# Patient Record
Sex: FEMALE | Race: WHITE | Employment: OTHER | URBAN - METROPOLITAN AREA
[De-identification: names, ages, dates, MRNs, and addresses within clinical notes are randomized per-mention and may not be internally consistent; named-entity substitution may affect disease eponyms.]

---

## 2017-01-26 ENCOUNTER — OFFICE VISIT (OUTPATIENT)
Dept: FAMILY MEDICINE | Facility: CLINIC | Age: 62
End: 2017-01-26
Payer: COMMERCIAL

## 2017-01-26 VITALS
DIASTOLIC BLOOD PRESSURE: 88 MMHG | HEIGHT: 69 IN | WEIGHT: 189 LBS | BODY MASS INDEX: 27.99 KG/M2 | HEART RATE: 119 BPM | TEMPERATURE: 98 F | RESPIRATION RATE: 14 BRPM | OXYGEN SATURATION: 98 % | SYSTOLIC BLOOD PRESSURE: 133 MMHG

## 2017-01-26 DIAGNOSIS — F98.8 ADD (ATTENTION DEFICIT DISORDER): ICD-10-CM

## 2017-01-26 DIAGNOSIS — R51.9 ACUTE NONINTRACTABLE HEADACHE, UNSPECIFIED HEADACHE TYPE: ICD-10-CM

## 2017-01-26 DIAGNOSIS — R41.3 POOR MEMORY: Primary | ICD-10-CM

## 2017-01-26 DIAGNOSIS — F41.9 ANXIETY: ICD-10-CM

## 2017-01-26 DIAGNOSIS — G45.9 TRANSIENT CEREBRAL ISCHEMIA, UNSPECIFIED TYPE: ICD-10-CM

## 2017-01-26 PROCEDURE — 99214 OFFICE O/P EST MOD 30 MIN: CPT | Performed by: PHYSICIAN ASSISTANT

## 2017-01-26 RX ORDER — BUSPIRONE HYDROCHLORIDE 15 MG/1
TABLET ORAL
Qty: 270 TABLET | Refills: 1 | Status: SHIPPED | OUTPATIENT
Start: 2017-01-26 | End: 2017-12-07

## 2017-01-26 RX ORDER — SUMATRIPTAN 100 MG/1
100 TABLET, FILM COATED ORAL
Qty: 9 TABLET | Refills: 1 | Status: SHIPPED
Start: 2017-01-26 | End: 2018-05-14

## 2017-01-26 RX ORDER — DEXMETHYLPHENIDATE HYDROCHLORIDE 10 MG/1
10 TABLET ORAL 2 TIMES DAILY
Qty: 60 TABLET | Refills: 0 | Status: SHIPPED | OUTPATIENT
Start: 2017-01-26 | End: 2017-02-27

## 2017-01-26 RX ORDER — LORAZEPAM 0.5 MG/1
0.25 TABLET ORAL 2 TIMES DAILY PRN
Qty: 60 TABLET | Refills: 0 | Status: SHIPPED | OUTPATIENT
Start: 2017-01-26 | End: 2017-02-14

## 2017-01-26 NOTE — MR AVS SNAPSHOT
After Visit Summary   1/26/2017    Mariela Duffy    MRN: [id number]           Patient Information     Date Of Birth          1955        Visit Information        Provider Department      1/26/2017 7:30 AM Estefany Stanley PA-C [facility]        Today's Diagnoses     Acute nonintractable headache, unspecified headache type    -  1     Anxiety         Poor memory         ADD (attention deficit disorder)            Follow-ups after your visit        Additional Services     MENTAL HEALTH REFERRAL       Your provider has referred you to: [facility]: [facility] - [facility] [phone]   [email or web address]   *Referral from [facility] Primary Care Provider required - Consultation Model - medication management & future refills will be returned to [facility] PCP upon completion of evaluation  *Please call to schedule an appointment.    All scheduling is subject to the client's specific insurance plan & benefits, provider/location availability, and provider clinical specialities.  Please arrive 15 minutes early for your first appointment and bring your completed paperwork.    Please be aware that coverage of these services is subject to the terms and limitations of your health insurance plan.  Call member services at your health plan with any benefit or coverage questions.            NEUROLOGY ADULT REFERRAL       Your provider has referred you to: [facility]: [facility] [phone]   [email or web address]    Reason for Referral: Consult    Please be aware that coverage of these services is subject to the terms and limitations of your health insurance plan.  Call member services at your health plan with any benefit or coverage questions.      Please bring the following with you to your appointment:    (1) Any X-Rays, CTs or MRIs which  "have been performed.  Contact the facility where they were done to arrange for  prior to your scheduled appointment.    (2) List of current medications  (3) This referral request   (4) Any documents/labs given to you for this referral                  Who to contact     If you have questions or need follow up information about today's clinic visit or your schedule please contact [facility] directly at [phone].  Normal or non-critical lab and imaging results will be communicated to you by ReDoc Softwarehart, letter or phone within 4 business days after the clinic has received the results. If you do not hear from us within 7 days, please contact the clinic through RiskIQt or phone. If you have a critical or abnormal lab result, we will notify you by phone as soon as possible.  Submit refill requests through TUBE or call your pharmacy and they will forward the refill request to us. Please allow 3 business days for your refill to be completed.          Additional Information About Your Visit        ReDoc SoftwareharStorefront Information     TUBE gives you secure access to your electronic health record. If you see a primary care provider, you can also send messages to your care team and make appointments. If you have questions, please call your primary care clinic.  If you do not have a primary care provider, please call [phone] and they will assist you.        Care EveryWhere ID     This is your Care EveryWhere ID. This could be used by other organizations to access your [place] medical records  [id number]        Your Vitals Were     Pulse Temperature Respirations    119 98  F (36.7  C) (Oral) 14    Height BMI (Body Mass Index) Pulse Oximetry    5' 9\" (1.753 m) 27.90 kg/m2 98%    Last Period Breastfeeding?       01/20/2004 No        Blood Pressure from Last 3 Encounters:   01/26/17 133/88   11/01/16 122/84   09/11/16 129/85    Weight from Last 3 Encounters:   01/26/17 189 lb (85.73 kg)   11/01/16 177 lb " 3.2 oz (80.377 kg)   09/11/16 173 lb 1.6 oz (78.518 kg)              We Performed the Following     MENTAL HEALTH REFERRAL     NEUROLOGY ADULT REFERRAL          Today's Medication Changes          These changes are accurate as of: 1/26/17  8:06 AM.  If you have any questions, ask your nurse or doctor.               These medicines have changed or have updated prescriptions.        Dose/Directions    * busPIRone 5 MG tablet   Commonly known as:  BUSPAR   This may have changed:    - how much to take  - additional instructions   Used for:  Anxiety   Changed by:  Estefany Stanley PA-C        Start at 5 mg twice daily for 3 days, then 7.5 mg (1.5 tabs) twice daily for 3 days, then 10 mg (2 tabs) twice daily for 3 days, then 12.5 mg (2.5 tabs) twice daily for 3 days, then 15 mg (3 tabs) twice daily and stay at that dose   Quantity:  150 tablet   Refills:  0       * busPIRone 15 MG tablet   Commonly known as:  BUSPAR   This may have changed:    - how much to take  - how to take this  - when to take this  - additional instructions   Used for:  Anxiety   Changed by:  Estefany Stanley PA-C        Take 2 tabs in in the morning and 1 tab in the evening   Quantity:  270 tablet   Refills:  1       * dexmethylphenidate 5 MG tablet   Commonly known as:  FOCALIN   This may have changed:  Another medication with the same name was added. Make sure you understand how and when to take each.   Used for:  ADD (attention deficit disorder)   Changed by:  Estefany Stanley PA-C        Dose:  5 mg   Take 1 tablet (5 mg) by mouth 2 times daily   Quantity:  60 tablet   Refills:  0       * dexmethylphenidate 10 MG tablet   Commonly known as:  FOCALIN   This may have changed:  You were already taking a medication with the same name, and this prescription was added. Make sure you understand how and when to take each.   Used for:  ADD (attention deficit disorder)   Changed by:  Estefany Stanley PA-C        Dose:  10  mg   Take 1 tablet (10 mg) by mouth 2 times daily   Quantity:  60 tablet   Refills:  0       nortriptyline 75 MG capsule   Commonly known as:  PAMELOR   This may have changed:  Another medication with the same name was removed. Continue taking this medication, and follow the directions you see here.   Used for:  Major depressive disorder, recurrent episode, mild (H)   Changed by:  Estefany Stanley PA-C        Dose:  75 mg   Take 1 capsule (75 mg) by mouth At Bedtime   Quantity:  90 capsule   Refills:  1       * Notice:  This list has 4 medication(s) that are the same as other medications prescribed for you. Read the directions carefully, and ask your doctor or other care provider to review them with you.      Stop taking these medicines if you haven't already. Please contact your care team if you have questions.     aspirin 81 MG EC tablet   Stopped by:  Estefany Stanley PA-C           traZODone 150 MG tablet   Commonly known as:  DESYREL   Stopped by:  Estefany Stanley PA-C                Where to get your medicines      These medications were sent to [address] [address]     Phone:  [phone]    - busPIRone 15 MG tablet  - SUMAtriptan 100 MG tablet      Some of these will need a paper prescription and others can be bought over the counter.  Ask your nurse if you have questions.     Bring a paper prescription for each of these medications    - dexmethylphenidate 10 MG tablet  - LORazepam 0.5 MG tablet             Primary Care Provider Office Phone # Fax #    Estefany Stanley PA-C [phone]       [address] [address]        Thank you!     Thank you for choosing [facility]  for your care. Our goal is always to provide you with excellent care. Hearing back from our patients is one way we can continue to improve our services.  Please take a few minutes to complete the written survey that you may receive in the mail after your visit with us. Thank you!             Your Updated Medication List - Protect others around you: Learn how to safely use, store and throw away your medicines at www.disposemymeds.org.          This list is accurate as of: 1/26/17  8:06 AM.  Always use your most recent med list.                   Brand Name Dispense Instructions for use    * busPIRone 5 MG tablet    BUSPAR    150 tablet    Start at 5 mg twice daily for 3 days, then 7.5 mg (1.5 tabs) twice daily for 3 days, then 10 mg (2 tabs) twice daily for 3 days, then 12.5 mg (2.5 tabs) twice daily for 3 days, then 15 mg (3 tabs) twice daily and stay at that dose       * busPIRone 15 MG tablet    BUSPAR    270 tablet    Take 2 tabs in in the morning and 1 tab in the evening       * dexmethylphenidate 5 MG tablet    FOCALIN    60 tablet    Take 1 tablet (5 mg) by mouth 2 times daily       * dexmethylphenidate 10 MG tablet    FOCALIN    60 tablet    Take 1 tablet (10 mg) by mouth 2 times daily       fluticasone 50 MCG/ACT spray    FLONASE     Spray 2 sprays into both nostrils daily as needed for rhinitis or allergies       levothyroxine 137 MCG tablet    SYNTHROID/LEVOTHROID    90 tablet    Take 1 tablet (137 mcg) by mouth daily       LORazepam 0.5 MG tablet    ATIVAN    60 tablet    Take 0.5 tablets (0.25 mg) by mouth 2 times daily as needed for anxiety . May take 2 tabs ( 1 mg) bid PO 2 times daily as needed until current anxiety flare improves.       nortriptyline 75 MG capsule    PAMELOR    90 capsule    Take 1 capsule (75 mg) by mouth At Bedtime       order for DME     1 Units    Light therapy light box       sertraline 100 MG tablet    ZOLOFT    180 tablet    Take 2 tablets (200 mg) by mouth At Bedtime       SUMAtriptan 100 MG tablet    IMITREX    9 tablet    Take 1 tablet (100 mg) by mouth at onset of headache for migraine May repeat in 2 hours if needed: max  2/day; average number of headaches monthly 2       triamcinolone 0.1 % ointment    KENALOG     Apply topically 2 times daily as needed for irritation       * Notice:  This list has 4 medication(s) that are the same as other medications prescribed for you. Read the directions carefully, and ask your doctor or other care provider to review them with you.

## 2017-01-26 NOTE — NURSING NOTE
"Chief Complaint   Patient presents with     Memory Loss       Initial /88 mmHg  Pulse 119  Temp(Src) 98  F (36.7  C) (Oral)  Resp 14  Ht 5' 9\" (1.753 m)  Wt 189 lb (85.73 kg)  BMI 27.90 kg/m2  SpO2 98%  LMP 01/20/2004  Breastfeeding? No Estimated body mass index is 27.9 kg/(m^2) as calculated from the following:    Height as of this encounter: 5' 9\" (1.753 m).    Weight as of this encounter: 189 lb (85.73 kg).  BP completed using cuff size: large rt arm Linda Keene MA  Health Maintenance has been reviewed.       "

## 2017-01-26 NOTE — PROGRESS NOTES
"  SUBJECTIVE:                                                    Mariela Duffy is a 61 year old female who presents to clinic today for the following health issues:      Patient states that her memory has been becoming an issue for the past 6 months, but has been becoming worse the this past month. Patient states that the memory loss has been affecting her work. Patient states she is forgetting to come back from lunch break or leaving to late after her shift. Patient feels like she loses focus often. Patient does have ADD and takes Focalin 5 mg BID for this.     She states she forgets her words, they eventually come to her and recently could not remember where she parked her car in a grocery store parking lot.     She did have a TIA a few months ago, never f/u with Neurology. Has previously been seen by [facility].   Patient's stress has increased as her partner was diagnosed with Parkinson's.     Patient \"graduated\" from her Psychatrist, Je, and has not been back for a long time.   Does feel anxiety is increasing.         Problem list and histories reviewed & adjusted, as indicated.  Additional history: as documented    Patient Active Problem List   Diagnosis     Contact dermatitis and other eczema due to other specified agent     Allergic rhinitis due to other allergen     Esophageal reflux     Irritable bowel syndrome     Rosacea     Anxiety     Postsurgical hypothyroidism     Iron deficiency anemia     Absence of menstruation     Mild major depression (H)     CARDIOVASCULAR SCREENING; LDL GOAL LESS THAN 160     Generalized anxiety disorder     Hypothyroidism     Tubular adenoma of colon     Seasonal affective disorder (H)     Rhinitis     Headache     ADD (attention deficit disorder)     Other insomnia     TIA (transient ischemic attack)     Past Surgical History   Procedure Laterality Date     C nonspecific procedure  1997     surgery hiatal hernia     C nonspecific procedure  1993     cholecystectomy     C " "nonspecific procedure  multiple     cleft lip repair.     Surgical history of -        thyroidectomy due to cancer     Cholecystectomy       Head & neck surgery       thyroid cancer surgery     Colonoscopy  2013     Colonoscopy Dr. Vallejo [facility]     Wrist surgery Right      2015       Social History   Substance Use Topics     Smoking status: Never Smoker      Smokeless tobacco: Never Used     Alcohol Use: Yes      Comment: 2-4 mixed drinks/month     Family History   Problem Relation Age of Onset     CANCER Father      Colon, stomach -  at 75yoa     CANCER Mother      Throat, lymph, bone -  at 79yoa     Hypertension Father      C.A.D. Father      C.A.D. Maternal Grandfather      Heart Attack -  in his late 60's     Alzheimer Disease Paternal Grandmother      C.A.D. Paternal Grandfather      Heart Attack -  at 63yoa           ROS:  Constitutional, HEENT, cardiovascular, pulmonary, GI, , musculoskeletal, neuro, skin, endocrine and psych systems are negative, except as otherwise noted.    OBJECTIVE:                                                    /88 mmHg  Pulse 119  Temp(Src) 98  F (36.7  C) (Oral)  Resp 14  Ht 5' 9\" (1.753 m)  Wt 189 lb (85.73 kg)  BMI 27.90 kg/m2  SpO2 98%  LMP 2004  Breastfeeding? No  Body mass index is 27.9 kg/(m^2).  GENERAL APPEARANCE: healthy, alert and no distress  RESP: lungs clear to auscultation - no rales, rhonchi or wheezes  CV: regular rates and rhythm, normal S1 S2, no S3 or S4 and no murmur, click or rub  SKIN: no suspicious lesions or rashes  NEURO: Normal strength and tone, mentation intact and speech normal  PSYCH: mentation appears normal and anxious         ASSESSMENT/PLAN:                                                            1. Poor memory  Recommend f/u with Neurology and Psychiatry.   Need to r/o continued TIA's vs. Anxiety vs. Other.   - MENTAL HEALTH REFERRAL  - NEUROLOGY ADULT REFERRAL    2. Transient cerebral " ischemia, unspecified type  See #1    3. Anxiety  Will increase buspar to 45 mg daily.  F/u with Psychiatry, referral given, but pt states she will likely go back to her previous Psychiatrist.   - busPIRone (BUSPAR) 15 MG tablet; Take 2 tabs in in the morning and 1 tab in the evening  Dispense: 270 tablet; Refill: 1  - LORazepam (ATIVAN) 0.5 MG tablet; Take 0.5 tablets (0.25 mg) by mouth 2 times daily as needed for anxiety . May take 2 tabs ( 1 mg) bid PO 2 times daily as needed until current anxiety flare improves.  Dispense: 60 tablet; Refill: 0  - MENTAL HEALTH REFERRAL  - NEUROLOGY ADULT REFERRAL    4. ADD (attention deficit disorder)  Will increase to 10 mg BID   - dexmethylphenidate (FOCALIN) 10 MG tablet; Take 1 tablet (10 mg) by mouth 2 times daily  Dispense: 60 tablet; Refill: 0  - NEUROLOGY ADULT REFERRAL    5. Acute nonintractable headache, unspecified headache type  Stable.   - SUMAtriptan (IMITREX) 100 MG tablet; Take 1 tablet (100 mg) by mouth at onset of headache for migraine May repeat in 2 hours if needed: max 2/day; average number of headaches monthly 2  Dispense: 9 tablet; Refill: 1        Estefany Stanley PA-C, PAUL  [facility]

## 2017-02-07 DIAGNOSIS — F33.0 MAJOR DEPRESSIVE DISORDER, RECURRENT EPISODE, MILD (H): Primary | ICD-10-CM

## 2017-02-07 NOTE — TELEPHONE ENCOUNTER
Last Written Prescription Date: 11/01/16  Last Fill Quantity: 90, # refills: 1  Last Office Visit with [facility], [facility] or [facility] prescribing provider: 1/26/17       BP Readings from Last 3 Encounters:   01/26/17 133/88   11/01/16 122/84   09/11/16 129/85     Gely Antonio, Pharmacy [facility]

## 2017-02-08 RX ORDER — NORTRIPTYLINE HYDROCHLORIDE 75 MG/1
75 CAPSULE ORAL AT BEDTIME
Qty: 90 CAPSULE | Refills: 1 | Status: SHIPPED | OUTPATIENT
Start: 2017-02-08 | End: 2017-11-01

## 2017-02-08 NOTE — TELEPHONE ENCOUNTER
Prescription approved per [facility] [place] Refill Protocol.  Nelda Ta PharmD   [facility]  [email or web address].

## 2017-02-14 DIAGNOSIS — F41.9 ANXIETY: ICD-10-CM

## 2017-02-14 NOTE — TELEPHONE ENCOUNTER
Controlled Substance Refill Request for Lorazepam  Problem List Complete:  No     PROVIDER TO CONSIDER COMPLETION OF PROBLEM LIST AND OVERVIEW/CONTROLLED SUBSTANCE AGREEMENT    Last Written Prescription Date:  1/26/17  Last Fill Quantity: 60,   # refills: 0    Last Office Visit with [facility] care provider: 1/26/17    Future Office visit:     Controlled substance agreement on file: No.     Processing:  Staff will hand deliver Rx to on-site pharmacy   checked in past 6 months?  Yes 10/29/16     Gely Antonio, Pharmacy [facility]

## 2017-02-15 RX ORDER — LORAZEPAM 0.5 MG/1
0.25 TABLET ORAL 2 TIMES DAILY PRN
Qty: 60 TABLET | Refills: 0 | Status: SHIPPED | OUTPATIENT
Start: 2017-02-15 | End: 2017-03-02

## 2017-02-15 NOTE — TELEPHONE ENCOUNTER
Refilled and in outbox but it looks like ryley wanted her to follow up with psych. Has she done so? If so, further refills should be deemed necessary through psych.     Thanks,    Pepe Acuna, PaC

## 2017-02-16 NOTE — TELEPHONE ENCOUNTER
Patient was returning call to [employer]  I provided patient with information regarding her prescription being walked over to the pharmacy.  Patient states that she has not had a f/u appointment with psych. I provided patient with information to call and schedule an appointment.  [facility]: [facility] - [facility] [phone]     Dominique Pfeiffer/

## 2017-02-17 ENCOUNTER — MYC MEDICAL ADVICE (OUTPATIENT)
Dept: FAMILY MEDICINE | Facility: CLINIC | Age: 62
End: 2017-02-17

## 2017-02-27 ENCOUNTER — TELEPHONE (OUTPATIENT)
Dept: FAMILY MEDICINE | Facility: CLINIC | Age: 62
End: 2017-02-27

## 2017-02-27 DIAGNOSIS — F98.8 ADD (ATTENTION DEFICIT DISORDER): ICD-10-CM

## 2017-02-27 NOTE — TELEPHONE ENCOUNTER
Controlled Substance Refill Request for Focalin  Problem List Complete:  No     PROVIDER TO CONSIDER COMPLETION OF PROBLEM LIST AND OVERVIEW/CONTROLLED SUBSTANCE AGREEMENT    Last Written Prescription Date:  1/26/17  Last Fill Quantity: 60,   # refills: 0    Last Office Visit with [facility] primary care provider: 1/26/17    Future Office visit:     Controlled substance agreement on file: No.     Processing:  Staff will hand deliver Rx to on-site pharmacy   checked in past 6 months?  Yes 10/29/16     Gely Antonio, Pharmacy [facility]

## 2017-02-28 RX ORDER — DEXMETHYLPHENIDATE HYDROCHLORIDE 10 MG/1
10 TABLET ORAL 2 TIMES DAILY
Qty: 60 TABLET | Refills: 0 | Status: SHIPPED | OUTPATIENT
Start: 2017-02-28 | End: 2017-03-27

## 2017-03-02 ENCOUNTER — TELEPHONE (OUTPATIENT)
Dept: FAMILY MEDICINE | Facility: CLINIC | Age: 62
End: 2017-03-02

## 2017-03-02 DIAGNOSIS — F41.9 ANXIETY: ICD-10-CM

## 2017-03-02 RX ORDER — LORAZEPAM 0.5 MG/1
0.25 TABLET ORAL 2 TIMES DAILY PRN
Qty: 60 TABLET | Refills: 0 | Status: SHIPPED | OUTPATIENT
Start: 2017-03-02 | End: 2017-04-08

## 2017-03-02 NOTE — TELEPHONE ENCOUNTER
Pt is having to take 2 tablets twice daily all day; should the rx reflect this?    Controlled Substance Refill Request for Lorazepam  Problem List Complete:  No     PROVIDER TO CONSIDER COMPLETION OF PROBLEM LIST AND OVERVIEW/CONTROLLED SUBSTANCE AGREEMENT    Last Written Prescription Date:  2/15/17  Last Fill Quantity: 60,   # refills: 0    Last Office Visit with [facility] primary care provider: 1/26/17    Future Office visit:     Controlled substance agreement on file: No.     Processing:  Staff will hand deliver Rx to on-site pharmacy   checked in past 6 months?  Yes 10/29/17     Gely Antonio, Pharmacy [facility]

## 2017-03-27 ENCOUNTER — TELEPHONE (OUTPATIENT)
Dept: FAMILY MEDICINE | Facility: CLINIC | Age: 62
End: 2017-03-27

## 2017-03-27 DIAGNOSIS — F98.8 ADD (ATTENTION DEFICIT DISORDER): ICD-10-CM

## 2017-03-27 NOTE — TELEPHONE ENCOUNTER
Controlled Substance Refill Request for Focalin 10mg  Problem List Complete:  No     PROVIDER TO CONSIDER COMPLETION OF PROBLEM LIST AND OVERVIEW/CONTROLLED SUBSTANCE AGREEMENT    Last Written Prescription Date:  02/28/17  Last Fill Quantity: 60,   # refills: 0    [facility] Office Visit with [facility] [place] care provider: 01/26/17    Future Office visit:     Controlled substance agreement on file: No.     Processing:  Staff will hand deliver Rx to on-site pharmacy   checked in past 6 months?  No, route to RN     Thank you,  Viki Rodriguez  [facility]  [phone]

## 2017-03-29 RX ORDER — DEXMETHYLPHENIDATE HYDROCHLORIDE 10 MG/1
10 TABLET ORAL 2 TIMES DAILY
Qty: 60 TABLET | Refills: 0 | Status: SHIPPED | OUTPATIENT
Start: 2017-03-29 | End: 2017-05-09

## 2017-03-29 NOTE — TELEPHONE ENCOUNTER
Kendall Allison, we added ADD overview to problem list.    Will you correct any errors? :    Medication(s): Focalin 10 mg.   Maximum quantity per month: 60  Clinic visit frequency required: Q 6  months     Controlled substance agreement on file: No  Neuropsych evaluation for ADD completed:  No    Last [facility] website verification:  done on 3/29/17 Report in Estefany's office   [email or web address]

## 2017-04-07 ENCOUNTER — TELEPHONE (OUTPATIENT)
Dept: FAMILY MEDICINE | Facility: CLINIC | Age: 62
End: 2017-04-07

## 2017-04-07 NOTE — TELEPHONE ENCOUNTER
4/7/2017     Call Regarding Preventive Health Screening Cervical/PAP  Attempt 1     Message on voicemail   Comments: MANUAL DIAL        Outreach   VARUN

## 2017-04-08 ENCOUNTER — TELEPHONE (OUTPATIENT)
Dept: FAMILY MEDICINE | Facility: CLINIC | Age: 62
End: 2017-04-08

## 2017-04-08 DIAGNOSIS — F41.9 ANXIETY: ICD-10-CM

## 2017-04-08 NOTE — TELEPHONE ENCOUNTER
Controlled Substance Refill Request for LORazepam (ATIVAN) 0.5 MG tablet  Problem List Complete:  No     PROVIDER TO CONSIDER COMPLETION OF PROBLEM LIST AND OVERVIEW/CONTROLLED SUBSTANCE AGREEMENT    Last Written Prescription Date:  03/02/17  Last Fill Quantity: 60,   # refills: 0    Last Office Visit with [facility] primary care provider: 01/26/17    Future Office visit:     Controlled substance agreement on file: No.     Processing:  Staff will hand deliver Rx to on-site pharmacy   checked in past 6 months?  No, route to RN

## 2017-04-11 ENCOUNTER — TELEPHONE (OUTPATIENT)
Dept: FAMILY MEDICINE | Facility: CLINIC | Age: 62
End: 2017-04-11

## 2017-04-11 RX ORDER — LORAZEPAM 0.5 MG/1
0.25 TABLET ORAL 2 TIMES DAILY PRN
Qty: 60 TABLET | Refills: 0 | Status: SHIPPED | OUTPATIENT
Start: 2017-04-11 | End: 2017-06-27

## 2017-04-11 NOTE — TELEPHONE ENCOUNTER
"Pt called in regarding Focalin,  Was off Focalin a week or two  Now taking 1 tab am and 1 in afternoon  Feels like she is \"flying\" by noon, hyperactive, overly sensitive  Lost job March 14 so has increased stress, applying for medical assistance since lost insurance at work    CB# [phone] OK to   Route to PCP    Vesna Zaman RN, BS  Clinical Nurse Triage.    "

## 2017-04-12 NOTE — TELEPHONE ENCOUNTER
Please call pt back.  I would recommend closely monitoring her symptoms.     Just try 1/2 tab of Focalin in daytime, try skipping afternoon dose and drink plenty of water.  If symptoms persist or worsen, I recommend an OV.    Estefany Stanley PA-C

## 2017-04-20 ENCOUNTER — TRANSFERRED RECORDS (OUTPATIENT)
Dept: HEALTH INFORMATION MANAGEMENT | Facility: CLINIC | Age: 62
End: 2017-04-20

## 2017-04-20 NOTE — TELEPHONE ENCOUNTER
4/20/2017    Call Regarding Preventive Health Screening Cervical/PAP    Attempt 2    Message on voicemail     Comments:           Outreach   Perla Cedeno

## 2017-05-09 ENCOUNTER — OFFICE VISIT (OUTPATIENT)
Dept: FAMILY MEDICINE | Facility: CLINIC | Age: 62
End: 2017-05-09
Payer: COMMERCIAL

## 2017-05-09 VITALS
HEIGHT: 69 IN | WEIGHT: 192.8 LBS | BODY MASS INDEX: 28.56 KG/M2 | DIASTOLIC BLOOD PRESSURE: 67 MMHG | SYSTOLIC BLOOD PRESSURE: 113 MMHG | HEART RATE: 112 BPM | RESPIRATION RATE: 16 BRPM | TEMPERATURE: 98.2 F | OXYGEN SATURATION: 98 %

## 2017-05-09 DIAGNOSIS — R10.11 RUQ ABDOMINAL PAIN: ICD-10-CM

## 2017-05-09 DIAGNOSIS — M79.674 PAIN OF TOE OF RIGHT FOOT: ICD-10-CM

## 2017-05-09 DIAGNOSIS — G45.3 AMAUROSIS FUGAX, LEFT EYE: ICD-10-CM

## 2017-05-09 DIAGNOSIS — K44.9 HIATAL HERNIA: ICD-10-CM

## 2017-05-09 DIAGNOSIS — K21.00 GASTROESOPHAGEAL REFLUX DISEASE WITH ESOPHAGITIS: Primary | ICD-10-CM

## 2017-05-09 LAB
ALBUMIN SERPL-MCNC: 4.4 G/DL (ref 3.4–5)
ALP SERPL-CCNC: 130 U/L (ref 40–150)
ALT SERPL W P-5'-P-CCNC: 33 U/L (ref 0–50)
ANION GAP SERPL CALCULATED.3IONS-SCNC: 8 MMOL/L (ref 3–14)
AST SERPL W P-5'-P-CCNC: 27 U/L (ref 0–45)
BILIRUB SERPL-MCNC: 0.4 MG/DL (ref 0.2–1.3)
BUN SERPL-MCNC: 9 MG/DL (ref 7–30)
CALCIUM SERPL-MCNC: 9.5 MG/DL (ref 8.5–10.1)
CHLORIDE SERPL-SCNC: 105 MMOL/L (ref 94–109)
CO2 SERPL-SCNC: 28 MMOL/L (ref 20–32)
CREAT SERPL-MCNC: 0.93 MG/DL (ref 0.52–1.04)
ERYTHROCYTE [DISTWIDTH] IN BLOOD BY AUTOMATED COUNT: 13.6 % (ref 10–15)
GFR SERPL CREATININE-BSD FRML MDRD: 61 ML/MIN/1.7M2
GLUCOSE SERPL-MCNC: 96 MG/DL (ref 70–99)
HCT VFR BLD AUTO: 43.4 % (ref 35–47)
HGB BLD-MCNC: 14.8 G/DL (ref 11.7–15.7)
LIPASE SERPL-CCNC: 246 U/L (ref 73–393)
MCH RBC QN AUTO: 30.7 PG (ref 26.5–33)
MCHC RBC AUTO-ENTMCNC: 34.1 G/DL (ref 31.5–36.5)
MCV RBC AUTO: 90 FL (ref 78–100)
PLATELET # BLD AUTO: 269 10E9/L (ref 150–450)
POTASSIUM SERPL-SCNC: 4.5 MMOL/L (ref 3.4–5.3)
PROT SERPL-MCNC: 7.8 G/DL (ref 6.8–8.8)
RBC # BLD AUTO: 4.82 10E12/L (ref 3.8–5.2)
SODIUM SERPL-SCNC: 141 MMOL/L (ref 133–144)
WBC # BLD AUTO: 8.2 10E9/L (ref 4–11)

## 2017-05-09 PROCEDURE — 36415 COLL VENOUS BLD VENIPUNCTURE: CPT | Performed by: PHYSICIAN ASSISTANT

## 2017-05-09 PROCEDURE — 85027 COMPLETE CBC AUTOMATED: CPT | Performed by: PHYSICIAN ASSISTANT

## 2017-05-09 PROCEDURE — 99214 OFFICE O/P EST MOD 30 MIN: CPT | Performed by: PHYSICIAN ASSISTANT

## 2017-05-09 PROCEDURE — 83690 ASSAY OF LIPASE: CPT | Performed by: PHYSICIAN ASSISTANT

## 2017-05-09 PROCEDURE — 80053 COMPREHEN METABOLIC PANEL: CPT | Performed by: PHYSICIAN ASSISTANT

## 2017-05-09 RX ORDER — PANTOPRAZOLE SODIUM 20 MG/1
20 TABLET, DELAYED RELEASE ORAL DAILY
Qty: 30 TABLET | Refills: 1 | Status: SHIPPED | OUTPATIENT
Start: 2017-05-09 | End: 2017-07-06

## 2017-05-09 ASSESSMENT — ANXIETY QUESTIONNAIRES
GAD7 TOTAL SCORE: 12
7. FEELING AFRAID AS IF SOMETHING AWFUL MIGHT HAPPEN: MORE THAN HALF THE DAYS
5. BEING SO RESTLESS THAT IT IS HARD TO SIT STILL: NOT AT ALL
2. NOT BEING ABLE TO STOP OR CONTROL WORRYING: MORE THAN HALF THE DAYS
1. FEELING NERVOUS, ANXIOUS, OR ON EDGE: NEARLY EVERY DAY
IF YOU CHECKED OFF ANY PROBLEMS ON THIS QUESTIONNAIRE, HOW DIFFICULT HAVE THESE PROBLEMS MADE IT FOR YOU TO DO YOUR WORK, TAKE CARE OF THINGS AT HOME, OR GET ALONG WITH OTHER PEOPLE: SOMEWHAT DIFFICULT
6. BECOMING EASILY ANNOYED OR IRRITABLE: SEVERAL DAYS
3. WORRYING TOO MUCH ABOUT DIFFERENT THINGS: NEARLY EVERY DAY

## 2017-05-09 ASSESSMENT — PATIENT HEALTH QUESTIONNAIRE - PHQ9: 5. POOR APPETITE OR OVEREATING: SEVERAL DAYS

## 2017-05-09 NOTE — MR AVS SNAPSHOT
After Visit Summary   5/9/2017    Mareila Duffy    MRN: [id number]           Patient Information     Date Of Birth          1955        Visit Information        Provider Department      5/9/2017 1:15 PM Estefany Stanley PA-C [facility]        Today's Diagnoses     Amaurosis fugax, left eye    -  1    Pain of toe of right foot        RUQ abdominal pain        Gastroesophageal reflux disease with esophagitis        Hiatal hernia           Follow-ups after your visit        Additional Services     NEUROLOGY ADULT REFERRAL       Your provider has referred you to: [facility]: [facility] [phone]   [email or web address]    Reason for Referral: Consult    Please be aware that coverage of these services is subject to the terms and limitations of your health insurance plan.  Call member services at your health plan with any benefit or coverage questions.      Please bring the following with you to your appointment:    (1) Any X-Rays, CTs or MRIs which have been performed.  Contact the facility where they were done to arrange for  prior to your scheduled appointment.    (2) List of current medications  (3) This referral request   (4) Any documents/labs given to you for this referral            ORTHO  REFERRAL       [facility] is referring you to the Orthopedic  Services at [facility].       The  Representative will assist you in the coordination of your Orthopedic and Musculoskeletal Care as prescribed by your physician.    The  Representative will call you within 1 business day to help schedule your appointment, or you may contact the  Representative at:    All areas ~ [phone]     Type of Referral : [facility]       Timeframe requested: 3 - 5 days    Coverage of these services is subject to the terms and  limitations of your health insurance plan.  Please call member services at your health plan with any benefit or coverage questions.      If X-rays, CT or MRI's have been performed, please contact the facility where they were done to arrange for , prior to your scheduled appointment.  Please bring this referral request to your appointment and present it to your specialist.                  Future tests that were ordered for you today     Open Future Orders        Priority Expected Expires Ordered    US Abdomen Limited Routine  5/9/2018 5/9/2017            Who to contact     If you have questions or need follow up information about today's clinic visit or your schedule please contact [facility] directly at [phone].  Normal or non-critical lab and imaging results will be communicated to you by MyChart, letter or phone within 4 business days after the clinic has received the results. If you do not hear from us within 7 days, please contact the clinic through [employer] or phone. If you have a critical or abnormal lab result, we will notify you by phone as soon as possible.  Submit refill requests through [employer] or call your pharmacy and they will forward the refill request to us. Please allow 3 business days for your refill to be completed.          Additional Information About Your Visit        [facility] Information     [employer] gives you secure access to your electronic health record. If you see a primary care provider, you can also send messages to your care team and make appointments. If you have questions, please call your primary care clinic.  If you do not have a primary care provider, please call [phone] and they will assist you.        Care EveryWhere ID     This is your Care EveryWhere ID. This could be used by other organizations to access your [place] medical records  [id number]        Your Vitals Were     Pulse Temperature Respirations Height Last Period Pulse Oximetry     "112 98.2  F (36.8  C) (Oral) 16 5' 9\" (1.753 m) 01/20/2004 98%    Breastfeeding? BMI (Body Mass Index)                No 28.47 kg/m2           Blood Pressure from Last 3 Encounters:   05/09/17 113/67   01/26/17 133/88   11/01/16 122/84    Weight from Last 3 Encounters:   05/09/17 192 lb 12.8 oz (87.5 kg)   01/26/17 189 lb (85.7 kg)   11/01/16 177 lb 3.2 oz (80.4 kg)              We Performed the Following     CBC with platelets     Comprehensive metabolic panel (BMP + Alb, Alk Phos, ALT, AST, Total. Bili, TP)     Lipase     NEUROLOGY ADULT REFERRAL     ORTHO  REFERRAL          Today's Medication Changes          These changes are accurate as of: 5/9/17  1:42 PM.  If you have any questions, ask your nurse or doctor.               Start taking these medicines.        Dose/Directions    pantoprazole 20 MG EC tablet   Commonly known as:  PROTONIX   Used for:  Gastroesophageal reflux disease with esophagitis, Hiatal hernia   Started by:  Estefany Stanley PA-C        Dose:  20 mg   Take 1 tablet (20 mg) by mouth daily Take by mouth 30-60 minutes before a meal.   Quantity:  30 tablet   Refills:  1         Stop taking these medicines if you haven't already. Please contact your care team if you have questions.     dexmethylphenidate 5 MG tablet   Commonly known as:  FOCALIN   Stopped by:  Estefany Stanley PA-C                Where to get your medicines      These medications were sent to [address] [address]     Phone:  [phone]     pantoprazole 20 MG EC tablet                Primary Care Provider Office Phone # Fax #    Estefany Stanley PA-C [phone]       [address] [address]        Thank you!     Thank you for choosing [facility]  for your care. Our goal is always to provide you with excellent care. Hearing back from our " patients is one way we can continue to improve our services. Please take a few minutes to complete the written survey that you may receive in the mail after your visit with us. Thank you!             Your Updated Medication List - Protect others around you: Learn how to safely use, store and throw away your medicines at www.disposemymeds.org.          This list is accurate as of: 5/9/17  1:42 PM.  Always use your most recent med list.                   Brand Name Dispense Instructions for use    busPIRone 15 MG tablet    BUSPAR    270 tablet    Take 2 tabs in in the morning and 1 tab in the evening       fluticasone 50 MCG/ACT spray    FLONASE     Spray 2 sprays into both nostrils daily as needed for rhinitis or allergies       levothyroxine 137 MCG tablet    SYNTHROID/LEVOTHROID    90 tablet    Take 1 tablet (137 mcg) by mouth daily       LORazepam 0.5 MG tablet    ATIVAN    60 tablet    Take 0.5 tablets (0.25 mg) by mouth 2 times daily as needed for anxiety . May take 2 tabs ( 1 mg) bid PO 2 times daily as needed until current anxiety flare improves.       nortriptyline 75 MG capsule    PAMELOR    90 capsule    Take 1 capsule (75 mg) by mouth At Bedtime       order for DME     1 Units    Light therapy light box       pantoprazole 20 MG EC tablet    PROTONIX    30 tablet    Take 1 tablet (20 mg) by mouth daily Take by mouth 30-60 minutes before a meal.       sertraline 100 MG tablet    ZOLOFT    180 tablet    Take 2 tablets (200 mg) by mouth At Bedtime       SUMAtriptan 100 MG tablet    IMITREX    9 tablet    Take 1 tablet (100 mg) by mouth at onset of headache for migraine May repeat in 2 hours if needed: max 2/day; average number of headaches monthly 2       triamcinolone 0.1 % ointment    KENALOG     Apply topically 2 times daily as needed for irritation

## 2017-05-09 NOTE — NURSING NOTE
"Chief Complaint   Patient presents with     Abdominal Pain     Musculoskeletal Problem       Initial /67 (BP Location: Right arm, Patient Position: Chair, Cuff Size: Adult Large)  Pulse 112  Temp 98.2  F (36.8  C) (Oral)  Resp 16  Ht 5' 9\" (1.753 m)  Wt 192 lb 12.8 oz (87.5 kg)  LMP 01/20/2004  SpO2 98%  Breastfeeding? No  BMI 28.47 kg/m2 Estimated body mass index is 28.47 kg/(m^2) as calculated from the following:    Height as of this encounter: 5' 9\" (1.753 m).    Weight as of this encounter: 192 lb 12.8 oz (87.5 kg).  Medication Reconciliation: complete Linda Keene MA  Health Maintenance has been reviewed.       "

## 2017-05-09 NOTE — PROGRESS NOTES
"  SUBJECTIVE:                                                    Mariela Duffy is a 61 year old female who presents to clinic today for the following health issues:      ABDOMINAL PAIN     Onset: 3 months    Description:   Character: throbbing  Location: lright upper quadrant  Radiation: None    Intensity: moderate    Progression of Symptoms:  same and intermittent    Accompanying Signs & Symptoms:  Fever/Chills?: no   Gas/Bloating: YES  Nausea: YES  Vomitting: acid burps up, not true \"vomiting\"  Diarrhea?: no   Constipation:no   Dysuria or Hematuria: no    History:   Trauma: no   Previous similar pain: YES   Previous tests done: nothing in the past 10 years    Precipitating factors:   Does the pain change with:     Food: YES- better     BM: no     Urination: no     Alleviating factors:  Rolaids sometimes help    Therapies Tried and outcome: rolaids    LMP:  not applicable     Joint Pain     Onset: 2 weeks    Description:   Location: right foot  Character: Sharp    Intensity: moderate    Progression of Symptoms: intermittent    Accompanying Signs & Symptoms:  Other symptoms: none   History:   Previous similar pain: no       Precipitating factors:   Trauma or overuse: no     Alleviating factors:  Improved by: soaking foot       Therapies Tried and outcome:       Patient here w/ OV note from opthalmology.  Dx with possible glaucoma and amaurosis fugax    Problem list and histories reviewed & adjusted, as indicated.  Additional history: as documented    Patient Active Problem List   Diagnosis     Contact dermatitis and other eczema due to other specified agent     Allergic rhinitis due to other allergen     Esophageal reflux     Irritable bowel syndrome     Rosacea     Anxiety     Postsurgical hypothyroidism     Iron deficiency anemia     Absence of menstruation     Mild major depression (H)     CARDIOVASCULAR SCREENING; LDL GOAL LESS THAN 160     Generalized anxiety disorder     Hypothyroidism     Tubular adenoma " "of colon     Seasonal affective disorder (H)     Rhinitis     Headache     ADD (attention deficit disorder)     Other insomnia     TIA (transient ischemic attack)     Gastroesophageal reflux disease with esophagitis     Hiatal hernia     Past Surgical History:   Procedure Laterality Date     C NONSPECIFIC PROCEDURE      surgery hiatal hernia     C NONSPECIFIC PROCEDURE      cholecystectomy     C NONSPECIFIC PROCEDURE  multiple    cleft lip repair.     CHOLECYSTECTOMY       COLONOSCOPY  2013    Colonoscopy Dr. Yoni BENOIT     HEAD & NECK SURGERY      thyroid cancer surgery     SURGICAL HISTORY OF -       thyroidectomy due to cancer     WRIST SURGERY Right     2015       Social History   Substance Use Topics     Smoking status: Never Smoker     Smokeless tobacco: Never Used     Alcohol use Yes      Comment: 2-4 mixed drinks/month     Family History   Problem Relation Age of Onset     CANCER Father      Colon, stomach -  at 75yoa     CANCER Mother      Throat, lymph, bone -  at 79yoa     Hypertension Father      C.A.D. Father      C.A.D. Maternal Grandfather      Heart Attack -  in his late 60's     Alzheimer Disease Paternal Grandmother      C.A.D. Paternal Grandfather      Heart Attack -  at 63yoa           Reviewed and updated as needed this visit by clinical staff  Tobacco  Allergies  Meds  Med Hx  Surg Hx  Fam Hx  Soc Hx      Reviewed and updated as needed this visit by Provider  Tobacco  Allergies  Meds  Med Hx  Surg Hx  Fam Hx  Soc Hx        ROS:  Constitutional, HEENT, cardiovascular, pulmonary, GI, , musculoskeletal, neuro, skin, endocrine and psych systems are negative, except as otherwise noted.    OBJECTIVE:                                                    /67 (BP Location: Right arm, Patient Position: Chair, Cuff Size: Adult Large)  Pulse 112  Temp 98.2  F (36.8  C) (Oral)  Resp 16  Ht 5' 9\" (1.753 m)  Wt 192 lb 12.8 oz (87.5 kg)  LMP " 01/20/2004  SpO2 98%  Breastfeeding? No  BMI 28.47 kg/m2  Body mass index is 28.47 kg/(m^2).  GENERAL APPEARANCE: healthy, alert and no distress  HENT: ear canals and TM's normal and nose and mouth without ulcers or lesions  RESP: lungs clear to auscultation - no rales, rhonchi or wheezes  CV: regular rates and rhythm, normal S1 S2, no S3 or S4 and no murmur, click or rub  ABDOMEN: bowel sounds normal and RUQ tenderness moderate, liver slightly enlarge  MS: FROM tender at 1st MTP  SKIN: no suspicious lesions or rashes    Diagnostic Test Results:  Results for orders placed or performed in visit on 05/09/17 (from the past 24 hour(s))   CBC with platelets   Result Value Ref Range    WBC 8.2 4.0 - 11.0 10e9/L    RBC Count 4.82 3.8 - 5.2 10e12/L    Hemoglobin 14.8 11.7 - 15.7 g/dL    Hematocrit 43.4 35.0 - 47.0 %    MCV 90 78 - 100 fl    MCH 30.7 26.5 - 33.0 pg    MCHC 34.1 31.5 - 36.5 g/dL    RDW 13.6 10.0 - 15.0 %    Platelet Count 269 150 - 450 10e9/L        ASSESSMENT/PLAN:                                                            1. Gastroesophageal reflux disease with esophagitis  Trial of protonix, if no improvement will refer to GI`  - pantoprazole (PROTONIX) 20 MG EC tablet; Take 1 tablet (20 mg) by mouth daily Take by mouth 30-60 minutes before a meal.  Dispense: 30 tablet; Refill: 1    2. RUQ abdominal pain  Await labs and US  H/o cholecystectomy  - Lipase  - Comprehensive metabolic panel (BMP + Alb, Alk Phos, ALT, AST, Total. Bili, TP)  - CBC with platelets  - US Abdomen Limited; Future    3. Hiatal hernia  See #1  - pantoprazole (PROTONIX) 20 MG EC tablet; Take 1 tablet (20 mg) by mouth daily Take by mouth 30-60 minutes before a meal.  Dispense: 30 tablet; Refill: 1    4. Pain of toe of right foot  Will refer  - ORTHO  REFERRAL    5. Amaurosis fugax, left eye  Pt has been seen at [facility]  - NEUROLOGY ADULT REFERRAL        Estefany Stanley PA-C, PA-C  [facility]

## 2017-05-10 ENCOUNTER — OFFICE VISIT (OUTPATIENT)
Dept: PODIATRY | Facility: CLINIC | Age: 62
End: 2017-05-10
Payer: COMMERCIAL

## 2017-05-10 VITALS
BODY MASS INDEX: 28.44 KG/M2 | WEIGHT: 192 LBS | DIASTOLIC BLOOD PRESSURE: 80 MMHG | HEIGHT: 69 IN | SYSTOLIC BLOOD PRESSURE: 122 MMHG

## 2017-05-10 DIAGNOSIS — M20.42 HAMMER TOES OF BOTH FEET: ICD-10-CM

## 2017-05-10 DIAGNOSIS — M20.41 HAMMER TOES OF BOTH FEET: ICD-10-CM

## 2017-05-10 DIAGNOSIS — L84 SKIN CALLUS: ICD-10-CM

## 2017-05-10 DIAGNOSIS — M79.671 PAIN IN BOTH FEET: Primary | ICD-10-CM

## 2017-05-10 DIAGNOSIS — Q66.70 PES CAVUS, CONGENITAL: ICD-10-CM

## 2017-05-10 DIAGNOSIS — M79.672 PAIN IN BOTH FEET: Primary | ICD-10-CM

## 2017-05-10 PROCEDURE — 99203 OFFICE O/P NEW LOW 30 MIN: CPT | Performed by: PODIATRIST

## 2017-05-10 ASSESSMENT — PATIENT HEALTH QUESTIONNAIRE - PHQ9: SUM OF ALL RESPONSES TO PHQ QUESTIONS 1-9: 6

## 2017-05-10 ASSESSMENT — ANXIETY QUESTIONNAIRES: GAD7 TOTAL SCORE: 12
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
## 2017-05-10 NOTE — MR AVS SNAPSHOT
After Visit Summary   5/10/2017    Mariela Duffy    MRN: [id number]           Patient Information     Date Of Birth          1955        Visit Information        Provider Department      5/10/2017 10:45 AM Susannah Stoner DPM, Podiatry/Foot and Ankle Surgery [facility]        Today's Diagnoses     Pain in both feet    -  1    Pes cavus, congenital        Hammer toes of both feet        Skin callus          Care Instructions    DR. STONER'S CLINIC SCHEDULE     [facility]  [address] [place] [address]  P: [phone]  F: [phone] [facility]  [address] [place] [address]  P: [phone]  F: [phone] [facility]  [address] [place] [address]  P: [phone]  F: [phone]   FRIDAY AM FRIDAY PM SURGERY   [facility]     [facility]  [address]  [place] [address]  P: [phone] [facility]  [address]  [place] [address]  P: [phone]  F: [phone] Surgery [phone]   Appointment [phone] General After Hours:  [phone] Patient [phone]             Body Mass Index (BMI)  Many things can cause foot and ankle problems. Foot structure, activity level, foot mechanics and injuries are common causes of pain.    One very important issue that often goes unmentioned, is body weight.  Extra weight can cause increased stress on muscles, ligaments, bones and tendons.  Sometimes just a few extra pounds is all it takes to put one over her/his threshold.   Without reducing that stress, it can be difficult to alleviate pain.      Some people are uncomfortable addressing this issue, but we feel it is important for you to think about it.  As Foot &  Ankle specialists, our job is addressing the lower extremity problem and possible causes.     Regarding extra body weight, we  encourage patients to discuss diet and weight management plans with their primary care doctors.  It is this team approach that gives you the best opportunity for pain relief and getting you back on your feet.        CALLUS / CORNS / IPKs    When there is excessive friction or pressure on the skin, the body responds by making the skin thicker to protect the deeper structures from becoming exposed. While this works well to protect the deeper structures, the thickened skin can increase pressure and pain.    CALLUS: Flat, diffuse thickening are simple calluses and they are usually caused by friction. Often these are the result of rubbing on a shoe or going barefoot.    CORNS: Calluses with a central core between the toes are called corns. These result from prominent joints on adjacent toes rubbing together. Theses are a symptom of bone malalignment and will always recur unless the underlying bones are addressed surgically.    IPKs: Calluses with a central core on the ball of the foot are usually IPKs (intractable plantar keratosis). These are caused by excessive pressure from the metatarsals, the bones that make up the ball of the foot. Often one of these bones is too long or too prominent.  Again, these will always recur unless the underlying bone issue is addressed. There is no cure for these. They will either go away by themselves, recur, or more could develop.    ROUTINE MAINTENANCE  1. File them down with a pumice stone or callus file a couple times a week.   2. An electric callus removing device. Amope Pedi Perfect Electronic Pedicure Foot File and Callus Remover can be a good option.   3. Lotion can be applied to soften the callus. A urea based cream such as Kersal or Vanicream or thicker cream with shea butter are good options.  4. Toe spacers or toe covers can be used for corns, gel pads can be used for other lesions on the bottom of the foot.   If there is a surgical pathology noted, such as a prominent bone,  often this needs to be addressed surgically to minimize recurrence. However, sometimes the lesion simply migrates to another spot after surgery, so it is not a guaranteed cure.         HAMMERTOES     Hammertoe is a contracture (bending) of one or both joints of the second, third, fourth, or fifth (little) toes. This abnormal bending can put pressure on the toe when wearing shoes, causing problems to develop.  Hammertoes usually start out as mild deformities and get progressively worse over time. In the earlier stages, hammertoes are flexible and the symptoms can often be managed with noninvasive measures. But if left untreated, hammertoes can become more rigid and will not respond to non-surgical treatment.  Because of the progressive nature of hammertoes, they should receive early attention. Hammertoes never get better without some kind of intervention.  Causes  The most common cause of hammertoe is a muscle/tendon imbalance. This imbalance, which leads to a bending of the toe, results from mechanical (structural) changes in the foot that occur over time in some people.  Hammertoes may be aggravated by shoes that don t fit properly. A hammertoe may result if a toe is too long and is forced into a cramped position when a tight shoe is worn.  Occasionally, hammertoe is the result of an earlier trauma to the toe. In some people, hammertoes are inherited.  Symptoms  Pain or irritation of the affected toe when wearing shoes.   Corns and calluses (a buildup of skin) on the toe, between two toes, or on the ball of the foot. Corns are caused by constant friction against the shoe. They may be soft or hard, depending upon their location.   Inflammation, redness, or a burning sensation   Contracture of the toe   In more severe cases of hammertoe, open sores may form.   Diagnosis  Although hammertoes are readily apparent, to arrive at a diagnosis the foot and ankle surgeon will obtain a thorough history of your symptoms and  examine your foot. During the physical examination, the doctor may attempt to reproduce your symptoms by manipulating your foot and will study the contractures of the toes. In addition, the foot and ankle surgeon may take x-rays to determine the degree of the deformities and assess any changes that may have occurred.   Hammertoes are progressive - they don t go away by themselves and usually they will get worse over time. However, not all cases are alike - some hammertoes progress more rapidly than others. Once your foot and ankle surgeon has evaluated your hammertoes, a treatment plan can be developed that is suited to your needs.  Non-surgical Treatment  There is a variety of treatment options for hammertoe. The treatment your foot and ankle surgeon selects will depend upon the severity of your hammertoe and other factors.  A number of non-surgical measures can be undertaken:  Padding corns and calluses. Your foot and ankle surgeon can provide or prescribe pads designed to shield corns from irritation. If you want to try over-the-counter pads, avoid the medicated types. Medicated pads are generally not recommended because they may contain a small amount of acid that can be harmful. Consult your surgeon about this option.   Changes in shoewear. Avoid shoes with pointed toes, shoes that are too short, or shoes with high heels - conditions that can force your toe against the front of the shoe. Instead, choose comfortable shoes with a deep, roomy toe box and heels no higher than two inches.   Orthotic devices. A custom orthotic device placed in your shoe may help control the muscle/tendon imbalance.   Injection therapy. Corticosteroid injections are sometimes used to ease pain and inflammation caused by hammertoe.   Medications. Oral nonsteroidal anti-inflammatory drugs (NSAIDs), such as ibuprofen, may be recommended to reduce pain and inflammation.   Splinting/strapping. Splints or small straps may be applied by the  surgeon to realign the bent toe.   Exercises:   1. The Toe Tap  Stand flat on the ground in your bare feet. Raise all of your toes up off the ground as high as you can. Then starting with the little toes, slowly press them down to the ground. After the big toes are on the ground, start over by raising all of them up off the ground again. This motion is similar to tapping your fingers on a desk. Repeat this ten times.     2. Interlocking your Fingers and Toes  Cross your right foot over your knee and place the fingers of your left hand between your toes. Squeeze your toes together, pinching your fingers between them. Spread the toes apart and squeeze them together again. Repeat this ten times then do the other foot. Like most exercises, this will get easier the more you do it. If you are having a lot of difficulty with this exercise, start with just your index finger between your first and second toe, then later add your middle finger between your second and third toes, and so on until you can fit all your fingers between your toes. Do this ten times on each foot. Eventually you will be able to spread your toes apart without using your fingers.    3. Gripping the Floor   the floor by pressing the pads of your toes (not the tips) into the floor without curling your toes. Relax and repeat this ten times. If your shoes have the proper amount of depth, you should be able to do this with shoes on.      HAMMERTOE TOE SURGERY   Hammertoe surgery is complex. The surgical procedure is an attempt to help the toe lay in a better position. Nearly every structure in the toe will be cut including the tendons, ligaments, skin and bone. Hammertoes are a complex deformity and final toe position is difficult to predict. The only sure way to position a toe is to fuse all three digital joints. That will not happen as some degree of toe motion is needed for walking. The toe may not be completely reduced as the surrounding skin and  other structures may not allow the toe to return to a normal position. The tendons on adjacent toes may need to be cut at the time of the original or subsequent surgeries, as interconnections exist between the toes. The toe may drift after surgery. Stiffness may develop leading to new areas of pressure.   Future shoe choices will be critical in allowing the surgery to provide comfort. The toes will still hurt if shoes rub. The original pain may also persist as other foot problems may be contributing to the current pain. The toe may or may not be pinned in place. External pins would require complete avoidance of water on the foot for six weeks. The pin would be removed about six weeks after the surgery. Strict attention to protection is critical. The pin could get bumped or loosen resulting in early removal. Removal might be necessary before the bone heals which would negatively affect the final surgical outcome and toe allignment.             Follow-ups after your visit        Additional Services     ORTHOTICS REFERRAL       Please be aware that coverage of these services is subject to the terms and limitations of your health insurance plan.  Call member services at your health plan with any benefit or coverage questions.      Please bring the following to your appointment:    >>   Any x-rays, CTs or MRIs which have been performed.  Contact the facility where they were done to arrange for  prior to your scheduled appointment.  Any new CT, MRI or other procedures ordered by your specialist must be performed at a [facility] facility or coordinated by your clinic's referral office.    >>   List of current medications   >>   This referral request   >>   Any documents/labs given to you for this referral    ==This Referral PRINTS in the [facility] (ORTHOTICS & PROSTHETICS) Central scheduling office ==     The [facility] Central Scheduling staff will contact patient to arrange appointments.  "Central Scheduling Phone #:  ROMI Kidd  [phone]     Orthotics: Foot Orthotics with lateral heel post, metatarsal pad                  Future tests that were ordered for you today     Open Future Orders        Priority Expected Expires Ordered    US Abdomen Limited Routine  5/9/2018 5/9/2017            Who to contact     If you have questions or need follow up information about today's clinic visit or your schedule please contact [facility] directly at [phone].  Normal or non-critical lab and imaging results will be communicated to you by RadPadhart, letter or phone within 4 business days after the clinic has received the results. If you do not hear from us within 7 days, please contact the clinic through RadPadhart or phone. If you have a critical or abnormal lab result, we will notify you by phone as soon as possible.  Submit refill requests through TipRanks or call your pharmacy and they will forward the refill request to us. Please allow 3 business days for your refill to be completed.          Additional Information About Your Visit        TipRanks Information     TipRanks gives you secure access to your electronic health record. If you see a primary care provider, you can also send messages to your care team and make appointments. If you have questions, please call your primary care clinic.  If you do not have a primary care provider, please call [phone] and they will assist you.        Care EveryWhere ID     This is your Care EveryWhere ID. This could be used by other organizations to access your [facility] medical records  [id number]        Your Vitals Were     Height Last Period BMI (Body Mass Index)             5' 9\" (1.753 m) 01/20/2004 28.35 kg/m2          Blood Pressure from Last 3 Encounters:   05/10/17 122/80   05/09/17 113/67   01/26/17 133/88    Weight from Last 3 Encounters:   05/10/17 192 lb (87.1 kg)   05/09/17 192 lb 12.8 oz (87.5 kg)   01/26/17 189 lb (85.7 kg)         "      We Performed the Following     ORTHOTICS REFERRAL        Primary Care Provider Office Phone # Fax #    Estefany Christina Stanley PA-C [phone]       [address] [address]        Thank you!     Thank you for choosing [facility]  for your care. Our goal is always to provide you with excellent care. Hearing back from our patients is one way we can continue to improve our services. Please take a few minutes to complete the written survey that you may receive in the mail after your visit with us. Thank you!             Your Updated Medication List - Protect others around you: Learn how to safely use, store and throw away your medicines at www.disposemymeds.org.          This list is accurate as of: 5/10/17 10:52 AM.  Always use your most recent med list.                   Brand Name Dispense Instructions for use    busPIRone 15 MG tablet    BUSPAR    270 tablet    Take 2 tabs in in the morning and 1 tab in the evening       fluticasone 50 MCG/ACT spray    FLONASE     Spray 2 sprays into both nostrils daily as needed for rhinitis or allergies       levothyroxine 137 MCG tablet    SYNTHROID/LEVOTHROID    90 tablet    Take 1 tablet (137 mcg) by mouth daily       LORazepam 0.5 MG tablet    ATIVAN    60 tablet    Take 0.5 tablets (0.25 mg) by mouth 2 times daily as needed for anxiety . May take 2 tabs ( 1 mg) bid PO 2 times daily as needed until current anxiety flare improves.       nortriptyline 75 MG capsule    PAMELOR    90 capsule    Take 1 capsule (75 mg) by mouth At Bedtime       order for DME     1 Units    Light therapy light box       pantoprazole 20 MG EC tablet    PROTONIX    30 tablet    Take 1 tablet (20 mg) by mouth daily Take by mouth 30-60 minutes before a meal.       sertraline 100 MG tablet    ZOLOFT    180 tablet    Take 2 tablets (200 mg) by mouth At Bedtime       SUMAtriptan 100 MG tablet    IMITREX    9 tablet    Take 1 tablet  (100 mg) by mouth at onset of headache for migraine May repeat in 2 hours if needed: max 2/day; average number of headaches monthly 2       triamcinolone 0.1 % ointment    KENALOG     Apply topically 2 times daily as needed for irritation
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
## 2017-05-10 NOTE — PATIENT INSTRUCTIONS
DR. DEL ROSARIO'S CLINIC SCHEDULE     [facility]  [address] [place] [address]  P: [phone]  F: [phone] [facility]  [address] [place] [address]  P: [phone]  F: [phone] [facility]  [address] [place] [address]  P: [phone]  F: [phone]   FRIDAY AM FRIDAY PM SURGERY   [facility]     [facility]  [address]  [place] [address]  P: [phone] [facility]  [address]  [place] [address]  P: [phone]  F: [phone] Surgery [phone]   Appointment [phone] General After Hours:  [phone] Patient [phone]             Body Mass Index (BMI)  Many things can cause foot and ankle problems. Foot structure, activity level, foot mechanics and injuries are common causes of pain.    One very important issue that often goes unmentioned, is body weight.  Extra weight can cause increased stress on muscles, ligaments, bones and tendons.  Sometimes just a few extra pounds is all it takes to put one over her/his threshold.   Without reducing that stress, it can be difficult to alleviate pain.      Some people are uncomfortable addressing this issue, but we feel it is important for you to think about it.  As Foot &  Ankle specialists, our job is addressing the lower extremity problem and possible causes.     Regarding extra body weight, we encourage patients to discuss diet and weight management plans with their primary care doctors.  It is this team approach that gives you the best opportunity for pain relief and getting you back on your feet.        CALLUS / CORNS / IPKs    When there is excessive friction or pressure on the skin, the body responds by making the skin thicker to protect the deeper structures from becoming exposed. While this works well to protect the deeper structures, the thickened  skin can increase pressure and pain.    CALLUS: Flat, diffuse thickening are simple calluses and they are usually caused by friction. Often these are the result of rubbing on a shoe or going barefoot.    CORNS: Calluses with a central core between the toes are called corns. These result from prominent joints on adjacent toes rubbing together. Theses are a symptom of bone malalignment and will always recur unless the underlying bones are addressed surgically.    IPKs: Calluses with a central core on the ball of the foot are usually IPKs (intractable plantar keratosis). These are caused by excessive pressure from the metatarsals, the bones that make up the ball of the foot. Often one of these bones is too long or too prominent.  Again, these will always recur unless the underlying bone issue is addressed. There is no cure for these. They will either go away by themselves, recur, or more could develop.    ROUTINE MAINTENANCE  1. File them down with a pumice stone or callus file a couple times a week.   2. An electric callus removing device. Amope Pedi Perfect Electronic Pedicure Foot File and Callus Remover can be a good option.   3. Lotion can be applied to soften the callus. A urea based cream such as Kersal or Vanicream or thicker cream with shea butter are good options.  4. Toe spacers or toe covers can be used for corns, gel pads can be used for other lesions on the bottom of the foot.   If there is a surgical pathology noted, such as a prominent bone, often this needs to be addressed surgically to minimize recurrence. However, sometimes the lesion simply migrates to another spot after surgery, so it is not a guaranteed cure.         HAMMERTOES     Hammertoe is a contracture (bending) of one or both joints of the second, third, fourth, or fifth (little) toes. This abnormal bending can put pressure on the toe when wearing shoes, causing problems to develop.  Hammertoes usually start out as mild deformities and get  progressively worse over time. In the earlier stages, hammertoes are flexible and the symptoms can often be managed with noninvasive measures. But if left untreated, hammertoes can become more rigid and will not respond to non-surgical treatment.  Because of the progressive nature of hammertoes, they should receive early attention. Hammertoes never get better without some kind of intervention.  Causes  The most common cause of hammertoe is a muscle/tendon imbalance. This imbalance, which leads to a bending of the toe, results from mechanical (structural) changes in the foot that occur over time in some people.  Hammertoes may be aggravated by shoes that don t fit properly. A hammertoe may result if a toe is too long and is forced into a cramped position when a tight shoe is worn.  Occasionally, hammertoe is the result of an earlier trauma to the toe. In some people, hammertoes are inherited.  Symptoms  Pain or irritation of the affected toe when wearing shoes.   Corns and calluses (a buildup of skin) on the toe, between two toes, or on the ball of the foot. Corns are caused by constant friction against the shoe. They may be soft or hard, depending upon their location.   Inflammation, redness, or a burning sensation   Contracture of the toe   In more severe cases of hammertoe, open sores may form.   Diagnosis  Although hammertoes are readily apparent, to arrive at a diagnosis the foot and ankle surgeon will obtain a thorough history of your symptoms and examine your foot. During the physical examination, the doctor may attempt to reproduce your symptoms by manipulating your foot and will study the contractures of the toes. In addition, the foot and ankle surgeon may take x-rays to determine the degree of the deformities and assess any changes that may have occurred.   Hammertoes are progressive   they don t go away by themselves and usually they will get worse over time. However, not all cases are alike   some  hammertoes progress more rapidly than others. Once your foot and ankle surgeon has evaluated your hammertoes, a treatment plan can be developed that is suited to your needs.  Non-surgical Treatment  There is a variety of treatment options for hammertoe. The treatment your foot and ankle surgeon selects will depend upon the severity of your hammertoe and other factors.  A number of non-surgical measures can be undertaken:  Padding corns and calluses. Your foot and ankle surgeon can provide or prescribe pads designed to shield corns from irritation. If you want to try over-the-counter pads, avoid the medicated types. Medicated pads are generally not recommended because they may contain a small amount of acid that can be harmful. Consult your surgeon about this option.   Changes in shoewear. Avoid shoes with pointed toes, shoes that are too short, or shoes with high heels   conditions that can force your toe against the front of the shoe. Instead, choose comfortable shoes with a deep, roomy toe box and heels no higher than two inches.   Orthotic devices. A custom orthotic device placed in your shoe may help control the muscle/tendon imbalance.   Injection therapy. Corticosteroid injections are sometimes used to ease pain and inflammation caused by hammertoe.   Medications. Oral nonsteroidal anti-inflammatory drugs (NSAIDs), such as ibuprofen, may be recommended to reduce pain and inflammation.   Splinting/strapping. Splints or small straps may be applied by the surgeon to realign the bent toe.   Exercises:   1. The Toe Tap  Stand flat on the ground in your bare feet. Raise all of your toes up off the ground as high as you can. Then starting with the little toes, slowly press them down to the ground. After the big toes are on the ground, start over by raising all of them up off the ground again. This motion is similar to tapping your fingers on a desk. Repeat this ten times.     2. Interlocking your Fingers and  Toes  Cross your right foot over your knee and place the fingers of your left hand between your toes. Squeeze your toes together, pinching your fingers between them. Spread the toes apart and squeeze them together again. Repeat this ten times then do the other foot. Like most exercises, this will get easier the more you do it. If you are having a lot of difficulty with this exercise, start with just your index finger between your first and second toe, then later add your middle finger between your second and third toes, and so on until you can fit all your fingers between your toes. Do this ten times on each foot. Eventually you will be able to spread your toes apart without using your fingers.    3. Gripping the Floor   the floor by pressing the pads of your toes (not the tips) into the floor without curling your toes. Relax and repeat this ten times. If your shoes have the proper amount of depth, you should be able to do this with shoes on.      HAMMERTOE TOE SURGERY   Hammertoe surgery is complex. The surgical procedure is an attempt to help the toe lay in a better position. Nearly every structure in the toe will be cut including the tendons, ligaments, skin and bone. Hammertoes are a complex deformity and final toe position is difficult to predict. The only sure way to position a toe is to fuse all three digital joints. That will not happen as some degree of toe motion is needed for walking. The toe may not be completely reduced as the surrounding skin and other structures may not allow the toe to return to a normal position. The tendons on adjacent toes may need to be cut at the time of the original or subsequent surgeries, as interconnections exist between the toes. The toe may drift after surgery. Stiffness may develop leading to new areas of pressure.   Future shoe choices will be critical in allowing the surgery to provide comfort. The toes will still hurt if shoes rub. The original pain may also  persist as other foot problems may be contributing to the current pain. The toe may or may not be pinned in place. External pins would require complete avoidance of water on the foot for six weeks. The pin would be removed about six weeks after the surgery. Strict attention to protection is critical. The pin could get bumped or loosen resulting in early removal. Removal might be necessary before the bone heals which would negatively affect the final surgical outcome and toe allignment.

## 2017-05-10 NOTE — NURSING NOTE
"Chief Complaint   Patient presents with     Foot Problems     bottom of right foot is sore around the ball of doot x3-6weeks corn on the 5th toe on right foot x1week        Initial /80  Ht 5' 9\" (1.753 m)  Wt 192 lb (87.1 kg)  LMP 01/20/2004  BMI 28.35 kg/m2 Estimated body mass index is 28.35 kg/(m^2) as calculated from the following:    Height as of this encounter: 5' 9\" (1.753 m).    Weight as of this encounter: 192 lb (87.1 kg).  Medication Reconciliation: complete   Lam Tran MA      "

## 2017-05-17 ENCOUNTER — HOSPITAL ENCOUNTER (OUTPATIENT)
Dept: ULTRASOUND IMAGING | Facility: CLINIC | Age: 62
Discharge: HOME | End: 2017-05-17
Attending: PHYSICIAN ASSISTANT | Admitting: PHYSICIAN ASSISTANT
Payer: COMMERCIAL

## 2017-05-17 DIAGNOSIS — R10.11 RUQ ABDOMINAL PAIN: ICD-10-CM

## 2017-05-17 PROCEDURE — 76705 ECHO EXAM OF ABDOMEN: CPT

## 2017-05-18 ENCOUNTER — TELEPHONE (OUTPATIENT)
Dept: FAMILY MEDICINE | Facility: CLINIC | Age: 62
End: 2017-05-18

## 2017-05-18 NOTE — TELEPHONE ENCOUNTER
Pt returned call, message given  States she is feeling much better since protonix started    FYI to PCP  Vesna Zaman RN, BS  Clinical Nurse Triage.

## 2017-05-18 NOTE — TELEPHONE ENCOUNTER
Please call ptMartinez Geiger,    Your ultrasound showed a small liver cyst, which is nothing to worry about. I do not feel this is causing any of your symptoms.   If your symptoms are persisting or worsening please let me know.     Estefany Stanley PA-C      (FYI*consulted with Dr. Zayas on imaging. He feels no further imaging needed)

## 2017-05-24 DIAGNOSIS — F33.0 MAJOR DEPRESSIVE DISORDER, RECURRENT EPISODE, MILD (H): ICD-10-CM

## 2017-05-24 NOTE — TELEPHONE ENCOUNTER
sertraline (ZOLOFT) 100 MG tablet     Last Written Prescription Date: 11/1/16  Last Fill Quantity: 180, # refills: 1  Last Office Visit with G primary care provider: 5/9/2017       Last PHQ-9 score on record=   PHQ-9 SCORE 5/9/2017   Total Score 6         BINH Hansen  May 24, 2017  11:33 AM

## 2017-05-26 RX ORDER — SERTRALINE HYDROCHLORIDE 100 MG/1
TABLET, FILM COATED ORAL
Qty: 180 TABLET | Refills: 0 | Status: SHIPPED | OUTPATIENT
Start: 2017-05-26 | End: 2017-08-26

## 2017-05-26 NOTE — TELEPHONE ENCOUNTER
Routing refill request to provider for review/approval because:  Drug interaction warning  Eliud Nielson, RN

## 2017-06-06 NOTE — TELEPHONE ENCOUNTER
Call Regarding Preventive Health Screening Cervical/PAP    Attempt 3    Message    Comments: patient will call on own time      Outreach   Thelma Ace

## 2017-06-17 ENCOUNTER — HEALTH MAINTENANCE LETTER (OUTPATIENT)
Age: 62
End: 2017-06-17

## 2017-06-22 ENCOUNTER — TELEPHONE (OUTPATIENT)
Dept: FAMILY MEDICINE | Facility: CLINIC | Age: 62
End: 2017-06-22

## 2017-06-22 NOTE — TELEPHONE ENCOUNTER
Can we call Fely and let her know the referral was faxed?  This should have been faxed mid-May, not sure why Ion did not get it, but we re-faxed it today.

## 2017-06-22 NOTE — PATIENT INSTRUCTIONS
Pt calls, called [facility] to schedule consult appointment, since last appointment 2006, they need new referral, CJ please advise, route to inform pt when final at [phone] (home) may LM    5. Amaurosis fugax, left eye  Pt has been seen at [facility]  - NEUROLOGY ADULT REFERRAL  Thelma Strong RN, BSN  Message handled by Nurse Triage.

## 2017-06-22 NOTE — TELEPHONE ENCOUNTER
5. Amaurosis fugax, left eye  Pt has been seen at [facility]  - NEUROLOGY ADULT REFERRAL

## 2017-06-23 ENCOUNTER — MYC MEDICAL ADVICE (OUTPATIENT)
Dept: FAMILY MEDICINE | Facility: CLINIC | Age: 62
End: 2017-06-23

## 2017-06-23 DIAGNOSIS — Z11.59 NEED FOR HEPATITIS C SCREENING TEST: Primary | ICD-10-CM

## 2017-06-26 DIAGNOSIS — Z11.59 NEED FOR HEPATITIS C SCREENING TEST: ICD-10-CM

## 2017-06-26 PROCEDURE — 36415 COLL VENOUS BLD VENIPUNCTURE: CPT | Performed by: FAMILY MEDICINE

## 2017-06-26 PROCEDURE — 86803 HEPATITIS C AB TEST: CPT | Performed by: FAMILY MEDICINE

## 2017-06-27 ENCOUNTER — TELEPHONE (OUTPATIENT)
Dept: FAMILY MEDICINE | Facility: CLINIC | Age: 62
End: 2017-06-27

## 2017-06-27 DIAGNOSIS — F41.9 ANXIETY: ICD-10-CM

## 2017-06-27 LAB — HCV AB SERPL QL IA: NORMAL

## 2017-06-27 RX ORDER — LORAZEPAM 0.5 MG/1
0.25 TABLET ORAL 2 TIMES DAILY PRN
Qty: 60 TABLET | Refills: 0 | Status: SHIPPED | OUTPATIENT
Start: 2017-06-27 | End: 2017-10-30

## 2017-06-27 NOTE — TELEPHONE ENCOUNTER
Controlled Substance Refill Request for Ativan  Problem List Complete:  Yes    Patient is followed by KESHIA NEGRON for ongoing prescription of benzodiazepines.  All refills should be approved by this provider, or covering partner.    Medication(s): lorazepam.   Maximum quantity per month: 60  Clinic visit frequency required: Q 6  Months Last Office Visit:  5/9/17    Controlled substance agreement on file: No  Benzodiazepine use reviewed by psychiatry:  No    Last [facility] website verification: 3/29/17   [email or web address]       checked in past 6 months?  Yes 3/29/17     Last picked up: 4/11/17    Gely Antonio, Pharmacy [facility]

## 2017-07-05 ENCOUNTER — TELEPHONE (OUTPATIENT)
Dept: FAMILY MEDICINE | Facility: CLINIC | Age: 62
End: 2017-07-05

## 2017-07-05 NOTE — TELEPHONE ENCOUNTER
Panel Management Review      Patient has the following on her problem list:     Depression / Dysthymia review  PHQ-9 SCORE 9/9/2016 11/1/2016 5/9/2017   Total Score - - -   Total Score 7 6 6      Patient is due for:  None      Composite cancer screening  Chart review shows that this patient is due/due soon for the following Pap Smear and Mammogram  Summary:    Patient is due/failing the following:   MAMMOGRAM, PAP and PHYSICAL    Action needed:   Patient needs office visit for Physical with pap. and Patient needs referral/order: Mammo     Type of outreach:    Phone, left message for patient to call back.     Questions for provider review:    None                                                                                                                                    Linda Keene MA       Chart routed to Care Team .

## 2017-07-05 NOTE — LETTER
[address] [address] [phone]  July 12, 2017    Mariela [address]  [facility] [address]    Dear Mariela,    I care about your health and have reviewed your health plan. I have reviewed your medical conditions, medication list, and lab results and am making recommendations based on this review, to better manage your health.    You are in particular need of attention regarding:  -Breast Cancer Screening  -Cervical Cancer Screening  -Wellness (Physical) Visit     I am recommending that you:  {recommendations:-schedule a WELLNESS (Physical) APPOINTMENT with me.   I will check fasting labs the same day - nothing to eat except water and meds for 8-10 hours prior.  -schedule a MAMMOGRAM which is due. We have mammogram available at our clinic; Tuesday 8:00am-11:30am or Wednesday 2:00pm-4:15pm- to schedule call [phone].   Please disregard this reminder if you have had this exam elsewhere within the last year.  It would be helpful for us to have a copy of your mammogram report in our file so that we can best coordinate your care.  -schedule a PAP SMEAR EXAM which is due.  Please disregard this reminder if you have had this exam elsewhere within the last year.  It would be helpful for us to have a copy of your recent pap smear report in our file so that we can best coordinate your care.    Here is a list of Health Maintenance topics that are due now or due soon:  Health Maintenance Due   Topic Date Due     MAMMO Q1 YR  12/13/2015     PAP Q3 YR  04/19/2016     DEPRESSION ACTION PLAN Q1 YR  07/31/2016       Please call us at [phone] (or use [facility]) to address the above recommendations.     Thank you for trusting [facility] and we appreciate the opportunity to serve you.  We look forward to supporting your healthcare needs in the future.    Healthy Regards,    Estefany Stanley PA-C

## 2017-07-06 DIAGNOSIS — K44.9 HIATAL HERNIA: ICD-10-CM

## 2017-07-06 DIAGNOSIS — K21.00 GASTROESOPHAGEAL REFLUX DISEASE WITH ESOPHAGITIS: ICD-10-CM

## 2017-07-06 RX ORDER — PANTOPRAZOLE SODIUM 20 MG/1
TABLET, DELAYED RELEASE ORAL
Qty: 30 TABLET | Refills: 1 | Status: SHIPPED | OUTPATIENT
Start: 2017-07-06 | End: 2017-09-05

## 2017-07-06 NOTE — TELEPHONE ENCOUNTER
Prantoprazole  20 mg      Last Written Prescription Date: 05/09/17  Last Fill Quantity: 30,  # refills: 1   Last Office Visit with [facility], [facility] or [facility] prescribing provider: 05/09/17 Estefany Stanley

## 2017-07-13 ENCOUNTER — TELEPHONE (OUTPATIENT)
Dept: FAMILY MEDICINE | Facility: CLINIC | Age: 62
End: 2017-07-13

## 2017-07-13 NOTE — TELEPHONE ENCOUNTER
Patient calling and states was up during the night and shaking all over from anxiety.  States took Lorazepam at 2 am and one at 5 am.  Is scheduled to see counselor on Wednesday.  Just wanted to make sure OK she took.  Advised fine she took as is in her direction limits of 2 tabs twice a day as needed and you gave RX to her for her if she needed so fine that she takes if she needs.  NEDAI to Estefany per patient request.  Abril Harris RN

## 2017-07-19 ENCOUNTER — OFFICE VISIT (OUTPATIENT)
Dept: PSYCHIATRY | Facility: CLINIC | Age: 62
End: 2017-07-19
Attending: PHYSICIAN ASSISTANT
Payer: COMMERCIAL

## 2017-07-19 VITALS
OXYGEN SATURATION: 98 % | TEMPERATURE: 98.1 F | HEIGHT: 69 IN | HEART RATE: 125 BPM | SYSTOLIC BLOOD PRESSURE: 110 MMHG | BODY MASS INDEX: 29.77 KG/M2 | WEIGHT: 201 LBS | DIASTOLIC BLOOD PRESSURE: 62 MMHG

## 2017-07-19 DIAGNOSIS — F41.1 GENERALIZED ANXIETY DISORDER: Primary | ICD-10-CM

## 2017-07-19 PROCEDURE — 90792 PSYCH DIAG EVAL W/MED SRVCS: CPT | Performed by: NURSE PRACTITIONER

## 2017-07-19 ASSESSMENT — ANXIETY QUESTIONNAIRES
IF YOU CHECKED OFF ANY PROBLEMS ON THIS QUESTIONNAIRE, HOW DIFFICULT HAVE THESE PROBLEMS MADE IT FOR YOU TO DO YOUR WORK, TAKE CARE OF THINGS AT HOME, OR GET ALONG WITH OTHER PEOPLE: SOMEWHAT DIFFICULT
5. BEING SO RESTLESS THAT IT IS HARD TO SIT STILL: NOT AT ALL
2. NOT BEING ABLE TO STOP OR CONTROL WORRYING: MORE THAN HALF THE DAYS
7. FEELING AFRAID AS IF SOMETHING AWFUL MIGHT HAPPEN: SEVERAL DAYS
1. FEELING NERVOUS, ANXIOUS, OR ON EDGE: SEVERAL DAYS
6. BECOMING EASILY ANNOYED OR IRRITABLE: SEVERAL DAYS
3. WORRYING TOO MUCH ABOUT DIFFERENT THINGS: MORE THAN HALF THE DAYS
GAD7 TOTAL SCORE: 8

## 2017-07-19 ASSESSMENT — PATIENT HEALTH QUESTIONNAIRE - PHQ9: 5. POOR APPETITE OR OVEREATING: SEVERAL DAYS

## 2017-07-19 NOTE — Clinical Note
Kendall Allison, Thank you for the Psychiatry referral to the [facility] ([facility]). Our psychiatry providers act as a specialty service for Primary Care Providers in the [place] [facility] that seek to optimize medications for unstable patients.  Once medications have been optimized, our providers discharge the patient back to the referring Primary Care Provider for ongoing medication management.  This type of system allows our providers to serve a high volume of patients.   Please see my Impression and Plan. No follow ups planned with me for now.  If you have any questions or concerns, please let me know.  Perla

## 2017-07-19 NOTE — NURSING NOTE
"Chief Complaint   Patient presents with     Consult     referred by Estefany Stanley- medication review,ot having memory issues so wanting to make sure on right medication?       Initial /62 (BP Location: Left arm, Patient Position: Chair, Cuff Size: Adult Large)  Pulse 125  Temp 98.1  F (36.7  C) (Oral)  Ht 5' 8.75\" (1.746 m)  Wt 201 lb (91.2 kg)  LMP 01/20/2004  SpO2 98%  BMI 29.9 kg/m2 Estimated body mass index is 29.9 kg/(m^2) as calculated from the following:    Height as of this encounter: 5' 8.75\" (1.746 m).    Weight as of this encounter: 201 lb (91.2 kg).  Medication Reconciliation: complete    "

## 2017-07-19 NOTE — MR AVS SNAPSHOT
After Visit Summary   7/19/2017    Mariela Duffy    MRN: [id number]           Patient Information     Date Of Birth          1955        Visit Information        Provider Department      7/19/2017 10:45 AM Perla Salter NP [facility]        Today's Diagnoses     Cognitive disorder    -  1      Care Instructions    Treatment Plan:    Continue Zoloft (sertraline) 200 mg by mouth daily for depression and anxiety.     Continue Aventyl/Pamelor (nortriptyline) 75 mg by mouth daily at bedtime for depression, anxiety, sleep, diarrhea.    Continue Buspar (buspirone) 30 mg in AM and 15 mg in PM for anxiety. Can take second dose earlier in afternoon.    Continue Ativan (lorazepam) as needed for anxiety per primary care provider.     Continue all other medications as reviewed per electronic medical record today.     All questions addressed. Education and counseling completed regarding risks and benefits of medications and psychotherapy options.    Safety plan was reviewed. To the Emergency Department as needed or call after hours crisis line at [phone] or [phone].     To schedule individual or family therapy, call [facility] at [phone].     Schedule an appointment with me as needed.  Call [facility] at [phone] to schedule.    Follow up with primary care provider as planned or for acute medical concerns.    Call the psychiatric nurse line with medication questions or concerns at [phone].    My Practice Policy was reviewed and signed: YES     MyChart may be used to communicate with your provider, but this is not intended to be used for emergencies          Follow-ups after your visit        Follow-up notes from your care team     Return if symptoms worsen or fail to improve.      Who to contact     If you have questions or need follow up information about today's clinic visit or your schedule please contact [facility]  "[facility] directly at [phone].  Normal or non-critical lab and imaging results will be communicated to you by MyChart, letter or phone within 4 business days after the clinic has received the results. If you do not hear from us within 7 days, please contact the clinic through [facility] or phone. If you have a critical or abnormal lab result, we will notify you by phone as soon as possible.  Submit refill requests through [facility] or call your pharmacy and they will forward the refill request to us. Please allow 3 business days for your refill to be completed.          Additional Information About Your Visit        [facility] Information     [facility] gives you secure access to your electronic health record. If you see a primary care provider, you can also send messages to your care team and make appointments. If you have questions, please call your primary care clinic.  If you do not have a primary care provider, please call [phone] and they will assist you.        Care EveryWhere ID     This is your Care EveryWhere ID. This could be used by other organizations to access your [place] medical records  [id number]        Your Vitals Were     Pulse Temperature Height Last Period Pulse Oximetry BMI (Body Mass Index)    125 98.1  F (36.7  C) (Oral) 5' 8.75\" (1.746 m) 01/20/2004 98% 29.9 kg/m2       Blood Pressure from Last 3 Encounters:   07/19/17 110/62   05/10/17 122/80   05/09/17 113/67    Weight from Last 3 Encounters:   07/19/17 201 lb (91.2 kg)   05/10/17 192 lb (87.1 kg)   05/09/17 192 lb 12.8 oz (87.5 kg)              Today, you had the following     No orders found for display       Primary Care Provider Office Phone # Fax #    Estefany Christina Stanley PA-C [phone]       [address] [address]        Equal Access to Services     TUAN RINCON AH: Hadii yamilet Parker, waaxda luqadaha, qaybta kaalmafazal patel " labhavna george. So [facility] [phone].    ATENCIÓN: Si charissela ana, tiene a romero disposición servicios gratuitos de asistencia lingüística. Tushar shepard [phone].    We comply with applicable federal civil rights laws and [place] laws. We do not discriminate on the basis of race, color, national origin, age, disability sex, sexual orientation or gender identity.            Thank you!     Thank you for choosing [facility]  for your care. Our goal is always to provide you with excellent care. Hearing back from our patients is one way we can continue to improve our services. Please take a few minutes to complete the written survey that you may receive in the mail after your visit with us. Thank you!             Your Updated Medication List - Protect others around you: Learn how to safely use, store and throw away your medicines at www.disposemymeds.org.          This list is accurate as of: 7/19/17 11:57 AM.  Always use your most recent med list.                   Brand Name Dispense Instructions for use Diagnosis    busPIRone 15 MG tablet    BUSPAR    270 tablet    Take 2 tabs in in the morning and 1 tab in the evening    Anxiety       fluticasone 50 MCG/ACT spray    FLONASE     Spray 2 sprays into both nostrils daily as needed for rhinitis or allergies        levothyroxine 137 MCG tablet    SYNTHROID/LEVOTHROID    90 tablet    Take 1 tablet (137 mcg) by mouth daily    Hypothyroidism       LORazepam 0.5 MG tablet    ATIVAN    60 tablet    Take 0.5 tablets (0.25 mg) by mouth 2 times daily as needed for anxiety . May take 2 tabs ( 1 mg) bid PO 2 times daily as needed until current anxiety flare improves.    Anxiety       nortriptyline 75 MG capsule    PAMELOR    90 capsule    Take 1 capsule (75 mg) by mouth At Bedtime    Major depressive disorder, recurrent episode, mild (H)       order for DME     1 Units    Light therapy light box    Seasonal affective disorder (H)       pantoprazole 20 MG EC tablet     PROTONIX    30 tablet    TAKE ONE TABLET BY MOUTH ONCE DAILY. TAKE BY MOUTH 30 TO 60 MINUTES BEFORE A MEAL.    Gastroesophageal reflux disease with esophagitis, Hiatal hernia       sertraline 100 MG tablet    ZOLOFT    180 tablet    TAKE TWO TABLETS BY MOUTH AT BEDTIME    Major depressive disorder, recurrent episode, mild (H)       SUMAtriptan 100 MG tablet    IMITREX    9 tablet    Take 1 tablet (100 mg) by mouth at onset of headache for migraine May repeat in 2 hours if needed: max 2/day; average number of headaches monthly 2    Acute nonintractable headache, unspecified headache type       triamcinolone 0.1 % ointment    KENALOG     Apply topically 2 times daily as needed for irritation

## 2017-07-19 NOTE — PATIENT INSTRUCTIONS
Treatment Plan:    Continue Zoloft (sertraline) 200 mg by mouth daily for depression and anxiety.     Continue Aventyl/Pamelor (nortriptyline) 75 mg by mouth daily at bedtime for depression, anxiety, sleep, diarrhea.    Continue Buspar (buspirone) 30 mg in AM and 15 mg in PM for anxiety. Can take second dose earlier in afternoon.    Continue Ativan (lorazepam) as needed for anxiety per primary care provider.     Continue all other medications as reviewed per electronic medical record today.     All questions addressed. Education and counseling completed regarding risks and benefits of medications and psychotherapy options.    Safety plan was reviewed. To the Emergency Department as needed or call after hours crisis line at [phone] or [phone].     To schedule individual or family therapy, call [facility] at [phone].     Schedule an appointment with me as needed.  Call [facility] at [phone] to schedule.    Follow up with primary care provider as planned or for acute medical concerns.    Call the psychiatric nurse line with medication questions or concerns at [phone].    My Practice Policy was reviewed and signed: YES     MyChart may be used to communicate with your provider, but this is not intended to be used for emergencies
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
## 2017-07-20 ASSESSMENT — ANXIETY QUESTIONNAIRES: GAD7 TOTAL SCORE: 8

## 2017-07-20 ASSESSMENT — PATIENT HEALTH QUESTIONNAIRE - PHQ9: SUM OF ALL RESPONSES TO PHQ QUESTIONS 1-9: 9

## 2017-07-24 DIAGNOSIS — E03.9 HYPOTHYROIDISM: ICD-10-CM

## 2017-07-24 NOTE — TELEPHONE ENCOUNTER
Pending Prescriptions:                       Disp   Refills    levothyroxine (SYNTHROID/LEVOTHROID) 137 *90 tab*2            Sig: TAKE ONE TABLET BY MOUTH EVERY DAY             Last Written Prescription Date: 7/21/2016  Last Quantity: 90, # refills: 3  Last Office Visit with DELVIN, GUALBERTO or [facility] prescribing provider: 5/9/2017Jaden        TSH   Date Value Ref Range Status   09/08/2016 1.87 0.40 - 4.00 mU/L Final

## 2017-07-25 RX ORDER — LEVOTHYROXINE SODIUM 137 UG/1
TABLET ORAL
Qty: 90 TABLET | Refills: 0 | Status: SHIPPED | OUTPATIENT
Start: 2017-07-25 | End: 2017-10-11

## 2017-07-25 NOTE — TELEPHONE ENCOUNTER
Prescription approved per [facility] Refill Protocol.  Eliud Nielson RN

## 2017-07-28 ENCOUNTER — RADIANT APPOINTMENT (OUTPATIENT)
Dept: MAMMOGRAPHY | Facility: CLINIC | Age: 62
End: 2017-07-28
Payer: COMMERCIAL

## 2017-07-28 DIAGNOSIS — Z12.31 VISIT FOR SCREENING MAMMOGRAM: ICD-10-CM

## 2017-07-28 PROCEDURE — G0202 SCR MAMMO BI INCL CAD: HCPCS | Mod: TC

## 2017-08-01 ENCOUNTER — TRANSFERRED RECORDS (OUTPATIENT)
Dept: HEALTH INFORMATION MANAGEMENT | Facility: CLINIC | Age: 62
End: 2017-08-01

## 2017-08-21 LAB
PHQ9 SCORE: 8
PHQ9 SCORE: 8

## 2017-09-05 DIAGNOSIS — K44.9 HIATAL HERNIA: ICD-10-CM

## 2017-09-05 DIAGNOSIS — K21.00 GASTROESOPHAGEAL REFLUX DISEASE WITH ESOPHAGITIS: ICD-10-CM

## 2017-09-05 NOTE — TELEPHONE ENCOUNTER
Pending Prescriptions:                       Disp   Refills    pantoprazole (PROTONIX) 20 MG EC tablet [*30 tab*1            Sig: TAKE ONE TABLET BY MOUTH ONCE DAILY. TAKE BY           MOUTH 30 TO 60 MINUTES BEFORE A MEAL.              Last Written Prescription Date: 7/6/2017  Last Fill Quantity: 30,  # refills: 1   Last Office Visit with FMTREVON, UMP or [facility] prescribing provider: 8/15/2017Jaden

## 2017-09-06 ENCOUNTER — TELEPHONE (OUTPATIENT)
Dept: FAMILY MEDICINE | Facility: CLINIC | Age: 62
End: 2017-09-06

## 2017-09-06 DIAGNOSIS — K44.9 HIATAL HERNIA: ICD-10-CM

## 2017-09-06 DIAGNOSIS — K21.00 GASTROESOPHAGEAL REFLUX DISEASE WITH ESOPHAGITIS: ICD-10-CM

## 2017-09-06 RX ORDER — PANTOPRAZOLE SODIUM 20 MG/1
TABLET, DELAYED RELEASE ORAL
Qty: 90 TABLET | Refills: 1 | Status: SHIPPED | OUTPATIENT
Start: 2017-09-06 | End: 2017-09-20

## 2017-09-06 NOTE — TELEPHONE ENCOUNTER
Estefany, we tried to call Fely to see how pantoprazole is working, no answer. We did not leave a message  Eliud Nielson RN

## 2017-09-06 NOTE — TELEPHONE ENCOUNTER
Pt's insurance will only cover Max of 120 Days Supply in 365 days of PPI medications.  Would you like to work on a PA for pt?    PA NEEDED ON: Pantoprazole  INS IS: RADHA LLANOS [employer]  Bin: [id number]  PHONE # IS: [phone]  ID # IS: [id number]  No Group  [facility]  PLEASE LET US KNOW WHEN PA IS GRANTED/DENIED.  THANK YOU!  Gely Antonio, Pharmacy [facility]

## 2017-09-07 NOTE — TELEPHONE ENCOUNTER
1) Omeprazole ?mg-- Patient reported 8/13/15--not prescribed in our office.  2)Prevacid 30 mg 9/2/03-3/31/08.  3)Nexium 40 4/1/08-10/9/08   PA submitted covermymeds KEY: [id number]  If you have any questions about your PA submission, contact [employer] at [phone], Option 5.  Eliud Nielson RN

## 2017-09-18 NOTE — TELEPHONE ENCOUNTER
PA denied.  Reason given:      Your provider requested a quantity larger or greater than allowed in your benefit set. The review of this request was based on the PPI quantity limit program. It cannot be approved at this time. In order to approve this request, program requirements must be met. You must be taking this drug according to the FDA label. This includes how often you take the drug and your intended condition. I also includes your length of treatment.  You are able to get up to 1 tablet per day for a maximum of 120 days within 365 days. This is the duration limit set by your plan for PPIs. This limit is shared across all PPIs.  Patient notified:  No ,PCP any recommendations?  This medication is historical.       Lakisha JOSEPH RN, BSN, PHN  [place] [facility] RN

## 2017-09-19 NOTE — TELEPHONE ENCOUNTER
[employer] calls, received urgent appeal, informs federal guidelines do not meet for urgent appeal, will be standard appeal, they will fax when final    Case #: [id number]  Provider #: [phone]  Thelma Strong RN, BSN  Message handled by Nurse Triage.

## 2017-09-19 NOTE — TELEPHONE ENCOUNTER
Placed call to [employer] to request an exception d/t dx of hiatal hernia that needs to be added to PA.     Awaiting for a call back to see if an appeal needs to be sent or if we can just add the dx.    Lakisha JOSEPH RN, BSN, PHN  [employer] RN

## 2017-09-19 NOTE — TELEPHONE ENCOUNTER
Patient has hiatal hernia, which causes daily symptoms.   Please call insurance.     Patient has GERD as well and has tried and failed multiple PPIs.

## 2017-09-19 NOTE — TELEPHONE ENCOUNTER
Appeal form faxed with new information.     Will await for response.     Lakisha JOSEPH RN, BSN, PHN  [place] [employer] RN

## 2017-09-20 ENCOUNTER — MYC MEDICAL ADVICE (OUTPATIENT)
Dept: FAMILY MEDICINE | Facility: CLINIC | Age: 62
End: 2017-09-20

## 2017-09-20 DIAGNOSIS — K21.00 GASTROESOPHAGEAL REFLUX DISEASE WITH ESOPHAGITIS: ICD-10-CM

## 2017-09-20 DIAGNOSIS — K44.9 HIATAL HERNIA: ICD-10-CM

## 2017-09-20 RX ORDER — PANTOPRAZOLE SODIUM 20 MG/1
TABLET, DELAYED RELEASE ORAL
Qty: 30 TABLET | Refills: 0 | Status: SHIPPED | OUTPATIENT
Start: 2017-09-20 | End: 2017-10-24

## 2017-09-20 NOTE — TELEPHONE ENCOUNTER
Per [employer] message. Patient requested Protonix to be sent to [facility].     Prescription sent for #30 as patient reports she is willing to pay out of pocket with good prescription coupon while we wait for PA approval.    Lakisha JOSEPH RN, BSN, PHN  [place] [employer] RN

## 2017-10-02 NOTE — TELEPHONE ENCOUNTER
Pantoprazole 20 mg PA denied.  Reason given:  Your provider requested a quantity larger or greater than allowed in your benefit set.   You are able to get up to 1 tablet per day for a maximum of 120 days within 365 days. This is the duration limit set you your plan for PPI's. This limit is shared across all PPIs.   Case #[id number]  Please advise.   Eliud Nielson RN

## 2017-10-09 LAB — PHQ9 SCORE: 5

## 2017-10-11 ENCOUNTER — OFFICE VISIT (OUTPATIENT)
Dept: FAMILY MEDICINE | Facility: CLINIC | Age: 62
End: 2017-10-11
Payer: COMMERCIAL

## 2017-10-11 ENCOUNTER — RADIANT APPOINTMENT (OUTPATIENT)
Dept: GENERAL RADIOLOGY | Facility: CLINIC | Age: 62
End: 2017-10-11
Attending: PHYSICIAN ASSISTANT
Payer: COMMERCIAL

## 2017-10-11 VITALS
OXYGEN SATURATION: 100 % | WEIGHT: 196.8 LBS | HEIGHT: 69 IN | BODY MASS INDEX: 29.15 KG/M2 | TEMPERATURE: 97.6 F | SYSTOLIC BLOOD PRESSURE: 139 MMHG | HEART RATE: 98 BPM | DIASTOLIC BLOOD PRESSURE: 84 MMHG

## 2017-10-11 DIAGNOSIS — E89.0 POSTSURGICAL HYPOTHYROIDISM: ICD-10-CM
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
## 2017-10-11 DIAGNOSIS — K21.00 GASTROESOPHAGEAL REFLUX DISEASE WITH ESOPHAGITIS: ICD-10-CM

## 2017-10-11 LAB
T4 FREE SERPL-MCNC: 0.77 NG/DL (ref 0.76–1.46)
TSH SERPL DL<=0.005 MIU/L-ACNC: 14.84 MU/L (ref 0.4–4)

## 2017-10-11 PROCEDURE — 36415 COLL VENOUS BLD VENIPUNCTURE: CPT | Performed by: PHYSICIAN ASSISTANT

## 2017-10-11 PROCEDURE — 84439 ASSAY OF FREE THYROXINE: CPT | Performed by: PHYSICIAN ASSISTANT

## 2017-10-11 PROCEDURE — 99214 OFFICE O/P EST MOD 30 MIN: CPT | Performed by: PHYSICIAN ASSISTANT

## 2017-10-11 PROCEDURE — 73130 X-RAY EXAM OF HAND: CPT | Mod: LT

## 2017-10-11 PROCEDURE — 84443 ASSAY THYROID STIM HORMONE: CPT | Performed by: PHYSICIAN ASSISTANT

## 2017-10-11 RX ORDER — CYCLOBENZAPRINE HCL 10 MG
10 TABLET ORAL
Qty: 14 TABLET | Refills: 1 | Status: SHIPPED | OUTPATIENT
Start: 2017-10-11 | End: 2018-03-08

## 2017-10-11 RX ORDER — LEVOTHYROXINE SODIUM 137 UG/1
137 TABLET ORAL DAILY
Qty: 90 TABLET | Refills: 3 | Status: SHIPPED | OUTPATIENT
Start: 2017-10-11 | End: 2018-03-03

## 2017-10-11 NOTE — MR AVS SNAPSHOT
"              After Visit Summary   10/11/2017    Mariela Duffy    MRN: [id number]           Patient Information     Date Of Birth          1955        Visit Information        Provider Department      10/11/2017 9:30 AM Estefany Stanley PA-C [facility]        Today's Diagnoses     Fall down stairs, initial encounter    -  1    Postsurgical hypothyroidism        Gastroesophageal reflux disease with esophagitis           Follow-ups after your visit        Who to contact     If you have questions or need follow up information about today's clinic visit or your schedule please contact [facility] directly at [phone].  Normal or non-critical lab and imaging results will be communicated to you by MyChart, letter or phone within 4 business days after the clinic has received the results. If you do not hear from us within 7 days, please contact the clinic through Retroficiencyt or phone. If you have a critical or abnormal lab result, we will notify you by phone as soon as possible.  Submit refill requests through RelTel or call your pharmacy and they will forward the refill request to us. Please allow 3 business days for your refill to be completed.          Additional Information About Your Visit        MyChart Information     RelTel gives you secure access to your electronic health record. If you see a primary care provider, you can also send messages to your care team and make appointments. If you have questions, please call your primary care clinic.  If you do not have a primary care provider, please call [phone] and they will assist you.        Care EveryWhere ID     This is your Care EveryWhere ID. This could be used by other organizations to access your [place] medical records  [id number]        Your Vitals Were     Pulse Temperature Height Last Period Pulse Oximetry Breastfeeding?    98 97.6  F (36.4  C) (Oral) 5' 8.76\" (1.746 m) 01/20/2004 100% No    BMI " (Body Mass Index)                   29.27 kg/m2            Blood Pressure from Last 3 Encounters:   10/11/17 139/84   07/19/17 110/62   05/10/17 122/80    Weight from Last 3 Encounters:   10/11/17 196 lb 12.8 oz (89.3 kg)   07/19/17 201 lb (91.2 kg)   05/10/17 192 lb (87.1 kg)              We Performed the Following     TSH WITH FREE T4 REFLEX     XR Hand Left G/E 3 Views          Today's Medication Changes          These changes are accurate as of: 10/11/17 10:58 AM.  If you have any questions, ask your nurse or doctor.               Start taking these medicines.        Dose/Directions    cyclobenzaprine 10 MG tablet   Commonly known as:  FLEXERIL   Used for:  Fall down stairs, initial encounter   Started by:  Estefany Stanley PA-C        Dose:  10 mg   Take 1 tablet (10 mg) by mouth nightly as needed for muscle spasms   Quantity:  14 tablet   Refills:  1       ranitidine 300 MG tablet   Commonly known as:  ZANTAC   Used for:  Gastroesophageal reflux disease with esophagitis   Started by:  Estefany Stanley PA-C        Dose:  300 mg   Take 1 tablet (300 mg) by mouth At Bedtime   Quantity:  90 tablet   Refills:  3         These medicines have changed or have updated prescriptions.        Dose/Directions    levothyroxine 137 MCG tablet   Commonly known as:  SYNTHROID/LEVOTHROID   This may have changed:  See the new instructions.   Used for:  Postsurgical hypothyroidism   Changed by:  Estefany Stanley PA-C        Dose:  137 mcg   Take 1 tablet (137 mcg) by mouth daily   Quantity:  90 tablet   Refills:  3            Where to get your medicines      These medications were sent to [facility] [address]  [address] [facility] [address]     Phone:  [phone]     cyclobenzaprine 10 MG tablet    levothyroxine 137 MCG tablet    ranitidine 300 MG tablet                Primary Care Provider Office Phone # Fax #    Estefany Stanley PA-C  [phone]       [address]  [facility] [address]        Equal Access to Services     TUAN RINCON : Hadii aad ku haddilipkhanh Elena, waarsalanda lunaeem, qaprabhata kaalmada bj, fazal solismarkus brice So [facility] [phone].    ATENCIÓN: Si habla español, tiene a romero disposición servicios gratuitos de asistencia lingüística. Llame al [phone].    We comply with applicable federal civil rights laws and [place] laws. We do not discriminate on the basis of race, color, national origin, age, disability, sex, sexual orientation, or gender identity.            Thank you!     Thank you for choosing [facility]  for your care. Our goal is always to provide you with excellent care. Hearing back from our patients is one way we can continue to improve our services. Please take a few minutes to complete the written survey that you may receive in the mail after your visit with us. Thank you!             Your Updated Medication List - Protect others around you: Learn how to safely use, store and throw away your medicines at www.disposemymeds.org.          This list is accurate as of: 10/11/17 10:58 AM.  Always use your most recent med list.                   Brand Name Dispense Instructions for use Diagnosis    busPIRone 15 MG tablet    BUSPAR    270 tablet    Take 2 tabs in in the morning and 1 tab in the evening    Anxiety       cyclobenzaprine 10 MG tablet    FLEXERIL    14 tablet    Take 1 tablet (10 mg) by mouth nightly as needed for muscle spasms    Fall down stairs, initial encounter       fluticasone 50 MCG/ACT spray    FLONASE     Spray 2 sprays into both nostrils daily as needed for rhinitis or allergies        levothyroxine 137 MCG tablet    SYNTHROID/LEVOTHROID    90 tablet    Take 1 tablet (137 mcg) by mouth daily    Postsurgical hypothyroidism       LORazepam 0.5 MG tablet    ATIVAN    60 tablet    Take 0.5 tablets (0.25 mg) by mouth 2 times daily as needed  for anxiety . May take 2 tabs ( 1 mg) bid PO 2 times daily as needed until current anxiety flare improves.    Anxiety       nortriptyline 75 MG capsule    PAMELOR    90 capsule    Take 1 capsule (75 mg) by mouth At Bedtime    Major depressive disorder, recurrent episode, mild (H)       order for DME     1 Units    Light therapy light box    Seasonal affective disorder (H)       pantoprazole 20 MG EC tablet    PROTONIX    30 tablet    TAKE ONE TABLET BY MOUTH ONCE DAILY. TAKE BY MOUTH 30 TO 60 MINUTES BEFORE A MEAL.    Gastroesophageal reflux disease with esophagitis, Hiatal hernia       ranitidine 300 MG tablet    ZANTAC    90 tablet    Take 1 tablet (300 mg) by mouth At Bedtime    Gastroesophageal reflux disease with esophagitis       sertraline 100 MG tablet    ZOLOFT    180 tablet    TAKE TWO TABLETS BY MOUTH AT BEDTIME    Major depressive disorder, recurrent episode, mild (H)       SUMAtriptan 100 MG tablet    IMITREX    9 tablet    Take 1 tablet (100 mg) by mouth at onset of headache for migraine May repeat in 2 hours if needed: max 2/day; average number of headaches monthly 2    Acute nonintractable headache, unspecified headache type       triamcinolone 0.1 % ointment    KENALOG     Apply topically 2 times daily as needed for irritation

## 2017-10-11 NOTE — PROGRESS NOTES
SUBJECTIVE:   Mariela Duffy is a 61 year old female who presents to clinic today for the following health issues:      Joint Pain    Onset: This am about 8 am    Description:   Location: Left middle finger and ring finger and left middle and second to last toe  Character: fingers are stabbing toes are numb    Intensity: moderate    Progression of Symptoms: same    Accompanying Signs & Symptoms:  Other symptoms: numbness    History:   Previous similar pain: no       Precipitating factors:   Trauma or overuse: YES- Patient missed last three steps    Alleviating factors:  Improved by: nothing    Therapies Tried and outcome:           Medication Followup of Pantoprazole    Taking Medication as prescribed: yes    Side Effects:  None    Medication Helping Symptoms:  Yes, but symptoms not fully controlled.     Insurance won't pay for ongoing symptoms.      Hypothyroidism Follow-up      Since last visit, patient describes the following symptoms: Weight stable, no hair loss, no skin changes, no constipation, no loose stools        Problem list and histories reviewed & adjusted, as indicated.  Additional history: as documented    Patient Active Problem List   Diagnosis     Contact dermatitis and other eczema due to other specified agent     Allergic rhinitis due to other allergen     Esophageal reflux     Irritable bowel syndrome     Rosacea     Postsurgical hypothyroidism     Iron deficiency anemia     Absence of menstruation     Mild major depression (H)     CARDIOVASCULAR SCREENING; LDL GOAL LESS THAN 160     Generalized anxiety disorder     Hypothyroidism     Tubular adenoma of colon     Seasonal affective disorder (H)     Rhinitis     Headache     ADD (attention deficit disorder)     Other insomnia     TIA (transient ischemic attack)     Gastroesophageal reflux disease with esophagitis     Hiatal hernia     Past Surgical History:   Procedure Laterality Date     C NONSPECIFIC PROCEDURE  1997    surgery hiatal hernia  "    C NONSPECIFIC PROCEDURE      cholecystectomy     C NONSPECIFIC PROCEDURE  multiple    cleft lip repair.     CHOLECYSTECTOMY       COLONOSCOPY  2013    Colonoscopy Dr. Vallejo [facility]     HEAD & NECK SURGERY      thyroid cancer surgery     SURGICAL HISTORY OF -       thyroidectomy due to cancer     WRIST SURGERY Right     2015       Social History   Substance Use Topics     Smoking status: Never Smoker     Smokeless tobacco: Never Used     Alcohol use Yes      Comment: 2-4 mixed drinks/month     Family History   Problem Relation Age of Onset     CANCER Father      Colon, stomach -  at 75yoa     Hypertension Father      C.A.D. Father      CANCER Mother      Throat, lymph, bone -  at 79yoa     C.A.D. Maternal Grandfather      Heart Attack -  in his late 60's     Alzheimer Disease Paternal Grandmother      C.A.D. Paternal Grandfather      Heart Attack -  at 63yoa             Reviewed and updated as needed this visit by clinical staffTobacco  Allergies  Med Hx  Surg Hx  Fam Hx  Soc Hx      Reviewed and updated as needed this visit by Provider         ROS:  Constitutional, HEENT, cardiovascular, pulmonary, gi and gu systems are negative, except as otherwise noted.      OBJECTIVE:   /84 (BP Location: Right arm, Patient Position: Chair, Cuff Size: Adult Regular)  Pulse 98  Temp 97.6  F (36.4  C) (Oral)  Ht 5' 8.76\" (1.746 m)  Wt 196 lb 12.8 oz (89.3 kg)  LMP 2004  SpO2 100%  Breastfeeding? No  BMI 29.27 kg/m2  Body mass index is 29.27 kg/(m^2).  GENERAL APPEARANCE: healthy, alert and no distress  CV: regular rates and rhythm, normal S1 S2, no S3 or S4 and no murmur, click or rub  MS: slight swelling and tenderness left hand, 3rd digit at PIP, FROM, tender diffusely around left wrist    SKIN: no suspicious lesions or rashes        ASSESSMENT/PLAN:             1. Fall down stairs, initial encounter  Ice, heat, NSAIDS rest.  rx for muscle relaxant given.   - XR Hand " Left G/E 3 Views  - cyclobenzaprine (FLEXERIL) 10 MG tablet; Take 1 tablet (10 mg) by mouth nightly as needed for muscle spasms  Dispense: 14 tablet; Refill: 1    2. Postsurgical hypothyroidism  Await labs. Rx filled. If WNL, recheck in 1 year.   - TSH WITH FREE T4 REFLEX  - levothyroxine (SYNTHROID/LEVOTHROID) 137 MCG tablet; Take 1 tablet (137 mcg) by mouth daily  Dispense: 90 tablet; Refill: 3    3. Gastroesophageal reflux disease with esophagitis  Trial of zantac, since insurance is not paying for PPI  - ranitidine (ZANTAC) 300 MG tablet; Take 1 tablet (300 mg) by mouth At Bedtime  Dispense: 90 tablet; Refill: 3        Estefany Stanley PA-C, PAUL  [facility]

## 2017-10-11 NOTE — NURSING NOTE
"Chief Complaint   Patient presents with     Fall       Initial /84 (BP Location: Right arm, Patient Position: Chair, Cuff Size: Adult Regular)  Pulse 98  Temp 97.6  F (36.4  C) (Oral)  Ht 5' 8.76\" (1.746 m)  Wt 196 lb 12.8 oz (89.3 kg)  LMP 01/20/2004  SpO2 100%  Breastfeeding? No  BMI 29.27 kg/m2 Estimated body mass index is 29.27 kg/(m^2) as calculated from the following:    Height as of this encounter: 5' 8.76\" (1.746 m).    Weight as of this encounter: 196 lb 12.8 oz (89.3 kg).  Medication Reconciliation: complete Linda Keene MA  Health Maintenance has been reviewed.       "

## 2017-10-12 DIAGNOSIS — R79.89 ELEVATED TSH: Primary | ICD-10-CM

## 2017-10-20 ENCOUNTER — TRANSFERRED RECORDS (OUTPATIENT)
Dept: HEALTH INFORMATION MANAGEMENT | Facility: CLINIC | Age: 62
End: 2017-10-20

## 2017-10-24 ENCOUNTER — OFFICE VISIT (OUTPATIENT)
Dept: FAMILY MEDICINE | Facility: CLINIC | Age: 62
End: 2017-10-24
Payer: COMMERCIAL

## 2017-10-24 VITALS
DIASTOLIC BLOOD PRESSURE: 79 MMHG | HEIGHT: 69 IN | SYSTOLIC BLOOD PRESSURE: 115 MMHG | TEMPERATURE: 98.1 F | BODY MASS INDEX: 29.3 KG/M2 | OXYGEN SATURATION: 96 % | HEART RATE: 114 BPM | WEIGHT: 197.8 LBS

## 2017-10-24 DIAGNOSIS — K21.00 GASTROESOPHAGEAL REFLUX DISEASE WITH ESOPHAGITIS: ICD-10-CM
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
## 2017-10-24 DIAGNOSIS — K44.9 HIATAL HERNIA: ICD-10-CM

## 2017-10-24 PROCEDURE — 99214 OFFICE O/P EST MOD 30 MIN: CPT | Performed by: PHYSICIAN ASSISTANT

## 2017-10-24 RX ORDER — PANTOPRAZOLE SODIUM 20 MG/1
TABLET, DELAYED RELEASE ORAL
Qty: 90 TABLET | Refills: 1 | Status: SHIPPED | OUTPATIENT
Start: 2017-10-24 | End: 2018-05-14

## 2017-10-24 NOTE — MR AVS SNAPSHOT
"              After Visit Summary   10/24/2017    Mariela Duffy    MRN: [id number]           Patient Information     Date Of Birth          1955        Visit Information        Provider Department      10/24/2017 2:30 PM Estefany Stanley PA-C [facility]        Today's Diagnoses     Fall, initial encounter    -  1    Gastroesophageal reflux disease with esophagitis        Hiatal hernia           Follow-ups after your visit        Who to contact     If you have questions or need follow up information about today's clinic visit or your schedule please contact [facility] directly at [phone].  Normal or non-critical lab and imaging results will be communicated to you by MyCarGossiphart, letter or phone within 4 business days after the clinic has received the results. If you do not hear from us within 7 days, please contact the clinic through MyCarGossiphart or phone. If you have a critical or abnormal lab result, we will notify you by phone as soon as possible.  Submit refill requests through Powderhook or call your pharmacy and they will forward the refill request to us. Please allow 3 business days for your refill to be completed.          Additional Information About Your Visit        MyChart Information     Powderhook gives you secure access to your electronic health record. If you see a primary care provider, you can also send messages to your care team and make appointments. If you have questions, please call your primary care clinic.  If you do not have a primary care provider, please call [phone] and they will assist you.        Care EveryWhere ID     This is your Care EveryWhere ID. This could be used by other organizations to access your [place] medical records  [id number]        Your Vitals Were     Pulse Temperature Height Last Period Pulse Oximetry Breastfeeding?    114 98.1  F (36.7  C) (Oral) 5' 8.75\" (1.746 m) 01/20/2004 96% No    BMI (Body Mass Index)       "             29.42 kg/m2            Blood Pressure from Last 3 Encounters:   10/24/17 115/79   10/11/17 139/84   07/19/17 110/62    Weight from Last 3 Encounters:   10/24/17 197 lb 12.8 oz (89.7 kg)   10/11/17 196 lb 12.8 oz (89.3 kg)   07/19/17 201 lb (91.2 kg)              Today, you had the following     No orders found for display         Today's Medication Changes          These changes are accurate as of: 10/24/17  5:00 PM.  If you have any questions, ask your nurse or doctor.               Stop taking these medicines if you haven't already. Please contact your care team if you have questions.     order for DME   Stopped by:  Estefany Stanley PA-C                Where to get your medicines      [facility] [address] [place] [address] [facility]  [address] [facility] [address]     Phone:  [phone]     pantoprazole 20 MG EC tablet                Primary Care Provider Office Phone # Fax #    Estefany Stanley PA-C [phone] [address]  [facility] [address]        Equal Access to Services     TUAN RINCON AH: Hadii yamilet harmono Sodanielaali, waaxda luqadaha, qaybta kaalmada adeegyada, fazal george. So [facility] [phone].    ATENCIÓN: Si habla español, tiene a romero disposición servicios gratuitos de asistencia lingüística. [facility] [phone].    We comply with applicable federal civil rights laws and [place] laws. We do not discriminate on the basis of race, color, national origin, age, disability, sex, sexual orientation, or gender identity.            Thank you!     Thank you for choosing [facility]  for your care. Our goal is always to provide you with excellent care. Hearing back from our patients is one way we can continue to improve our services. Please take a few minutes to complete the written survey that you may receive in the mail after your visit with us.  Thank you!             Your Updated Medication List - Protect others around you: Learn how to safely use, store and throw away your medicines at www.disposemymeds.org.          This list is accurate as of: 10/24/17  5:00 PM.  Always use your most recent med list.                   Brand Name Dispense Instructions for use Diagnosis    busPIRone 15 MG tablet    BUSPAR    270 tablet    Take 2 tabs in in the morning and 1 tab in the evening    Anxiety       cyclobenzaprine 10 MG tablet    FLEXERIL    14 tablet    Take 1 tablet (10 mg) by mouth nightly as needed for muscle spasms    Fall down stairs, initial encounter       fluticasone 50 MCG/ACT spray    FLONASE     Spray 2 sprays into both nostrils daily as needed for rhinitis or allergies        levothyroxine 137 MCG tablet    SYNTHROID/LEVOTHROID    90 tablet    Take 1 tablet (137 mcg) by mouth daily    Postsurgical hypothyroidism       LORazepam 0.5 MG tablet    ATIVAN    60 tablet    Take 0.5 tablets (0.25 mg) by mouth 2 times daily as needed for anxiety . May take 2 tabs ( 1 mg) bid PO 2 times daily as needed until current anxiety flare improves.    Anxiety       nortriptyline 75 MG capsule    PAMELOR    90 capsule    Take 1 capsule (75 mg) by mouth At Bedtime    Major depressive disorder, recurrent episode, mild (H)       pantoprazole 20 MG EC tablet    PROTONIX    90 tablet    TAKE ONE TABLET BY MOUTH ONCE DAILY. TAKE BY MOUTH 30 TO 60 MINUTES BEFORE A MEAL.    Gastroesophageal reflux disease with esophagitis, Hiatal hernia       ranitidine 300 MG tablet    ZANTAC    90 tablet    Take 1 tablet (300 mg) by mouth At Bedtime    Gastroesophageal reflux disease with esophagitis       sertraline 100 MG tablet    ZOLOFT    180 tablet    TAKE TWO TABLETS BY MOUTH AT BEDTIME    Major depressive disorder, recurrent episode, mild (H)       SUMAtriptan 100 MG tablet    IMITREX    9 tablet    Take 1 tablet (100 mg) by mouth at onset of headache for migraine May repeat in 2  hours if needed: max 2/day; average number of headaches monthly 2    Acute nonintractable headache, unspecified headache type       triamcinolone 0.1 % ointment    KENALOG     Apply topically 2 times daily as needed for irritation

## 2017-10-24 NOTE — PROGRESS NOTES
SUBJECTIVE:   Mariela Duffy is a 62 year old female who presents to clinic today for the following health issues:      Patient fell a few weeks ago down stairs. Then was walking son's dog and tripped over it and then almost fell again later at home. Wondering if she needs physical therapy or not. Of note, was just at eye doctor and reports she is having decreased vision when she looks down, eye doctor recommended rechecking it in 3 months, pt did not realize this was a problem until eyes were checked.   No HA's or LOC with falls. No ETOH or drug use.     Will like refill on Protonix. Receept filled it for her.   Was previously just denied by insurance stating she has used her 120 day course and no more will be paid for. Patient still requests refill.     Problem list and histories reviewed & adjusted, as indicated.  Additional history: as documented    Patient Active Problem List   Diagnosis     Contact dermatitis and other eczema due to other specified agent     Allergic rhinitis due to other allergen     Esophageal reflux     Irritable bowel syndrome     Rosacea     Postsurgical hypothyroidism     Iron deficiency anemia     Absence of menstruation     Mild major depression (H)     CARDIOVASCULAR SCREENING; LDL GOAL LESS THAN 160     Generalized anxiety disorder     Hypothyroidism     Tubular adenoma of colon     Seasonal affective disorder (H)     Rhinitis     Headache     ADD (attention deficit disorder)     Other insomnia     TIA (transient ischemic attack)     Gastroesophageal reflux disease with esophagitis     Hiatal hernia     Past Surgical History:   Procedure Laterality Date     C NONSPECIFIC PROCEDURE  1997    surgery hiatal hernia     C NONSPECIFIC PROCEDURE  1993    cholecystectomy     C NONSPECIFIC PROCEDURE  multiple    cleft lip repair.     CHOLECYSTECTOMY       COLONOSCOPY  6/14/2013    Colonoscopy Dr. Vallejo [facility]     HEAD & NECK SURGERY      thyroid cancer surgery     SURGICAL  "HISTORY OF -       thyroidectomy due to cancer     WRIST SURGERY Right     2015       Social History   Substance Use Topics     Smoking status: Never Smoker     Smokeless tobacco: Never Used     Alcohol use Yes      Comment: 2-4 mixed drinks/month     Family History   Problem Relation Age of Onset     CANCER Father      Colon, stomach -  at 75yoa     Hypertension Father      C.A.D. Father      CANCER Mother      Throat, lymph, bone -  at 79yoa     C.A.D. Maternal Grandfather      Heart Attack -  in his late 60's     Alzheimer Disease Paternal Grandmother      C.A.D. Paternal Grandfather      Heart Attack -  at 63yoa             Reviewed and updated as needed this visit by clinical staffTobacco  Allergies  Med Hx  Surg Hx  Fam Hx  Soc Hx      Reviewed and updated as needed this visit by Provider         ROS:  Constitutional, HEENT, cardiovascular, pulmonary, gi and gu systems are negative, except as otherwise noted.      OBJECTIVE:   /79 (BP Location: Right arm, Patient Position: Chair, Cuff Size: Adult Regular)  Pulse 114  Temp 98.1  F (36.7  C) (Oral)  Ht 5' 8.75\" (1.746 m)  Wt 197 lb 12.8 oz (89.7 kg)  LMP 2004  SpO2 96%  Breastfeeding? No  BMI 29.42 kg/m2  Body mass index is 29.42 kg/(m^2).  GENERAL APPEARANCE: healthy, alert and no distress  HENT: ear canals and TM's normal and nose and mouth without ulcers or lesions  RESP: lungs clear to auscultation - no rales, rhonchi or wheezes  CV: regular rates and rhythm, normal S1 S2, no S3 or S4 and no murmur, click or rub  SKIN: no suspicious lesions or rashes  NEURO: Normal strength and tone, mentation intact, speech normal, cranial nerves 2-12 intact, Romberg negative and rapid alternating movements normal  PSYCH: mentation appears normal and affect normal/bright        ASSESSMENT/PLAN:             1. Fall, initial encounter  Recommend monitor falls.  If falls one more time, call and will refer to Neurology.    2. " Gastroesophageal reflux disease with esophagitis  Discussed likely will be denied, as previous Rx PA denied, due to maximum quantity reached for year. Patient still request refill to [facility]   - pantoprazole (PROTONIX) 20 MG EC tablet; TAKE ONE TABLET BY MOUTH ONCE DAILY. TAKE BY MOUTH 30 TO 60 MINUTES BEFORE A MEAL.  Dispense: 90 tablet; Refill: 1    3. Hiatal hernia  See #2  - pantoprazole (PROTONIX) 20 MG EC tablet; TAKE ONE TABLET BY MOUTH ONCE DAILY. TAKE BY MOUTH 30 TO 60 MINUTES BEFORE A MEAL.  Dispense: 90 tablet; Refill: 1        Estefany Stanley PA-C, PA-C  [facility]

## 2017-10-24 NOTE — NURSING NOTE
"Chief Complaint   Patient presents with     Fall       Initial /79 (BP Location: Right arm, Patient Position: Chair, Cuff Size: Adult Regular)  Pulse 114  Temp 98.1  F (36.7  C) (Oral)  Ht 5' 8.75\" (1.746 m)  Wt 197 lb 12.8 oz (89.7 kg)  LMP 01/20/2004  SpO2 96%  Breastfeeding? No  BMI 29.42 kg/m2 Estimated body mass index is 29.42 kg/(m^2) as calculated from the following:    Height as of this encounter: 5' 8.75\" (1.746 m).    Weight as of this encounter: 197 lb 12.8 oz (89.7 kg).  Medication Reconciliation: complete Linda Keene MA  Health Maintenance has been reviewed.       "

## 2017-10-30 ENCOUNTER — TELEPHONE (OUTPATIENT)
Dept: FAMILY MEDICINE | Facility: CLINIC | Age: 62
End: 2017-10-30

## 2017-10-30 DIAGNOSIS — F41.9 ANXIETY: ICD-10-CM

## 2017-10-30 NOTE — TELEPHONE ENCOUNTER
Pt calls, see below, inform pt when final at [phone] (home) none (work)    1)  has 67 LB dog, pulled her over and fell again, 2 days, hurt right knee hurts, bruised, knee does not need eval, told to call if fell again, see 10/24 visit    1. Fall, initial encounter  Recommend monitor falls.  If falls one more time, call and will refer to Neurology.    2) wants lorazepam refill, uses prn, inform if you want added to problem list    Controlled Substance Refill Request for Lorazepam  Problem List Complete:  No     PROVIDER TO CONSIDER COMPLETION OF PROBLEM LIST AND OVERVIEW/CONTROLLED SUBSTANCE AGREEMENT    Last Written Prescription Date:  6/27/17  Last Fill Quantity: 60,   # refills: 1    Last Office Visit with [facility] primary care provider: 10/24/17    Future Office visit:     Controlled substance agreement on file: No.     Processing:  Staff will hand deliver Rx to on-site pharmacy     checked in past 6 months?  Yes 10/30/17-only above refill on     [phone] (home) none (work)      Thelma Strong, RN, BSN  Message handled by Nurse Triage.

## 2017-11-01 DIAGNOSIS — F33.0 MAJOR DEPRESSIVE DISORDER, RECURRENT EPISODE, MILD (H): ICD-10-CM

## 2017-11-01 RX ORDER — LORAZEPAM 0.5 MG/1
0.25 TABLET ORAL 2 TIMES DAILY PRN
Qty: 60 TABLET | Refills: 0 | Status: SHIPPED | OUTPATIENT
Start: 2017-11-01 | End: 2017-12-22

## 2017-11-01 NOTE — TELEPHONE ENCOUNTER
Please call back. I think this fall sounds like it was related to the large dog.     If she falls again, not related to dog, please call me back.

## 2017-11-02 RX ORDER — NORTRIPTYLINE HYDROCHLORIDE 75 MG/1
CAPSULE ORAL
Qty: 90 CAPSULE | Refills: 1 | Status: SHIPPED | OUTPATIENT
Start: 2017-11-02 | End: 2017-12-22

## 2017-11-02 NOTE — TELEPHONE ENCOUNTER
Routing nortriptyline refill request to provider for review/approval because:  Assoc Dx:  Major depressive disorder, recurrent episode, mild   PHQ-9 score:    PHQ-9 SCORE 7/19/2017   Total Score -   Total Score 9     Eliud Nielson RN

## 2017-11-02 NOTE — TELEPHONE ENCOUNTER
Last Office Visit with [facility], [facility] or [facility] prescribing provider: 10/24/2017

## 2017-12-05 ENCOUNTER — TRANSFERRED RECORDS (OUTPATIENT)
Dept: HEALTH INFORMATION MANAGEMENT | Facility: CLINIC | Age: 62
End: 2017-12-05

## 2017-12-07 DIAGNOSIS — F41.9 ANXIETY: ICD-10-CM

## 2017-12-08 ENCOUNTER — TELEPHONE (OUTPATIENT)
Dept: FAMILY MEDICINE | Facility: CLINIC | Age: 62
End: 2017-12-08

## 2017-12-08 ENCOUNTER — OFFICE VISIT (OUTPATIENT)
Dept: FAMILY MEDICINE | Facility: CLINIC | Age: 62
End: 2017-12-08
Payer: COMMERCIAL

## 2017-12-08 VITALS
HEART RATE: 113 BPM | HEIGHT: 69 IN | OXYGEN SATURATION: 97 % | BODY MASS INDEX: 28.79 KG/M2 | SYSTOLIC BLOOD PRESSURE: 130 MMHG | TEMPERATURE: 98 F | DIASTOLIC BLOOD PRESSURE: 87 MMHG | WEIGHT: 194.4 LBS

## 2017-12-08 DIAGNOSIS — R11.0 NAUSEA: Primary | ICD-10-CM

## 2017-12-08 DIAGNOSIS — J01.00 ACUTE NON-RECURRENT MAXILLARY SINUSITIS: ICD-10-CM

## 2017-12-08 PROCEDURE — 99213 OFFICE O/P EST LOW 20 MIN: CPT | Performed by: PHYSICIAN ASSISTANT

## 2017-12-08 RX ORDER — ONDANSETRON 4 MG/1
4 TABLET, FILM COATED ORAL EVERY 8 HOURS PRN
Qty: 18 TABLET | Refills: 0 | Status: SHIPPED | OUTPATIENT
Start: 2017-12-08 | End: 2018-03-08

## 2017-12-08 RX ORDER — BUSPIRONE HYDROCHLORIDE 15 MG/1
TABLET ORAL
Qty: 270 TABLET | Refills: 0 | Status: SHIPPED | OUTPATIENT
Start: 2017-12-08 | End: 2017-12-22

## 2017-12-08 NOTE — TELEPHONE ENCOUNTER
Medication is being filled for 1 time refill only due to:  Patient needs to be seen because due for depression medication recheck and annual physical.      Prescription approved per [facility] Refill Protocol.    Lakisha JOSEPH RN, BSN, PHN  [place] [facility]

## 2017-12-08 NOTE — TELEPHONE ENCOUNTER
Patient calling and has had nausea since Wednesday.  Thought related to what she ate.  Now states has odor from sinus and feels may have sinus infection.  Scheduled for appt at 11:45 am with Jennifer Harris RN

## 2017-12-08 NOTE — LETTER
My Depression Action Plan  Name: Mariela Duffy   Date of Birth 1955  Date: 12/8/2017    My doctor: Estefany Satnley   My clinic: [address] [address] [phone]          GREEN    ZONE   Good Control    What it looks like:     Things are going generally well. You have normal up s and down s. You may even feel depressed from time to time, but bad moods usually last less than a day.   What you need to do:  1. Continue to care for yourself (see self care plan)  2. Check your depression survival kit and update it as needed  3. Follow your physician s recommendations including any medication.  4. Do not stop taking medication unless you consult with your physician first.           YELLOW         ZONE Getting Worse    What it looks like:     Depression is starting to interfere with your life.     It may be hard to get out of bed; you may be starting to isolate yourself from others.    Symptoms of depression are starting to last most all day and this has happened for several days.     You may have suicidal thoughts but they are not constant.   What you need to do:     1. Call your care team, your response to treatment will improve if you keep your care team informed of your progress. Yellow periods are signs an adjustment may need to be made.     2. Continue your self-care, even if you have to fake it!    3. Talk to someone in your support network    4. Open up your depression survival kit           RED    ZONE Medical Alert - Get Help    What it looks like:     Depression is seriously interfering with your life.     You may experience these or other symptoms: You can t get out of bed most days, can t work or engage in other necessary activities, you have trouble taking care of basic hygiene, or basic responsibilities, thoughts of suicide or death that will not go away, self-injurious behavior.     What you need to do:  1. Call your  care team and request a same-day appointment. If they are not available (weekends or after hours) call your local crisis line, emergency room or 911.      Electronically signed by: Estefany Stanley, December 8, 2017    Depression Self Care Plan / Survival Kit    Self-Care for Depression  Here s the deal. Your body and mind are really not as separate as most people think.  What you do and think affects how you feel and how you feel influences what you do and think. This means if you do things that people who feel good do, it will help you feel better.  Sometimes this is all it takes.  There is also a place for medication and therapy depending on how severe your depression is, so be sure to consult with your medical provider and/ or Behavioral Health Consultant if your symptoms are worsening or not improving.     In order to better manage my stress, I will:    Exercise  Get some form of exercise, every day. This will help reduce pain and release endorphins, the  feel good  chemicals in your brain. This is almost as good as taking antidepressants!  This is not the same as joining a gym and then never going! (they count on that by the way ) It can be as simple as just going for a walk or doing some gardening, anything that will get you moving.      Hygiene   Maintain good hygiene (Get out of bed in the morning, Make your bed, Brush your teeth, Take a shower, and Get dressed like you were going to work, even if you are unemployed).  If your clothes don't fit try to get ones that do.    Diet  I will strive to eat foods that are good for me, drink plenty of water, and avoid excessive sugar, caffeine, alcohol, and other mood-altering substances.  Some foods that are helpful in depression are: complex carbohydrates, B vitamins, flaxseed, fish or fish oil, fresh fruits and vegetables.    Psychotherapy  I agree to participate in Individual Therapy (if recommended).    Medication  If prescribed medications, I agree to take  them.  Missing doses can result in serious side effects.  I understand that drinking alcohol, or other illicit drug use, may cause potential side effects.  I will not stop my medication abruptly without first discussing it with my provider.    Staying Connected With Others  I will stay in touch with my friends, family members, and my primary care provider/team.    Use your imagination  Be creative.  We all have a creative side; it doesn t matter if it s oil painting, sand castles, or mud pies! This will also kick up the endorphins.    Witness Beauty  (AKA stop and smell the roses) Take a look outside, even in mid-winter. Notice colors, textures. Watch the squirrels and birds.     Service to others  Be of service to others.  There is always someone else in need.  By helping others we can  get out of ourselves  and remember the really important things.  This also provides opportunities for practicing all the other parts of the program.    Humor  Laugh and be silly!  Adjust your TV habits for less news and crime-drama and more comedy.    Control your stress  Try breathing deep, massage therapy, biofeedback, and meditation. Find time to relax each day.     My support system    Clinic Contact:  Phone number:    Contact 1:  Phone number:    Contact 2:  Phone number:    Caoda[employer]/:  Phone number:    Therapist:  Phone number:    Local crisis center:    Phone number:    Other community support:  Phone number:

## 2017-12-08 NOTE — PROGRESS NOTES
SUBJECTIVE:   Mariela Duffy is a 62 year old female who presents to clinic today for the following health issues:      Acute Illness   Acute illness concerns: Nausea   Onset: Monday or Tuesday    Fever: no    Chills/Sweats: no    Headache (location?): no    Sinus Pressure:yes    Conjunctivitis:  no    Ear Pain: YES: left    Rhinorrhea: no    Congestion: YES    Sore Throat: no     Cough: YES-non-productive    Wheeze: YES    Decreased Appetite: YES    Nausea: YES- Patient states she ate sushi Monday or Tuesday     Vomiting: no    Diarrhea:  no    Dysuria/Freq.: no    Fatigue/Achiness: no    Sick/Strep Exposure: no     Therapies Tried and outcome:             Problem list and histories reviewed & adjusted, as indicated.  Additional history: as documented    Patient Active Problem List   Diagnosis     Contact dermatitis and other eczema due to other specified agent     Allergic rhinitis due to other allergen     Esophageal reflux     Irritable bowel syndrome     Rosacea     Postsurgical hypothyroidism     Iron deficiency anemia     Absence of menstruation     Mild major depression (H)     CARDIOVASCULAR SCREENING; LDL GOAL LESS THAN 160     Generalized anxiety disorder     Hypothyroidism     Tubular adenoma of colon     Seasonal affective disorder (H)     Rhinitis     Headache     ADD (attention deficit disorder)     Other insomnia     TIA (transient ischemic attack)     Gastroesophageal reflux disease with esophagitis     Hiatal hernia     Past Surgical History:   Procedure Laterality Date     C NONSPECIFIC PROCEDURE  1997    surgery hiatal hernia     C NONSPECIFIC PROCEDURE  1993    cholecystectomy     C NONSPECIFIC PROCEDURE  multiple    cleft lip repair.     CHOLECYSTECTOMY       COLONOSCOPY  6/14/2013    [facility]     HEAD & NECK SURGERY      thyroid cancer surgery     SURGICAL HISTORY OF -   11/04    thyroidectomy due to cancer     WRIST SURGERY Right     8/2015       Social History  "  Substance Use Topics     Smoking status: Never Smoker     Smokeless tobacco: Never Used     Alcohol use Yes      Comment: 2-4 mixed drinks/month     Family History   Problem Relation Age of Onset     CANCER Father      Colon, stomach -  at 75yoa     Hypertension Father      C.A.D. Father      CANCER Mother      Throat, lymph, bone -  at 79yoa     C.A.D. Maternal Grandfather      Heart Attack -  in his late 60's     Alzheimer Disease Paternal Grandmother      C.A.D. Paternal Grandfather      Heart Attack -  at 63yoa             Reviewed and updated as needed this visit by clinical staff     Reviewed and updated as needed this visit by Provider         ROS:  Constitutional, HEENT, cardiovascular, pulmonary, gi and gu systems are negative, except as otherwise noted.      OBJECTIVE:   /87 (BP Location: Right arm, Patient Position: Chair, Cuff Size: Adult Large)  Pulse 113  Temp 98  F (36.7  C) (Oral)  Ht 5' 8.78\" (1.747 m)  Wt 194 lb 6.4 oz (88.2 kg)  LMP 2004  SpO2 97%  BMI 28.9 kg/m2  Body mass index is 28.9 kg/(m^2).  HENT: ear canals and TM's normal, nasal mucosa edematous without rhinorrhea and maxillary sinus tenderness bilateral, post nasal drip noted  RESP: lungs clear to auscultation - no rales, rhonchi or wheezes  CV: regular rates and rhythm, normal S1 S2, no S3 or S4 and no murmur, click or rub  ABDOMEN: soft, nontender, without hepatosplenomegaly or masses and bowel sounds normal        ASSESSMENT/PLAN:             1. Nausea  Not LT medication use as needed   If symptoms persist or worsen return to clinic   Nausea likely secondary to copious post nasal drip  - ondansetron (ZOFRAN) 4 MG tablet; Take 1 tablet (4 mg) by mouth every 8 hours as needed for nausea  Dispense: 18 tablet; Refill: 0    2. Acute non-recurrent maxillary sinusitis  Supportive cares.   - amoxicillin-clavulanate (AUGMENTIN) 875-125 MG per tablet; Take 1 tablet by mouth 2 times daily  Dispense: 20 " tablet; Refill: 0    F/u as needed     BOB TorresC  [facility]

## 2017-12-08 NOTE — MR AVS SNAPSHOT
After Visit Summary   12/8/2017    Mariela Duffy    MRN: [id number]           Patient Information     Date Of Birth          1955        Visit Information        Provider Department      12/8/2017 11:45 AM Estefany Stanley PA-C [facility]        Today's Diagnoses     Screening for malignant neoplasm of cervix        Need for prophylactic vaccination and inoculation against influenza           Follow-ups after your visit        Your next 10 appointments already scheduled     Dec 08, 2017 11:45 AM CST   SHORT with Estefany Stanley PA-C   [facility]    [address] [place] [id number] [phone]              Who to contact     If you have questions or need follow up information about today's clinic visit or your schedule please contact [facility] directly at [phone].  Normal or non-critical lab and imaging results will be communicated to you by Xuehuilehart, letter or phone within 4 business days after the clinic has received the results. If you do not hear from us within 7 days, please contact the clinic through Xuehuilehart or phone. If you have a critical or abnormal lab result, we will notify you by phone as soon as possible.  Submit refill requests through [employer]antage or call your pharmacy and they will forward the refill request to us. Please allow 3 business days for your refill to be completed.          Additional Information About Your Visit        MyChart Information     [employer] gives you secure access to your electronic health record. If you see a primary care provider, you can also send messages to your care team and make appointments. If you have questions, please call your primary care clinic.  If you do not have a primary care provider, please call [phone] and they will assist you.        Care EveryWhere ID     This is your Care EveryWhere ID. This could be used by other  "organizations to access your [place] medical records  [id number]        Your Vitals Were     Pulse Temperature Height Last Period Pulse Oximetry BMI (Body Mass Index)    113 98  F (36.7  C) (Oral) 5' 8.78\" (1.747 m) 01/20/2004 97% 28.9 kg/m2       Blood Pressure from Last 3 Encounters:   12/08/17 130/87   10/24/17 115/79   10/11/17 139/84    Weight from Last 3 Encounters:   12/08/17 194 lb 6.4 oz (88.2 kg)   10/24/17 197 lb 12.8 oz (89.7 kg)   10/11/17 196 lb 12.8 oz (89.3 kg)              Today, you had the following     No orders found for display       Primary Care Provider Office Phone # Fax #    Estefany Stanley PA-C [phone] [address] [facility] [address]        Equal Access to Services     TUAN RINCON : Hadii yamilet ku hadasho Soomaali, waaxda luqadaha, qaybta kaalmada adeegyada, fazal guillermo . So [facility] [phone].    ATENCIÓN: Si raven perez, tiene a romero disposición servicios gratuitos de asistencia lingüística. Llame al [phone].    We comply with applicable federal civil rights laws and [place] laws. We do not discriminate on the basis of race, color, national origin, age, disability, sex, sexual orientation, or gender identity.            Thank you!     Thank you for choosing [facility]  for your care. Our goal is always to provide you with excellent care. Hearing back from our patients is one way we can continue to improve our services. Please take a few minutes to complete the written survey that you may receive in the mail after your visit with us. Thank you!             Your Updated Medication List - Protect others around you: Learn how to safely use, store and throw away your medicines at www.disposemymeds.org.          This list is accurate as of: 12/8/17 11:36 AM.  Always use your most recent med list.                   Brand Name Dispense Instructions for use Diagnosis    busPIRone 15 MG tablet    BUSPAR    270 " tablet    TAKE 2 TABLETS BY MOUTH IN THE MORNING AND TAKE 1 TABLET BY MOUTH IN THE EVENING    Anxiety       cyclobenzaprine 10 MG tablet    FLEXERIL    14 tablet    Take 1 tablet (10 mg) by mouth nightly as needed for muscle spasms    Fall down stairs, initial encounter       fluticasone 50 MCG/ACT spray    FLONASE     Spray 2 sprays into both nostrils daily as needed for rhinitis or allergies        levothyroxine 137 MCG tablet    SYNTHROID/LEVOTHROID    90 tablet    Take 1 tablet (137 mcg) by mouth daily    Postsurgical hypothyroidism       LORazepam 0.5 MG tablet    ATIVAN    60 tablet    Take 0.5 tablets (0.25 mg) by mouth 2 times daily as needed for anxiety . May take 2 tabs ( 1 mg) bid PO 2 times daily as needed until current anxiety flare improves.    Anxiety       nortriptyline 75 MG capsule    PAMELOR    90 capsule    TAKE ONE CAPSULE BY MOUTH AT BEDTIME    Major depressive disorder, recurrent episode, mild (H)       pantoprazole 20 MG EC tablet    PROTONIX    90 tablet    TAKE ONE TABLET BY MOUTH ONCE DAILY. TAKE BY MOUTH 30 TO 60 MINUTES BEFORE A MEAL.    Gastroesophageal reflux disease with esophagitis, Hiatal hernia       ranitidine 300 MG tablet    ZANTAC    90 tablet    Take 1 tablet (300 mg) by mouth At Bedtime    Gastroesophageal reflux disease with esophagitis       sertraline 100 MG tablet    ZOLOFT    180 tablet    TAKE TWO TABLETS BY MOUTH AT BEDTIME    Major depressive disorder, recurrent episode, mild (H)       SUMAtriptan 100 MG tablet    IMITREX    9 tablet    Take 1 tablet (100 mg) by mouth at onset of headache for migraine May repeat in 2 hours if needed: max 2/day; average number of headaches monthly 2    Acute nonintractable headache, unspecified headache type       triamcinolone 0.1 % ointment    KENALOG     Apply topically 2 times daily as needed for irritation

## 2017-12-11 ENCOUNTER — TELEPHONE (OUTPATIENT)
Dept: FAMILY MEDICINE | Facility: CLINIC | Age: 62
End: 2017-12-11

## 2017-12-11 NOTE — TELEPHONE ENCOUNTER
Pt informed. Scheduled OV tomorrow 9 AM.  She agrees to go to UC today if needs earlier evaluation and management.  Eliud Nielson RN

## 2017-12-11 NOTE — TELEPHONE ENCOUNTER
I did not see this patient, so it is hard to make decision over mychart messages, if she is still concerned I recommend OV.  Karina Zayas MD  [facility]  [phone]

## 2017-12-11 NOTE — TELEPHONE ENCOUNTER
Patient calling and states was in 12/8/17.  Got Augmentin and Zofran.  States was still in bed all weekend, nausea and headache.  Has not taken temp but does not feel has temp.  Felt better yesterday as far as getting up but still has headache, nausea and not wanting to get out of bed.  Wondering what to do?  Please advise.  Call her back at [phone].  Abirl Harris RN

## 2017-12-12 ENCOUNTER — OFFICE VISIT (OUTPATIENT)
Dept: FAMILY MEDICINE | Facility: CLINIC | Age: 62
End: 2017-12-12
Payer: COMMERCIAL

## 2017-12-12 VITALS
OXYGEN SATURATION: 98 % | DIASTOLIC BLOOD PRESSURE: 74 MMHG | RESPIRATION RATE: 16 BRPM | TEMPERATURE: 98.1 F | BODY MASS INDEX: 28.48 KG/M2 | WEIGHT: 191.6 LBS | HEART RATE: 116 BPM | SYSTOLIC BLOOD PRESSURE: 124 MMHG

## 2017-12-12 DIAGNOSIS — R11.0 NAUSEA: Primary | ICD-10-CM

## 2017-12-12 PROCEDURE — 99213 OFFICE O/P EST LOW 20 MIN: CPT | Performed by: FAMILY MEDICINE

## 2017-12-12 RX ORDER — PROCHLORPERAZINE MALEATE 10 MG
5 TABLET ORAL EVERY 6 HOURS PRN
Qty: 20 TABLET | Refills: 1 | Status: SHIPPED | OUTPATIENT
Start: 2017-12-12 | End: 2018-03-08

## 2017-12-12 NOTE — PROGRESS NOTES
SUBJECTIVE:   Mairela Duffy is a 62 year old female who presents to clinic today for the following health issues:      Follow up sinusitis - currently on Amoxicillin.  Also on Zofran for nausea - experiencing headaches from the Zofran.  Wants to discuss an alternative.  Since Friday, she has been having significant headache, she stopped using zofran and the headache went away.  Her nausea started after eating at a chinese buffet, which was about 1 week ago, and since this morning she felt the nausea has improved.        Problem list and histories reviewed & adjusted, as indicated.  Additional history: as documented    Patient Active Problem List   Diagnosis     Contact dermatitis and other eczema due to other specified agent     Allergic rhinitis due to other allergen     Esophageal reflux     Irritable bowel syndrome     Rosacea     Postsurgical hypothyroidism     Iron deficiency anemia     Absence of menstruation     Mild major depression (H)     CARDIOVASCULAR SCREENING; LDL GOAL LESS THAN 160     Generalized anxiety disorder     Hypothyroidism     Tubular adenoma of colon     Seasonal affective disorder (H)     Rhinitis     Headache     ADD (attention deficit disorder)     Other insomnia     TIA (transient ischemic attack)     Gastroesophageal reflux disease with esophagitis     Hiatal hernia     Past Surgical History:   Procedure Laterality Date     C NONSPECIFIC PROCEDURE  1997    surgery hiatal hernia     C NONSPECIFIC PROCEDURE  1993    cholecystectomy     C NONSPECIFIC PROCEDURE  multiple    cleft lip repair.     CHOLECYSTECTOMY       COLONOSCOPY  6/14/2013    Colonoscopy Dr. Yoni LOOMIS     HEAD & NECK SURGERY      thyroid cancer surgery     SURGICAL HISTORY OF -   11/04    thyroidectomy due to cancer     WRIST SURGERY Right     8/2015       Social History   Substance Use Topics     Smoking status: Never Smoker     Smokeless tobacco: Never Used     Alcohol use Yes      Comment: 2-4 mixed drinks/month      Family History   Problem Relation Age of Onset     CANCER Father      Colon, stomach -  at 75yoa     Hypertension Father      C.A.D. Father      CANCER Mother      Throat, lymph, bone -  at 79yoa     C.A.JUAN. Maternal Grandfather      Heart Attack -  in his late 60's     Alzheimer Disease Paternal Grandmother      C.A.D. Paternal Grandfather      Heart Attack -  at 63yoa         Current Outpatient Prescriptions   Medication Sig Dispense Refill     prochlorperazine (COMPAZINE) 10 MG tablet Take 0.5 tablets (5 mg) by mouth every 6 hours as needed for nausea or vomiting 20 tablet 1     busPIRone (BUSPAR) 15 MG tablet TAKE 2 TABLETS BY MOUTH IN THE MORNING AND TAKE 1 TABLET BY MOUTH IN THE EVENING 270 tablet 0     ondansetron (ZOFRAN) 4 MG tablet Take 1 tablet (4 mg) by mouth every 8 hours as needed for nausea 18 tablet 0     amoxicillin-clavulanate (AUGMENTIN) 875-125 MG per tablet Take 1 tablet by mouth 2 times daily 20 tablet 0     nortriptyline (PAMELOR) 75 MG capsule TAKE ONE CAPSULE BY MOUTH AT BEDTIME 90 capsule 1     LORazepam (ATIVAN) 0.5 MG tablet Take 0.5 tablets (0.25 mg) by mouth 2 times daily as needed for anxiety . May take 2 tabs ( 1 mg) bid PO 2 times daily as needed until current anxiety flare improves. 60 tablet 0     pantoprazole (PROTONIX) 20 MG EC tablet TAKE ONE TABLET BY MOUTH ONCE DAILY. TAKE BY MOUTH 30 TO 60 MINUTES BEFORE A MEAL. 90 tablet 1     cyclobenzaprine (FLEXERIL) 10 MG tablet Take 1 tablet (10 mg) by mouth nightly as needed for muscle spasms 14 tablet 1     levothyroxine (SYNTHROID/LEVOTHROID) 137 MCG tablet Take 1 tablet (137 mcg) by mouth daily 90 tablet 3     ranitidine (ZANTAC) 300 MG tablet Take 1 tablet (300 mg) by mouth At Bedtime 90 tablet 3     sertraline (ZOLOFT) 100 MG tablet TAKE TWO TABLETS BY MOUTH AT BEDTIME 180 tablet 0     SUMAtriptan (IMITREX) 100 MG tablet Take 1 tablet (100 mg) by mouth at onset of headache for migraine May repeat in 2 hours if  needed: max 2/day; average number of headaches monthly 2 9 tablet 1     fluticasone (FLONASE) 50 MCG/ACT nasal spray Spray 2 sprays into both nostrils daily as needed for rhinitis or allergies       triamcinolone (KENALOG) 0.1 % ointment Apply topically 2 times daily as needed for irritation           Reviewed and updated as needed this visit by clinical staffTobacco  Allergies  Meds  Med Hx  Surg Hx  Fam Hx  Soc Hx      Reviewed and updated as needed this visit by Provider         ROS:      OBJECTIVE:     /74 (BP Location: Right arm, Patient Position: Chair, Cuff Size: Adult Large)  Pulse 116  Temp 98.1  F (36.7  C) (Oral)  Resp 16  Wt 86.9 kg (191 lb 9.6 oz)  LMP 01/20/2004  SpO2 98%  Breastfeeding? No  BMI 28.48 kg/m2  Body mass index is 28.48 kg/(m^2).  GENERAL: healthy, alert and no distress  RESP: lungs clear to auscultation - no rales, rhonchi or wheezes  HEENT: erythematous mucosa of the nose, surgical repair of the upper hard palate on the Lt with normal rest of the exam of the throat and pharynx.  CV: regular rates and rhythm, normal S1 S2, no S3 or S4 and no murmur, click or rub  ABDOMEN: soft, nontender, no hepatosplenomegaly, no masses and bowel sounds normal  MS: no gross musculoskeletal defects noted, no edema        ASSESSMENT/PLAN:             1. Nausea  Mostly related to viral gastroenteritis, pt improved, may use short course of   - prochlorperazine (COMPAZINE) 10 MG tablet; Take 0.5 tablets (5 mg) by mouth every 6 hours as needed for nausea or vomiting  Dispense: 20 tablet; Refill: 1  If needed.  In regard to her sinusitis, continue on amoxil.  Follow up in 5 days if symptoms persist, sooner if symptoms worsen or new ones develops, pt may contact us over the phone for any questions or concerns.          Karina Zayas MD  [facility]

## 2017-12-12 NOTE — MR AVS SNAPSHOT
After Visit Summary   12/12/2017    Mariela Duffy    MRN: [id number]           Patient Information     Date Of Birth          1955        Visit Information        Provider Department      12/12/2017 9:00 AM Karina Zayas MD [facility]        Today's Diagnoses     Nausea    -  1       Follow-ups after your visit        Who to contact     If you have questions or need follow up information about today's clinic visit or your schedule please contact [facility] directly at [phone].  Normal or non-critical lab and imaging results will be communicated to you by MyChart, letter or phone within 4 business days after the clinic has received the results. If you do not hear from us within 7 days, please contact the clinic through [employer] or phone. If you have a critical or abnormal lab result, we will notify you by phone as soon as possible.  Submit refill requests through [employer] or call your pharmacy and they will forward the refill request to us. Please allow 3 business days for your refill to be completed.          Additional Information About Your Visit        MyChart Information     [employer] gives you secure access to your electronic health record. If you see a primary care provider, you can also send messages to your care team and make appointments. If you have questions, please call your primary care clinic.  If you do not have a primary care provider, please call [phone] and they will assist you.        Care EveryWhere ID     This is your Care EveryWhere ID. This could be used by other organizations to access your [facility] medical records  [id number]        Your Vitals Were     Pulse Temperature Respirations Last Period Pulse Oximetry Breastfeeding?    116 98.1  F (36.7  C) (Oral) 16 01/20/2004 98% No    BMI (Body Mass Index)                   28.48 kg/m2            Blood Pressure from Last 3 Encounters:   12/12/17 124/74   12/08/17 130/87   10/24/17  115/79    Weight from Last 3 Encounters:   12/12/17 86.9 kg (191 lb 9.6 oz)   12/08/17 88.2 kg (194 lb 6.4 oz)   10/24/17 89.7 kg (197 lb 12.8 oz)              Today, you had the following     No orders found for display         Today's Medication Changes          These changes are accurate as of: 12/12/17  9:43 AM.  If you have any questions, ask your nurse or doctor.               Start taking these medicines.        Dose/Directions    prochlorperazine 10 MG tablet   Commonly known as:  COMPAZINE   Used for:  Nausea   Started by:  Karina Zayas MD        Dose:  5 mg   Take 0.5 tablets (5 mg) by mouth every 6 hours as needed for nausea or vomiting   Quantity:  20 tablet   Refills:  1            Where to get your medicines      These medications were sent to [facility] [address]  [address] [facility] [address]     Phone:  [phone]     prochlorperazine 10 MG tablet                Primary Care Provider Office Phone # Fax #    Estefany Stanley PA-C [phone] [address] [facility] [address]        Equal Access to Services     TUAN RINCON AH: Hadii yamilet harmono Sodanielaali, waaxda luqadaha, qaybta kaalmada adeegyada, fazal george. So [facility] [phone].    ATENCIÓN: Si habla español, tiene a romero disposición servicios gratuitos de asistencia lingüística. Tushar al [phone].    We comply with applicable federal civil rights laws and [place] laws. We do not discriminate on the basis of race, color, national origin, age, disability, sex, sexual orientation, or gender identity.            Thank you!     Thank you for choosing [facility]  for your care. Our goal is always to provide you with excellent care. Hearing back from our patients is one way we can continue to improve our services. Please take a few minutes to complete the written survey that you may receive in the mail after your visit  with us. Thank you!             Your Updated Medication List - Protect others around you: Learn how to safely use, store and throw away your medicines at www.disposemymeds.org.          This list is accurate as of: 12/12/17  9:43 AM.  Always use your most recent med list.                   Brand Name Dispense Instructions for use Diagnosis    amoxicillin-clavulanate 875-125 MG per tablet    AUGMENTIN    20 tablet    Take 1 tablet by mouth 2 times daily    Acute non-recurrent maxillary sinusitis       busPIRone 15 MG tablet    BUSPAR    270 tablet    TAKE 2 TABLETS BY MOUTH IN THE MORNING AND TAKE 1 TABLET BY MOUTH IN THE EVENING    Anxiety       cyclobenzaprine 10 MG tablet    FLEXERIL    14 tablet    Take 1 tablet (10 mg) by mouth nightly as needed for muscle spasms    Fall down stairs, initial encounter       fluticasone 50 MCG/ACT spray    FLONASE     Spray 2 sprays into both nostrils daily as needed for rhinitis or allergies        levothyroxine 137 MCG tablet    SYNTHROID/LEVOTHROID    90 tablet    Take 1 tablet (137 mcg) by mouth daily    Postsurgical hypothyroidism       LORazepam 0.5 MG tablet    ATIVAN    60 tablet    Take 0.5 tablets (0.25 mg) by mouth 2 times daily as needed for anxiety . May take 2 tabs ( 1 mg) bid PO 2 times daily as needed until current anxiety flare improves.    Anxiety       nortriptyline 75 MG capsule    PAMELOR    90 capsule    TAKE ONE CAPSULE BY MOUTH AT BEDTIME    Major depressive disorder, recurrent episode, mild (H)       ondansetron 4 MG tablet    ZOFRAN    18 tablet    Take 1 tablet (4 mg) by mouth every 8 hours as needed for nausea    Nausea       pantoprazole 20 MG EC tablet    PROTONIX    90 tablet    TAKE ONE TABLET BY MOUTH ONCE DAILY. TAKE BY MOUTH 30 TO 60 MINUTES BEFORE A MEAL.    Gastroesophageal reflux disease with esophagitis, Hiatal hernia       prochlorperazine 10 MG tablet    COMPAZINE    20 tablet    Take 0.5 tablets (5 mg) by mouth every 6 hours as needed for  nausea or vomiting    Nausea       ranitidine 300 MG tablet    ZANTAC    90 tablet    Take 1 tablet (300 mg) by mouth At Bedtime    Gastroesophageal reflux disease with esophagitis       sertraline 100 MG tablet    ZOLOFT    180 tablet    TAKE TWO TABLETS BY MOUTH AT BEDTIME    Major depressive disorder, recurrent episode, mild (H)       SUMAtriptan 100 MG tablet    IMITREX    9 tablet    Take 1 tablet (100 mg) by mouth at onset of headache for migraine May repeat in 2 hours if needed: max 2/day; average number of headaches monthly 2    Acute nonintractable headache, unspecified headache type       triamcinolone 0.1 % ointment    KENALOG     Apply topically 2 times daily as needed for irritation

## 2017-12-14 ENCOUNTER — TELEPHONE (OUTPATIENT)
Dept: FAMILY MEDICINE | Facility: CLINIC | Age: 62
End: 2017-12-14

## 2017-12-14 DIAGNOSIS — J32.9 SINUSITIS, UNSPECIFIED CHRONICITY, UNSPECIFIED LOCATION: Primary | ICD-10-CM

## 2017-12-14 RX ORDER — DOXYCYCLINE 100 MG/1
100 CAPSULE ORAL 2 TIMES DAILY
Qty: 20 CAPSULE | Refills: 0 | Status: SHIPPED | OUTPATIENT
Start: 2017-12-14 | End: 2017-12-24

## 2017-12-14 NOTE — TELEPHONE ENCOUNTER
Please call pt back.     Yes, recommend stopping Augmentin. No antibiotics for a few days to give GI tract to calm down.     Rx changed to doxycycline.

## 2017-12-14 NOTE — TELEPHONE ENCOUNTER
Pt calls, see recent calls and visits, wants message sent to [facility] updating, had dry heaves this am, denies fever or abd pain, c/o loose stools, 2-3 today, 4-5 loose stools yesterday, still taking antibiotic, still has some PND, discussed dietary modifications--clear fluids, increase to BRAT, compazine does help but does not resolve, wonders if can DC Augmentin? Routed to , please advise, route to inform pt at [phone] (home) of carmel Strong RN, BSN  Message handled by Nurse Triage.

## 2017-12-18 LAB — PHQ9 SCORE: 3

## 2017-12-19 ENCOUNTER — NURSE TRIAGE (OUTPATIENT)
Dept: NURSING | Facility: CLINIC | Age: 62
End: 2017-12-19

## 2017-12-20 ENCOUNTER — HOSPITAL ENCOUNTER (EMERGENCY)
Facility: CLINIC | Age: 62
Discharge: HOME | End: 2017-12-20
Attending: EMERGENCY MEDICINE | Admitting: EMERGENCY MEDICINE
Payer: COMMERCIAL

## 2017-12-20 VITALS
RESPIRATION RATE: 16 BRPM | TEMPERATURE: 98.3 F | OXYGEN SATURATION: 99 % | SYSTOLIC BLOOD PRESSURE: 140 MMHG | DIASTOLIC BLOOD PRESSURE: 83 MMHG

## 2017-12-20 DIAGNOSIS — R11.0 NAUSEA: ICD-10-CM

## 2017-12-20 DIAGNOSIS — R19.7 DIARRHEA, UNSPECIFIED TYPE: ICD-10-CM

## 2017-12-20 LAB
ALBUMIN SERPL-MCNC: 4.1 G/DL (ref 3.4–5)
ALBUMIN UR-MCNC: NEGATIVE MG/DL
ALP SERPL-CCNC: 102 U/L (ref 40–150)
ALT SERPL W P-5'-P-CCNC: 25 U/L (ref 0–50)
ANION GAP SERPL CALCULATED.3IONS-SCNC: 9 MMOL/L (ref 3–14)
APPEARANCE UR: ABNORMAL
AST SERPL W P-5'-P-CCNC: 18 U/L (ref 0–45)
BASOPHILS # BLD AUTO: 0.1 10E9/L (ref 0–0.2)
BASOPHILS NFR BLD AUTO: 0.9 %
BILIRUB SERPL-MCNC: 0.9 MG/DL (ref 0.2–1.3)
BILIRUB UR QL STRIP: NEGATIVE
BUN SERPL-MCNC: 5 MG/DL (ref 7–30)
CALCIUM SERPL-MCNC: 8.9 MG/DL (ref 8.5–10.1)
CHLORIDE SERPL-SCNC: 104 MMOL/L (ref 94–109)
CO2 SERPL-SCNC: 26 MMOL/L (ref 20–32)
COLOR UR AUTO: YELLOW
CREAT SERPL-MCNC: 0.84 MG/DL (ref 0.52–1.04)
DIFFERENTIAL METHOD BLD: NORMAL
EOSINOPHIL # BLD AUTO: 0.3 10E9/L (ref 0–0.7)
EOSINOPHIL NFR BLD AUTO: 4.9 %
ERYTHROCYTE [DISTWIDTH] IN BLOOD BY AUTOMATED COUNT: 12.4 % (ref 10–15)
GFR SERPL CREATININE-BSD FRML MDRD: 69 ML/MIN/1.7M2
GLUCOSE SERPL-MCNC: 101 MG/DL (ref 70–99)
GLUCOSE UR STRIP-MCNC: NEGATIVE MG/DL
HCT VFR BLD AUTO: 41.7 % (ref 35–47)
HGB BLD-MCNC: 14.5 G/DL (ref 11.7–15.7)
HGB UR QL STRIP: NEGATIVE
HYALINE CASTS #/AREA URNS LPF: 2 /LPF (ref 0–2)
IMM GRANULOCYTES # BLD: 0 10E9/L (ref 0–0.4)
IMM GRANULOCYTES NFR BLD: 0.5 %
KETONES UR STRIP-MCNC: NEGATIVE MG/DL
LEUKOCYTE ESTERASE UR QL STRIP: ABNORMAL
LIPASE SERPL-CCNC: 132 U/L (ref 73–393)
LYMPHOCYTES # BLD AUTO: 1.4 10E9/L (ref 0.8–5.3)
LYMPHOCYTES NFR BLD AUTO: 24.4 %
MCH RBC QN AUTO: 30.4 PG (ref 26.5–33)
MCHC RBC AUTO-ENTMCNC: 34.8 G/DL (ref 31.5–36.5)
MCV RBC AUTO: 87 FL (ref 78–100)
MONOCYTES # BLD AUTO: 0.4 10E9/L (ref 0–1.3)
MONOCYTES NFR BLD AUTO: 6.5 %
MUCOUS THREADS #/AREA URNS LPF: PRESENT /LPF
NEUTROPHILS # BLD AUTO: 3.6 10E9/L (ref 1.6–8.3)
NEUTROPHILS NFR BLD AUTO: 62.8 %
NITRATE UR QL: NEGATIVE
NRBC # BLD AUTO: 0 10*3/UL
NRBC BLD AUTO-RTO: 0 /100
PH UR STRIP: 5 PH (ref 5–7)
PLATELET # BLD AUTO: 294 10E9/L (ref 150–450)
POTASSIUM SERPL-SCNC: 3.3 MMOL/L (ref 3.4–5.3)
PROT SERPL-MCNC: 7.4 G/DL (ref 6.8–8.8)
RBC # BLD AUTO: 4.77 10E12/L (ref 3.8–5.2)
RBC #/AREA URNS AUTO: 1 /HPF (ref 0–2)
SODIUM SERPL-SCNC: 139 MMOL/L (ref 133–144)
SOURCE: ABNORMAL
SP GR UR STRIP: 1.01 (ref 1–1.03)
SQUAMOUS #/AREA URNS AUTO: 3 /HPF (ref 0–1)
TROPONIN I SERPL-MCNC: <0.015 UG/L (ref 0–0.04)
UROBILINOGEN UR STRIP-MCNC: 0 MG/DL (ref 0–2)
WBC # BLD AUTO: 5.7 10E9/L (ref 4–11)
WBC #/AREA URNS AUTO: 1 /HPF (ref 0–2)

## 2017-12-20 PROCEDURE — 80053 COMPREHEN METABOLIC PANEL: CPT | Performed by: EMERGENCY MEDICINE

## 2017-12-20 PROCEDURE — 84484 ASSAY OF TROPONIN QUANT: CPT | Performed by: EMERGENCY MEDICINE

## 2017-12-20 PROCEDURE — 25000128 H RX IP 250 OP 636: Performed by: EMERGENCY MEDICINE

## 2017-12-20 PROCEDURE — 25000132 ZZH RX MED GY IP 250 OP 250 PS 637: Performed by: EMERGENCY MEDICINE

## 2017-12-20 PROCEDURE — 96375 TX/PRO/DX INJ NEW DRUG ADDON: CPT

## 2017-12-20 PROCEDURE — 83690 ASSAY OF LIPASE: CPT | Performed by: EMERGENCY MEDICINE

## 2017-12-20 PROCEDURE — 81001 URINALYSIS AUTO W/SCOPE: CPT | Performed by: EMERGENCY MEDICINE

## 2017-12-20 PROCEDURE — 99284 EMERGENCY DEPT VISIT MOD MDM: CPT | Mod: 25

## 2017-12-20 PROCEDURE — 96374 THER/PROPH/DIAG INJ IV PUSH: CPT

## 2017-12-20 PROCEDURE — 85025 COMPLETE CBC W/AUTO DIFF WBC: CPT | Performed by: EMERGENCY MEDICINE

## 2017-12-20 RX ORDER — POTASSIUM CHLORIDE 1.5 G/1.58G
40 POWDER, FOR SOLUTION ORAL ONCE
Status: COMPLETED | OUTPATIENT
Start: 2017-12-20 | End: 2017-12-20

## 2017-12-20 RX ORDER — METOCLOPRAMIDE 10 MG/1
10 TABLET ORAL 3 TIMES DAILY PRN
Qty: 20 TABLET | Refills: 1 | Status: SHIPPED | OUTPATIENT
Start: 2017-12-20 | End: 2018-03-08

## 2017-12-20 RX ORDER — DIPHENHYDRAMINE HYDROCHLORIDE 50 MG/ML
25 INJECTION INTRAMUSCULAR; INTRAVENOUS ONCE
Status: COMPLETED | OUTPATIENT
Start: 2017-12-20 | End: 2017-12-20

## 2017-12-20 RX ORDER — METOCLOPRAMIDE HYDROCHLORIDE 5 MG/ML
10 INJECTION INTRAMUSCULAR; INTRAVENOUS ONCE
Status: COMPLETED | OUTPATIENT
Start: 2017-12-20 | End: 2017-12-20

## 2017-12-20 RX ADMIN — METOCLOPRAMIDE 10 MG: 5 INJECTION, SOLUTION INTRAMUSCULAR; INTRAVENOUS at 12:01

## 2017-12-20 RX ADMIN — DIPHENHYDRAMINE HYDROCHLORIDE 25 MG: 50 INJECTION, SOLUTION INTRAMUSCULAR; INTRAVENOUS at 12:01

## 2017-12-20 RX ADMIN — POTASSIUM CHLORIDE 40 MEQ: 1.5 POWDER, FOR SOLUTION ORAL at 14:44

## 2017-12-20 ASSESSMENT — ENCOUNTER SYMPTOMS
VOMITING: 1
BLOOD IN STOOL: 0
ABDOMINAL PAIN: 0
HEMATURIA: 0
SHORTNESS OF BREATH: 0
DYSURIA: 0
WEAKNESS: 0
DIARRHEA: 1
FEVER: 0
HEADACHES: 0
APPETITE CHANGE: 1
NAUSEA: 1
NUMBNESS: 0
COUGH: 0
DIFFICULTY URINATING: 1

## 2017-12-20 NOTE — ED AVS SNAPSHOT
[facility]    [address] [facility] [address]    Phone:  [phone]    Fax:  [phone]                                       Mariela Duffy   MRN: [id number]    Department:  [facility]   Date of Visit:  12/20/2017           Patient Information     Date Of Birth          1955        Your diagnoses for this visit were:     Nausea     Diarrhea, unspecified type        You were seen by Baldomero Fragoso MD.      Follow-up Information     Follow up with Estefany Stanley PA-C In 1 week.    Specialty:  Physician Assistant    Contact information:    [address] [facility] [address] [phone]          Follow up with ROMI FERNÁNDEZ.    Contact information:    [address]  [facility] [address] [phone]        Follow up with [facility].    Specialty:  EMERGENCY MEDICINE    Why:  As needed    Contact information:    [address] [facility] [address] [phone]        Discharge Instructions       Take the below medications as prescribed.  Please do not miss any doses.    New Prescriptions    METOCLOPRAMIDE (REGLAN) 10 MG TABLET    Take 1 tablet (10 mg) by mouth 3 times daily as needed (Nausea or Vomiting)           Discharge Instructions  Vomiting    You have been seen today for vomiting (throwing up). This is usually caused by a virus, but some bacteria, parasites, medicines or other medical conditions can cause similar symptoms. At this time your provider does not find that your vomiting is a sign of anything dangerous or life-threatening. However, sometimes the signs of serious illness do not show up right away. If you have new or worse symptoms, you may need to be seen again in the Emergency Department or by your primary provider. Remember that serious problems like appendicitis can start as vomiting.    Generally, every  Emergency Department visit should have a follow-up clinic visit with either a primary or a specialty clinic/provider. Please follow-up as instructed by your emergency provider today.    Return to the Emergency Department if:    You keep vomiting and you are not able to keep liquids down.     You feel you are getting dehydrated, such as being very thirsty, not urinating (peeing) at least every 8-12 hours, or feeling faint or lightheaded.     You develop a new fever, or your fever continues for more than 2 days.     You have abdominal (belly pain) that seems worse than cramps, is in one spot, or is getting worse over time. Appendicitis usually causes pain in the right lower abdomen (to the right and below your belly button) so watch for pain in this location.    You have blood in your vomit or stools.     You feel very weak.    You are not starting to improve within 24 hours of your visit here.     What can I do to help myself?    The most important thing to do is to drink clear liquids. If you have been vomiting a lot, it is best to have only small, frequent sips of liquids. Drinking too much at once may cause more vomiting. If you are vomiting often, you must replace minerals, sodium and potassium lost with your illness. Pedialyte  is the best available rehydration liquid but some find that it doesn t taste good so sports drinks are an alterative. You can also drink clear liquids such as water, weak tea, apple juice, and 7-Up . Avoid acid liquids (orange), caffeine (coffee) or alcohol. Do not drink milk until you no longer have diarrhea (loose stools).     After liquids are staying down, you may start eating mild foods. Soda crackers, toast, plain noodles, gelatin, applesauce and bananas are good first choices. Avoid foods that have acid, are spicy, fatty or have a lot of fiber (such as meats, coarse grains, vegetables). You may start eating these foods again in about 3 days when you are better.     Sometimes  treatment includes prescription medicine to prevent nausea (sick to your stomach) and vomiting. If your provider prescribes these for you, take them as directed.     Do not take ibuprofen, naproxen, or other nonsteroidal anti-inflammatory (NSAID) medicines without checking with your healthcare provider.     If you were given a prescription for medicine here today, be sure to read all of the information (including the package insert) that comes with your prescription.  This will include important information about the medicine, its side effects, and any warnings that you need to know about.  The pharmacist who fills the prescription can provide more information and answer questions you may have about the medicine.  If you have questions or concerns that the pharmacist cannot address, please call or return to the Emergency Department.     Remember that you can always come back to the Emergency Department if you are not able to see your regular provider in the amount of time listed above, if you get any new symptoms, or if there is anything that worries you.        Discharge Instructions  Adult Diarrhea    You have been seen today for diarrhea (loose stools). This is usually caused by a virus, but some bacteria, parasites, medicines, or other medical conditions can cause similar symptoms. At this time your provider does not find that your diarrhea is a sign of anything dangerous or life-threatening. However, sometimes the signs of serious illness do not show up right away. If you have new or worse symptoms, you may need to be seen again in the Emergency Department or by your primary provider.     Generally, every Emergency Department visit should have a follow-up clinic visit with either a primary or a specialty clinic/provider. Please follow-up as instructed by your emergency provider today.    Return to the Emergency Department if:    You feel you are getting dehydrated, such as being very thirsty, not urinating  "(peeing) like usual, or feeling faint or lightheaded.     You develop a new fever.    You have abdominal (belly) pain that seems worse than cramps, is in one spot, or is getting worse over time.     You have blood in your stool or your stool becomes black.  (Remember that if you take Pepto-Bismol , this will turn your stool black).     You feel very weak.    What can I do to help myself?    The most important thing to do is to drink clear liquids.   It is best to have only small, frequent sips of liquids. Drinking too much at once may cause more diarrhea. You should also replace minerals, sodium and potassium lost with diarrhea. Pedialyte  and sports drinks can help you replace these minerals. You can also drink clear liquids such as water, weak tea, apple juice, and 7-Up . Avoid acidic liquids (orange juice), caffeine (coffee) or alcohol. Milk products will make the diarrhea worse.    Eat bland (plain) foods. Soda crackers, toast, plain noodles, gelatin, applesauce and bananas are good first choices. Avoid foods that have acid, are spicy, fatty or fibrous (such as meats, coarse grains, vegetables). You may start eating these foods again in about 3 days when you are better.     Sometimes treatment includes prescription medicine to prevent diarrhea. If your provider prescribes these for you, take them as directed.     Nonprescription medicine is available for the treatment of diarrhea and can be very effective. If you use it, make sure you use the dose recommended on the package. Check with your healthcare provider before you use any medicine for diarrhea.     Do not take ibuprofen, or other nonsteroidal anti-inflammatory medicines, without checking with your healthcare provider.   Probiotics: If you have been given an antibiotic, you may want to also take a probiotic pill or eat yogurt with live cultures. Probiotics have \"good bacteria\" to help your intestines stay healthy. Studies have shown that probiotics help " prevent diarrhea and other intestine problems (including C. diff infection) when you take antibiotics. You can buy these without a prescription in the pharmacy section of the store.   If you were given a prescription for medicine here today, be sure to read all of the information (including the package insert) that comes with your prescription.  This will include important information about the medicine, its side effects, and any warnings that you need to know about.  The pharmacist who fills the prescription can provide more information and answer questions you may have about the medicine.  If you have questions or concerns that the pharmacist cannot address, please call or return to the Emergency Department.  Remember that you can always come back to the Emergency Department if you are not able to see your regular provider in the amount of time listed above, if you get any new symptoms, or if there is anything that worries you.        24 Hour Appointment Hotline       To make an appointment at any [facility], call [phone] ([phone]). If you don't have a family doctor or clinic, we will help you find one. [facility] are conveniently located to serve the needs of you and your family.             Review of your medicines      START taking        Dose / Directions Last dose taken    metoclopramide 10 MG tablet   Commonly known as:  REGLAN   Dose:  10 mg   Quantity:  20 tablet        Take 1 tablet (10 mg) by mouth 3 times daily as needed (Nausea or Vomiting)   Refills:  1          Our records show that you are taking the medicines listed below. If these are incorrect, please call your family doctor or clinic.        Dose / Directions Last dose taken    amoxicillin-clavulanate 875-125 MG per tablet   Commonly known as:  AUGMENTIN   Dose:  1 tablet   Quantity:  20 tablet        Take 1 tablet by mouth 2 times daily   Refills:  0        busPIRone 15 MG tablet   Commonly known as:  BUSPAR   Quantity:   270 tablet        TAKE 2 TABLETS BY MOUTH IN THE MORNING AND TAKE 1 TABLET BY MOUTH IN THE EVENING   Refills:  0        cyclobenzaprine 10 MG tablet   Commonly known as:  FLEXERIL   Dose:  10 mg   Quantity:  14 tablet        Take 1 tablet (10 mg) by mouth nightly as needed for muscle spasms   Refills:  1        doxycycline monohydrate 100 MG capsule   Dose:  100 mg   Quantity:  20 capsule        Take 1 capsule (100 mg) by mouth 2 times daily for 10 days   Refills:  0        fluticasone 50 MCG/ACT spray   Commonly known as:  FLONASE   Dose:  2 spray        Spray 2 sprays into both nostrils daily as needed for rhinitis or allergies   Refills:  0        levothyroxine 137 MCG tablet   Commonly known as:  SYNTHROID/LEVOTHROID   Dose:  137 mcg   Quantity:  90 tablet        Take 1 tablet (137 mcg) by mouth daily   Refills:  3        LORazepam 0.5 MG tablet   Commonly known as:  ATIVAN   Dose:  0.25 mg   Quantity:  60 tablet        Take 0.5 tablets (0.25 mg) by mouth 2 times daily as needed for anxiety . May take 2 tabs ( 1 mg) bid PO 2 times daily as needed until current anxiety flare improves.   Refills:  0        nortriptyline 75 MG capsule   Commonly known as:  PAMELOR   Quantity:  90 capsule        TAKE ONE CAPSULE BY MOUTH AT BEDTIME   Refills:  1        ondansetron 4 MG tablet   Commonly known as:  ZOFRAN   Dose:  4 mg   Quantity:  18 tablet        Take 1 tablet (4 mg) by mouth every 8 hours as needed for nausea   Refills:  0        pantoprazole 20 MG EC tablet   Commonly known as:  PROTONIX   Quantity:  90 tablet        TAKE ONE TABLET BY MOUTH ONCE DAILY. TAKE BY MOUTH 30 TO 60 MINUTES BEFORE A MEAL.   Refills:  1        prochlorperazine 10 MG tablet   Commonly known as:  COMPAZINE   Dose:  5 mg   Quantity:  20 tablet        Take 0.5 tablets (5 mg) by mouth every 6 hours as needed for nausea or vomiting   Refills:  1        ranitidine 300 MG tablet   Commonly known as:  ZANTAC   Dose:  300 mg   Quantity:  90  tablet        Take 1 tablet (300 mg) by mouth At Bedtime   Refills:  3        sertraline 100 MG tablet   Commonly known as:  ZOLOFT   Quantity:  180 tablet        TAKE TWO TABLETS BY MOUTH AT BEDTIME   Refills:  0        SUMAtriptan 100 MG tablet   Commonly known as:  IMITREX   Dose:  100 mg   Quantity:  9 tablet        Take 1 tablet (100 mg) by mouth at onset of headache for migraine May repeat in 2 hours if needed: max 2/day; average number of headaches monthly 2   Refills:  1        triamcinolone 0.1 % ointment   Commonly known as:  KENALOG        Apply topically 2 times daily as needed for irritation   Refills:  0                Prescriptions were sent or printed at these locations (1 Prescription)                   Other Prescriptions                Printed at Department/Unit printer (1 of 1)         metoclopramide (REGLAN) 10 MG tablet                Procedures and tests performed during your visit     CBC with platelets differential    Comprehensive metabolic panel    Lipase    Troponin I    UA with Microscopic      Orders Needing Specimen Collection     None      Pending Results     No orders found from 12/18/2017 to 12/21/2017.            Pending Culture Results     No orders found from 12/18/2017 to 12/21/2017.            Pending Results Instructions     If you had any lab results that were not finalized at the time of your Discharge, you can call the ED Lab Result RN at [phone]. You will be contacted by this team for any positive Lab results or changes in treatment. The nurses are available 7 days a week from 10A to 6:30P.  You can leave a message 24 hours per day and they will return your call.        Test Results From Your Hospital Stay        12/20/2017 12:36 PM      Component Results     Component Value Ref Range & Units Status    WBC 5.7 4.0 - 11.0 10e9/L Final    RBC Count 4.77 3.8 - 5.2 10e12/L Final    Hemoglobin 14.5 11.7 - 15.7 g/dL Final    Hematocrit 41.7 35.0 - 47.0 % Final    MCV 87 78  - 100 fl Final    MCH 30.4 26.5 - 33.0 pg Final    MCHC 34.8 31.5 - 36.5 g/dL Final    RDW 12.4 10.0 - 15.0 % Final    Platelet Count 294 150 - 450 10e9/L Final    Diff Method Automated Method  Final    % Neutrophils 62.8 % Final    % Lymphocytes 24.4 % Final    % Monocytes 6.5 % Final    % Eosinophils 4.9 % Final    % Basophils 0.9 % Final    % Immature Granulocytes 0.5 % Final    Nucleated RBCs 0 0 /100 Final    Absolute Neutrophil 3.6 1.6 - 8.3 10e9/L Final    Absolute Lymphocytes 1.4 0.8 - 5.3 10e9/L Final    Absolute Monocytes 0.4 0.0 - 1.3 10e9/L Final    Absolute Eosinophils 0.3 0.0 - 0.7 10e9/L Final    Absolute Basophils 0.1 0.0 - 0.2 10e9/L Final    Abs Immature Granulocytes 0.0 0 - 0.4 10e9/L Final    Absolute Nucleated RBC 0.0  Final         12/20/2017 12:58 PM      Component Results     Component Value Ref Range & Units Status    Sodium 139 133 - 144 mmol/L Final    Potassium 3.3 (L) 3.4 - 5.3 mmol/L Final    Chloride 104 94 - 109 mmol/L Final    Carbon Dioxide 26 20 - 32 mmol/L Final    Anion Gap 9 3 - 14 mmol/L Final    Glucose 101 (H) 70 - 99 mg/dL Final    Urea Nitrogen 5 (L) 7 - 30 mg/dL Final    Creatinine 0.84 0.52 - 1.04 mg/dL Final    GFR Estimate 69 >60 mL/min/1.7m2 Final    Non  GFR Calc    GFR Estimate If Black 84 >60 mL/min/1.7m2 Final    African American GFR Calc    Calcium 8.9 8.5 - 10.1 mg/dL Final    Bilirubin Total 0.9 0.2 - 1.3 mg/dL Final    Albumin 4.1 3.4 - 5.0 g/dL Final    Protein Total 7.4 6.8 - 8.8 g/dL Final    Alkaline Phosphatase 102 40 - 150 U/L Final    ALT 25 0 - 50 U/L Final    AST 18 0 - 45 U/L Final         12/20/2017 12:58 PM      Component Results     Component Value Ref Range & Units Status    Lipase 132 73 - 393 U/L Final         12/20/2017 12:58 PM      Component Results     Component Value Ref Range & Units Status    Troponin I ES <0.015 0.000 - 0.045 ug/L Final    The 99th percentile for upper reference range is 0.045 ug/L.  Troponin values   in  the range of 0.045 - 0.120 ug/L may be associated with risks of adverse   clinical events.           12/20/2017  1:48 PM      Component Results     Component Value Ref Range & Units Status    Color Urine Yellow  Final    Appearance Urine Slightly Cloudy  Final    Glucose Urine Negative NEG^Negative mg/dL Final    Bilirubin Urine Negative NEG^Negative Final    Ketones Urine Negative NEG^Negative mg/dL Final    Specific Gravity Urine 1.013 1.003 - 1.035 Final    Blood Urine Negative NEG^Negative Final    pH Urine 5.0 5.0 - 7.0 pH Final    Protein Albumin Urine Negative NEG^Negative mg/dL Final    Urobilinogen mg/dL 0.0 0.0 - 2.0 mg/dL Final    Nitrite Urine Negative NEG^Negative Final    Leukocyte Esterase Urine Trace (A) NEG^Negative Final    Source Midstream Urine  Final    WBC Urine 1 0 - 2 /HPF Final    RBC Urine 1 0 - 2 /HPF Final    Squamous Epithelial /HPF Urine 3 (H) 0 - 1 /HPF Final    Mucous Urine Present (A) NEG^Negative /LPF Final    Hyaline Casts 2 0 - 2 /LPF Final                Clinical Quality Measure: Blood Pressure Screening     Your blood pressure was checked while you were in the emergency department today. The last reading we obtained was  BP: 140/83 . Please read the guidelines below about what these numbers mean and what you should do about them.  If your systolic blood pressure (the top number) is less than 120 and your diastolic blood pressure (the bottom number) is less than 80, then your blood pressure is normal. There is nothing more that you need to do about it.  If your systolic blood pressure (the top number) is 120-139 or your diastolic blood pressure (the bottom number) is 80-89, your blood pressure may be higher than it should be. You should have your blood pressure rechecked within a year by a primary care provider.  If your systolic blood pressure (the top number) is 140 or greater or your diastolic blood pressure (the bottom number) is 90 or greater, you may have high blood  pressure. High blood pressure is treatable, but if left untreated over time it can put you at risk for heart attack, stroke, or kidney failure. You should have your blood pressure rechecked by a primary care provider within the next 4 weeks.  If your provider in the emergency department today gave you specific instructions to follow-up with your doctor or provider even sooner than that, you should follow that instruction and not wait for up to 4 weeks for your follow-up visit.        Thank you for choosing [facility]       Thank you for choosing [facility] for your care. Our goal is always to provide you with excellent care. Hearing back from our patients is one way we can continue to improve our services. Please take a few minutes to complete the written survey that you may receive in the mail after you visit with us. Thank you!        [facility] Information     [facility] gives you secure access to your electronic health record. If you see a primary care provider, you can also send messages to your care team and make appointments. If you have questions, please call your primary care clinic.  If you do not have a primary care provider, please call [phone] and they will assist you.        Care EveryWhere ID     This is your Care EveryWhere ID. This could be used by other organizations to access your [facility] medical records  [id number]        Equal Access to Services     TUAN RINCON : Kalli Parker, elsie bates, nguyễn lorenzo, fazal george. So [facility] [phone].    ATENCIÓN: Si habla español, tiene a romero disposición servicios gratuitos de asistencia lingüística. Llame al [phone].    We comply with applicable federal civil rights laws and [place] laws. We do not discriminate on the basis of race, color, national origin, age, disability, sex, sexual orientation, or gender identity.            After Visit Summary       This is your record. Keep this with  you and show to your community pharmacist(s) and doctor(s) at your next visit.

## 2017-12-20 NOTE — ED PROVIDER NOTES
History     Chief Complaint:  Nausea    HPI   Mariela Duffy is a 62 year old female with a history of anxiety, depression, and GERD who presents via EMS for evaluation of nausea. The patient reports a 2 week history of nausea. She has been seen by her primary in clinic twice. Nausea likely secondary to bad chinese food she had 2 weeks ago, as well as sinus drainage from recent sinusitis (finished Augmentin). The patient was on Zofran but started having headaches, so she was switched to Compazine by her primary. The patient reports she has ongoing nausea and has not been eating or drinking at home. She also has not been taking all of her medications. She is concerned about ongoing symptoms and notes her sister who lives in [place] was also concerned. She tried calling her kids today but they couldn't get her so she called EMS and was brought to the ED. On arrival, the patient reports she is nauseous and has been dry heaving. She has not vomited today. She has had two episodes of nonbloody diarrhea in the last 24 hours. She denies any abdominal pain, fever, chest pain, shortness of breath, or focal weakness or numbness. She notes she did have some difficulty urinating yesterday, no dysuria or hematuria. No recent travel.     The patient also notes she broke up with her boyfriend of 10 years recently. She has been feeling a little bit down recently, but denies any depression or anxiety today. She denies any suicidal or homicidal thoughts. 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     Allergies:  Levaquin      Medications:    doxycycline monohydrate 100 MG capsule  prochlorperazine (COMPAZINE) 10 MG tablet  busPIRone (BUSPAR) 15 MG tablet  ondansetron (ZOFRAN) 4 MG tablet  amoxicillin-clavulanate (AUGMENTIN) 875-125 MG per tablet  nortriptyline (PAMELOR) 75 MG capsule  LORazepam (ATIVAN) 0.5 MG tablet  pantoprazole (PROTONIX) 20 MG EC tablet  cyclobenzaprine (FLEXERIL) 10 MG  tablet  levothyroxine (SYNTHROID/LEVOTHROID) 137 MCG tablet  ranitidine (ZANTAC) 300 MG tablet  sertraline (ZOLOFT) 100 MG tablet  SUMAtriptan (IMITREX) 100 MG tablet    Past Medical History:    Depression  Diverticulosis  GERD  Headache  Generalized anxiety disorder  Malignant neoplasm of thyroid gland, s/p thyroidectomy  Postsurgical hypothyroidism    Past Surgical History:    Hiatal hernia surgery  Cholecystectomy  Cleft lip repair  Cholecystectomy  Thyroidectomy  Wrist surgery    Family History:    Colon cancer  Hypertension  CAD  Cancer    Social History:  Smoking status: Never smoker  Alcohol use: yes, 2-3 per month  Patient lives alone   Marital Status:   [4]     Review of Systems   Constitutional: Positive for appetite change. Negative for fever.   Respiratory: Negative for cough and shortness of breath.    Cardiovascular: Negative for chest pain.   Gastrointestinal: Positive for diarrhea, nausea and vomiting. Negative for abdominal pain and blood in stool.   Genitourinary: Positive for difficulty urinating. Negative for dysuria and hematuria.   Skin: Negative for rash.   Neurological: Negative for weakness, numbness and headaches.   Psychiatric/Behavioral: Negative for suicidal ideas.        No homicidal ideation    All other systems reviewed and are negative.      Physical Exam   Patient Vitals for the past 24 hrs:   BP Temp Temp src Heart Rate Resp SpO2   12/20/17 1440 - - - - - 99 %   12/20/17 1436 140/83 98.3  F (36.8  C) Oral 84 16 99 %   12/20/17 1126 (!) 130/94 - - 96 16 96 %     Physical Exam  Constitutional: Well developed, nontox appearance, tearful  Head: Atraumatic.   Mouth/Throat: Oropharynx is clear and moist.   Neck:  no stridor  Eyes: no scleral icterus  Cardiovascular: RRR, 2+ bilat radial pulses  Pulmonary/Chest: nml resp effort, Clear BS bilat  Abdominal: ND, +BS, soft, NT, no rebound or guarding   : no CVA tenderness bilat  Ext: Warm, well perfused, no edema  Neurological: A&O,  symmetric facies, moves ext x4  Skin: Skin is warm and dry.   Psychiatric: Behavior is normal. Anxious  Nursing note and vitals reviewed.      Emergency Department Course   Laboratory:  CBC: WNL (WBC 5.7, HGB 14.5, )   CMP: Potassium 3.3(L), Glucose 101(H), BUN 5(L), o/w WNL (Creatinine 0.84)  UA: Leukocyte esterase trace, Squamous epithelial 3(H), Mucous present, o/w negative  Lipase: 132  Troponin: <0.015    Interventions:  1201: Reglan 10 mg IV  1201: Benadryl 25 mg IV  1444: Potassium chloride 40 mEq oral    Emergency Department Course:  The patient arrived in the emergency department via EMS.  Past medical records, nursing notes, and vitals reviewed.  1137: I performed an exam of the patient and obtained history, as documented above.  IV inserted and blood drawn.    1449: I rechecked the patient. Explained findings to the patient.    The patient passed a PO challenge prior to discharge from the ED.    I rechecked the patient.  Findings and plan explained to the Patient. Patient discharged home with instructions regarding supportive care, medications, and reasons to return. The importance of close follow-up was reviewed.     Impression & Plan      Medical Decision Making:  Mariela Duffy is a 62 year old female presenting with nausea, diarrhea.     Differential diagnosis includes functional nausea, electrolyte abnormality, dehydration, food poisoning, infectious diarrhea. The patient has not traveled anywhere recently and has had no fevers.  C diff seems less likely given frequency of diarrhea. Abdominal exam is benign. She is given medications as noted above for symptomatic control and is tolerating PO in the Emergency Department stating that she feels significantly improved. She has no notable anemia on exam. Just prior to discharge she had a bowel movement and reported that she had a small amount of bright red blood noted. Doubt diverticulitis or ischemic colitis given the patient is not having any  abdominal pain and had no abdominal tenderness. This is possibly sloughing of the intestinal lining given her numerous episodes of diarrhea. Given her normal vital signs, normal CBC, I think it would be reasonable to discharge her with follow up with [facility] should she have persistent symptoms. She is given return precautions for follow up in the Emergency Department and advised to see her PCP within one week. The patient was counseled on results, diagnosis, and disposition. She is understanding and agreeable to plan. Discharged in stable condition.     Diagnosis:    ICD-10-CM   1. Nausea R11.0   2. Diarrhea, unspecified type R19.7     Disposition: Discharged to home    Discharge Medications:   Details   metoclopramide (REGLAN) 10 MG tablet Take 1 tablet (10 mg) by mouth 3 times daily as needed (Nausea or Vomiting), Disp-20 tablet, R-1, Local Print     Mandie Singer  12/20/2017   [facility] EMERGENCY DEPARTMENT    IMandie, am serving as a scribe at 11:37 AM on 12/20/2017 to document services personally performed by Baldomero Fragoso MD based on my observations and the provider's statements to me.        Baldomero Fragoso MD  12/21/17 3856

## 2017-12-20 NOTE — TELEPHONE ENCOUNTER
Pt seen 12/8 in clinic and prescribed Augmentin for sinusitis. On 12/12 she was seen in clinic again to discuss the nausea she was having. She was prescribed Compazine for nausea. Pt called 12/14 and said the nausea was continuing and the Compazine was helping but not resolving it. See 12/14 msg. Pt advised to dc Augmentin, give her stomach a few days to calm down then start doxycycline. Pt states she d/c'd Augmentin 12/14. Still feeling nauseated almost all the time w/ no relief. She has not started doxycycline yet because of the persistent nausea. T 96.5 orally. Has nasal congestion and PND. Disc'd that sometimes upset stomach can happen w/ sinus drainage. Advised if she is not feeling okay about starting the doxycycline she should call/see PCP tomorrow to discuss. Pt verbalized understanding and  agreement w/ this plan. Jaclyn Escobar RN/FNA    Additional Information    Negative: Severe difficulty breathing (e.g., struggling for each breath, speaks in single words)    Negative: Sounds like a life-threatening emergency to the triager    Negative: [1] Difficulty breathing AND [2] not from stuffy nose (e.g., not relieved by cleaning out the nose)    Negative: [1] SEVERE headache AND [2] fever    Negative: [1] Taking antibiotic > 24 hours AND [2] fever > 103 F (39.4 C)    Negative: [1] Redness or swelling on the cheek, forehead or around the eye AND [2] fever    Negative: Patient sounds very sick or weak to the triager    Negative: [1] SEVERE pain AND [2] not improved 2 hours after pain medicine    Negative: [1] Redness or swelling on the cheek, forehead or around the eye AND [2] new since starting antibiotics    Negative: [1] Taking antibiotic > 48 hours (2 days) AND [2] fever persists    Negative: [1] Taking antibiotic > 72 hours (3 days) AND [2] sinus pain not improved    Negative: [1] Taking antibiotic > 7 days AND [2] nasal discharge not improved    Negative: [1] Taking antibiotic < 48 hours AND [2] fever  persists (all triage questions negative)    Negative: [1] Taking antibiotic < 72 hours (3 days) AND [2] sinus pain not improved  (all triage questions negative)    Negative: [1] Taking antibiotic AND [2] nose still blocked  (all triage questions negative)    [1] Reasonable improvement on antibiotic AND [2] no fever or pain (all triage questions negative)    Protocols used: SINUS INFECTION ON [facility]IBIOTIC FOLLOW[facility]

## 2017-12-20 NOTE — ED NOTES
"Patient here via ambulance. Per EMS pt's sister (Saskia) who lives in [place] has been concerned about patient.   Two weeks ago pt seen for food poisoning from [employer], since has been \"off kilter\", nausea, not taking meds. Pt. Has been under a lot of stress, broke up with boyfriend,   Nausea.   Concern for mental health status per EMS. Denies suicidal thoughts, states she lives alone and feels safe.   Pt. Tearful when asked about stressors.   "

## 2017-12-20 NOTE — DISCHARGE INSTRUCTIONS
Take the below medications as prescribed.  Please do not miss any doses.    New Prescriptions    METOCLOPRAMIDE (REGLAN) 10 MG TABLET    Take 1 tablet (10 mg) by mouth 3 times daily as needed (Nausea or Vomiting)           Discharge Instructions  Vomiting    You have been seen today for vomiting (throwing up). This is usually caused by a virus, but some bacteria, parasites, medicines or other medical conditions can cause similar symptoms. At this time your provider does not find that your vomiting is a sign of anything dangerous or life-threatening. However, sometimes the signs of serious illness do not show up right away. If you have new or worse symptoms, you may need to be seen again in the Emergency Department or by your primary provider. Remember that serious problems like appendicitis can start as vomiting.    Generally, every Emergency Department visit should have a follow-up clinic visit with either a primary or a specialty clinic/provider. Please follow-up as instructed by your emergency provider today.    Return to the Emergency Department if:    You keep vomiting and you are not able to keep liquids down.     You feel you are getting dehydrated, such as being very thirsty, not urinating (peeing) at least every 8-12 hours, or feeling faint or lightheaded.     You develop a new fever, or your fever continues for more than 2 days.     You have abdominal (belly pain) that seems worse than cramps, is in one spot, or is getting worse over time. Appendicitis usually causes pain in the right lower abdomen (to the right and below your belly button) so watch for pain in this location.    You have blood in your vomit or stools.     You feel very weak.    You are not starting to improve within 24 hours of your visit here.     What can I do to help myself?    The most important thing to do is to drink clear liquids. If you have been vomiting a lot, it is best to have only small, frequent sips of liquids. Drinking too  much at once may cause more vomiting. If you are vomiting often, you must replace minerals, sodium and potassium lost with your illness. Pedialyte  is the best available rehydration liquid but some find that it doesn t taste good so sports drinks are an alterative. You can also drink clear liquids such as water, weak tea, apple juice, and 7-Up . Avoid acid liquids (orange), caffeine (coffee) or alcohol. Do not drink milk until you no longer have diarrhea (loose stools).     After liquids are staying down, you may start eating mild foods. Soda crackers, toast, plain noodles, gelatin, applesauce and bananas are good first choices. Avoid foods that have acid, are spicy, fatty or have a lot of fiber (such as meats, coarse grains, vegetables). You may start eating these foods again in about 3 days when you are better.     Sometimes treatment includes prescription medicine to prevent nausea (sick to your stomach) and vomiting. If your provider prescribes these for you, take them as directed.     Do not take ibuprofen, naproxen, or other nonsteroidal anti-inflammatory (NSAID) medicines without checking with your healthcare provider.     If you were given a prescription for medicine here today, be sure to read all of the information (including the package insert) that comes with your prescription.  This will include important information about the medicine, its side effects, and any warnings that you need to know about.  The pharmacist who fills the prescription can provide more information and answer questions you may have about the medicine.  If you have questions or concerns that the pharmacist cannot address, please call or return to the Emergency Department.     Remember that you can always come back to the Emergency Department if you are not able to see your regular provider in the amount of time listed above, if you get any new symptoms, or if there is anything that worries you.        Discharge Instructions  Adult  Diarrhea    You have been seen today for diarrhea (loose stools). This is usually caused by a virus, but some bacteria, parasites, medicines, or other medical conditions can cause similar symptoms. At this time your provider does not find that your diarrhea is a sign of anything dangerous or life-threatening. However, sometimes the signs of serious illness do not show up right away. If you have new or worse symptoms, you may need to be seen again in the Emergency Department or by your primary provider.     Generally, every Emergency Department visit should have a follow-up clinic visit with either a primary or a specialty clinic/provider. Please follow-up as instructed by your emergency provider today.    Return to the Emergency Department if:    You feel you are getting dehydrated, such as being very thirsty, not urinating (peeing) like usual, or feeling faint or lightheaded.     You develop a new fever.    You have abdominal (belly) pain that seems worse than cramps, is in one spot, or is getting worse over time.     You have blood in your stool or your stool becomes black.  (Remember that if you take Pepto-Bismol , this will turn your stool black).     You feel very weak.    What can I do to help myself?    The most important thing to do is to drink clear liquids.   It is best to have only small, frequent sips of liquids. Drinking too much at once may cause more diarrhea. You should also replace minerals, sodium and potassium lost with diarrhea. Pedialyte  and sports drinks can help you replace these minerals. You can also drink clear liquids such as water, weak tea, apple juice, and 7-Up . Avoid acidic liquids (orange juice), caffeine (coffee) or alcohol. Milk products will make the diarrhea worse.    Eat bland (plain) foods. Soda crackers, toast, plain noodles, gelatin, applesauce and bananas are good first choices. Avoid foods that have acid, are spicy, fatty or fibrous (such as meats, coarse grains,  "vegetables). You may start eating these foods again in about 3 days when you are better.     Sometimes treatment includes prescription medicine to prevent diarrhea. If your provider prescribes these for you, take them as directed.     Nonprescription medicine is available for the treatment of diarrhea and can be very effective. If you use it, make sure you use the dose recommended on the package. Check with your healthcare provider before you use any medicine for diarrhea.     Do not take ibuprofen, or other nonsteroidal anti-inflammatory medicines, without checking with your healthcare provider.   Probiotics: If you have been given an antibiotic, you may want to also take a probiotic pill or eat yogurt with live cultures. Probiotics have \"good bacteria\" to help your intestines stay healthy. Studies have shown that probiotics help prevent diarrhea and other intestine problems (including C. diff infection) when you take antibiotics. You can buy these without a prescription in the pharmacy section of the store.   If you were given a prescription for medicine here today, be sure to read all of the information (including the package insert) that comes with your prescription.  This will include important information about the medicine, its side effects, and any warnings that you need to know about.  The pharmacist who fills the prescription can provide more information and answer questions you may have about the medicine.  If you have questions or concerns that the pharmacist cannot address, please call or return to the Emergency Department.  Remember that you can always come back to the Emergency Department if you are not able to see your regular provider in the amount of time listed above, if you get any new symptoms, or if there is anything that worries you.      "

## 2017-12-20 NOTE — ED AVS SNAPSHOT
[facility] Emergency Department    [address]    [facility] [address]    Phone:  [phone]    Fax:  [phone]                                       Mariela Duffy   MRN: [id number]    Department:  [facility] Emergency Department   Date of Visit:  12/20/2017           After Visit Summary Signature Page     I have received my discharge instructions, and my questions have been answered. I have discussed any challenges I see with this plan with the nurse or doctor.    ..........................................................................................................................................  Patient/Patient Representative Signature      ..........................................................................................................................................  Patient Representative Print Name and Relationship to Patient    ..................................................               ................................................  Date                                            Time    ..........................................................................................................................................  Reviewed by Signature/Title    ...................................................              ..............................................  Date                                                            Time

## 2017-12-20 NOTE — ED NOTES
Bed: ED07  Expected date: 12/20/17  Expected time:   Means of arrival: Ambulance  Comments:  Nuha Loyd

## 2017-12-22 ENCOUNTER — TELEPHONE (OUTPATIENT)
Dept: FAMILY MEDICINE | Facility: CLINIC | Age: 62
End: 2017-12-22

## 2017-12-22 DIAGNOSIS — F41.9 ANXIETY: ICD-10-CM

## 2017-12-22 DIAGNOSIS — F33.0 MAJOR DEPRESSIVE DISORDER, RECURRENT EPISODE, MILD (H): ICD-10-CM

## 2017-12-22 RX ORDER — LORAZEPAM 0.5 MG/1
0.25 TABLET ORAL 2 TIMES DAILY PRN
Qty: 60 TABLET | Refills: 0 | Status: SHIPPED | OUTPATIENT
Start: 2017-12-22 | End: 2018-06-12

## 2017-12-22 RX ORDER — BUSPIRONE HYDROCHLORIDE 15 MG/1
TABLET ORAL
Qty: 270 TABLET | Refills: 0 | Status: SHIPPED | OUTPATIENT
Start: 2017-12-22 | End: 2018-05-14

## 2017-12-22 RX ORDER — SERTRALINE HYDROCHLORIDE 100 MG/1
TABLET, FILM COATED ORAL
Qty: 180 TABLET | Refills: 0 | Status: SHIPPED | OUTPATIENT
Start: 2017-12-22 | End: 2018-05-14 | Stop reason: DRUGHIGH

## 2017-12-22 RX ORDER — NORTRIPTYLINE HYDROCHLORIDE 75 MG/1
CAPSULE ORAL
Qty: 90 CAPSULE | Refills: 1 | Status: SHIPPED | OUTPATIENT
Start: 2017-12-22 | End: 2018-05-14 | Stop reason: DRUGHIGH

## 2017-12-22 NOTE — TELEPHONE ENCOUNTER
Estefany, can you take care of this? Or would you like me to?  Karina Zayas MD  [facility]  [phone]

## 2017-12-22 NOTE — TELEPHONE ENCOUNTER
"Patient calling and  was at ER 12/20/17 and was really sick and needs to know how to get her medications back to their correct dose.     Sertraline 100 mg 2/d.  Has not taken since a week ago.    Buspar 15 mg is to be 2 tabs am and 1 tab felicita.  Took one this am but otherwise none for a week.      Nortriptyline 75 mg is to be one at bedtime.       is confused and can not locate her medications today.  Asked if she belongs at hospital if confused and she said \"no, I just need to know how to get my medications back to normal\".   States a nurse evaluator is coming out Tuesday to her house to help look at medications as she has been disorganized.   will find and take medications before Tuesday though.  Abril Harris RN       "

## 2017-12-22 NOTE — TELEPHONE ENCOUNTER
VM left for pt.    Advising she may RCTC if needed for questions about medications.     Pt called back. Having [facility] working come in a few days.    Will send new Rx's to [facility] for pt.   If any symptoms worsen or persist will return to clinic or go to ER

## 2017-12-27 ENCOUNTER — TRANSFERRED RECORDS (OUTPATIENT)
Dept: HEALTH INFORMATION MANAGEMENT | Facility: CLINIC | Age: 62
End: 2017-12-27

## 2017-12-28 LAB — PHQ9 SCORE: 11

## 2018-01-26 DIAGNOSIS — F33.0 MAJOR DEPRESSIVE DISORDER, RECURRENT EPISODE, MILD (H): ICD-10-CM

## 2018-01-27 NOTE — TELEPHONE ENCOUNTER
Requested Prescriptions   Pending Prescriptions Disp Refills     sertraline (ZOLOFT) 100 MG tablet [Pharmacy Med Name: SERTRALINE HCL 100MG TABS] 180 tablet 0     Sig: TAKE TWO TABLETS BY MOUTH AT BEDTIME  Last Written Prescription Date:  12/22/2017  Last Fill Quantity: 180 TABLET,  # refills: 0   Last Office Visit with :  01/05/2018   Future Office Visit:         SSRIs Protocol Passed    1/26/2018  4:16 PM       Passed - Patient is age 18 or older       Passed - No active pregnancy on record       Passed - No positive pregnancy test in last 12 months          Rei PURCELL

## 2018-01-29 RX ORDER — SERTRALINE HYDROCHLORIDE 100 MG/1
TABLET, FILM COATED ORAL
Refills: 0
Start: 2018-01-29

## 2018-01-29 NOTE — TELEPHONE ENCOUNTER
[employer] message sent PHQ-9 questionnaire.      We called [facility] no insurance in January 2018.  Starts Feb 1.   Informed has refill from 12/22/17 at [facility].  She only has couple of pills at hand.  Informed OK to ask pharmacist to dispense only what you need  - you could pay cash for pills you need to get you through to Feb 1.    Pt will call [employer].     Notes is scheduled to see psychiatric nurse at [facility] in first or second week of February.   Eliud Nielson RN

## 2018-02-02 ENCOUNTER — TRANSFERRED RECORDS (OUTPATIENT)
Dept: HEALTH INFORMATION MANAGEMENT | Facility: CLINIC | Age: 63
End: 2018-02-02

## 2018-02-13 ENCOUNTER — TRANSFERRED RECORDS (OUTPATIENT)
Dept: HEALTH INFORMATION MANAGEMENT | Facility: CLINIC | Age: 63
End: 2018-02-13

## 2018-03-02 ENCOUNTER — TELEPHONE (OUTPATIENT)
Dept: FAMILY MEDICINE | Facility: CLINIC | Age: 63
End: 2018-03-02

## 2018-03-03 DIAGNOSIS — E89.0 POSTSURGICAL HYPOTHYROIDISM: ICD-10-CM

## 2018-03-03 NOTE — TELEPHONE ENCOUNTER
"Requested Prescriptions   Pending Prescriptions Disp Refills     levothyroxine (SYNTHROID/LEVOTHROID) 137 MCG tablet [Pharmacy Med Name: LEVOTHYROXIN 137MCG TAB]    Last Written Prescription Date:  10/11/17  Last Fill Quantity: 90 tablet,  # refills: 3   Last office visit: 12/12/2017 with prescribing provider:  aleks   Future Office Visit:   90 tablet 2     Sig: TAKE ONE TABLET BY MOUTH ONCE DAILY    Thyroid Protocol Failed    3/3/2018  9:59 AM       Failed - Normal TSH on file in past 12 months    Recent Labs   Lab Test  10/11/17   1013   TSH  14.84*             Passed - Patient is 12 years or older       Passed - Recent (12 mo) or future (30 days) visit within the authorizing provider's specialty    Patient had office visit in the last year or has a visit in the next 30 days with authorizing provider.  See \"Patient Info\" tab in inbasket, or \"Choose Columns\" in Meds & Orders section of the refill encounter.            Passed - No active pregnancy on record    If patient is pregnant or has had a positive pregnancy test, please check TSH.         Passed - No positive pregnancy test in past 12 months    If patient is pregnant or has had a positive pregnancy test, please check TSH.          "

## 2018-03-05 ENCOUNTER — MYC MEDICAL ADVICE (OUTPATIENT)
Dept: FAMILY MEDICINE | Facility: CLINIC | Age: 63
End: 2018-03-05

## 2018-03-05 RX ORDER — LEVOTHYROXINE SODIUM 137 UG/1
TABLET ORAL
Qty: 30 TABLET | Refills: 0 | Status: SHIPPED | OUTPATIENT
Start: 2018-03-05 | End: 2018-04-30

## 2018-03-05 NOTE — TELEPHONE ENCOUNTER
TSH   Date Value Ref Range Status   10/11/2017 14.84 (H) 0.40 - 4.00 mU/L Final     Medication is being filled for 1 time refill only due to:  Patient needs labs [employer] message sent.  .   Eliud Nielson RN

## 2018-03-08 ENCOUNTER — OFFICE VISIT (OUTPATIENT)
Dept: FAMILY MEDICINE | Facility: CLINIC | Age: 63
End: 2018-03-08
Payer: COMMERCIAL

## 2018-03-08 VITALS
WEIGHT: 199 LBS | TEMPERATURE: 98.6 F | HEART RATE: 102 BPM | DIASTOLIC BLOOD PRESSURE: 77 MMHG | SYSTOLIC BLOOD PRESSURE: 128 MMHG | BODY MASS INDEX: 29.58 KG/M2 | RESPIRATION RATE: 16 BRPM

## 2018-03-08 DIAGNOSIS — E03.9 PRIMARY HYPOTHYROIDISM: ICD-10-CM

## 2018-03-08 DIAGNOSIS — R29.6 FALLING EPISODES: Primary | ICD-10-CM

## 2018-03-08 DIAGNOSIS — R79.89 ELEVATED TSH: ICD-10-CM

## 2018-03-08 LAB
ERYTHROCYTE [DISTWIDTH] IN BLOOD BY AUTOMATED COUNT: 13.2 % (ref 10–15)
HCT VFR BLD AUTO: 41.2 % (ref 35–47)
HGB BLD-MCNC: 13.9 G/DL (ref 11.7–15.7)
MCH RBC QN AUTO: 30.8 PG (ref 26.5–33)
MCHC RBC AUTO-ENTMCNC: 33.7 G/DL (ref 31.5–36.5)
MCV RBC AUTO: 91 FL (ref 78–100)
PLATELET # BLD AUTO: 260 10E9/L (ref 150–450)
RBC # BLD AUTO: 4.51 10E12/L (ref 3.8–5.2)
T3FREE SERPL-MCNC: 2.1 PG/ML (ref 2.3–4.2)
T4 FREE SERPL-MCNC: 0.72 NG/DL (ref 0.76–1.46)
TSH SERPL DL<=0.005 MIU/L-ACNC: 6.79 MU/L (ref 0.4–4)
WBC # BLD AUTO: 7.4 10E9/L (ref 4–11)

## 2018-03-08 PROCEDURE — 85027 COMPLETE CBC AUTOMATED: CPT | Performed by: PHYSICIAN ASSISTANT

## 2018-03-08 PROCEDURE — 84481 FREE ASSAY (FT-3): CPT | Performed by: PHYSICIAN ASSISTANT

## 2018-03-08 PROCEDURE — 99214 OFFICE O/P EST MOD 30 MIN: CPT | Performed by: PHYSICIAN ASSISTANT

## 2018-03-08 PROCEDURE — 84439 ASSAY OF FREE THYROXINE: CPT | Performed by: PHYSICIAN ASSISTANT

## 2018-03-08 PROCEDURE — 99000 SPECIMEN HANDLING OFFICE-LAB: CPT | Performed by: PHYSICIAN ASSISTANT

## 2018-03-08 PROCEDURE — 84443 ASSAY THYROID STIM HORMONE: CPT | Performed by: PHYSICIAN ASSISTANT

## 2018-03-08 PROCEDURE — 36415 COLL VENOUS BLD VENIPUNCTURE: CPT | Performed by: PHYSICIAN ASSISTANT

## 2018-03-08 PROCEDURE — 84482 T3 REVERSE: CPT | Mod: 90 | Performed by: PHYSICIAN ASSISTANT

## 2018-03-08 NOTE — MR AVS SNAPSHOT
After Visit Summary   3/8/2018    Mariela Duffy    MRN: [id number]           Patient Information     Date Of Birth          1955        Visit Information        Provider Department      3/8/2018 9:00 AM Estefany Stanley PA-C [facility]        Today's Diagnoses     Falling episodes    -  1    Elevated TSH           Follow-ups after your visit        Additional Services     [facility] PT, HAND, AND CHIROPRACTIC REFERRAL       **This order will print in the [facility] Scheduling Office**    Physical Therapy, Hand Therapy and Chiropractic Care are available through:    *[facility]  *[facility]  *[facility]    Call one number to schedule at any of the above locations: [phone].    Your provider has referred you to: Physical Therapy at [facility] or [facility]    Indication/Reason for Referral: falling  Onset of Illness: 3 months  Therapy Orders: Evaluate and Treat  Special Programs:   Special Request:     Gonzalo Sánchez      Additional Comments for the Therapist or Chiropractor:     Please be aware that coverage of these services is subject to the terms and limitations of your health insurance plan.  Call member services at your health plan with any benefit or coverage questions.      Please bring the following to your appointment:    *Your personal calendar for scheduling future appointments  *Comfortable clothing                  Who to contact     If you have questions or need follow up information about today's clinic visit or your schedule please contact [facility] directly at [phone].  Normal or non-critical lab and imaging results will be communicated to you by MyChart, letter or phone within 4 business days after the clinic has received the results. If you do not hear from us within 7 days, please contact the clinic through MyChart or phone. If you have a critical or abnormal lab result, we will notify you by  phone as soon as possible.  Submit refill requests through [employer] or call your pharmacy and they will forward the refill request to us. Please allow 3 business days for your refill to be completed.          Additional Information About Your Visit        [employer] Information     [employer] gives you secure access to your electronic health record. If you see a primary care provider, you can also send messages to your care team and make appointments. If you have questions, please call your primary care clinic.  If you do not have a primary care provider, please call [phone] and they will assist you.        Care EveryWhere ID     This is your Care EveryWhere ID. This could be used by other organizations to access your [facility] medical records  [id number]        Your Vitals Were     Pulse Temperature Respirations Last Period BMI (Body Mass Index)       102 98.6  F (37  C) (Oral) 16 01/20/2004 29.58 kg/m2        Blood Pressure from Last 3 Encounters:   03/08/18 128/77   12/20/17 140/83   12/12/17 124/74    Weight from Last 3 Encounters:   03/08/18 199 lb (90.3 kg)   12/12/17 191 lb 9.6 oz (86.9 kg)   12/08/17 194 lb 6.4 oz (88.2 kg)              We Performed the Following     CBC with platelets     [facility] PT, HAND, AND CHIROPRACTIC REFERRAL     T3, Free     T4, free     TSH        Primary Care Provider Office Phone # Fax #    Estefany MAGNOLIA Stanley PA-C [phone] [id number] [facility] [id number]        Equal Access to Services     [facility] Hospital[facility]IA ANTONIA : Hadii aad ku hadasho Soomaali, waaxda luqadaha, qaybta kaalmada adeegyada, waxay georgiana guillermo . So [facility] [phone].    ATENCIÓN: Si habla español, tiene a romero disposición servicios gratuitos de asistencia lingüística. Llame al [phone].    We comply with applicable federal civil rights laws and [place] laws. We do not discriminate on the basis of race, color, national origin, age, disability, sex, sexual orientation, or gender  identity.            Thank you!     Thank you for choosing [facility]  for your care. Our goal is always to provide you with excellent care. Hearing back from our patients is one way we can continue to improve our services. Please take a few minutes to complete the written survey that you may receive in the mail after your visit with us. Thank you!             Your Updated Medication List - Protect others around you: Learn how to safely use, store and throw away your medicines at www.disposemymeds.org.          This list is accurate as of 3/8/18  9:46 AM.  Always use your most recent med list.                   Brand Name Dispense Instructions for use Diagnosis    amoxicillin-clavulanate 875-125 MG per tablet    AUGMENTIN    20 tablet    Take 1 tablet by mouth 2 times daily    Acute non-recurrent maxillary sinusitis       busPIRone 15 MG tablet    BUSPAR    270 tablet    TAKE 2 TABLETS BY MOUTH IN THE MORNING AND TAKE 1 TABLET BY MOUTH IN THE EVENING    Anxiety       fluticasone 50 MCG/ACT spray    FLONASE     Spray 2 sprays into both nostrils daily as needed for rhinitis or allergies        levothyroxine 137 MCG tablet    SYNTHROID/LEVOTHROID    30 tablet    TAKE ONE TABLET BY MOUTH ONCE DAILY    Postsurgical hypothyroidism       LORazepam 0.5 MG tablet    ATIVAN    60 tablet    Take 0.5 tablets (0.25 mg) by mouth 2 times daily as needed for anxiety . May take 2 tabs ( 1 mg) bid PO 2 times daily as needed until current anxiety flare improves.    Anxiety       nortriptyline 75 MG capsule    PAMELOR    90 capsule    TAKE ONE CAPSULE BY MOUTH AT BEDTIME    Major depressive disorder, recurrent episode, mild (H)       pantoprazole 20 MG EC tablet    PROTONIX    90 tablet    TAKE ONE TABLET BY MOUTH ONCE DAILY. TAKE BY MOUTH 30 TO 60 MINUTES BEFORE A MEAL.    Gastroesophageal reflux disease with esophagitis, Hiatal hernia       ranitidine 300 MG tablet    ZANTAC    90 tablet    Take 1 tablet (300 mg)  by mouth At Bedtime    Gastroesophageal reflux disease with esophagitis       sertraline 100 MG tablet    ZOLOFT    180 tablet    TAKE TWO TABLETS BY MOUTH AT BEDTIME    Major depressive disorder, recurrent episode, mild (H)       SUMAtriptan 100 MG tablet    IMITREX    9 tablet    Take 1 tablet (100 mg) by mouth at onset of headache for migraine May repeat in 2 hours if needed: max 2/day; average number of headaches monthly 2    Acute nonintractable headache, unspecified headache type       triamcinolone 0.1 % ointment    KENALOG     Apply topically 2 times daily as needed for irritation

## 2018-03-08 NOTE — PROGRESS NOTES
SUBJECTIVE:   Mariela Duffy is a 62 year old female who presents to clinic today for the following health issues:      Unsteadiness and has fallen 2-3 times this week. Has had this issue before in the past. One fall was just standing still the others were with going up stairs. Patient has done physical therapy for this in the past with good results     Also follow up on thyroid testing.    Hypothyroidism Follow-up      Since last visit, patient describes the following symptoms: Weight stable, no hair loss, no skin changes, no constipation, no loose stools               Problem list and histories reviewed & adjusted, as indicated.  Additional history: as documented    Patient Active Problem List   Diagnosis     Contact dermatitis and other eczema due to other specified agent     Allergic rhinitis due to other allergen     Esophageal reflux     Irritable bowel syndrome     Rosacea     Postsurgical hypothyroidism     Iron deficiency anemia     Absence of menstruation     Mild major depression (H)     CARDIOVASCULAR SCREENING; LDL GOAL LESS THAN 160     Generalized anxiety disorder     Hypothyroidism     Tubular adenoma of colon     Seasonal affective disorder (H)     Rhinitis     Headache     ADD (attention deficit disorder)     Other insomnia     TIA (transient ischemic attack)     Gastroesophageal reflux disease with esophagitis     Hiatal hernia     Past Surgical History:   Procedure Laterality Date     C NONSPECIFIC PROCEDURE  1997    surgery hiatal hernia     C NONSPECIFIC PROCEDURE  1993    cholecystectomy     C NONSPECIFIC PROCEDURE  multiple    cleft lip repair.     CHOLECYSTECTOMY       COLONOSCOPY  6/14/2013    Colonoscopy Dr. Vallejo [facility]     HEAD & NECK SURGERY      thyroid cancer surgery     SURGICAL HISTORY OF -   11/04    thyroidectomy due to cancer     WRIST SURGERY Right     8/2015       Social History   Substance Use Topics     Smoking status: Never Smoker     Smokeless tobacco: Never Used      Alcohol use Yes      Comment: 2-4 mixed drinks/month     Family History   Problem Relation Age of Onset     CANCER Father      Colon, stomach -  at 75yoa     Hypertension Father      C.A.D. Father      CANCER Mother      Throat, lymph, bone -  at 79yoa     C.A.D. Maternal Grandfather      Heart Attack -  in his late 60's     Alzheimer Disease Paternal Grandmother      C.A.D. Paternal Grandfather      Heart Attack -  at 63yoa           Reviewed and updated as needed this visit by clinical staff       Reviewed and updated as needed this visit by Provider         ROS:  Constitutional, HEENT, cardiovascular, pulmonary, GI, , musculoskeletal, neuro, skin, endocrine and psych systems are negative, except as otherwise noted.    OBJECTIVE:     /77 (BP Location: Right arm, Patient Position: Chair, Cuff Size: Adult Large)  Pulse 102  Temp 98.6  F (37  C) (Oral)  Resp 16  Wt 199 lb (90.3 kg)  LMP 2004  BMI 29.58 kg/m2  Body mass index is 29.58 kg/(m^2).  GENERAL APPEARANCE: healthy, alert and no distress  EYES: Eyes grossly normal to inspection, PERRL and conjunctivae and sclerae normal  HENT: ear canals and TM's normal and nose and mouth without ulcers or lesions  RESP: lungs clear to auscultation - no rales, rhonchi or wheezes  CV: regular rates and rhythm, normal S1 S2, no S3 or S4 and no murmur, click or rub  MS: extremities normal- no gross deformities noted  NEURO: Normal strength and tone, mentation intact, speech normal, cranial nerves 2-12 intact and Romberg negative  PSYCH: mentation appears normal and affect normal/bright        ASSESSMENT/PLAN:             1. Falling episodes  Discussed neurology. Patient will wait at this time.  If no improvement agrees to see Neurology  - CBC with platelets  - [facility] PT, HAND, AND CHIROPRACTIC REFERRAL    2. Elevated TSH  Await labs. Taking meds per pt.   - TSH  - T4, free  - T3, Free        Estefany Stanley PA-C  [facility]  [place]

## 2018-03-09 ENCOUNTER — TELEPHONE (OUTPATIENT)
Dept: FAMILY MEDICINE | Facility: CLINIC | Age: 63
End: 2018-03-09

## 2018-03-09 DIAGNOSIS — E03.9 PRIMARY HYPOTHYROIDISM: Primary | ICD-10-CM

## 2018-03-09 RX ORDER — LEVOTHYROXINE SODIUM 150 UG/1
150 TABLET ORAL DAILY
Qty: 90 TABLET | Refills: 1 | Status: SHIPPED | OUTPATIENT
Start: 2018-03-09 | End: 2018-06-15

## 2018-03-09 NOTE — TELEPHONE ENCOUNTER
Please call pt back.     This was my error, I'm so sorry.  I have added this test.  It goes to another lab for the reverse T3. I checked with lab and it will take over a week to get this back.    I would like Fely to go ahead and proceed to take the new dose of levothyroxine I sent to the pharmacy for her.     Estefany Stanley PA-C

## 2018-03-09 NOTE — TELEPHONE ENCOUNTER
Pt calling about thyroid results.  She said requested to do a reverse T3 and they took 3 vials of blood yesterday.  Is this something that could be added or that you would do?

## 2018-03-09 NOTE — TELEPHONE ENCOUNTER
Panel Management Review      Patient has the following on her problem list: None      Composite cancer screening  Chart review shows that this patient is due/due soon for the following Pap Smear  Summary:    Patient is due/failing the following:   PAP    Action needed:   Patient needs office visit for physical and pap.    Type of outreach:    Sent [employer] message.    Questions for provider review:    None                                                                                                                                    Orly Roberson CMA       Chart routed to Care Team .          
Patient read Alex and Ani message.  
98.4

## 2018-03-12 ENCOUNTER — TELEPHONE (OUTPATIENT)
Dept: FAMILY MEDICINE | Facility: CLINIC | Age: 63
End: 2018-03-12

## 2018-03-12 NOTE — TELEPHONE ENCOUNTER
The Patient declined Preventive Health Screens for: PAP SMEAR  Please review chart and follow-up with patient if needed.    Thank you

## 2018-03-13 ENCOUNTER — HOSPITAL ENCOUNTER (OUTPATIENT)
Dept: PHYSICAL THERAPY | Facility: CLINIC | Age: 63
Setting detail: THERAPIES SERIES
End: 2018-03-13
Attending: PHYSICIAN ASSISTANT
Payer: COMMERCIAL

## 2018-03-13 LAB — T3REVERSE SERPL-MCNC: 8.8 NG/DL (ref 9–27)

## 2018-03-13 PROCEDURE — 97161 PT EVAL LOW COMPLEX 20 MIN: CPT | Mod: GP | Performed by: PHYSICAL THERAPIST

## 2018-03-13 PROCEDURE — 40000185 ZZHC STATISTIC PT OUTPT VISIT: Performed by: PHYSICAL THERAPIST

## 2018-03-14 ENCOUNTER — MYC MEDICAL ADVICE (OUTPATIENT)
Dept: FAMILY MEDICINE | Facility: CLINIC | Age: 63
End: 2018-03-14

## 2018-03-14 NOTE — PROGRESS NOTES
03/13/18 1415   Quick Adds   Type of Visit Initial OP PT Evaluation   General Information   Start of Care Date 03/13/18   Referring Physician Estefany Stanley PA-C   Orders Evaluate and Treat as Indicated   Order Date 03/08/18   Medical Diagnosis Falling episodes R29.6   Onset of illness/injury or Date of Surgery 03/08/18   Surgical/Medical history reviewed Yes   Pertinent history of current problem (include personal factors and/or comorbidities that impact the POC) Patient report h/o falling, x 3 falls in the past few weeks. About 6 months ago, she had another bout of falling that seemed to just get better without any intervention. Patient reports she started a new medication for indigestion a couple months ago but doesnt feel this is related. She does sometimes feel lightheaded before falling but not always. She seems to mis-step alot on the stairs. She reports she fell on the stairs with each of her 9 pregnanies so has some fear of falling there. She always uses the railing. She has fallen more than once while walking her 60lb dog. She reports just yesterday she was out wit the dog when he took off after something in the yard and she fell trying to gaye after him. Patient does not use an AD for ambulation. She walks her dog 2-3 times a day for a block or 2. She recently got a gym membership. She has gone once and rode the upright stationary bike x 5 mins. She had knee pain after that. She does feel that her legs are weak. She does note that she is under alot of stress. She has been unemployed for about a year. [place] moved [place] into [place] in [place] and lost some income there. She has been working with a counselor and practicing mindfulness. Patient feels part of her imbalance may be related to distraction from stressors. Patient did have some PT several years ago and found it helpful. Patient has been referred to neurologist as well.   Pertinent Visual History  Patient wears bifocals. She was seen by eye  doctor last week for an updated prescription, mild change.    Prior level of function comment independent in all mobility. walks with dog multiple times a day about a block or two at a time.   Previous/Current Treatment Physical Therapy  (3 years ago for balance/gait training)   Improvement after PT Significant   Current Community Support Other/Comments  (counseling, yard service)   Patient role/Employment history Unemployed  (music therapist, looking for work)   [facility]   Home/Community Accessibility Comments stairs to laundry, pt uses them once a week. able to drive   ADL Devices (none)   Assistive Devices Comments (none)   Patient/Family Goals Statement to get stronger, learn to walk correctly, avoid falling   Fall Risk Screen   Fall screen completed by PT   Have you fallen 2 or more times in the past year? Yes  (4-6 in past 6 months)   Have you fallen and had an injury in the past year? Yes  (sore knees, scabbed knees)   Is patient a fall risk? Yes   Fall screen comments not an increased fall risk based on testing today but clearly is at risk with h/o multple recent falls.   Pain   Patient currently in pain No;Other   Pain comments c/o L groin and knee pain with MMT    Cognitive Status Examination   Orientation orientation to person, place and time   Level of Consciousness alert   Follows Commands and Answers Questions 100% of the time   Personal Safety and Judgment intact   Cognitive Comment some difficulty reporting PMH   Strength   Manual Muscle Testing Quick Adds MMT: Hip;MMT: Knee;MMT: Ankle   MMT: Hip, Rehab Eval   Hip Flexion - Left Side (3+/5) fair plus, left  (painful in groin)   Hip Flexion - Right Side (5/5) normal,right   MMT: Knee, Rehab Eval   Knee Flexion - Left Side (5/5) normal,left   Knee Extension - Left Side (5/5) normal,left   Knee Flexion - Right Side (5/5) normal,right   Knee Extension - Right Side (5/5) normal,right   MMT: Ankle, Rehab Eval   Ankle Dorsiflexion -  "Left Side (5/5) normal,left   Ankle Dorsiflexion - Right Side (5/5) normal,right   Bed Mobility   Bed Mobility Comments independent   Transfer Skills   Transfer Comments independent   Gait Special Tests   Gait Special Tests 25 FOOT TIMED WALK;DYNAMIC GAIT INDEX   Gait Special Tests 25 Foot Timed Walk   Seconds 6.31   Comments < or = 5.86\" is norm for age and gender   Gait Special Tests Dynamic Gait Index   Score out of 24 21   Comments 19 or less indicates increased fall risk   Balance   Balance Comments modified tandem stance with and without cognitive distraction (counting back by 3s)- pt able to maintain balance with cognitive distraction but with increase ankle and hip strategy.   Balance Special Tests   Balance Special Tests Modified CTSIB Conditions;Sit to stand reps   Balance Special Tests Modified CTSIB Conditions   Condition 1, seconds 25 Seconds   Condition 2, seconds 30 Seconds   Condition 4, seconds 29 Seconds   Condition 5, seconds 12 Seconds   Balance Special Tests Sit to Stand Reps in 30 Seconds   Reps in 30 seconds 10  (feet side by side, no UE support.)   Height 18   Comments L knee pain 4/10, pt wincing. With first rep sit to stand, pt prefers to but R foot behind L in narrow INOCENCIO, feels balance is better this way   Coordination   Coordination Comments some incoordination with Adnrea on stairs   Clinical Impression   Criteria for Skilled Therapeutic Interventions Met yes, treatment indicated   PT Diagnosis instability, falls   Influenced by the following impairments proximal LE weakness on L, pain L hip and knee, decreased ability to maintain balance with dual tasks/cognitive distraction, reduced static balance with reduced visual and somatosensory information , gait instability    Functional limitations due to impairments ambulation over varied level/terraine, stairs   Clinical Presentation Evolving/Changing   Clinical Presentation Rationale significant increase in falls in past 3 weeks   Clinical " Decision Making (Complexity) Low complexity   Therapy Frequency 1 time/week   Predicted Duration of Therapy Intervention (days/wks) 5 weeks   Risk & Benefits of therapy have been explained Yes   Patient, Family & other staff in agreement with plan of care Yes   Education Assessment   Barriers to Learning No barriers   GOALS   PT Eval Goals 1;2;3;4   Goal 1   Goal Identifier 1   Goal Description Patient will be independent in [facility] for strengthening and balance to reduce fall risk in the long term.   Target Date 04/20/18   Goal 2   Goal Identifier 2   Goal Description Patient will be able to perform 13 sit to stands in 30 seconds indicating improved functional strength and stability to reduce fall risk while moving about her home and community.   Target Date 04/20/18   Goal 3   Goal Identifier 3   Goal Description Patient will be able to navigate varied terrain with external perturbations and no LOB to be able to safely walk her dog without falling.   Target Date 04/20/18   Goal 4   Goal Identifier 4   Goal Description Improved balance and coordination with reciprocal movements of LEs to be able to climb 12 stairs reciprocally and easily without mis-stepping to reduce fall risk on stairs.   Target Date 04/20/18   Total Evaluation Time   Total Evaluation Time (Minutes) 60

## 2018-03-20 ENCOUNTER — HOSPITAL ENCOUNTER (OUTPATIENT)
Dept: PHYSICAL THERAPY | Facility: CLINIC | Age: 63
Setting detail: THERAPIES SERIES
End: 2018-03-20
Attending: PHYSICIAN ASSISTANT
Payer: COMMERCIAL

## 2018-03-20 PROCEDURE — 97110 THERAPEUTIC EXERCISES: CPT | Mod: GP | Performed by: PHYSICAL THERAPIST

## 2018-03-20 PROCEDURE — 40000185 ZZHC STATISTIC PT OUTPT VISIT: Performed by: PHYSICAL THERAPIST

## 2018-04-03 ENCOUNTER — HOSPITAL ENCOUNTER (OUTPATIENT)
Dept: PHYSICAL THERAPY | Facility: CLINIC | Age: 63
Setting detail: THERAPIES SERIES
End: 2018-04-03
Attending: PHYSICIAN ASSISTANT
Payer: COMMERCIAL

## 2018-04-03 PROCEDURE — 40000185 ZZHC STATISTIC PT OUTPT VISIT: Performed by: PHYSICAL THERAPIST

## 2018-04-03 PROCEDURE — 97110 THERAPEUTIC EXERCISES: CPT | Mod: GP | Performed by: PHYSICAL THERAPIST

## 2018-04-10 ENCOUNTER — HOSPITAL ENCOUNTER (OUTPATIENT)
Dept: PHYSICAL THERAPY | Facility: CLINIC | Age: 63
Setting detail: THERAPIES SERIES
End: 2018-04-10
Attending: PHYSICIAN ASSISTANT
Payer: COMMERCIAL

## 2018-04-10 PROCEDURE — 40000185 ZZHC STATISTIC PT OUTPT VISIT: Performed by: PHYSICAL THERAPIST

## 2018-04-10 PROCEDURE — 97110 THERAPEUTIC EXERCISES: CPT | Mod: GP | Performed by: PHYSICAL THERAPIST

## 2018-04-10 PROCEDURE — 97116 GAIT TRAINING THERAPY: CPT | Mod: GP | Performed by: PHYSICAL THERAPIST

## 2018-04-26 LAB — PHQ9 SCORE: 11

## 2018-04-30 ENCOUNTER — RADIANT APPOINTMENT (OUTPATIENT)
Dept: GENERAL RADIOLOGY | Facility: CLINIC | Age: 63
End: 2018-04-30
Attending: FAMILY MEDICINE
Payer: COMMERCIAL

## 2018-04-30 ENCOUNTER — OFFICE VISIT (OUTPATIENT)
Dept: FAMILY MEDICINE | Facility: CLINIC | Age: 63
End: 2018-04-30
Payer: COMMERCIAL

## 2018-04-30 VITALS
HEIGHT: 69 IN | SYSTOLIC BLOOD PRESSURE: 124 MMHG | TEMPERATURE: 98.6 F | DIASTOLIC BLOOD PRESSURE: 80 MMHG | OXYGEN SATURATION: 99 % | RESPIRATION RATE: 16 BRPM | HEART RATE: 105 BPM

## 2018-04-30 DIAGNOSIS — R09.82 POST-NASAL DRIP: ICD-10-CM

## 2018-04-30 DIAGNOSIS — R05.9 COUGH: ICD-10-CM

## 2018-04-30 DIAGNOSIS — R05.9 COUGH: Primary | ICD-10-CM

## 2018-04-30 DIAGNOSIS — Z86.0100 HISTORY OF COLONIC POLYPS: ICD-10-CM

## 2018-04-30 PROCEDURE — 99214 OFFICE O/P EST MOD 30 MIN: CPT | Performed by: FAMILY MEDICINE

## 2018-04-30 PROCEDURE — 71046 X-RAY EXAM CHEST 2 VIEWS: CPT

## 2018-04-30 RX ORDER — DEXTROMETHORPHAN POLISTIREX 30 MG/5ML
30 SUSPENSION ORAL 2 TIMES DAILY
Qty: 89 ML | Refills: 1 | Status: SHIPPED | OUTPATIENT
Start: 2018-04-30 | End: 2018-06-27

## 2018-04-30 RX ORDER — FLUTICASONE PROPIONATE 50 MCG
1-2 SPRAY, SUSPENSION (ML) NASAL DAILY
Qty: 1 BOTTLE | Refills: 11 | Status: ON HOLD | OUTPATIENT
Start: 2018-04-30 | End: 2020-01-24

## 2018-04-30 NOTE — MR AVS SNAPSHOT
"              After Visit Summary   4/30/2018    Mariela Duffy    MRN: [id number]           Patient Information     Date Of Birth          1955        Visit Information        Provider Department      4/30/2018 2:45 PM Gonzales Yang MD [facility]        Today's Diagnoses     Cough    -  1    Post-nasal drip          Care Instructions    Delsym  Flonase          Follow-ups after your visit        Who to contact     If you have questions or need follow up information about today's clinic visit or your schedule please contact [facility] directly at [phone].  Normal or non-critical lab and imaging results will be communicated to you by [employer], letter or phone within 4 business days after the clinic has received the results. If you do not hear from us within 7 days, please contact the clinic through [employer] or phone. If you have a critical or abnormal lab result, we will notify you by phone as soon as possible.  Submit refill requests through [employer] or call your pharmacy and they will forward the refill request to us. Please allow 3 business days for your refill to be completed.          Additional Information About Your Visit        MyChart Information     [employer] gives you secure access to your electronic health record. If you see a primary care provider, you can also send messages to your care team and make appointments. If you have questions, please call your primary care clinic.  If you do not have a primary care provider, please call [phone] and they will assist you.        Care EveryWhere ID     This is your Care EveryWhere ID. This could be used by other organizations to access your [place] medical records  [id number]        Your Vitals Were     Pulse Temperature Respirations Height Last Period Pulse Oximetry    105 98.6  F (37  C) (Oral) 16 5' 8.78\" (1.747 m) 01/20/2004 99%    Breastfeeding?                   No            Blood Pressure from Last 3 " Encounters:   04/30/18 124/80   03/08/18 128/77   12/20/17 140/83    Weight from Last 3 Encounters:   03/08/18 199 lb (90.3 kg)   12/12/17 191 lb 9.6 oz (86.9 kg)   12/08/17 194 lb 6.4 oz (88.2 kg)               Primary Care Provider Office Phone # Fax #    Estefany MAGNOLIA Stanley PA-C [phone]       [address] [facility] [address]        Equal Access to Services     [facility]: Hadii aad ku hadasho Soomaali, waaxda luqadaha, qaybta kaalmada adeegyada, waxghada guillermo . So [facility] [phone].    ATENCIÓN: Si habla español, tiene a romero disposición servicios gratuitos de asistencia lingüística. Ll[facility] [phone].    We comply with applicable federal civil rights laws and [place] laws. We do not discriminate on the basis of race, color, national origin, age, disability, sex, sexual orientation, or gender identity.            Thank you!     Thank you for choosing [facility]  for your care. Our goal is always to provide you with excellent care. Hearing back from our patients is one way we can continue to improve our services. Please take a few minutes to complete the written survey that you may receive in the mail after your visit with us. Thank you!             Your Updated Medication List - Protect others around you: Learn how to safely use, store and throw away your medicines at www.disposemymeds.org.          This list is accurate as of 4/30/18  3:44 PM.  Always use your most recent med list.                   Brand Name Dispense Instructions for use Diagnosis    busPIRone 15 MG tablet    BUSPAR    270 tablet    TAKE 2 TABLETS BY MOUTH IN THE MORNING AND TAKE 1 TABLET BY MOUTH IN THE EVENING    Anxiety       levothyroxine 150 MCG tablet    SYNTHROID/LEVOTHROID    90 tablet    Take 1 tablet (150 mcg) by mouth daily    Primary hypothyroidism       LORazepam 0.5 MG tablet    ATIVAN    60 tablet    Take 0.5 tablets (0.25 mg) by mouth 2 times daily as  needed for anxiety . May take 2 tabs ( 1 mg) bid PO 2 times daily as needed until current anxiety flare improves.    Anxiety       nortriptyline 75 MG capsule    PAMELOR    90 capsule    TAKE ONE CAPSULE BY MOUTH AT BEDTIME    Major depressive disorder, recurrent episode, mild (H)       pantoprazole 20 MG EC tablet    PROTONIX    90 tablet    TAKE ONE TABLET BY MOUTH ONCE DAILY. TAKE BY MOUTH 30 TO 60 MINUTES BEFORE A MEAL.    Gastroesophageal reflux disease with esophagitis, Hiatal hernia       ranitidine 300 MG tablet    ZANTAC    90 tablet    Take 1 tablet (300 mg) by mouth At Bedtime    Gastroesophageal reflux disease with esophagitis       sertraline 100 MG tablet    ZOLOFT    180 tablet    TAKE TWO TABLETS BY MOUTH AT BEDTIME    Major depressive disorder, recurrent episode, mild (H)       SUMAtriptan 100 MG tablet    IMITREX    9 tablet    Take 1 tablet (100 mg) by mouth at onset of headache for migraine May repeat in 2 hours if needed: max 2/day; average number of headaches monthly 2    Acute nonintractable headache, unspecified headache type       triamcinolone 0.1 % ointment    KENALOG     Apply topically 2 times daily as needed for irritation
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
## 2018-04-30 NOTE — NURSING NOTE
"Chief Complaint   Patient presents with     URI     x 2 days        Initial /80 (BP Location: Right arm, Patient Position: Chair, Cuff Size: Adult Large)  Pulse 105  Temp 98.6  F (37  C) (Oral)  Resp 16  Ht 5' 8.78\" (1.747 m)  LMP 01/20/2004  SpO2 99%  Breastfeeding? No Estimated body mass index is 29.58 kg/(m^2) as calculated from the following:    Height as of 12/8/17: 5' 8.78\" (1.747 m).    Weight as of 3/8/18: 199 lb (90.3 kg).  Medication Reconciliation: complete rt arm Cecelia SYLVESTER      "

## 2018-05-11 ENCOUNTER — TELEPHONE (OUTPATIENT)
Dept: FAMILY MEDICINE | Facility: CLINIC | Age: 63
End: 2018-05-11

## 2018-05-11 ENCOUNTER — APPOINTMENT (OUTPATIENT)
Dept: CT IMAGING | Facility: CLINIC | Age: 63
End: 2018-05-11
Attending: EMERGENCY MEDICINE
Payer: COMMERCIAL

## 2018-05-11 ENCOUNTER — HOSPITAL ENCOUNTER (EMERGENCY)
Facility: CLINIC | Age: 63
Discharge: HOME | End: 2018-05-11
Attending: EMERGENCY MEDICINE | Admitting: EMERGENCY MEDICINE
Payer: COMMERCIAL

## 2018-05-11 ENCOUNTER — APPOINTMENT (OUTPATIENT)
Dept: MRI IMAGING | Facility: CLINIC | Age: 63
End: 2018-05-11
Attending: EMERGENCY MEDICINE
Payer: COMMERCIAL

## 2018-05-11 VITALS
WEIGHT: 191 LBS | BODY MASS INDEX: 28.95 KG/M2 | OXYGEN SATURATION: 98 % | SYSTOLIC BLOOD PRESSURE: 142 MMHG | HEIGHT: 68 IN | DIASTOLIC BLOOD PRESSURE: 104 MMHG | HEART RATE: 83 BPM | TEMPERATURE: 97.9 F | RESPIRATION RATE: 16 BRPM

## 2018-05-11 DIAGNOSIS — R51.9 NONINTRACTABLE HEADACHE, UNSPECIFIED CHRONICITY PATTERN, UNSPECIFIED HEADACHE TYPE: ICD-10-CM

## 2018-05-11 DIAGNOSIS — R42 DIZZINESS: ICD-10-CM

## 2018-05-11 DIAGNOSIS — F43.9 STRESS: ICD-10-CM

## 2018-05-11 LAB
ANION GAP SERPL CALCULATED.3IONS-SCNC: 10 MMOL/L (ref 3–14)
APTT PPP: 28 SEC (ref 22–37)
BASOPHILS # BLD AUTO: 0.1 10E9/L (ref 0–0.2)
BASOPHILS NFR BLD AUTO: 0.7 %
BUN SERPL-MCNC: 7 MG/DL (ref 7–30)
CALCIUM SERPL-MCNC: 9.3 MG/DL (ref 8.5–10.1)
CHLORIDE SERPL-SCNC: 103 MMOL/L (ref 94–109)
CO2 SERPL-SCNC: 26 MMOL/L (ref 20–32)
CREAT BLD-MCNC: 0.7 MG/DL (ref 0.52–1.04)
CREAT SERPL-MCNC: 0.63 MG/DL (ref 0.52–1.04)
DIFFERENTIAL METHOD BLD: NORMAL
EOSINOPHIL # BLD AUTO: 0.2 10E9/L (ref 0–0.7)
EOSINOPHIL NFR BLD AUTO: 3 %
ERYTHROCYTE [DISTWIDTH] IN BLOOD BY AUTOMATED COUNT: 12.5 % (ref 10–15)
GFR SERPL CREATININE-BSD FRML MDRD: 85 ML/MIN/1.7M2
GFR SERPL CREATININE-BSD FRML MDRD: >90 ML/MIN/1.7M2
GLUCOSE BLDC GLUCOMTR-MCNC: 100 MG/DL (ref 70–99)
GLUCOSE SERPL-MCNC: 99 MG/DL (ref 70–99)
HCT VFR BLD AUTO: 44.5 % (ref 35–47)
HGB BLD-MCNC: 15.4 G/DL (ref 11.7–15.7)
IMM GRANULOCYTES # BLD: 0.1 10E9/L (ref 0–0.4)
IMM GRANULOCYTES NFR BLD: 1 %
INR PPP: 0.96 (ref 0.86–1.14)
INTERPRETATION ECG - MUSE: NORMAL
LYMPHOCYTES # BLD AUTO: 1.7 10E9/L (ref 0.8–5.3)
LYMPHOCYTES NFR BLD AUTO: 22.1 %
MCH RBC QN AUTO: 30.1 PG (ref 26.5–33)
MCHC RBC AUTO-ENTMCNC: 34.6 G/DL (ref 31.5–36.5)
MCV RBC AUTO: 87 FL (ref 78–100)
MONOCYTES # BLD AUTO: 0.4 10E9/L (ref 0–1.3)
MONOCYTES NFR BLD AUTO: 5.6 %
NEUTROPHILS # BLD AUTO: 5.2 10E9/L (ref 1.6–8.3)
NEUTROPHILS NFR BLD AUTO: 67.6 %
NRBC # BLD AUTO: 0 10*3/UL
NRBC BLD AUTO-RTO: 0 /100
PLATELET # BLD AUTO: 349 10E9/L (ref 150–450)
POTASSIUM SERPL-SCNC: 3.7 MMOL/L (ref 3.4–5.3)
RBC # BLD AUTO: 5.11 10E12/L (ref 3.8–5.2)
SODIUM SERPL-SCNC: 139 MMOL/L (ref 133–144)
TROPONIN I SERPL-MCNC: <0.015 UG/L (ref 0–0.04)
WBC # BLD AUTO: 7.7 10E9/L (ref 4–11)

## 2018-05-11 PROCEDURE — 85610 PROTHROMBIN TIME: CPT | Performed by: EMERGENCY MEDICINE

## 2018-05-11 PROCEDURE — 82565 ASSAY OF CREATININE: CPT

## 2018-05-11 PROCEDURE — 84484 ASSAY OF TROPONIN QUANT: CPT | Performed by: EMERGENCY MEDICINE

## 2018-05-11 PROCEDURE — 93005 ELECTROCARDIOGRAM TRACING: CPT

## 2018-05-11 PROCEDURE — 25000128 H RX IP 250 OP 636: Performed by: EMERGENCY MEDICINE

## 2018-05-11 PROCEDURE — 99285 EMERGENCY DEPT VISIT HI MDM: CPT | Mod: 25

## 2018-05-11 PROCEDURE — 96374 THER/PROPH/DIAG INJ IV PUSH: CPT | Mod: 59

## 2018-05-11 PROCEDURE — 70450 CT HEAD/BRAIN W/O DYE: CPT

## 2018-05-11 PROCEDURE — 70549 MR ANGIOGRAPH NECK W/O&W/DYE: CPT

## 2018-05-11 PROCEDURE — 85730 THROMBOPLASTIN TIME PARTIAL: CPT | Performed by: EMERGENCY MEDICINE

## 2018-05-11 PROCEDURE — 70553 MRI BRAIN STEM W/O & W/DYE: CPT

## 2018-05-11 PROCEDURE — 00000146 ZZHCL STATISTIC GLUCOSE BY METER IP

## 2018-05-11 PROCEDURE — 80048 BASIC METABOLIC PNL TOTAL CA: CPT | Performed by: EMERGENCY MEDICINE

## 2018-05-11 PROCEDURE — 70544 MR ANGIOGRAPHY HEAD W/O DYE: CPT | Mod: XS

## 2018-05-11 PROCEDURE — A9585 GADOBUTROL INJECTION: HCPCS | Performed by: EMERGENCY MEDICINE

## 2018-05-11 PROCEDURE — 85025 COMPLETE CBC W/AUTO DIFF WBC: CPT | Performed by: EMERGENCY MEDICINE

## 2018-05-11 PROCEDURE — 25000132 ZZH RX MED GY IP 250 OP 250 PS 637: Performed by: EMERGENCY MEDICINE

## 2018-05-11 RX ORDER — GADOBUTROL 604.72 MG/ML
10 INJECTION INTRAVENOUS ONCE
Status: COMPLETED | OUTPATIENT
Start: 2018-05-11 | End: 2018-05-11

## 2018-05-11 RX ORDER — SERTRALINE HYDROCHLORIDE 100 MG/1
100 TABLET, FILM COATED ORAL DAILY
Qty: 3 TABLET | Refills: 0 | Status: SHIPPED | OUTPATIENT
Start: 2018-05-11 | End: 2018-05-14

## 2018-05-11 RX ORDER — LORAZEPAM 2 MG/ML
0.5 INJECTION INTRAMUSCULAR ONCE
Status: COMPLETED | OUTPATIENT
Start: 2018-05-11 | End: 2018-05-11

## 2018-05-11 RX ORDER — ACETAMINOPHEN 500 MG
1000 TABLET ORAL ONCE
Status: COMPLETED | OUTPATIENT
Start: 2018-05-11 | End: 2018-05-11

## 2018-05-11 RX ORDER — NORTRIPTYLINE HYDROCHLORIDE 50 MG/1
50 CAPSULE ORAL AT BEDTIME
Qty: 3 CAPSULE | Refills: 0 | Status: SHIPPED | OUTPATIENT
Start: 2018-05-11 | End: 2018-05-14

## 2018-05-11 RX ORDER — IOPAMIDOL 755 MG/ML
500 INJECTION, SOLUTION INTRAVASCULAR ONCE
Status: DISCONTINUED | OUTPATIENT
Start: 2018-05-11 | End: 2018-05-11 | Stop reason: CLARIF

## 2018-05-11 RX ADMIN — LORAZEPAM 0.5 MG: 2 INJECTION INTRAMUSCULAR; INTRAVENOUS at 11:00

## 2018-05-11 RX ADMIN — ACETAMINOPHEN 1000 MG: 500 TABLET, FILM COATED ORAL at 10:13

## 2018-05-11 RX ADMIN — GADOBUTROL 10 ML: 604.72 INJECTION INTRAVENOUS at 11:54

## 2018-05-11 ASSESSMENT — ENCOUNTER SYMPTOMS
HEADACHES: 1
CONFUSION: 1
DIARRHEA: 1
SPEECH DIFFICULTY: 1
DIZZINESS: 1
NUMBNESS: 1

## 2018-05-11 NOTE — ED AVS SNAPSHOT
[facility] Emergency Department    [address] [facility] [address]    Phone:  [phone]    Fax:  [phone]                                       Mariela Duffy   MRN: [id number]    Department:  [facility] Emergency Department   Date of Visit:  5/11/2018           Patient Information     Date Of Birth          1955        Your diagnoses for this visit were:     Nonintractable headache, unspecified chronicity pattern, unspecified headache type     Dizziness     Stress        You were seen by Gonzales Barron MD.      Follow-up Information     Follow up with Estefany Stanley PA-C. Schedule an appointment as soon as possible for a visit in 2 days.    Specialty:  Physician Assistant    Contact information:    [address]  [facility] [address] [phone]          Follow up with [facility] Emergency Department.    Specialty:  EMERGENCY MEDICINE    Why:  If symptoms worsen    Contact information:    [address] [facility] [address] [phone]        Discharge Instructions       Please follow-up with your primary care provider in 2-3 days as well as your counselor appointments.    Please resume medications as prescribed.    Please return to the ER with any other new or troubling symptoms.    Discharge Instructions  Headache    You were seen today for a headache. Headaches may be caused by many different things such as muscle tension, sinus inflammation, anxiety and stress, having too little sleep, too much alcohol, some medical conditions or injury. You may have a migraine, which is caused by changes in the blood vessels in your head.  At this time your provider does not find that your headache is a sign of anything dangerous or life-threatening.  However, sometimes the signs of serious illness do not show up right away.      Generally, every Emergency Department visit should have a follow-up clinic visit with either  a primary or a specialty clinic/provider. Please follow-up as instructed by your emergency provider today.    Return to the Emergency Department if:    You get a new fever of 100.4 F or higher.    Your headache gets much worse.    You get a stiff neck with your headache.    You get a new headache that is significantly different or worse than headaches you have had before.    You are vomiting (throwing up) and cannot keep food or water down.    You have blurry or double vision or other problems with your eyes.    You have a new weakness on one side of your body.    You have difficulty with balance which is new.    You or your family thinks you are confused.    You have a seizure.    What can I do to help myself?    Pain medications - You may take a pain medication such as Tylenol  (acetaminophen), Advil , Motrin  (ibuprofen) or Aleve  (naproxen).    Take a pain reliever as soon as you notice symptoms.  Starting medications as soon as you start to have symptoms may lessen the amount of pain you have.    Relaxing in a quiet, dark room may help.    Get enough sleep and eat meals regularly.    You may need to watch for certain foods or other things which may trigger your headaches.  Keeping a journal of your headaches and possible triggers may help you and your primary provider to identify things which you should avoid which may be causing your headaches.  If you were given a prescription for medicine here today, be sure to read all of the information (including the package insert) that comes with your prescription.  This will include important information about the medicine, its side effects, and any warnings that you need to know about.  The pharmacist who fills the prescription can provide more information and answer questions you may have about the medicine.  If you have questions or concerns that the pharmacist cannot address, please call or return to the Emergency Department.   Remember that you can always come  back to the Emergency Department if you are not able to see your regular provider in the amount of time listed above, if you get any new symptoms, or if there is anything that worries you.      24 Hour Appointment Hotline       To make an appointment at any [facility], call [phone] ([phone]). If you don't have a family doctor or clinic, we will help you find one. [facility] are conveniently located to serve the needs of you and your family.             Review of your medicines      CONTINUE these medicines which may have CHANGED, or have new prescriptions. If we are uncertain of the size of tablets/capsules you have at home, strength may be listed as something that might have changed.        Dose / Directions Last dose taken    * nortriptyline 75 MG capsule   Commonly known as:  PAMELOR   What changed:  Another medication with the same name was added. Make sure you understand how and when to take each.   Quantity:  90 capsule        TAKE ONE CAPSULE BY MOUTH AT BEDTIME   Refills:  1        * nortriptyline 50 MG capsule   Commonly known as:  PAMELOR   Dose:  50 mg   What changed:  You were already taking a medication with the same name, and this prescription was added. Make sure you understand how and when to take each.   Quantity:  3 capsule        Take 1 capsule (50 mg) by mouth At Bedtime for 3 days   Refills:  0        * sertraline 100 MG tablet   Commonly known as:  ZOLOFT   What changed:  Another medication with the same name was added. Make sure you understand how and when to take each.   Quantity:  180 tablet        TAKE TWO TABLETS BY MOUTH AT BEDTIME   Refills:  0        * sertraline 100 MG tablet   Commonly known as:  ZOLOFT   Dose:  100 mg   What changed:  You were already taking a medication with the same name, and this prescription was added. Make sure you understand how and when to take each.   Quantity:  3 tablet        Take 1 tablet (100 mg) by mouth daily   Refills:  0         * Notice:  This list has 4 medication(s) that are the same as other medications prescribed for you. Read the directions carefully, and ask your doctor or other care provider to review them with you.      Our records show that you are taking the medicines listed below. If these are incorrect, please call your family doctor or clinic.        Dose / Directions Last dose taken    busPIRone 15 MG tablet   Commonly known as:  BUSPAR   Quantity:  270 tablet        TAKE 2 TABLETS BY MOUTH IN THE MORNING AND TAKE 1 TABLET BY MOUTH IN THE EVENING   Refills:  0        dextromethorphan 30 MG/5ML liquid   Commonly known as:  DELSYM   Dose:  30 mg   Quantity:  89 mL        Take 5 mLs (30 mg) by mouth 2 times daily   Refills:  1        fluticasone 50 MCG/ACT spray   Commonly known as:  FLONASE   Dose:  1-2 spray   Quantity:  1 Bottle        Spray 1-2 sprays into both nostrils daily   Refills:  11        levothyroxine 150 MCG tablet   Commonly known as:  SYNTHROID/LEVOTHROID   Dose:  150 mcg   Quantity:  90 tablet        Take 1 tablet (150 mcg) by mouth daily   Refills:  1        LORazepam 0.5 MG tablet   Commonly known as:  ATIVAN   Dose:  0.25 mg   Quantity:  60 tablet        Take 0.5 tablets (0.25 mg) by mouth 2 times daily as needed for anxiety . May take 2 tabs ( 1 mg) bid PO 2 times daily as needed until current anxiety flare improves.   Refills:  0        pantoprazole 20 MG EC tablet   Commonly known as:  PROTONIX   Quantity:  90 tablet        TAKE ONE TABLET BY MOUTH ONCE DAILY. TAKE BY MOUTH 30 TO 60 MINUTES BEFORE A MEAL.   Refills:  1        ranitidine 300 MG tablet   Commonly known as:  ZANTAC   Dose:  300 mg   Quantity:  90 tablet        Take 1 tablet (300 mg) by mouth At Bedtime   Refills:  3        SUMAtriptan 100 MG tablet   Commonly known as:  IMITREX   Dose:  100 mg   Quantity:  9 tablet        Take 1 tablet (100 mg) by mouth at onset of headache for migraine May repeat in 2 hours if needed: max 2/day; average  number of headaches monthly 2   Refills:  1        triamcinolone 0.1 % ointment   Commonly known as:  KENALOG        Apply topically 2 times daily as needed for irritation   Refills:  0                Prescriptions were sent or printed at these locations (2 Prescriptions)                   Other Prescriptions                Printed at Department/Unit printer (2 of 2)         nortriptyline (PAMELOR) 50 MG capsule               sertraline (ZOLOFT) 100 MG tablet                Procedures and tests performed during your visit     Basic metabolic panel    CBC with platelets differential    CT Head w/o Contrast    Creatinine POCT    EKG 12 lead    Glucose by meter    Head MRA w/o contrast - STROKE PROTOCOL    INR    MR Brain w/o & w Contrast    Neck MRA w & w/o contrast - STROKE PROTOCOL    Partial thromboplastin time    Troponin I      Orders Needing Specimen Collection     None      Pending Results     No orders found from 5/9/2018 to 5/12/2018.            Pending Culture Results     No orders found from 5/9/2018 to 5/12/2018.            Pending Results Instructions     If you had any lab results that were not finalized at the time of your Discharge, you can call the ED Lab Result RN at [phone]. You will be contacted by this team for any positive Lab results or changes in treatment. The nurses are available 7 days a week from 10A to 6:30P.  You can leave a message 24 hours per day and they will return your call.        Test Results From Your Hospital Stay        5/11/2018 10:21 AM      Narrative     CT SCAN OF THE HEAD WITHOUT CONTRAST   5/11/2018 9:48 AM     HISTORY: Code stroke.     TECHNIQUE:  Axial images of the head and coronal reformations without  IV contrast material. Radiation dose for this scan was reduced using  automated exposure control, adjustment of the mA and/or kV according  to patient size, or iterative reconstruction technique.    COMPARISON: None.    FINDINGS: There is no evidence of  intracranial hemorrhage, mass, or  anomaly. The ventricles are normal in size, shape and configuration.  The brain parenchyma and subarachnoid spaces are normal.     Partially visualized mucosal thickening in the left maxillary sinus.  The bony calvarium and bones of the skull base appear intact.         Impression     IMPRESSION: No evidence of acute intracranial hemorrhage, mass, or  herniation.    Results discussed with Gonzales Barron at 9:54 AM on 5/11/2018.    CRISTINA LOPEZ MD                     5/11/2018  9:42 AM      Component Results     Component Value Ref Range & Units Status    Glucose 100 (H) 70 - 99 mg/dL Final         5/11/2018 10:11 AM      Component Results     Component Value Ref Range & Units Status    Sodium 139 133 - 144 mmol/L Final    Potassium 3.7 3.4 - 5.3 mmol/L Final    Chloride 103 94 - 109 mmol/L Final    Carbon Dioxide 26 20 - 32 mmol/L Final    Anion Gap 10 3 - 14 mmol/L Final    Glucose 99 70 - 99 mg/dL Final    Urea Nitrogen 7 7 - 30 mg/dL Final    Creatinine 0.63 0.52 - 1.04 mg/dL Final    GFR Estimate >90 >60 mL/min/1.7m2 Final    Non  GFR Calc    GFR Estimate If Black >90 >60 mL/min/1.7m2 Final    African American GFR Calc    Calcium 9.3 8.5 - 10.1 mg/dL Final         5/11/2018  9:49 AM      Component Results     Component Value Ref Range & Units Status    WBC 7.7 4.0 - 11.0 10e9/L Final    RBC Count 5.11 3.8 - 5.2 10e12/L Final    Hemoglobin 15.4 11.7 - 15.7 g/dL Final    Hematocrit 44.5 35.0 - 47.0 % Final    MCV 87 78 - 100 fl Final    MCH 30.1 26.5 - 33.0 pg Final    MCHC 34.6 31.5 - 36.5 g/dL Final    RDW 12.5 10.0 - 15.0 % Final    Platelet Count 349 150 - 450 10e9/L Final    Diff Method Automated Method  Final    % Neutrophils 67.6 % Final    % Lymphocytes 22.1 % Final    % Monocytes 5.6 % Final    % Eosinophils 3.0 % Final    % Basophils 0.7 % Final    % Immature Granulocytes 1.0 % Final    Nucleated RBCs 0 0 /100 Final    Absolute Neutrophil 5.2 1.6 - 8.3  10e9/L Final    Absolute Lymphocytes 1.7 0.8 - 5.3 10e9/L Final    Absolute Monocytes 0.4 0.0 - 1.3 10e9/L Final    Absolute Eosinophils 0.2 0.0 - 0.7 10e9/L Final    Absolute Basophils 0.1 0.0 - 0.2 10e9/L Final    Abs Immature Granulocytes 0.1 0 - 0.4 10e9/L Final    Absolute Nucleated RBC 0.0  Final         5/11/2018  9:59 AM      Component Results     Component Value Ref Range & Units Status    INR 0.96 0.86 - 1.14 Final         5/11/2018  9:59 AM      Component Results     Component Value Ref Range & Units Status    PTT 28 22 - 37 sec Final         5/11/2018 10:11 AM      Component Results     Component Value Ref Range & Units Status    Troponin I ES <0.015 0.000 - 0.045 ug/L Final    The 99th percentile for upper reference range is 0.045 ug/L.  Troponin values   in the range of 0.045 - 0.120 ug/L may be associated with risks of adverse   clinical events.           5/11/2018  9:45 AM      Component Results     Component Value Ref Range & Units Status    Creatinine 0.7 0.52 - 1.04 mg/dL Final    GFR Estimate 85 >60 mL/min/1.7m2 Final    GFR Estimate If Black >90 >60 mL/min/1.7m2 Final         5/11/2018 12:38 PM      Narrative     MRI BRAIN WITHOUT AND WITH CONTRAST  5/11/2018 11:52 AM    HISTORY:  dizziness, headache;      TECHNIQUE:  Multiplanar, multisequence MRI of the brain without and  with 10mL Gadavist    COMPARISON: None.    FINDINGS: There is a partially empty sella. This is usually an  incidental finding.  The brain parenchyma, ventricles and subarachnoid  spaces appear normal. There is no evidence of hemorrhage, mass, acute  infarct, or anomaly.  There are no gadolinium enhancing lesions.    Mucosal thickening is present in both maxillary sinuses. There is a  2.5 cm polyp or retention cyst in the left maxillary sinus. The  arteries at the base of the brain and the dural venous sinuses appear  patent.         Impression     IMPRESSION:  1. No acute pathology. No bleed, mass, or infarcts are seen.  2.  Mucosal thickening is seen in the maxillary sinuses.      PHIL LAINEZ MD         5/11/2018 12:37 PM      Narrative     MR ANGIOGRAM OF THE HEAD WITHOUT CONTRAST   5/11/2018 11:41 AM     HISTORY: headache, dizziness;     TECHNIQUE:  3D time-of-flight MR angiogram of the head without  contrast.    COMPARISON: None.    FINDINGS:  The visualized portions of the distal internal carotid and  vertebral arteries, the basilar artery, [place], and the  proximal anterior, middle and posterior cerebral arteries all appear  normal.  There is no evidence of aneurysm or vascular stenosis or  occlusion.        Impression     IMPRESSION:  Normal MR angiogram of the head.      PHIL LAINEZ MD         5/11/2018 12:37 PM      Narrative     MRA ANGIOGRAM NECK W/O & W CONTRAST 5/11/2018 11:55 AM     HISTORY:  heaadche, dizziness, heaadche, dizziness;     TECHNIQUE:  Sequential axial images of the neck were obtained using  2-dimensional time-of-flight before contrast and 3-dimensional  time-of-flight after the uneventful administration of 10mL Gadavist iv  contrast.    COMPARISON:  None.    FINDINGS: Stenosis is relative to the distal internal carotid  diameter.    Arch: The left vertebral artery arises directly from the arch of the  aorta. This is a normal variant.    Right Carotid:  No significant stenosis is seen at the bifurcation  relative to the distal internal carotid diameter.    Left Carotid:  No significant stenosis is seen at the bifurcation  relative to the distal internal carotid diameter.    Vertebrals: Antegrade flow is seen in both vertebral arteries.    No arterial dissection is identified.        Impression     IMPRESSION:   1. No stenosis is seen at either carotid bifurcation.  2. No arterial dissection is seen.    PHIL LAINEZ MD                Clinical Quality Measure: Blood Pressure Screening     Your blood pressure was checked while you were in the emergency department today. The last reading we obtained was   BP: (!) 151/93 . Please read the guidelines below about what these numbers mean and what you should do about them.  If your systolic blood pressure (the top number) is less than 120 and your diastolic blood pressure (the bottom number) is less than 80, then your blood pressure is normal. There is nothing more that you need to do about it.  If your systolic blood pressure (the top number) is 120-139 or your diastolic blood pressure (the bottom number) is 80-89, your blood pressure may be higher than it should be. You should have your blood pressure rechecked within a year by a primary care provider.  If your systolic blood pressure (the top number) is 140 or greater or your diastolic blood pressure (the bottom number) is 90 or greater, you may have high blood pressure. High blood pressure is treatable, but if left untreated over time it can put you at risk for heart attack, stroke, or kidney failure. You should have your blood pressure rechecked by a primary care provider within the next 4 weeks.  If your provider in the emergency department today gave you specific instructions to follow-up with your doctor or provider even sooner than that, you should follow that instruction and not wait for up to 4 weeks for your follow-up visit.        Thank you for choosing [facility]       Thank you for choosing [facility] for your care. Our goal is always to provide you with excellent care. Hearing back from our patients is one way we can continue to improve our services. Please take a few minutes to complete the written survey that you may receive in the mail after you visit with us. Thank you!        [facility] Information     [employer] gives you secure access to your electronic health record. If you see a primary care provider, you can also send messages to your care team and make appointments. If you have questions, please call your primary care clinic.  If you do not have a primary care provider, please call [phone] and they  will assist you.        Care EveryWhere ID     This is your Care EveryWhere ID. This could be used by other organizations to access your [facility] medical records  [id number]        Equal Access to Services     TUAN RINCON : Kalli Parker, elsie bates, nguyễn lorenzo, fazal george. So [facility] [phone].    ATENCIÓN: Si habla español, tiene a romero disposición servicios gratuitos de asistencia lingüística. Llame al [phone].    We comply with applicable federal civil rights laws and [place] laws. We do not discriminate on the basis of race, color, national origin, age, disability, sex, sexual orientation, or gender identity.            After Visit Summary       This is your record. Keep this with you and show to your community pharmacist(s) and doctor(s) at your next visit.

## 2018-05-11 NOTE — ED TRIAGE NOTES
"Patient reports headache, dizziness, diarrhea and she has been off of her sertraline \"for awhile\". Numbness in feet. Feeling emotional. Blood glucose = 100 on arrival.   "

## 2018-05-11 NOTE — TELEPHONE ENCOUNTER
Would recommend ER given pt's past and current medical conditions.  Cannot say much more since we do not have consent to communicate on file.    Estefany Stanley PA-C

## 2018-05-11 NOTE — DISCHARGE INSTRUCTIONS
Please follow-up with your primary care provider in 2-3 days as well as your counselor appointments.    Please resume medications as prescribed.    Please return to the ER with any other new or troubling symptoms.    Discharge Instructions  Headache    You were seen today for a headache. Headaches may be caused by many different things such as muscle tension, sinus inflammation, anxiety and stress, having too little sleep, too much alcohol, some medical conditions or injury. You may have a migraine, which is caused by changes in the blood vessels in your head.  At this time your provider does not find that your headache is a sign of anything dangerous or life-threatening.  However, sometimes the signs of serious illness do not show up right away.      Generally, every Emergency Department visit should have a follow-up clinic visit with either a primary or a specialty clinic/provider. Please follow-up as instructed by your emergency provider today.    Return to the Emergency Department if:    You get a new fever of 100.4 F or higher.    Your headache gets much worse.    You get a stiff neck with your headache.    You get a new headache that is significantly different or worse than headaches you have had before.    You are vomiting (throwing up) and cannot keep food or water down.    You have blurry or double vision or other problems with your eyes.    You have a new weakness on one side of your body.    You have difficulty with balance which is new.    You or your family thinks you are confused.    You have a seizure.    What can I do to help myself?    Pain medications - You may take a pain medication such as Tylenol  (acetaminophen), Advil , Motrin  (ibuprofen) or Aleve  (naproxen).    Take a pain reliever as soon as you notice symptoms.  Starting medications as soon as you start to have symptoms may lessen the amount of pain you have.    Relaxing in a quiet, dark room may help.    Get enough sleep and eat meals  regularly.    You may need to watch for certain foods or other things which may trigger your headaches.  Keeping a journal of your headaches and possible triggers may help you and your primary provider to identify things which you should avoid which may be causing your headaches.  If you were given a prescription for medicine here today, be sure to read all of the information (including the package insert) that comes with your prescription.  This will include important information about the medicine, its side effects, and any warnings that you need to know about.  The pharmacist who fills the prescription can provide more information and answer questions you may have about the medicine.  If you have questions or concerns that the pharmacist cannot address, please call or return to the Emergency Department.   Remember that you can always come back to the Emergency Department if you are not able to see your regular provider in the amount of time listed above, if you get any new symptoms, or if there is anything that worries you.

## 2018-05-11 NOTE — ED PROVIDER NOTES
History     Chief Complaint:  Headache and Dizziness    HPI   Mariela Duffy is a 62 year old female, with a past medical history significant for depression and possible TIA, who comes to the emergency department for evaluation of headache and dizziness.  History is provided from the patient, and supplemented later by multiple family members present at bedside.  Patient reports, and family members acknowledge, significant stress over the past 9.5 months.  Patient has undergone recent changes in her life including financial struggles, resulting in the loss of her home, unemployment for approximately 1 year, and concerns regarding losing her dog.  She has multiple children, many of whom live in the [place] area, as well as a brother and sister-in-law, who reside in [place].  Patient's brother and sister-in-law, drove to the [facility] to assist the patient earlier this week.  There have been multiple discussions regarding the patient's ability to afford and care for her dog, for which it has been mentioned the patient give up her dog to a local shelter.  The patient notes that she is found this extraordinarily difficult, resulting in significant stress, and emotional lability.  She follows closely with a counselor and therapist, whom she last met with on Wednesday of this past week.  Her therapist at that time recommended the patient to express her feelings surrounding all of these changes going on in her life.  After her appointment on Wednesday, the patient began developing a gradual onset headache, located to the front aspect of her head with radiation towards her occiput.  Yesterday, the patient's brother and sister-in-law stopped by her home to drop off a CD, and provide support.  They did not go inside the home.  The patient acknowledges she has internalized many of the recent conversations and became quite angry and upset after this visit.  Her son later called her in the evening, felt as though that she  was acting atypically, making repetitive statements.  The patient also reportedly called her son multiple times.  This concerned him.  Son contacted the patient's primary care clinic this morning to discuss the patient's current medications as he was unsure if she had been taking them regularly.  Patient herself acknowledges she does not have her pills in a well organized fashion, and states she has been out of her psychiatric medications for many days.  They suggested she present to the ED for further evaluation.  Here in the ED, the patient acknowledges a headache, again located from the front to posterior aspect of her head.  She denies any new vision changes or vision loss.  She denies unilateral numbness nor weakness.  She does note associated dizziness and finds herself reaching for objects to stabilize herself.  She denies associated chest pain, chest pressure, palpitations, nor abdominal pain.  She denies any known prior cardiac history.    Cardiac Risk Factors   Sex: Female   Tobacco: Negative   Hypertension: Negative  Diabetes: Negative  Hyperlipidemia: Negative  Family History: Positive    Allergies:  Levaquin     Medications:    Buspar  Delsym  Flonase  Levothyroxine  Ativan  Pamelor  Protonix  Zantac  Sertraline  Imitrex  Kenalog ointment      Past Medical History:    Allergic rhinitis  Benign neoplasm of colon  Contact dermatitis  Depression  GERD  Malignant neoplasm of thyroid gland  IBS  Anxiety  Hiatal hernia  TIA  ADD  Post-surgical hypothyroidism    Past Surgical History:    Surgery hiatal hernia  Cholecystectomy  Cleft lip repair  Colonoscopy  Thyroidectomy  Wrist surgery    Family History:    Cancer  HTN  CAD  Alzheimer's disease    Social History:  Marital Status:   Presents to the ED with her son.   Tobacco Use: Never  Alcohol Use: 2-4 drinks/month  PCP: Estefany Stanley     Review of Systems   Eyes: Negative for visual disturbance.        Negative for double vision and vision loss.    "  Gastrointestinal: Positive for diarrhea.   Neurological: Positive for dizziness, speech difficulty, numbness and headaches.   Psychiatric/Behavioral: Positive for confusion.   All other systems reviewed and are negative.      Physical Exam   First Vitals:  Patient Vitals for the past 24 hrs:   BP Temp Temp src Pulse Heart Rate Resp SpO2 Height Weight   05/11/18 1330 (!) 151/93 - - - - - - - -   05/11/18 1315 (!) 155/97 - - - - - 98 % - -   05/11/18 1200 - - - - 83 - 97 % - -   05/11/18 1154 (!) 135/94 - - 83 83 16 97 % - -   05/11/18 1030 107/81 - - 80 80 16 92 % - -   05/11/18 1015 (!) 156/94 - - 82 82 16 98 % - -   05/11/18 1000 146/89 - - 89 89 16 - - -   05/11/18 0935 (!) 141/114 97.9  F (36.6  C) Oral 87 87 16 99 % 1.727 m (5' 8\") 86.6 kg (191 lb)       Physical Exam  General:                        Well-nourished                        Speaking in full sentences  Eyes:                        Conjunctiva without injection or scleral icterus                        Pupils: 3 on R and 3 on L, round, reactive to light and accomodation  ENT:                        Moist mucous membranes                        Posterior oropharynx clear without erythema or exudate                        Cleft lip  Neck:                        Supple with full ROM  Resp:                        Lungs CTAB                        No crackles, wheezing or audible rubs                        Good air movement  CV:                                        Normal rate, regular rhythm                        S1 and S2 present                        No murmur, gallop or rub  GI:                        BS present                        Abdomen soft without distention                        Non-tender to light and deep palpation                        No guarding or rebound tenderness  Skin:                        Warm, dry, well perfused                        No rashes or open wounds on exposed skin  MSK:                        Moves all " extremities                        No focal deformities or swelling  Neuro:                        Alert                        CN III-XII grossly intact                        5/5 strength bilaterally to hand , elbow flexion/extension                        5/5 strength bilaterally to ankle dorsi/plantarflexion, hip flexion                        SILT in BUE and BLE                        No clonus at the ankles                        Ambulates, though unsteady, and reaching out for objects for stabilization  Psych:                        Normal affect, normal mood      Emergency Department Course   ECG:  @ 0934  Indication: Dizziness  Vent. Rate 90 bpm. PA interval 182 ms. QRS duration 84 ms. QT/QTc 362/442 ms. P-R-T axis 51 80 80.   Normal sinus rhythm. Nonspecific T wave abnormality. Abnormal ECG.   Read @ 0942 by Dr. Barron.    Imaging:  Radiographic findings were communicated with the patient who voiced understanding of the findings.    MR Brain w/o & w Contrast   Final Result   IMPRESSION:   1. No acute pathology. No bleed, mass, or infarcts are seen.   2. Mucosal thickening is seen in the maxillary sinuses.         PHIL LAINEZ MD      Head MRA w/o contrast - STROKE PROTOCOL   Final Result   IMPRESSION:  Normal MR angiogram of the head.         PHIL LAINEZ MD      Neck MRA w & w/o contrast - STROKE PROTOCOL   Final Result   IMPRESSION:    1. No stenosis is seen at either carotid bifurcation.   2. No arterial dissection is seen.      PHIL LAINEZ MD      CT Head w/o Contrast   Final Result   IMPRESSION: No evidence of acute intracranial hemorrhage, mass, or   herniation.      Results discussed with Gonzales Barron at 9:54 AM on 5/11/2018.      CRISTINA LOPEZ MD        Laboratory:  Creatinine POCT: 0.7  Glucose: 100 (H)  CBC:  WBC 7.7, HGB 15.4, , otherwise WNL  BMP: WNL (Creatinine 0.63)  Troponin: <0.015  INR: 0.96  PTT: 28    Interventions:  1013: Tylenol, 1000 mg, oral   1100: Ativan, 0.5 mg, IV  injection  1154: Gadavist, 10 mL, IV injection    Emergency Department Course:  0926: The patient arrived in triage where vitals were measured and recorded.   The patient was then escorted back to the emergency department.   The patient's medical records were reviewed.  Nursing notes and vitals were reviewed.  0928: History given by patient  0930: Peripheral IV established.   0931: I performed an exam of the patient as documented above.   0934: EKG performed.   0936: Code stroke called.   0944: I consulted with Dr. Juarez of neurology.  The above workup was undertaken.  0957: I consulted with Dr. Garcia of radiology.  1005: I rechecked the patient and discussed results.  1241: I rechecked the patient and discussed results.   1345: I rechecked the patient and discussed results.   1432: I rechecked the patient and discussed results.   Findings and plan explained to the Patient. Patient discharged home, status improved, with instructions regarding supportive care, medications, and reasons to return as well as the importance of close follow-up was reviewed. Patient was prescribed nortriptyline and sertraline.     Impression & Plan      Medical Decision Making:  Mariela Duffy is a 62-year-old female with a history of depression, and possible prior TIA, presenting to the ER accompanied by family members for evaluation of headache and dizziness.  VS on presentation reveal elevated BP although otherwise are unremarkable.  A broad differential was considered for the patient's current presentation including though is not limited to intracranial hemorrhage (subarachnoid hemorrhage), vertebral dissection, CVA, primary headache syndrome, medication noncompliance, hypertensive urgency, acute exacerbation of underlying psychiatric illness, meningitis/encephalitis, sepsis, infection, dysrhythmia, anemia, among others.  At the time of my evaluation, initial history suggested the onset of symptoms beginning yesterday evening.  For  that reason and given patient's noted headache and dizziness, code stroke was activated.  Care was expedited to obtain initial noncontrast head CT which is negative for intracranial hemorrhage or acute pathology.  Case discussed with Dr. Juarez of stroke neurology who has recommended proceeding with MRI/MRA.  This was performed as above.  Fortunately, MRI demonstrates no evidence of acute pathology, including hemorrhage, mass, or infarct.  MRA of the head and neck revealed no evidence of aneurysmal disease nor evidence of dissection, or vascular occlusion.  With regards to the patient's headache, I feel this is unlikely to represent subarachnoid hemorrhage.  On further questioning, the patient reports the gradual worsening of a headache over the past 2 days and denies the sudden onset thunderclap headache.  As her imaging does not reveal evidence of intracranial aneurysm, I feel subarachnoid hemorrhage to be unlikely and feel the risks of further evaluation with LP outweigh benefits.  Meningitis/encephalitis was also considered though presently felt to be unlikely.  After discussion with multiple family members present at bedside, the patient reported therapeutically feeling much improved.  Her mental status has improved to baseline per family members.  She denies associated fevers or antecedent infectious symptoms.  WBC count is normal.   Again, I feel evaluation with LP for meningitis can be deferred safely.  I do suspect a large component to the patient's current presentation is secondary to psychosocial stressors.  She is undergone significant change over the past 9.5 months including financial struggles, loss of her home, loss of her job, and possible loss of her dog.  She follows closely with a counselor and therapist whom she has a strong relationship with.  She denies any thoughts of self-harm or suicide.  She declined the opportunity to speak with our DEC worker in the ED. Laboratory evaluation as above  otherwise is grossly unremarkable.  EKG demonstrates sinus rhythm without evidence of dysrhythmia.  I feel her current presentation is unlikely to represent cerebral ischemia.  Clinical impression discussed at length with the patient and multiple family present at bedside.  With reasonable clinical certainty, I do feel she is safe for discharge home with close outpatient follow-up.  As she has been out of many of her psychiatric medications, will provide a 3 day supply as she contacts her clinic to ensure that there are refills available at her pharmacy.  Given that she had stopped her previous doses, we will resume these medications at a lower dose and recommend follow-up with PCP to ensure ultimate therapeutic doses are reached.  Family members felt comfortable with this plan of care.  They are welcome and encouraged to return to the ER at any point with worsening symptoms, confusion, focal neurologic deficits, or any other concerns.  All questions are answered prior to discharge.    Diagnosis:    ICD-10-CM    1. Nonintractable headache, unspecified chronicity pattern, unspecified headache type R51    2. Dizziness R42    3. Stress F43.9        Disposition:  Discharged to home.     Discharge Medications:  New Prescriptions    NORTRIPTYLINE (PAMELOR) 50 MG CAPSULE    Take 1 capsule (50 mg) by mouth At Bedtime for 3 days    SERTRALINE (ZOLOFT) 100 MG TABLET    Take 1 tablet (100 mg) by mouth daily         Kathryn ZIMMERMAN, am serving as a scribe on 5/11/2018 at 9:28 AM to personally document services performed by Dr. Barron based on my observations and the provider's statements to me.   [facility] EMERGENCY DEPARTMENT       Gonzales Barron MD  05/12/18 0756

## 2018-05-11 NOTE — ED NOTES
Patient returned from MRI, states nausea and anxiety resolved after ativan given in MRI. Neuro checks unchanged. Vital signs stable. Awaiting results of MRI.

## 2018-05-11 NOTE — TELEPHONE ENCOUNTER
Called son Roshan and relayed below information. Advised ER visit ASAP.     Roshan will arrange for the Pt to go to the ER.     Mora Calle RN -- [facility] [employer]

## 2018-05-11 NOTE — ED AVS SNAPSHOT
[facility] Emergency Department    [address]    [facility] [address]    Phone:  [phone]    Fax:  [phone]                                       Mariela Duffy   MRN: [id number]    Department:  [facility] Emergency Department   Date of Visit:  5/11/2018           After Visit Summary Signature Page     I have received my discharge instructions, and my questions have been answered. I have discussed any challenges I see with this plan with the nurse or doctor.    ..........................................................................................................................................  Patient/Patient Representative Signature      ..........................................................................................................................................  Patient Representative Print Name and Relationship to Patient    ..................................................               ................................................  Date                                            Time    ..........................................................................................................................................  Reviewed by Signature/Title    ...................................................              ..............................................  Date                                                            Time

## 2018-05-11 NOTE — TELEPHONE ENCOUNTER
Roshan, son calling and states would like someone to contact her to see if she is taking her medications and if taking correct/correctly.  He does not know what she takes or for what but feels not taking medications due to how she is acting.    States she is calling and not making sense.  Forgetting conversations they had 20 minutes ago.  This has been going on 2 or more days.      Roshan [phone] CTC on file.  Did advise if acting different ER would be recommended.  Wants to start with message.  Please advise.  Abril Harris RN

## 2018-05-13 ENCOUNTER — NURSE TRIAGE (OUTPATIENT)
Dept: NURSING | Facility: CLINIC | Age: 63
End: 2018-05-13

## 2018-05-13 NOTE — TELEPHONE ENCOUNTER
From  [phone] Pt called.  Seen @ [facility] ED for Intractable Headache due to missed doses of Zoloft and  nortriptyline . Reviewed current Rx Ativan with sig 0.25mg 2 times daily as needed for anxiety. Pt states she only take this occasionally .  No triage requested .   .Taty Zepeda RN [place] nurse advisors.

## 2018-05-14 ENCOUNTER — OFFICE VISIT (OUTPATIENT)
Dept: FAMILY MEDICINE | Facility: CLINIC | Age: 63
End: 2018-05-14
Payer: COMMERCIAL

## 2018-05-14 VITALS
SYSTOLIC BLOOD PRESSURE: 132 MMHG | BODY MASS INDEX: 28.59 KG/M2 | TEMPERATURE: 98.2 F | DIASTOLIC BLOOD PRESSURE: 88 MMHG | HEART RATE: 114 BPM | WEIGHT: 188 LBS | RESPIRATION RATE: 16 BRPM

## 2018-05-14 DIAGNOSIS — F41.9 ANXIETY: ICD-10-CM

## 2018-05-14 DIAGNOSIS — R51.9 ACUTE NONINTRACTABLE HEADACHE, UNSPECIFIED HEADACHE TYPE: ICD-10-CM

## 2018-05-14 DIAGNOSIS — E03.9 PRIMARY HYPOTHYROIDISM: ICD-10-CM

## 2018-05-14 DIAGNOSIS — F33.0 MAJOR DEPRESSIVE DISORDER, RECURRENT EPISODE, MILD (H): ICD-10-CM

## 2018-05-14 DIAGNOSIS — K21.00 GASTROESOPHAGEAL REFLUX DISEASE WITH ESOPHAGITIS: ICD-10-CM

## 2018-05-14 DIAGNOSIS — Z79.899 POLYPHARMACY: Primary | ICD-10-CM

## 2018-05-14 DIAGNOSIS — K44.9 HIATAL HERNIA: ICD-10-CM

## 2018-05-14 PROCEDURE — 84482 T3 REVERSE: CPT | Mod: 90 | Performed by: PHYSICIAN ASSISTANT

## 2018-05-14 PROCEDURE — 84443 ASSAY THYROID STIM HORMONE: CPT | Performed by: PHYSICIAN ASSISTANT

## 2018-05-14 PROCEDURE — 36415 COLL VENOUS BLD VENIPUNCTURE: CPT | Performed by: PHYSICIAN ASSISTANT

## 2018-05-14 PROCEDURE — 99214 OFFICE O/P EST MOD 30 MIN: CPT | Performed by: PHYSICIAN ASSISTANT

## 2018-05-14 PROCEDURE — 99000 SPECIMEN HANDLING OFFICE-LAB: CPT | Performed by: PHYSICIAN ASSISTANT

## 2018-05-14 RX ORDER — SERTRALINE HYDROCHLORIDE 100 MG/1
TABLET, FILM COATED ORAL
Qty: 180 TABLET | Refills: 1 | Status: SHIPPED | OUTPATIENT
Start: 2018-05-14 | End: 2018-06-29

## 2018-05-14 RX ORDER — LORAZEPAM 0.5 MG/1
0.25 TABLET ORAL 2 TIMES DAILY PRN
Qty: 60 TABLET | Refills: 0 | Status: CANCELLED | OUTPATIENT
Start: 2018-05-14

## 2018-05-14 RX ORDER — NORTRIPTYLINE HYDROCHLORIDE 75 MG/1
CAPSULE ORAL
Qty: 90 CAPSULE | Refills: 1 | Status: SHIPPED | OUTPATIENT
Start: 2018-05-14 | End: 2018-06-27

## 2018-05-14 RX ORDER — BUSPIRONE HYDROCHLORIDE 15 MG/1
TABLET ORAL
Qty: 270 TABLET | Refills: 1 | Status: SHIPPED | OUTPATIENT
Start: 2018-05-14 | End: 2018-10-22

## 2018-05-14 RX ORDER — SUMATRIPTAN 100 MG/1
100 TABLET, FILM COATED ORAL
Qty: 9 TABLET | Refills: 1 | Status: CANCELLED | OUTPATIENT
Start: 2018-05-14

## 2018-05-14 RX ORDER — PANTOPRAZOLE SODIUM 20 MG/1
TABLET, DELAYED RELEASE ORAL
Qty: 90 TABLET | Refills: 1 | Status: SHIPPED | OUTPATIENT
Start: 2018-05-14 | End: 2018-12-19

## 2018-05-14 RX ORDER — SUMATRIPTAN 100 MG/1
100 TABLET, FILM COATED ORAL
Qty: 9 TABLET | Refills: 1 | Status: SHIPPED | OUTPATIENT
Start: 2018-05-14 | End: 2018-06-27

## 2018-05-14 NOTE — TELEPHONE ENCOUNTER
Pt is calling asking for a refill of Ativan. Last filled at [facility].    Gely Antonio, Pharmacy [facility]

## 2018-05-14 NOTE — MR AVS SNAPSHOT
After Visit Summary   5/14/2018    Mariela Duffy    MRN: [id number]           Patient Information     Date Of Birth          1955        Visit Information        Provider Department      5/14/2018 1:30 PM Markell Acuna PA-C [facility]        Today's Diagnoses     Acute nonintractable headache, unspecified headache type        Primary hypothyroidism        Major depressive disorder, recurrent episode, mild (H)        Anxiety        Gastroesophageal reflux disease with esophagitis        Hiatal hernia           Follow-ups after your visit        Additional Services     MED THERAPY MANAGE REFERRAL       Your provider has referred you to: **[facility] (numerous locations) [phone]   [email or web address]    Reason for Referral: polypharmacy.     The [facility] department will contact you to schedule an appointment.  You may also schedule the appointment by calling [phone].  For [facility] [place] patients, please call [phone] to confirm/schedule your appointment on the next business day.    This service is designed to help you get the most from your medications.  A specially trained Pharmacist will work closely with you and your providers to solve any questions, concerns, issues or problems related to your medications.    Please bring all of your prescription and non-prescription medications (such as vitamins, over-the-counter medications, and herbals) or a detailed medication list to your appointment.    If you have a glucose meter or other home monitoring information, please also bring this to your appointment (i.e. blood glucose log, blood pressure log, pain log, etc.).                  Who to contact     If you have questions or need follow up information about today's clinic visit or your schedule please contact [facility] directly at [phone].  Normal or non-critical lab and imaging results will be communicated to you by [employer], letter or phone within 4 business days after the clinic has received the results. If you do not hear from us within 7 days, please contact the clinic through [employer] or phone. If you have a critical or abnormal lab result, we will notify you by phone as soon as possible.  Submit refill requests through [employer] or call your pharmacy and they will forward the refill request to us. Please allow 3 business days for your refill to be completed.          Additional Information About Your Visit        [employer] Information     [employer] gives you secure access to your electronic health record. If you see a primary care provider, you can also send messages to your care team and make appointments. If you have questions, please call your primary care clinic.  If you do not have a primary care provider, please call [phone] and they will assist you.        Care EveryWhere ID     This is your Care EveryWhere ID. This could be used by other organizations to access your [facility] medical records  [id number]        Your Vitals Were     Pulse Temperature Respirations Last Period BMI (Body Mass Index)       114 98.2  F (36.8  C) (Oral) 16 01/20/2004 28.59 kg/m2        Blood Pressure from Last 3 Encounters:   05/14/18 132/88   05/11/18 (!) 142/104   04/30/18 124/80    Weight from Last 3 Encounters:   05/14/18 188 lb (85.3 kg)   05/11/18 191 lb (86.6 kg)   03/08/18 199 lb (90.3 kg)              We Performed the Following     MED THERAPY MANAGE REFERRAL     T3 reverse     TSH with free T4 reflex          Today's Medication Changes          These changes are accurate as of 5/14/18  1:59 PM.  If you have any questions, ask your nurse or doctor.               These medicines have changed or have updated prescriptions.        Dose/Directions    sertraline 100 MG tablet   Commonly known as:  ZOLOFT   This may have  changed:    - how much to take  - how to take this  - when to take this  - additional instructions  - Another medication with the same name was removed. Continue taking this medication, and follow the directions you see here.   Used for:  Major depressive disorder, recurrent episode, mild (H), Anxiety   Changed by:  Markell Acuna PA-C        Take 1 tabs (100 mg) nightly for 4 days. Then increase to 2 tabs (200 mg) nightly.   Quantity:  180 tablet   Refills:  1            Where to get your medicines      These medications were sent to [facility] [address]  [address] [facility] [address]     Phone:  [phone]     busPIRone 15 MG tablet    nortriptyline 75 MG capsule    pantoprazole 20 MG EC tablet    sertraline 100 MG tablet         Some of these will need a paper prescription and others can be bought over the counter.  Ask your nurse if you have questions.     Bring a paper prescription for each of these medications     SUMAtriptan 100 MG tablet                Primary Care Provider Office Phone # Fax #    Estefany Stanley PA-C [phone]  [phone]        Equal Access to Services     TUAN RINCON AH: Hadii yamilet arrieta hadasho Sodanielaali, waaxda luqadaha, qaybta kaalmada adeegyada, fazal george. So [facility] [phone].    ATENCIÓN: Si habla español, tiene a romero disposición servicios gratuitos de asistencia lingüística. [facility] [phone].    We comply with applicable federal civil rights laws and [place] laws. We do not discriminate on the basis of race, color, national origin, age, disability, sex, sexual orientation, or gender identity.            Thank you!     Thank you for choosing [facility]  for your care. Our goal is always to provide you with excellent care. Hearing back from our patients is one way we can continue to improve our services. Please take a  few minutes to complete the written survey that you may receive in the mail after your visit with us. Thank you!             Your Updated Medication List - Protect others around you: Learn how to safely use, store and throw away your medicines at www.disposemymeds.org.          This list is accurate as of 5/14/18  1:59 PM.  Always use your most recent med list.                   Brand Name Dispense Instructions for use Diagnosis    busPIRone 15 MG tablet    BUSPAR    270 tablet    TAKE 2 TABLETS BY MOUTH IN THE MORNING AND TAKE 1 TABLET BY MOUTH IN THE EVENING    Anxiety       dextromethorphan 30 MG/5ML liquid    DELSYM    89 mL    Take 5 mLs (30 mg) by mouth 2 times daily    Cough       fluticasone 50 MCG/ACT spray    FLONASE    1 Bottle    Spray 1-2 sprays into both nostrils daily    Post-nasal drip       levothyroxine 150 MCG tablet    SYNTHROID/LEVOTHROID    90 tablet    Take 1 tablet (150 mcg) by mouth daily    Primary hypothyroidism       LORazepam 0.5 MG tablet    ATIVAN    60 tablet    Take 0.5 tablets (0.25 mg) by mouth 2 times daily as needed for anxiety . May take 2 tabs ( 1 mg) bid PO 2 times daily as needed until current anxiety flare improves.    Anxiety       * nortriptyline 50 MG capsule    PAMELOR    3 capsule    Take 1 capsule (50 mg) by mouth At Bedtime for 3 days        * nortriptyline 75 MG capsule    PAMELOR    90 capsule    TAKE ONE CAPSULE BY MOUTH AT BEDTIME    Major depressive disorder, recurrent episode, mild (H)       pantoprazole 20 MG EC tablet    PROTONIX    90 tablet    TAKE ONE TABLET BY MOUTH ONCE DAILY. TAKE BY MOUTH 30 TO 60 MINUTES BEFORE A MEAL.    Gastroesophageal reflux disease with esophagitis, Hiatal hernia       ranitidine 300 MG tablet    ZANTAC    90 tablet    Take 1 tablet (300 mg) by mouth At Bedtime    Gastroesophageal reflux disease with esophagitis       sertraline 100 MG tablet    ZOLOFT    180 tablet    Take 1 tabs (100 mg) nightly for 4 days. Then increase to 2  tabs (200 mg) nightly.    Major depressive disorder, recurrent episode, mild (H), Anxiety       SUMAtriptan 100 MG tablet    IMITREX    9 tablet    Take 1 tablet (100 mg) by mouth at onset of headache for migraine May repeat in 2 hours if needed: max 2/day; average number of headaches monthly 2    Acute nonintractable headache, unspecified headache type       triamcinolone 0.1 % ointment    KENALOG     Apply topically 2 times daily as needed for irritation        * Notice:  This list has 2 medication(s) that are the same as other medications prescribed for you. Read the directions carefully, and ask your doctor or other care provider to review them with you.

## 2018-05-14 NOTE — PROGRESS NOTES
"  SUBJECTIVE:   Mariela Duffy is a 62 year old female who presents to clinic today for the following health issues:      ED/UC Followup:    Facility:  [facility]  Date of visit: 5/11/2018  Reason for visit: HA's and dizziness  Current Status: admits to improved headache. However still feeling dizziness and having troubles remembering things.  has been taking zoloft 100 mg daily with 50 mg of nortriptyline as prescribed at ER. She states she ran our of nortriptyline last wed and is unaware when the last time zoloft was taken prior to ER visit.  has been under large stress due to foreclosure of home. However,  has been taking buspar as prescribed and has \"half\" of ativan left.             Problem list and histories reviewed & adjusted, as indicated.  Additional history: as documented    Patient Active Problem List   Diagnosis     Contact dermatitis and other eczema due to other specified agent     Allergic rhinitis due to other allergen     Esophageal reflux     Irritable bowel syndrome     Rosacea     Postsurgical hypothyroidism     Iron deficiency anemia     Absence of menstruation     Mild major depression (H)     CARDIOVASCULAR SCREENING; LDL GOAL LESS THAN 160     Generalized anxiety disorder     Hypothyroidism     Tubular adenoma of colon     Seasonal affective disorder (H)     Rhinitis     Headache     ADD (attention deficit disorder)     Other insomnia     TIA (transient ischemic attack)     Gastroesophageal reflux disease with esophagitis     Hiatal hernia     History of colonic polyps     Past Surgical History:   Procedure Laterality Date     C NONSPECIFIC PROCEDURE  1997    surgery hiatal hernia     C NONSPECIFIC PROCEDURE  1993    cholecystectomy     C NONSPECIFIC PROCEDURE  multiple    cleft lip repair.     CHOLECYSTECTOMY       COLONOSCOPY  6/14/2013    Colonoscopy Dr. Yoni LOOMIS     HEAD & NECK SURGERY      thyroid cancer surgery     SURGICAL HISTORY OF -   11/04    thyroidectomy due " to cancer     WRIST SURGERY Right     2015       Social History   Substance Use Topics     Smoking status: Never Smoker     Smokeless tobacco: Never Used     Alcohol use Yes      Comment: 2-4 mixed drinks/month     Family History   Problem Relation Age of Onset     CANCER Father      Colon, stomach -  at 75yoa     Hypertension Father      C.A.D. Father      CANCER Mother      Throat, lymph, bone -  at 79yoa     C.A.D. Maternal Grandfather      Heart Attack -  in his late 60's     Alzheimer Disease Paternal Grandmother      C.A.D. Paternal Grandfather      Heart Attack -  at 63yoa         Current Outpatient Prescriptions   Medication Sig Dispense Refill     busPIRone (BUSPAR) 15 MG tablet TAKE 2 TABLETS BY MOUTH IN THE MORNING AND TAKE 1 TABLET BY MOUTH IN THE EVENING 270 tablet 1     dextromethorphan (DELSYM) 30 MG/5ML liquid Take 5 mLs (30 mg) by mouth 2 times daily 89 mL 1     fluticasone (FLONASE) 50 MCG/ACT spray Spray 1-2 sprays into both nostrils daily 1 Bottle 11     levothyroxine (SYNTHROID/LEVOTHROID) 150 MCG tablet Take 1 tablet (150 mcg) by mouth daily 90 tablet 1     LORazepam (ATIVAN) 0.5 MG tablet Take 0.5 tablets (0.25 mg) by mouth 2 times daily as needed for anxiety . May take 2 tabs ( 1 mg) bid PO 2 times daily as needed until current anxiety flare improves. 60 tablet 0     nortriptyline (PAMELOR) 75 MG capsule TAKE ONE CAPSULE BY MOUTH AT BEDTIME 90 capsule 1     pantoprazole (PROTONIX) 20 MG EC tablet TAKE ONE TABLET BY MOUTH ONCE DAILY. TAKE BY MOUTH 30 TO 60 MINUTES BEFORE A MEAL. 90 tablet 1     ranitidine (ZANTAC) 300 MG tablet Take 1 tablet (300 mg) by mouth At Bedtime 90 tablet 3     sertraline (ZOLOFT) 100 MG tablet Take 1 tabs (100 mg) nightly for 4 days. Then increase to 2 tabs (200 mg) nightly. 180 tablet 1     SUMAtriptan (IMITREX) 100 MG tablet Take 1 tablet (100 mg) by mouth at onset of headache for migraine May repeat in 2 hours if needed: max 2/day; average number  of headaches monthly 2 9 tablet 1     triamcinolone (KENALOG) 0.1 % ointment Apply topically 2 times daily as needed for irritation       [DISCONTINUED] nortriptyline (PAMELOR) 50 MG capsule Take 1 capsule (50 mg) by mouth At Bedtime for 3 days 3 capsule 0     [DISCONTINUED] nortriptyline (PAMELOR) 75 MG capsule TAKE ONE CAPSULE BY MOUTH AT BEDTIME (Patient not taking: Reported on 5/14/2018) 90 capsule 1     [DISCONTINUED] sertraline (ZOLOFT) 100 MG tablet TAKE TWO TABLETS BY MOUTH AT BEDTIME (Patient not taking: Reported on 5/14/2018) 180 tablet 0     [DISCONTINUED] sertraline (ZOLOFT) 100 MG tablet Take 1 tablet (100 mg) by mouth daily 3 tablet 0     Allergies   Allergen Reactions     Levaquin Nausea and Vomiting     BP Readings from Last 3 Encounters:   05/14/18 132/88   05/11/18 (!) 142/104   04/30/18 124/80    Wt Readings from Last 3 Encounters:   05/14/18 188 lb (85.3 kg)   05/11/18 191 lb (86.6 kg)   03/08/18 199 lb (90.3 kg)                    Reviewed and updated as needed this visit by clinical staff  Tobacco  Allergies  Meds  Problems  Med Hx  Surg Hx  Fam Hx  Soc Hx        Reviewed and updated as needed this visit by Provider  Allergies  Meds  Problems         ROS:  Constitutional, HEENT, neuro, psych, cardiovascular, pulmonary, gi and gu systems are negative, except as otherwise noted.    OBJECTIVE:     /88 (BP Location: Right arm, Patient Position: Chair, Cuff Size: Adult Large)  Pulse 114  Temp 98.2  F (36.8  C) (Oral)  Resp 16  Wt 188 lb (85.3 kg)  LMP 01/20/2004  BMI 28.59 kg/m2  Body mass index is 28.59 kg/(m^2).  GENERAL: alert and no distress  RESP: lungs clear to auscultation - no rales, rhonchi or wheezes  CV: regular rates and rhythm, normal S1 S2, no S3 or S4 and no murmur, click or rub  NEURO: Normal strength and tone, mentation intact and speech normal  PSYCH: mentation appears normal, affect normal/bright and appearance disheveled    Diagnostic Test Results:  none      ASSESSMENT/PLAN:     (Z79.899) Polypharmacy  (primary encounter diagnosis)  Comment: ER notes and discharge summary reviewed. Substantial work up performed including MRI/MRA carotids and CT scan of head showing no concerns of secondary headache. I agree with ER assessment and symptoms likely secondary to incidental noncompliance with medication regimen. zoloft was last filled for 90 days in dec. Likely has not been taking for at least 2 months. I also question if also taking buspar correctly due to also being prescribed 3 months in December without refills. At ER, restarted meds with taper up to original dose. Recommending continuing zoloft at 100 mg for additional 4 days and restarting 200 mg dose after. Will restart 75 mg of nortriptyline today. buspar refilled. I also recommend seeing MTM given concerns of noncompliance due to polypharmacy. Otherwise, follow up with pcp in 3 months.   Plan:     (F33.0) Major depressive disorder, recurrent episode, mild (H)  Comment:   Plan: sertraline (ZOLOFT) 100 MG tablet,         nortriptyline (PAMELOR) 75 MG capsule            (F41.9) Anxiety  Comment:   Plan: sertraline (ZOLOFT) 100 MG tablet, busPIRone         (BUSPAR) 15 MG tablet            (R51) Acute nonintractable headache, unspecified headache type  Comment:   Plan: SUMAtriptan (IMITREX) 100 MG tablet            (E03.9) Primary hypothyroidism  Comment:   Plan: T3 reverse            (K21.0) Gastroesophageal reflux disease with esophagitis  Comment:   Plan: pantoprazole (PROTONIX) 20 MG EC tablet            (K44.9) Hiatal hernia  Comment:   Plan: pantoprazole (PROTONIX) 20 MG EC tablet              Follow up: as above     Markell Acuna PA-C  [facility]

## 2018-05-14 NOTE — TELEPHONE ENCOUNTER
Pt is asking for a refill of Imitrex 100mg tabs.  LAST FILL DATE: 7/27/17  Original date:1/26/17  QTY: 9  Refills: 1  Cedric Zimmerman  [facility]

## 2018-05-15 ENCOUNTER — MYC MEDICAL ADVICE (OUTPATIENT)
Dept: FAMILY MEDICINE | Facility: CLINIC | Age: 63
End: 2018-05-15

## 2018-05-15 ENCOUNTER — TELEPHONE (OUTPATIENT)
Dept: FAMILY MEDICINE | Facility: CLINIC | Age: 63
End: 2018-05-15

## 2018-05-15 LAB — TSH SERPL DL<=0.005 MIU/L-ACNC: 3.54 MU/L (ref 0.4–4)

## 2018-05-15 NOTE — TELEPHONE ENCOUNTER
Pt saw [facility]B yesterday, see visit, thinks told not to take  Thelma Strong RN, BSN  Message handled by Nurse Triage.

## 2018-05-15 NOTE — TELEPHONE ENCOUNTER
Pt calls, saw ZB yesterday, forgot to discuss numbness in feet, after lengthy discussion pt remembers saw podiatry, found visit from one year ago, pt never followed up for orthotics, provided referral inform below, also sent via [email or web address], pt will f/u if needs new order since current order >1 year    The [facility] Scheduling staff will contact patient to arrange appointments. Central Scheduling Phone #:  [place]  [phone]     Thelma Strong RN, BSN  Message handled by Nurse Triage.

## 2018-05-17 LAB — T3REVERSE SERPL-MCNC: 14.9 NG/DL (ref 9–27)

## 2018-05-18 ENCOUNTER — ALLIED HEALTH/NURSE VISIT (OUTPATIENT)
Dept: FAMILY MEDICINE | Facility: CLINIC | Age: 63
End: 2018-05-18
Payer: COMMERCIAL

## 2018-05-18 ENCOUNTER — TELEPHONE (OUTPATIENT)
Dept: FAMILY MEDICINE | Facility: CLINIC | Age: 63
End: 2018-05-18

## 2018-05-18 VITALS — DIASTOLIC BLOOD PRESSURE: 80 MMHG | SYSTOLIC BLOOD PRESSURE: 121 MMHG

## 2018-05-18 DIAGNOSIS — Z01.30 BP CHECK: Primary | ICD-10-CM

## 2018-05-18 PROCEDURE — 99207 ZZC NO CHARGE NURSE ONLY: CPT | Performed by: PHYSICIAN ASSISTANT

## 2018-05-18 NOTE — PROGRESS NOTES
Mariela Duffy is enrolled/participating in the retail pharmacy Blood Pressure Goals Achievement Program (BPGAP).  Mariela Duffy was evaluated at [facility] on May 18, 2018 at which time her blood pressure was:    BP Readings from Last 3 Encounters:   05/18/18 121/80   05/14/18 132/88   05/11/18 (!) 142/104     Reviewed lifestyle modifications for blood pressure control and reduction: including making healthy food choices, managing weight, getting regular exercise, smoking cessation, reducing alcohol consumption, monitoring blood pressure regularly.     Mariela Duffy is not experiencing symptoms.    Follow-Up: BP is at goal of < 130/90mmHg (patient 60+ years of age with or without diabetes).  Recommended follow-up at pharmacy in 6 months.     Recommendation to Provider: Patient is at goal    Mariela Duffy was evaluated for enrollment into the PGEN study today.    Patient eligible for enrollment:  No  Patient interested in enrollment:  No    Completed by: Nicole Crane

## 2018-05-18 NOTE — TELEPHONE ENCOUNTER
Patient calling and states when went to pharmacy and had some dizziness.  Under a lot of stress.  States just had issue with her medications but states has been back on track with medications for past week and has felt good til today.  States had not eaten and wondering if that could cause dizziness.  Advised low blood sugar can cause dizziness and make you not feel well.  Ate cookie when she got home.  Advised she eat a balance meal with protein now and if not feeling better to call back so we can get plan from Estefany.  Also had bp checked at pharmacy and was normal.  Patient agrees to call back if not feeling better after eating.  FYI to Estefany.  Abril Harris RN

## 2018-05-18 NOTE — MR AVS SNAPSHOT
After Visit Summary   5/18/2018    Mariela Duffy    MRN: [id number]           Patient Information     Date Of Birth          1955        Visit Information        Provider Department      5/18/2018 4:24 PM Estefany Stanley PA-C [facility]        Today's Diagnoses     BP check    -  1       Follow-ups after your visit        Your next 10 appointments already scheduled     May 21, 2018  9:30 AM CDT   Office Visit with Jerry Jordan RPH   [facility] [place] ([facility])    [address] [facility] [id number] [phone]           Bring a current list of meds and any records pertaining to this visit. For Physicals, please bring immunization records and any forms needing to be filled out. Please arrive 10 minutes early to complete paperwork.              Who to contact     If you have questions or need follow up information about today's clinic visit or your schedule please contact [facility] directly at [phone].  Normal or non-critical lab and imaging results will be communicated to you by Revolverhart, letter or phone within 4 business days after the clinic has received the results. If you do not hear from us within 7 days, please contact the clinic through Framebridge or phone. If you have a critical or abnormal lab result, we will notify you by phone as soon as possible.  Submit refill requests through Framebridge or call your pharmacy and they will forward the refill request to us. Please allow 3 business days for your refill to be completed.          Additional Information About Your Visit        RevolverharDVS Sciences Information     Framebridge gives you secure access to your electronic health record. If you see a primary care provider, you can also send messages to your care team and make appointments. If you have questions, please call your primary care clinic.  If you do not have a primary care provider, please call [phone]  and they will assist you.        Care EveryWhere ID     This is your Care EveryWhere ID. This could be used by other organizations to access your [place] medical records  [id number]        Your Vitals Were     Last Period                   01/20/2004            Blood Pressure from Last 3 Encounters:   05/18/18 121/80   05/14/18 132/88   05/11/18 (!) 142/104    Weight from Last 3 Encounters:   05/14/18 188 lb (85.3 kg)   05/11/18 191 lb (86.6 kg)   03/08/18 199 lb (90.3 kg)              Today, you had the following     No orders found for display       Primary Care Provider Office Phone # Fax #    Estefany Stanley PA-C [phone] [address] [facility] [address]        Equal Access to Services     TUAN RINCON : Hadii yamilet arrieta hadasho Sokaelyn, waaxda luqadaha, qaybta kaalmada adeegyada, fazal guillermo . So [facility] [phone].    ATENCIÓN: Si habla español, tiene a romero disposición servicios gratuitos de asistencia lingüística. Tushar al [phone].    We comply with applicable federal civil rights laws and [place] laws. We do not discriminate on the basis of race, color, national origin, age, disability, sex, sexual orientation, or gender identity.            Thank you!     Thank you for choosing [facility]  for your care. Our goal is always to provide you with excellent care. Hearing back from our patients is one way we can continue to improve our services. Please take a few minutes to complete the written survey that you may receive in the mail after your visit with us. Thank you!             Your Updated Medication List - Protect others around you: Learn how to safely use, store and throw away your medicines at www.disposemymeds.org.          This list is accurate as of 5/18/18  4:33 PM.  Always use your most recent med list.                   Brand Name Dispense Instructions for use Diagnosis    busPIRone 15 MG tablet    BUSPAR    270  tablet    TAKE 2 TABLETS BY MOUTH IN THE MORNING AND TAKE 1 TABLET BY MOUTH IN THE EVENING    Anxiety       dextromethorphan 30 MG/5ML liquid    DELSYM    89 mL    Take 5 mLs (30 mg) by mouth 2 times daily    Cough       fluticasone 50 MCG/ACT spray    FLONASE    1 Bottle    Spray 1-2 sprays into both nostrils daily    Post-nasal drip       levothyroxine 150 MCG tablet    SYNTHROID/LEVOTHROID    90 tablet    Take 1 tablet (150 mcg) by mouth daily    Primary hypothyroidism       LORazepam 0.5 MG tablet    ATIVAN    60 tablet    Take 0.5 tablets (0.25 mg) by mouth 2 times daily as needed for anxiety . May take 2 tabs ( 1 mg) bid PO 2 times daily as needed until current anxiety flare improves.    Anxiety       nortriptyline 75 MG capsule    PAMELOR    90 capsule    TAKE ONE CAPSULE BY MOUTH AT BEDTIME    Major depressive disorder, recurrent episode, mild (H)       pantoprazole 20 MG EC tablet    PROTONIX    90 tablet    TAKE ONE TABLET BY MOUTH ONCE DAILY. TAKE BY MOUTH 30 TO 60 MINUTES BEFORE A MEAL.    Gastroesophageal reflux disease with esophagitis, Hiatal hernia       ranitidine 300 MG tablet    ZANTAC    90 tablet    Take 1 tablet (300 mg) by mouth At Bedtime    Gastroesophageal reflux disease with esophagitis       sertraline 100 MG tablet    ZOLOFT    180 tablet    Take 1 tabs (100 mg) nightly for 4 days. Then increase to 2 tabs (200 mg) nightly.    Major depressive disorder, recurrent episode, mild (H), Anxiety       SUMAtriptan 100 MG tablet    IMITREX    9 tablet    Take 1 tablet (100 mg) by mouth at onset of headache for migraine May repeat in 2 hours if needed: max 2/day; average number of headaches monthly 2    Acute nonintractable headache, unspecified headache type       triamcinolone 0.1 % ointment    KENALOG     Apply topically 2 times daily as needed for irritation

## 2018-05-21 ENCOUNTER — OFFICE VISIT (OUTPATIENT)
Dept: PHARMACY | Facility: CLINIC | Age: 63
End: 2018-05-21
Payer: COMMERCIAL

## 2018-05-21 VITALS
HEART RATE: 110 BPM | WEIGHT: 191.5 LBS | DIASTOLIC BLOOD PRESSURE: 75 MMHG | BODY MASS INDEX: 29.12 KG/M2 | OXYGEN SATURATION: 97 % | SYSTOLIC BLOOD PRESSURE: 125 MMHG

## 2018-05-21 DIAGNOSIS — F41.9 ANXIETY: ICD-10-CM

## 2018-05-21 DIAGNOSIS — G47.09 OTHER INSOMNIA: ICD-10-CM

## 2018-05-21 DIAGNOSIS — E89.0 POSTSURGICAL HYPOTHYROIDISM: ICD-10-CM

## 2018-05-21 DIAGNOSIS — F41.1 GENERALIZED ANXIETY DISORDER: ICD-10-CM

## 2018-05-21 DIAGNOSIS — R51.9 ACUTE NONINTRACTABLE HEADACHE, UNSPECIFIED HEADACHE TYPE: ICD-10-CM

## 2018-05-21 DIAGNOSIS — F32.0 MILD MAJOR DEPRESSION (H): Primary | ICD-10-CM

## 2018-05-21 DIAGNOSIS — K21.00 GASTROESOPHAGEAL REFLUX DISEASE WITH ESOPHAGITIS: ICD-10-CM

## 2018-05-21 DIAGNOSIS — K58.0 IRRITABLE BOWEL SYNDROME WITH DIARRHEA: ICD-10-CM

## 2018-05-21 PROCEDURE — 99605 MTMS BY PHARM NP 15 MIN: CPT | Performed by: PHARMACIST

## 2018-05-21 PROCEDURE — 99607 MTMS BY PHARM ADDL 15 MIN: CPT | Performed by: PHARMACIST

## 2018-05-21 NOTE — MR AVS SNAPSHOT
After Visit Summary   5/21/2018    Mariela Duffy    MRN: [id number]           Patient Information     Date Of Birth          1955        Visit Information        Provider Department      5/21/2018 9:30 AM Jerry Jordan RPH [facility] MTM        Care Instructions    Recommendations from today's MTM visit:                                                    MTM (medication therapy management) is a service provided by a clinical pharmacist designed to help you get the most of out of your medicines.   Today we reviewed what your medicines are for, how to know if they are working, that your medicines are safe and how to make your medicine regimen as easy as possible.     1. FYI stay on 200 mg sertraline daily every evening for depression. For anxiety if you are feeling it's a panic attack then take a lorazepam in the evening when you cannot sleep due to anxiety or any time during the day if you feel panicky. Continue buspirone 30 mg in the morning (2 tablets) and 15 mg in the evening, unless you would like to try increasing to 30 mg in the evening. Continue taking nortriptyline 75 mg at bedtime.     Next MTM visit: June 25th at 10 am for follow up    To schedule another MTM appointment, please call the clinic directly or you may call the MTM scheduling line at [phone] or toll-free at [phone].     My Clinical Pharmacist's contact information:                                                      It was a pleasure seeing you today!  Please feel free to contact me with any questions or concerns you have.      Jerry Jordan Rph.  Medication Therapy Management Provider  [phone]  Keren Sauceda PD4    You may receive a survey about the MTM services you received.  I would appreciate your feedback to help me serve you better in the future. Please fill it out and return it when you can. Your comments will be anonymous.      My healthcare goals:                                                       Continue to take medications daily.                 Follow-ups after your visit        Your next 10 appointments already scheduled     Jun 25, 2018 10:00 AM CDT   SHORT with Jerry Jordan RPH   [facility] [place] ([facility])    [address]  [facility] [id number] [phone]              Who to contact     If you have questions or need follow up information about today's clinic visit or your schedule please contact [facility] [place] [facility] directly at [phone].  Normal or non-critical lab and imaging results will be communicated to you by Sonianhart, letter or phone within 4 business days after the clinic has received the results. If you do not hear from us within 7 days, please contact the clinic through CardioMindt or phone. If you have a critical or abnormal lab result, we will notify you by phone as soon as possible.  Submit refill requests through Shopow or call your pharmacy and they will forward the refill request to us. Please allow 3 business days for your refill to be completed.          Additional Information About Your Visit        MyChart Information     Shopow gives you secure access to your electronic health record. If you see a primary care provider, you can also send messages to your care team and make appointments. If you have questions, please call your primary care clinic.  If you do not have a primary care provider, please call [phone] and they will assist you.        Care EveryWhere ID     This is your Care EveryWhere ID. This could be used by other organizations to access your [place] medical records  [id number]        Your Vitals Were     Pulse Last Period Pulse Oximetry BMI (Body Mass Index)          110 01/20/2004 97% 29.12 kg/m2         Blood Pressure from Last 3 Encounters:   05/21/18 125/75   05/18/18 121/80   05/14/18 132/88    Weight from Last 3 Encounters:   05/21/18 191 lb 8 oz (86.9 kg)   05/14/18  188 lb (85.3 kg)   05/11/18 191 lb (86.6 kg)              Today, you had the following     No orders found for display       Primary Care Provider Office Phone # Fax #    Estefany Stanley PA-C [phone] [address] [facility] [address]        Equal Access to Services     CHANELL [facility] System[facility]IA ANTONIA : Hadii aad ku hadasho Soomaali, waaxda luqadaha, qaybta kaalmada adeegyada, waxay idiin hayaan adeeg kharash la'aan . So [facility] [phone].    ATENCIÓN: Si habla español, tiene a romero disposición servicios gratuitos de asistencia lingüística. Tushar al [phone].    We comply with applicable federal civil rights laws and [place] laws. We do not discriminate on the basis of race, color, national origin, age, disability, sex, sexual orientation, or gender identity.            Thank you!     Thank you for choosing [facility]  for your care. Our goal is always to provide you with excellent care. Hearing back from our patients is one way we can continue to improve our services. Please take a few minutes to complete the written survey that you may receive in the mail after your visit with us. Thank you!             Your Updated Medication List - Protect others around you: Learn how to safely use, store and throw away your medicines at www.disposemymeds.org.          This list is accurate as of 5/21/18 10:48 AM.  Always use your most recent med list.                   Brand Name Dispense Instructions for use Diagnosis    busPIRone 15 MG tablet    BUSPAR    270 tablet    TAKE 2 TABLETS BY MOUTH IN THE MORNING AND TAKE 1 TABLET BY MOUTH IN THE EVENING    Anxiety       dextromethorphan 30 MG/5ML liquid    DELSYM    89 mL    Take 5 mLs (30 mg) by mouth 2 times daily    Cough       fluticasone 50 MCG/ACT spray    FLONASE    1 Bottle    Spray 1-2 sprays into both nostrils daily    Post-nasal drip       levothyroxine 150 MCG tablet    SYNTHROID/LEVOTHROID    90 tablet    Take 1 tablet (150 mcg) by  mouth daily    Primary hypothyroidism       LORazepam 0.5 MG tablet    ATIVAN    60 tablet    Take 0.5 tablets (0.25 mg) by mouth 2 times daily as needed for anxiety . May take 2 tabs ( 1 mg) bid PO 2 times daily as needed until current anxiety flare improves.    Anxiety       nortriptyline 75 MG capsule    PAMELOR    90 capsule    TAKE ONE CAPSULE BY MOUTH AT BEDTIME    Major depressive disorder, recurrent episode, mild (H)       pantoprazole 20 MG EC tablet    PROTONIX    90 tablet    TAKE ONE TABLET BY MOUTH ONCE DAILY. TAKE BY MOUTH 30 TO 60 MINUTES BEFORE A MEAL.    Gastroesophageal reflux disease with esophagitis, Hiatal hernia       ranitidine 300 MG tablet    ZANTAC    90 tablet    Take 1 tablet (300 mg) by mouth At Bedtime    Gastroesophageal reflux disease with esophagitis       sertraline 100 MG tablet    ZOLOFT    180 tablet    Take 1 tabs (100 mg) nightly for 4 days. Then increase to 2 tabs (200 mg) nightly.    Major depressive disorder, recurrent episode, mild (H), Anxiety       SUMAtriptan 100 MG tablet    IMITREX    9 tablet    Take 1 tablet (100 mg) by mouth at onset of headache for migraine May repeat in 2 hours if needed: max 2/day; average number of headaches monthly 2    Acute nonintractable headache, unspecified headache type       triamcinolone 0.1 % ointment    KENALOG     Apply topically 2 times daily as needed for irritation

## 2018-05-21 NOTE — PROGRESS NOTES
SUBJECTIVE/OBJECTIVE:                           Mariela Duffy is a 62 year old female coming in for an initial visit for Medication Therapy Management.  She was referred to me from Pepe Acuna.     Chief Complaint: Recent hospital admission due to not taking medications. She has disorganized personality. She was using medication bottles, but now has a pill box.   Personal Healthcare Goals: To become physically stronger. To take all medications.     Allergies/ADRs: Reviewed in Epic  Tobacco: No tobacco use  Alcohol: Less than 1 beverage / month  Caffeine: 1.5 cups/day of coffee, 3 cans dr pepper or coke per day  Activity: loves dancing, but is not currently. She likes to swim, but is not currently swimming.   PMH: Reviewed in Epic    Medication Adherence/Access:  Per patient, misses medication 1 times per week(sertraline).      Depression:  Current medications include: Sertraline 200 mg once daily hs. Pt reports that depression symptoms are improved since back on sertraline last 2 weeks. Current depression symptoms include: social isolation, feeling more fatigue, feeling threatened and sad. Patient reports the following stressors:  financial difficulties ( potential loss of house(6-6-18) and unemployment), giving up her dog to her daughter.  Therapies tried and response: She has tried several antidepressants in the past, but could not recall today.   PHQ-9 SCORE 7/19/2017 1/29/2018 5/21/2018   Total Score - - -   Total Score MyChart - 4 (Minimal depression) -   Total Score 9 4 15     fyi--she see's weekly counselor as well as talks to her BF daily --they both help her cope.       Anxiety:  Taking Buspirone 15 mg tablets two in the morning and one in the evening. Has rx for lorazepam 0.5mg tabs but almost afraid to use them --not sure if she can ?   Has some difficulty staying asleep at night and has been forgetting things --her 9 kids worried she has alzheimers. -mtm feels no she does not.     IBS/Sleep/Migraines:   Takes nightly 75mg nortriptyline --helps ibs but sleep right now is difficult.  giancarlo--has imitrex to abort migraine but hasnt used it in 6 months --she uses Aleve prn.    Hypothyroidism: Patient is taking levothyroxine 150(up from 137) mcg daily. Patient is having the following symptoms: hypothyroidism -  fatigue.   TSH   Date Value Ref Range Status   05/14/2018 3.54 0.40 - 4.00 mU/L Final   ]    GERD: Current medications include: Protonix (pantoprazole) 20 and Zantac (ranitidine) 150 once daily. Pt c/o no current symptoms.   The patient does not have a history of GI bleed.  The patient does notice symptoms if they miss a dose.  Patient has tried a trial off of therapy and is not interested in doing so. Patient feels that current regimen is effective.        Current labs include:  Today's Vitals: /75  Pulse 110  Wt 191 lb 8 oz (86.9 kg)  LMP 01/20/2004  SpO2 97%  BMI 29.12 kg/m2  BP Readings from Last 3 Encounters:   05/21/18 125/75   05/18/18 121/80   05/14/18 132/88     Lab Results   Component Value Date    A1C 5.2 10/19/2015   .  Lab Results   Component Value Date    CHOL 204 09/08/2016     Lab Results   Component Value Date    TRIG 97 09/08/2016     Lab Results   Component Value Date    HDL 46 09/08/2016     Lab Results   Component Value Date     09/08/2016       Liver Function Studies -   Recent Labs   Lab Test  12/20/17   1210   PROTTOTAL  7.4   ALBUMIN  4.1   BILITOTAL  0.9   ALKPHOS  102   AST  18   ALT  25       No results found for: UCRR, MICROL, UMALCR    Last Basic Metabolic Panel:  Lab Results   Component Value Date     05/11/2018      Lab Results   Component Value Date    POTASSIUM 3.7 05/11/2018     Lab Results   Component Value Date    CHLORIDE 103 05/11/2018     Lab Results   Component Value Date    BUN 7 05/11/2018     Lab Results   Component Value Date    CR 0.63 05/11/2018     GFR Estimate   Date Value Ref Range Status   05/11/2018 85 >60 mL/min/1.7m2 Final   05/11/2018 >90  >60 mL/min/1.7m2 Final     Comment:     Non  GFR Calc   12/20/2017 69 >60 mL/min/1.7m2 Final     Comment:     Non  GFR Calc     GFR Estimate If Black   Date Value Ref Range Status   05/11/2018 >90 >60 mL/min/1.7m2 Final   05/11/2018 >90 >60 mL/min/1.7m2 Final     Comment:      GFR Calc   12/20/2017 84 >60 mL/min/1.7m2 Final     Comment:      GFR Calc       Most Recent Immunizations   Administered Date(s) Administered     Influenza (IIV3) PF 01/06/2011     Influenza Intranasal Vaccine 4 valent 10/24/2016     Influenza Vaccine IM 3yrs+ 4 Valent IIV4 10/19/2015     TD (ADULT, 7+) 07/01/2000     TDAP Vaccine (Boostrix) 08/20/2009       ASSESSMENT:                             Current medications were reviewed today.     Medication Adherence: excellent, no issues identified      Depression: Needs Improvement. Patient would benefit from -phq-9 today is a 15 but all related to her potential loss of house on 6-6-18--she declines additive med today --will review in 30 days and consider adding wellbutrin or snri like venlafaxine?    Consider Vitamin D and B-12 lab draws.     Anxiety:  Needs improvement --she declined more buspar --but educated her on when to use lorazepam --she did take 1/2 tab during the visit today --feels better. See plan for details.       IBS/Sleep/Migraines:  Stable right now      Hypothyroidism: Stable. Last TSH is within normal limits.       GERD: Stable.  Current treatment is effective. Chronic PPI use places patient at an increased risk of C. Diff, hypomagnesemia and lower bone mineral density, these risks were not  reviewed with the patient.  Pt would benefit from the following changes: none        PLAN:                            1. FYI stay on 200 mg sertraline daily every evening for depression. For anxiety if you are feeling it's a panic attack then take a lorazepam in the evening when you cannot sleep due to anxiety or any time  during the day if you feel panicky. Continue buspirone 30 mg in the morning (2 tablets) and 15 mg in the evening, unless you would like to try increasing to 30 mg in the evening. Continue taking nortriptyline 75 mg at bedtime.     Next [facility] visit: June 25th at 10 am for follow up    I spent 60 minutes with this patient today. All changes were made via collaborative practice agreement with Estefany Stanley. A copy of the visit note was provided to the patient's primary care provider.        The patient was given a summary of these recommendations as an after visit summary.     Jerry Jordan [facility].  Medication Therapy Management Provider  [phone]  Keren Sauceda, Pharm-D4

## 2018-05-21 NOTE — PATIENT INSTRUCTIONS
Recommendations from today's MTM visit:                                                    MTM (medication therapy management) is a service provided by a clinical pharmacist designed to help you get the most of out of your medicines.   Today we reviewed what your medicines are for, how to know if they are working, that your medicines are safe and how to make your medicine regimen as easy as possible.     1. FYI stay on 200 mg sertraline daily every evening for depression. For anxiety if you are feeling it's a panic attack then take a lorazepam in the evening when you cannot sleep due to anxiety or any time during the day if you feel panicky. Continue buspirone 30 mg in the morning (2 tablets) and 15 mg in the evening, unless you would like to try increasing to 30 mg in the evening. Continue taking nortriptyline 75 mg at bedtime.     Next MTM visit: June 25th at 10 am for follow up    To schedule another MTM appointment, please call the clinic directly or you may call the MTM scheduling line at [phone] or toll-free at [phone].     My Clinical Pharmacist's contact information:                                                      It was a pleasure seeing you today!  Please feel free to contact me with any questions or concerns you have.      Jerry Jordan [facility].  Medication Therapy Management Provider  [phone]  Kerensara Sauceda PD4    You may receive a survey about the MTM services you received.  I would appreciate your feedback to help me serve you better in the future. Please fill it out and return it when you can. Your comments will be anonymous.      My healthcare goals:                                                      Continue to take medications daily.

## 2018-05-22 ASSESSMENT — PATIENT HEALTH QUESTIONNAIRE - PHQ9: SUM OF ALL RESPONSES TO PHQ QUESTIONS 1-9: 15

## 2018-05-23 ENCOUNTER — TELEPHONE (OUTPATIENT)
Dept: PHARMACY | Facility: CLINIC | Age: 63
End: 2018-05-23

## 2018-05-23 NOTE — TELEPHONE ENCOUNTER
Pt calls, I met with Jerry earlier this week.  I think, but I am not sure if I forgot to take my buspar or not.  Sets up meds in pill minder.  Today's, tomorrow's and Friday's have no pills in them.   She has 5/14/18 Rx at home. She will count pills remaining in bottle to determine if took this morning's dose or not.  She is confident she can do this count.  Eliud Nielson, RN

## 2018-06-11 ENCOUNTER — TELEPHONE (OUTPATIENT)
Dept: FAMILY MEDICINE | Facility: CLINIC | Age: 63
End: 2018-06-11

## 2018-06-11 NOTE — TELEPHONE ENCOUNTER
Son calls, listed as emergency contact, cannot find CTC for son, took day off of work, concerned about mother, sibling also called expressing concerns, mom has declined significantly over the past 6 weeks, called him to ask what time it is, also mother's friend called him expressing concerns that mother did not know directions, son informs mother extremely forgetful and scattered, took her to ER last month with similar concerns, negative MRI, CJ out all week, son, Roshan wonders what best to do now, take her back to ER?, will huddle with covering silver staff and call Roshan back    Thelma Strong RN, BSN  Message handled by Nurse Triage.

## 2018-06-11 NOTE — TELEPHONE ENCOUNTER
CJ in clinic doing paper work, discussed below with CJ, discussed ER eval, r/o TIA, agrees pt needs further w/u, also informs pt driving and worried about this, went to ER already for this and sent home, ok to schedule apt with MD tomorrow, son would like neurology referral for dementia, ? ua to r/o uti, MP FYI    Next 5 appointments (look out 90 days)     Jun 12, 2018 11:00 AM CDT   Office Visit with Jaclyn Ojeda,    [facility] ([facility]    [address] [place] [id number] [phone]            Jun 25, 2018 10:00 AM CDT   SHORT with Jerry Jordan RPH   [facility] ([facility])    [address] [place] [id number]   [phone]                Thelma Strong RN, BSN  Message handled by Nurse Triage.

## 2018-06-12 ENCOUNTER — OFFICE VISIT (OUTPATIENT)
Dept: FAMILY MEDICINE | Facility: CLINIC | Age: 63
End: 2018-06-12
Payer: COMMERCIAL

## 2018-06-12 ENCOUNTER — TRANSFERRED RECORDS (OUTPATIENT)
Dept: HEALTH INFORMATION MANAGEMENT | Facility: CLINIC | Age: 63
End: 2018-06-12

## 2018-06-12 VITALS
OXYGEN SATURATION: 98 % | RESPIRATION RATE: 16 BRPM | SYSTOLIC BLOOD PRESSURE: 110 MMHG | DIASTOLIC BLOOD PRESSURE: 76 MMHG | HEIGHT: 68 IN | BODY MASS INDEX: 28.63 KG/M2 | TEMPERATURE: 97.8 F | WEIGHT: 188.9 LBS | HEART RATE: 122 BPM

## 2018-06-12 DIAGNOSIS — D50.9 IRON DEFICIENCY ANEMIA, UNSPECIFIED IRON DEFICIENCY ANEMIA TYPE: ICD-10-CM

## 2018-06-12 DIAGNOSIS — R41.3 MEMORY LOSS: Primary | ICD-10-CM

## 2018-06-12 DIAGNOSIS — E89.0 POSTSURGICAL HYPOTHYROIDISM: ICD-10-CM

## 2018-06-12 DIAGNOSIS — F32.0 MILD MAJOR DEPRESSION (H): ICD-10-CM

## 2018-06-12 LAB
BASOPHILS # BLD AUTO: 0 10E9/L (ref 0–0.2)
BASOPHILS NFR BLD AUTO: 0.5 %
DIFFERENTIAL METHOD BLD: NORMAL
EOSINOPHIL # BLD AUTO: 0.4 10E9/L (ref 0–0.7)
EOSINOPHIL NFR BLD AUTO: 4.6 %
ERYTHROCYTE [DISTWIDTH] IN BLOOD BY AUTOMATED COUNT: 13.2 % (ref 10–15)
HCT VFR BLD AUTO: 44.7 % (ref 35–47)
HGB BLD-MCNC: 15.4 G/DL (ref 11.7–15.7)
LYMPHOCYTES # BLD AUTO: 2.1 10E9/L (ref 0.8–5.3)
LYMPHOCYTES NFR BLD AUTO: 26.2 %
MCH RBC QN AUTO: 30.7 PG (ref 26.5–33)
MCHC RBC AUTO-ENTMCNC: 34.5 G/DL (ref 31.5–36.5)
MCV RBC AUTO: 89 FL (ref 78–100)
MONOCYTES # BLD AUTO: 0.6 10E9/L (ref 0–1.3)
MONOCYTES NFR BLD AUTO: 7.5 %
NEUTROPHILS # BLD AUTO: 4.9 10E9/L (ref 1.6–8.3)
NEUTROPHILS NFR BLD AUTO: 61.2 %
PLATELET # BLD AUTO: 323 10E9/L (ref 150–450)
RBC # BLD AUTO: 5.02 10E12/L (ref 3.8–5.2)
T3FREE SERPL-MCNC: 2.3 PG/ML (ref 2.3–4.2)
VIT B12 SERPL-MCNC: 273 PG/ML (ref 193–986)
WBC # BLD AUTO: 7.9 10E9/L (ref 4–11)

## 2018-06-12 PROCEDURE — 84439 ASSAY OF FREE THYROXINE: CPT | Performed by: FAMILY MEDICINE

## 2018-06-12 PROCEDURE — 84443 ASSAY THYROID STIM HORMONE: CPT | Performed by: FAMILY MEDICINE

## 2018-06-12 PROCEDURE — 83550 IRON BINDING TEST: CPT | Performed by: FAMILY MEDICINE

## 2018-06-12 PROCEDURE — 36415 COLL VENOUS BLD VENIPUNCTURE: CPT | Performed by: FAMILY MEDICINE

## 2018-06-12 PROCEDURE — 82306 VITAMIN D 25 HYDROXY: CPT | Performed by: FAMILY MEDICINE

## 2018-06-12 PROCEDURE — 99000 SPECIMEN HANDLING OFFICE-LAB: CPT | Performed by: FAMILY MEDICINE

## 2018-06-12 PROCEDURE — 80053 COMPREHEN METABOLIC PANEL: CPT | Performed by: FAMILY MEDICINE

## 2018-06-12 PROCEDURE — 85025 COMPLETE CBC W/AUTO DIFF WBC: CPT | Performed by: FAMILY MEDICINE

## 2018-06-12 PROCEDURE — 99214 OFFICE O/P EST MOD 30 MIN: CPT | Performed by: FAMILY MEDICINE

## 2018-06-12 PROCEDURE — 82607 VITAMIN B-12: CPT | Performed by: FAMILY MEDICINE

## 2018-06-12 PROCEDURE — 84482 T3 REVERSE: CPT | Mod: 90 | Performed by: FAMILY MEDICINE

## 2018-06-12 PROCEDURE — 83540 ASSAY OF IRON: CPT | Performed by: FAMILY MEDICINE

## 2018-06-12 PROCEDURE — 84481 FREE ASSAY (FT-3): CPT | Performed by: FAMILY MEDICINE

## 2018-06-12 NOTE — MR AVS SNAPSHOT
After Visit Summary   6/12/2018    Mariela Duffy    MRN: [id number]           Patient Information     Date Of Birth          1955        Visit Information        Provider Department      6/12/2018 11:00 AM Jaclyn Ojeda,  [facility]        Today's Diagnoses     Memory loss    -  1    Postsurgical hypothyroidism        Iron deficiency anemia, unspecified iron deficiency anemia type        Mild major depression (H)           Follow-ups after your visit        Follow-up notes from your care team     Return in about 1 week (around 6/19/2018).      Your next 10 appointments already scheduled     Jun 25, 2018 10:00 AM CDT   SHORT with Jerry Jordan [facility] ([facility])    [address] [facility] [address] [phone]              Who to contact     If you have questions or need follow up information about today's clinic visit or your schedule please contact [facility] directly at [phone].  Normal or non-critical lab and imaging results will be communicated to you by Visierhart, letter or phone within 4 business days after the clinic has received the results. If you do not hear from us within 7 days, please contact the clinic through [employer] or phone. If you have a critical or abnormal lab result, we will notify you by phone as soon as possible.  Submit refill requests through [employer] or call your pharmacy and they will forward the refill request to us. Please allow 3 business days for your refill to be completed.          Additional Information About Your Visit        Visierhart Information     [employer] gives you secure access to your electronic health record. If you see a primary care provider, you can also send messages to your care team and make appointments. If you have questions, please call your primary care clinic.  If you do not have a primary care provider, please call [phone]  "and they will assist you.        Care EveryWhere ID     This is your Care EveryWhere ID. This could be used by other organizations to access your [place] medical records  [id number]        Your Vitals Were     Pulse Temperature Respirations Height Last Period Pulse Oximetry    122 97.8  F (36.6  C) (Oral) 16 5' 8\" (1.727 m) 01/20/2004 98%    BMI (Body Mass Index)                   28.72 kg/m2            Blood Pressure from Last 3 Encounters:   06/12/18 110/76   05/21/18 125/75   05/18/18 121/80    Weight from Last 3 Encounters:   06/12/18 188 lb 14.4 oz (85.7 kg)   05/21/18 191 lb 8 oz (86.9 kg)   05/14/18 188 lb (85.3 kg)              We Performed the Following     CBC with platelets differential     Comprehensive metabolic panel     Iron and iron binding capacity     T3 reverse     T3, Free     T4 free     TSH     Vitamin B12     Vitamin D Deficiency          Today's Medication Changes          These changes are accurate as of 6/12/18 11:59 PM.  If you have any questions, ask your nurse or doctor.               Stop taking these medicines if you haven't already. Please contact your care team if you have questions.     LORazepam 0.5 MG tablet   Commonly known as:  ATIVAN   Stopped by:  Jaclyn Ojeda,                     Primary Care Provider Office Phone # Fax #    Estefany Stanley PA-C [phone] [address] [facility] [address]        Equal Access to Services     [facility]TONIA AH: Hadii aad ku hadasho Sodanielaali, waaxda luqadaha, qaybta kaalmada bj, waxay georgiana george. So [facility] [phone].    ATENCIÓN: Si habla español, tiene a romero disposición servicios gratuitos de asistencia lingüística. Llame al [phone].    We comply with applicable federal civil rights laws and [place] laws. We do not discriminate on the basis of race, color, national origin, age, disability, sex, sexual orientation, or gender identity.            Thank you!     Thank you " for choosing [facility]  for your care. Our goal is always to provide you with excellent care. Hearing back from our patients is one way we can continue to improve our services. Please take a few minutes to complete the written survey that you may receive in the mail after your visit with us. Thank you!             Your Updated Medication List - Protect others around you: Learn how to safely use, store and throw away your medicines at www.disposemymeds.org.          This list is accurate as of 6/12/18 11:59 PM.  Always use your most recent med list.                   Brand Name Dispense Instructions for use Diagnosis    busPIRone 15 MG tablet    BUSPAR    270 tablet    TAKE 2 TABLETS BY MOUTH IN THE MORNING AND TAKE 1 TABLET BY MOUTH IN THE EVENING    Anxiety       dextromethorphan 30 MG/5ML liquid    DELSYM    89 mL    Take 5 mLs (30 mg) by mouth 2 times daily    Cough       fluticasone 50 MCG/ACT spray    FLONASE    1 Bottle    Spray 1-2 sprays into both nostrils daily    Post-nasal drip       levothyroxine 150 MCG tablet    SYNTHROID/LEVOTHROID    90 tablet    Take 1 tablet (150 mcg) by mouth daily    Primary hypothyroidism       nortriptyline 75 MG capsule    PAMELOR    90 capsule    TAKE ONE CAPSULE BY MOUTH AT BEDTIME    Major depressive disorder, recurrent episode, mild (H)       pantoprazole 20 MG EC tablet    PROTONIX    90 tablet    TAKE ONE TABLET BY MOUTH ONCE DAILY. TAKE BY MOUTH 30 TO 60 MINUTES BEFORE A MEAL.    Gastroesophageal reflux disease with esophagitis, Hiatal hernia       ranitidine 300 MG tablet    ZANTAC    90 tablet    Take 1 tablet (300 mg) by mouth At Bedtime    Gastroesophageal reflux disease with esophagitis       sertraline 100 MG tablet    ZOLOFT    180 tablet    Take 1 tabs (100 mg) nightly for 4 days. Then increase to 2 tabs (200 mg) nightly.    Major depressive disorder, recurrent episode, mild (H), Anxiety       SUMAtriptan 100 MG tablet    IMITREX    9 tablet     Take 1 tablet (100 mg) by mouth at onset of headache for migraine May repeat in 2 hours if needed: max 2/day; average number of headaches monthly 2    Acute nonintractable headache, unspecified headache type       triamcinolone 0.1 % ointment    KENALOG     Apply topically 2 times daily as needed for irritation

## 2018-06-12 NOTE — PROGRESS NOTES
SUBJECTIVE:   Mariela Duffy is a 62 year old female who presents to clinic today for the following health issues:    Patient is here with her daughter for concerns about memory decline.  Pts daughter has noticed issues for the past 1-2 months.  Daughter has taken over the care of pts dog since she feels the dog is too powerful and could pull pt over easily.  Patient has had to have this concept explained to her multiple times and seems to forget why her daughter is keeping the dog.  Patient has a close friend who has expressed concern to pts family about memory issues as well.  Patient states that sometimes she feels forgetful, but is not as concerned about it as her family his.  She denies dizziness, headaches, weakness, numbness, tingling, vision changes.      Patient has a history of possible TIA, depression, and thyroid disorder.  She did have a change in her thyroid medications recently.        Patient was seen in the ER a month ago with headache and dizziness.   MRI and MRA were normal at that time.  Her HA improved in the ER and she was sent home.        Problem list and histories reviewed & adjusted, as indicated.  Additional history: as documented    Patient Active Problem List   Diagnosis     Contact dermatitis and other eczema due to other specified agent     Allergic rhinitis due to other allergen     Esophageal reflux     Irritable bowel syndrome     Rosacea     Postsurgical hypothyroidism     Iron deficiency anemia     Absence of menstruation     Mild major depression (H)     CARDIOVASCULAR SCREENING; LDL GOAL LESS THAN 160     Generalized anxiety disorder     Hypothyroidism     Tubular adenoma of colon     Seasonal affective disorder (H)     Rhinitis     Headache     ADD (attention deficit disorder)     Other insomnia     TIA (transient ischemic attack)     Gastroesophageal reflux disease with esophagitis     Hiatal hernia     History of colonic polyps     BP check     Past Surgical History:  "  Procedure Laterality Date     C NONSPECIFIC PROCEDURE      surgery hiatal hernia     C NONSPECIFIC PROCEDURE      cholecystectomy     C NONSPECIFIC PROCEDURE  multiple    cleft lip repair.     CHOLECYSTECTOMY       COLONOSCOPY  2013    Colonoscopy Dr. Yoni LOOMIS     HEAD & NECK SURGERY      thyroid cancer surgery     SURGICAL HISTORY OF -       thyroidectomy due to cancer     WRIST SURGERY Right     2015       Social History   Substance Use Topics     Smoking status: Never Smoker     Smokeless tobacco: Never Used     Alcohol use Yes      Comment: 2-4 mixed drinks/month     Family History   Problem Relation Age of Onset     CANCER Father      Colon, stomach -  at 75yoa     Hypertension Father      C.A.D. Father      CANCER Mother      Throat, lymph, bone -  at 79yoa     C.A.D. Maternal Grandfather      Heart Attack -  in his late 60's     Alzheimer Disease Paternal Grandmother      C.A.D. Paternal Grandfather      Heart Attack -  at 63yoa           Reviewed and updated as needed this visit by clinical staff  Tobacco  Allergies  Meds  Med Hx  Surg Hx  Fam Hx  Soc Hx      Reviewed and updated as needed this visit by Provider         ROS:  Constitutional, HEENT, cardiovascular, pulmonary, gi and gu systems are negative, except as otherwise noted.    OBJECTIVE:     /76 (BP Location: Right arm, Patient Position: Chair, Cuff Size: Adult Regular)  Pulse 122  Temp 97.8  F (36.6  C) (Oral)  Resp 16  Ht 5' 8\" (1.727 m)  Wt 188 lb 14.4 oz (85.7 kg)  LMP 2004  SpO2 98%  BMI 28.72 kg/m2  Body mass index is 28.72 kg/(m^2).  GENERAL: healthy, alert and no distress  EYES: Eyes grossly normal to inspection, PERRL and conjunctivae and sclerae normal  HENT: ear canals and TM's normal, nose and mouth without ulcers or lesions  NECK: no adenopathy, no asymmetry, masses, or scars and thyroid normal to palpation  RESP: lungs clear to auscultation - no rales, rhonchi or " wheezes  CV: regular rate and rhythm, normal S1 S2, no S3 or S4, no murmur, click or rub, no peripheral edema and peripheral pulses strong  ABDOMEN: soft, nontender, no hepatosplenomegaly, no masses and bowel sounds normal  MS: no gross musculoskeletal defects noted, no edema  SKIN: no suspicious lesions or rashes  NEURO: Normal strength and tone, speech normal, cranial nerves 2-12 intact and DTR's normal and symmetric  PSYCH: Tangential conversation that is sometimes hard to follow     ASSESSMENT/PLAN:       ICD-10-CM    1. Memory loss R41.3 TSH     T4 free     Comprehensive metabolic panel     CBC with platelets differential     Vitamin B12     T3, Free     Vitamin D Deficiency     T3 reverse     Iron and iron binding capacity   2. Postsurgical hypothyroidism E89.0    3. Iron deficiency anemia, unspecified iron deficiency anemia type D50.9    4. Mild major depression (H) F32.0      MRI brain done last month was normal.  Exam today is normal, but her conversation is hard to follow and could be consistent with dementia.  Will get labs above to rule out metabolic causes.  Her history of hypothyroid and depression both put her at risk for memory issues.  If everything is normal will refer to memory clinic for evaluation.    Jaclyn Ojeda, DO  [facility]

## 2018-06-12 NOTE — PROGRESS NOTES
Staff asks if can assist pt and daughter to set up medications, reviewed med, provided med list copy, will need refill

## 2018-06-13 LAB
ALBUMIN SERPL-MCNC: 4.6 G/DL (ref 3.4–5)
ALP SERPL-CCNC: 120 U/L (ref 40–150)
ALT SERPL W P-5'-P-CCNC: 29 U/L (ref 0–50)
ANION GAP SERPL CALCULATED.3IONS-SCNC: 10 MMOL/L (ref 3–14)
AST SERPL W P-5'-P-CCNC: 28 U/L (ref 0–45)
BILIRUB SERPL-MCNC: 0.7 MG/DL (ref 0.2–1.3)
BUN SERPL-MCNC: 11 MG/DL (ref 7–30)
CALCIUM SERPL-MCNC: 9.4 MG/DL (ref 8.5–10.1)
CHLORIDE SERPL-SCNC: 103 MMOL/L (ref 94–109)
CO2 SERPL-SCNC: 26 MMOL/L (ref 20–32)
CREAT SERPL-MCNC: 0.87 MG/DL (ref 0.52–1.04)
DEPRECATED CALCIDIOL+CALCIFEROL SERPL-MC: 22 UG/L (ref 20–75)
GFR SERPL CREATININE-BSD FRML MDRD: 65 ML/MIN/1.7M2
GLUCOSE SERPL-MCNC: 92 MG/DL (ref 70–99)
IRON SATN MFR SERPL: 26 % (ref 15–46)
IRON SERPL-MCNC: 108 UG/DL (ref 35–180)
POTASSIUM SERPL-SCNC: 3.9 MMOL/L (ref 3.4–5.3)
PROT SERPL-MCNC: 8.2 G/DL (ref 6.8–8.8)
SODIUM SERPL-SCNC: 139 MMOL/L (ref 133–144)
T4 FREE SERPL-MCNC: 0.77 NG/DL (ref 0.76–1.46)
TIBC SERPL-MCNC: 421 UG/DL (ref 240–430)
TSH SERPL DL<=0.005 MIU/L-ACNC: 7.75 MU/L (ref 0.4–4)

## 2018-06-14 LAB — T3REVERSE SERPL-MCNC: 11.6 NG/DL (ref 9–27)

## 2018-06-15 ENCOUNTER — TELEPHONE (OUTPATIENT)
Dept: FAMILY MEDICINE | Facility: CLINIC | Age: 63
End: 2018-06-15

## 2018-06-15 ENCOUNTER — TELEPHONE (OUTPATIENT)
Dept: ENDOCRINOLOGY | Facility: CLINIC | Age: 63
End: 2018-06-15

## 2018-06-15 DIAGNOSIS — E03.9 PRIMARY HYPOTHYROIDISM: ICD-10-CM

## 2018-06-15 DIAGNOSIS — R41.3 MEMORY DISTURBANCE: Primary | ICD-10-CM

## 2018-06-15 RX ORDER — LEVOTHYROXINE SODIUM 175 UG/1
175 TABLET ORAL DAILY
Qty: 90 TABLET | Refills: 1 | Status: SHIPPED | OUTPATIENT
Start: 2018-06-15 | End: 2018-12-21

## 2018-06-15 NOTE — PROGRESS NOTES
Please call pt to discuss lab results.  TSH is a bit high and T4 is low normal.  I'd recommend increasing her Synthroid dose a bit.  I'll send in refills.  I don't think the abnormal thyroid is abnormal enough to cause her memory issues.  I think we should have her see a memory specialist.  I put an order in for the memory clinic and they should call her in the next 2-3 business days.

## 2018-06-15 NOTE — TELEPHONE ENCOUNTER
Pt returned call  Message given  Reviewed labs  States she is taking her meds qd at same time    Given info on referral    Voiced understanding    Vesna Zaman RN, BS  Clinical Nurse Triage.

## 2018-06-15 NOTE — TELEPHONE ENCOUNTER
Notes Recorded by Abril Harris RN on 6/15/2018 at 11:24 AM  L/M to call.  See telephone encounter.  Abril Harris RN    ------    Notes Recorded by Jaclyn Ojeda DO on 6/15/2018 at 10:22 AM  Please call pt to discuss lab results.  TSH is a bit high and T4 is low normal.  I'd recommend increasing her Synthroid dose a bit.  I'll send in refills.  I don't think the abnormal thyroid is abnormal enough to cause her memory issues.  I think we should have her see a memory specialist.  I put an order in for the memory clinic and they should call her in the next 2-3 business days.

## 2018-06-18 ENCOUNTER — OFFICE VISIT (OUTPATIENT)
Dept: FAMILY MEDICINE | Facility: CLINIC | Age: 63
End: 2018-06-18
Payer: COMMERCIAL

## 2018-06-18 VITALS
DIASTOLIC BLOOD PRESSURE: 78 MMHG | OXYGEN SATURATION: 98 % | BODY MASS INDEX: 28.59 KG/M2 | RESPIRATION RATE: 12 BRPM | TEMPERATURE: 98.5 F | HEART RATE: 116 BPM | SYSTOLIC BLOOD PRESSURE: 114 MMHG | WEIGHT: 188 LBS

## 2018-06-18 DIAGNOSIS — E89.0 POSTSURGICAL HYPOTHYROIDISM: Primary | ICD-10-CM

## 2018-06-18 DIAGNOSIS — R41.3 MEMORY LOSS: ICD-10-CM

## 2018-06-18 PROCEDURE — 99213 OFFICE O/P EST LOW 20 MIN: CPT | Performed by: FAMILY MEDICINE

## 2018-06-18 NOTE — LETTER
[address] [address]  Phone: [phone]    Office Visit on 06/12/2018   Component Date Value Ref Range Status     TSH 06/12/2018 7.75* 0.40 - 4.00 mU/L Final     T4 Free 06/12/2018 0.77  0.76 - 1.46 ng/dL Final     Sodium 06/12/2018 139  133 - 144 mmol/L Final     Potassium 06/12/2018 3.9  3.4 - 5.3 mmol/L Final     Chloride 06/12/2018 103  94 - 109 mmol/L Final     Carbon Dioxide 06/12/2018 26  20 - 32 mmol/L Final     Anion Gap 06/12/2018 10  3 - 14 mmol/L Final     Glucose 06/12/2018 92  70 - 99 mg/dL Final     Urea Nitrogen 06/12/2018 11  7 - 30 mg/dL Final     Creatinine 06/12/2018 0.87  0.52 - 1.04 mg/dL Final     GFR Estimate 06/12/2018 65  >60 mL/min/1.7m2 Final    Non  GFR Calc     GFR Estimate If Black 06/12/2018 79  >60 mL/min/1.7m2 Final    African American GFR Calc     Calcium 06/12/2018 9.4  8.5 - 10.1 mg/dL Final     Bilirubin Total 06/12/2018 0.7  0.2 - 1.3 mg/dL Final     Albumin 06/12/2018 4.6  3.4 - 5.0 g/dL Final     Protein Total 06/12/2018 8.2  6.8 - 8.8 g/dL Final     Alkaline Phosphatase 06/12/2018 120  40 - 150 U/L Final     ALT 06/12/2018 29  0 - 50 U/L Final     AST 06/12/2018 28  0 - 45 U/L Final     WBC 06/12/2018 7.9  4.0 - 11.0 10e9/L Final     RBC Count 06/12/2018 5.02  3.8 - 5.2 10e12/L Final     Hemoglobin 06/12/2018 15.4  11.7 - 15.7 g/dL Final     Hematocrit 06/12/2018 44.7  35.0 - 47.0 % Final     MCV 06/12/2018 89  78 - 100 fl Final     MCH 06/12/2018 30.7  26.5 - 33.0 pg Final     MCHC 06/12/2018 34.5  31.5 - 36.5 g/dL Final     RDW 06/12/2018 13.2  10.0 - 15.0 % Final     Platelet Count 06/12/2018 323  150 - 450 10e9/L Final     Diff Method 06/12/2018 Automated Method   Final     % Neutrophils 06/12/2018 61.2  % Final     % Lymphocytes 06/12/2018 26.2  % Final     % Monocytes 06/12/2018 7.5  % Final     % Eosinophils 06/12/2018 4.6  % Final     % Basophils 06/12/2018 0.5  % Final     Absolute  Neutrophil 06/12/2018 4.9  1.6 - 8.3 10e9/L Final     Absolute Lymphocytes 06/12/2018 2.1  0.8 - 5.3 10e9/L Final     Absolute Monocytes 06/12/2018 0.6  0.0 - 1.3 10e9/L Final     Absolute Eosinophils 06/12/2018 0.4  0.0 - 0.7 10e9/L Final     Absolute Basophils 06/12/2018 0.0  0.0 - 0.2 10e9/L Final     Vitamin B12 06/12/2018 273  193 - 986 pg/mL Final     Free T3 06/12/2018 2.3  2.3 - 4.2 pg/mL Final     Vitamin D Deficiency screening 06/12/2018 22  20 - 75 ug/L Final    Comment: Season, race, dietary intake, and treatment affect the concentration of   25-hydroxy-Vitamin D. Values may decrease during winter months and increase   during summer months. Values 20-29 ug/L may indicate Vitamin D insufficiency   and values <20 ug/L may indicate Vitamin D deficiency.  Vitamin D determination is routinely performed by an immunoassay specific for   25 hydroxyvitamin D3.  If an individual is on vitamin D2 (ergocalciferol)   supplementation, please specify 25 OH vitamin D2 and D3 level determination by   LCMSMS test VITD23.       T3, Reverse ng/dL 06/12/2018 11.6  9.0 - 27.0 ng/dL Final    Comment: (Note)  INTERPRETIVE INFORMATION: Triiodothyronine, Reverse -   LC-MS/MS  Test developed and characteristics determined by [facility]. See Compliance Statement B: [facility]  Performed by [facility],  [address] [place] [id number] [phone]  [email or web address], Soy Beyer MD, Lab. Director       Iron 06/12/2018 108  35 - 180 ug/dL Final     Iron Binding Cap 06/12/2018 421  240 - 430 ug/dL Final     Iron Saturation Index 06/12/2018 26  15 - 46 % Final

## 2018-06-18 NOTE — MR AVS SNAPSHOT
After Visit Summary   6/18/2018    Mariela Duffy    MRN: [id number]           Patient Information     Date Of Birth          1955        Visit Information        Provider Department      6/18/2018 7:00 AM Jaclyn Ojeda, DO [facility]        Today's Diagnoses     Postsurgical hypothyroidism    -  1    Memory loss           Follow-ups after your visit        Additional Services     ENDOCRINOLOGY ADULT REFERRAL       Your provider has referred you to: [facility]: [facility] [phone]   [email or web address]      Please be aware that coverage of these services is subject to the terms and limitations of your health insurance plan.  Call member services at your health plan with any benefit or coverage questions.      Please bring the following to your appointment:    >>   Any x-rays, CTs or MRIs which have been performed.  Contact the facility where they were done to arrange for  prior to your scheduled appointment.    >>   List of current medications   >>   This referral request   >>   Any documents/labs given to you for this referral                  Follow-up notes from your care team     Return in about 4 weeks (around 7/16/2018).      Your next 10 appointments already scheduled     Jun 25, 2018 10:00 AM CDT   SHORT with Jerry Jordan RPH   [facility] ([facility])    [address] [facility] [address] [phone]            Jul 27, 2018  1:30 PM CDT   New Visit with REGINE Santoro CNP   [facility] ([facility]    [address]  [facility] [address] [phone]              Who to contact     If you have questions or need follow up information about today's clinic visit or your schedule please contact [facility] directly at [phone].  Normal or non-critical lab and imaging  results will be communicated to you by MyChart, letter or phone within 4 business days after the clinic has received the results. If you do not hear from us within 7 days, please contact the clinic through MyChart or phone. If you have a critical or abnormal lab result, we will notify you by phone as soon as possible.  Submit refill requests through [employer] or call your pharmacy and they will forward the refill request to us. Please allow 3 business days for your refill to be completed.          Additional Information About Your Visit        Care EveryWhere ID     This is your Care EveryWhere ID. This could be used by other organizations to access your [place] medical records  [id number]        Your Vitals Were     Pulse Temperature Respirations Last Period Pulse Oximetry BMI (Body Mass Index)    116 98.5  F (36.9  C) (Oral) 12 01/20/2004 98% 28.59 kg/m2       Blood Pressure from Last 3 Encounters:   06/18/18 114/78   06/12/18 110/76   05/21/18 125/75    Weight from Last 3 Encounters:   06/18/18 188 lb (85.3 kg)   06/12/18 188 lb 14.4 oz (85.7 kg)   05/21/18 191 lb 8 oz (86.9 kg)              We Performed the Following     ENDOCRINOLOGY ADULT REFERRAL        Primary Care Provider Office Phone # Fax #    Estefany Stanley PA-C [phone] [address] [facility] [address]        Equal Access to Services     CHANELL RINCON : Hadii aad ku hadasho Sokaelyn, waaxda luqadaha, qaybta kaalmada bj, fazal guillermo . So [facility] [phone].    ATENCIÓN: Si habla español, tiene a romero disposición servicios gratuitos de asistencia lingüística. Gemmaame al [phone].    We comply with applicable federal civil rights laws and [place] laws. We do not discriminate on the basis of race, color, national origin, age, disability, sex, sexual orientation, or gender identity.            Thank you!     Thank you for choosing [facility]  for your care. Our goal is  always to provide you with excellent care. Hearing back from our patients is one way we can continue to improve our services. Please take a few minutes to complete the written survey that you may receive in the mail after your visit with us. Thank you!             Your Updated Medication List - Protect others around you: Learn how to safely use, store and throw away your medicines at www.disposemymeds.org.          This list is accurate as of 6/18/18  8:25 AM.  Always use your most recent med list.                   Brand Name Dispense Instructions for use Diagnosis    busPIRone 15 MG tablet    BUSPAR    270 tablet    TAKE 2 TABLETS BY MOUTH IN THE MORNING AND TAKE 1 TABLET BY MOUTH IN THE EVENING    Anxiety       dextromethorphan 30 MG/5ML liquid    DELSYM    89 mL    Take 5 mLs (30 mg) by mouth 2 times daily    Cough       fluticasone 50 MCG/ACT spray    FLONASE    1 Bottle    Spray 1-2 sprays into both nostrils daily    Post-nasal drip       levothyroxine 175 MCG tablet    SYNTHROID/LEVOTHROID    90 tablet    Take 1 tablet (175 mcg) by mouth daily    Primary hypothyroidism       nortriptyline 75 MG capsule    PAMELOR    90 capsule    TAKE ONE CAPSULE BY MOUTH AT BEDTIME    Major depressive disorder, recurrent episode, mild (H)       pantoprazole 20 MG EC tablet    PROTONIX    90 tablet    TAKE ONE TABLET BY MOUTH ONCE DAILY. TAKE BY MOUTH 30 TO 60 MINUTES BEFORE A MEAL.    Gastroesophageal reflux disease with esophagitis, Hiatal hernia       ranitidine 300 MG tablet    ZANTAC    90 tablet    Take 1 tablet (300 mg) by mouth At Bedtime    Gastroesophageal reflux disease with esophagitis       sertraline 100 MG tablet    ZOLOFT    180 tablet    Take 1 tabs (100 mg) nightly for 4 days. Then increase to 2 tabs (200 mg) nightly.    Major depressive disorder, recurrent episode, mild (H), Anxiety       SUMAtriptan 100 MG tablet    IMITREX    9 tablet    Take 1 tablet (100 mg) by mouth at onset of headache for migraine  May repeat in 2 hours if needed: max 2/day; average number of headaches monthly 2    Acute nonintractable headache, unspecified headache type       triamcinolone 0.1 % ointment    KENALOG     Apply topically 2 times daily as needed for irritation

## 2018-06-18 NOTE — PROGRESS NOTES
SUBJECTIVE:   Mariela Duffy is a 62 year old female who presents to clinic today for the following health issues:    Hypothyroidism Follow-up      Since last visit, patient describes the following symptoms: Weight stable, no hair loss, no skin changes, no constipation, no loose stools      Amount of exercise or physical activity: None    Problems taking medications regularly: No    Medication side effects: none    Diet: regular (no restrictions)    Patient was here with her daughters last week to discuss concerns about memory loss.  Labs were all normal except a high TSH.  I increased her Synthroid dosing to help with this.  Discussed that her slightly elevated TSH is likely not contributing to her memory issues.  Patient is actually not worried much about memory.  She states she has been stressed out lately and thinks that has something to do with it.  She is agreeable to being evaluated at the memory clinic.  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  She has been considering trying Cytomel instead of Synthroid for awhile now.  She's interested in switching to see if that helps more with her thyroid.    Problem list and histories reviewed & adjusted, as indicated.  Additional history: as documented    Patient Active Problem List   Diagnosis     Contact dermatitis and other eczema due to other specified agent     Allergic rhinitis due to other allergen     Esophageal reflux     Irritable bowel syndrome     Rosacea     Postsurgical hypothyroidism     Iron deficiency anemia     Absence of menstruation     Mild major depression (H)     CARDIOVASCULAR SCREENING; LDL GOAL LESS THAN 160     Generalized anxiety disorder     Hypothyroidism     Tubular adenoma of colon     Seasonal affective disorder (H)     Rhinitis     Headache     ADD (attention deficit disorder)     Other insomnia     TIA (transient ischemic attack)     Gastroesophageal reflux disease with esophagitis     Hiatal hernia      History of colonic polyps     BP check     Past Surgical History:   Procedure Laterality Date     C NONSPECIFIC PROCEDURE      surgery hiatal hernia     C NONSPECIFIC PROCEDURE      cholecystectomy     C NONSPECIFIC PROCEDURE  multiple    cleft lip repair.     CHOLECYSTECTOMY       COLONOSCOPY  2013    Colonoscopy Dr. Yoni BENOIT     HEAD & NECK SURGERY      thyroid cancer surgery     SURGICAL HISTORY OF -       thyroidectomy due to cancer     WRIST SURGERY Right     2015       Social History   Substance Use Topics     Smoking status: Never Smoker     Smokeless tobacco: Never Used     Alcohol use Yes      Comment: 2-4 mixed drinks/month     Family History   Problem Relation Age of Onset     CANCER Father      Colon, stomach -  at 75yoa     Hypertension Father      C.A.D. Father      CANCER Mother      Throat, lymph, bone -  at 79yoa     C.A.D. Maternal Grandfather      Heart Attack -  in his late 60's     Alzheimer Disease Paternal Grandmother      C.A.D. Paternal Grandfather      Heart Attack -  at 63yoa           Reviewed and updated as needed this visit by clinical staff  Tobacco  Allergies  Meds  Med Hx  Surg Hx  Fam Hx  Soc Hx      Reviewed and updated as needed this visit by Provider         ROS:  Constitutional, HEENT, cardiovascular, pulmonary, gi and gu systems are negative, except as otherwise noted.    OBJECTIVE:     /78 (BP Location: Right arm, Patient Position: Chair, Cuff Size: Adult Large)  Pulse 116  Temp 98.5  F (36.9  C) (Oral)  Resp 12  Wt 188 lb (85.3 kg)  LMP 2004  SpO2 98%  BMI 28.59 kg/m2  Body mass index is 28.59 kg/(m^2).  GENERAL: healthy, alert and no distress    ASSESSMENT/PLAN:     1. Postsurgical hypothyroidism  - Patient currently on Synthroid  - Recently increased dose from 150 to 175mcg daily due to elevated TSH  - She is interested in trying Cytomel   - Will refer to endocrine for counseling regarding medication change  -  ENDOCRINOLOGY ADULT REFERRAL    2. Memory loss  - Daughters are more concerned about this than the patient is  - Patient has been referred to the  memory clinic for evaluation  - All labs normal besides elevated TSH  - MRI brain normal a couple months ago     Follow up in 2-4 weeks after memory eval.  Patient is also due for a physical and pap but prefers to wait until Estefany is back in the office for this.    Greater than 50% of this visit was spent counseling regarding the above diagnosis  Visit lasted approximately 15 minutes    Jaclyn Ojeda DO  [facility]

## 2018-06-25 ENCOUNTER — OFFICE VISIT (OUTPATIENT)
Dept: PHARMACY | Facility: CLINIC | Age: 63
End: 2018-06-25
Payer: COMMERCIAL

## 2018-06-25 ENCOUNTER — TELEPHONE (OUTPATIENT)
Dept: FAMILY MEDICINE | Facility: CLINIC | Age: 63
End: 2018-06-25

## 2018-06-25 VITALS
HEART RATE: 93 BPM | OXYGEN SATURATION: 98 % | BODY MASS INDEX: 29.04 KG/M2 | WEIGHT: 191 LBS | SYSTOLIC BLOOD PRESSURE: 122 MMHG | DIASTOLIC BLOOD PRESSURE: 78 MMHG

## 2018-06-25 DIAGNOSIS — K58.0 IRRITABLE BOWEL SYNDROME WITH DIARRHEA: Primary | ICD-10-CM

## 2018-06-25 DIAGNOSIS — E53.8 VITAMIN B12 DEFICIENCY (NON ANEMIC): ICD-10-CM

## 2018-06-25 DIAGNOSIS — F41.1 GENERALIZED ANXIETY DISORDER: ICD-10-CM

## 2018-06-25 DIAGNOSIS — G47.00 INSOMNIA: ICD-10-CM

## 2018-06-25 DIAGNOSIS — J30.1 CHRONIC SEASONAL ALLERGIC RHINITIS DUE TO POLLEN: ICD-10-CM

## 2018-06-25 DIAGNOSIS — E89.0 POSTOPERATIVE HYPOTHYROIDISM: ICD-10-CM

## 2018-06-25 DIAGNOSIS — J30.2 SEASONAL ALLERGIC RHINITIS: ICD-10-CM

## 2018-06-25 DIAGNOSIS — E55.9 VITAMIN D DEFICIENCY: ICD-10-CM

## 2018-06-25 DIAGNOSIS — G43.909 MIGRAINE: ICD-10-CM

## 2018-06-25 DIAGNOSIS — F32.0 MILD MAJOR DEPRESSION (H): ICD-10-CM

## 2018-06-25 DIAGNOSIS — E03.9 HYPOTHYROIDISM: ICD-10-CM

## 2018-06-25 LAB — PHQ9 SCORE: 13

## 2018-06-25 PROCEDURE — 99607 MTMS BY PHARM ADDL 15 MIN: CPT | Performed by: PHARMACIST

## 2018-06-25 PROCEDURE — 99606 MTMS BY PHARM EST 15 MIN: CPT | Performed by: PHARMACIST

## 2018-06-25 RX ORDER — NORTRIPTYLINE HYDROCHLORIDE 50 MG/1
50 CAPSULE ORAL AT BEDTIME
Qty: 90 CAPSULE | Refills: 1 | Status: SHIPPED | OUTPATIENT
Start: 2018-06-25 | End: 2018-12-03

## 2018-06-25 RX ORDER — OXYMETAZOLINE HYDROCHLORIDE 0.05 G/100ML
1 SPRAY NASAL PRN
COMMUNITY
End: 2018-06-27

## 2018-06-25 NOTE — PATIENT INSTRUCTIONS
Recommendations from today's MTM visit:                                                        1. FYI - the Zicam nasal spray you have contains oxymetazolone in it. This nasal spray should not be used more than 3 days in a row in order to prevent rebound congestion. Your flonase can help with the postnasal drip.    2. Continue to carry your Ativan in your purse so you have it in case you need it. Continue to use your ativan as needed. You can try taking half a tablet as well.     3. Start to take 500 mcg of vitamin B12 daily. Your level was 273 and we would like to see you around 700-800. This may help with energy and memory.    4. Start to take 5000 units of vitamin D3 daily with a meal. Your level was 22 and we would like this above 40.     5. FYI - your memory symptoms occur with your anxiety. In my opinion your memory has to do with anxiety or stress, not dementia symptoms.     6. Let's decrease the nortriptyline dose to 50 mg in the evening to see if it helps with constipation, dry mouth, and cognitive effects. You can try Miralax or Senna S as needed to help with constipation.     Next MTM visit: Monday October 15, 2018 at 10:00 am    To schedule another MTM appointment, please call the clinic directly or you may call the MTM scheduling line at [phone] or toll-free at [phone].     My Clinical Pharmacist's contact information:                                                      It was a pleasure seeing you today!  Please feel free to contact me with any questions or concerns you have.      Jerry Jordan [facility].  Medication Therapy Management Provider  [phone]  Keren Sauceda, Pharm-D4

## 2018-06-25 NOTE — PROGRESS NOTES
SUBJECTIVE/OBJECTIVE:                           Mariela Duffy is a 62 year old female coming in for a follow up visit (5/23/2018) for Medication Therapy Management.  She was referred to me from Pepe Acuna.     Chief Complaint: Follow up on depression and anxiety. She also has a question about Zicam and vitamin B and D supplements.     Pt states that her house is still foreclosing, but she has until December to move out. She is unsure where she will be moving, but she is working with a  on this.     Recent hospital admission due to not taking medications. She has disorganized personality. She was using medication bottles, but now has a pill box.     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     Personal Healthcare Goals: To become physically stronger. To take all medications.     Allergies/ADRs: Reviewed in Epic  Tobacco: No tobacco use  Alcohol: Less than 1 beverage / month  Caffeine: 1.5 cups/day of coffee, 3 cans dr pepper or coke per day  Activity: loves dancing, but is not currently. She likes to swim, but is not currently swimming.   PMH: Reviewed in Epic    Medication Adherence/Access:  Per patient, misses medication 1 times per week, but misses flonase dose more often (not in pill box)    Depression:  Current medications include: Sertraline 200 mg once daily hs. Pt reports that depression symptoms are improved. Current depression symptoms include: none. Patient reports the following stressors:  financial difficulties (loss of house and unemployment), giving up her dog to her daughter.  Therapies tried and response: She has tried several antidepressants in the past, but could not recall today.   PHQ-9 SCORE 7/19/2017 1/29/2018 5/21/2018   Total Score - - -   Total Score MyChart - 4 (Minimal depression) -   Total Score 9 4 15     fyi--she see's weekly counselor as well as talks to her best friend daily --they both help her cope.     Anxiety: Current therapy is Buspirone 15 mg tablets two in  the morning and one in the evening and lorazepam 0.5 mg as needed. She is using the lorazepam, which has been very helpful during stressful events. She lost her keys this morning, but was able to problem solve and take an uber to clinic (she did not need to take lorazepam). She uses the lorazepam about once a day and finds it helpful.      She has an extensive support system, including her daughters, friend,  therapist weekly andARMS worker. She states that her ARMS worker was helping her with organization in her home, but that the ARMS worker became very overwhelmed due to her ex-fiance being a hoarder. She was proud that she has cleaned out half of her living room. Her kids are helping with her yard work. She took early orker was helping her with organization in her home, but that the ARMS worker became very overwhelmed due to her ex-fiance being a hoarder. She was proud that she has cleaned out half of her living room. She took social security early, which is helping her financially. She feels her memory worsens when she is stressed or anxious.     Allergic Rhinitis: Current therapy is flonase 1-2 sprays in each nostril daily. She is not currently experiencing congestion, but she has some postnasal drip. She notes that she often forgets the flonase dose since it is not in her pill box. She recently bought Zicam nasal spray (oxymetalozone) because a friend recommended she take it. She is wondering if she can take both the flonase and the Zicam.     IBS/Sleep/Migraines:  Currently take 75mg nortriptyline in the evening and naproxen 220 mg PRN for headaches. She feels as if the nortriptyline dose is too high due to experiencing constipation. She denies having a difficult time sleeping.     fyi--has imitrex to abort migraine but hasnt used it in 6 months.    Vitamin D3: level was 273 on 6/12/18. Pt has a calcium + vitamin D supplement and wonders if this is a good option? The D3 dose is 400 iu's.     Vitamin B12:  level was 22 on 6/12/18. Vitamin B12 helps support memory and lessens fatigue. Pt has a B Complex vitamin and wonders if this is a good option?    Hypothyroidism: Patient is taking levothyroxine 175 (up from 150) mcg daily. Patient is having the following symptoms: Pt has an appointment with endocrinology because she is wondering about taking liothyronine. She states that her brother experienced benefit from liothyronine in the past. Patient is having the following symptoms: hypothyroidism -  constipation.   TSH   Date Value Ref Range Status   06/12/2018 7.75 (H) 0.40 - 4.00 mU/L Final     Current labs include:  Today's Vitals: /78  Pulse 93  Wt 191 lb (86.6 kg)  LMP 01/20/2004  SpO2 98%  BMI 29.04 kg/m2  BP Readings from Last 3 Encounters:   06/25/18 122/78   06/18/18 114/78   06/12/18 110/76     Lab Results   Component Value Date    A1C 5.2 10/19/2015   .  Lab Results   Component Value Date    CHOL 204 09/08/2016     Lab Results   Component Value Date    TRIG 97 09/08/2016     Lab Results   Component Value Date    HDL 46 09/08/2016     Lab Results   Component Value Date     09/08/2016       Liver Function Studies -   Recent Labs   Lab Test  06/12/18   1212   PROTTOTAL  8.2   ALBUMIN  4.6   BILITOTAL  0.7   ALKPHOS  120   AST  28   ALT  29       No results found for: UCRR, MICROL, UMALCR    Last Basic Metabolic Panel:  Lab Results   Component Value Date     06/12/2018      Lab Results   Component Value Date    POTASSIUM 3.9 06/12/2018     Lab Results   Component Value Date    CHLORIDE 103 06/12/2018     Lab Results   Component Value Date    BUN 11 06/12/2018     Lab Results   Component Value Date    CR 0.87 06/12/2018     GFR Estimate   Date Value Ref Range Status   06/12/2018 65 >60 mL/min/1.7m2 Final     Comment:     Non  GFR Calc   05/11/2018 85 >60 mL/min/1.7m2 Final   05/11/2018 >90 >60 mL/min/1.7m2 Final     Comment:     Non  GFR Calc     GFR Estimate If  Black   Date Value Ref Range Status   06/12/2018 79 >60 mL/min/1.7m2 Final     Comment:      GFR Calc   05/11/2018 >90 >60 mL/min/1.7m2 Final   05/11/2018 >90 >60 mL/min/1.7m2 Final     Comment:      GFR Calc       Most Recent Immunizations   Administered Date(s) Administered     Influenza (IIV3) PF 01/06/2011     Influenza Intranasal Vaccine 4 valent 10/24/2016     Influenza Vaccine IM 3yrs+ 4 Valent IIV4 10/19/2015     TD (ADULT, 7+) 07/01/2000     TDAP Vaccine (Boostrix) 08/20/2009       ASSESSMENT:                             Current medications were reviewed today.     Medication Adherence: good    Depression: Stable. Continue current therapy.     Anxiety:  Improving. . She demonstrated the ability to cope with stress by problem solving how to get to clinic when she lost her keys. He anxiety and stress appears correlated with memory issues.     Allergic Rhinitis: Provided education on flonase and zicam use (See plan)    IBS/Sleep/Migraines:  Nortriptyline has anticholinergic properties (BEERS List) and may be contributing to constipation and cognitive effects pt is experiencing. Dose decrease recommended. Recommend Miralax or Senna for PRN use for constipation.     Vitamin D3: is not at goal of 50-75ng/mL. Pt would benefit from initiation of OTC vitamin D3 5000 IU daily and vitamin D3 lab recheck in 6 months.    Vitamin B12: is not at goal of 600-1000pg/mL. Pt would benefit from initiation of OTC vitamin B12 at 500 mcg daily and vitamin B12 lab recheck in 6 months.  .  Hypothyroidism: Pt will follow up with endocrinology to discuss T3 med.        PLAN:                              1. FYI - the Zicam nasal spray you have contains oxymetazolone in it. This nasal spray should not be used more than 3 days in a row in order to prevent rebound congestion. Your flonase can help with the postnasal drip.    2. Continue to carry your Ativan in your purse so you have it in case you need it.  Continue to use your ativan as needed. You can try taking half a tablet as well.     3. Start to take 500 mcg of vitamin B12 daily. Your level was 273 and we would like to see you around 700-800. This may help with energy and memory.    4. Start to take 5000 units of vitamin D3 daily with a meal. Your level was 22 and we would like this above 40.     5. FYI - your memory symptoms occur with your anxiety. In my opinion your memory has to do with anxiety or stress, not dementia symptoms.     6. Let's decrease the nortriptyline dose to 50 mg in the evening to see if it helps with constipation, dry mouth, and cognitive effects. You can try Miralax or Senna S as needed to help with constipation.     Next [facility] visit: Monday October 15, 2018 at 10:00 am    I spent 60 minutes with this patient today. All changes were made via collaborative practice agreement with Estefany Stanley. A copy of the visit note was provided to the patient's primary care provider.        The patient was given a summary of these recommendations as an after visit summary.     Jerry Jordan [facility].  Medication Therapy Management Provider  [phone]  Keren Sauceda, Pharm-D4

## 2018-06-25 NOTE — MR AVS SNAPSHOT
After Visit Summary   6/25/2018    Mariela Duffy    MRN: [id number]           Patient Information     Date Of Birth          1955        Visit Information        Provider Department      6/25/2018 10:00 AM Jerry Jordan RPH [facility]        Today's Diagnoses     Irritable bowel syndrome with diarrhea    -  1      Care Instructions    Recommendations from today's MT visit:                                                        1. FYI - the Zicam nasal spray you have contains oxymetazolone in it. This nasal spray should not be used more than 3 days in a row in order to prevent rebound congestion. Your flonase can help with the postnasal drip.    2. Continue to carry your Ativan in your purse so you have it in case you need it. Continue to use your ativan as needed. You can try taking half a tablet as well.     3. Start to take 500 mcg of vitamin B12 daily. Your level was 273 and we would like to see you around 700-800. This may help with energy and memory.    4. Start to take 5000 units of vitamin D3 daily with a meal. Your level was 22 and we would like this above 40.     5. FYI - your memory symptoms occur with your anxiety. In my opinion your memory has to do with anxiety or stress, not dementia symptoms.     6. Let's decrease the nortriptyline dose to 50 mg in the evening to see if it helps with constipation, dry mouth, and cognitive effects. You can try Miralax or Senna S as needed to help with constipation.     Next MTM visit: Monday October 15, 2018 at 10:00 am    To schedule another MT appointment, please call the clinic directly or you may call the [facility] scheduling line at [phone] or toll-free at [phone].     My Clinical Pharmacist's contact information:                                                      It was a pleasure seeing you today!  Please feel free to contact me with any questions or concerns you have.      Jerry Jordan [facility].  Medication  Therapy Management Provider  [phone]  Keren Sauceda, Pharm-D4                Follow-ups after your visit        Your next 10 appointments already scheduled     Jul 27, 2018  1:30 PM CDT   New Visit with REGINE Santoro CNP   [facility]    [address]  [facility] [address] [phone]            Oct 15, 2018 10:00 AM CDT   SHORT with Jerry Jordan RPH   [facility] ([facility])    [address]  [facility] [address] [phone]              Who to contact     If you have questions or need follow up information about today's clinic visit or your schedule please contact [facility] directly at [phone].  Normal or non-critical lab and imaging results will be communicated to you by MyChart, letter or phone within 4 business days after the clinic has received the results. If you do not hear from us within 7 days, please contact the clinic through MyChart or phone. If you have a critical or abnormal lab result, we will notify you by phone as soon as possible.  Submit refill requests through [employer] or call your pharmacy and they will forward the refill request to us. Please allow 3 business days for your refill to be completed.          Additional Information About Your Visit        Care EveryWhere ID     This is your Care EveryWhere ID. This could be used by other organizations to access your [facility] medical records  [id number]        Your Vitals Were     Pulse Last Period Pulse Oximetry BMI (Body Mass Index)          93 01/20/2004 98% 29.04 kg/m2         Blood Pressure from Last 3 Encounters:   06/25/18 122/78   06/18/18 114/78   06/12/18 110/76    Weight from Last 3 Encounters:   06/25/18 191 lb (86.6 kg)   06/18/18 188 lb (85.3 kg)   06/12/18 188 lb 14.4 oz (85.7 kg)              Today, you had the following     No orders found for display         Today's  Medication Changes          These changes are accurate as of 6/25/18 10:49 AM.  If you have any questions, ask your nurse or doctor.               These medicines have changed or have updated prescriptions.        Dose/Directions    * nortriptyline 75 MG capsule   Commonly known as:  PAMELOR   This may have changed:  Another medication with the same name was added. Make sure you understand how and when to take each.   Used for:  Major depressive disorder, recurrent episode, mild (H)   Changed by:  Jerry Jordan RPH        TAKE ONE CAPSULE BY MOUTH AT BEDTIME   Quantity:  90 capsule   Refills:  1       * nortriptyline 50 MG capsule   Commonly known as:  PAMELOR   This may have changed:  You were already taking a medication with the same name, and this prescription was added. Make sure you understand how and when to take each.   Used for:  Irritable bowel syndrome with diarrhea   Changed by:  Jerry Jordan RPH        Dose:  50 mg   Take 1 capsule (50 mg) by mouth At Bedtime   Quantity:  90 capsule   Refills:  1       * Notice:  This list has 2 medication(s) that are the same as other medications prescribed for you. Read the directions carefully, and ask your doctor or other care provider to review them with you.         Where to get your medicines      These medications were sent to [facility] [place] [id number]     Phone:  [phone]     nortriptyline 50 MG capsule                Primary Care Provider Office Phone # Fax #    Estefany Stanley PA-C [phone] [address] [facility] [address]        Equal Access to Services     [facility] : Hadii yamilet arrieta hadasho Sodanielaali, waaxda luqadaha, qaybta kaalmada adeegyada, fazal george. So [facility] [phone].    ATENCIÓN: Si habla español, tiene a romero disposición servicios gratuitos de asistencia lingüística. Llame al [phone].    We  comply with applicable federal civil rights laws and [place] laws. We do not discriminate on the basis of race, color, national origin, age, disability, sex, sexual orientation, or gender identity.            Thank you!     Thank you for choosing [facility]  for your care. Our goal is always to provide you with excellent care. Hearing back from our patients is one way we can continue to improve our services. Please take a few minutes to complete the written survey that you may receive in the mail after your visit with us. Thank you!             Your Updated Medication List - Protect others around you: Learn how to safely use, store and throw away your medicines at www.disposemymeds.org.          This list is accurate as of 6/25/18 10:49 AM.  Always use your most recent med list.                   Brand Name Dispense Instructions for use Diagnosis    ALEVE PO      Take 220 mg by mouth as needed for moderate pain        busPIRone 15 MG tablet    BUSPAR    270 tablet    TAKE 2 TABLETS BY MOUTH IN THE MORNING AND TAKE 1 TABLET BY MOUTH IN THE EVENING    Anxiety       dextromethorphan 30 MG/5ML liquid    DELSYM    89 mL    Take 5 mLs (30 mg) by mouth 2 times daily    Cough       fluticasone 50 MCG/ACT spray    FLONASE    1 Bottle    Spray 1-2 sprays into both nostrils daily    Post-nasal drip       levothyroxine 175 MCG tablet    SYNTHROID/LEVOTHROID    90 tablet    Take 1 tablet (175 mcg) by mouth daily    Primary hypothyroidism       * nortriptyline 75 MG capsule    PAMELOR    90 capsule    TAKE ONE CAPSULE BY MOUTH AT BEDTIME    Major depressive disorder, recurrent episode, mild (H)       * nortriptyline 50 MG capsule    PAMELOR    90 capsule    Take 1 capsule (50 mg) by mouth At Bedtime    Irritable bowel syndrome with diarrhea       pantoprazole 20 MG EC tablet    PROTONIX    90 tablet    TAKE ONE TABLET BY MOUTH ONCE DAILY. TAKE BY MOUTH 30 TO 60 MINUTES BEFORE A MEAL.    Gastroesophageal  reflux disease with esophagitis, Hiatal hernia       ranitidine 300 MG tablet    ZANTAC    90 tablet    Take 1 tablet (300 mg) by mouth At Bedtime    Gastroesophageal reflux disease with esophagitis       sertraline 100 MG tablet    ZOLOFT    180 tablet    Take 1 tabs (100 mg) nightly for 4 days. Then increase to 2 tabs (200 mg) nightly.    Major depressive disorder, recurrent episode, mild (H), Anxiety       SUMAtriptan 100 MG tablet    IMITREX    9 tablet    Take 1 tablet (100 mg) by mouth at onset of headache for migraine May repeat in 2 hours if needed: max 2/day; average number of headaches monthly 2    Acute nonintractable headache, unspecified headache type       triamcinolone 0.1 % ointment    KENALOG     Apply topically 2 times daily as needed for irritation        * Notice:  This list has 2 medication(s) that are the same as other medications prescribed for you. Read the directions carefully, and ask your doctor or other care provider to review them with you.

## 2018-06-25 NOTE — Clinical Note
Maddie Allison and I saw Eunice today for follow-up med review see our plan for details biggest change was adding some vitamins and decreasing nortriptyline dose to avoid the overt constipation, otherwise she is doing well now that she is retired and taking Social Security, still working on getting out of her foreclosed house and into new residence in the next 4-6 months.  Jerry Jordan Formerly [facility]. Medication Therapy Management Provider [phone]

## 2018-06-25 NOTE — TELEPHONE ENCOUNTER
Panel Management Review      Patient has the following on her problem list: None      Composite cancer screening  Chart review shows that this patient is due/due soon for the following Pap Smear, Mammogram and Colonoscopy  Summary:    Patient is due/failing the following:   COLONOSCOPY, MAMMOGRAM and PAP    Action needed:   Patient needs office visit for physical and pap, also schedule colonoscopy. Mammogram is due at the end of July.    Type of outreach:    routed to panel pool for outreach    Questions for provider review:    None                                                                                                                                    Orly Roberson CMA       Chart routed to Care Team .

## 2018-06-27 ENCOUNTER — APPOINTMENT (OUTPATIENT)
Dept: CT IMAGING | Facility: CLINIC | Age: 63
End: 2018-06-27
Attending: NURSE PRACTITIONER
Payer: COMMERCIAL

## 2018-06-27 ENCOUNTER — HOSPITAL ENCOUNTER (EMERGENCY)
Facility: CLINIC | Age: 63
Discharge: HOME | End: 2018-06-28
Attending: NURSE PRACTITIONER | Admitting: NURSE PRACTITIONER
Payer: COMMERCIAL

## 2018-06-27 ENCOUNTER — OFFICE VISIT (OUTPATIENT)
Dept: FAMILY MEDICINE | Facility: CLINIC | Age: 63
End: 2018-06-27
Payer: COMMERCIAL

## 2018-06-27 ENCOUNTER — TELEPHONE (OUTPATIENT)
Dept: FAMILY MEDICINE | Facility: CLINIC | Age: 63
End: 2018-06-27

## 2018-06-27 VITALS
BODY MASS INDEX: 29.4 KG/M2 | WEIGHT: 194 LBS | DIASTOLIC BLOOD PRESSURE: 80 MMHG | TEMPERATURE: 99.5 F | HEIGHT: 68 IN | RESPIRATION RATE: 16 BRPM | SYSTOLIC BLOOD PRESSURE: 110 MMHG | HEART RATE: 104 BPM

## 2018-06-27 DIAGNOSIS — R41.3 MEMORY LOSS: ICD-10-CM

## 2018-06-27 DIAGNOSIS — F41.1 GENERALIZED ANXIETY DISORDER: Primary | ICD-10-CM

## 2018-06-27 DIAGNOSIS — G45.9 TRANSIENT CEREBRAL ISCHEMIA, UNSPECIFIED TYPE: ICD-10-CM

## 2018-06-27 DIAGNOSIS — R20.2 PARESTHESIA: ICD-10-CM

## 2018-06-27 LAB
ANION GAP SERPL CALCULATED.3IONS-SCNC: 7 MMOL/L (ref 3–14)
APTT PPP: 26 SEC (ref 22–37)
BASOPHILS # BLD AUTO: 0 10E9/L (ref 0–0.2)
BASOPHILS NFR BLD AUTO: 0.4 %
BUN SERPL-MCNC: 9 MG/DL (ref 7–30)
CALCIUM SERPL-MCNC: 8.8 MG/DL (ref 8.5–10.1)
CHLORIDE SERPL-SCNC: 107 MMOL/L (ref 94–109)
CO2 SERPL-SCNC: 26 MMOL/L (ref 20–32)
CREAT SERPL-MCNC: 0.78 MG/DL (ref 0.52–1.04)
DIFFERENTIAL METHOD BLD: NORMAL
EOSINOPHIL # BLD AUTO: 0.3 10E9/L (ref 0–0.7)
EOSINOPHIL NFR BLD AUTO: 3.3 %
ERYTHROCYTE [DISTWIDTH] IN BLOOD BY AUTOMATED COUNT: 12.5 % (ref 10–15)
GFR SERPL CREATININE-BSD FRML MDRD: 75 ML/MIN/1.7M2
GLUCOSE SERPL-MCNC: 111 MG/DL (ref 70–99)
HCT VFR BLD AUTO: 41.2 % (ref 35–47)
HGB BLD-MCNC: 14.2 G/DL (ref 11.7–15.7)
IMM GRANULOCYTES # BLD: 0.1 10E9/L (ref 0–0.4)
IMM GRANULOCYTES NFR BLD: 0.6 %
INR PPP: 0.92 (ref 0.86–1.14)
LYMPHOCYTES # BLD AUTO: 1.7 10E9/L (ref 0.8–5.3)
LYMPHOCYTES NFR BLD AUTO: 18 %
MCH RBC QN AUTO: 30.1 PG (ref 26.5–33)
MCHC RBC AUTO-ENTMCNC: 34.5 G/DL (ref 31.5–36.5)
MCV RBC AUTO: 87 FL (ref 78–100)
MONOCYTES # BLD AUTO: 0.4 10E9/L (ref 0–1.3)
MONOCYTES NFR BLD AUTO: 4.2 %
NEUTROPHILS # BLD AUTO: 7 10E9/L (ref 1.6–8.3)
NEUTROPHILS NFR BLD AUTO: 73.5 %
NRBC # BLD AUTO: 0 10*3/UL
NRBC BLD AUTO-RTO: 0 /100
PLATELET # BLD AUTO: 294 10E9/L (ref 150–450)
POTASSIUM SERPL-SCNC: 3.4 MMOL/L (ref 3.4–5.3)
RBC # BLD AUTO: 4.72 10E12/L (ref 3.8–5.2)
SODIUM SERPL-SCNC: 140 MMOL/L (ref 133–144)
TROPONIN I SERPL-MCNC: <0.015 UG/L (ref 0–0.04)
WBC # BLD AUTO: 9.5 10E9/L (ref 4–11)

## 2018-06-27 PROCEDURE — 70450 CT HEAD/BRAIN W/O DYE: CPT

## 2018-06-27 PROCEDURE — 84484 ASSAY OF TROPONIN QUANT: CPT | Performed by: NURSE PRACTITIONER

## 2018-06-27 PROCEDURE — 70496 CT ANGIOGRAPHY HEAD: CPT

## 2018-06-27 PROCEDURE — 99285 EMERGENCY DEPT VISIT HI MDM: CPT | Mod: 25

## 2018-06-27 PROCEDURE — 85025 COMPLETE CBC W/AUTO DIFF WBC: CPT | Performed by: NURSE PRACTITIONER

## 2018-06-27 PROCEDURE — 93005 ELECTROCARDIOGRAM TRACING: CPT

## 2018-06-27 PROCEDURE — 70460 CT HEAD/BRAIN W/DYE: CPT

## 2018-06-27 PROCEDURE — 99214 OFFICE O/P EST MOD 30 MIN: CPT | Performed by: FAMILY MEDICINE

## 2018-06-27 PROCEDURE — 85730 THROMBOPLASTIN TIME PARTIAL: CPT | Performed by: NURSE PRACTITIONER

## 2018-06-27 PROCEDURE — 25000128 H RX IP 250 OP 636: Performed by: NURSE PRACTITIONER

## 2018-06-27 PROCEDURE — 80048 BASIC METABOLIC PNL TOTAL CA: CPT | Performed by: NURSE PRACTITIONER

## 2018-06-27 PROCEDURE — 85610 PROTHROMBIN TIME: CPT | Performed by: NURSE PRACTITIONER

## 2018-06-27 RX ORDER — TRIAMCINOLONE ACETONIDE 1 MG/G
OINTMENT TOPICAL 2 TIMES DAILY PRN
Status: CANCELLED | OUTPATIENT
Start: 2018-06-27

## 2018-06-27 RX ORDER — IOPAMIDOL 755 MG/ML
500 INJECTION, SOLUTION INTRAVASCULAR ONCE
Status: COMPLETED | OUTPATIENT
Start: 2018-06-27 | End: 2018-06-27

## 2018-06-27 RX ADMIN — SODIUM CHLORIDE 80 ML: 9 INJECTION, SOLUTION INTRAVENOUS at 23:22

## 2018-06-27 RX ADMIN — IOPAMIDOL 120 ML: 755 INJECTION, SOLUTION INTRAVENOUS at 23:22

## 2018-06-27 ASSESSMENT — ENCOUNTER SYMPTOMS
NUMBNESS: 1
NERVOUS/ANXIOUS: 1
HEADACHES: 1
CONSTITUTIONAL NEGATIVE: 1
TINGLING: 1
HEADACHES: 1
INSOMNIA: 0
DOUBLE VISION: 0
BLURRED VISION: 0
FACIAL ASYMMETRY: 0
FALLS: 0
SPEECH DIFFICULTY: 1
FOCAL WEAKNESS: 0

## 2018-06-27 NOTE — PROGRESS NOTES
"HPI    SUBJECTIVE:   Mariela Duffy is a 62 year old female who presents to clinic today for the following health issues:      Anxiety Follow-Up - See nurse triage note from today    Status since last visit: Worsened \"went timur high this week\"    Other associated symptoms:None    Complicating factors:   Significant life event: Yes-  Son is getting  July 15th   Current substance abuse: None  Depression symptoms: Yes  QUYEN-7 SCORE 11/1/2016 5/9/2017 7/19/2017   Total Score - - -   Total Score 5 12 8       QUYEN-7    Amount of exercise or physical activity: None    Problems taking medications regularly: No    Medication side effects: Headache, and cannot remember what happened yesterday     Diet: regular (no restrictions)    Feeling much more anxious.  Tearful.  Feeling distinctly out of sort, \"feeling like I'm not here.\"  Does see a counselor at [facility].  Feeling a bit confused.  Sites several dates for things in the last year that she has been trouble keeping straight.  Notes that she felt that she's seen her counselor three times, was told they've seen each other many more times, knows she got rid of her dog in May, can't remember if it was this May or last May.  Also can't remember exactly when son's recent wedding was.  Did wake with some L arm weakness, not numbness this am.  This resolved fairly quickly.  Of note, was able to drive herself here without difficulty although doesn't come here typically. Feels like she's been sleeping well.  Feels memory has not been an issue in the last several months, just recently.  Not a headache behind her L eye.  Is taking 200 mg sertraline, 30 mg Buspar am, 15mg Buspar PM.  Nortriptyline used for IBS.  No recent fall, no head injury.  Notes that partner has parkinson's and recently needed to be moved to nursing home.  Also needed to have dog taken by her daughter because he was too much for her to take care of.  Denies hearing voices or seeing things that others don't.  " Denies thought of self harm.    Thyroid test earlier this month.    Review of Systems   Constitutional: Negative.    Eyes: Negative for blurred vision and double vision.   Musculoskeletal: Negative for falls.   Neurological: Positive for tingling and headaches. Negative for focal weakness.   Psychiatric/Behavioral: The patient is nervous/anxious. The patient does not have insomnia.          Physical Exam   Constitutional: She is oriented to person, place, and time and well-developed, well-nourished, and in no distress.   Eyes: Conjunctivae and EOM are normal.   Cardiovascular: Normal rate, regular rhythm and normal heart sounds.    Pulmonary/Chest: Effort normal and breath sounds normal.   Musculoskeletal: She exhibits no edema.   Neurological: She is alert and oriented to person, place, and time.   MMSE - 29/30 - unable to recall one of three words remotely   Skin: Skin is warm and dry.   Psychiatric: Her mood appears anxious. Her affect is labile. She expresses no suicidal ideation. She expresses no suicidal plans.   Vitals reviewed.    (F41.1) Generalized anxiety disorder  (primary encounter diagnosis)  Comment: MMSE is pretty normal, although speech is somewhat tangential and story is one of confusion.    Plan: trial increase buspar to 30mg BID, close f/u in one week      RTC in 1w    Omi Dueñas MD

## 2018-06-27 NOTE — TELEPHONE ENCOUNTER
Pt calls with worsening anxiety,  Difficulty remembering her activities after about noon yesterday. Recalls going to a morning meeting and later paying bills at home but not much more.   She states this is a symptom of her anxiety. Denies this is emergency, no other symptoms.  OV in FM scheduled today.   Eliud Nielson RN

## 2018-06-27 NOTE — ED AVS SNAPSHOT
[facility]    [address]    [facility] [address]    Phone:  [phone]    Fax:  [phone]                                       Mariela Duffy   MRN: [id number]    Department:  [facility]   Date of Visit:  6/27/2018           After Visit Summary Signature Page     I have received my discharge instructions, and my questions have been answered. I have discussed any challenges I see with this plan with the nurse or doctor.    ..........................................................................................................................................  Patient/Patient Representative Signature      ..........................................................................................................................................  Patient Representative Print Name and Relationship to Patient    ..................................................               ................................................  Date                                            Time    ..........................................................................................................................................  Reviewed by Signature/Title    ...................................................              ..............................................  Date                                                            Time

## 2018-06-27 NOTE — LETTER
06/28/18      To Whom it may concern:    Shaquille Duffy was in our Emergency Department today, 06/28/18. with a patient who needed their assistance.  Please excuse them from work/school.      Sincerely,    Marlin QUINTANILLA RN

## 2018-06-27 NOTE — MR AVS SNAPSHOT
After Visit Summary   6/27/2018    Mariela Duffy    MRN: [id number]           Patient Information     Date Of Birth          1955        Visit Information        Provider Department      6/27/2018 2:00 PM Omi Dueñas MD [facility]        Today's Diagnoses     Generalized anxiety disorder    -  1      Care Instructions    Increase Buspar to 30mg twice daily (2 tabs twice daily).          Follow-ups after your visit        Follow-up notes from your care team     Return in about 1 week (around 7/4/2018).      Your next 10 appointments already scheduled     Jul 27, 2018  1:30 PM CDT   [facility] REGINE [facility]oro CNP   [facility]    [address] [facility] [address] [phone]            Oct 15, 2018 10:00 AM CDT   SHORT with Jerry Jordan RPH   [facility] ([facility])    [address] [facility] [address] [phone]              Who to contact     If you have questions or need follow up information about today's clinic visit or your schedule please contact [facility] directly at [phone].  Normal or non-critical lab and imaging results will be communicated to you by MyChart, letter or phone within 4 business days after the clinic has received the results. If you do not hear from us within 7 days, please contact the clinic through MyChart or phone. If you have a critical or abnormal lab result, we will notify you by phone as soon as possible.  Submit refill requests through [employer] or call your pharmacy and they will forward the refill request to us. Please allow 3 business days for your refill to be completed.          Additional Information About Your Visit        Care EveryWhere ID     This is your Care EveryWhere ID. This could be used by other organizations to access your [facility] medical records  [id number]    "     Your Vitals Were     Pulse Temperature Respirations Height Last Period BMI (Body Mass Index)    104 99.5  F (37.5  C) (Oral) 16 5' 8\" (1.727 m) 01/20/2004 29.5 kg/m2       Blood Pressure from Last 3 Encounters:   06/27/18 110/80   06/25/18 122/78   06/18/18 114/78    Weight from Last 3 Encounters:   06/27/18 194 lb (88 kg)   06/25/18 191 lb (86.6 kg)   06/18/18 188 lb (85.3 kg)              Today, you had the following     No orders found for display       Primary Care Provider Office Phone # Fax #    Estefany Stanley PA-C [phone] [address] [facility] [address]        Equal Access to Services     CHANELL RINCON : Hadii aad ku hadasho Sokaelyn, waaxda luqadaha, qaybta kaalmada adeegyada, fazal guillermo . So [facility] [phone].    ATENCIÓN: Si habla español, tiene a romero disposición servicios gratuitos de asistencia lingüística. Tushar al [phone].    We comply with applicable federal civil rights laws and [place] laws. We do not discriminate on the basis of race, color, national origin, age, disability, sex, sexual orientation, or gender identity.            Thank you!     Thank you for choosing [facility]  for your care. Our goal is always to provide you with excellent care. Hearing back from our patients is one way we can continue to improve our services. Please take a few minutes to complete the written survey that you may receive in the mail after your visit with us. Thank you!             Your Updated Medication List - Protect others around you: Learn how to safely use, store and throw away your medicines at www.disposemymeds.org.          This list is accurate as of 6/27/18  2:52 PM.  Always use your most recent med list.                   Brand Name Dispense Instructions for use Diagnosis    ALEVE PO      Take 220 mg by mouth as needed for moderate pain        busPIRone 15 MG tablet    BUSPAR    270 tablet    TAKE 2 TABLETS BY " MOUTH IN THE MORNING AND TAKE 1 TABLET BY MOUTH IN THE EVENING    Anxiety       fluticasone 50 MCG/ACT spray    FLONASE    1 Bottle    Spray 1-2 sprays into both nostrils daily    Post-nasal drip       levothyroxine 175 MCG tablet    SYNTHROID/LEVOTHROID    90 tablet    Take 1 tablet (175 mcg) by mouth daily    Primary hypothyroidism       nortriptyline 50 MG capsule    PAMELOR    90 capsule    Take 1 capsule (50 mg) by mouth At Bedtime    Irritable bowel syndrome with diarrhea       pantoprazole 20 MG EC tablet    PROTONIX    90 tablet    TAKE ONE TABLET BY MOUTH ONCE DAILY. TAKE BY MOUTH 30 TO 60 MINUTES BEFORE A MEAL.    Gastroesophageal reflux disease with esophagitis, Hiatal hernia       ranitidine 300 MG tablet    ZANTAC    90 tablet    Take 1 tablet (300 mg) by mouth At Bedtime    Gastroesophageal reflux disease with esophagitis       sertraline 100 MG tablet    ZOLOFT    180 tablet    Take 1 tabs (100 mg) nightly for 4 days. Then increase to 2 tabs (200 mg) nightly.    Major depressive disorder, recurrent episode, mild (H), Anxiety       triamcinolone 0.1 % ointment    KENALOG     Apply topically 2 times daily as needed for irritation

## 2018-06-27 NOTE — ED AVS SNAPSHOT
" [facility] Emergency Department    [address]    [facility] [address]    Phone:  [phone]    Fax:  [phone]                                       Mariela Duffy   MRN: [id number]    Department:  [facility] Emergency Department   Date of Visit:  6/27/2018           Patient Information     Date Of Birth          1955        Your diagnoses for this visit were:     Paresthesia     Memory loss     Transient cerebral ischemia, unspecified type        You were seen by aAron Rose APRN CNP.      Follow-up Information     Call [facility].    Why:  to schedule appointment for follow up    Contact information:    [address]  [address]  [facility] [phone]        Call [facility] NEUROLOGY.    Why:  to schedule appointment for follow up    Contact information:    [address]  [facility] [id number] [phone]        Call Estefany Stanley PA-C.    Specialty:  Physician Assistant    Why:  tomorrow to schedule appointment for follow up    Contact information:    [address] [facility] [address]  [phone]          Discharge Instructions         Take 1 full aspirin daily.  Follow-up with your primary care provider and neurology as we discussed.  Call tomorrow to schedule these.    Paraesthesias  Paraesthesia is a burning or prickling sensation that is sometimes felt in the hands, arms, legs or feet. It can also occur in other parts of the body. It can also feel like tingling or numbness, skin crawling, or itching. The feeling is not comfortable, but it is not painful. (The \"pins and needles\" feeling that happens when a foot or hand \"falls asleep\" is a temporary paraesthesia.)  Paraesthesias that last or come and go may be caused by medical issues that need to be treated. These include stroke, a bulging disk pressing on a nerve, a trapped nerve, vitamin deficiencies, or " even certain medicines.  Tests are often done. These tests may include blood tests, X-ray, CT (computerized tomography) scan, or a muscle test (electromyography). Depending on the cause, treatment may include physical therapy.  Home care    Tell the healthcare provider about all medicines you take. This includes prescription and over-the-counter medicines, vitamins, and herbs. Ask if any of the medicines may be causing your problems. Do not make any changes to prescription medicines without talking to your healthcare provider first.    You may be prescribed medicines to help relieve the tingling feeling or for pain. Take all medicines as directed.    A numb hand or foot may be more prone to injury. To help protect it:  ? Always use oven mitts.  ? Test water with an unaffected hand or foot.  ? Use caution when trimming nails. File sharp areas.  ? Wear shoes that fit well to avoid pressure points, blisters, and ulcers.  ? Inspect your hands and feet carefully (including the soles of your feet and between your toes) at least once a week. If you see red areas, sores, or other problems, tell your healthcare provider.  Follow-up care  Follow up with your doctor or as advised by our staff. You may need further testing or evaluation.  When to seek medical advice  Call your healthcare provider right away if any of the following occur:    Numbness or weakness of the face, one arm, or one leg    Slurred speech, confusion, trouble speaking, walking, or seeing    Severe headache, fainting spell, dizziness, or seizure    Chest, arm, neck, or upper back pain    Loss of bladder or bowel control    Open wound with redness, swelling, or pus  Date Last Reviewed: 9/25/2015 2000-2017 [employer]. [address] [place] [address], [place] [address]. All rights reserved. This information is not intended as a substitute for professional medical care. Always follow your healthcare professional's instructions.          Transient  Ischemic Attack (TIA)   Your symptoms were caused by a TIA, or mini-stroke. Even though your symptoms have gone away, this condition is as serious as a full stroke. It means you are more likely to have a full stroke. About 1 in 3 people who have a TIA go on to have a full stroke. And 4% to 10% of those people will have the stroke within 2 days.  A TIA is caused when something decreases or blocks blood flow to a part of your brain. A TIA often happens when a blood clot travels to a blood vessel in the brain. The clot reduces or blocks blood flow. This causes the symptoms you had. After a short while, the clot dissolves. Blood flows again, and the symptoms go away. People with hardening of the arteries (atherosclerosis) are at higher risk for a TIA. So are people who have an irregular heartbeat called atrial fibrillation.  A TIA causes symptoms similar to a stroke, but they last less than 24 hours. A full stroke causes symptoms that last more than 24 hours and may be permanent. But even if your symptoms only lasted a short time, the TIA may have damaged your brain tissue. Once you have had a TIA, you are at risk of having a full stroke. You will need tests to look at the blood flow to your brain. The tests can also rule out other causes of your symptoms. The tests may include an ultrasound of the arteries in your neck and an evaluation of your heart. They may also include a CT scan of your brain, an MRI scan of your brain, or both. If your healthcare provider finds problems, he or she will recommend treatment with medicines, procedures, or both.  Your provider may prescribe medicines to reduce your chance of having another TIA and stroke. These may include medicines that prevent blood clots, such as antiplatelet medicines and blood thinners (anticoagulants). Your doctor may recommend other treatment. This may include a procedure to open up a blocked artery in your neck.  Home care  The following guidelines will help  you take care of yourself at home:    Take any medicines your doctor has prescribed as directed. These may include antiplatelet medicines or medicines for other conditions, such as high blood pressure or high cholesterol.    A TIA is a serious event that puts you at risk of having a full stroke. Because of this, it is important to take steps to help prevent a stroke from happening. Your doctor will look at all of your risk factors when deciding on what other treatment you may need.  Ways to reduce your risk for stroke  High blood pressure, diabetes, high cholesterol, heavy drinking, and smoking are risk factors for stroke and heart disease. You can control these by taking medicines and making diet and lifestyle changes. One way to help prevent a stroke is to take aspirin or a similar medicine every day. But don't take daily aspirin unless your healthcare provider tells you to.  Your provider will work with you to make lifestyle changes to help prevent a stroke.  Diet  Your healthcare provider will give you information about changes you may need to make to your diet. You may need to see a registered dietitian for help with diet changes. Changes may include:    Eating less fat and cholesterol    Eating less salt (sodium). This is especially important if you have high blood pressure.    Eating more fresh fruits and vegetables    Eating lean proteins, such as fish, poultry, beans, and peas    Eating less red meat and processed meats    Using low-fat dairy products    Using vegetable and nut oils in limited amounts    Limiting how many sweets and processed foods such as chips, cookies, and baked goods you eat    Limiting how much alcohol you drink  Physical activity  Your healthcare provider may recommend that you get more exercise if you have not been as active as possible. He or she may suggest that you get 40 minutes of moderate to vigorous physical activity each day. You should do this at least 3 to 4 days a week. A  few examples of moderate to vigorous exercise are:    Walking at a brisk pace, about 3 to 4 miles per hour    Jogging or running    Swimming or water aerobics    Hiking    Dancing    Martial arts    Tennis    Riding a bike  Other ways to reduce your risk    Weight management. If you are overweight or obese, your healthcare provider will work with you to lose weight and lower your body mass index (BMI) to a normal or near-normal level. Making diet changes and increasing physical activity can help.    Smoking. If you smoke, break the habit. Enroll in a stop smoking program to improve your chances of success.    Stress. Learn how to manage your stress. This will help you deal with stress at home and at work.  Follow-up care  Call your doctor for an appointment in the next few days for another evaluation, or as advised. This is to make a plan for preventing another TIA or stroke. You may need to see a neurologist to follow up on your TIA. A neurologist is a doctor who specializes in treating brain and nervous system problems. You may need other tests or procedures.  If you had an X-ray, CT scan, MRI scan, or ECG (electrocardiogram), a specialist will review it. You ll be told of any new findings that will affect your care.  Call 911  Call 911 if any of these occur:    Any of your TIA symptoms return    New problems with speech, vision, walking, or weakness or numbness of the face or on one side of the body    Severe headache, fainting spell, dizziness, or seizure  Date Last Reviewed: 10/1/2016    6711-0299 [employer]. [address] [place] [address]. All rights reserved. This information is not intended as a substitute for professional medical care. Always follow your healthcare professional's instructions.          Your next 10 appointments already scheduled     Jul 09, 2018  3:00 PM CDT   SHORT with Jerry Jordan RPH   [facility] ([facility])    [address]  [facility] [address]   [phone]            Jul 27, 2018  1:30 PM CDT   New Visit with REGIEN Santoro [facility] ([facility])    [address]  [facility] [address]   [phone]            Oct 15, 2018 10:00 AM CDT   SHORT with Jerry Jordan RPH   [facility] ([facility])    [address]  [facility] [address]   [phone]              24 Hour Appointment Hotline       To make an appointment at any [facility], call [phone] ([phone]). If you don't have a family doctor or clinic, we will help you find one. [facility] ([facility]) are conveniently located to serve the needs of you and your family.             Review of your medicines      Our records show that you are taking the medicines listed below. If these are incorrect, please call your family doctor or clinic.        Dose / Directions Last dose taken    ALEVE PO   Dose:  220 mg        Take 220 mg by mouth as needed for moderate pain   Refills:  0        busPIRone 15 MG tablet   Commonly known as:  BUSPAR   Quantity:  270 tablet        TAKE 2 TABLETS BY MOUTH IN THE MORNING AND TAKE 1 TABLET BY MOUTH IN THE EVENING   Refills:  1        fluticasone 50 MCG/ACT spray   Commonly known as:  FLONASE   Dose:  1-2 spray   Quantity:  1 Bottle        Spray 1-2 sprays into both nostrils daily   Refills:  11        levothyroxine 175 MCG tablet   Commonly known as:  SYNTHROID/LEVOTHROID   Dose:  175 mcg   Quantity:  90 tablet        Take 1 tablet (175 mcg) by mouth daily   Refills:  1        nortriptyline 50 MG capsule   Commonly known as:  PAMELOR   Dose:  50 mg   Quantity:  90 capsule        Take 1 capsule (50 mg) by mouth At Bedtime   Refills:  1        pantoprazole 20 MG EC tablet   Commonly known as:  PROTONIX   Quantity:  90 tablet        TAKE ONE TABLET BY MOUTH ONCE DAILY. TAKE BY MOUTH 30 TO 60 MINUTES BEFORE A MEAL.    Refills:  1        ranitidine 300 MG tablet   Commonly known as:  ZANTAC   Dose:  300 mg   Quantity:  90 tablet        Take 1 tablet (300 mg) by mouth At Bedtime   Refills:  3        sertraline 100 MG tablet   Commonly known as:  ZOLOFT   Quantity:  180 tablet        Take 1 tabs (100 mg) nightly for 4 days. Then increase to 2 tabs (200 mg) nightly.   Refills:  1        triamcinolone 0.1 % ointment   Commonly known as:  KENALOG        Apply topically 2 times daily as needed for irritation   Refills:  0                Procedures and tests performed during your visit     Basic metabolic panel    CBC with platelets differential    CT Head w Contrast    CT Head w/o Contrast    CTA Angiogram Head Neck    EKG 12 lead    INR    MR Brain w/o & w Contrast    Partial thromboplastin time    Troponin I      Orders Needing Specimen Collection     None      Pending Results     Date and Time Order Name Status Description    6/28/2018 0002 EKG 12 lead Preliminary     6/27/2018 2358 MR Brain w/o & w Contrast In process     6/27/2018 2252 CTA Angiogram Head Neck In process     6/27/2018 2252 CT Head w Contrast In process     6/27/2018 2252 CT Head w/o Contrast In process             Pending Culture Results     No orders found for last 3 day(s).            Pending Results Instructions     If you had any lab results that were not finalized at the time of your Discharge, you can call the ED Lab Result RN at [phone]. You will be contacted by this team for any positive Lab results or changes in treatment. The nurses are available 7 days a week from 10A to 6:30P.  You can leave a message 24 hours per day and they will return your call.        Test Results From Your Hospital Stay        6/27/2018 10:54 PM      Result not yet available     Exam Ended         6/27/2018 10:54 PM      Result not yet available     Exam Ended         6/27/2018 10:54 PM      Result not yet available     Exam Ended         6/27/2018 11:27 PM      Component  Results     Component Value Ref Range & Units Status    Sodium 140 133 - 144 mmol/L Final    Potassium 3.4 3.4 - 5.3 mmol/L Final    Chloride 107 94 - 109 mmol/L Final    Carbon Dioxide 26 20 - 32 mmol/L Final    Anion Gap 7 3 - 14 mmol/L Final    Glucose 111 (H) 70 - 99 mg/dL Final    Urea Nitrogen 9 7 - 30 mg/dL Final    Creatinine 0.78 0.52 - 1.04 mg/dL Final    GFR Estimate 75 >60 mL/min/1.7m2 Final    Non  GFR Calc    GFR Estimate If Black >90 >60 mL/min/1.7m2 Final    African American GFR Calc    Calcium 8.8 8.5 - 10.1 mg/dL Final         6/27/2018 10:59 PM      Component Results     Component Value Ref Range & Units Status    WBC 9.5 4.0 - 11.0 10e9/L Final    RBC Count 4.72 3.8 - 5.2 10e12/L Final    Hemoglobin 14.2 11.7 - 15.7 g/dL Final    Hematocrit 41.2 35.0 - 47.0 % Final    MCV 87 78 - 100 fl Final    MCH 30.1 26.5 - 33.0 pg Final    MCHC 34.5 31.5 - 36.5 g/dL Final    RDW 12.5 10.0 - 15.0 % Final    Platelet Count 294 150 - 450 10e9/L Final    Diff Method Automated Method  Final    % Neutrophils 73.5 % Final    % Lymphocytes 18.0 % Final    % Monocytes 4.2 % Final    % Eosinophils 3.3 % Final    % Basophils 0.4 % Final    % Immature Granulocytes 0.6 % Final    Nucleated RBCs 0 0 /100 Final    Absolute Neutrophil 7.0 1.6 - 8.3 10e9/L Final    Absolute Lymphocytes 1.7 0.8 - 5.3 10e9/L Final    Absolute Monocytes 0.4 0.0 - 1.3 10e9/L Final    Absolute Eosinophils 0.3 0.0 - 0.7 10e9/L Final    Absolute Basophils 0.0 0.0 - 0.2 10e9/L Final    Abs Immature Granulocytes 0.1 0 - 0.4 10e9/L Final    Absolute Nucleated RBC 0.0  Final         6/27/2018 11:09 PM      Component Results     Component Value Ref Range & Units Status    INR 0.92 0.86 - 1.14 Final         6/27/2018 11:09 PM      Component Results     Component Value Ref Range & Units Status    PTT 26 22 - 37 sec Final         6/27/2018 11:27 PM      Component Results     Component Value Ref Range & Units Status    Troponin I ES <0.015  0.000 - 0.045 ug/L Final    The 99th percentile for upper reference range is 0.045 ug/L.  Troponin values   in the range of 0.045 - 0.120 ug/L may be associated with risks of adverse   clinical events.           6/28/2018  1:10 AM      Result not yet available     Exam Ended                Clinical Quality Measure: Blood Pressure Screening     Your blood pressure was checked while you were in the emergency department today. The last reading we obtained was  BP: (!) 145/95 . Please read the guidelines below about what these numbers mean and what you should do about them.  If your systolic blood pressure (the top number) is less than 120 and your diastolic blood pressure (the bottom number) is less than 80, then your blood pressure is normal. There is nothing more that you need to do about it.  If your systolic blood pressure (the top number) is 120-139 or your diastolic blood pressure (the bottom number) is 80-89, your blood pressure may be higher than it should be. You should have your blood pressure rechecked within a year by a primary care provider.  If your systolic blood pressure (the top number) is 140 or greater or your diastolic blood pressure (the bottom number) is 90 or greater, you may have high blood pressure. High blood pressure is treatable, but if left untreated over time it can put you at risk for heart attack, stroke, or kidney failure. You should have your blood pressure rechecked by a primary care provider within the next 4 weeks.  If your provider in the emergency department today gave you specific instructions to follow-up with your doctor or provider even sooner than that, you should follow that instruction and not wait for up to 4 weeks for your follow-up visit.        Thank you for choosing [facility]       Thank you for choosing [facility] for your care. Our goal is always to provide you with excellent care. Hearing back from our patients is one way we can continue to improve our services. Please  take a few minutes to complete the written survey that you may receive in the mail after you visit with us. Thank you!        Care EveryWhere ID     This is your Care EveryWhere ID. This could be used by other organizations to access your [facility] medical records  [id number]        Equal Access to Services     TUAN RINCON : Kalli Parker, elsie bates, nguyễn lorenzo, fazal george. So [facility] [place] [phone].    ATENCIÓN: Si habla español, tiene a romero disposición servicios gratuitos de asistencia lingüística. Llame al [phone].    We comply with applicable federal civil rights laws and [place] laws. We do not discriminate on the basis of race, color, national origin, age, disability, sex, sexual orientation, or gender identity.            After Visit Summary       This is your record. Keep this with you and show to your community pharmacist(s) and doctor(s) at your next visit.

## 2018-06-28 ENCOUNTER — APPOINTMENT (OUTPATIENT)
Dept: MRI IMAGING | Facility: CLINIC | Age: 63
End: 2018-06-28
Attending: NURSE PRACTITIONER
Payer: COMMERCIAL

## 2018-06-28 ENCOUNTER — TELEPHONE (OUTPATIENT)
Dept: FAMILY MEDICINE | Facility: CLINIC | Age: 63
End: 2018-06-28

## 2018-06-28 ENCOUNTER — PATIENT OUTREACH (OUTPATIENT)
Dept: CARE COORDINATION | Facility: CLINIC | Age: 63
End: 2018-06-28

## 2018-06-28 VITALS
DIASTOLIC BLOOD PRESSURE: 95 MMHG | RESPIRATION RATE: 18 BRPM | TEMPERATURE: 98.6 F | HEART RATE: 120 BPM | SYSTOLIC BLOOD PRESSURE: 145 MMHG | OXYGEN SATURATION: 96 %

## 2018-06-28 DIAGNOSIS — G45.9 TRANSIENT CEREBRAL ISCHEMIA, UNSPECIFIED TYPE: Primary | ICD-10-CM

## 2018-06-28 DIAGNOSIS — F41.9 ANXIETY: ICD-10-CM

## 2018-06-28 DIAGNOSIS — F33.0 MAJOR DEPRESSIVE DISORDER, RECURRENT EPISODE, MILD (H): ICD-10-CM

## 2018-06-28 LAB — INTERPRETATION ECG - MUSE: NORMAL

## 2018-06-28 PROCEDURE — 70553 MRI BRAIN STEM W/O & W/DYE: CPT

## 2018-06-28 PROCEDURE — 25000128 H RX IP 250 OP 636: Performed by: NURSE PRACTITIONER

## 2018-06-28 PROCEDURE — 25000132 ZZH RX MED GY IP 250 OP 250 PS 637: Performed by: NURSE PRACTITIONER

## 2018-06-28 PROCEDURE — A9585 GADOBUTROL INJECTION: HCPCS | Performed by: NURSE PRACTITIONER

## 2018-06-28 RX ORDER — ASPIRIN 325 MG
325 TABLET ORAL ONCE
Status: COMPLETED | OUTPATIENT
Start: 2018-06-28 | End: 2018-06-28

## 2018-06-28 RX ORDER — GADOBUTROL 604.72 MG/ML
10 INJECTION INTRAVENOUS ONCE
Status: COMPLETED | OUTPATIENT
Start: 2018-06-28 | End: 2018-06-28

## 2018-06-28 RX ADMIN — ASPIRIN 325 MG ORAL TABLET 325 MG: 325 PILL ORAL at 00:55

## 2018-06-28 RX ADMIN — GADOBUTROL 9 ML: 604.72 INJECTION INTRAVENOUS at 01:29

## 2018-06-28 ASSESSMENT — ENCOUNTER SYMPTOMS
FEVER: 0
NECK PAIN: 0
SHORTNESS OF BREATH: 0
COUGH: 0
WEAKNESS: 0
ABDOMINAL PAIN: 0
CHILLS: 0

## 2018-06-28 ASSESSMENT — ACTIVITIES OF DAILY LIVING (ADL): DEPENDENT_IADLS:: MEDICATION MANAGEMENT;MONEY MANAGEMENT

## 2018-06-28 NOTE — LETTER
June 28, 2018      Mariela Duffy  [address]  [facility] [address]        Dear Mariela,    I am a clinic care coordinator who works with your primary physician, Estefany Stanley, at the [facility]. I wanted to thank you for spending the time to talk with me.  I wanted to provide you with information on care coordination so that you can call me or your clinic care coordination team with questions or concerns about your health care. Below is a description of clinic care coordination and how I can further assist you.     The clinic care coordinator is a registered nurse and/or  who understand the health care system. The goal of clinic care coordination is to help you manage your health and improve access to the [facility] in the most efficient manner. The registered nurse can assist you in meeting your health care goals by providing education, coordinating services, and strengthening the communication among your providers. The  can assist you with financial, behavioral, psychosocial, chemical dependency, counseling, and/or psychiatric resources.    Please feel free to contact me at [phone], however my last day will be on July 6th, 2018. If you find you have a need after this date please call your clinic directly and they will connect you with a care coordinator that can assist you with any questions or concerns. We at [facility] are focused on providing you with the highest-quality healthcare experience possible and that all starts with you.     Sincerely,    ZOHREH Burden, MSW  Social Work Care Coordinator  P:[phone]  [facility] [place], and [place]    Enclosed: I have enclosed a copy of a 24 Hour Access Plan. This has helpful phone numbers for you to call when needed. Please keep this in an easy to access place to use as needed.                  [employer]  [employer] understands the importance  of home. We make it possible for people to live comfortably in their homes and stay connected to their communities by providing practical, reliable help with basic household chores, such as:  Laundry   Ironing   Dusting   Vacuuming   Light meal preparation   Baking   Cleaning closets   Grocery shopping / Errands   Decluttering/Organizing  [employer] staff are carefully screened, including background and reference checks. To best serve you, we can provide housekeeping at your convenience -- the minimum is two hours every other week. This fee-based service is provided on a sliding scale (based on the older adult's income). As needs change, we can link you with other programs and services that fit your situation.   For more information, call [phone]            Senior [facility] line -  [phone]   Helps to simplify the complex issues and decisions you face every day as a senior, Baby Kamrar, Medicare beneficiary, caregiver or someone trying to reduce prescription drug costs.  [facility] Line  not only helps connect you to services in your community, but also provides the actual assistance to help you get the information and personalized help you need to make good decisions. This includes providing face to face help to you in your home and community. The Senior [employer] Line has expertise in the areas of :    Medicare   Prescription Drug Expense assistance for [place] of all ages   Health Insurance Counseling   Forms assistance, including help applying for Medical Assistance and Medicare options   Long-term Care Insurance, including the Long-term Care Partnership   Long-term Care Planning Options   Caregiver planning and support   Grandparents raising Grandchildren

## 2018-06-28 NOTE — ED TRIAGE NOTES
Pt arrives to the ED for left sided numbness that has been present since this morning. Pt also having c/o of headache. Seen at clinic for her headache this morning. Pt states left side feels tingly. Pt having a little difficulty finding words.

## 2018-06-28 NOTE — PROGRESS NOTES
"Clinic Care Coordination Contact  OUTREACH    Referral Information:  Referral Source: IP Handoff  Primary Diagnosis: Cognitive Impairment  Chief Complaint   Patient presents with     ER F/U     cognition, decline        Universal Utilization:   Utilization    Last refreshed: 6/28/2018 11:39 AM:  No Show Count (past year) 1       Last refreshed: 6/28/2018 11:39 AM:  ED visits 3       Last refreshed: 6/28/2018 11:39 AM:  Hospital admissions 0          Current as of: 6/28/2018 11:39 AM             Clinical Concerns:  Health Maintenance Reviewed: Due/Overdue   Health Maintenance Due   Topic Date Due     HIV SCREEN (SYSTEM ASSIGNED)  10/12/1973     PAP Q3 YR  04/19/2016     ADVANCE DIRECTIVE PLANNING Q5 YRS  04/19/2018     COLONOSCOPY Q5 YR  06/14/2018     MAMMO Q1 YR  07/28/2018     Clinical Pathway Name: None  Current Medical Concerns:  Hx of TIA    Current Behavioral Concerns: depression, anxiety     Education Provided to patient: CCSW outreached to the dtr Dominique. She contacted the clinic earlier today due to concerns about the pt's safety and cognition.  Dominique states her mom has been declining over the last 6 to 7 weeks. Reports more confusion and difficulty \"coping with stress\". States her mom has a history or \"regressing into a 6 year old girl\" when she has to face confrontation and around this time she had a fight with her [employer] worker and was also served a foreclosure notice on her home.  The dtr is responsible for setting up her mother's medications and does this every week. States yesterday she set up the pill box and left her mom's thyroid medication bottle by her bed like normal. When she came over 3 hours later a pill was missing from the pill box, her anti-anxiety, and the thyroid medications was not by the bed anymore. Reports her mom is forgetful and didn't seem to remember touching the medications. Dtr is not sure if mom is having difficulty remembering or if she is not wanting to take her " "medications. Reports a hx of the pt wanting to wean herself off of her psychiatric medications in the past.    Dtr also reports concerns about the pt driving. States she has been forgetting driving to places and is \"easily confused\". States her and her brothers have been having difficulty keeping track of the truth and the pt often forgets pieces of information or does not disclose it. Children are trying to go to all appointments with the pt to better understand the information. The pt also does not like to \"ask for help\".     The dtr is unsure what supports the pt has. States she knows she has a therapist and [employer] worker through [employer] and just started seeing a psychiatrist there as well. She has a f/u appt with psychiatry next week as well. Was told to also seek neuropsyche testing by psychiatry. Scheduled this for October 2018. Unsure about [facility]. States her mom says she has a  and PCA but the dtr does not feel this is the case.     CCSW discussed a home care referral. The dtr feels her mom will consent to this , but expresses concern stating their family has had experience with \"social workers\" and services and she is concerned the [facility] will intervene and take her mom's rights away. CCSW stated if there is a safety concern home care will have to report to [facility], however the county is not quick to take away rights and would first attempt to intervene and make the situation safe before resorting to guardianship. CCSW will need to confirm first with the pt she is ok with this referral placement.     Also discussed some [facility] services. CCSW will call the [facility] to see what the patient has in regards to services and follow up with the dtr.    Medication Management:  dtr sets up pill box and patient takes     Functional Status:  Mobility Status: Independent  Transportation means:: Regular car, Family     Psychosocial:  [place] ( [facility] states this is " "[place]. [place])  Current living arrangement:: I live alone  Type of residence:: Apartment    The patient has 9 children, 1 dtr and 8 sons. Two siblings, 1 sister and 1 brother. Dtr and several brothers heavily involved. Some are not   and receives alimony. Working on trying to get SSDI. Dtr is unsure where this process is.  Does not work   Lives alone in a home that's being foreclosed on. Must be out by December. Kids would like her to move into supportive housing.   [place]onalisaer household. Started 1 yr ago with to who was a \"\". The fisang has since moved out, but the home is still in disarray.     CCSW will send out a [facility] consent form for the pt to sign to allow her children to be authorized representatives  CCSW will send out [place] book and [place] pamphlet     Resources and Interventions:  Current Resources:  ;    Advanced Care Plans/Directives on file:: No     Goals:   Patient/Caregiver understanding: Good - dtr seems genuinely concerns for mother and wants to support her. Appears to have some burnout and would benefit from support to help get her mother services in the home.      Next 5 appointments (look out 90 days)     Jul 09, 2018  3:00 PM CDT   SHORT with Jerry Jordan RPH   [facility]    [address]  [facility] [address] [phone]                   PLAN:  Pt will outreach as needed  CCSW will follow up in 1-2 business days. dtr off on Monday and works 9-330 on friday    Orly Rivera, Social Work Care Coordinator, RICHARDSW, MSW  [facility]: [place], [place], and [place]   P:[phone]/ Signed June 28, 2018        "

## 2018-06-28 NOTE — PROGRESS NOTES
Daughter calls back, pt cannot get in with neuro until August, ER said to get in one week, talked to CC earlier today, routed to Orly, can you facilitate? MP out now, cannot see neuro referral from ER, saw earlier?, inform daughter     Thelma Strong RN, BSN  Message handled by Nurse Triage.

## 2018-06-28 NOTE — PROGRESS NOTES
Clinic Care Coordination Contact    CCSW called the patient. Left a voicemail and asked for a call back.    CCSW called the [facility]. Was told the pt was active with [facility] department not the [facility] department. Spoke to [facility] ans was told the pt is on [place] not medical assistance.     15:40 incoming call from the pt. Discussed home care. She is hesitant. States she would like to do it for her daughter but is worried about it for herself. Concerned about the state of her home and states her [employer] worker recently told her she had to report her to APS which caused some tension. Would like to try and clean before anyone comes to the home. States she would like to discuss homecare with her son first and see what his opinion is. Agrees to follow up on Monday to discuss the referral  Pt states she was accepted for SSI recently but did NOT apply for SSDI. Will get a benefit of $1013 a month. Has not received this yet.  Looking for housing. Would like a town home or a 55 plus community. Not wanting an [facility] or memory care. CCSW will send a senior housing guide and information for a place for mom ( pt requested).      Orly Rivera, Social Work Care Coordinator, LGSW, MSW  [facility] [place], [place], and [place]  P:[phone]/ Signed June 28, 2018

## 2018-06-28 NOTE — LETTER
Health Care Home - Access Care Plan    About Me  Patient Name:  Mariela Edmond    YOB: 1955  Age:                             62 year old   Basia MRN:            [id number] Telephone Information:     Home Phone [phone]   Mobile [phone]       Address:    [address]   [place] [address] Email address:  [email or web address]      Emergency Contact(s)  Name Relationship Lgl Grd Work Phone Home Phone Mobile Phone   1. ERIN EDMOND Son   [phone]    2. MAYITO,LISA Daughter    [phone]   3. ERNESTO, VASQUEZ Son  [phone]   4. JADENBEV Friend    [phone]             Health Maintenance: Routine Health maintenance Reviewed: Due/Overdue   Health Maintenance Due   Topic Date Due     HIV SCREEN (SYSTEM ASSIGNED)  10/12/1973     PAP Q3 YR  04/19/2016     ADVANCE DIRECTIVE PLANNING Q5 YRS  04/19/2018     COLONOSCOPY Q5 YR  06/14/2018     MAMMO Q1 YR  07/28/2018        My Access Plan  Medical Emergency 911   Questions or concerns during clinic hours Primary Clinic Line, I will call the clinic directly: [facility] - [phone]   24 Hour Appointment Line [phone] or  [phone]) (toll free)   24 Hour Nurse Line [phone] (toll free)   Questions or concerns outside clinic hours 24 Hour Appointment Line, I will call the after-hours on-call line:   [facility] [phone] or [phone] ([phone]) (toll-free)   [facility], [phone]   Preferred Hospital [facility]  [phone]   Preferred Pharmacy [facility] [place], [place] - [address]     Behavioral Health Crisis Line The National Suicide Prevention Lifeline at 1-193.825.7303 or 911     My Care Team Members  Patient Care Team       Relationship Specialty Notifications Start End    Estefany Stanley PA-C PCP - General Family  Practice  4/19/13     Phone: [phone] Fax: [phone]         [address] [facility] [address]    Orly Rivera LGSW Clinic Care Coordinator Primary Care - CC  6/28/18     Phone: [phone] Fax: [phone]               My Medical and Care Information  Problem List   Patient Active Problem List   Diagnosis     Contact dermatitis and other eczema due to other specified agent     Allergic rhinitis due to other allergen     Esophageal reflux     Irritable bowel syndrome     Rosacea     Postsurgical hypothyroidism     Iron deficiency anemia     Absence of menstruation     Mild major depression (H)     CARDIOVASCULAR SCREENING; LDL GOAL LESS THAN 160     Generalized anxiety disorder     Hypothyroidism     Tubular adenoma of colon     Seasonal affective disorder (H)     Rhinitis     Headache     ADD (attention deficit disorder)     Other insomnia     TIA (transient ischemic attack)     Gastroesophageal reflux disease with esophagitis     Hiatal hernia     History of colonic polyps     BP check

## 2018-06-28 NOTE — DISCHARGE INSTRUCTIONS
"  Take 1 full aspirin daily.  Follow-up with your primary care provider and neurology as we discussed.  Call tomorrow to schedule these.    Paraesthesias  Paraesthesia is a burning or prickling sensation that is sometimes felt in the hands, arms, legs or feet. It can also occur in other parts of the body. It can also feel like tingling or numbness, skin crawling, or itching. The feeling is not comfortable, but it is not painful. (The \"pins and needles\" feeling that happens when a foot or hand \"falls asleep\" is a temporary paraesthesia.)  Paraesthesias that last or come and go may be caused by medical issues that need to be treated. These include stroke, a bulging disk pressing on a nerve, a trapped nerve, vitamin deficiencies, or even certain medicines.  Tests are often done. These tests may include blood tests, X-ray, CT (computerized tomography) scan, or a muscle test (electromyography). Depending on the cause, treatment may include physical therapy.  Home care    Tell the healthcare provider about all medicines you take. This includes prescription and over-the-counter medicines, vitamins, and herbs. Ask if any of the medicines may be causing your problems. Do not make any changes to prescription medicines without talking to your healthcare provider first.    You may be prescribed medicines to help relieve the tingling feeling or for pain. Take all medicines as directed.    A numb hand or foot may be more prone to injury. To help protect it:  ? Always use oven mitts.  ? Test water with an unaffected hand or foot.  ? Use caution when trimming nails. File sharp areas.  ? Wear shoes that fit well to avoid pressure points, blisters, and ulcers.  ? Inspect your hands and feet carefully (including the soles of your feet and between your toes) at least once a week. If you see red areas, sores, or other problems, tell your healthcare provider.  Follow-up care  Follow up with your doctor or as advised by our staff. You may " need further testing or evaluation.  When to seek medical advice  Call your healthcare provider right away if any of the following occur:    Numbness or weakness of the face, one arm, or one leg    Slurred speech, confusion, trouble speaking, walking, or seeing    Severe headache, fainting spell, dizziness, or seizure    Chest, arm, neck, or upper back pain    Loss of bladder or bowel control    Open wound with redness, swelling, or pus  Date Last Reviewed: 9/25/2015 2000-2017 [employer]. [address] [place] [address]. All rights reserved. This information is not intended as a substitute for professional medical care. Always follow your healthcare professional's instructions.          Transient Ischemic Attack (TIA)   Your symptoms were caused by a TIA, or mini-stroke. Even though your symptoms have gone away, this condition is as serious as a full stroke. It means you are more likely to have a full stroke. About 1 in 3 people who have a TIA go on to have a full stroke. And 4% to 10% of those people will have the stroke within 2 days.  A TIA is caused when something decreases or blocks blood flow to a part of your brain. A TIA often happens when a blood clot travels to a blood vessel in the brain. The clot reduces or blocks blood flow. This causes the symptoms you had. After a short while, the clot dissolves. Blood flows again, and the symptoms go away. People with hardening of the arteries (atherosclerosis) are at higher risk for a TIA. So are people who have an irregular heartbeat called atrial fibrillation.  A TIA causes symptoms similar to a stroke, but they last less than 24 hours. A full stroke causes symptoms that last more than 24 hours and may be permanent. But even if your symptoms only lasted a short time, the TIA may have damaged your brain tissue. Once you have had a TIA, you are at risk of having a full stroke. You will need tests to look at the blood flow to your brain.  The tests can also rule out other causes of your symptoms. The tests may include an ultrasound of the arteries in your neck and an evaluation of your heart. They may also include a CT scan of your brain, an MRI scan of your brain, or both. If your healthcare provider finds problems, he or she will recommend treatment with medicines, procedures, or both.  Your provider may prescribe medicines to reduce your chance of having another TIA and stroke. These may include medicines that prevent blood clots, such as antiplatelet medicines and blood thinners (anticoagulants). Your doctor may recommend other treatment. This may include a procedure to open up a blocked artery in your neck.  Home care  The following guidelines will help you take care of yourself at home:    Take any medicines your doctor has prescribed as directed. These may include antiplatelet medicines or medicines for other conditions, such as high blood pressure or high cholesterol.    A TIA is a serious event that puts you at risk of having a full stroke. Because of this, it is important to take steps to help prevent a stroke from happening. Your doctor will look at all of your risk factors when deciding on what other treatment you may need.  Ways to reduce your risk for stroke  High blood pressure, diabetes, high cholesterol, heavy drinking, and smoking are risk factors for stroke and heart disease. You can control these by taking medicines and making diet and lifestyle changes. One way to help prevent a stroke is to take aspirin or a similar medicine every day. But don't take daily aspirin unless your healthcare provider tells you to.  Your provider will work with you to make lifestyle changes to help prevent a stroke.  Diet  Your healthcare provider will give you information about changes you may need to make to your diet. You may need to see a registered dietitian for help with diet changes. Changes may include:    Eating less fat and  cholesterol    Eating less salt (sodium). This is especially important if you have high blood pressure.    Eating more fresh fruits and vegetables    Eating lean proteins, such as fish, poultry, beans, and peas    Eating less red meat and processed meats    Using low-fat dairy products    Using vegetable and nut oils in limited amounts    Limiting how many sweets and processed foods such as chips, cookies, and baked goods you eat    Limiting how much alcohol you drink  Physical activity  Your healthcare provider may recommend that you get more exercise if you have not been as active as possible. He or she may suggest that you get 40 minutes of moderate to vigorous physical activity each day. You should do this at least 3 to 4 days a week. A few examples of moderate to vigorous exercise are:    Walking at a brisk pace, about 3 to 4 miles per hour    Jogging or running    Swimming or water aerobics    Hiking    Dancing    Martial arts    Tennis    Riding a bike  Other ways to reduce your risk    Weight management. If you are overweight or obese, your healthcare provider will work with you to lose weight and lower your body mass index (BMI) to a normal or near-normal level. Making diet changes and increasing physical activity can help.    Smoking. If you smoke, break the habit. Enroll in a stop smoking program to improve your chances of success.    Stress. Learn how to manage your stress. This will help you deal with stress at home and at work.  Follow-up care  Call your doctor for an appointment in the next few days for another evaluation, or as advised. This is to make a plan for preventing another TIA or stroke. You may need to see a neurologist to follow up on your TIA. A neurologist is a doctor who specializes in treating brain and nervous system problems. You may need other tests or procedures.  If you had an X-ray, CT scan, MRI scan, or ECG (electrocardiogram), a specialist will review it. You ll be told of any  new findings that will affect your care.  Call 911  Call 911 if any of these occur:    Any of your TIA symptoms return    New problems with speech, vision, walking, or weakness or numbness of the face or on one side of the body    Severe headache, fainting spell, dizziness, or seizure  Date Last Reviewed: 10/1/2016    8267-3101 [employer]. [address] [place] [address]. All rights reserved. This information is not intended as a substitute for professional medical care. Always follow your healthcare professional's instructions.

## 2018-06-28 NOTE — TELEPHONE ENCOUNTER
Daughter calls back, see message below, mother not capable to self administer her medications, daughter set up her meds, went back 3 hours later and mother had displaced some of them and rearranged them, feels should not be driving as pt informs does not remember driving, no one had told her not to drive but feels at this time should not be, wonders if can get home care or what options are now, pt lives alone and they cannot be with her 24/7, see ER visit yesterday, calling now to schedule neurology and memory clinic appointments, will discuss with MP, ? CC referral, home care eval? Route to inform daughter of plan    Thelma Strong, RN, BSN  Message handled by Nurse Triage.

## 2018-06-28 NOTE — TELEPHONE ENCOUNTER
Joy, daughter calling.  CTC on file.  Calling back because we L/M for Fely.  Advised of panel management message.  I then asked how she is doing as I see she was at ER yesterday.  States not doing will and bad night last night because she has been off her medications again.  States ER told her if she had been admitted could have talked to .  Does not feel she is safe at home without someone monitoring her medications.  Also states was to see Neurology.  States to f/u with Neurology.  Referral from 2017.  Call dropped so not sure if she went ever or not.  Abril Harris RN

## 2018-06-28 NOTE — ED PROVIDER NOTES
"  History     Chief Complaint:  Left-sided numbness    HPI   Mariela Duffy is a 62 year old female who presents to the emergency department today for evaluation of left-sided numbness and headache.  These symptoms began between 24 and 13 hours ago. She characterizes this numbness as surface tingling from her left shoulder down to her feet, other than her toes, which are \"numb.\" Her headache began on the left and behind her left eye, then moved diffusely across her head, and has now resolved. She further notes having some difficulties finding her words, and was a little nauseas earlier but resolved after eating. She was seen at [facility] today for her headache, but doesn't remember this.  This evening was the first time she reported her symptoms to her daughter who brought him here for further evaluation.  Patient has had a recent slight decrease in her nortriptyline.  She has not had any other medication changes.  She may have missed a dose or 2 of her antidepressant medications in the last few days otherwise she regularly takes them.  There is no trauma.      Allergies:  Levaquin    Medications:    levothyroxine (SYNTHROID/LEVOTHROID) 175 MCG tablet  nortriptyline (PAMELOR) 50 MG capsule  pantoprazole (PROTONIX) 20 MG EC tablet  sertraline (ZOLOFT) 100 MG tablet    Past Medical History:    Generalised anxiety disorder   Headache    Past Surgical History:    History reviewed. No pertinent surgical history.    Family History:    History reviewed. No pertinent family history.    Social History:  The patient was accompanied to the ED by her family.  Smoking Status: Former Smoker  Smokeless Tobacco: Never Used  Alcohol Use: Positive   Marital Status:       Review of Systems   Constitutional: Negative for chills and fever.   HENT: Negative for drooling.    Respiratory: Negative for cough and shortness of breath.    Cardiovascular: Negative for chest pain.   Gastrointestinal: Negative for abdominal " pain.   Musculoskeletal: Negative for neck pain.   Neurological: Positive for speech difficulty, numbness (Left-sided) and headaches. Negative for facial asymmetry and weakness.   All other systems reviewed and are negative.    Physical Exam     Patient Vitals for the past 24 hrs:   BP Temp Pulse Heart Rate Resp SpO2   06/28/18 0045 (!) 145/95 - - - - -   06/28/18 0030 (!) 138/97 - - - - -   06/28/18 0015 145/87 - - - - -   06/28/18 0000 144/81 - - - - -   06/27/18 2345 (!) 135/96 - - - - -   06/27/18 2330 (!) 142/94 - - - - -   06/27/18 2322 - 98.6  F (37  C) - - - -   06/27/18 2319 - - - 96 - 96 %   06/27/18 2315 149/86 - - - - -   06/27/18 2242 (!) 152/140 - 120 121 18 97 %      Physical Exam  General:  Alert, No obvious discomfort, well kept  HEENT:  Normal Voice, PERRL, EOM normal, oropharynx is normal, Uvula is midline, Conjunctivae and sclerae are normal, No lymphadenopathy    Neck: Normal range of motion, No meningismus. There is no midline cervical spine pain/tenderness. No mass is detected  CV:  Regular rate and underlying rhythm, Normal Peripheral pulses. No murmurs, rubs or clicks  Resp: Lungs are clear, No tachypnea, Non-labored breathing, No wheezing, crackles, or rales   GI:  Abdomen is soft, there is no rigidity, No distension, No tympani, No rebound tenderness, Non-surgical without peritoneal features    MS:  No major joint effusions, No asymmetric leg swelling. No calf tenderness  Skin:  No rash or acute skin lesions noted, Warm, Dry  Neuro:    National Institutes of Health Stroke Scale  Exam Interval: Baseline   Score    Level of consciousness: (0)   Alert, keenly responsive    LOC questions: (0)   Answers both questions correctly    LOC commands: (0)   Performs both tasks correctly    Best gaze: (0)   Normal    Visual: (0)   No visual loss    Facial palsy: (0)   Normal symmetrical movements    Motor arm (left): (0)   No drift    Motor arm (right): (0)   No drift    Motor leg (left): (0)   No  drift    Motor leg (right): (0)   No drift    Limb ataxia: (0)   Absent    Sensory: (0)   Normal- no sensory loss, normal sharp dull sensation entire left side of body, subjective paresthesia    Best language: (0)   Normal- no aphasia    Dysarthria: (1)   Mild to moderate dysarthria    Extinction and inattention: (0)   No abnormality        Total Score: 1   Psych:  Awake. Alert.  Normal affect.  Appropriate interactions. Good eye contact    Emergency Department Course     ECG:  ECG taken at 2246, ECG read at 2246  Sinus tach  Otherwise normal ECG  Rate 107 bpm. OH interval 180 ms. QRS duration 90 ms. QT/QTc 356/475 ms. P-R-T axes 60 73 67.    Imaging:  Radiology findings were communicated with the patient who voiced understanding of the findings.    CT Head w/o Contrast:  1. Normal head CT  2. NBo CT finding of a mass, infarct, or hemorrhage  Reading per radiology    CT Head w/ Contrast:  Normal perfusion images  Reading per radiology     Head and Neck CT Angiogram with IV contrast  Head CTA:   Normal intracranial circulation  Neck CTA:    No hemodynamically significant narrowing throughout major neck vessels    Head MRI w/ and w/o IV Contrast:  No acute infarct, mass, mass effect, or hemorrhage.  Reading per radiology    Laboratory:  Laboratory findings were communicated with the patient who voiced understanding of the findings.    CBC: WBC 9.5, HGB 14.2,   BMP: Gloucose 111, Creatinine 0.78  INR: 0.92  Partial thromboplastin time: 26  Troponin I: <0.015    Interventions:  0055 Aspirin 325 mg PO  0129 Gadavist 9 mL IV    Emergency Department Course:    2240 Nursing notes and vitals reviewed.    2245 I performed an exam of the patient as documented above.    I did discuss the case with the stroke neurologist.  He did recommend if there was any signs of stroke for the patient to be admitted.  Otherwise this could be a TIA and aspirin therapy is recommended.    2254 The patient was sent for a CT and CTA while  in the emergency department, results above.      0110 The patient was sent for a MRI while in the emergency department, results above.     I personally reviewed the imaging and lab results with the patient and answered all related questions prior to discharge.  I also answered family's questions to the best of my ability.      Impression & Plan      Medical Decision Making:  Mariela Duffy is a 62 year old female who presents to the emergency department today for evaluation of left sided paresthesia and memory issues.  Patient states she had left sided paresthesia since last evening but did not tell her daughter tell of this evening who therefore was concerned and brought her in for evaluation.  Patient also does not recall having a visit at the [facility] this morning.  My initial evaluation did show some slight apparent word finding difficulties with occasionally slurring of words.  Patient's laboratory studies are noncontributory.  Negative troponin and a nonacute EKG.  I discussed the case with the on-call stroke neurologist.  CT CTA were obtained without indication of acute process.  Therefore MR brain was obtained again without findings for acute process.  Patient appears to have normal sharp dull sensation on my exam.  She has had a suspected TIA approximately 2 years ago and had a thorough workup for that.  I did discuss today's findings with her and the possibility of this being a TIA again.  At this point in time no further inpatient workup is indicated.  She does appear to be safe and appropriate for outpatient management and follow-up.  Patient and family planning to follow-up with her primary care provider, her psychiatrist, and will also follow-up with neurology.  She is ambulatory around the department without difficulty.  Has no focal neurologic changes at this time and again appears to be safe and appropriate for outpatient management.  She is discharged to home.  Strict return protocols  are given.    Diagnosis:    ICD-10-CM    1. Paresthesia R20.2    2. Memory loss R41.3    3. Transient cerebral ischemia, unspecified type G45.9      Disposition:   Discharge Medications:  Discharge Medication List as of 6/28/2018  2:22 AM        Scribe Disclosure:  Abhishek ZIMMERMAN, am serving as a scribe at 10:41 PM on 6/27/2018 to document services personally performed by Aaron Rose APRN based on my observations and the provider's statements to me.      [facility] EMERGENCY DEPARTMENT       Aaron Rose APRN CNP  06/28/18 0243

## 2018-06-29 RX ORDER — SERTRALINE HYDROCHLORIDE 100 MG/1
TABLET, FILM COATED ORAL
Qty: 180 TABLET | Refills: 1 | COMMUNITY
Start: 2018-06-29 | End: 2018-11-07

## 2018-06-29 RX ORDER — ASPIRIN 325 MG
325 TABLET ORAL DAILY
Qty: 90 TABLET | Refills: 3 | COMMUNITY
Start: 2018-06-29 | End: 2018-12-06 | Stop reason: DRUGHIGH
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
## 2018-07-02 ENCOUNTER — PATIENT OUTREACH (OUTPATIENT)
Dept: FAMILY MEDICINE | Facility: CLINIC | Age: 63
End: 2018-07-02

## 2018-07-02 ENCOUNTER — DOCUMENTATION ONLY (OUTPATIENT)
Dept: CARE COORDINATION | Facility: CLINIC | Age: 63
End: 2018-07-02

## 2018-07-02 ENCOUNTER — TELEPHONE (OUTPATIENT)
Dept: FAMILY MEDICINE | Facility: CLINIC | Age: 63
End: 2018-07-02

## 2018-07-02 ENCOUNTER — OFFICE VISIT (OUTPATIENT)
Dept: FAMILY MEDICINE | Facility: CLINIC | Age: 63
End: 2018-07-02
Payer: COMMERCIAL

## 2018-07-02 VITALS
HEART RATE: 110 BPM | DIASTOLIC BLOOD PRESSURE: 80 MMHG | WEIGHT: 190 LBS | HEIGHT: 68 IN | BODY MASS INDEX: 28.79 KG/M2 | RESPIRATION RATE: 16 BRPM | SYSTOLIC BLOOD PRESSURE: 126 MMHG | TEMPERATURE: 97.8 F

## 2018-07-02 DIAGNOSIS — R41.3 MEMORY LOSS: ICD-10-CM

## 2018-07-02 DIAGNOSIS — F32.0 MILD MAJOR DEPRESSION (H): ICD-10-CM

## 2018-07-02 DIAGNOSIS — F41.1 GENERALIZED ANXIETY DISORDER: ICD-10-CM

## 2018-07-02 DIAGNOSIS — G45.9 TRANSIENT CEREBRAL ISCHEMIA, UNSPECIFIED TYPE: Primary | ICD-10-CM

## 2018-07-02 DIAGNOSIS — L30.9 ECZEMA, UNSPECIFIED TYPE: Primary | ICD-10-CM

## 2018-07-02 DIAGNOSIS — G45.9 TRANSIENT CEREBRAL ISCHEMIA, UNSPECIFIED TYPE: ICD-10-CM

## 2018-07-02 PROCEDURE — 99214 OFFICE O/P EST MOD 30 MIN: CPT | Performed by: FAMILY MEDICINE

## 2018-07-02 RX ORDER — TRIAMCINOLONE ACETONIDE 1 MG/G
OINTMENT TOPICAL 2 TIMES DAILY PRN
Qty: 80 G | Refills: 3 | Status: SHIPPED | OUTPATIENT
Start: 2018-07-02 | End: 2019-03-13

## 2018-07-02 NOTE — PROGRESS NOTES
"  SUBJECTIVE:   Mariela Duffy is a 62 year old female who presents to clinic today for the following health issues:    Patient is here to follow up regarding memory loss and anxiety issues.  I last saw her on 06/18/18 and since then she has been seen by multiple providers, including the ER for these issues.  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  Previously, her daughters were concerned but she wasn't convinced it was a problem.  The realization that she is losing her memory is making her very anxious.  She actually came back into the office about an hour after her appt was finished because she got a voicemail from \"independent living\" and was anxious about that.  She hadn't even listened to the message, but was scared for what it might say.  She is very nervous that her daughters will force her to live in a nursing home.  Currently she is living by herself.  We are hoping to get RN home care to help manage her medications for her.  Her daughters have reported that even when they place her pills in a pill box she goes through it and mixes them up.  She has an appt with [facility] for neuro and neuropsych next Wednesday.      PATIENT would also like a refill of steroid cream for her eczema      Problem list and histories reviewed & adjusted, as indicated.  Additional history: none        Reviewed and updated as needed this visit by clinical staff  Tobacco  Allergies  Meds  Med Hx  Surg Hx  Fam Hx  Soc Hx      Reviewed and updated as needed this visit by Provider         ROS:  Constitutional, HEENT, cardiovascular, pulmonary, gi and gu systems are negative, except as otherwise noted.    OBJECTIVE:     /80  Pulse 110  Temp 97.8  F (36.6  C) (Oral)  Resp 16  Ht 5' 8\" (1.727 m)  Wt 190 lb (86.2 kg)  LMP 01/20/2004  Breastfeeding? No  BMI 28.89 kg/m2  Body mass index is 28.89 kg/(m^2).  GENERAL: healthy, alert and no distress  PSYCH: " tangential, tearful and anxious  SKIN: Right elbow with a 4x3cm patch of dry flaky, thickened skin     ASSESSMENT/PLAN:       ICD-10-CM    1. Eczema, unspecified type L30.9 triamcinolone (KENALOG) 0.1 % ointment   2. Memory loss R41.3    3. Mild major depression (H) F32.0    4. Generalized anxiety disorder F41.1    5. Transient cerebral ischemia, unspecified type G45.9      A lot of counseling and reassurance given today.  I reminded her that even though she is having memory loss, she is still able to make her own decisions and cannot be forced into a nursing home against her will.  She previously was interested in increasing her buspar, but today has decided that she would like to stay on the same dose.  It is hard to say if she is taking any of her medications regularly at this point.  She will be evaluated by [facility] and neuropsych next week. Our social workers will continue to work with her regarding home care services.    We will follow up after her neuro evaluation in 2-3 weeks.    Greater than 50% of this visit was spent counseling regarding the above diagnosis  Visit lasted approximately 30 minutes    Jaclyn Ojeda DO  [facility]

## 2018-07-02 NOTE — MR AVS SNAPSHOT
After Visit Summary   7/2/2018    Mariela Duffy    MRN: [id number]           Patient Information     Date Of Birth          1955        Visit Information        Provider Department      7/2/2018 1:20 PM Jaclyn Ojeda,  [facility]        Today's Diagnoses     Eczema, unspecified type    -  1    Memory loss        Mild major depression (H)        Generalized anxiety disorder        Transient cerebral ischemia, unspecified type           Follow-ups after your visit        Follow-up notes from your care team     Return in about 2 weeks (around 7/16/2018).      Your next 10 appointments already scheduled     Jul 09, 2018  3:00 PM CDT   [facility]    [address]  [facility] [address]   [phone]            Jul 27, 2018  1:30 PM CDT   New Visit with REGINE Santoro CNP   [facility] ([facility])    [address]  [facility] [address]   [phone]            Oct 15, 2018 10:00 AM CDT   SHORT with Jerry Jordan [facility] ([facility])    [address]  [facility] [address]   [phone]              Who to contact     If you have questions or need follow up information about today's clinic visit or your schedule please contact [facility] directly at [phone].  Normal or non-critical lab and imaging results will be communicated to you by MyChart, letter or phone within 4 business days after the clinic has received the results. If you do not hear from us within 7 days, please contact the clinic through MyChart or phone. If you have a critical or abnormal lab result, we will notify you by phone as soon as possible.  Submit refill requests through [employer] or call your pharmacy and they will forward the refill request to us. Please allow 3  "business days for your refill to be completed.          Additional Information About Your Visit        MyChart Information     [employer] gives you secure access to your electronic health record. If you see a primary care provider, you can also send messages to your care team and make appointments. If you have questions, please call your primary care clinic.  If you do not have a primary care provider, please call [phone] and they will assist you.        Care EveryWhere ID     This is your Care EveryWhere ID. This could be used by other organizations to access your [facility] medical records  [id number]        Your Vitals Were     Pulse Temperature Respirations Height Last Period Breastfeeding?    110 97.8  F (36.6  C) (Oral) 16 5' 8\" (1.727 m) 01/20/2004 No    BMI (Body Mass Index)                   28.89 kg/m2            Blood Pressure from Last 3 Encounters:   07/02/18 126/80   06/28/18 (!) 145/95   06/27/18 110/80    Weight from Last 3 Encounters:   07/02/18 190 lb (86.2 kg)   06/27/18 194 lb (88 kg)   06/25/18 191 lb (86.6 kg)              Today, you had the following     No orders found for display         Where to get your medicines      These medications were sent to [facility] [address]  [address] [place] [address]     Phone:  [phone]     triamcinolone 0.1 % ointment          Primary Care Provider Office Phone # Fax #    Estefany Stanley PA-C [phone] [address] [facility] [address]        Equal Access to Services     [facility] Hospital[facility] ANTONIA : Hadii yamilet arrieta hadasho Soomaali, waaxda luqadaha, qaybta kaalmada fazal lorenzo. So [facility] [phone].    ATENCIÓN: Si habla español, tiene a romero disposición servicios gratuitos de asistencia lingüística. Gemmaame al [phone].    We comply with applicable federal civil rights laws and [place] laws. We do not discriminate on the basis of race, " color, national origin, age, disability, sex, sexual orientation, or gender identity.            Thank you!     Thank you for choosing [facility]  for your care. Our goal is always to provide you with excellent care. Hearing back from our patients is one way we can continue to improve our services. Please take a few minutes to complete the written survey that you may receive in the mail after your visit with us. Thank you!             Your Updated Medication List - Protect others around you: Learn how to safely use, store and throw away your medicines at www.disposemymeds.org.          This list is accurate as of 7/2/18 11:59 PM.  Always use your most recent med list.                   Brand Name Dispense Instructions for use Diagnosis    ALEVE PO      Take 220 mg by mouth as needed for moderate pain        aspirin 325 MG tablet     90 tablet    Take 1 tablet (325 mg) by mouth daily    Transient cerebral ischemia, unspecified type       busPIRone 15 MG tablet    BUSPAR    270 tablet    TAKE 2 TABLETS BY MOUTH IN THE MORNING AND TAKE 1 TABLET BY MOUTH IN THE EVENING    Anxiety       fluticasone 50 MCG/ACT spray    FLONASE    1 Bottle    Spray 1-2 sprays into both nostrils daily    Post-nasal drip       levothyroxine 175 MCG tablet    SYNTHROID/LEVOTHROID    90 tablet    Take 1 tablet (175 mcg) by mouth daily    Primary hypothyroidism       nortriptyline 50 MG capsule    PAMELOR    90 capsule    Take 1 capsule (50 mg) by mouth At Bedtime    Irritable bowel syndrome with diarrhea       pantoprazole 20 MG EC tablet    PROTONIX    90 tablet    TAKE ONE TABLET BY MOUTH ONCE DAILY. TAKE BY MOUTH 30 TO 60 MINUTES BEFORE A MEAL.    Gastroesophageal reflux disease with esophagitis, Hiatal hernia       ranitidine 300 MG tablet    ZANTAC    90 tablet    Take 1 tablet (300 mg) by mouth At Bedtime    Gastroesophageal reflux disease with esophagitis       sertraline 100 MG tablet    ZOLOFT    180 tablet    2  tabs (200 mg) by mouth nightly.    Major depressive disorder, recurrent episode, mild (H), Anxiety       triamcinolone 0.1 % ointment    KENALOG    80 g    Apply topically 2 times daily as needed for irritation    Eczema, unspecified type

## 2018-07-02 NOTE — PROGRESS NOTES
Dear ,   Medicare Home Health regulations requires [facility] to provide an initial assessment visit either within 48 hours of the patient's return home, or on the physician ordered Start of Care date.    There will be a delay in the Initial Assessment for Mariela Duffy; MRN [id number]. Pt requested call back next Monday to discuss home care. She state she has not accepted the need for home care yet and want to discuss with family . she will have accompany this week b/c of the holiday too.  fhch will follow up with pt next Monday.     Sincerely [facility]  John Campos  [phone]

## 2018-07-02 NOTE — TELEPHONE ENCOUNTER
Home care order pended for RN and PT. Unclear if pt will meet home bound criteria or if her insurance will cover without meeting the criteria.   PT order placed for home safety evaluation as well    Orly Rivera, Social Work Care Coordinator, [facility]: Phone [phone]

## 2018-07-02 NOTE — PROGRESS NOTES
Clinic Care Coordination Contact    Met with the patient in the clinic.    States she is starting to realize she is having some memory concerns. Struggling with the idea of needing more help. Wishes her children could help more but understands that is not an option.     Discussed her upcoming neurology appt. Pt was able to get a sooner appt on 7/11 with the [facility] neurology clinic for an assessment. Discussed neuro psyche testing and its role in her care.    Discussed options for now. Gave her a [facility] assessment brochure and discussed its service. Also discussed home care. Pt is open to a home care RN.     Pt is aware this CCSW is leaving the clinic. CC offered to hand off her case to another CC but pt declines. Aware she can call the PCP clinic anytime for support.     PLAN:  Pt will outreach as needed  CCSW will not continue to follow  CCSW will let PCP know pt is open to home care    Orly Rivera, Social Work Care Coordinator, LGSW, MSW  [facility]: [place], [place], and [place]   P:[phone]/ Signed July 2, 2018

## 2018-07-03 PROBLEM — R41.3 MEMORY LOSS: Status: ACTIVE | Noted: 2018-07-03

## 2018-07-05 DIAGNOSIS — F41.9 ANXIETY: ICD-10-CM

## 2018-07-05 NOTE — TELEPHONE ENCOUNTER
Controlled Substance Refill Request for  LORazepam (ATIVAN) 0.5 MG tablet (Discontinued)  Problem List Complete:  Yes   checked in past 3 months?  No, route to RN              LORazepam (ATIVAN) 0.5 MG tablet (Discontinued)   Last Written Prescription Date:  8/16/16-9/9/16  Last Fill Quantity: 60 tablet,   # refills: 0  Last Office Visit: 7/2/18 [facility]  Future Office visit:    Next 5 appointments (look out 90 days)     Jul 09, 2018  3:00 PM CDT   SHORT with Jerry Jordan RPH   [facility] ([facility])    [address] [facility] [id number] [phone]                   Routing refill request to provider for review/approval because:  Drug not active on patient's medication list

## 2018-07-06 RX ORDER — LORAZEPAM 0.5 MG/1
0.25 TABLET ORAL 2 TIMES DAILY PRN
Qty: 60 TABLET | Refills: 0 | Status: SHIPPED | OUTPATIENT
Start: 2018-07-06 | End: 2019-03-13

## 2018-07-06 NOTE — TELEPHONE ENCOUNTER
Routing refill request to provider for review/approval because:  Drug not on the [facility] refill protocol   Drug not active on patient's medication list    Vesna Zaman RN, BS  Clinical Nurse Triage.

## 2018-07-10 ENCOUNTER — TRANSFERRED RECORDS (OUTPATIENT)
Dept: HEALTH INFORMATION MANAGEMENT | Facility: CLINIC | Age: 63
End: 2018-07-10

## 2018-07-10 ENCOUNTER — TELEPHONE (OUTPATIENT)
Dept: FAMILY MEDICINE | Facility: CLINIC | Age: 63
End: 2018-07-10

## 2018-07-10 DIAGNOSIS — G43.109 MIGRAINE WITH AURA AND WITHOUT STATUS MIGRAINOSUS, NOT INTRACTABLE: Primary | ICD-10-CM

## 2018-07-10 RX ORDER — SUMATRIPTAN 100 MG/1
100 TABLET, FILM COATED ORAL
Qty: 9 TABLET | Refills: 1 | Status: SHIPPED | OUTPATIENT
Start: 2018-07-10 | End: 2019-06-06

## 2018-07-10 NOTE — TELEPHONE ENCOUNTER
Pt is looking for a refill of Imitrex 100mg tabs; not on current med list.  Would you like to continue prescribing this?  Thanks!!    LAST FILL DATE: 7/27/17  Last Written Date: 1/26/17  QTY: 9 with 1 refill    Cedric Zimmerman  [facility]

## 2018-07-11 ENCOUNTER — DOCUMENTATION ONLY (OUTPATIENT)
Dept: CARE COORDINATION | Facility: CLINIC | Age: 63
End: 2018-07-11

## 2018-07-11 ENCOUNTER — TRANSFERRED RECORDS (OUTPATIENT)
Dept: HEALTH INFORMATION MANAGEMENT | Facility: CLINIC | Age: 63
End: 2018-07-11

## 2018-07-11 NOTE — PROGRESS NOTES
"This note is for your information, and you do not need to take action right now.  I have had 2 conversations with Mariela, or as she prefers Fely, in the past week.  She is vague and keeps putting off homecare services of any type, even just a one time visit with our nurse.  She states throughout our conversations that she \"does not want to think about all of this right now, I have other things going on, like my daughter's wedding\".  First she asked me to call her back this week, and now today she requested I call her back \"in a couple of weeks\" to see if she has \"had a chance to think about homecare\".   I'm concerned that she might not be functioning as well as she wants us to think.  I know she had neuro apmnt today, and states she has more tests this Friday 7\13.  I am requesting that we receive a new referral from your clinic, when the patient is willing to accept a homecare nurse visit.    If you could please follow up with her, that would be great.  I see she has an apmnt on 7\27 with endocrine and I apologize I don't know if that person is in your clinic or somewhere else.  Thank you for the care coordination for this patient.    Mariah Slater RN, BSN  Clinical Coordinator  [facility]   [phone]  "

## 2018-07-12 ENCOUNTER — PATIENT OUTREACH (OUTPATIENT)
Dept: CARE COORDINATION | Facility: CLINIC | Age: 63
End: 2018-07-12

## 2018-07-12 DIAGNOSIS — R41.3 MEMORY LOSS: Primary | ICD-10-CM

## 2018-07-12 ASSESSMENT — ACTIVITIES OF DAILY LIVING (ADL): DEPENDENT_IADLS:: MEDICATION MANAGEMENT;MONEY MANAGEMENT

## 2018-07-12 NOTE — PROGRESS NOTES
Clinic Care Coordination Contact  [facility]/Voicemail    1540 hours -     Clinical Data: Care Coordinator Outreach    CCRN outreached to patient's daughter, Dominique Butler. (CTC on file - see information below).     Outreach attempted x 1.  Left message on daughter's voicemail with call back information and requested return call.    Plan:     Care Coordinator (GLENNA Burden) mailed out care coordination introduction letter to patient on 06/28/2018.     Care Coordinator will try to reach patient/caregiver again in 1-2 business days.      Aide Veliz, RN  [facility] Care Coordinator - [place], [facility] Locations   Direct:  [phone] (voicemail available)   (Today's Date: 07/12/2018)

## 2018-07-12 NOTE — PROGRESS NOTES
Clinic Care Coordination Contact  Care Team Conversations    See all below.     Please refer to 07/12/2018 Care Coordination entry.     Aide Veliz RN  [facility]  Clinic Care Coordinator - Prior [facility] [place] Locations   Direct:  [phone] (voicemail available)   (Today's Date: )   [Covering for Mariely Mackenzie RN Care Coordinator- Thursday 07/12/2018 and Friday 07/13/2018.]

## 2018-07-12 NOTE — PROGRESS NOTES
"Clinic Care Coordination Contact  Care Team Conversations    Please refer to 07/11/2018 Documentation Only - [facility] entry; forwarded to CC team by PCP today, 07/12/2018.    Writer is assisting with coverage today for primary CCRN with PCP office and is not familiar with this patient.     CCRN performed thorough chart review.     Please refer to the following pertinent entries:    06/28/2018 -Patient Outreach - Care Coordination entry by ZOHREH Burden (no longer employed with [employer])    07/02/2018 - Office Visit notes by Dr. Jaclyn Ojeda     07/02/2018 - Patient Outreach - Care Coordination entry by ZOHREH Burden (FACE-TO-FACE VISIT) - Patient declined Orly's offer to hand case to another CCSW in her departure.     07/02/2018 - Telephone Encounter    07/02/2018 - Documentation Only - [facility]    07/11/2018 - Documentation Only - [facility]    Patient continually deferring initial home care visit.   [facility] cannot keep current referral open if patient is not willing to accept services; will need a new referral from PCP/Care team. At minimum, it would be beneficial for patient to have at least a ONE TIME, SN Visit to her home for a comprehensive evaluation (for safety reasons) to determine if she meets criteria for services.       Based on chart notes, she was seen by Ion Baker on 07/11/2018 and has a future appointment with their office for \"more tests\" (neuropsych testing?) tomorrow, Friday 07/13/2018.  Unfortunately, until we know if patient is deemed without capacity, she has the right to refuse care/services and make poor decisions.   It sounds like she may be in the process of having testing done, of which writer does not have access to at this time to verify.     CCRN outreached to CCSW, HUBER Mcfarlane.  Case reviewed.     Does not appear that patient is in any emergent situation/crisis with needs.     Verified that we do have a consent on file to speak with both daughter, Dominique Butler " ([phone]) and son, Baldomero Duffy ([phone]); okay to discuss scheduling, medical and billing information.  [See Media tab entry - dated 06/12/2018.]    Plan:    CCRN will outreach to patient's daughter, Dominique, to gain her perspective on patient's current situation and status.      If patient is currently in the process of having neuropsych testing to determine capacity, we need to await those results.      If the patient is in a crisis situation or dealing with immediate safety needs, she would need to proceed to ED for further evaluation.     If patient is deemed to have capacity, she has the right to refuse services/cares.   Then we would need to work with patient and family to try to educate on benefits of home care, facility placement, etc.     If she is deemed not to have capacity, then alternative plans would be enacted for caregiver to initiate services, etc.        TBD at this time.     Aide Veliz RN  [facility]  Clinic Care Coordinator - Prior [facility] Locations   Direct:  [phone] (voicemail available)   (Today's Date: 07/12/2018 )   [Covering for Mariely Mackenzie RN Care Coordinator- Thursday 07/12/2018 and Friday 07/13/2018.]

## 2018-07-12 NOTE — PROGRESS NOTES
CCRN,    Does this patient even qualify for home care?  She is not home bound.   Please see the CCSW note from earlier this month.     Thanks,  Estefany Stanley PA-C

## 2018-07-13 ENCOUNTER — TRANSFERRED RECORDS (OUTPATIENT)
Dept: HEALTH INFORMATION MANAGEMENT | Facility: CLINIC | Age: 63
End: 2018-07-13

## 2018-07-13 ASSESSMENT — ACTIVITIES OF DAILY LIVING (ADL): DEPENDENT_IADLS:: MEDICATION MANAGEMENT;MONEY MANAGEMENT

## 2018-07-13 NOTE — PROGRESS NOTES
"Clinic Care Coordination Contact    0907 hours- UTC/Voicemail  Referral Source: IP Handoff  Clinical Data: Care Coordinator Outreach  Outreach attempted x 2 - daughter Dominique.  Left message on voicemail with call back information and requested return call.      0908 hours - UTC/Voicemail  Referral Source: IP Handoff  Clinical Data: Care Coordinator Outreach  Outreach attempted x 3 - patient, Mariela \"Fely\" Clive. Left message on voicemail with call back information and requested return call.  Plan:  Care Coordinator will try to reach patient again in 1-2 business days.    Aide Veliz, CHRIS  [facility] [place] Care Coordinator - Prior [facility] and [place] Locations   Direct:  [phone] (voicemail available)   (Today's Date: 07/13/2018)  [Covering for Mariely Mackenzie RN Care Coordinator- Thursday 07/12/2018 and Friday 07/13/2018.]  "

## 2018-07-13 NOTE — PROGRESS NOTES
"Clinic Care Coordination Contact    1221 hours -  CCRN received a VM update from patient directly.   She reports that she rec'd writer's VM from this morning.   She was attending her Neurologist appointment at time of call.  She states that her \"son is getting  tomorrow\" and she will not be available again via telephone until either Mon 07/16/18 PM or Tuesday 07/17/2018 \"as most of my kids will be gone by then.\"   She requests call back on one of those two dates.   #[phone]    PLAN:   CCRN forwarding encounter to primary care CCRNMariely for follow-up.     Aide Veliz, RN  [facility]  Clinic Care Coordinator - Prior [facility] Locations   Direct:  [phone] (voicemail available)   (Today's Date: 07/13/2018)   "

## 2018-07-16 ENCOUNTER — TELEPHONE (OUTPATIENT)
Dept: FAMILY MEDICINE | Facility: CLINIC | Age: 63
End: 2018-07-16

## 2018-07-16 NOTE — TELEPHONE ENCOUNTER
"Caller friend Chio, worried patient is approaching \"nervous breakdown\".  No CTC for Chio.  We recommended she encourage Fely to schedule an appointment with her primary care provider.  Not a team provider, her primary her knows her.  We informed Chio there are many services available that can be arranged through a primary care appointment.    Eliud Nielson RN      "

## 2018-07-17 ASSESSMENT — ACTIVITIES OF DAILY LIVING (ADL): DEPENDENT_IADLS:: MEDICATION MANAGEMENT;MONEY MANAGEMENT

## 2018-07-17 NOTE — PROGRESS NOTES
Clinic Care Coordination Contact  [facility]/Voicemail    Referral Source: IP Handoff  Clinical Data: Care Coordinator Outreach - concerns for home care not being able to reach patient to open care - per pcp patient is not home bound however she does have MA which would not go by this guideline     Outreach attempted x 1.  Left message on voicemail with call back information and requested return call.    Plan: Care Coordinator will await call from patient to discuss - 2 messages have already been left for daughter to return call and to this date she has not returned call. Care Coordinator will try to reach patient again in 1-2 business days.    Mariely Mackenzie Care Coordinator RN  [facility]  [phone]  July 17, 2018

## 2018-07-17 NOTE — PROGRESS NOTES
Clinic Care Coordination Contact    1306 hours - CCRN received return call from patient's daughter, Kerrie Butler. ([phone]).   She is calling back after receiving VM as noted below.    She states that she may call the clinic Triage RN to find out what our team was calling about.   She will be around today.     PLAN:  ABRILN notified PEYMAN Strong of this call.     Aide Veliz, RN  [facility]  Clinic Care Coordinator - Prior [facility] Locations   Direct:  [phone] (voicemail available)   (Today's Date: 07/17/2018)

## 2018-07-17 NOTE — PROGRESS NOTES
Clinic Care Coordination Contact    Clinic Care Coordination Contact  OUTREACH    Referral Information:  Referral Source: PCP    Primary Diagnosis: Cognitive Impairment    Chief Complaint   Patient presents with     Clinic Care Coordination - Initial     Clinic Care Coordinator-RN      McRae Utilization:   Clinic Utilization  Difficulty keeping appointments:: No  Utilization    Last refreshed: 7/16/2018  6:05 PM:  No Show Count (past year) 1       Last refreshed: 7/16/2018  6:05 PM:  ED visits 3       Last refreshed: 7/16/2018  6:05 PM:  Hospital admissions 0          Current as of: 7/16/2018  6:05 PM           Clinical Concerns:  Current Medical Concerns: cognition / safety concerns   CCRN received referral from pcp after home care sent a message stating they were not able to arrange a time to see patient in her home   CCRN has left message for patient - and did not receive a call back as of this time/date   CCRN received message from covering PEYMAN Noble - that patient's daughter Joy had called her back after voicemail that was left for her earlier this week     CCRN placed call to griffin Hamilton - C2C on file   Joy reports significant decline and safety concerns for her mom over the past few months   Joy states that her mom has been driving - and reports that she has called her and not known where she was but was able to identify a street sign she had seen - discussed reporting to police if they know her mom is out driving and this is a safety concern not only for patient but also for others     Joy reports that her mom had a large dog in her home that Fely recently took to her home as she was concerned for her mom and the dog. This past weekend patient had a family wedding to attend - during the wedding her mom inappropriately tried to discuss the dog which caused some agitation and Joy had to tell her they would discuss this at a different time. A few days later patient went to daughter Joy's home and took  "the dog back. When Joy called her to ask where she was she stated she was at [facility] - and unsure of where she was but that she could see a sign that said [place].     Joy reports that she and her siblings are really overwhelmed and do not feel that they are getting the help they need.     Joy reports that patient's home is \"hoarding home\"     Joy reports that patient has not paid her mortgage in several months despite having the money to do so. Joy reports that the home has been foreclosed on and in September patient will have no where to live.     CCRN asked Joy where her mom was right now? And Joy reported that she was on a bus tour with the [employer] [place]. While talking with CCRN Joy went to patient's home to remove the pill bottles that she forgot in the home. Fely states that mental health provider at [facility] has advised her not to leave the bottles in the home - just to fill the pill box and take the meds with her. Today Joy reports that the home is \"worse\" everytime she goes in there.     Joy states patient recently went to [facility] for consult and testing - her brother attended that visit, they did not have a consent signed so Joy has been unable to get any information regarding that visit/testing.     CCRN asked Joy if her mom patient had any friends? And Joy reported a friend Chio - that had actually called the clinic yesterday - CCRN advised that there was no consent to communicate on file. Joy reported that she has been talking with Chio some as well. 7/16 when Chio called the clinic she reported that she was worried patient was about to have a nervous breakdown. CCRN asked Joy if she knew what Chio meant by that? Is she feeling overwhelmed? Suicidal? Joy will talk more about this with Chio   CCRN recommended patient be taken to [place] if there are mental health concerns     CCRN discussed concerns of home care being unable to get into the home - to do an evaluation. Joy states that if home care " "can meet with patient outside the home she would be more willing to meet with them. CCRN advised that home care needs to meet with patient in the home - for proper evaluation. With the safety concerns brought forth it would be in the best interest of family to aid with home care meeting, Joy agrees to assist with this. CCRN called Mariah Slater from [facility] - discussed concerns, Mariah will reach out to daughter Joy    CCRN discussed with daughter making APS report through the [facility] due to her concerns of her mothers behaviors and safety.     While CCRN was documenting this phone call - daughter Joy and patient came to clinic to meet face to face (CCRN was unaware of them coming to clinic)     Joy went to find her mother after talking to CCRN and came to the clinic to discuss concerns     CCRN introduced self and role - patient immediately asked who CCRN was and what she did     CCRN asked patient how she could help her today? Patient states she is in the clinic as her daughter Joy is worried about her. Patient Mariela (Fely) is very tearful.   Patient states she knows her daughter is worried about her but she is very scared - CCRN asked patient why she is scared? And patient talks to CCRN about multiple concerns - patient is not able to keep her thoughts on track and jumps around during conversation   Patient tells CCRN that she has no choice but to meet with the home care team - as her daughter is telling her she has no choice. Patient tells CCRN that she has not met with home care in the past as she is fearful of what the outcome will be.   Patient tells CCRN that her house is full of \"stuff\" that was put there by her ex finance - patient states that she barely can walk through the house due to the mess on the floors, she tells CCRN that the stuff is knee high or higher   Discussed patient losing her home - she is not sure what will happen or where she will go - she has to be out of the home by December. " Discussed the need for assistance to remove the stuff from inside the home and find a spot for her to go after she is no longer to live in the house   Discussed driving and CCRN asked patient if she ever gets lost? Patient stated that only once this happened but sounded unsure of this answer. Discussed concerns over this.     Patient reluctantly accepts home care eval - her daughter took her keys away and advised that she could not drive, and asked patient to go back to her home for the night while awaiting home care eval     CCRN discussed with home care concerns - they will call daughter to make appt.     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 This happened to her aunt when she was young. Patient's daughter and CCRN asked patient if she felt she needed an inpatient mental health stay to stabilize and patient does not feel this is needed at this time - discussed [facility] if they change their mind but at this time patient will be safe with her daughter keeping close tabs on her     Pain  Chronic pain (GOAL):: No  Health Maintenance Reviewed: Due/Overdue   Health Maintenance Due   Topic Date Due     HIV SCREEN (SYSTEM ASSIGNED)  10/12/1973     PAP Q3 YR  04/19/2016     ADVANCE DIRECTIVE PLANNING Q5 YRS  04/19/2018     COLONOSCOPY Q5 YR  06/14/2018     MAMMO Q1 YR  07/28/2018       Clinical Pathway: None    Medication Management: not discussed        Functional Status:  Dependent ADLs:: Ambulation-no assistive device  Dependent IADLs:: Medication Management, Money Management  Bed or wheelchair  confined:: No  Mobility Status: Independent    Living Situation:  Current living arrangement:: I live alone  Type of residence:: Private home - stairs    Diet/Exercise/Sleep:  Diet:: Regular  Inadequate nutrition (GOAL):: No  Food Insecurity: No  Tube Feeding: No  Inadequate activity/exercise (GOAL):: No  Significant changes in sleep pattern (GOAL): No    Transportation:  Transportation concerns (GOAL):: Yes  Transportation means:: Regular car, Family     Psychosocial:  Confucianist or spiritual beliefs that impact treatment:: No  Mental health DX:: Yes  Mental health management concern (GOAL):: Yes  Informal Support system:: Children, Family     Financial/Insurance:   Financial/Insurance concerns (GOAL):: Yes     Resources and Interventions:  Current Resources: [facility]   Advance Care Plan/Directive  Advanced Care Plans/Directives on file:: No     Goals: None    Patient/Caregiver understanding: yes        Future Appointments              In 1 week Catie Crane APRN [facility], [facility]    In 3 months Jerry Jordan RPH [facility], [facility]          Plan:   CCRN discussed with Mariah Slater from [facility] - they will call daughter to set up appt.   CCRN placed call to [facility] to obtain most recent neurology visit notes - they have an office visit and EMG that was completed recently and will fax to CCRN   CCRN will make APS report #[id number]  and f/u with daughter in 1-2 business days     Mariely Mackenzie Care Coordinator RN  [facility]  [phone]  July 18, 2018

## 2018-07-18 ASSESSMENT — ACTIVITIES OF DAILY LIVING (ADL): DEPENDENT_IADLS:: MEDICATION MANAGEMENT;MONEY MANAGEMENT

## 2018-07-19 ENCOUNTER — PATIENT OUTREACH (OUTPATIENT)
Dept: CARE COORDINATION | Facility: CLINIC | Age: 63
End: 2018-07-19

## 2018-07-19 ASSESSMENT — ACTIVITIES OF DAILY LIVING (ADL): DEPENDENT_IADLS:: MEDICATION MANAGEMENT;MONEY MANAGEMENT

## 2018-07-19 NOTE — PROGRESS NOTES
Clinic Care Coordination Contact  Care Team Conversations    CCRN received voicemail from [facility]     CCRN returned call to Timothy @ [phone] and left another voicemail to return call to CCRN     Mariely Mackenzie Care Coordinator RN  [facility]  [phone]  July 19, 2018

## 2018-07-19 NOTE — PROGRESS NOTES
Clinic Care Coordination Contact  Care Team Conversations    CCRN discussed concerns with Timothy from [facility] APS - she will relay all information for review     Mariely Mackenzie Care Coordinator RN  [facility]  [phone]  July 19, 2018

## 2018-07-19 NOTE — PROGRESS NOTES
Clinic Care Coordination Contact  Care Team Conversations    CCRN received phone call from Reba Ta RN CM with [facility]     April was out to see patient today in her home with daughter Joy present as well     April set up meds - there was some confusion over the nortriptyline dose - reviewed epic - patient recently changed to 50 mg from 75 mg as patient was having worsening confusion on the 75 mg.     April stated the house was very mess, dirty, urine on the floor in the bathroom, cats - cat puke and poop all over the house, flies, knats flying around home.   April will make APS report as well     Plan is to have CHARLEE RN - PT- OT out to see her, patient refused HHA. Insurance does not cover SW but will ask emergency fund to cover that visit.     April will alert CCRN if questions/concerns arise - patient was asking how to cancel home care if needed     CCRN will send note to both Estefany BAUTISTA and Dr. Ojeda as they have both seen patient recently     Mariely Mackenzie Care Coordinator RN  [facility]  [phone]  July 19, 2018

## 2018-07-20 ENCOUNTER — PATIENT OUTREACH (OUTPATIENT)
Dept: CARE COORDINATION | Facility: CLINIC | Age: 63
End: 2018-07-20

## 2018-07-20 DIAGNOSIS — S16.1XXA CERVICAL STRAIN: ICD-10-CM

## 2018-07-20 DIAGNOSIS — R20.9 DISTURBANCE OF SKIN SENSATION: ICD-10-CM

## 2018-07-20 DIAGNOSIS — R51.9 HEAD ACHE: ICD-10-CM

## 2018-07-20 DIAGNOSIS — M54.50 LOW BACK PAIN: ICD-10-CM

## 2018-07-20 DIAGNOSIS — M54.2 NECK PAIN: ICD-10-CM

## 2018-07-20 DIAGNOSIS — S39.012A LUMBAR STRAIN: ICD-10-CM

## 2018-07-20 DIAGNOSIS — G45.9 TRANSIENT ISCHEMIC ATTACK (TIA): ICD-10-CM

## 2018-07-20 DIAGNOSIS — G60.9 PERIPHERAL NEUROPATHY, IDIOPATHIC: ICD-10-CM
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
## 2018-07-20 DIAGNOSIS — R41.3 MEMORY PROBLEM: ICD-10-CM

## 2018-07-20 DIAGNOSIS — M54.9 BACK PAIN: ICD-10-CM

## 2018-07-20 DIAGNOSIS — G44.86 CERVICOGENIC HEADACHE: ICD-10-CM

## 2018-07-20 DIAGNOSIS — R20.2 PARESTHESIA: ICD-10-CM

## 2018-07-20 DIAGNOSIS — M50.30 DEGENERATIVE CERVICAL DISC: ICD-10-CM

## 2018-07-20 DIAGNOSIS — G60.9 PERIPHERAL NEUROPATHY, IDIOPATHIC: Primary | ICD-10-CM

## 2018-07-20 DIAGNOSIS — G83.20: ICD-10-CM

## 2018-07-20 LAB
HBA1C MFR BLD: 5 % (ref 0–5.6)
VIT B12 SERPL-MCNC: 270 PG/ML (ref 193–986)

## 2018-07-20 PROCEDURE — 82607 VITAMIN B-12: CPT | Performed by: PSYCHIATRY & NEUROLOGY

## 2018-07-20 PROCEDURE — 83516 IMMUNOASSAY NONANTIBODY: CPT | Mod: 91 | Performed by: PSYCHIATRY & NEUROLOGY

## 2018-07-20 PROCEDURE — 82525 ASSAY OF COPPER: CPT | Mod: 90 | Performed by: PSYCHIATRY & NEUROLOGY

## 2018-07-20 PROCEDURE — 84443 ASSAY THYROID STIM HORMONE: CPT | Performed by: PSYCHIATRY & NEUROLOGY

## 2018-07-20 PROCEDURE — 36415 COLL VENOUS BLD VENIPUNCTURE: CPT | Performed by: PSYCHIATRY & NEUROLOGY

## 2018-07-20 PROCEDURE — 83516 IMMUNOASSAY NONANTIBODY: CPT | Performed by: PSYCHIATRY & NEUROLOGY

## 2018-07-20 PROCEDURE — 86235 NUCLEAR ANTIGEN ANTIBODY: CPT | Performed by: PSYCHIATRY & NEUROLOGY

## 2018-07-20 PROCEDURE — 99000 SPECIMEN HANDLING OFFICE-LAB: CPT | Performed by: PSYCHIATRY & NEUROLOGY

## 2018-07-20 PROCEDURE — 83036 HEMOGLOBIN GLYCOSYLATED A1C: CPT | Performed by: PSYCHIATRY & NEUROLOGY

## 2018-07-20 PROCEDURE — 86334 IMMUNOFIX E-PHORESIS SERUM: CPT | Performed by: PSYCHIATRY & NEUROLOGY

## 2018-07-20 PROCEDURE — 82784 ASSAY IGA/IGD/IGG/IGM EACH: CPT | Performed by: PSYCHIATRY & NEUROLOGY

## 2018-07-20 PROCEDURE — 86618 LYME DISEASE ANTIBODY: CPT | Performed by: PSYCHIATRY & NEUROLOGY

## 2018-07-20 PROCEDURE — 84425 ASSAY OF VITAMIN B-1: CPT | Mod: 90 | Performed by: PSYCHIATRY & NEUROLOGY

## 2018-07-20 NOTE — PROGRESS NOTES
Clinic Care Coordination Contact  Care Team Conversations    CCRN received message from daughter Joy Hamilton states that they met with [facility] CHRIS April yesterday     April RN had advised that emergent SW visit would be planned for Monday - she is calling to acquire about this as she has not heard from anyone.     CCRN placed call to [facility] - spoke to Abby, she does see that patient is on Stephanie Cartagena  schedule for Monday 7/23/18 however is unable to see the time. Abby will page Stephanie DALTON and give daughter Joy's phone number to call and schedule time     CCRN placed call back to Joy - left detailed voicemail for her as she had given permission to do so. Asked Joy to return call to CCRN if any further questions/concerns     CCRN will continue to work with family, home care, [facility] Avril Care Coordinator RN  [facility]  [phone]  July 20, 2018

## 2018-07-21 LAB — TSH SERPL DL<=0.005 MIU/L-ACNC: 1.88 MU/L (ref 0.4–4)

## 2018-07-23 ENCOUNTER — DOCUMENTATION ONLY (OUTPATIENT)
Dept: CARE COORDINATION | Facility: CLINIC | Age: 63
End: 2018-07-23

## 2018-07-23 LAB
B BURGDOR IGG+IGM SER QL: 0.57 (ref 0–0.89)
COPPER SERPL-MCNC: 129 UG/DL (ref 80–155)
ENA SS-A IGG SER IA-ACNC: <0.2 AI (ref 0–0.9)
ENA SS-B IGG SER IA-ACNC: <0.2 AI (ref 0–0.9)
IGA SERPL-MCNC: 181 MG/DL (ref 70–380)
IGG SERPL-MCNC: 1080 MG/DL (ref 695–1620)
IGM SERPL-MCNC: 57 MG/DL (ref 60–265)
PROT PATTERN SERPL IFE-IMP: ABNORMAL
TTG IGA SER-ACNC: <1 U/ML
TTG IGG SER-ACNC: <1 U/ML

## 2018-07-23 NOTE — PROGRESS NOTES
Dear Dr. Jaclyn Ojeda   [facility] process is that all ordered disciplines will be involved in the development of the plan of care.  The following disciplines were unable to see Mariela Duffy; MRN [id number] within the 5 day evaluation window.  There will be a delay in the evalation visit by  PT  OT      We will notify you when the evaluation is completed.  The patient has been notified.      Sincerely [facility]  Courtney Ta  [phone]

## 2018-07-25 ENCOUNTER — DOCUMENTATION ONLY (OUTPATIENT)
Dept: CARE COORDINATION | Facility: CLINIC | Age: 63
End: 2018-07-25

## 2018-07-25 LAB — VIT B1 BLD-MCNC: 160 NMOL/L (ref 70–180)

## 2018-07-25 NOTE — PROGRESS NOTES
Approved:  ORDER  for FU SW visit and up to one PRN visit to assist with long term care planning, MA glendy, and community resources.   Estefany Stanley PA-C

## 2018-07-25 NOTE — PROGRESS NOTES
[facility] now requests orders and shares plan of care/discharge summaries for some patients through [facility].  Please REPLY TO THIS MESSAGE OR ROUTE BACK TO THE AUTHOR in order to give authorization for orders when needed.  This is considered a verbal order, you will still receive a faxed copy of orders for signature.  Thank you for your assistance in improving collaboration for our patients.    ORDER Requesting order for FU SW visit and up to one PRN visit to assist with long term care planning, MA glendy, and community resources.     MD SUMMARY/PLAN OF CARE The following is SW visit note from 7/23/18  Pt ambulating in kitchen upon arrival. Appeared fairly well groomed, wearing a dress and matching jewelry. Hair appeared uncombed. Friendly and receptive. Daughter, Joy, also present and participated.    PRIMARY LANGUAGE          English    HOMEBOUND STATUS  Pt is not homebound, [facility], grocery store, medical appts, etc..  Unclear if Pt should be driving due to cognitive changes.    GOALS/WHAT MATTERS/FEARS/CONCERNS/QUALITY OF LIFE  My goals are to get organized and keep my independence. Would like to get back to work.    PATIENT STRENGTHS  Compassion. Mercy. Music.    MEDICAL HISTORY/CURRENT ISSUES/HAS PT BEEN IN HOSPITAL OR TCU RECENTLY   Referral from  clinic and clinical care coordination following mulitple episodes of anxiety and memory loss.  According to daughter and epic notes, pt has memory loss that is quicky worsening and they feel pt is no longer safe to be doing the activities she used to such as driving /she has gotten lost/ and medication mangement /pills are messed up in medication boxes. Seen by neurologist at [facility] recently. Neuropsych testing scheduled at [facility] in October 2018. [facility] wants Pt to be seen sooner and wants her to call [facility] to schedule. H/O anxiety, depression, GERD, and IBS. Denied ETOH use for several months.  Denied ever having been a heavy drinker.    COGNITION   alert and oriented x3. H/O recent memory problems. Daug stated Pts cognition varies.      FUNCTIONAL STATUS   Ambulates without AD. H/O falls. Last one was 1 to 2 months ago when she was walking her dog. Stated dog was very strong and pulled her down. Dog now with her daug. Independent with giving self sponge bath. Denied incontinence, except when she has diarrhea from her IBS.    LIVING SITUATION    Lives alone in own home which is extremely piled up with belongings. Clear pathway through kraus and to bathroom. Reports her bedroom is piled up. Dapeña stated there is a narrow pathway to Pts bed. [facility] has been involved due to lack of lawn care. Mortgage not paid x8 months. Foreclosure to occur 12/2018. Pt not sure what she is going to do. Would like to keep living independently. Stated she could go to live with sister in [place] or son in [place].    SUPPORT SYSTEM  Joy is in [place]. She assists with appts, lawn care, setting up/monitoring meds, and scheduling. She stated she tries to clean up and organize home, but things go back to how they were a week later. Six other children in [facility], son in [place], son in [place]. Five of the children including Joy are involved and help some. Son, Naldo, going to help with managing finances. Children recently learned about the foreclosure. They did not realize Pt wasnt paying bills. Pt attends [employer]. A neighbor helped with lawn recently, but up to this point Pt has not gotten along with neighbors.     CAREGIVER CONCERNS/ LIMITATIONS/ CONFLICTS    Children all have own work and family responsibilities. Children who are disengaged have been so for years. Pt stated she doesnt like to ask them for help.    FINANCIAL ASSESSMENT   Pt receives [employer] and spousal support, totaling about 3,000/month. She reported spousal support ending in 3 months. JENNY 1,000.00. Otherwise, denied having any assets. In addition to  unpaid mortgage, Pt far behind on utility bills. Worst one seems to be gas bill with a balance of 724.77. Son, Naldo, used to manage finances, but Pt asked him to stop as she stated other children thought he wasnt doing a good job.    EMOTIONAL STATUS/ CURRENT SOURCES OF STRESS    Pt anxious on and off throughout discussion, snapping and manipulating a rubberband around her wrist. Sees counselor at [facility] weekly and psychiatrist every 3 to 4 weeks.    ADVANCE CARE PLANNING   No documents on file.    INTERVENTIONS     SUPPORT/MENTAL HEALTH   Listened and empathized as Pt and caregiver spoke about her illness, associated cognitive impairment, and need for more support. Pt reported being under a lot of stress for several years. She had a significant other living with her until 10/2017 who is now in a facility. Pt stated she does fine when she gets to go to the [facility] and socialize.     ALTERNATIVE LIVING OPTIONS   Explored long term care plan. Pt expressed desire to live independently. LISW recommended that she consider move to [place] in light of cognitive deficits reported by her daug ie confusion with meds, getting lost while driving. Explained Pt would need to be certified as disabled by [facility] since she is not on SSD. Also explained Pt would need to apply for MA and be approved for CADI before she could move to [place] since she does not have ability to pay privately. Provided Senior Housing Guide. Educated re CADI coverage of [place] facilities. Explained most facilities accept CADI, but majority also require 2 to 3 yrs of private pay before they will accept it. Informed Pt and daug that there are some facilities without a private pay requirement. Explained above process could take several months and that NH placement would be only immediate option for care. Pt focused on wanting [place]. Explained there are very few subsidized [place] options as [facility] owns most subsidized housing and that wait list is  closed. Reviewed housing guide with snehal, pointing out subsidized section should Pt want to apply now in light of her hope of living independently some day. Pt agreeable with starting SMRT/MA glendy/CADI eval process. FU visit scheduled 7/30/18. Will complete MA LTC glendy. Asked Pt to gather income and asset verifications together and she agreed to follow through.    NEUROPSYCH TESTING  KASHIF called [facility] re status of neuropsych testing. Spoke with Cecelia at [facility]. She stated no referral received from [facility]. She provided fax where referral should be sent. LISW provided lesley contact number, asking that she be contacted re scheduling. Called [facility] and spoke with  who agreed to fax referral to [facility]. Informed Pt and snehal.    HOME/HOARDING SITUATION  Pt wanting to go through her belongings and sell things of value. However, she stated her  told her not to sell anything since some belongs to her former roommate. Daug expressed desire to get house cleaned out and sold with hope that Pt will be able to walk away with 20,000. Pt reported she owes 130,000 on home. It is in significant disrepair and seems unlikely that she would be able to make anything from selling it. Pt and snehal wondering about possible company that would clear out home and sell things of value. QUINNW called [employer] and learned they charge for clearng out home, but any sale of belongings reduces the charge. Asked if they might clear the home out for no charge. Worker stated she will ask owner to call writer, but expressed doubt that this would be approved. QUINNW concerned about health and safety hazard created by clutter. KASHIF also stated opinion that most realistic option seems to be for Pt to walk away from the home with whatever belongings she chooses to take. Snehal stated that is what they are thinking will happen.    FINANCIAL RESOURCES  QUINNW reviewed gas bill. Will call CAP  agency and see if Pt qualifies for emergency assist. This is doubtful as her income is over guidelines for [employer]. If not eligible, will request funds from [facility] to go toward that bill.     FOOD RESOURCES   Educated re Fare for All, providing address of nearest distribution location as well as dates/times it is open. Educated re food shelves in area as well as community meals served in local churches. Pt appreciative of info.    TRANSPORTATION   Explored transport needs. Pt currently driving.     ADVANCE CARE PLANNING    Not addressed this visit.    PLAN OF CARE  FU visit 7/30/18. Will continue to assess needs at that time and complete MA LTC glendy.

## 2018-07-27 ENCOUNTER — TELEPHONE (OUTPATIENT)
Dept: ENDOCRINOLOGY | Facility: CLINIC | Age: 63
End: 2018-07-27

## 2018-07-27 ENCOUNTER — OFFICE VISIT (OUTPATIENT)
Dept: ENDOCRINOLOGY | Facility: CLINIC | Age: 63
End: 2018-07-27
Payer: COMMERCIAL

## 2018-07-27 ENCOUNTER — DOCUMENTATION ONLY (OUTPATIENT)
Dept: FAMILY MEDICINE | Facility: CLINIC | Age: 63
End: 2018-07-27

## 2018-07-27 ENCOUNTER — TELEPHONE (OUTPATIENT)
Dept: FAMILY MEDICINE | Facility: CLINIC | Age: 63
End: 2018-07-27

## 2018-07-27 VITALS
DIASTOLIC BLOOD PRESSURE: 69 MMHG | WEIGHT: 190.6 LBS | BODY MASS INDEX: 28.98 KG/M2 | SYSTOLIC BLOOD PRESSURE: 124 MMHG | HEART RATE: 114 BPM | TEMPERATURE: 98.1 F

## 2018-07-27 DIAGNOSIS — E89.0 POSTOPERATIVE HYPOTHYROIDISM: Primary | ICD-10-CM

## 2018-07-27 DIAGNOSIS — Z85.850 HISTORY OF THYROID CANCER: ICD-10-CM

## 2018-07-27 LAB — TSH SERPL DL<=0.005 MIU/L-ACNC: 1.34 MU/L (ref 0.4–4)

## 2018-07-27 PROCEDURE — 99204 OFFICE O/P NEW MOD 45 MIN: CPT | Performed by: CLINICAL NURSE SPECIALIST

## 2018-07-27 PROCEDURE — 36415 COLL VENOUS BLD VENIPUNCTURE: CPT | Performed by: CLINICAL NURSE SPECIALIST

## 2018-07-27 PROCEDURE — 86800 THYROGLOBULIN ANTIBODY: CPT | Mod: 90 | Performed by: CLINICAL NURSE SPECIALIST

## 2018-07-27 PROCEDURE — 84432 ASSAY OF THYROGLOBULIN: CPT | Mod: 90 | Performed by: CLINICAL NURSE SPECIALIST

## 2018-07-27 PROCEDURE — 84443 ASSAY THYROID STIM HORMONE: CPT | Performed by: CLINICAL NURSE SPECIALIST

## 2018-07-27 PROCEDURE — 99000 SPECIMEN HANDLING OFFICE-LAB: CPT | Performed by: CLINICAL NURSE SPECIALIST

## 2018-07-27 RX ORDER — LEVOTHYROXINE SODIUM 175 UG/1
175 TABLET ORAL DAILY
Qty: 30 TABLET | Refills: 3 | Status: SHIPPED | OUTPATIENT
Start: 2018-07-27 | End: 2018-12-06

## 2018-07-27 NOTE — TELEPHONE ENCOUNTER
Linda calling from  Home Care -[phone] and states patient took pm medications this am.   for home care is Halie [phone].  Do you want her to take her am medications this pm?  Any side effects?  Looks like just may be tired as got 2 instead of 1 Buspar, Nortriptyline that is usually at bedtime and Sertraline she usually takes nightly.  Please advise.  Abril Harris RN

## 2018-07-27 NOTE — TELEPHONE ENCOUNTER
For evening: take 1 buspar and 1 capusle of nortriptyline    Can restart zoloft on normal schedule tomorrow evening/night

## 2018-07-27 NOTE — PROGRESS NOTES
Linda Occupational Therapist with [facility] calling, [phone]    Request OT orders:     1 once a week for 2 weeks   (cognitive testing, coping skills, and safety service equipment recommendations).     Informed approved per standing orders. HC staff will fax these orders for MD signature.      Lakisha JOSEPH RN, BSN, PHN  [place] [employer] RN

## 2018-07-27 NOTE — TELEPHONE ENCOUNTER
Halie returned call  Message given  Pt has arrived to Catie Crane, given message to her also    Vesna Zaman RN, BS  Clinical Nurse Triage.
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
## 2018-07-27 NOTE — TELEPHONE ENCOUNTER
Faxed signed release of information to [facility] fax# [phone]. (ph# [phone])   Per Catie Crane, records may be from 2952-5102 time frame.  Records pending.  Nina العلي M.A.

## 2018-07-27 NOTE — PATIENT INSTRUCTIONS
Try changing to name brand Levoxyl    Make a lab appointment 6 weeks after starting the name brand Levoxyl    Take Levoxyl on an empty stomach, and wait 30-60 minutes before eating.  Separate any iron or calcium supplements by 4 hours from levoxyl dose  Check with pharmacy - how far apart to take levoxyl and protonix.    Schedule a neck ultrasound at [facility].  I'm checking thyroid cancer recurrent marker today - thyroglobulin    We'll discuss future plans/changes, if any later after repeat thyroid tests in 6+ weeks.    Catie Crane NP  Endocrinology
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
## 2018-07-30 ENCOUNTER — DOCUMENTATION ONLY (OUTPATIENT)
Dept: CARE COORDINATION | Facility: CLINIC | Age: 63
End: 2018-07-30

## 2018-07-30 NOTE — PROGRESS NOTES
[facility] now requests orders and shares plan of care/discharge summaries for some patients through [employer].  Please REPLY TO THIS MESSAGE OR ROUTE BACK TO THE AUTHOR in order to give authorization for orders when needed.  This is considered a verbal order, you will still receive a faxed copy of orders for signature.  Thank you for your assistance in improving collaboration for our patients.    ORDER    Ok for SW visit to complete MA L[facility] glendy and explore more community resources.      JOSE ALFREDO Arboleda

## 2018-08-01 ENCOUNTER — PATIENT OUTREACH (OUTPATIENT)
Dept: CARE COORDINATION | Facility: CLINIC | Age: 63
End: 2018-08-01

## 2018-08-01 NOTE — PROGRESS NOTES
Clinic Care Coordination Contact  Care Team Conversations    CCRN received email from Stephanie Cartagena [facility] SW     Patient has requested new  - Mora Singer SW has been assigned     CCRN will continue to monitor     Mariely Mackenzie Care Coordinator RN  [facility]  [phone]  August 1, 2018

## 2018-08-03 LAB — LAB SCANNED RESULT: NORMAL

## 2018-08-04 NOTE — PROGRESS NOTES
Fely,  Good news! Your thyroglobulin was undetectable.  TSH level looks good.  Here's a copy of the results for your records.  Catie Crane NP  Endocrinology

## 2018-08-10 ENCOUNTER — TELEPHONE (OUTPATIENT)
Dept: FAMILY MEDICINE | Facility: CLINIC | Age: 63
End: 2018-08-10

## 2018-08-10 ENCOUNTER — OFFICE VISIT (OUTPATIENT)
Dept: FAMILY MEDICINE | Facility: CLINIC | Age: 63
End: 2018-08-10
Payer: COMMERCIAL

## 2018-08-10 VITALS
BODY MASS INDEX: 29.04 KG/M2 | DIASTOLIC BLOOD PRESSURE: 70 MMHG | OXYGEN SATURATION: 100 % | HEART RATE: 104 BPM | RESPIRATION RATE: 12 BRPM | TEMPERATURE: 98.3 F | WEIGHT: 191 LBS | SYSTOLIC BLOOD PRESSURE: 114 MMHG

## 2018-08-10 DIAGNOSIS — F41.1 GENERALIZED ANXIETY DISORDER: Primary | ICD-10-CM

## 2018-08-10 PROCEDURE — 99213 OFFICE O/P EST LOW 20 MIN: CPT | Performed by: NURSE PRACTITIONER

## 2018-08-10 ASSESSMENT — ANXIETY QUESTIONNAIRES
3. WORRYING TOO MUCH ABOUT DIFFERENT THINGS: SEVERAL DAYS
5. BEING SO RESTLESS THAT IT IS HARD TO SIT STILL: SEVERAL DAYS
6. BECOMING EASILY ANNOYED OR IRRITABLE: SEVERAL DAYS
1. FEELING NERVOUS, ANXIOUS, OR ON EDGE: MORE THAN HALF THE DAYS
GAD7 TOTAL SCORE: 8
4. TROUBLE RELAXING: SEVERAL DAYS
GAD7 TOTAL SCORE: 8
7. FEELING AFRAID AS IF SOMETHING AWFUL MIGHT HAPPEN: SEVERAL DAYS
7. FEELING AFRAID AS IF SOMETHING AWFUL MIGHT HAPPEN: SEVERAL DAYS
GAD7 TOTAL SCORE: 8
2. NOT BEING ABLE TO STOP OR CONTROL WORRYING: SEVERAL DAYS

## 2018-08-10 ASSESSMENT — PATIENT HEALTH QUESTIONNAIRE - PHQ9
10. IF YOU CHECKED OFF ANY PROBLEMS, HOW DIFFICULT HAVE THESE PROBLEMS MADE IT FOR YOU TO DO YOUR WORK, TAKE CARE OF THINGS AT HOME, OR GET ALONG WITH OTHER PEOPLE: VERY DIFFICULT
SUM OF ALL RESPONSES TO PHQ QUESTIONS 1-9: 7
SUM OF ALL RESPONSES TO PHQ QUESTIONS 1-9: 7

## 2018-08-10 NOTE — TELEPHONE ENCOUNTER
Linda calling from  Home Care OT  Requests 4 visits per month x1    Approved per protocol  Vesna Zaman RN, BS  Clinical Nurse Triage.

## 2018-08-10 NOTE — PROGRESS NOTES
SUBJECTIVE:   Mariela Duffy is a 62 year old female who presents to clinic today for the following health issues:    History of Present Illness     Depression & Anxiety Follow-up:     Depression/Anxiety:  Depression & Anxiety    Status since last visit::  Stable    Other associated symptoms of depression and anxiety::  YES    Significant life event::  YES    Current substance use::  Alcohol and Prescription Drugs       Today's PHQ-9         PHQ-9 Total Score:     (P) 7   PHQ-9 Q9 Suicidal ideation:   (P) Not at all   Thoughts of suicide or self harm:      Self-harm Plan:        Self-harm Action:          Safety concerns for self or others:       QUYEN-7 Total Score: (P) 8    Diet:  Regular (no restrictions)  Frequency of exercise:  None  Taking medications regularly:  No  Barriers to taking medications:  Problems remembering to take them  Medication side effects:  None  Additional concerns today:  No    Continues on Zoloft 200 mg and Buspar 30 mg daily. Taking Lorazepam 0.25 mg prn. Today she reports to the clinic due to an anxiety attack from recurrence of spilling beverages. She reports she was drinking soda, sat down to read a book. Stood up and spilled glass of soda on floor. She took dose of Lorazepam and also called home health care in [place] to lower anxious thoughts, but staff suggested she go to the clinic. Denies thoughts of self harm during this episode. Drinking 1/2 of a alcoholic drink at a time. She lives in a home alone, receives in home care (nurse, OT), visit once weekly. Follows up with [facility] next week.     Problem list and histories reviewed & adjusted, as indicated.  Additional history: as documented  Patient Active Problem List   Diagnosis     Contact dermatitis and other eczema due to other specified agent     Allergic rhinitis due to other allergen     Esophageal reflux     Irritable bowel syndrome     Rosacea     Postsurgical hypothyroidism     Iron deficiency anemia      Absence of menstruation     Mild major depression (H)     CARDIOVASCULAR SCREENING; LDL GOAL LESS THAN 160     Generalized anxiety disorder     Hypothyroidism     Tubular adenoma of colon     Seasonal affective disorder (H)     Rhinitis     Headache     ADD (attention deficit disorder)     Other insomnia     TIA (transient ischemic attack)     Gastroesophageal reflux disease with esophagitis     Hiatal hernia     History of colonic polyps     BP check     Memory loss     Migraine with aura and without status migrainosus, not intractable     History of thyroid cancer     Past Surgical History:   Procedure Laterality Date     ABDOMEN SURGERY      Hiatelherna     C NONSPECIFIC PROCEDURE      surgery hiatal hernia     C NONSPECIFIC PROCEDURE      cholecystectomy     C NONSPECIFIC PROCEDURE  multiple    cleft lip repair.     CHOLECYSTECTOMY       COLONOSCOPY  2013    Colonoscopy Dr. Valle[facility]     ENT SURGERY  [id number]     HEAD & NECK SURGERY      thyroid cancer surgery     SURGICAL HISTORY OF -       thyroidectomy due to cancer     WRIST SURGERY Right     2015       Social History   Substance Use Topics     Smoking status: Former Smoker     Years: 5.00     Types: Cigarettes     Start date: 5/10/1973     Quit date: 1977     Smokeless tobacco: Never Used     Alcohol use Yes      Comment: 2-4 mixed drinks/month     Family History   Problem Relation Age of Onset     Cancer Father      Colon, stomach -  at 75yoa     Hypertension Father      C.A.D. Father      Colon Cancer Father      Other Cancer Father      Cancer Mother      Throat, lymph, bone -  at 79yoa     Other Cancer Mother      C.A.D. Maternal Grandfather      Heart Attack -  in his late 60's     Alzheimer Disease Paternal Grandmother      C.A.D. Paternal Grandfather      Heart Attack -  at 63yoa     Thyroid Disease Other          Current Outpatient Prescriptions   Medication Sig Dispense Refill     aspirin 325 MG  tablet Take 1 tablet (325 mg) by mouth daily 90 tablet 3     busPIRone (BUSPAR) 15 MG tablet TAKE 2 TABLETS BY MOUTH IN THE MORNING AND TAKE 1 TABLET BY MOUTH IN THE EVENING 270 tablet 1     fluticasone (FLONASE) 50 MCG/ACT spray Spray 1-2 sprays into both nostrils daily 1 Bottle 11     levothyroxine (SYNTHROID/LEVOTHROID) 175 MCG tablet Take 1 tablet (175 mcg) by mouth daily 90 tablet 1     LEVOXYL 175 MCG tablet Take 1 tablet (175 mcg) by mouth daily Dispense name brand Levoxyl 30 tablet 3     LORazepam (ATIVAN) 0.5 MG tablet Take 0.5 tablets (0.25 mg) by mouth 2 times daily as needed for anxiety . May take 2 tabs ( 1 mg) bid PO 2 times daily as needed until current anxiety flare improves. 60 tablet 0     Naproxen Sodium (ALEVE PO) Take 220 mg by mouth as needed for moderate pain       nortriptyline (PAMELOR) 50 MG capsule Take 1 capsule (50 mg) by mouth At Bedtime 90 capsule 1     pantoprazole (PROTONIX) 20 MG EC tablet TAKE ONE TABLET BY MOUTH ONCE DAILY. TAKE BY MOUTH 30 TO 60 MINUTES BEFORE A MEAL. 90 tablet 1     ranitidine (ZANTAC) 300 MG tablet Take 1 tablet (300 mg) by mouth At Bedtime 90 tablet 3     sertraline (ZOLOFT) 100 MG tablet 2 tabs (200 mg) by mouth nightly. 180 tablet 1     SUMAtriptan (IMITREX) 100 MG tablet Take 1 tablet (100 mg) by mouth at onset of headache for migraine May repeat in 2 hours. Max 2 tablets/24 hours. 9 tablet 1     triamcinolone (KENALOG) 0.1 % ointment Apply topically 2 times daily as needed for irritation 80 g 3     Allergies   Allergen Reactions     Levaquin Nausea and Vomiting       ROS:  Constitutional, cardiovascular, pulmonary, neuro and psych systems are negative, except as otherwise noted.    This document serves as a record of the services and decisions personally performed and made by Susan Haase, CNP. It was created on her behalf by Berenice Welch, a trained medical scribe. The creation of this document is based on the provider's statements to the medical  bev.  Berenice Welch 1:38 PM August 10, 2018  OBJECTIVE:   /70 (BP Location: Left arm, Patient Position: Chair, Cuff Size: Adult Regular)  Pulse 104  Temp 98.3  F (36.8  C) (Oral)  Resp 12  Wt 86.6 kg (191 lb)  LMP 01/20/2004  SpO2 100%  BMI 29.04 kg/m2  Body mass index is 29.04 kg/(m^2).  GENERAL: healthy, alert and no distress  HENT: ear canals and TM's normal, nose and mouth without ulcers or lesions  NECK: no adenopathy, no asymmetry, masses, or scars and thyroid normal to palpation  RESP: lungs clear to auscultation - no rales, rhonchi or wheezes  CV: regular rate and rhythm, normal S1 S2, no S3 or S4, no murmur, click or rub, no peripheral edema   NEURO: BUE strength strong, equal.  Alert, oriented.   PSYCH: mentation appears normal, affect normal/bright    ASSESSMENT/PLAN:   Mariela was seen today for recheck medication and health maintenance.    Diagnoses and all orders for this visit:    Generalized anxiety disorder:  PHQ 9 score of 7, QUYEN 7 score of 8.  Sounds like she had an anxiety attack at home that was relieved after taking a lorazepam.  In clinic appears calm, able to articulate symptoms.  Continue on current medications of Zoloft and Buspar.      Follow up visit in 2 weeks, sooner as needed.   The information in this document, created by the medical scribe for me, accurately reflects the services I personally performed and the decisions made by me. I have reviewed and approved this document for accuracy prior to leaving the patient care area.  August 10, 2018 1:47 PM  Susan Haase, APRN [facility]

## 2018-08-10 NOTE — TELEPHONE ENCOUNTER
Terri RN with [facility] requests verbal orders for   Skilled nurse visit  1 x week x 3 weeks with 3 as needed visits for medications set up and mental health assessment   Terri noted that Patient's daughter is not longer setting up medications.     Informed approved per standing orders.   Home Care staff will fax these orders for MD signature.   Eliud Nielson, RN

## 2018-08-10 NOTE — MR AVS SNAPSHOT
After Visit Summary   8/10/2018    Mariela Duffy    MRN: [id number]           Patient Information     Date Of Birth          1955        Visit Information        Provider Department      8/10/2018 1:30 PM Haase, Susan Rachele, APRN CNP [facility]        Today's Diagnoses     Generalized anxiety disorder    -  1       Follow-ups after your visit        Follow-up notes from your care team     Return in about 2 weeks (around 8/24/2018).      Your next 10 appointments already scheduled     Oct 15, 2018 10:00 AM CDT   SHORT with Jerry Jordan RPH   [facility]    [address] [address] [phone]              Who to contact     If you have questions or need follow up information about today's clinic visit or your schedule please contact [facility] directly at [phone].  Normal or non-critical lab and imaging results will be communicated to you by MyChart, letter or phone within 4 business days after the clinic has received the results. If you do not hear from us within 7 days, please contact the clinic through [employer] In[employer] or phone. If you have a critical or abnormal lab result, we will notify you by phone as soon as possible.  Submit refill requests through [employer] or call your pharmacy and they will forward the refill request to us. Please allow 3 business days for your refill to be completed.          Additional Information About Your Visit        MyChart Information     [employer] gives you secure access to your electronic health record. If you see a primary care provider, you can also send messages to your care team and make appointments. If you have questions, please call your primary care clinic.  If you do not have a primary care provider, please call [phone] and they will assist you.        Care EveryWhere ID     This is your Care EveryWhere ID. This could be used by  other organizations to access your [place] medical records  [id number]        Your Vitals Were     Pulse Temperature Respirations Last Period Pulse Oximetry BMI (Body Mass Index)    104 98.3  F (36.8  C) (Oral) 12 01/20/2004 100% 29.04 kg/m2       Blood Pressure from Last 3 Encounters:   08/10/18 114/70   07/27/18 124/69   07/02/18 126/80    Weight from Last 3 Encounters:   08/10/18 191 lb (86.6 kg)   07/27/18 190 lb 9.6 oz (86.5 kg)   07/02/18 190 lb (86.2 kg)              Today, you had the following     No orders found for display       Primary Care Provider Office Phone # Fax #    Estefany Stanley PA-C [phone] [address] [facility] [address]        Equal Access to Services     [facility] : Hadii aad ku hadasho Sokaelyn, waaxda luqadaha, qaybta kaalmada adeegyada, fazal guillermo . So [facility] [phone].    ATENCIÓN: Si habla español, tiene a romero disposición servicios gratuitos de asistencia lingüística. Llame al [phone].    We comply with applicable federal civil rights laws and [place] laws. We do not discriminate on the basis of race, color, national origin, age, disability, sex, sexual orientation, or gender identity.            Thank you!     Thank you for choosing [facility]  for your care. Our goal is always to provide you with excellent care. Hearing back from our patients is one way we can continue to improve our services. Please take a few minutes to complete the written survey that you may receive in the mail after your visit with us. Thank you!             Your Updated Medication List - Protect others around you: Learn how to safely use, store and throw away your medicines at www.disposemymeds.org.          This list is accurate as of 8/10/18  1:50 PM.  Always use your most recent med list.                   Brand Name Dispense Instructions for use Diagnosis    ALEVE PO      Take 220 mg by mouth as needed for  moderate pain        aspirin 325 MG tablet     90 tablet    Take 1 tablet (325 mg) by mouth daily    Transient cerebral ischemia, unspecified type       busPIRone 15 MG tablet    BUSPAR    270 tablet    TAKE 2 TABLETS BY MOUTH IN THE MORNING AND TAKE 1 TABLET BY MOUTH IN THE EVENING    Anxiety       fluticasone 50 MCG/ACT spray    FLONASE    1 Bottle    Spray 1-2 sprays into both nostrils daily    Post-nasal drip       * levothyroxine 175 MCG tablet    SYNTHROID/LEVOTHROID    90 tablet    Take 1 tablet (175 mcg) by mouth daily    Primary hypothyroidism       * LEVOXYL 175 MCG tablet   Generic drug:  levothyroxine     30 tablet    Take 1 tablet (175 mcg) by mouth daily Dispense name brand Levoxyl    Postoperative hypothyroidism, History of thyroid cancer       LORazepam 0.5 MG tablet    ATIVAN    60 tablet    Take 0.5 tablets (0.25 mg) by mouth 2 times daily as needed for anxiety . May take 2 tabs ( 1 mg) bid PO 2 times daily as needed until current anxiety flare improves.    Anxiety       nortriptyline 50 MG capsule    PAMELOR    90 capsule    Take 1 capsule (50 mg) by mouth At Bedtime    Irritable bowel syndrome with diarrhea       pantoprazole 20 MG EC tablet    PROTONIX    90 tablet    TAKE ONE TABLET BY MOUTH ONCE DAILY. TAKE BY MOUTH 30 TO 60 MINUTES BEFORE A MEAL.    Gastroesophageal reflux disease with esophagitis, Hiatal hernia       ranitidine 300 MG tablet    ZANTAC    90 tablet    Take 1 tablet (300 mg) by mouth At Bedtime    Gastroesophageal reflux disease with esophagitis       sertraline 100 MG tablet    ZOLOFT    180 tablet    2 tabs (200 mg) by mouth nightly.    Major depressive disorder, recurrent episode, mild (H), Anxiety       SUMAtriptan 100 MG tablet    IMITREX    9 tablet    Take 1 tablet (100 mg) by mouth at onset of headache for migraine May repeat in 2 hours. Max 2 tablets/24 hours.    Migraine with aura and without status migrainosus, not intractable       triamcinolone 0.1 % ointment     KENALOG    80 g    Apply topically 2 times daily as needed for irritation    Eczema, unspecified type       * Notice:  This list has 2 medication(s) that are the same as other medications prescribed for you. Read the directions carefully, and ask your doctor or other care provider to review them with you.

## 2018-08-11 ASSESSMENT — ANXIETY QUESTIONNAIRES: GAD7 TOTAL SCORE: 8

## 2018-08-11 ASSESSMENT — PATIENT HEALTH QUESTIONNAIRE - PHQ9: SUM OF ALL RESPONSES TO PHQ QUESTIONS 1-9: 7

## 2018-08-13 ENCOUNTER — DOCUMENTATION ONLY (OUTPATIENT)
Dept: CARE COORDINATION | Facility: CLINIC | Age: 63
End: 2018-08-13

## 2018-08-13 NOTE — PROGRESS NOTES
[facility] now requests orders and shares plan of care/discharge summaries for some patients through [employer].  Please REPLY TO THIS MESSAGE OR ROUTE BACK TO THE AUTHOR in order to give authorization for orders when needed.  This is considered a verbal order, you will still receive a faxed copy of orders for signature.  Thank you for your assistance in improving collaboration for our patients.    ORDER    SW revisit to further assist with paperwork to get pt certified disabled and on CADI waiver.    JOSE ALFREDO Arboleda

## 2018-08-14 ENCOUNTER — TELEPHONE (OUTPATIENT)
Dept: FAMILY MEDICINE | Facility: CLINIC | Age: 63
End: 2018-08-14

## 2018-08-14 ENCOUNTER — TRANSFERRED RECORDS (OUTPATIENT)
Dept: HEALTH INFORMATION MANAGEMENT | Facility: CLINIC | Age: 63
End: 2018-08-14

## 2018-08-14 DIAGNOSIS — Z85.850 HISTORY OF THYROID CANCER: ICD-10-CM

## 2018-08-14 DIAGNOSIS — E89.0 POSTOPERATIVE HYPOTHYROIDISM: ICD-10-CM

## 2018-08-14 LAB
PHQ9 SCORE: 3
T3FREE SERPL-MCNC: 2.2 PG/ML (ref 2.3–4.2)
T4 FREE SERPL-MCNC: 0.8 NG/DL (ref 0.76–1.46)
TSH SERPL DL<=0.005 MIU/L-ACNC: 3.75 MU/L (ref 0.4–4)

## 2018-08-14 PROCEDURE — 84443 ASSAY THYROID STIM HORMONE: CPT | Performed by: CLINICAL NURSE SPECIALIST

## 2018-08-14 PROCEDURE — 84439 ASSAY OF FREE THYROXINE: CPT | Performed by: CLINICAL NURSE SPECIALIST

## 2018-08-14 PROCEDURE — 84481 FREE ASSAY (FT-3): CPT | Performed by: CLINICAL NURSE SPECIALIST

## 2018-08-14 PROCEDURE — 36415 COLL VENOUS BLD VENIPUNCTURE: CPT | Performed by: CLINICAL NURSE SPECIALIST

## 2018-08-14 NOTE — TELEPHONE ENCOUNTER
Tiffanie Bueno calling from [employer]  Home care had contacted her with their concerns  Tiffanie has met with pt in her home  Concerns about med mngt, ability to manage finances by self, mental health    Wondering if Dr Ojeda would agree to physician support statement for guarantor conservator    # [phone]    Route to provider to review and advise    Vesna Zaman RN Nurse Triage

## 2018-08-15 ENCOUNTER — PATIENT OUTREACH (OUTPATIENT)
Dept: CARE COORDINATION | Facility: CLINIC | Age: 63
End: 2018-08-15

## 2018-08-15 NOTE — TELEPHONE ENCOUNTER
See care coordination encounter     Mariely Mackenzie Care Coordinator RN  [facility]  [phone]  August 15, 2018

## 2018-08-15 NOTE — PROGRESS NOTES
Clinic Care Coordination Contact  Care Team Conversations    Guardianship/conservator paperwork received via fax from [facility]     Paperwork filled out by Dr. Ojeda and faxed back to [facility]     CCRN placed call to Tiffanie King advising that paperwork was filled out and faxed today     [facility] is to return call to CCRN if she did not receive fax.     CCRN will hold fax at her desk until notified that it was received and then will be scanned into patient chart     Mariely Mackenzie Care Coordinator RN  [facility]  [phone]  August 15, 2018

## 2018-08-16 NOTE — PROGRESS NOTES
Fely,  Your T4 level is in the low-normal range.  TSH is normal but a little higher than ideal, TSH target range is less than 3.0.  Your T3 level is below normal.  We could either increase your levothyroxine dose OR continue the current dose and add cytomel (T3) - up to you.  Whichever you choose, we'll plan to recheck labs in 6-8 weeks and reassess symptoms - if that doesn't help your symptoms, we can try the other option.  Let me know what you think you would like to try first.  Catie Crane, NP  Endocrinology

## 2018-08-18 ENCOUNTER — HEALTH MAINTENANCE LETTER (OUTPATIENT)
Age: 63
End: 2018-08-18

## 2018-08-22 ENCOUNTER — TELEPHONE (OUTPATIENT)
Dept: FAMILY MEDICINE | Facility: CLINIC | Age: 63
End: 2018-08-22

## 2018-08-22 NOTE — TELEPHONE ENCOUNTER
Patient calling and \[facility]\"" wants Estefany to call her back.  [facility] a  was to her house.  [facility] they want to remove her from her house.  She would like you to call her back at [phone].  Abril Harris RN

## 2018-08-22 NOTE — TELEPHONE ENCOUNTER
"Call number RN listed and was actually pt's daughter, Joy. Pt's number is one digit difference.    Joy reports mom has upcoming Neuropsych testing in October. Joy reports pt is likely upset about guardianship paperwork.     Called Fely's cell and she states she \"wants to keep you ( Estefany Stanley PA-C) in the loop\".   Patient reports she has Neuropsych testing Decmeber 24th (???)  Wants to inform this writer about guardianship paperwork and upset that her house if being foreclosed upon.  States dumpster is coming this week, so she can get started cleaning stuff out of house and her kids are helping her.     Then patient abruptly changes topic and states \"thanks for calling, racheal now.\"    Estefany Stanley PA-C    "

## 2018-08-31 ENCOUNTER — TELEPHONE (OUTPATIENT)
Dept: FAMILY MEDICINE | Facility: CLINIC | Age: 63
End: 2018-08-31

## 2018-08-31 NOTE — TELEPHONE ENCOUNTER
[facility] calls, verbal order provided for visits until recertification, change from earlier orders, 1 time a week x 2 weeks, CLEMENTE to JOSÉ Strong, RN, BSN  Message handled by Nurse Triage.

## 2018-09-05 ENCOUNTER — PATIENT OUTREACH (OUTPATIENT)
Dept: CARE COORDINATION | Facility: CLINIC | Age: 63
End: 2018-09-05

## 2018-09-05 NOTE — PROGRESS NOTES
Clinic Care Coordination Contact    Situation: Patient chart reviewed by RN care coordinator.    Background: active with [facility] on last outreach     Assessment: Patient is still active with [facility] team     Plan/Recommendations: CCRN will f/u in 4 weeks to ensure still active with home care ([facility] is involved as well)     Mariely Mackenzie Care Coordinator RN  [facility]  [phone]  September 5, 2018

## 2018-09-14 ENCOUNTER — PATIENT OUTREACH (OUTPATIENT)
Dept: CARE COORDINATION | Facility: CLINIC | Age: 63
End: 2018-09-14

## 2018-09-14 NOTE — PROGRESS NOTES
Clinic Care Coordination Contact  Care Team Conversations    CCRN received call from Terri [facility]BR[facility] RN     Patient is up for recert period for home care services     Scheduled visit with patient today - she was not at the home upon arrival     Patient has been doing good setting up her pill boxes with home care and has gotten a lot better about taking on time as well     Verbal order provided for nursing visits to continue - will send to pcp for signature     SW has been working with [facility] staff on plan of care - Terri PEREZ does not have any updates on this at this time    Mariely Mackenzie Care Coordinator RN  [facility]  [phone]  September 14, 2018

## 2018-09-17 ENCOUNTER — TELEPHONE (OUTPATIENT)
Dept: FAMILY MEDICINE | Facility: CLINIC | Age: 63
End: 2018-09-17

## 2018-09-17 NOTE — TELEPHONE ENCOUNTER
[facility] calls, Halie, verbal orders provided for Skilled nursing 1 time a week x 8 weeks and 3 prn visits, also wants f/u appointment as pt more anxious and forgetful, TIMOTEO and JOSÉ OOO, f/u appointment made with CLEMENTE MERCER to RUSLAN Strong RN, BSN  Message handled by Nurse Triage.

## 2018-09-19 ENCOUNTER — OFFICE VISIT (OUTPATIENT)
Dept: FAMILY MEDICINE | Facility: CLINIC | Age: 63
End: 2018-09-19
Payer: COMMERCIAL

## 2018-09-19 VITALS
WEIGHT: 192 LBS | DIASTOLIC BLOOD PRESSURE: 68 MMHG | TEMPERATURE: 98.1 F | OXYGEN SATURATION: 99 % | SYSTOLIC BLOOD PRESSURE: 114 MMHG | BODY MASS INDEX: 29.19 KG/M2 | HEART RATE: 114 BPM | RESPIRATION RATE: 14 BRPM

## 2018-09-19 DIAGNOSIS — F41.1 GENERALIZED ANXIETY DISORDER: Primary | ICD-10-CM

## 2018-09-19 DIAGNOSIS — F32.0 MILD MAJOR DEPRESSION (H): ICD-10-CM

## 2018-09-19 PROCEDURE — 99213 OFFICE O/P EST LOW 20 MIN: CPT | Performed by: NURSE PRACTITIONER

## 2018-09-19 ASSESSMENT — ANXIETY QUESTIONNAIRES
4. TROUBLE RELAXING: MORE THAN HALF THE DAYS
7. FEELING AFRAID AS IF SOMETHING AWFUL MIGHT HAPPEN: MORE THAN HALF THE DAYS
GAD7 TOTAL SCORE: 17
2. NOT BEING ABLE TO STOP OR CONTROL WORRYING: NEARLY EVERY DAY
GAD7 TOTAL SCORE: 17
3. WORRYING TOO MUCH ABOUT DIFFERENT THINGS: NEARLY EVERY DAY
6. BECOMING EASILY ANNOYED OR IRRITABLE: MORE THAN HALF THE DAYS
GAD7 TOTAL SCORE: 17
5. BEING SO RESTLESS THAT IT IS HARD TO SIT STILL: MORE THAN HALF THE DAYS
1. FEELING NERVOUS, ANXIOUS, OR ON EDGE: NEARLY EVERY DAY
7. FEELING AFRAID AS IF SOMETHING AWFUL MIGHT HAPPEN: MORE THAN HALF THE DAYS

## 2018-09-19 ASSESSMENT — PATIENT HEALTH QUESTIONNAIRE - PHQ9
SUM OF ALL RESPONSES TO PHQ QUESTIONS 1-9: 12
SUM OF ALL RESPONSES TO PHQ QUESTIONS 1-9: 12

## 2018-09-19 NOTE — PROGRESS NOTES
SUBJECTIVE:   Mariela Duffy is a 62 year old female who presents to clinic today for the following health issues:    History of Present Illness     Depression & Anxiety Follow-up:     Depression/Anxiety:  Depression & Anxiety    Status since last visit::  Worsened    Other associated symptoms of depression and anxiety::  YES    Significant life event::  No    Current substance use::  Alcohol and Prescription Drugs       Today's PHQ-9         PHQ-9 Total Score:     (P) 12   PHQ-9 Q9 Suicidal ideation:   (P) Not at all   Thoughts of suicide or self harm:      Self-harm Plan:        Self-harm Action:          Safety concerns for self or others:       QUYEN-7 Total Score: (P) 17    Continues Zoloft 200 mg daily and Buspar BID, taking Lorazepam prn. Anxiety is worsening, triggers include home situation and disclosing information about children. Has began waking up in middle of the night. Sees counselor weekly, changing to biweekly, at [facility]. Continues to receive home healthcare, last visit 09/17. Plans to follow up with psychiatry.   QUYEN 7 score of 17, PHQ 9 score of 12, denies thoughts of self harm    Problem list and histories reviewed & adjusted, as indicated.  Additional history: as documented  Patient Active Problem List   Diagnosis     Contact dermatitis and other eczema due to other specified agent     Allergic rhinitis due to other allergen     Esophageal reflux     Irritable bowel syndrome     Rosacea     Postsurgical hypothyroidism     Iron deficiency anemia     Absence of menstruation     Mild major depression (H)     CARDIOVASCULAR SCREENING; LDL GOAL LESS THAN 160     Generalized anxiety disorder     Hypothyroidism     Tubular adenoma of colon     Seasonal affective disorder (H)     Rhinitis     Headache     ADD (attention deficit disorder)     Other insomnia     TIA (transient ischemic attack)     Gastroesophageal reflux disease with esophagitis     Hiatal hernia      History of colonic polyps     BP check     Memory loss     Migraine with aura and without status migrainosus, not intractable     History of thyroid cancer     Past Surgical History:   Procedure Laterality Date     ABDOMEN SURGERY      Hiatelherna     C NONSPECIFIC PROCEDURE      surgery hiatal hernia     C NONSPECIFIC PROCEDURE      cholecystectomy     C NONSPECIFIC PROCEDURE  multiple    cleft lip repair.     CHOLECYSTECTOMY       COLONOSCOPY  2013    Colonoscopy Dr. Vallejo [facility]     ENT SURGERY  [id number]     HEAD & NECK SURGERY      thyroid cancer surgery     SURGICAL HISTORY OF -       thyroidectomy due to cancer     WRIST SURGERY Right     2015       Social History   Substance Use Topics     Smoking status: Former Smoker     Years: 5.00     Types: Cigarettes     Start date: 5/10/1973     Quit date: 1977     Smokeless tobacco: Never Used     Alcohol use Yes      Comment: 2-4 mixed drinks/month     Family History   Problem Relation Age of Onset     Cancer Father      Colon, stomach -  at 75yoa     Hypertension Father      C.A.D. Father      Colon Cancer Father      Other Cancer Father      Cancer Mother      Throat, lymph, bone -  at 79yoa     Other Cancer Mother      C.A.D. Maternal Grandfather      Heart Attack -  in his late 60's     Alzheimer Disease Paternal Grandmother      C.A.D. Paternal Grandfather      Heart Attack -  at 63yoa     Thyroid Disease Other          Current Outpatient Prescriptions   Medication Sig Dispense Refill     aspirin 325 MG tablet Take 1 tablet (325 mg) by mouth daily 90 tablet 3     busPIRone (BUSPAR) 15 MG tablet TAKE 2 TABLETS BY MOUTH IN THE MORNING AND TAKE 1 TABLET BY MOUTH IN THE EVENING 270 tablet 1     fluticasone (FLONASE) 50 MCG/ACT spray Spray 1-2 sprays into both nostrils daily 1 Bottle 11     levothyroxine (SYNTHROID/LEVOTHROID) 175 MCG tablet Take 1 tablet (175 mcg) by mouth daily 90 tablet 1     LEVOXYL 175 MCG tablet Take  1 tablet (175 mcg) by mouth daily Dispense name brand Levoxyl 30 tablet 3     LORazepam (ATIVAN) 0.5 MG tablet Take 0.5 tablets (0.25 mg) by mouth 2 times daily as needed for anxiety . May take 2 tabs ( 1 mg) bid PO 2 times daily as needed until current anxiety flare improves. 60 tablet 0     Naproxen Sodium (ALEVE PO) Take 220 mg by mouth as needed for moderate pain       nortriptyline (PAMELOR) 50 MG capsule Take 1 capsule (50 mg) by mouth At Bedtime 90 capsule 1     pantoprazole (PROTONIX) 20 MG EC tablet TAKE ONE TABLET BY MOUTH ONCE DAILY. TAKE BY MOUTH 30 TO 60 MINUTES BEFORE A MEAL. 90 tablet 1     ranitidine (ZANTAC) 300 MG tablet Take 1 tablet (300 mg) by mouth At Bedtime 90 tablet 3     sertraline (ZOLOFT) 100 MG tablet 2 tabs (200 mg) by mouth nightly. 180 tablet 1     SUMAtriptan (IMITREX) 100 MG tablet Take 1 tablet (100 mg) by mouth at onset of headache for migraine May repeat in 2 hours. Max 2 tablets/24 hours. 9 tablet 1     triamcinolone (KENALOG) 0.1 % ointment Apply topically 2 times daily as needed for irritation 80 g 3     Allergies   Allergen Reactions     Levaquin Nausea and Vomiting       ROS:  Constitutional, cardiovascular, pulmonary, and psych systems are negative, except as otherwise noted.    This document serves as a record of the services and decisions personally performed and made by Susan Haase, CNP. It was created on her behalf by Berenice Welch, a trained medical scribe. The creation of this document is based on the provider's statements to the medical scribe.  Berenice Welch 10:29 AM September 19, 2018  OBJECTIVE:   /68 (BP Location: Right arm, Patient Position: Chair, Cuff Size: Adult Regular)  Pulse 114  Temp 98.1  F (36.7  C) (Oral)  Resp 14  Wt 87.1 kg (192 lb)  LMP 01/20/2004  SpO2 99%  BMI 29.19 kg/m2  Body mass index is 29.19 kg/(m^2).  GENERAL: healthy, alert and no distress  PSYCH: mentation appears normal, affect normal/bright    ASSESSMENT/PLAN:   Mariela forbes  seen today for recheck medication, health maintenance and flu shot.    Diagnoses and all orders for this visit:    Generalized anxiety disorder and Depression:  Continue on Zoloft 200 mg every day and Buspar, will refer to psychiatry for medication management.  QUYEN 7 score of 17, PHQ 9 score of 12, denies thoughts of harming self or others. Discussed relaxation techniques.   -     MENTAL HEALTH REFERRAL  - Adult; Psychiatry and Medication Management; [place] [facility] [facility] [phone].  Medication management & future refills will be returned to G PCP upon completion of evaluation;      FUTURE APPOINTMENTS:       - Follow-up visit in 3 months, sooner as needed.    The information in this document, created by the medical scribe for me, accurately reflects the services I personally performed and the decisions made by me. I have reviewed and approved this document for accuracy prior to leaving the patient care area.  September 19, 2018 10:31 AM  Susan Haase, APRN [facility]

## 2018-09-19 NOTE — MR AVS SNAPSHOT
After Visit Summary   9/19/2018    Mariela Duffy    MRN: [id number]           Patient Information     Date Of Birth          1955        Visit Information        Provider Department      9/19/2018 10:30 AM Haase, Susan Rachele, APRN CNP [facility]        Today's Diagnoses     Generalized anxiety disorder    -  1    Mild major depression (H)           Follow-ups after your visit        Additional Services     MENTAL HEALTH REFERRAL  - Adult; Psychiatry and Medication Management; Psychiatry; G: [facility] Psychiatry Service [phone].  Medication management & future refills will be returned to [facility] PCP upon completion of evaluation; We myrtle...       All scheduling is subject to the client's specific insurance plan & benefits, provider/location availability, and provider clinical specialities.  Please arrive 15 minutes early for your first appointment and bring your completed paperwork.    Please be aware that coverage of these services is subject to the terms and limitations of your health insurance plan.  Call member services at your health plan with any benefit or coverage questions.                            Follow-up notes from your care team     Return if symptoms worsen or fail to improve.      Your next 10 appointments already scheduled     Oct 15, 2018 10:00 AM CDT   SHORT with Jerry Jordan RPH   [facility] ([facility])    [address] [address] [phone]              Who to contact     If you have questions or need follow up information about today's clinic visit or your schedule please contact [facility] directly at [phone].  Normal or non-critical lab and imaging results will be communicated to you by MyChart, letter or phone within 4 business days after the clinic has received the results. If you do not hear from us within 7 days, please contact the clinic  through [employer] or phone. If you have a critical or abnormal lab result, we will notify you by phone as soon as possible.  Submit refill requests through [employer] or call your pharmacy and they will forward the refill request to us. Please allow 3 business days for your refill to be completed.          Additional Information About Your Visit        [employer] Information     [employer] gives you secure access to your electronic health record. If you see a primary care provider, you can also send messages to your care team and make appointments. If you have questions, please call your primary care clinic.  If you do not have a primary care provider, please call [phone] and they will assist you.        Care EveryWhere ID     This is your Care EveryWhere ID. This could be used by other organizations to access your [place] medical records  [id number]        Your Vitals Were     Pulse Temperature Respirations Last Period Pulse Oximetry BMI (Body Mass Index)    114 98.1  F (36.7  C) (Oral) 14 01/20/2004 99% 29.19 kg/m2       Blood Pressure from Last 3 Encounters:   09/19/18 114/68   08/10/18 114/70   07/27/18 124/69    Weight from Last 3 Encounters:   09/19/18 192 lb (87.1 kg)   08/10/18 191 lb (86.6 kg)   07/27/18 190 lb 9.6 oz (86.5 kg)              We Performed the Following     MENTAL HEALTH REFERRAL  - Adult; Psychiatry and Medication Management; Psychiatry; G: [facility] [phone].  Medication management & future refills will be returned to G PCP upon completion of evaluation; We myrtle...        Primary Care Provider Office Phone # Fax #    Estefany Stanley PA-C [phone] [address] [facility] [address]        Equal Access to Services     [facility] MCKENZIE : Kalli Parker, elsie bates, nguyễn lorenzo, fazal george. So [facility] [phone].    ATENCIÓN: Si habla español, tiene a romero disposición  servicios gratuitos de asistencia lingüística. Tushar shepard [phone].    We comply with applicable federal civil rights laws and [place] laws. We do not discriminate on the basis of race, color, national origin, age, disability, sex, sexual orientation, or gender identity.            Thank you!     Thank you for choosing [facility]  for your care. Our goal is always to provide you with excellent care. Hearing back from our patients is one way we can continue to improve our services. Please take a few minutes to complete the written survey that you may receive in the mail after your visit with us. Thank you!             Your Updated Medication List - Protect others around you: Learn how to safely use, store and throw away your medicines at www.disposemymeds.org.          This list is accurate as of 9/19/18 10:36 AM.  Always use your most recent med list.                   Brand Name Dispense Instructions for use Diagnosis    ALEVE PO      Take 220 mg by mouth as needed for moderate pain        aspirin 325 MG tablet     90 tablet    Take 1 tablet (325 mg) by mouth daily    Transient cerebral ischemia, unspecified type       busPIRone 15 MG tablet    BUSPAR    270 tablet    TAKE 2 TABLETS BY MOUTH IN THE MORNING AND TAKE 1 TABLET BY MOUTH IN THE EVENING    Anxiety       fluticasone 50 MCG/ACT spray    FLONASE    1 Bottle    Spray 1-2 sprays into both nostrils daily    Post-nasal drip       * levothyroxine 175 MCG tablet    SYNTHROID/LEVOTHROID    90 tablet    Take 1 tablet (175 mcg) by mouth daily    Primary hypothyroidism       * LEVOXYL 175 MCG tablet   Generic drug:  levothyroxine     30 tablet    Take 1 tablet (175 mcg) by mouth daily Dispense name brand Levoxyl    Postoperative hypothyroidism, History of thyroid cancer       LORazepam 0.5 MG tablet    ATIVAN    60 tablet    Take 0.5 tablets (0.25 mg) by mouth 2 times daily as needed for anxiety . May take 2 tabs ( 1 mg) bid PO 2 times daily as  needed until current anxiety flare improves.    Anxiety       nortriptyline 50 MG capsule    PAMELOR    90 capsule    Take 1 capsule (50 mg) by mouth At Bedtime    Irritable bowel syndrome with diarrhea       pantoprazole 20 MG EC tablet    PROTONIX    90 tablet    TAKE ONE TABLET BY MOUTH ONCE DAILY. TAKE BY MOUTH 30 TO 60 MINUTES BEFORE A MEAL.    Gastroesophageal reflux disease with esophagitis, Hiatal hernia       ranitidine 300 MG tablet    ZANTAC    90 tablet    Take 1 tablet (300 mg) by mouth At Bedtime    Gastroesophageal reflux disease with esophagitis       sertraline 100 MG tablet    ZOLOFT    180 tablet    2 tabs (200 mg) by mouth nightly.    Major depressive disorder, recurrent episode, mild (H), Anxiety       SUMAtriptan 100 MG tablet    IMITREX    9 tablet    Take 1 tablet (100 mg) by mouth at onset of headache for migraine May repeat in 2 hours. Max 2 tablets/24 hours.    Migraine with aura and without status migrainosus, not intractable       triamcinolone 0.1 % ointment    KENALOG    80 g    Apply topically 2 times daily as needed for irritation    Eczema, unspecified type       * Notice:  This list has 2 medication(s) that are the same as other medications prescribed for you. Read the directions carefully, and ask your doctor or other care provider to review them with you.

## 2018-09-20 ENCOUNTER — PATIENT OUTREACH (OUTPATIENT)
Dept: CARE COORDINATION | Facility: CLINIC | Age: 63
End: 2018-09-20

## 2018-09-20 ASSESSMENT — ANXIETY QUESTIONNAIRES: GAD7 TOTAL SCORE: 17

## 2018-09-20 ASSESSMENT — PATIENT HEALTH QUESTIONNAIRE - PHQ9: SUM OF ALL RESPONSES TO PHQ QUESTIONS 1-9: 12

## 2018-09-20 NOTE — LETTER
[place] CARE COORDINATION  [address] [address]  [phone]    September 20, 2018    [address]  [facility] [address]      Dear Mariela,    I am a clinic care coordinator who works with Jaclyn Ojeda DO at [facility].  I am writing to inform you I will be leaving the [facility] as RN Care Coordinator 9/21/18 -   Please contact Kinjal Epperson Care Coordination  at the [facility] for further questions/concerns     The clinic care coordinator is a registered nurse and/or  who understand the health care system. The goal of clinic care coordination is to help you manage your health and improve access to the [facility] in the most efficient manner. The registered nurse can assist you in meeting your health care goals by providing education, coordinating services, and strengthening the communication among your providers. The  can assist you with financial, behavioral, psychosocial, chemical dependency, counseling, and/or psychiatric resources.    Please feel free to contact Kinjal Epperson Care Coordination [facility] at [phone], with any questions or concerns. We at [facility] are focused on providing you with the highest-quality healthcare experience possible and that all starts with you.     Sincerely,   Mariely Mackenzie Care Coordinator RN  [facility]  September 20, 2018     Enclosed: I have enclosed a copy of a 24 Hour Access Plan. This has helpful phone numbers for you to call when needed. Please keep this in an easy to access place to use as needed.

## 2018-09-20 NOTE — PROGRESS NOTES
Clinic Care Coordination Contact  Care Team Conversations    Patient is currently open to [facility] home care services   Reba Ta RN, CM and Mora DALTON     [facility] is involved and they are working on guardian/conservator     Patient has impaired memory, losing her home (hoarding home)     This writer will be transitioning out of the clinic on 9/21 as care coordinator       CCRN spoke to APS worker ambar Tiffanie from [facility] and [facility] for further follow up     CCRN will pass to Kinjal DALTON for further follow up   CCRN will advise home care RN THOMAS and [facility] that this writer will be leaving the clinic and to reach out to Kinjal DALTON with updates/questions/concerns and discharge plans     Mariely Mackenzie Care Coordinator RN  [facility]  [phone]  September 20, 2018

## 2018-09-20 NOTE — LETTER
Health Care Home - Access Care Plan    About Me  Patient Name:  Mariela Edmond    YOB: 1955  Age:                             62 year old   [facility] MRN:            [id number] Telephone Information:     Home Phone [phone]   Mobile [phone]       Address:    [address] [place] [address] Email address:  [email or web address]      Emergency Contact(s)  Name Relationship Lgl Grd Work Phone Home Phone Mobile Phone   1. ERIN EDMOND Son   [phone]    2. LISA EDMOND Daughter    [phone]   3. ERNESTO, VASQUEZ Son  [phone]   4. JADENBEV Friend    [phone]             Health Maintenance: Routine Health maintenance Reviewed: Due/Overdue   Health Maintenance Due   Topic Date Due     HIV SCREEN (SYSTEM ASSIGNED)  10/12/1973     PAP Q3 YR  04/19/2016     ADVANCE DIRECTIVE PLANNING Q5 YRS  04/19/2018     COLONOSCOPY Q5 YR  06/14/2018     INFLUENZA VACCINE (1) 09/01/2018     MAMMO Q1 YR  07/28/2018         My Access Plan  Medical Emergency 911   Questions or concerns during clinic hours Primary Clinic Line, I will call the clinic directly: [facility] - [phone]   24 Hour Appointment Line [phone] or  [phone] [phone]) (toll free)   24 Hour Nurse Line [phone] (toll free)   Questions or concerns outside clinic hours 24 Hour Appointment Line, I will call the after-hours on-call line:   [facility] [phone] or [phone]) (toll-free)   Preferred Urgent Care [facility], [phone]   Preferred Hospital [facility]  [phone]   Preferred Pharmacy [facility] [place], [place] - [address]     Behavioral Health Crisis Line The National Suicide Prevention Lifeline at 1-189.116.9256 or 911     My Care Team Members  Patient Care Team       Relationship Specialty Notifications Start End    [place],  Jaclyn Song DO PCP - General Family Practice  9/20/18     Phone: [phone] Fax: [phone] [address] [facility] [address]    [facility]ice, [facility]   [phone]      8/1/18     Fax: [phone]          [facility] [address] [address]    Kinjal Epperson [facility]  Clinic Care Coordinator  [phone]   9/20/18            My Medical and Care Information  Problem List   Patient Active Problem List   Diagnosis     Contact dermatitis and other eczema due to other specified agent     Allergic rhinitis due to other allergen     Esophageal reflux     Irritable bowel syndrome     Rosacea     Postsurgical hypothyroidism     Iron deficiency anemia     Absence of menstruation     Mild major depression (H)     CARDIOVASCULAR SCREENING; LDL GOAL LESS THAN 160     Generalized anxiety disorder     Hypothyroidism     Tubular adenoma of colon     Seasonal affective disorder (H)     Rhinitis     Headache     ADD (attention deficit disorder)     Other insomnia     TIA (transient ischemic attack)     Gastroesophageal reflux disease with esophagitis     Hiatal hernia     History of colonic polyps     BP check     Memory loss     Migraine with aura and without status migrainosus, not intractable     History of thyroid cancer      Current Medications and Allergies:    Current Outpatient Prescriptions   Medication     aspirin 325 MG tablet     busPIRone (BUSPAR) 15 MG tablet     fluticasone (FLONASE) 50 MCG/ACT spray     levothyroxine (SYNTHROID/LEVOTHROID) 175 MCG tablet     LEVOXYL 175 MCG tablet     LORazepam (ATIVAN) 0.5 MG tablet     Naproxen Sodium (ALEVE PO)     nortriptyline (PAMELOR) 50 MG capsule     pantoprazole (PROTONIX) 20 MG EC tablet     ranitidine (ZANTAC) 300 MG tablet     sertraline (ZOLOFT) 100 MG tablet     SUMAtriptan (IMITREX) 100 MG tablet     triamcinolone (KENALOG) 0.1 % ointment     No current facility-administered medications for this visit.       Allergies   Allergen Reactions     Levaquin Nausea and Vomiting

## 2018-09-27 ENCOUNTER — OFFICE VISIT (OUTPATIENT)
Dept: PHARMACY | Facility: CLINIC | Age: 63
End: 2018-09-27
Payer: COMMERCIAL

## 2018-09-27 VITALS
SYSTOLIC BLOOD PRESSURE: 110 MMHG | HEART RATE: 122 BPM | BODY MASS INDEX: 29.98 KG/M2 | WEIGHT: 197.2 LBS | DIASTOLIC BLOOD PRESSURE: 76 MMHG

## 2018-09-27 DIAGNOSIS — E53.8 VITAMIN B12 DEFICIENCY (NON ANEMIC): ICD-10-CM

## 2018-09-27 DIAGNOSIS — F41.1 GENERALIZED ANXIETY DISORDER: Primary | ICD-10-CM

## 2018-09-27 DIAGNOSIS — E55.9 VITAMIN D DEFICIENCY: ICD-10-CM

## 2018-09-27 DIAGNOSIS — F32.0 MILD MAJOR DEPRESSION (H): ICD-10-CM

## 2018-09-27 DIAGNOSIS — E89.0 POSTOPERATIVE HYPOTHYROIDISM: ICD-10-CM

## 2018-09-27 DIAGNOSIS — J30.1 CHRONIC SEASONAL ALLERGIC RHINITIS DUE TO POLLEN: ICD-10-CM

## 2018-09-27 PROCEDURE — 99607 MTMS BY PHARM ADDL 15 MIN: CPT | Performed by: PHARMACIST

## 2018-09-27 PROCEDURE — 99606 MTMS BY PHARM EST 15 MIN: CPT | Performed by: PHARMACIST

## 2018-09-27 RX ORDER — QUETIAPINE FUMARATE 25 MG/1
TABLET, FILM COATED ORAL
Qty: 90 TABLET | Refills: 1 | Status: SHIPPED | OUTPATIENT
Start: 2018-09-27 | End: 2019-03-13

## 2018-09-27 NOTE — MR AVS SNAPSHOT
After Visit Summary   9/27/2018    Mariela Duffy    MRN: [id number]           Patient Information     Date Of Birth          1955        Visit Information        Provider Department      9/27/2018 9:00 AM Jerry Jordan RPH [facility]        Today's Diagnoses     Generalized anxiety disorder    -  1      Care Instructions    Recommendations from today's MT visit:                                                      1. Start taking Quetiapine 25 mg up to three times daily, take 1/2 - 1 tablet up to three times daily as needed. Sent prescription to [facility] pharmacy     2. MUST CARRY LORAZEPAM in your purse at all times!    3. Plan to follow-up in 2 weeks in clinic to get a test to check your heart (this test is called an EKG).         Next MTM visit: Follow-up in 2 weeks, appointment scheduled on October 11th at 4 pm     To schedule another MTM appointment, please call the clinic directly or you may call the MTM scheduling line at [phone] or toll-free at [phone].     My Clinical Pharmacist's contact information:                                                      It was a pleasure seeing you today!  Please feel free to contact me with any questions or concerns you have.     Jerry Jordan [facility].  Medication Therapy Management Provider  [phone]  Kristen Weiler, PharmD. IV Student     You may receive a survey about the MT services you received.  I would appreciate your feedback to help me serve you better in the future. Please fill it out and return it when you can. Your comments will be anonymous.      My healthcare goals:                                                                    Follow-ups after your visit        Your next 10 appointments already scheduled     Oct 04, 2018  2:45 PM CDT   (Arrive by 2:30 PM)   [facility] Lam Corder[facility], CNP   [facility]    [address]  [facility] [address]   [phone]            Oct 11, 2018  4:00 PM CDT   SHORT with Jerry Jordan RPH   [facility] ([facility])    [address]  [facility] [id number] [phone]            Oct 15, 2018 10:00 AM CDT   SHORT with Jerry Jordan RPH   [facility] ([facility])    [address]  [facility] [id number] [phone]              Who to contact     If you have questions or need follow up information about today's clinic visit or your schedule please contact [facility] directly at [phone].  Normal or non-critical lab and imaging results will be communicated to you by [employer], letter or phone within 4 business days after the clinic has received the results. If you do not hear from us within 7 days, please contact the clinic through [employer] or phone. If you have a critical or abnormal lab result, we will notify you by phone as soon as possible.  Submit refill requests through [employer] or call your pharmacy and they will forward the refill request to us. Please allow 3 business days for your refill to be completed.          Additional Information About Your Visit        [employer] Information     [employer] gives you secure access to your electronic health record. If you see a primary care provider, you can also send messages to your care team and make appointments. If you have questions, please call your primary care clinic.  If you do not have a primary care provider, please call [phone] and they will assist you.        Care EveryWhere ID     This is your Care EveryWhere ID. This could be used by other organizations to access your [place] medical records  [id number]        Your Vitals Were     Pulse Last Period BMI (Body Mass Index)             122 01/20/2004 29.98 kg/m2          Blood Pressure from Last 3 Encounters:   09/27/18 110/76   09/19/18 114/68   08/10/18  114/70    Weight from Last 3 Encounters:   09/27/18 197 lb 3.2 oz (89.4 kg)   09/19/18 192 lb (87.1 kg)   08/10/18 191 lb (86.6 kg)              Today, you had the following     No orders found for display         Today's Medication Changes          These changes are accurate as of 9/27/18  9:44 AM.  If you have any questions, ask your nurse or doctor.               Start taking these medicines.        Dose/Directions    QUEtiapine 25 MG tablet   Commonly known as:  SEROQUEL   Used for:  Generalized anxiety disorder        Take 1/2-1 tablet up to three times daily for anxiety.   Quantity:  90 tablet   Refills:  1            Where to get your medicines      These medications were sent to [facility] [id number] [place] [address]  [id number] [place] [address]     Phone:  [phone]     QUEtiapine 25 MG tablet                Primary Care Provider Office Phone # Fax #    Jaclyn Ojeda,  [phone] [address] [facility] [address]        Equal Access to Services     CHANELL RINCON : Hadii yamilet ku hadasho Soomaali, waaxda luqadaha, qaybta kaalmada adeegyada, fazal guillermo . So [facility] [phone].    ATENCIÓN: Si habla español, tiene a romero disposición servicios gratuitos de asistencia lingüística. Llame al [phone].    We comply with applicable federal civil rights laws and [place] laws. We do not discriminate on the basis of race, color, national origin, age, disability, sex, sexual orientation, or gender identity.            Thank you!     Thank you for choosing [facility]  for your care. Our goal is always to provide you with excellent care. Hearing back from our patients is one way we can continue to improve our services. Please take a few minutes to complete the written survey that you may receive in the mail after your visit with us. Thank you!             Your Updated Medication List - Protect  others around you: Learn how to safely use, store and throw away your medicines at www.disposemymeds.org.          This list is accurate as of 9/27/18  9:44 AM.  Always use your most recent med list.                   Brand Name Dispense Instructions for use Diagnosis    ALEVE PO      Take 220 mg by mouth as needed for moderate pain        aspirin 325 MG tablet     90 tablet    Take 1 tablet (325 mg) by mouth daily    Transient cerebral ischemia, unspecified type       busPIRone 15 MG tablet    BUSPAR    270 tablet    TAKE 2 TABLETS BY MOUTH IN THE MORNING AND TAKE 1 TABLET BY MOUTH IN THE EVENING    Anxiety       fluticasone 50 MCG/ACT spray    FLONASE    1 Bottle    Spray 1-2 sprays into both nostrils daily    Post-nasal drip       * levothyroxine 175 MCG tablet    SYNTHROID/LEVOTHROID    90 tablet    Take 1 tablet (175 mcg) by mouth daily    Primary hypothyroidism       * LEVOXYL 175 MCG tablet   Generic drug:  levothyroxine     30 tablet    Take 1 tablet (175 mcg) by mouth daily Dispense name brand Levoxyl    Postoperative hypothyroidism, History of thyroid cancer       LORazepam 0.5 MG tablet    ATIVAN    60 tablet    Take 0.5 tablets (0.25 mg) by mouth 2 times daily as needed for anxiety . May take 2 tabs ( 1 mg) bid PO 2 times daily as needed until current anxiety flare improves.    Anxiety       nortriptyline 50 MG capsule    PAMELOR    90 capsule    Take 1 capsule (50 mg) by mouth At Bedtime    Irritable bowel syndrome with diarrhea       pantoprazole 20 MG EC tablet    PROTONIX    90 tablet    TAKE ONE TABLET BY MOUTH ONCE DAILY. TAKE BY MOUTH 30 TO 60 MINUTES BEFORE A MEAL.    Gastroesophageal reflux disease with esophagitis, Hiatal hernia       QUEtiapine 25 MG tablet    SEROQUEL    90 tablet    Take 1/2-1 tablet up to three times daily for anxiety.    Generalized anxiety disorder       ranitidine 300 MG tablet    ZANTAC    90 tablet    Take 1 tablet (300 mg) by mouth At Bedtime    Gastroesophageal  reflux disease with esophagitis       sertraline 100 MG tablet    ZOLOFT    180 tablet    2 tabs (200 mg) by mouth nightly.    Major depressive disorder, recurrent episode, mild (H), Anxiety       SUMAtriptan 100 MG tablet    IMITREX    9 tablet    Take 1 tablet (100 mg) by mouth at onset of headache for migraine May repeat in 2 hours. Max 2 tablets/24 hours.    Migraine with aura and without status migrainosus, not intractable       triamcinolone 0.1 % ointment    KENALOG    80 g    Apply topically 2 times daily as needed for irritation    Eczema, unspecified type       * Notice:  This list has 2 medication(s) that are the same as other medications prescribed for you. Read the directions carefully, and ask your doctor or other care provider to review them with you.

## 2018-09-27 NOTE — PATIENT INSTRUCTIONS
Recommendations from today's MTM visit:                                                      1. Start taking Quetiapine 25 mg up to three times daily, take 1/2 - 1 tablet up to three times daily as needed. Sent prescription to [facility] pharmacy     2. MUST CARRY LORAZEPAM in your purse at all times!    3. Plan to follow-up in 2 weeks in clinic to get a test to check your heart (this test is called an EKG).         Next MTM visit: Follow-up in 2 weeks, appointment scheduled on October 11th at 4 pm     To schedule another MTM appointment, please call the clinic directly or you may call the MTM scheduling line at [phone] or toll-free at [phone].     My Clinical Pharmacist's contact information:                                                      It was a pleasure seeing you today!  Please feel free to contact me with any questions or concerns you have.     Jerry Jordan [facility].  Medication Therapy Management Provider  [phone]  Kristen Weiler, PharmD. IV Student     You may receive a survey about the MTM services you received.  I would appreciate your feedback to help me serve you better in the future. Please fill it out and return it when you can. Your comments will be anonymous.      My healthcare goals:

## 2018-09-27 NOTE — Clinical Note
Jaclyn--giancarlo-- surendra house being foreclosed 11-7-18--anxiety level thru the roof --I added low dose seroquel to help calm her --will rcheck ekg in 2 weeks .  Nayana. -Jerry/Jenn

## 2018-09-27 NOTE — PROGRESS NOTES
"SUBJECTIVE/OBJECTIVE:                           Mariela Duffy is a 62 year old female coming in for a follow up visit (6/25/2018) for Medication Therapy Management.  She was referred to me from Pepe Acuna.     Chief Complaint: Follow up on depression and anxiety. She feels anxiety worse now --needs another med.     Pt states that her house is still foreclosing, but she has until November to move out. She is unsure where she will be moving, but she is working with a  on this.     She has disorganized personality. She was using medication bottles, but now has a pill box.     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           Personal Healthcare Goals: To become physically stronger. To take all medications.     Allergies/ADRs: Reviewed in Epic  Tobacco: No tobacco use  Alcohol: Less than 1 beverage / month  Caffeine: 1.5 cups/day of coffee, 3 cans dr pepper or coke per day  Activity: loves dancing, but is not currently. She likes to swim, but is not currently swimming.   PMH: Reviewed in Epic    Medication Adherence/Access:  Per patient, misses medication 1 times per week, but misses flonase dose more often (not in pill box)    Depression:  Current medications include: Sertraline 200 mg once daily QHS. Pt reports that she feels more \"shaky\" and attributes this to her sertraline. She found that sertraline was effective for her but she expresses interest in trying a different medication in the future due to side effects. Patient reports the following stressors:  financial difficulties (loss of house and unemployment), giving up her dog to her daughter.  Therapies tried and response: She has tried several antidepressants in the past, but could not recall today.   PHQ-9 SCORE 5/21/2018 8/10/2018 9/19/2018   Total Score - - -   Total Score MyChart - 7 (Mild depression) 12 (Moderate depression)   Total Score 15 7 12     fyi--she see's weekly counselor as well as talks to her best friend daily --they both " "help her cope.         Anxiety: Current therapy is Buspirone 15 mg. tablets two in the morning and one in the evening and lorazepam 0.5 mg as needed. She is using the lorazepam, which has been very helpful during stressful events. She reports that she is taking her lorazepam about twice daily but not everyday. She states that buspirone tastes awful and she \"hates it.\" She has an appointment next Thursday to get cognitive testing done and is planning to see a new psychiatrist next week but may need to cancel that appointment due to overlap with the cognitive testing. She states that her anxiety has gotten worse and that she feels that she needs another medication to get her through this hard time in her life during the process of moving out of her home.        She has an extensive support system, including her daughters, friend,  therapist weekly andARMS worker. She states that her ARMS worker was helping her with organization in her home, but that the ARMS worker became very overwhelmed due to her ex-fiance being a hoarder. She was proud that she has cleaned out half of her living room. Her kids are helping with her yard work. She took early orker was helping her with organization in her home, but that the ARMS worker became very overwhelmed due to her ex-fiance being a hoarder. She was proud that she has cleaned out half of her living room. She took social security early, which is helping her financially. She feels her memory worsens when she is stressed or anxious.     Allergic Rhinitis: Current therapy is flonase 1-2 sprays in each nostril daily. She is not currently experiencing congestion, but she has some postnasal drip. She notes that she often forgets the flonase dose since it is not in her pill box. She recently bought Zicam nasal spray (oxymetalozone) because a friend recommended she take it. She is wondering if she can take both the flonase and the Zicam.     IBS/Sleep/Migraines:  Currently take 75mg " nortriptyline in the evening and naproxen 220 mg PRN for headaches. She feels as if the nortriptyline dose is too high due to experiencing constipation. She denies having a difficult time sleeping.     giancarlo--has imitrex to abort migraine but hasnt used it in 6 months.    Vitamin D3: level was 273 on 6/12/18. Pt has a calcium + vitamin D supplement and wonders if this is a good option? The D3 dose is 400 iu's.     Vitamin B12: level was 22 on 6/12/18. Vitamin B12 helps support memory and lessens fatigue. Pt has a B Complex vitamin and wonders if this is a good option?    Hypothyroidism: Patient is taking levothyroxine 175 (up from 150) mcg daily. Patient is having the following symptoms: Pt has an appointment with endocrinology because she is wondering about taking liothyronine. She states that her brother experienced benefit from liothyronine in the past. Patient is having the following symptoms: hypothyroidism -  constipation.   TSH   Date Value Ref Range Status   08/14/2018 3.75 0.40 - 4.00 mU/L Final         /76 (BP Location: Right arm)  Pulse 122  Wt 197 lb 3.2 oz (89.4 kg)  LMP 01/20/2004  BMI 29.98 kg/m2      ASSESSMENT:                             Current medications were reviewed today.     Medication Adherence: good    Depression: Stable. Continue current therapy.     Anxiety:  Needs improvement . Long discussion on med options we decide her ekg qtc is just < 490 ms --will do a trial of Quetiapine low dose --see plan. Recheck ekg in 2 weeks.     Allergic Rhinitis: Provided education on flonase and zicam use (See plan).     IBS/Sleep/Migraines:  Improved --lower dose Nortriptyline working well.     Vitamin D3: is not at goal of 50-75ng/mL. Pt would benefit from vitamin D3 lab recheck in 6 months.    Vitamin B12: is not at goal of 600-1000pg/mL. Pt would benefit from initiation of OTC vitamin B12 at 500 mcg daily and vitamin B12 lab recheck in 6 months.  .  Hypothyroidism: Pt will follow up with  endocrinology to discuss T3 med.        PLAN:                              1. Start taking Quetiapine 25 mg. up to three times daily, take 1/2 - 1 tablet up to three times daily as needed. Sent prescription to [facility] pharmacy     2. MUST CARRY LORAZEPAM in your purse at all times!    3. Plan to follow-up in 2 weeks in clinic to get a test to check your heart (this test is called an EKG).         Next [facility] visit: Follow-up in 2 weeks, appointment scheduled on October 11th at 4 pm   I spent 40 minutes with this patient today. All changes were made via collaborative practice agreement with Estefany Stanley. A copy of the visit note was provided to the patient's primary care provider.        The patient was given a summary of these recommendations as an after visit summary.     Jerry Jordan [facility].  Medication Therapy Management Provider  [phone]  Kristen Weiler, Pharm-D4

## 2018-09-29 ENCOUNTER — HEALTH MAINTENANCE LETTER (OUTPATIENT)
Age: 63
End: 2018-09-29

## 2018-10-02 ENCOUNTER — TELEPHONE (OUTPATIENT)
Dept: FAMILY MEDICINE | Facility: CLINIC | Age: 63
End: 2018-10-02

## 2018-10-02 NOTE — TELEPHONE ENCOUNTER
Terri calling from  Home Care.  States Fely telling her she thinks she is to take the Lorazepam first and then Seroquel if needed.  Advised Seroquel is up to 3 times a day as needed and Lorazepam to be carried with her and taken if other medications not working.  Saw note about EKG in 2 weeks.  Jerry- are you having this done at visit on 10/11/18 when you see her?  Please advise.  Abril Harris RN    9/27/18  PLAN:            1. Start taking Quetiapine 25 mg. up to three times daily, take 1/2 - 1 tablet up to three times daily as needed. Sent prescription to [facility] pharmacy      2. MUST CARRY LORAZEPAM in your purse at all times!     3. Plan to follow-up in 2 weeks in clinic to get a test to check your heart (this test is called an EKG).           Next [facility] visit: Follow-up in 2 weeks, appointment scheduled on October 11th at 4 pm   I spent 40 minutes with this patient today. All changes were made via collaborative practice agreement with Estefany Stanley. A copy of the visit note was provided to the patient's primary care provider.           The patient was given a summary of these recommendations as an after visit summary.      Jerry Jordan [facility].  Medication Therapy Management Provider  [phone]  Kristen Weiler, Pharm-D4      Instructions        Return in about 2 weeks (around 10/11/2018) for Medication Therapy Management Visit, Anxiety.

## 2018-10-02 NOTE — TELEPHONE ENCOUNTER
Spoke to Jerry.  Fely is to be using Seroquel 1/2-1 tab up 3 times a day.  Is to have Lorazepam in purse for anxiety attacks and bite one in half and suck on or swallow if panic attack.  Discussed EKG and Jerry will have someone do this at his visit with her on 10/11/18.  L/M for [facility] to call.  Jerry wanted above message reiterated and to let them know he spoke with me and verified plan.  Abril Harris RN

## 2018-10-02 NOTE — TELEPHONE ENCOUNTER
Joy calls, looking for Terri home care number, cannot locate in call, provided main number, has concerns with mom, CTC on file, if Terri calls back inform to call daughter  Thelma Strong RN, BSN  Message handled by Nurse Triage.

## 2018-10-03 NOTE — TELEPHONE ENCOUNTER
L/M to call.  Please have Abril take if possible.  Fely also called this am worried about a friend of hers and that she said was acting weird yesterday.  Abril Harris RN

## 2018-10-08 ENCOUNTER — OFFICE VISIT (OUTPATIENT)
Dept: PSYCHIATRY | Facility: CLINIC | Age: 63
End: 2018-10-08
Attending: NURSE PRACTITIONER
Payer: COMMERCIAL

## 2018-10-08 ENCOUNTER — TELEPHONE (OUTPATIENT)
Dept: PSYCHIATRY | Facility: CLINIC | Age: 63
End: 2018-10-08

## 2018-10-08 VITALS
BODY MASS INDEX: 28.32 KG/M2 | WEIGHT: 197.8 LBS | SYSTOLIC BLOOD PRESSURE: 146 MMHG | OXYGEN SATURATION: 96 % | TEMPERATURE: 98.4 F | DIASTOLIC BLOOD PRESSURE: 86 MMHG | RESPIRATION RATE: 16 BRPM | HEART RATE: 116 BPM | HEIGHT: 70 IN

## 2018-10-08 DIAGNOSIS — F41.1 GENERALIZED ANXIETY DISORDER: ICD-10-CM

## 2018-10-08 DIAGNOSIS — Z79.899 ENCOUNTER FOR LONG-TERM (CURRENT) USE OF MEDICATIONS: Primary | ICD-10-CM

## 2018-10-08 PROCEDURE — 93000 ELECTROCARDIOGRAM COMPLETE: CPT | Performed by: NURSE PRACTITIONER

## 2018-10-08 PROCEDURE — 90792 PSYCH DIAG EVAL W/MED SRVCS: CPT | Performed by: NURSE PRACTITIONER

## 2018-10-08 ASSESSMENT — ANXIETY QUESTIONNAIRES
7. FEELING AFRAID AS IF SOMETHING AWFUL MIGHT HAPPEN: NOT AT ALL
GAD7 TOTAL SCORE: 8
4. TROUBLE RELAXING: SEVERAL DAYS
GAD7 TOTAL SCORE: 8
1. FEELING NERVOUS, ANXIOUS, OR ON EDGE: NEARLY EVERY DAY
6. BECOMING EASILY ANNOYED OR IRRITABLE: SEVERAL DAYS
2. NOT BEING ABLE TO STOP OR CONTROL WORRYING: SEVERAL DAYS
3. WORRYING TOO MUCH ABOUT DIFFERENT THINGS: SEVERAL DAYS
GAD7 TOTAL SCORE: 8
5. BEING SO RESTLESS THAT IT IS HARD TO SIT STILL: SEVERAL DAYS
7. FEELING AFRAID AS IF SOMETHING AWFUL MIGHT HAPPEN: NOT AT ALL

## 2018-10-08 ASSESSMENT — PATIENT HEALTH QUESTIONNAIRE - PHQ9
SUM OF ALL RESPONSES TO PHQ QUESTIONS 1-9: 6
SUM OF ALL RESPONSES TO PHQ QUESTIONS 1-9: 6
10. IF YOU CHECKED OFF ANY PROBLEMS, HOW DIFFICULT HAVE THESE PROBLEMS MADE IT FOR YOU TO DO YOUR WORK, TAKE CARE OF THINGS AT HOME, OR GET ALONG WITH OTHER PEOPLE: SOMEWHAT DIFFICULT

## 2018-10-08 NOTE — MR AVS SNAPSHOT
After Visit Summary   10/8/2018    Mariela Duffy    MRN: [id number]           Patient Information     Date Of Birth          1955        Visit Information        Provider Department      10/8/2018 10:45 AM Lam Cordero CNP [facility]        Today's Diagnoses     Encounter for long-term (current) use of medications    -  1    Generalized anxiety disorder           Follow-ups after your visit        Your next 10 appointments already scheduled     Oct 11, 2018  4:00 PM CDT   Office Visit with Jerry Jordan RPH   [facility] ([facility])    [address] [address] [id number] [phone]           Bring a current list of meds and any records pertaining to this visit. For Physicals, please bring immunization records and any forms needing to be filled out. Please arrive 10 minutes early to complete paperwork.            Oct 25, 2018  8:00 AM CDT   US HEAD NECK SOFT TISSUE with [email or web address]   [facility] ([facility])    [address]  [facility] [address] [phone]           How do I prepare for my exam? (Food and drink instructions) No Food and Drink Restrictions.  How do I prepare for my exam? (Other instructions) You do not need to do anything special to prepare for your exam.  What should I wear: Wear comfortable clothes.  How long does the exam take: Most ultrasounds take 30 to 60 minutes.  What should I bring: Bring a list of your medicines, including vitamins, minerals and over-the-counter drugs. It is safest to leave personal items at home.  Do I need a :  No  is needed.  What do I need to tell my doctor: Tell your doctor about any allergies you may have.  What should I do after the exam: No restrictions, You may resume normal activities.  What is this test: An ultrasound uses sound waves to make pictures of the body. Sound waves do  "not cause pain. The only discomfort may be the pressure of the wand against your skin or full bladder.  Who should I call with questions: If you have any questions, please call the Imaging Department where you will have your exam. Directions, parking instructions, and other information is available on our website, [email or web address].            2018 10:15 AM CST   Return Visit with Lam Cordero CNP   [facility]    [address]  [facility] [address] [phone]              Who to contact     If you have questions or need follow up information about today's clinic visit or your schedule please contact [facility] directly at [phone].  Normal or non-critical lab and imaging results will be communicated to you by MyChart, letter or phone within 4 business days after the clinic has received the results. If you do not hear from us within 7 days, please contact the clinic through MyChart or phone. If you have a critical or abnormal lab result, we will notify you by phone as soon as possible.  Submit refill requests through [employer] or call your pharmacy and they will forward the refill request to us. Please allow 3 business days for your refill to be completed.          Additional Information About Your Visit        [employer] Information     [employer] lets you send messages to your doctor, view your test results, renew your prescriptions, schedule appointments and more. To sign up, go to [email or web address] . Click on \"Log in\" on the left side of the screen, which will take you to the Welcome page. Then click on \"Sign up Now\" on the right side of the page.     You will be asked to enter the access code listed below, as well as some personal information. Please follow the directions to create your username and password.     Your access code is: [email or web address]  Expires: 2019  6:04 PM     Your access code will  in " "90 days. If you need help or a new code, please call your [facility] or [phone].        Care EveryWhere ID     This is your Care EveryWhere ID. This could be used by other organizations to access your [place] medical records  [id number]        Your Vitals Were     Pulse Temperature Respirations Height Last Period Pulse Oximetry    116 98.4  F (36.9  C) (Oral) 16 5' 9.75\" (1.772 m) 01/20/2004 96%    Breastfeeding? BMI (Body Mass Index)                No 28.59 kg/m2           Blood Pressure from Last 3 Encounters:   10/08/18 146/86   09/27/18 110/76   09/19/18 114/68    Weight from Last 3 Encounters:   10/08/18 197 lb 12.8 oz (89.7 kg)   09/27/18 197 lb 3.2 oz (89.4 kg)   09/19/18 192 lb (87.1 kg)              We Performed the Following     EKG 12-lead complete w/read - Clinics        Primary Care Provider Office Phone # Fax #    Jaclyn Nohemi DO [place] [phone] [address] [address] [facility] [address]        Equal Access to Services     [facility] [place] VistaMARIA ANTONIA : Hadii aad ku hadasho Soomaali, waaxda luqadaha, qaybta kaalmada adeegyada, fazal george. So [facility] [phone].    ATENCIÓN: Si habla español, tiene a romero disposición servicios gratuitos de asistencia lingüística. Tushar al [phone].    We comply with applicable federal civil rights laws and [place] laws. We do not discriminate on the basis of race, color, national origin, age, disability, sex, sexual orientation, or gender identity.            Thank you!     Thank you for choosing [facility]  for your care. Our goal is always to provide you with excellent care. Hearing back from our patients is one way we can continue to improve our services. Please take a few minutes to complete the written survey that you may receive in the mail after your visit with us. Thank you!             Your Updated Medication List - Protect others around you: Learn how to safely use, store and throw away your " medicines at www.disposemymeds.org.          This list is accurate as of 10/8/18  6:04 PM.  Always use your most recent med list.                   Brand Name Dispense Instructions for use Diagnosis    aspirin 325 MG tablet     90 tablet    Take 1 tablet (325 mg) by mouth daily    Transient cerebral ischemia, unspecified type       busPIRone 15 MG tablet    BUSPAR    270 tablet    TAKE 2 TABLETS BY MOUTH IN THE MORNING AND TAKE 1 TABLET BY MOUTH IN THE EVENING    Anxiety       fluticasone 50 MCG/ACT spray    FLONASE    1 Bottle    Spray 1-2 sprays into both nostrils daily    Post-nasal drip       * levothyroxine 175 MCG tablet    SYNTHROID/LEVOTHROID    90 tablet    Take 1 tablet (175 mcg) by mouth daily    Primary hypothyroidism       * LEVOXYL 175 MCG tablet   Generic drug:  levothyroxine     30 tablet    Take 1 tablet (175 mcg) by mouth daily Dispense name brand Levoxyl    Postoperative hypothyroidism, History of thyroid cancer       LORazepam 0.5 MG tablet    ATIVAN    60 tablet    Take 0.5 tablets (0.25 mg) by mouth 2 times daily as needed for anxiety . May take 2 tabs ( 1 mg) bid PO 2 times daily as needed until current anxiety flare improves.    Anxiety       nortriptyline 50 MG capsule    PAMELOR    90 capsule    Take 1 capsule (50 mg) by mouth At Bedtime    Irritable bowel syndrome with diarrhea       pantoprazole 20 MG EC tablet    PROTONIX    90 tablet    TAKE ONE TABLET BY MOUTH ONCE DAILY. TAKE BY MOUTH 30 TO 60 MINUTES BEFORE A MEAL.    Gastroesophageal reflux disease with esophagitis, Hiatal hernia       QUEtiapine 25 MG tablet    SEROQUEL    90 tablet    Take 1/2-1 tablet up to three times daily for anxiety.    Generalized anxiety disorder       ranitidine 300 MG tablet    ZANTAC    90 tablet    Take 1 tablet (300 mg) by mouth At Bedtime    Gastroesophageal reflux disease with esophagitis       sertraline 100 MG tablet    ZOLOFT    180 tablet    2 tabs (200 mg) by mouth nightly.    Major depressive  disorder, recurrent episode, mild (H), Anxiety       SUMAtriptan 100 MG tablet    IMITREX    9 tablet    Take 1 tablet (100 mg) by mouth at onset of headache for migraine May repeat in 2 hours. Max 2 tablets/24 hours.    Migraine with aura and without status migrainosus, not intractable       triamcinolone 0.1 % ointment    KENALOG    80 g    Apply topically 2 times daily as needed for irritation    Eczema, unspecified type       * Notice:  This list has 2 medication(s) that are the same as other medications prescribed for you. Read the directions carefully, and ask your doctor or other care provider to review them with you.
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
## 2018-10-08 NOTE — TELEPHONE ENCOUNTER
Faxed signed Release of information to [facility] to receive recent results per providers request. Faxed to [phone].    Jenni Anthony, CMA

## 2018-10-09 ASSESSMENT — PATIENT HEALTH QUESTIONNAIRE - PHQ9: SUM OF ALL RESPONSES TO PHQ QUESTIONS 1-9: 6

## 2018-10-09 ASSESSMENT — ANXIETY QUESTIONNAIRES: GAD7 TOTAL SCORE: 8

## 2018-10-11 ENCOUNTER — OFFICE VISIT (OUTPATIENT)
Dept: PHARMACY | Facility: CLINIC | Age: 63
End: 2018-10-11
Payer: COMMERCIAL

## 2018-10-11 VITALS
OXYGEN SATURATION: 94 % | WEIGHT: 199 LBS | SYSTOLIC BLOOD PRESSURE: 120 MMHG | HEART RATE: 105 BPM | BODY MASS INDEX: 28.76 KG/M2 | DIASTOLIC BLOOD PRESSURE: 82 MMHG

## 2018-10-11 DIAGNOSIS — E89.0 POSTOPERATIVE HYPOTHYROIDISM: ICD-10-CM

## 2018-10-11 DIAGNOSIS — J30.1 CHRONIC SEASONAL ALLERGIC RHINITIS DUE TO POLLEN: ICD-10-CM

## 2018-10-11 DIAGNOSIS — J30.2 SEASONAL ALLERGIC RHINITIS, UNSPECIFIED TRIGGER: ICD-10-CM

## 2018-10-11 DIAGNOSIS — E55.9 VITAMIN D DEFICIENCY: ICD-10-CM

## 2018-10-11 DIAGNOSIS — E53.8 VITAMIN B12 DEFICIENCY (NON ANEMIC): ICD-10-CM

## 2018-10-11 DIAGNOSIS — F41.1 GENERALIZED ANXIETY DISORDER: Primary | ICD-10-CM

## 2018-10-11 DIAGNOSIS — K58.0 IRRITABLE BOWEL SYNDROME WITH DIARRHEA: ICD-10-CM

## 2018-10-11 DIAGNOSIS — F32.0 MILD MAJOR DEPRESSION (H): ICD-10-CM

## 2018-10-11 PROCEDURE — 99607 MTMS BY PHARM ADDL 15 MIN: CPT | Performed by: PHARMACIST

## 2018-10-11 PROCEDURE — 99606 MTMS BY PHARM EST 15 MIN: CPT | Performed by: PHARMACIST

## 2018-10-11 NOTE — PATIENT INSTRUCTIONS
Recommendations from today's MTM visit:                                                        1.  FYI--please have your 2018 flu shot after your move.     2. FYI--please stay on all your current medications and MUST CARRY LORAZEPAM in your purse at all times and use if you have a panic attack! FYI--your EKG of your heart on Monday was normal.     3. Please go to your Monday appt. To discuss results of your neuropsych evaluation.    4. Good luck on your move!      Next MTM visit:  Thursday December 6th at 10;00am .     To schedule another MTM appointment, please call the clinic directly or you may call the MTM scheduling line at [phone] or toll-free at [phone].     My Clinical Pharmacist's contact information:                                                      It was a pleasure seeing you today!  Please feel free to contact me with any questions or concerns you have.      Jerry Jordan Rph.  Medication Therapy Management Provider  [phone]  Viki Hanna, Pharm-D4([place]).       You may receive a survey about the MTM services you received.  I would appreciate your feedback to help me serve you better in the future. Please fill it out and return it when you can. Your comments will be anonymous.

## 2018-10-11 NOTE — Clinical Note
Lam Anaya--CLEMENTE--saw Fely today --doing well -will have cognitive testing result next Monday -until then will keep same meds/plan.  Thanks ,Jerry Jordan, [facility]. Medication Therapy Management Provider [phone]

## 2018-10-11 NOTE — MR AVS SNAPSHOT
After Visit Summary   10/11/2018    Mariela Duffy    MRN: [id number]           Patient Information     Date Of Birth          1955        Visit Information        Provider Department      10/11/2018 4:00 PM Jerry Jordan RPH [facility] MT        Care Instructions    Recommendations from today's MT visit:                                                        1.  FYI--please have your 2018 flu shot after your move.     2. FYI--please stay on all your current medications and MUST CARRY LORAZEPAM in your purse at all times and use if you have a panic attack! FYI--your EKG of your heart on Monday was normal.     3. Please go to your Monday appt. To discuss results of your neuropsych evaluation.    4. Good luck on your move!      Next MTM visit:  Thursday December 6th at 10;00am .     To schedule another MTM appointment, please call the clinic directly or you may call the MTM scheduling line at [phone] or toll-free at [phone].     My Clinical Pharmacist's contact information:                                                      It was a pleasure seeing you today!  Please feel free to contact me with any questions or concerns you have.      Jerry Jordan Rph.  Medication Therapy Management Provider  [phone]  Viki Hanna, Pharm-D4([place]).       You may receive a survey about the MTM services you received.  I would appreciate your feedback to help me serve you better in the future. Please fill it out and return it when you can. Your comments will be anonymous.                    Follow-ups after your visit        Your next 10 appointments already scheduled     Oct 25, 2018  8:00 AM CDT   US HEAD NECK SOFT TISSUE with [email or web address]   [facility] ([facility])    [address]  [facility] [address] [phone]           How do I prepare for my exam? (Food and drink instructions) No Food and Drink  Restrictions.  How do I prepare for my exam? (Other instructions) You do not need to do anything special to prepare for your exam.  What should I wear: Wear comfortable clothes.  How long does the exam take: Most ultrasounds take 30 to 60 minutes.  What should I bring: Bring a list of your medicines, including vitamins, minerals and over-the-counter drugs. It is safest to leave personal items at home.  Do I need a :  No  is needed.  What do I need to tell my doctor: Tell your doctor about any allergies you may have.  What should I do after the exam: No restrictions, You may resume normal activities.  What is this test: An ultrasound uses sound waves to make pictures of the body. Sound waves do not cause pain. The only discomfort may be the pressure of the wand against your skin or full bladder.  Who should I call with questions: If you have any questions, please call the Imaging Department where you will have your exam. Directions, parking instructions, and other information is available on our website, [email or web address].            Nov 19, 2018 10:15 AM CST   Return Visit with Lam Cordero [facility][note 3] ([facility])    [address] [address]   [phone]            Dec 06, 2018 10:00 AM CST   SHORT with Jerry Jordan [facility] ([facility])    [address] [address] [phone]              Who to contact     If you have questions or need follow up information about today's clinic visit or your schedule please contact [facility] directly at [phone].  Normal or non-critical lab and imaging results will be communicated to you by MyChart, letter or phone within 4 business days after the clinic has received the results. If you do not hear from us within 7 days, please contact the clinic through MyChart or phone. If you have a  "critical or abnormal lab result, we will notify you by phone as soon as possible.  Submit refill requests through [employer] or call your pharmacy and they will forward the refill request to us. Please allow 3 business days for your refill to be completed.          Additional Information About Your Visit        Fatsomahart Information     TrustHop lets you send messages to your doctor, view your test results, renew your prescriptions, schedule appointments and more. To sign up, go to [email or web address] . Click on \"Log in\" on the left side of the screen, which will take you to the Welcome page. Then click on \"Sign up Now\" on the right side of the page.     You will be asked to enter the access code listed below, as well as some personal information. Please follow the directions to create your username and password.     Your access code is: [email or web address]  Expires: 2019  6:04 PM     Your access code will  in 90 days. If you need help or a new code, please call your [place] clinic or [phone].        Care EveryWhere ID     This is your Care EveryWhere ID. This could be used by other organizations to access your [place] medical records  [id number]        Your Vitals Were     Pulse Last Period Pulse Oximetry BMI (Body Mass Index)          105 2004 94% 28.76 kg/m2         Blood Pressure from Last 3 Encounters:   10/11/18 120/82   10/08/18 146/86   18 110/76    Weight from Last 3 Encounters:   10/11/18 199 lb (90.3 kg)   10/08/18 197 lb 12.8 oz (89.7 kg)   18 197 lb 3.2 oz (89.4 kg)              Today, you had the following     No orders found for display       Primary Care Provider Office Phone # Fax #    Jaclyn Song DO Lynette [phone] [address] [address]        Equal Access to Services     TUAN RINCON : Kalli harmono Sokaelyn, waaxda luqadaha, qaybta kaalmada bj, fazal george. So [facility] " [phone].    ATENCIÓN: Si raven perez, tiene a romero disposición servicios gratuitos de asistencia lingüística. Tushar shepard [phone].    We comply with applicable federal civil rights laws and [place] laws. We do not discriminate on the basis of race, color, national origin, age, disability, sex, sexual orientation, or gender identity.            Thank you!     Thank you for choosing [facility]  for your care. Our goal is always to provide you with excellent care. Hearing back from our patients is one way we can continue to improve our services. Please take a few minutes to complete the written survey that you may receive in the mail after your visit with us. Thank you!             Your Updated Medication List - Protect others around you: Learn how to safely use, store and throw away your medicines at www.disposemymeds.org.          This list is accurate as of 10/11/18  5:24 PM.  Always use your most recent med list.                   Brand Name Dispense Instructions for use Diagnosis    aspirin 325 MG tablet     90 tablet    Take 1 tablet (325 mg) by mouth daily    Transient cerebral ischemia, unspecified type       busPIRone 15 MG tablet    BUSPAR    270 tablet    TAKE 2 TABLETS BY MOUTH IN THE MORNING AND TAKE 1 TABLET BY MOUTH IN THE EVENING    Anxiety       fluticasone 50 MCG/ACT spray    FLONASE    1 Bottle    Spray 1-2 sprays into both nostrils daily    Post-nasal drip       * levothyroxine 175 MCG tablet    SYNTHROID/LEVOTHROID    90 tablet    Take 1 tablet (175 mcg) by mouth daily    Primary hypothyroidism       * LEVOXYL 175 MCG tablet   Generic drug:  levothyroxine     30 tablet    Take 1 tablet (175 mcg) by mouth daily Dispense name brand Levoxyl    Postoperative hypothyroidism, History of thyroid cancer       LORazepam 0.5 MG tablet    ATIVAN    60 tablet    Take 0.5 tablets (0.25 mg) by mouth 2 times daily as needed for anxiety . May take 2 tabs ( 1 mg) bid PO 2 times daily  as needed until current anxiety flare improves.    Anxiety       nortriptyline 50 MG capsule    PAMELOR    90 capsule    Take 1 capsule (50 mg) by mouth At Bedtime    Irritable bowel syndrome with diarrhea       pantoprazole 20 MG EC tablet    PROTONIX    90 tablet    TAKE ONE TABLET BY MOUTH ONCE DAILY. TAKE BY MOUTH 30 TO 60 MINUTES BEFORE A MEAL.    Gastroesophageal reflux disease with esophagitis, Hiatal hernia       QUEtiapine 25 MG tablet    SEROQUEL    90 tablet    Take 1/2-1 tablet up to three times daily for anxiety.    Generalized anxiety disorder       ranitidine 300 MG tablet    ZANTAC    90 tablet    Take 1 tablet (300 mg) by mouth At Bedtime    Gastroesophageal reflux disease with esophagitis       sertraline 100 MG tablet    ZOLOFT    180 tablet    2 tabs (200 mg) by mouth nightly.    Major depressive disorder, recurrent episode, mild (H), Anxiety       SUMAtriptan 100 MG tablet    IMITREX    9 tablet    Take 1 tablet (100 mg) by mouth at onset of headache for migraine May repeat in 2 hours. Max 2 tablets/24 hours.    Migraine with aura and without status migrainosus, not intractable       triamcinolone 0.1 % ointment    KENALOG    80 g    Apply topically 2 times daily as needed for irritation    Eczema, unspecified type       * Notice:  This list has 2 medication(s) that are the same as other medications prescribed for you. Read the directions carefully, and ask your doctor or other care provider to review them with you.

## 2018-10-11 NOTE — PROGRESS NOTES
"SUBJECTIVE/OBJECTIVE:                           Mariela Duffy is a 62 year old female coming in for a follow up visit (9-27-18) for Medication Therapy Management.  She was referred to me from Pepe Acuna.     Chief Complaint: Follow up on depression and anxiety.   She did see marian --did not change any mental health meds, she has been taking the seroquel a few times --very sedating- when she takes it - , also has used her 'security blanket pill lorazepam\"--works well.    Her issue is --her adult children feel she has dementia /memory loss-- (her grandmother and her aunt both had dementia, neither her mom or dad had dementia.    Last Thursday -she had neuropsych eval--will find out results next Monday .    Her children(daughter lucero) tell her she cannot drive but yet Fely has no issues driving.     Tomorrow she is looking for a place to move tomorrow as her house will be foreclosed 11-7-18.  She feels she may have 2-3 options she can move to or a couple GF's she can stay at temporarily.       Personal Healthcare Goals: To become physically stronger. To take all medications.     Allergies/ADRs: Reviewed in Epic  Tobacco: No tobacco use  Alcohol: Less than 1 beverage / month  Caffeine: 1.5 cups/day of coffee, 3 cans dr pepper or coke per day  Activity: loves dancing, but is not currently. She likes to swim, but is not currently swimming.   PMH: Reviewed in Epic    Medication Adherence/Access:  Per patient, misses medication 1 times per week, but misses flonase dose more often (not in pill box)    Depression:  Current medications include: Sertraline 200 mg once daily QHS. Pt reports that she feels more \"shaky\" and attributes this to her sertraline. She found that sertraline was effective for her but she expresses interest in trying a different medication in the future due to side effects. Patient reports the following stressors:  financial difficulties (loss of house and unemployment), giving up her dog to her " "daughter.  Therapies tried and response: She has tried several antidepressants in the past, but could not recall today.   PHQ-9 SCORE 8/10/2018 9/19/2018 10/8/2018   Total Score - - -   Total Score MyChart 7 (Mild depression) 12 (Moderate depression) 6 (Mild depression)   Total Score 7 12 6     fyi--she see's weekly counselor as well as talks to her best friend daily --they both help her cope. Recently saw Lam Gallardodevora  Our clinic psychiatrist--he stated no med chnages at this time --have neuropsych eval .        Anxiety: Current therapy :  Quetiapine 12.5-25mg tid prn + Buspirone 15 mg. tablets two in the morning and one in the evening and lorazepam 0.5 mg as needed. She is using the lorazepam, which has been very helpful during stressful events. She reports that she is taking her lorazepam about twice daily but not everyday. She states that buspirone tastes awful and she \"hates it.\" She had an appointment last Thursday to get cognitive testing done and did see a new psychiatrist last week but he did not change any meds at that visit. Next Monday will have results of her cognitive testing. She states that her anxiety has gotten worse and that she feels that she needs another medication to get her through this hard time in her life during the process of moving out of her home.        She has an extensive support system, including her daughters, friend,  therapist weekly andARMS worker. She states that her ARMS worker was helping her with organization in her home, but that the ARMS worker became very overwhelmed due to her ex-fiance being a hoarder. She was proud that she has cleaned out half of her living room. Her kids are helping with her yard work. She took early orker was helping her with organization in her home, but that the ARMS worker became very overwhelmed due to her ex-fiance being a hoarder. She was proud that she has cleaned out half of her living room. She took social security early, which is helping " her financially. She feels her memory worsens when she is stressed or anxious.    Psych nurse gave ekg recheck 10 days post seroquel start.      Allergic Rhinitis: Current therapy is flonase 1-2 sprays in each nostril daily. She is not currently experiencing congestion, but she has some postnasal drip.      IBS/Sleep/Migraines:  Currently take 50mg nortriptyline in the evening and naproxen 220 mg PRN for headaches. She feels as if the nortriptyline dose is too high due to experiencing constipation. She denies having a difficult time sleeping.     fyi--has imitrex to abort migraine but hasnt used it in 6 months.    Vitamin D3: level was 273 on 6/12/18. Pt has a calcium + vitamin D supplement and wonders if this is a good option? The D3 dose is 400 iu's.     Vitamin B12: level was 220 on 6/12/18. Vitamin B12 helps support memory and lessens fatigue. Pt has a B Complex vitamin and wonders if this is a good option?    Hypothyroidism: Patient is taking levothyroxine 175 (up from 150) mcg daily. Patient is having the following symptoms: Pt has an appointment with endocrinology because she is wondering about taking liothyronine. She states that her brother experienced benefit from liothyronine in the past. Patient is having the following symptoms: hypothyroidism -  constipation.   TSH   Date Value Ref Range Status   08/14/2018 3.75 0.40 - 4.00 mU/L Final     Immunizations:  Due for 2018 flu shot.    /82  Pulse 105  Wt 199 lb (90.3 kg)  LMP 01/20/2004  SpO2 94%  BMI 28.76 kg/m2      ASSESSMENT:                             Current medications were reviewed today.     Medication Adherence: good    Depression: Stable. Continue current therapy.     Anxiety:  Improved. ekg /qtc wnl. Ok to stay on quetiapine. Carry your lorazepam with you as your security blanket -see plan.     Allergic Rhinitis: improved.     IBS/Sleep/Migraines:  Improved --lower dose Nortriptyline working well.     Vitamin D3: is not at goal of  50-75ng/mL. Pt would benefit from vitamin D3 lab recheck in 6 months.    Vitamin B12: is not at goal of 600-1000pg/mL. Pt would benefit from Vitamin B12 lab recheck in 6 months.  .  Hypothyroidism: Pt will follow up with endocrinology to discuss T3 med.    Immunizations:  See our retail pharmacist for 2018 flu shot.     PLAN:                              1.  FYI--please have your 2018 flu shot after your move.     2. FYI--please stay on all your current medications and MUST CARRY LORAZEPAM in your purse at all times and use if you have a panic attack! FYI--your EKG of your heart on Monday was normal.     3. Please go to your Monday appt. To discuss results of your neuropsych evaluation.    4. Good luck on your move!      Next MT visit:  Thursday December 6th at 10;00am .     I spent 30 minutes with this patient today. All changes were made via collaborative practice agreement with Estefany Stanley. A copy of the visit note was provided to the patient's primary care provider.        The patient was given a summary of these recommendations as an after visit summary.     Jerry Jordan [facility].  Medication Therapy Management Provider  [phone]  Viki Hanna, Pharm-D4

## 2018-10-15 ENCOUNTER — TRANSFERRED RECORDS (OUTPATIENT)
Dept: HEALTH INFORMATION MANAGEMENT | Facility: CLINIC | Age: 63
End: 2018-10-15

## 2018-10-15 ENCOUNTER — TELEPHONE (OUTPATIENT)
Dept: FAMILY MEDICINE | Facility: CLINIC | Age: 63
End: 2018-10-15

## 2018-10-15 NOTE — TELEPHONE ENCOUNTER
Reason for Call:  Form, our goal is to have forms completed with 72 hours, however, some forms may require a visit or additional information.    Type of letter, form or note:  Plan of Care     Who is the form from?: Home care    Where did the form come from: form was faxed in    What clinic location was the form placed at?: [facility]     Where the form was placed: 's Box    What number is listed as a contact on the form?: [phone]       Additional comments: Please sign and fax back to [phone]    Call taken on 10/15/2018 at 11:16 AM by Eulalia Tobar

## 2018-10-22 DIAGNOSIS — F41.9 ANXIETY: ICD-10-CM

## 2018-10-22 RX ORDER — BUSPIRONE HYDROCHLORIDE 15 MG/1
TABLET ORAL
Qty: 270 TABLET | Refills: 3 | Status: SHIPPED | OUTPATIENT
Start: 2018-10-22 | End: 2019-03-13

## 2018-10-22 NOTE — TELEPHONE ENCOUNTER
mtm will refill for her as this is working well for her.     She prefers to use this over lorazepam for daily use.    Jerry Jordan Rph.  Medication Therapy Management Provider  [phone]

## 2018-10-22 NOTE — TELEPHONE ENCOUNTER
Pt states that she would like this sent to Jerry Jordan since he is her dr. She said she no longer sees Dr. Flory Lockett who wrote the rx last time on 8/14/18 and now sees Jerry Jordan    If you would like to refill please send in new rx for pt.    Thanks!!  Gely Antonio, Pharmacy [facility]  [phone]

## 2018-10-23 ENCOUNTER — PATIENT OUTREACH (OUTPATIENT)
Dept: CARE COORDINATION | Facility: CLINIC | Age: 63
End: 2018-10-23

## 2018-10-23 NOTE — LETTER
[place] CARE COORDINATION  [address]  [facility] [address]  October 23, 2018    Mariela Duffy  [address]  [facility] [address]      Dear Mariela,    I am a clinic care coordinator who works with Jaclyn Ojeda DO at [facility]. I wanted to introduce myself and provide you with my contact information for you to be able to call me with any questions or concerns.     The clinic care coordinator is a registered nurse and/or  who understand the health care system. The goal of clinic care coordination is to help you manage your health and improve access to the [facility] system in the most efficient manner. The registered nurse can assist you in meeting your health care goals by providing education, coordinating services, and strengthening the communication among your providers. The  can assist you with financial, behavioral, psychosocial, chemical dependency, counseling, and/or psychiatric resources.    Please feel free to contact me at [phone], with any questions or concerns. We at [facility] are focused on providing you with the highest-quality healthcare experience possible and that all starts with you.     Sincerely,     Kinjal Epperson    Enclosed: I have enclosed a copy of a 24 Hour Access Plan. This has helpful phone numbers for you to call when needed. Please keep this in an easy to access place to use as needed.

## 2018-10-23 NOTE — LETTER
Health Care Home - Access Care Plan    About Me  Patient Name:  Mariela Edmond    YOB: 1955  Age:                             63 year old   Basia MRN:            [id number] Telephone Information:     Home Phone [phone]   Mobile [phone]       Address:    [address]   [place] [address] Email address:  [email or web address]      Emergency Contact(s)  Name Relationship Lgl Grd Work Phone Home Phone Mobile Phone   1. ERIN EDMOND Son   [phone]    2. LISA EDMOND Daughter   [phone]   3. ERNESTO, VASQUEZ Son   [phone]   4. BEV STANLEY   [phone]             Health Maintenance: Routine Health maintenance Reviewed: Due/Overdue     My Access Plan  Medical Emergency 911   Questions or concerns during clinic hours Primary Clinic Line, I will call the clinic directly: [facility] - [phone]   24 Hour Appointment Line [phone] or  [phone]) (toll free)   24 Hour Nurse Line [phone] (toll free)   Questions or concerns outside clinic hours 24 Hour Appointment Line, I will call the after-hours on-call line:   [facility] [phone] or [phone]) (toll-free)   Preferred Urgent Care [facility], [phone]   Preferred Hospital [facility]  [phone]   Preferred Pharmacy [facility] [place], [place] - [address]     Behavioral Health Crisis Line The National Suicide Prevention Lifeline at 1-469.369.6085 or 911     My Care Team Members  Patient Care Team       Relationship Specialty Notifications Start End    Jaclyn Ojeda DO PCP - General Family Practice  9/20/18     Phone: [phone] Fax: [phone] [address] [facility] [address]    Hospice, [facility]     8/1/18     Fax: [phone]          [address] [place] [address]  [facility] [address]    Kinjal Epperson, [facility] Clinic Care Coordinator   9/20/18            My Medical and Care Information  Problem List   Patient Active Problem List   Diagnosis     Contact dermatitis and other eczema due to other specified agent     Allergic rhinitis due to other allergen     Esophageal reflux     Irritable bowel syndrome     Rosacea     Postsurgical hypothyroidism     Iron deficiency anemia     Absence of menstruation     Mild major depression (H)     CARDIOVASCULAR SCREENING; LDL GOAL LESS THAN 160     Generalized anxiety disorder     Hypothyroidism     Tubular adenoma of colon     Seasonal affective disorder (H)     Rhinitis     Headache     ADD (attention deficit disorder)     Other insomnia     TIA (transient ischemic attack)     Gastroesophageal reflux disease with esophagitis     Hiatal hernia     History of colonic polyps     BP check     Memory loss     Migraine with aura and without status migrainosus, not intractable     History of thyroid cancer      Current Medications and Allergies:  See printed Medication Report

## 2018-10-23 NOTE — PROGRESS NOTES
Clinic Care Coordination Contact    Situation: Patient chart reviewed by care coordinator.    Background: Patient has worked with a previous RNCC and is receiving psychiatric care with PCP Susan Haase. She has also been working with Home Care SW Mora Singer.    Assessment: This SWCC's information has been provided by previous RNCC. Patient's status and progress is unclear at this time.    Plan/Recommendations: [facility] has reached out to Mora Singer to confirm patient's progress and will reach out to patient in a few days, regardless of whether communication with Mora has occurred. Introduction letter and Access Plan will be mailed out.    Kinjal Epperson [facility]  Clinic Care Coordinator - [facility], and [facility]  Ph: [phone]  [email or web address]  (Today's date: 10/23/18)

## 2018-10-26 ENCOUNTER — PATIENT OUTREACH (OUTPATIENT)
Dept: CARE COORDINATION | Facility: CLINIC | Age: 63
End: 2018-10-26

## 2018-10-26 NOTE — Clinical Note
Estefany--giancarlo-- surendra doing ok --just a med review --to straighten her out --see our plan .  -Jerry/Keren
normal/breath sounds equal/airway patent

## 2018-10-26 NOTE — PROGRESS NOTES
Clinic Care Coordination Contact    Situation: Patient chart reviewed by care coordinator.    Background: Patient working with Home Care.    Assessment: Via email correspondence, [facility] confirmed patient continues working with Home Care SW Mora Singer and they are working on obtaining housing. Mora reports patient has to be out of her house by November 7th but, other than that, no issues.    Plan/Recommendations: [facility] will maintain contact with Mora re: patient's progress and status.     Kinjal Epperson SHA  Clinic Care Coordinator - [facility], and [facility]  Ph: [phone]  [email or web address]  (Today's date: 10/26/18)

## 2018-11-06 DIAGNOSIS — K21.00 GASTROESOPHAGEAL REFLUX DISEASE WITH ESOPHAGITIS: ICD-10-CM

## 2018-11-06 NOTE — TELEPHONE ENCOUNTER
"Last Written Prescription Date:  10/11/17  Last Fill Quantity: 90 tablet,  # refills: 3   Last office visit: 3/8/2018 with prescribing provider:  CRISTELA Stanley   Future Office Visit:   Next 5 appointments (look out 90 days)     Nov 19, 2018 10:15 AM CST   Return Visit with Lam Cordero [facility] ([facility])    [address] [address] [address]   [phone]            Dec 06, 2018 10:00 AM CST   SHORT with Jerry Jordan [facility] ([facility])    [address] [facility] [address]   [phone]                 Requested Prescriptions   Pending Prescriptions Disp Refills     ranitidine (ZANTAC) 300 MG tablet [Pharmacy Med Name: RANITIDINE HCL 300MG TABS] 90 tablet 3     Sig: TAKE ONE TABLET BY MOUTH AT BEDTIME    H2 Blockers Protocol Passed    11/6/2018 10:32 AM       Passed - Patient is age 12 or older       Passed - Recent (12 mo) or future (30 days) visit within the authorizing provider's specialty    Patient had office visit in the last 12 months or has a visit in the next 30 days with authorizing provider or within the authorizing provider's specialty.  See \"Patient Info\" tab in inbasket, or \"Choose Columns\" in Meds & Orders section of the refill encounter.                "

## 2018-11-07 DIAGNOSIS — F33.0 MAJOR DEPRESSIVE DISORDER, RECURRENT EPISODE, MILD (H): ICD-10-CM

## 2018-11-07 DIAGNOSIS — F41.9 ANXIETY: ICD-10-CM

## 2018-11-07 RX ORDER — SERTRALINE HYDROCHLORIDE 100 MG/1
TABLET, FILM COATED ORAL
Qty: 180 TABLET | Refills: 0 | Status: SHIPPED | OUTPATIENT
Start: 2018-11-07 | End: 2019-02-22

## 2018-11-13 ENCOUNTER — TELEPHONE (OUTPATIENT)
Dept: FAMILY MEDICINE | Facility: CLINIC | Age: 63
End: 2018-11-13

## 2018-11-13 ENCOUNTER — HOSPITAL ENCOUNTER (OUTPATIENT)
Dept: ULTRASOUND IMAGING | Facility: CLINIC | Age: 63
Discharge: HOME | End: 2018-11-13
Attending: CLINICAL NURSE SPECIALIST | Admitting: CLINICAL NURSE SPECIALIST
Payer: COMMERCIAL

## 2018-11-13 DIAGNOSIS — E89.0 POSTOPERATIVE HYPOTHYROIDISM: ICD-10-CM

## 2018-11-13 DIAGNOSIS — Z85.850 HISTORY OF THYROID CANCER: ICD-10-CM

## 2018-11-13 PROCEDURE — 76536 US EXAM OF HEAD AND NECK: CPT

## 2018-11-13 NOTE — TELEPHONE ENCOUNTER
Panel Management Review      Patient has the following on her problem list:     Depression / Dysthymia review    Measure:  Needs PHQ-9 score of 4 or less during index window.  Administer PHQ-9 and if score is 5 or more, send encounter to provider for next steps.      PHQ-9 SCORE 8/10/2018 9/19/2018 10/8/2018   Total Score - - -   Total Score MyChart 7 (Mild depression) 12 (Moderate depression) 6 (Mild depression)   Total Score 7 12 6       If PHQ-9 recheck is 5 or more, route to provider for next steps.    Patient is due for:  None      Composite cancer screening  Chart review shows that this patient is due/due soon for the following Pap Smear  Summary:    Patient is due/failing the following:   PAP    Action needed:   Patient needs office visit for PAP.    Type of outreach:    Panel pool, please contact patient    Questions for provider review:    None                                                                                                                                    Flavio Clark MA       Chart routed to Care Team .

## 2018-11-15 NOTE — TELEPHONE ENCOUNTER
Type of outreach:  Phone, spoke to patient.  Patient scheduled with Susan Haase on 11/23/18.    Adriane Carreno MA on 11/15/2018 at 4:44 PM

## 2018-11-16 ENCOUNTER — PATIENT OUTREACH (OUTPATIENT)
Dept: CARE COORDINATION | Facility: CLINIC | Age: 63
End: 2018-11-16

## 2018-11-16 ENCOUNTER — TELEPHONE (OUTPATIENT)
Dept: FAMILY MEDICINE | Facility: CLINIC | Age: 63
End: 2018-11-16

## 2018-11-16 NOTE — TELEPHONE ENCOUNTER
Patient could meet with Jerry if her insurance covers.  Otherwise, we don't have anyone here that can help set up her meds for her on a regular basis.  Make sure that she knows that home care is the better option for that.

## 2018-11-16 NOTE — TELEPHONE ENCOUNTER
LM below for Terri  Neither clinic or pharmacy sets up pill boxes  Enc her to keep home care    Vesna Zaman RN, BS  Clinical Nurse Triage.

## 2018-11-16 NOTE — PROGRESS NOTES
Clinic Care Coordination Contact  [facility]/Voicemail       Clinical Data: Care Coordinator Outreach    RN TJ received message from PCP and care team from telephone encounter concerning patient medication therapy management and options for assisting patient for set up.  Patient had previously been enrolled in home care but chart review notes that she has discontinued services      Outreach attempted x 1.  Left message on voicemail with call back information and requested return call.  RN CC also left a message with home care  to discuss patient needs and assess care coordination resources   Plan:  Care Coordinator will try to reach patient again in 3-5 business days.    Jennifer Collins RN  Clinic Care Coordinator  Office [phone]  [email or web address]

## 2018-11-16 NOTE — TELEPHONE ENCOUNTER
Terri with [facility] calling stating pt is requesting to be discharged from homecare.  Pt moved into a new place last week and has a new roommate but not sure why pt wants to discharge from services.  They are working on getting her med set up but pt states she will just go into the clinic to have them set up her medications.  Terri tried to reach out to the CCRN for [facility] to discuss options with patient and also wants PCP to be aware of situation.    Please call Terri with any questions at [phone]    Donald Concepcion RN, BSN

## 2018-11-17 NOTE — PROGRESS NOTES
Please call  Fely,  Good news! Your neck ultrasound was normal.  No indication of recurrent thyroid cancer.  Let me know if you have any questions.  Catie Crane NP  Endocrinology

## 2018-11-19 ENCOUNTER — OFFICE VISIT (OUTPATIENT)
Dept: PSYCHIATRY | Facility: CLINIC | Age: 63
End: 2018-11-19
Payer: COMMERCIAL

## 2018-11-19 VITALS
TEMPERATURE: 98.6 F | BODY MASS INDEX: 28.47 KG/M2 | RESPIRATION RATE: 18 BRPM | DIASTOLIC BLOOD PRESSURE: 78 MMHG | HEART RATE: 112 BPM | SYSTOLIC BLOOD PRESSURE: 120 MMHG | OXYGEN SATURATION: 92 % | WEIGHT: 197 LBS

## 2018-11-19 DIAGNOSIS — F32.0 MILD MAJOR DEPRESSION (H): ICD-10-CM

## 2018-11-19 DIAGNOSIS — F41.1 GENERALIZED ANXIETY DISORDER: Primary | ICD-10-CM

## 2018-11-19 PROCEDURE — 99214 OFFICE O/P EST MOD 30 MIN: CPT | Performed by: NURSE PRACTITIONER

## 2018-11-19 NOTE — MR AVS SNAPSHOT
After Visit Summary   11/19/2018    Mariela Duffy    MRN: [id number]           Patient Information     Date Of Birth          1955        Visit Information        Provider Department      11/19/2018 10:15 AM Lam Cordero CNP [facility]        Today's Diagnoses     Generalized anxiety disorder    -  1    Mild major depression (H)           Follow-ups after your visit        Your next 10 appointments already scheduled     Nov 23, 2018  1:15 PM CST   SHORT with Susan Rachele Haase, APRN CNP   [address] [address] [phone]            Dec 06, 2018 10:00 AM CST   SHORT with Jerry Jordan RPH   [address] [address] [phone]              Who to contact     If you have questions or need follow up information about today's clinic visit or your schedule please contact [facility] directly at [phone].  Normal or non-critical lab and imaging results will be communicated to you by MyChart, letter or phone within 4 business days after the clinic has received the results. If you do not hear from us within 7 days, please contact the clinic through MyChart or phone. If you have a critical or abnormal lab result, we will notify you by phone as soon as possible.  Submit refill requests through [employer] or call your pharmacy and they will forward the refill request to us. Please allow 3 business days for your refill to be completed.          Additional Information About Your Visit        Care EveryWhere ID     This is your Care EveryWhere ID. This could be used by other organizations to access your [facility] medical records  [id number]        Your Vitals Were     Pulse Temperature Respirations Last Period Pulse Oximetry Breastfeeding?    112 98.6  F (37  C)  (Oral) 18 01/20/2004 92% No    BMI (Body Mass Index)                   28.47 kg/m2            Blood Pressure from Last 3 Encounters:   11/19/18 120/78   10/11/18 120/82   10/08/18 146/86    Weight from Last 3 Encounters:   11/19/18 197 lb (89.4 kg)   10/11/18 199 lb (90.3 kg)   10/08/18 197 lb 12.8 oz (89.7 kg)              Today, you had the following     No orders found for display       Primary Care Provider Office Phone # Fax #    Jaclyn Ojeda [phone] [address] [address] [facility] [address]        Equal Access to Services     TUAN RINCON : Kalli Parker, wakerry bates, qaybta kaalmada adeheatheryarosangela, fazal george. So [facility] [phone].    ATENCIÓN: Si habla español, tiene a romero disposición servicios gratuitos de asistencia lingüística. Llame al [phone].    We comply with applicable federal civil rights laws and [place] laws. We do not discriminate on the basis of race, color, national origin, age, disability, sex, sexual orientation, or gender identity.            Thank you!     Thank you for choosing [facility]  for your care. Our goal is always to provide you with excellent care. Hearing back from our patients is one way we can continue to improve our services. Please take a few minutes to complete the written survey that you may receive in the mail after your visit with us. Thank you!             Your Updated Medication List - Protect others around you: Learn how to safely use, store and throw away your medicines at www.disposemymeds.org.          This list is accurate as of 11/19/18 12:18 PM.  Always use your most recent med list.                   Brand Name Dispense Instructions for use Diagnosis    aspirin 325 MG tablet     90 tablet    Take 1 tablet (325 mg) by mouth daily    Transient cerebral ischemia, unspecified type       busPIRone 15 MG tablet    BUSPAR    270 tablet    TAKE 2 TABLETS BY MOUTH IN THE  MORNING AND TAKE 1 TABLET BY MOUTH IN THE EVENING    Anxiety       fluticasone 50 MCG/ACT spray    FLONASE    1 Bottle    Spray 1-2 sprays into both nostrils daily    Post-nasal drip       * levothyroxine 175 MCG tablet    SYNTHROID/LEVOTHROID    90 tablet    Take 1 tablet (175 mcg) by mouth daily    Primary hypothyroidism       * LEVOXYL 175 MCG tablet   Generic drug:  levothyroxine     30 tablet    Take 1 tablet (175 mcg) by mouth daily Dispense name brand Levoxyl    Postoperative hypothyroidism, History of thyroid cancer       LORazepam 0.5 MG tablet    ATIVAN    60 tablet    Take 0.5 tablets (0.25 mg) by mouth 2 times daily as needed for anxiety . May take 2 tabs ( 1 mg) bid PO 2 times daily as needed until current anxiety flare improves.    Anxiety       nortriptyline 50 MG capsule    PAMELOR    90 capsule    Take 1 capsule (50 mg) by mouth At Bedtime    Irritable bowel syndrome with diarrhea       pantoprazole 20 MG EC tablet    PROTONIX    90 tablet    TAKE ONE TABLET BY MOUTH ONCE DAILY. TAKE BY MOUTH 30 TO 60 MINUTES BEFORE A MEAL.    Gastroesophageal reflux disease with esophagitis, Hiatal hernia       QUEtiapine 25 MG tablet    SEROQUEL    90 tablet    Take 1/2-1 tablet up to three times daily for anxiety.    Generalized anxiety disorder       ranitidine 300 MG tablet    ZANTAC    90 tablet    TAKE ONE TABLET BY MOUTH AT BEDTIME    Gastroesophageal reflux disease with esophagitis       sertraline 100 MG tablet    ZOLOFT    180 tablet    2 tabs (200 mg) by mouth nightly.    Major depressive disorder, recurrent episode, mild (H), Anxiety       SUMAtriptan 100 MG tablet    IMITREX    9 tablet    Take 1 tablet (100 mg) by mouth at onset of headache for migraine May repeat in 2 hours. Max 2 tablets/24 hours.    Migraine with aura and without status migrainosus, not intractable       triamcinolone 0.1 % ointment    KENALOG    80 g    Apply topically 2 times daily as needed for irritation    Eczema, unspecified  type       * Notice:  This list has 2 medication(s) that are the same as other medications prescribed for you. Read the directions carefully, and ask your doctor or other care provider to review them with you.
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
## 2018-11-21 NOTE — PROGRESS NOTES
Clinic Care Coordination Contact  [facility]/Voicemail       Clinical Data: Care Coordinator Outreach  Outreach attempted x 2.  Left message on voicemail with call back information and requested return call.  Plan:   Care Coordinator will try to reach patient again in 3-5 business days.       Jennifer Collins RN  Clinic Care Coordinator  Office [phone]  [email or web address]

## 2018-12-03 ENCOUNTER — TELEPHONE (OUTPATIENT)
Dept: FAMILY MEDICINE | Facility: CLINIC | Age: 63
End: 2018-12-03

## 2018-12-03 DIAGNOSIS — K58.0 IRRITABLE BOWEL SYNDROME WITH DIARRHEA: ICD-10-CM

## 2018-12-03 NOTE — TELEPHONE ENCOUNTER
Pt's insurance requires a PA on Pantoprazole    Reject from Insurance: Max 120 days supply (of any PPI) in 365 days     Diagnosis Code: K449    Please provide reasoning / documentation and forward to PA team at [phone].  Thanks!!    PA NEEDED ON: Pantoprazole 20mg  INS IS: [place] PMAP  BIN: [id number]  PHONE # IS: [phone]  ID # IS: [id number]  GROUP: -  [facility]    PLEASE LET US KNOW WHEN PA IS GRANTED/DENIED.  THANK YOU!    Cedric Mg  [facility]  [phone]

## 2018-12-03 NOTE — TELEPHONE ENCOUNTER
Central Prior Authorization Team   Phone: [phone]      PA Initiation    Medication: Pantoprazole-Initiated  Insurance Company: [employer] PMAP - Phone [phone] Fax [phone]  Pharmacy Filling the Rx: [place], [place] - [address]  Filling Pharmacy Phone: [phone]  Filling Pharmacy Fax:    Start Date: 12/3/2018

## 2018-12-03 NOTE — TELEPHONE ENCOUNTER
"Requested Prescriptions   Pending Prescriptions Disp Refills     nortriptyline (PAMELOR) 50 MG capsule [Pharmacy Med Name: NORTRIPTYLINE HCL 50MG CAPS]  Last Written Prescription Date:  6/25/2018  Last Fill Quantity: 90 capsule,  # refills: 1   Last office visit: 11/27/2018 with prescribing provider:  Haase     Future Office Visit:   Next 5 appointments (look out 90 days)     Dec 06, 2018 10:00 AM CST   SHORT with Jerry Jordan [facility]    [address] [place] [address]   [phone]                  90 capsule 1     Sig: TAKE ONE CAPSULE BY MOUTH AT BEDTIME    Tricyclic Agents ( Annual appt and no PHQ9) Passed    12/3/2018 10:22 AM       Passed - Blood Pressure under 140/90 in past 12 mos    BP Readings from Last 3 Encounters:   11/19/18 120/78   10/11/18 120/82   10/08/18 146/86                Passed - Recent (12 mo) or future (30 days) visit within authorizing provider's specialty    Patient had office visit in the last 12 months or has a visit in the next 30 days with authorizing provider or within the authorizing provider's specialty.  See \"Patient Info\" tab in inbasket, or \"Choose Columns\" in Meds & Orders section of the refill encounter.             Passed - Patient is age 18 or older       Passed - Patient is not pregnant       Passed - No positive pregnancy test on record in past 12 mos          "

## 2018-12-04 RX ORDER — NORTRIPTYLINE HYDROCHLORIDE 50 MG/1
CAPSULE ORAL
Qty: 90 CAPSULE | Refills: 0 | Status: SHIPPED | OUTPATIENT
Start: 2018-12-04 | End: 2019-02-28

## 2018-12-05 ENCOUNTER — TELEPHONE (OUTPATIENT)
Dept: FAMILY MEDICINE | Facility: CLINIC | Age: 63
End: 2018-12-05

## 2018-12-05 DIAGNOSIS — Z53.9 DIAGNOSIS NOT YET DEFINED: Primary | ICD-10-CM

## 2018-12-05 PROCEDURE — G0179 MD RECERTIFICATION HHA PT: HCPCS | Performed by: FAMILY MEDICINE

## 2018-12-05 NOTE — TELEPHONE ENCOUNTER
Received Home Health Certification and Plan of Care.  Placed on Dr. Ojeda's Desk for review and signature.

## 2018-12-06 ENCOUNTER — OFFICE VISIT (OUTPATIENT)
Dept: PHARMACY | Facility: CLINIC | Age: 63
End: 2018-12-06
Payer: COMMERCIAL

## 2018-12-06 VITALS
HEART RATE: 109 BPM | SYSTOLIC BLOOD PRESSURE: 122 MMHG | WEIGHT: 193 LBS | DIASTOLIC BLOOD PRESSURE: 82 MMHG | BODY MASS INDEX: 27.89 KG/M2 | OXYGEN SATURATION: 96 %

## 2018-12-06 DIAGNOSIS — E55.9 VITAMIN D DEFICIENCY: ICD-10-CM

## 2018-12-06 DIAGNOSIS — E89.0 POSTOPERATIVE HYPOTHYROIDISM: ICD-10-CM

## 2018-12-06 DIAGNOSIS — J30.1 CHRONIC SEASONAL ALLERGIC RHINITIS DUE TO POLLEN: ICD-10-CM

## 2018-12-06 DIAGNOSIS — K58.0 IRRITABLE BOWEL SYNDROME WITH DIARRHEA: ICD-10-CM

## 2018-12-06 DIAGNOSIS — Z23 ENCOUNTER FOR IMMUNIZATION: ICD-10-CM

## 2018-12-06 DIAGNOSIS — F41.1 GENERALIZED ANXIETY DISORDER: Primary | ICD-10-CM

## 2018-12-06 DIAGNOSIS — F32.0 MILD MAJOR DEPRESSION (H): ICD-10-CM

## 2018-12-06 DIAGNOSIS — E53.8 VITAMIN B12 DEFICIENCY (NON ANEMIC): ICD-10-CM

## 2018-12-06 PROCEDURE — 99607 MTMS BY PHARM ADDL 15 MIN: CPT | Performed by: PHARMACIST

## 2018-12-06 PROCEDURE — 99606 MTMS BY PHARM EST 15 MIN: CPT | Performed by: PHARMACIST

## 2018-12-06 ASSESSMENT — ANXIETY QUESTIONNAIRES
1. FEELING NERVOUS, ANXIOUS, OR ON EDGE: NOT AT ALL
7. FEELING AFRAID AS IF SOMETHING AWFUL MIGHT HAPPEN: NOT AT ALL
GAD7 TOTAL SCORE: 0
6. BECOMING EASILY ANNOYED OR IRRITABLE: NOT AT ALL
2. NOT BEING ABLE TO STOP OR CONTROL WORRYING: NOT AT ALL
5. BEING SO RESTLESS THAT IT IS HARD TO SIT STILL: NOT AT ALL
IF YOU CHECKED OFF ANY PROBLEMS ON THIS QUESTIONNAIRE, HOW DIFFICULT HAVE THESE PROBLEMS MADE IT FOR YOU TO DO YOUR WORK, TAKE CARE OF THINGS AT HOME, OR GET ALONG WITH OTHER PEOPLE: NOT DIFFICULT AT ALL
3. WORRYING TOO MUCH ABOUT DIFFERENT THINGS: NOT AT ALL

## 2018-12-06 ASSESSMENT — PATIENT HEALTH QUESTIONNAIRE - PHQ9
5. POOR APPETITE OR OVEREATING: NOT AT ALL
SUM OF ALL RESPONSES TO PHQ QUESTIONS 1-9: 1

## 2018-12-06 NOTE — PROGRESS NOTES
"SUBJECTIVE/OBJECTIVE:                           Mariela Duffy is a 63 year old female coming in for a follow up visit (10-11-18) for Medication Therapy Management.  She was referred to me from Pepe Acuna.     Will switch to Susan Haase as her PCP .     Chief Complaint: Follow up on depression and anxiety.   She is happy --has a great , new residence , sleeps all night --doing well. Carrying her lorazepam bottle with her . HHN comes in weekly now - fely sets up her own meds herself --they are just checking that she is doing this correctly.     Neuropsych eval notes --Mild cognitive impairment .                 Her children(daughter lucero) tell her she cannot drive but yet Fely has no issues driving.     Tomorrow she is looking for a place to move tomorrow as her house will be foreclosed 11-7-18.  She feels she may have 2-3 options she can move to or a couple GF's she can stay at temporarily.     Personal Healthcare Goals: To become physically stronger. To take all medications.     Allergies/ADRs: Reviewed in Epic  Tobacco: No tobacco use  Alcohol: Less than 1 beverage / month  Caffeine: 1.5 cups/day of coffee, 3 cans dr pepper or coke per day  Activity: loves dancing, but is not currently. She likes to swim, but is not currently swimming.   PMH: Reviewed in Epic    Medication Adherence/Access:  Doing well now --has fv N checking on this weekly now for her.     Depression:  Current medications include: Sertraline 200 mg once daily QHS. Pt reports that she feels more \"shaky\" and attributes this to her sertraline. She found that sertraline was effective for her but she expresses interest in trying a different medication in the future due to side effects. Patient reports the following stressors:  None today   Therapies tried and response: She has tried several antidepressants in the past, but could not recall today.   PHQ-9 SCORE 10/8/2018 11/19/2018 12/6/2018   PHQ-9 Total Score - - -   PHQ-9 Total Score " Kaylynnt 6 (Mild depression) Incomplete -   PHQ-9 Total Score 6 - 1     fyi--she see's weekly counselor as well as talks to her best friend daily --they both help her cope. Recently saw Lam Cordero  Our clinic psychiatrist--he stated no med chnages at this time --have neuropsych eval .        Anxiety: Current therapy :  Quetiapine 12.5-25mg tid prn(not had to use it since her move) + Buspirone 15 mg. tablets two in the morning and one in the evening and lorazepam 0.5 mg as needed(carries with her but not needing any since her move).       She has an extensive support system, including her daughters, friend,  therapist weekly andARMS worker. She states that her ARMS worker was helping her with organization in her home, but that the ARMS worker became very overwhelmed due to her ex-fiance being a hoarder. She was proud that she has cleaned out half of her living room. Her kids are helping with her yard work. She took early orker was helping her with organization in her home, but that the ARMS worker became very overwhelmed due to her ex-fiance being a hoarder. She was proud that she has cleaned out half of her living room. She took social security early, which is helping her financially. She feels her memory worsens when she is stressed or anxious.    Psych nurse gave ekg recheck 10 days post seroquel start.     QUYEN-7 SCORE 9/19/2018 10/8/2018 12/6/2018   Total Score - - -   Total Score 17 (severe anxiety) 8 (mild anxiety) -   Total Score 17 8 0            Allergic Rhinitis: Current therapy is flonase 1-2 sprays in each nostril daily. She is not currently experiencing congestion, but she has some postnasal drip.      IBS/Sleep/Migraines:  Currently take 50mg nortriptyline in the evening and naproxen 220 mg PRN for headaches. She feels as if the nortriptyline dose is too high due to experiencing constipation. She denies having a difficult time sleeping.     fyi--has imitrex to abort migraine but hasnt used it in 6  "months.    Vitamin D3: level was 273 on 6/12/18. Pt has a calcium + vitamin D supplement and wonders if this is a good option? The D3 dose is 400 iu's.     Vitamin B12: level was 220 on 6/12/18. Vitamin B12 helps support memory and lessens fatigue. Pt has a B Complex vitamin and wonders if this is a good option?    Hypothyroidism: Patient is taking levothyroxine 175 (up from 150) mcg daily. Patient is having the following symptoms: Pt has an appointment with endocrinology because she is wondering about taking liothyronine. She states that her brother experienced benefit from liothyronine in the past. Patient is having the following symptoms: hypothyroidism -  constipation.   TSH   Date Value Ref Range Status   08/14/2018 3.75 0.40 - 4.00 mU/L Final     Immunizations:  Due for 2018 flu shot. Agreed to have at our pharmacy today .     BP Readings from Last 1 Encounters:   12/06/18 122/82     Pulse Readings from Last 1 Encounters:   12/06/18 109     Wt Readings from Last 1 Encounters:   12/06/18 193 lb (87.5 kg)     Ht Readings from Last 1 Encounters:   10/08/18 5' 9.75\" (1.772 m)     Estimated body mass index is 27.89 kg/(m^2) as calculated from the following:    Height as of 10/8/18: 5' 9.75\" (1.772 m).    Weight as of this encounter: 193 lb (87.5 kg).    Temp Readings from Last 1 Encounters:   11/19/18 98.6  F (37  C) (Oral)         ASSESSMENT:                             Current medications were reviewed today.     Medication Adherence: good    Depression: Stable. Continue current therapy. phq-9 =1 today .     Anxiety:  Stable --Maged score is zero today .     Allergic Rhinitis: improved.     IBS/Sleep/Migraines:  Improved --lower dose Nortriptyline working well.     Vitamin D3: is not at goal of 50-75ng/mL. Pt would benefit from vitamin D3 lab recheck in 6 months.    Vitamin B12: is not at goal of 600-1000pg/mL. Pt would benefit from Vitamin B12 lab recheck in 6 months.  .  Hypothyroidism: Pt will follow up with " endocrinology to discuss T3 med.    Immunizations:  See our retail pharmacist for 2018 flu shot.     PLAN:                            1. FYI--Please have your 2018 flu shot today at our pharmacy .     2. FYI--Your depression / anxiety-- are very well controled --continue same medications as is --your doing great --enjoy the holidays!         Next [facility] visit:  6 months - June 6th -2019 at 10;00am.       I spent 30 minutes with this patient today. All changes were made via collaborative practice agreement with Estefany Stanley. A copy of the visit note was provided to the patient's primary care provider.        The patient was given a summary of these recommendations as an after visit summary.     Jerry Jordan Rp.  Medication Therapy Management Provider  [phone]  Jarrell Bah, Pharm-D4

## 2018-12-06 NOTE — MR AVS SNAPSHOT
After Visit Summary   12/6/2018    Mariela Duffy    MRN: [id number]           Patient Information     Date Of Birth          1955        Visit Information        Provider Department      12/6/2018 10:00 AM Jerry Jordan RPH [facility] MTM        Care Instructions    Recommendations from today's MTM visit:                                                        1. FYI--Please have your 2018 flu shot today at our pharmacy .     2. FYI--Your depression / anxiety-- are very well controled --continue same medications as is --your doing great --enjoy the holidays!         Next MTM visit:  6 months - June 6th -2019 at 10;00am.     To schedule another MTM appointment, please call the clinic directly or you may call the MTM scheduling line at [phone] or toll-free at [phone].     My Clinical Pharmacist's contact information:                                                      It was a pleasure talking with you today!  Please feel free to contact me with any questions or concerns you have.      Jerry Jordan Rph.  Medication Therapy Management Provider  [phone]      You may receive a survey about the MTM services you received.  I would appreciate your feedback to help me serve you better in the future. Please fill it out and return it when you can. Your comments will be anonymous.                  Follow-ups after your visit        Your next 10 appointments already scheduled     Dec 11, 2018  8:45 AM CST   SHORT with Susan Rachele Haase, APRN [facility] ([facility])    [address] [place] [address] [phone]            Jun 06, 2019 10:00 AM CDT   SHORT with Jerry Jordan RPH   [facility] ([address] [address] [phone]              Who to contact     If you have questions or need follow up information about  today's clinic visit or your schedule please contact [facility] MTM directly at [phone].  Normal or non-critical lab and imaging results will be communicated to you by MyChart, letter or phone within 4 business days after the clinic has received the results. If you do not hear from us within 7 days, please contact the clinic through MyChart or phone. If you have a critical or abnormal lab result, we will notify you by phone as soon as possible.  Submit refill requests through [employer] or call your pharmacy and they will forward the refill request to us. Please allow 3 business days for your refill to be completed.          Additional Information About Your Visit        Care EveryWhere ID     This is your Care EveryWhere ID. This could be used by other organizations to access your [facility] medical records  [id number]        Your Vitals Were     Pulse Last Period Pulse Oximetry BMI (Body Mass Index)          109 01/20/2004 96% 27.89 kg/m2         Blood Pressure from Last 3 Encounters:   12/06/18 122/82   11/19/18 120/78   10/11/18 120/82    Weight from Last 3 Encounters:   12/06/18 193 lb (87.5 kg)   11/19/18 197 lb (89.4 kg)   10/11/18 199 lb (90.3 kg)              Today, you had the following     No orders found for display         Today's Medication Changes          These changes are accurate as of 12/6/18 10:30 AM.  If you have any questions, ask your nurse or doctor.               These medicines have changed or have updated prescriptions.        Dose/Directions    aspirin 81 MG tablet   Commonly known as:  ASA   This may have changed:  Another medication with the same name was removed. Continue taking this medication, and follow the directions you see here.        Dose:  81 mg   Take 81 mg by mouth daily   Refills:  0                Primary Care Provider Office Phone # Fax #    Jaclyn Ojeda [phone] [address] [facility] [address]        Equal Access  to Services     TUAN RINCON : Kalli Parker, waarsalanda luqadaha, qaybta kaalmarosangela lorenzo, fazal george. So [facility] [phone].    ATENCIÓN: Si habla ana, tiene a romero disposición servicios gratuitos de asistencia lingüística. Llame al [phone].    We comply with applicable federal civil rights laws and [place] laws. We do not discriminate on the basis of race, color, national origin, age, disability, sex, sexual orientation, or gender identity.            Thank you!     Thank you for choosing [facility]  for your care. Our goal is always to provide you with excellent care. Hearing back from our patients is one way we can continue to improve our services. Please take a few minutes to complete the written survey that you may receive in the mail after your visit with us. Thank you!             Your Updated Medication List - Protect others around you: Learn how to safely use, store and throw away your medicines at www.disposemymeds.org.          This list is accurate as of 12/6/18 10:30 AM.  Always use your most recent med list.                   Brand Name Dispense Instructions for use Diagnosis    aspirin 81 MG tablet    ASA     Take 81 mg by mouth daily        busPIRone 15 MG tablet    BUSPAR    270 tablet    TAKE 2 TABLETS BY MOUTH IN THE MORNING AND TAKE 1 TABLET BY MOUTH IN THE EVENING    Anxiety       fluticasone 50 MCG/ACT nasal spray    FLONASE    1 Bottle    Spray 1-2 sprays into both nostrils daily    Post-nasal drip       levothyroxine 175 MCG tablet    SYNTHROID/LEVOTHROID    90 tablet    Take 1 tablet (175 mcg) by mouth daily    Primary hypothyroidism       LORazepam 0.5 MG tablet    ATIVAN    60 tablet    Take 0.5 tablets (0.25 mg) by mouth 2 times daily as needed for anxiety . May take 2 tabs ( 1 mg) bid PO 2 times daily as needed until current anxiety flare improves.    Anxiety       nortriptyline 50 MG capsule    PAMELOR    90  capsule    TAKE ONE CAPSULE BY MOUTH AT BEDTIME    Irritable bowel syndrome with diarrhea       pantoprazole 20 MG EC tablet    PROTONIX    90 tablet    TAKE ONE TABLET BY MOUTH ONCE DAILY. TAKE BY MOUTH 30 TO 60 MINUTES BEFORE A MEAL.    Gastroesophageal reflux disease with esophagitis, Hiatal hernia       QUEtiapine 25 MG tablet    SEROQUEL    90 tablet    Take 1/2-1 tablet up to three times daily for anxiety.    Generalized anxiety disorder       ranitidine 300 MG tablet    ZANTAC    90 tablet    TAKE ONE TABLET BY MOUTH AT BEDTIME    Gastroesophageal reflux disease with esophagitis       sertraline 100 MG tablet    ZOLOFT    180 tablet    2 tabs (200 mg) by mouth nightly.    Major depressive disorder, recurrent episode, mild (H), Anxiety       SUMAtriptan 100 MG tablet    IMITREX    9 tablet    Take 1 tablet (100 mg) by mouth at onset of headache for migraine May repeat in 2 hours. Max 2 tablets/24 hours.    Migraine with aura and without status migrainosus, not intractable       triamcinolone 0.1 % external ointment    KENALOG    80 g    Apply topically 2 times daily as needed for irritation    Eczema, unspecified type

## 2018-12-06 NOTE — PATIENT INSTRUCTIONS
Recommendations from today's MTM visit:                                                        1. FYI--Please have your 2018 flu shot today at our pharmacy .     2. FYI--Your depression / anxiety-- are very well controled --continue same medications as is --your doing great --enjoy the holidays!         Next MTM visit:  6 months - June 6th -2019 at 10;00am.     To schedule another MTM appointment, please call the clinic directly or you may call the MTM scheduling line at [phone] or toll-free at [phone].     My Clinical Pharmacist's contact information:                                                      It was a pleasure talking with you today!  Please feel free to contact me with any questions or concerns you have.      Jerry Jordan Rph.  Medication Therapy Management Provider  [phone]      You may receive a survey about the MTM services you received.  I would appreciate your feedback to help me serve you better in the future. Please fill it out and return it when you can. Your comments will be anonymous.

## 2018-12-07 ASSESSMENT — ANXIETY QUESTIONNAIRES: GAD7 TOTAL SCORE: 0

## 2018-12-10 ENCOUNTER — TRANSFERRED RECORDS (OUTPATIENT)
Dept: HEALTH INFORMATION MANAGEMENT | Facility: CLINIC | Age: 63
End: 2018-12-10

## 2018-12-11 ENCOUNTER — OFFICE VISIT (OUTPATIENT)
Dept: FAMILY MEDICINE | Facility: CLINIC | Age: 63
End: 2018-12-11
Payer: COMMERCIAL

## 2018-12-11 VITALS
TEMPERATURE: 98.6 F | BODY MASS INDEX: 27.89 KG/M2 | RESPIRATION RATE: 14 BRPM | DIASTOLIC BLOOD PRESSURE: 70 MMHG | HEART RATE: 106 BPM | WEIGHT: 193 LBS | OXYGEN SATURATION: 98 % | SYSTOLIC BLOOD PRESSURE: 110 MMHG

## 2018-12-11 DIAGNOSIS — E55.9 VITAMIN D DEFICIENCY: ICD-10-CM

## 2018-12-11 DIAGNOSIS — Z13.6 CARDIOVASCULAR SCREENING; LDL GOAL LESS THAN 160: ICD-10-CM

## 2018-12-11 DIAGNOSIS — G31.84 MILD COGNITIVE IMPAIRMENT: ICD-10-CM

## 2018-12-11 DIAGNOSIS — F32.0 MILD MAJOR DEPRESSION (H): Primary | ICD-10-CM

## 2018-12-11 DIAGNOSIS — E53.8 VITAMIN B12 DEFICIENCY (NON ANEMIC): ICD-10-CM

## 2018-12-11 DIAGNOSIS — G62.9 PERIPHERAL POLYNEUROPATHY: ICD-10-CM

## 2018-12-11 LAB
ALBUMIN SERPL-MCNC: 4 G/DL (ref 3.4–5)
ALP SERPL-CCNC: 107 U/L (ref 40–150)
ALT SERPL W P-5'-P-CCNC: 24 U/L (ref 0–50)
ANION GAP SERPL CALCULATED.3IONS-SCNC: 8 MMOL/L (ref 3–14)
AST SERPL W P-5'-P-CCNC: 19 U/L (ref 0–45)
BASOPHILS # BLD AUTO: 0 10E9/L (ref 0–0.2)
BASOPHILS NFR BLD AUTO: 0.3 %
BILIRUB SERPL-MCNC: 0.7 MG/DL (ref 0.2–1.3)
BUN SERPL-MCNC: 8 MG/DL (ref 7–30)
CALCIUM SERPL-MCNC: 9.6 MG/DL (ref 8.5–10.1)
CHLORIDE SERPL-SCNC: 106 MMOL/L (ref 94–109)
CO2 SERPL-SCNC: 26 MMOL/L (ref 20–32)
CREAT SERPL-MCNC: 0.89 MG/DL (ref 0.52–1.04)
DEPRECATED CALCIDIOL+CALCIFEROL SERPL-MC: 18 UG/L (ref 20–75)
DIFFERENTIAL METHOD BLD: NORMAL
EOSINOPHIL # BLD AUTO: 0.4 10E9/L (ref 0–0.7)
EOSINOPHIL NFR BLD AUTO: 6.8 %
ERYTHROCYTE [DISTWIDTH] IN BLOOD BY AUTOMATED COUNT: 13.7 % (ref 10–15)
GFR SERPL CREATININE-BSD FRML MDRD: 64 ML/MIN/1.7M2
GLUCOSE SERPL-MCNC: 100 MG/DL (ref 70–99)
HCT VFR BLD AUTO: 41.2 % (ref 35–47)
HGB BLD-MCNC: 13.6 G/DL (ref 11.7–15.7)
LDLC SERPL DIRECT ASSAY-MCNC: 163 MG/DL
LYMPHOCYTES # BLD AUTO: 1.5 10E9/L (ref 0.8–5.3)
LYMPHOCYTES NFR BLD AUTO: 24.2 %
MCH RBC QN AUTO: 28.9 PG (ref 26.5–33)
MCHC RBC AUTO-ENTMCNC: 33 G/DL (ref 31.5–36.5)
MCV RBC AUTO: 88 FL (ref 78–100)
MONOCYTES # BLD AUTO: 0.4 10E9/L (ref 0–1.3)
MONOCYTES NFR BLD AUTO: 6.5 %
NEUTROPHILS # BLD AUTO: 3.7 10E9/L (ref 1.6–8.3)
NEUTROPHILS NFR BLD AUTO: 62.2 %
PLATELET # BLD AUTO: 272 10E9/L (ref 150–450)
POTASSIUM SERPL-SCNC: 3.8 MMOL/L (ref 3.4–5.3)
PROT SERPL-MCNC: 7.5 G/DL (ref 6.8–8.8)
RBC # BLD AUTO: 4.7 10E12/L (ref 3.8–5.2)
SODIUM SERPL-SCNC: 140 MMOL/L (ref 133–144)
TSH SERPL DL<=0.005 MIU/L-ACNC: 1.78 MU/L (ref 0.4–4)
WBC # BLD AUTO: 6 10E9/L (ref 4–11)

## 2018-12-11 PROCEDURE — 36415 COLL VENOUS BLD VENIPUNCTURE: CPT | Performed by: NURSE PRACTITIONER

## 2018-12-11 PROCEDURE — 84443 ASSAY THYROID STIM HORMONE: CPT | Performed by: NURSE PRACTITIONER

## 2018-12-11 PROCEDURE — 83921 ORGANIC ACID SINGLE QUANT: CPT | Mod: 90 | Performed by: NURSE PRACTITIONER

## 2018-12-11 PROCEDURE — 99214 OFFICE O/P EST MOD 30 MIN: CPT | Performed by: NURSE PRACTITIONER

## 2018-12-11 PROCEDURE — 85025 COMPLETE CBC W/AUTO DIFF WBC: CPT | Performed by: NURSE PRACTITIONER

## 2018-12-11 PROCEDURE — 83721 ASSAY OF BLOOD LIPOPROTEIN: CPT | Performed by: NURSE PRACTITIONER

## 2018-12-11 PROCEDURE — 80053 COMPREHEN METABOLIC PANEL: CPT | Performed by: NURSE PRACTITIONER

## 2018-12-11 PROCEDURE — 99000 SPECIMEN HANDLING OFFICE-LAB: CPT | Performed by: NURSE PRACTITIONER

## 2018-12-11 PROCEDURE — 82306 VITAMIN D 25 HYDROXY: CPT | Performed by: NURSE PRACTITIONER

## 2018-12-11 RX ORDER — ERGOCALCIFEROL 1.25 MG/1
50000 CAPSULE, LIQUID FILLED ORAL
Qty: 12 CAPSULE | Refills: 0 | Status: SHIPPED | OUTPATIENT
Start: 2018-12-11 | End: 2019-03-13

## 2018-12-11 RX ORDER — DONEPEZIL HYDROCHLORIDE 5 MG/1
TABLET, FILM COATED ORAL
Refills: 0 | COMMUNITY
Start: 2018-12-10 | End: 2019-02-25

## 2018-12-11 NOTE — LETTER
[facility]  [address] [address]  [phone]        June 11, 2019    Mariela Duffy  [address] [place] [address]              Dear Mariela Duffy    This is to remind you that your recheck of Vitamin D blood work is due.    You may call our office at [phone] to schedule a lab only appointment.    Please disregard this notice if you have already had your labs drawn or made an appointment.        Sincerely,        Susan Rachele Haase APRN, CNP/ silvia

## 2018-12-11 NOTE — PROGRESS NOTES
SUBJECTIVE:   Mariela Duffy is a 63 year old female who presents to clinic today for the following health issues:    Depression and Anxiety Follow-Up    Status since last visit: No change    Other associated symptoms:None    Complicating factors:     Significant life event: No     Current substance abuse: None    PHQ 9/19/2018 10/8/2018 12/6/2018   PHQ-9 Total Score 12 6 1   Q9: Suicide Ideation Not at all Not at all Not at all   F/U: Thoughts of suicide or self-harm - - -   F/U: Self harm-plan - - -   F/U: Self-harm action - - -   F/U: Safety concerns - - -     QUYEN-7 SCORE 9/19/2018 10/8/2018 12/6/2018   Total Score - - -   Total Score 17 (severe anxiety) 8 (mild anxiety) -   Total Score 17 8 0   12/11/18---  PHQ-9 score: 0,no difficulty  QUYEN-7: 0, no difficulty    PHQ-9  English  PHQ-9   Any Language  QUYEN-7  Suicide Assessment Five-step Evaluation and Treatment (SAFE-T)    Amount of exercise or physical activity: None    Problems taking medications regularly: No    Medication side effects: none    Diet: regular (no restrictions)    Cognitive impairment:  Was seen  Yesterday by Dr. Murphy, was diagnosed with moderate peripheral neuropathy (unknown cause) and mild cognitive impairment.  She is not sure if she should start on Aricept, also reports that she was to have some additional lab work completed.      Problem list and histories reviewed & adjusted, as indicated.  Additional history: as documented    Patient Active Problem List   Diagnosis     Contact dermatitis and other eczema due to other specified agent     Allergic rhinitis due to other allergen     Esophageal reflux     Irritable bowel syndrome     Rosacea     Postsurgical hypothyroidism     Iron deficiency anemia     Absence of menstruation     Mild major depression (H)     CARDIOVASCULAR SCREENING; LDL GOAL LESS THAN 160     Generalized anxiety disorder     Hypothyroidism     Tubular adenoma of colon     Seasonal affective disorder (H)      Rhinitis     Headache     ADD (attention deficit disorder)     Other insomnia     TIA (transient ischemic attack)     Gastroesophageal reflux disease with esophagitis     Hiatal hernia     History of colonic polyps     BP check     Memory loss     Migraine with aura and without status migrainosus, not intractable     History of thyroid cancer     Past Surgical History:   Procedure Laterality Date     ABDOMEN SURGERY      Hiatelherna     C NONSPECIFIC PROCEDURE      surgery hiatal hernia     C NONSPECIFIC PROCEDURE      cholecystectomy     C NONSPECIFIC PROCEDURE  multiple    cleft lip repair.     CHOLECYSTECTOMY       COLONOSCOPY  2013    Colonoscopy [facility]     ENT SURGERY  7848-1399     HEAD & NECK SURGERY      thyroid cancer surgery     SURGICAL HISTORY OF -       thyroidectomy due to cancer     WRIST SURGERY Right     2015       Social History     Tobacco Use     Smoking status: Former Smoker     Years: 5.00     Types: Cigarettes     Start date: 5/10/1973     Last attempt to quit: 1977     Years since quittin.9     Smokeless tobacco: Never Used   Substance Use Topics     Alcohol use: Yes     Comment: 2-4 mixed drinks/month     Family History   Problem Relation Age of Onset     Cancer Father         Colon, stomach -  at 75yoa     Hypertension Father      C.A.D. Father      Colon Cancer Father      Other Cancer Father      Cancer Mother         Throat, lymph, bone -  at 79yoa     Other Cancer Mother      C.A.D. Maternal Grandfather         Heart Attack -  in his late 60's     Alzheimer Disease Paternal Grandmother      C.A.D. Paternal Grandfather         Heart Attack -  at 63yoa     Thyroid Disease Other          Current Outpatient Medications   Medication Sig Dispense Refill     aspirin (ASA) 81 MG tablet Take 81 mg by mouth daily       busPIRone (BUSPAR) 15 MG tablet TAKE 2 TABLETS BY MOUTH IN THE MORNING AND TAKE 1 TABLET BY MOUTH IN THE EVENING 270  tablet 3     fluticasone (FLONASE) 50 MCG/ACT spray Spray 1-2 sprays into both nostrils daily 1 Bottle 11     levothyroxine (SYNTHROID/LEVOTHROID) 175 MCG tablet Take 1 tablet (175 mcg) by mouth daily 90 tablet 1     LORazepam (ATIVAN) 0.5 MG tablet Take 0.5 tablets (0.25 mg) by mouth 2 times daily as needed for anxiety . May take 2 tabs ( 1 mg) bid PO 2 times daily as needed until current anxiety flare improves. 60 tablet 0     nortriptyline (PAMELOR) 50 MG capsule TAKE ONE CAPSULE BY MOUTH AT BEDTIME 90 capsule 0     pantoprazole (PROTONIX) 20 MG EC tablet TAKE ONE TABLET BY MOUTH ONCE DAILY. TAKE BY MOUTH 30 TO 60 MINUTES BEFORE A MEAL. 90 tablet 1     QUEtiapine (SEROQUEL) 25 MG tablet Take 1/2-1 tablet up to three times daily for anxiety. 90 tablet 1     ranitidine (ZANTAC) 300 MG tablet TAKE ONE TABLET BY MOUTH AT BEDTIME 90 tablet 3     sertraline (ZOLOFT) 100 MG tablet 2 tabs (200 mg) by mouth nightly. 180 tablet 0     SUMAtriptan (IMITREX) 100 MG tablet Take 1 tablet (100 mg) by mouth at onset of headache for migraine May repeat in 2 hours. Max 2 tablets/24 hours. 9 tablet 1     triamcinolone (KENALOG) 0.1 % ointment Apply topically 2 times daily as needed for irritation 80 g 3     Allergies   Allergen Reactions     Levaquin Nausea and Vomiting       Reviewed and updated as needed this visit by clinical staff  Allergies  Meds       Reviewed and updated as needed this visit by Provider         ROS:  CONSTITUTIONAL: NEGATIVE for fever, chills, change in weight  ENT/MOUTH: NEGATIVE for ear, mouth and throat problems  RESP: NEGATIVE for significant cough or SOB  CV: NEGATIVE for chest pain, palpitations or peripheral edema  PSYCHIATRIC: NEGATIVE for changes in mood or affect  OBJECTIVE:     /70 (BP Location: Right arm, Patient Position: Chair, Cuff Size: Adult Large)   Pulse 106   Temp 98.6  F (37  C) (Oral)   Resp 14   Wt 87.5 kg (193 lb)   LMP 01/20/2004   SpO2 98%   BMI 27.89 kg/m    Body mass  index is 27.89 kg/m .   GENERAL: healthy, alert and no distress  NECK: no adenopathy, no asymmetry, masses, or scars and thyroid normal to palpation  RESP: lungs clear to auscultation - no rales, rhonchi or wheezes  CV: regular rate and rhythm, normal S1 S2, no S3 or S4, no murmur, click or rub, no peripheral edema and peripheral pulses strong  PSYCH: mentation appears normal, confused about yesterday's appt with neurology    ASSESSMENT:   See below    PLAN:   Mariela was seen today for recheck medication.    Diagnoses and all orders for this visit:    Mild major depression (H): improved since her recent move.      Mild cognitive impairment: diagnosed after neuropsych exam, will obtain below labs for further evaluation. Awaiting the report from her visit with neurology yesterday, unclear if Aricept was started? Also unsure what other labs she needed completed?  -     CBC with platelets differential  -     Comprehensive metabolic panel  -     TSH with free T4 reflex  -     Vitamin D Deficiency    Peripheral polyneuropathy: received notes from yesterday's visit, needs below lab will see if it can be added on.    -     Methylmalonic acid    CARDIOVASCULAR SCREENING; LDL GOAL LESS THAN 160  -     LDL cholesterol direct    Vitamin B12 deficiency (non anemic)  -     Methylmalonic acid    Follow up in 4 weeks, sooner as needed.    Susan Haase, APRN [facility]

## 2018-12-12 ENCOUNTER — TELEPHONE (OUTPATIENT)
Dept: FAMILY MEDICINE | Facility: CLINIC | Age: 63
End: 2018-12-12

## 2018-12-12 NOTE — TELEPHONE ENCOUNTER
Terri calling from [facility]-    States Zantac and Protonix coming up as duplicate therapy.  Is she to be on both?    Sertraline and Nortriptylilne coming up duplicate therapy.  Is she to be on both?     Please confirm above.  Call Terri back [phone].  Abril Harris RN

## 2018-12-13 LAB — METHYLMALONATE SERPL-SCNC: 0.22 UMOL/L (ref 0–0.4)

## 2018-12-13 NOTE — TELEPHONE ENCOUNTER
Can you let Terri she needs to be on all of the below medications, tell her thanks for asking.  Susan Haase, CNP

## 2018-12-18 ENCOUNTER — PATIENT OUTREACH (OUTPATIENT)
Dept: CARE COORDINATION | Facility: CLINIC | Age: 63
End: 2018-12-18

## 2018-12-19 ENCOUNTER — TELEPHONE (OUTPATIENT)
Dept: FAMILY MEDICINE | Facility: CLINIC | Age: 63
End: 2018-12-19

## 2018-12-19 DIAGNOSIS — K21.00 GASTROESOPHAGEAL REFLUX DISEASE WITH ESOPHAGITIS: ICD-10-CM

## 2018-12-19 DIAGNOSIS — K44.9 HIATAL HERNIA: ICD-10-CM

## 2018-12-19 DIAGNOSIS — F41.9 ANXIETY: ICD-10-CM

## 2018-12-19 RX ORDER — PANTOPRAZOLE SODIUM 20 MG/1
TABLET, DELAYED RELEASE ORAL
Qty: 90 TABLET | Refills: 1 | Status: SHIPPED | OUTPATIENT
Start: 2018-12-19 | End: 2019-06-06

## 2018-12-19 NOTE — PROGRESS NOTES
Clinic Care Coordination Contact      Chief Complaint   Patient presents with     Clinic Care Coordination - Follow-up   RN CC outreach for status update, spoke with patient   Patient states that home care working with patient and that she is reestablishing with behavioral health provider.  Patient notes enjoyment with music and previously worked in music therapy field.   RN CC discussed opportunities for her to be able to enjoy music again and suggested to discuss with therapist if appropriate to consider level of involvement.         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   Patient expressed understanding of goal: yes  .              Patient/Caregiver understanding: Patient verbalized understanding, engaged in AIDET communication behavior during encounter.      Outreach Frequency: monthly  Future Appointments              In 5 months Jerry Jordan RPH [facility] MTM, ETHAN          Plan: Patient will attend scheduled  evaluation and visit with specialist and follow up with PCP as recommended.   Jennifer Collins RN  Clinic Care Coordinator  Office [phone]  [email or web address]

## 2018-12-19 NOTE — TELEPHONE ENCOUNTER
Controlled Substance Refill Request for Lorazepam  Problem List Complete:  Yes    Patient is followed by KESHIA NEGRON for ongoing prescription of benzodiazepines.  All refills should be approved by this provider, or covering partner.     Medication(s): lorazepam.   Maximum quantity per month: 60  Clinic visit frequency required: Q 6  months 12/11/18  With S. Haase     Controlled substance agreement on file: No  Benzodiazepine use reviewed by psychiatry:  No     Last [facility] website verification: 3/29/17   [email or web address]   checked in past 3 months?  No, route to CHRIS Antonio, Pharmacy [facility]  [phone]

## 2018-12-19 NOTE — TELEPHONE ENCOUNTER
Halie with [facility] calls, [phone], pt now taking ranitidine 300 mg due to PA denied for pantoprazole, noticing more gas and burping, wonders if can increase, also reviewed PA denial, informs can appeal if letter written for reason why needs to take daily, routed to , please advise plan, route for processing    Denial Rational: Requested quantity is larger/greater than allowed by insurance.  All PPIs are covered up to 1 per day for 120 days within 365 days.  If an appeal is desired please let the PA team know when a letter of medical necessity has been written explaining why the patient needs more than the 120 per 365    Thelma Strong RN, BSN  Message handled by Nurse Triage.

## 2018-12-20 ENCOUNTER — TELEPHONE (OUTPATIENT)
Dept: FAMILY MEDICINE | Facility: CLINIC | Age: 63
End: 2018-12-20

## 2018-12-20 NOTE — TELEPHONE ENCOUNTER
The maximum daily dose of ranitidine is 300 mg which she is taking.  I would suggest a PA for the pantoprazole every day for 365 days of the year since the ranitidine is not working well.  When reviewing the chart she has been on omeprazole, aciphex, prevacid, nexium, lanxoprazole that have not worked.   I would recommend taking OTC pantoprazole every day until and if the PA is approved.  Thanks,  Susan Haase, CNP

## 2018-12-20 NOTE — TELEPHONE ENCOUNTER
Prior Authorization Retail Medication Request    Medication/Dose: Pantoprazole 20 mg  ICD code (if different than what is on RX):    Previously Tried and Failed:    Rationale:      Insurance Name:   [employer] [facility]  Insurance ID:  [id number]      Pharmacy Information (if different than what is on RX)  Name:  [facility]   Phone: [phone]    Plan limits quantity to #120/365 days, patient has exceeded plans allowance and this will now require a Prior

## 2018-12-20 NOTE — TELEPHONE ENCOUNTER
Please submit PA for daily Pantoprazole  See criteria below  [facility]    Vesna Zaman RN, BS  Clinical Nurse Triage.

## 2018-12-23 RX ORDER — LORAZEPAM 0.5 MG/1
0.25 TABLET ORAL 2 TIMES DAILY PRN
Qty: 60 TABLET | Refills: 0 | OUTPATIENT
Start: 2018-12-23

## 2018-12-24 NOTE — TELEPHONE ENCOUNTER
I did not refill the ativan.  Please call Fely and let her know that she should only be taking ativan if she has tried the quetiapine (seroquel) and that does not work.  If she has any questions please have her set up a clinic visit with me to discuss this further, if she comes in for her appointment please ask her to bring her medications with her, when last saw her she was a little confused.   Thanks,  Susan Haase, CNP

## 2018-12-24 NOTE — TELEPHONE ENCOUNTER
Pharmacy informed, think nurse called it in and has not been used in awhile, will add note to inform if nurse calls in  Thelma Strong RN, BSN  Message handled by Nurse Triage.

## 2018-12-27 ENCOUNTER — TELEPHONE (OUTPATIENT)
Dept: FAMILY MEDICINE | Facility: CLINIC | Age: 63
End: 2018-12-27

## 2018-12-27 NOTE — TELEPHONE ENCOUNTER
HERBERTH Ro denied. We checked with Eduardo -- pantoprazole is not available OTC.    Eliud Nielson RN

## 2018-12-27 NOTE — TELEPHONE ENCOUNTER
Panel Management Review      Patient has the following on her problem list:     Depression / Dysthymia review    Measure:  Needs PHQ-9 score of 4 or less during index window.  Administer PHQ-9 and if score is 5 or more, send encounter to provider for next steps.    5 - 7 month window range:     PHQ-9 SCORE 10/8/2018 11/19/2018 12/6/2018   PHQ-9 Total Score - - -   PHQ-9 Total Score MyChart 6 (Mild depression) Incomplete -   PHQ-9 Total Score 6 - 1       If PHQ-9 recheck is 5 or more, route to provider for next steps.    Patient is due for:  None      Composite cancer screening  Chart review shows that this patient is due/due soon for the following Pap Smear, Mammogram and Colonoscopy  Summary:    Patient is due/failing the following:   COLONOSCOPY, MAMMOGRAM, PAP and PHYSICAL    Action needed:   Patient needs office visit for PX AND PAP, MAMMO, COLONOSCOPY.    Type of outreach:    OUTREACH PT    Questions for provider review:    None                                                                                                                                    Melony Feng CMA       Chart routed to PANEL POOL .

## 2018-12-27 NOTE — LETTER
[facility]  [address] [facility] [address]  [phone]  January 28, 2019    Mariela Duffy  [address]  [facility] [address]    Dear Mariela,    I care about your health and have reviewed your health plan. I have reviewed your medical conditions, medication list, and lab results and am making recommendations based on this review, to better manage your health.    You are in particular need of attention regarding:  -Breast Cancer Screening  -Colon Cancer Screening  -Cervical Cancer Screening    I am recommending that you:  {recommendations:-schedule a MAMMOGRAM which is due. We have mammogram available at our clinic; Tuesday 8:00am-11:30am or Wednesday 2:00pm-4:15pm- to schedule call [phone].   Please disregard this reminder if you have had this exam elsewhere within the last year.  It would be helpful for us to have a copy of your mammogram report in our file so that we can best coordinate your care.  -schedule a COLONOSCOPY to look for colon cancer (due every 10 years or 5 years in higher risk situations.)   Colon cancer is now the second leading cause of death in the [place] for both men and women and there are over 130,000 new cases and 50,000 deaths per year from colon cancer.  Colonoscopies can prevent 90-95% of these deaths.  Problem lesions can be removed before they ever become cancer.  This test is not only looking for cancer, but also getting rid of precancerious lesions.  If you do not wish to do a colonoscopy or cannot afford to do one, at this time, there is another option. It is called a FIT test or Fecal Immunochemical Occult Blood Test (take home stool sample kit).  It does not replace the colonoscopy for colorectal cancer screening, but it can detect hidden bleeding in the lower colon.  It does need to be repeated every year and if a positive result is obtained, you would be referred for a colonoscopy.  If you  have completed either one of these tests at another facility, please have the records sent to our clinic so that we can best coordinate your care.  -schedule a PAP SMEAR EXAM which is due.  Please disregard this reminder if you have had this exam elsewhere within the last year.  It would be helpful for us to have a copy of your recent pap smear report in our file so that we can best coordinate your care.    Here is a list of Health Maintenance topics that are due now or due soon:  Health Maintenance Due   Topic Date Due     HIV SCREEN (SYSTEM ASSIGNED)  10/12/1973     ZOSTER IMMUNIZATION (1 of 2) 10/12/2005     PAP Q3 YR  04/19/2016     ADVANCE DIRECTIVE PLANNING Q5 YRS  04/19/2018     COLONOSCOPY Q5 YR  06/14/2018     MAMMO Q1 YR  07/28/2018       Please call us at [phone] (or use [employer]) to address the above recommendations.     Thank you for trusting [facility] and we appreciate the opportunity to serve you.  We look forward to supporting your healthcare needs in the future.    Healthy Regards,    Susan Haase CNP

## 2018-12-28 NOTE — TELEPHONE ENCOUNTER
Please call Fely and ask her to come in for a visit and we can discuss the below further, since she is new to me I am not sure if she needs to see GI or can trial a different med?  Thanks,  Susan Haase, CNP

## 2019-01-03 NOTE — TELEPHONE ENCOUNTER
TC, can you call pt and schedule appointment to discuss, see note below  Thelma Strong RN, BSN  Message handled by Nurse Triage.

## 2019-01-03 NOTE — TELEPHONE ENCOUNTER
Patient called and given Susan Haase's note she gave verbal understanding. She declined scheduling an appointment she stated she is seeing an endocrinologist and will discuss the medication with him. She will follow up with Jia as needed.  Adriane Carreno MA on 1/3/2019 at 3:35 PM

## 2019-01-10 ENCOUNTER — TELEPHONE (OUTPATIENT)
Dept: FAMILY MEDICINE | Facility: CLINIC | Age: 64
End: 2019-01-10

## 2019-01-10 NOTE — TELEPHONE ENCOUNTER
"[facility] home care nurse calls, Halie, at residence, pt c/o \"stomach pain 2 days\", fever 100.7, loose stools past week, P 118 (runs high for her), /60 (normal for her), discussed UC vs ER, pt had historian, recommend ER eval due, Halie agrees, FYI to SH    BP Readings from Last 3 Encounters:   12/11/18 110/70   12/06/18 122/82   11/19/18 120/78     Thelma Strong, RN, BSN  Message handled by Nurse Triage.    "

## 2019-01-12 ENCOUNTER — TELEPHONE (OUTPATIENT)
Dept: FAMILY MEDICINE | Facility: CLINIC | Age: 64
End: 2019-01-12

## 2019-01-12 ENCOUNTER — NURSE TRIAGE (OUTPATIENT)
Dept: NURSING | Facility: CLINIC | Age: 64
End: 2019-01-12

## 2019-01-12 NOTE — TELEPHONE ENCOUNTER
Pt calls with cold symptoms and I seem to have a problem with -- Unable to understand. We asked her to repeat symptoms. She asks if open appointments today. Informed no but we could review your symptoms and decide together if UC today or provider visit on Monday?  Patient suddenly ended call.    Eliud Nielson RN

## 2019-01-12 NOTE — TELEPHONE ENCOUNTER
Jazmín Deleon RN  [facility] hasn't received a verbal okay yet from Dr Jaclyn Ojeda.     Per [facility] RN protocol, I was able to give a verbal okay for Jazmín's order request:        ORDER  SN 1 x week x 9, 3 PRN       Jazmín will continue to wait for providers reply to request she put in on 1/11/2019     Jennifer LESTER RN [facility]

## 2019-01-16 NOTE — TELEPHONE ENCOUNTER
"Last Written Prescription Date:  06/15/18  Last Fill Quantity: 90,  # refills: 1   Last office visit: 12/11/2018 with prescribing provider:  Dr Ojeda   Future Office Visit:      Requested Prescriptions   Pending Prescriptions Disp Refills     levothyroxine (SYNTHROID/LEVOTHROID) 137 MCG tablet [Pharmacy Med Name: LEVOTHYROXINE SODIUM 137MCG TABS] 120 tablet 0     Sig: TAKE ONE TABLET BY MOUTH EVERY DAY    Thyroid Protocol Passed - 1/16/2019 12:46 PM       Passed - Patient is 12 years or older       Passed - Recent (12 mo) or future (30 days) visit within the authorizing provider's specialty    Patient had office visit in the last 12 months or has a visit in the next 30 days with authorizing provider or within the authorizing provider's specialty.  See \"Patient Info\" tab in inbasket, or \"Choose Columns\" in Meds & Orders section of the refill encounter.             Passed - Medication is active on med list       Passed - Normal TSH on file in past 12 months    Recent Labs   Lab Test 12/11/18  0932   TSH 1.78             Passed - No active pregnancy on record    If patient is pregnant or has had a positive pregnancy test, please check TSH.         Passed - No positive pregnancy test in past 12 months    If patient is pregnant or has had a positive pregnancy test, please check TSH.          Eulalia Tobar/UBALDO    "

## 2019-01-18 RX ORDER — LEVOTHYROXINE SODIUM 137 UG/1
TABLET ORAL
Start: 2019-01-18

## 2019-01-18 NOTE — TELEPHONE ENCOUNTER
Refill denied with note to pharmacist: Pharmacy staff verified December 2018 Levoxyl refills on file.   Eliud Nielson RN

## 2019-02-08 ENCOUNTER — TELEPHONE (OUTPATIENT)
Dept: FAMILY MEDICINE | Facility: CLINIC | Age: 64
End: 2019-02-08

## 2019-02-08 DIAGNOSIS — Z53.9 DIAGNOSIS NOT YET DEFINED: Primary | ICD-10-CM

## 2019-02-08 PROCEDURE — G0179 MD RECERTIFICATION HHA PT: HCPCS | Performed by: FAMILY MEDICINE

## 2019-02-08 NOTE — TELEPHONE ENCOUNTER
HC/ Home Health Cert (01/15/2019-03/15/2019) received.  Given to Team Makenna PEREZ for med rec.  Please give to provider for review and signature upon completion.    Fax to: [phone]      Rajwinder Santos  Patient Representative

## 2019-02-11 ENCOUNTER — PATIENT OUTREACH (OUTPATIENT)
Dept: CARE COORDINATION | Facility: CLINIC | Age: 64
End: 2019-02-11

## 2019-02-11 ENCOUNTER — TRANSFERRED RECORDS (OUTPATIENT)
Dept: HEALTH INFORMATION MANAGEMENT | Facility: CLINIC | Age: 64
End: 2019-02-11

## 2019-02-11 NOTE — PROGRESS NOTES
Clinic Care Coordination Contact  [facility]/Voicemail       Clinical Data: Care Coordinator Outreach  Chart review notes patient orders sent to home care.    Outreach attempted x 1.  Left message on voicemail with call back information and requested return call.  Plan: RN CC will outreach in 1-2 weeks, will be available sooner if needed. Contact information given.   Jennifer Collins RN  Clinic Care Coordinator  Office [phone]  [email or web address]

## 2019-02-11 NOTE — PROGRESS NOTES
Clinic Care Coordination Contact         Chief Complaint   Patient presents with     Clinic Care Coordination - Follow-up     Patient returned outreach call to state that she is doing well.   She continues to work with home care nurse for weekly visit and to set up medications.   Patient works with a Behavioral health counselor -sessions every 2 weeks  Patient reports that she has a connection with a [employer] study and she hopes to be able to play piano with them for [employer]- patient hx of being a music therapist.      Transportation:  States recent visit with Neurology and reports she was given clearance to continue to drive. Discussed informal supports as well as possible transportation benefits through insurance. Patient will look into options as needed.           Patient/Caregiver understanding: Patient verbalized understanding, engaged in AIDET communication behavior during encounter.      Outreach Frequency: monthly  Future Appointments              In 3 months Jerry Jordan RPH [facility] MTM, ETHAN          Plan: No further outreaches will be made at this time unless a new referral is made or a change in the pt's status occurs. Patient was provided with this writer's contact information and encouraged to call with any questions or concerns.    Jennifer Collins RN  Clinic Care Coordinator  Office [phone]  [email or web address]

## 2019-02-22 ENCOUNTER — TELEPHONE (OUTPATIENT)
Dept: FAMILY MEDICINE | Facility: CLINIC | Age: 64
End: 2019-02-22

## 2019-02-22 DIAGNOSIS — F33.0 MAJOR DEPRESSIVE DISORDER, RECURRENT EPISODE, MILD (H): ICD-10-CM

## 2019-02-22 DIAGNOSIS — F41.9 ANXIETY: ICD-10-CM

## 2019-02-22 NOTE — TELEPHONE ENCOUNTER
Anirudh calling from  Home Care  # [phone]  Saw endocrinologist yesterday  Dr Shaquille Vanegas, made med changes  Added - Vit D 50,000 2x/wk  Calcium chews every day  Alendronate sodium 70 mg qwk    Not taking Aricept, was told by [facility] not to take due to vomiting    Vesna Zaman RN, BS  Clinical Nurse Triage.

## 2019-02-23 NOTE — TELEPHONE ENCOUNTER
"Requested Prescriptions   Pending Prescriptions Disp Refills     sertraline (ZOLOFT) 100 MG tablet [Pharmacy Med Name: SERTRALINE HCL 100MG TABS]    Last Written Prescription Date:  11/7/18  Last Fill Quantity: 180 tablet,  # refills: 0   Last office visit: 12/11/2018 with prescribing provider:  Haase   Future Office Visit:     180 tablet 0     Sig: TAKE TWO TABLETS BY MOUTH NIGHTLY    SSRIs Protocol Passed - 2/22/2019  1:59 PM       Passed - PHQ-9 score less than 5 in past 6 months    Please review last PHQ-9 score.          Passed - Medication is active on med list       Passed - Patient is age 18 or older       Passed - No active pregnancy on record       Passed - No positive pregnancy test in last 12 months       Passed - Recent (6 mo) or future (30 days) visit within the authorizing provider's specialty    Patient had office visit in the last 6 months or has a visit in the next 30 days with authorizing provider or within the authorizing provider's specialty.  See \"Patient Info\" tab in inbasket, or \"Choose Columns\" in Meds & Orders section of the refill encounter.              "

## 2019-02-25 RX ORDER — SERTRALINE HYDROCHLORIDE 100 MG/1
TABLET, FILM COATED ORAL
Qty: 180 TABLET | Refills: 1 | Status: SHIPPED | OUTPATIENT
Start: 2019-02-25 | End: 2019-06-26

## 2019-02-25 RX ORDER — ALENDRONATE SODIUM 70 MG/1
70 TABLET ORAL
Status: ON HOLD | COMMUNITY
Start: 2019-02-25 | End: 2020-01-24

## 2019-02-25 NOTE — TELEPHONE ENCOUNTER
Added to medication list, placed generic aricept in discontinue file  Thelma Strong RN, BSN  Message handled by Nurse Triage.

## 2019-02-25 NOTE — TELEPHONE ENCOUNTER
Routing refill request to provider to review approval because:   Return in about 4 weeks (around 1/8/2019).     CONFIRM it f/u appointment needed  Thelma Strong RN, BSN  Message handled by Nurse Triage.

## 2019-02-28 DIAGNOSIS — K58.0 IRRITABLE BOWEL SYNDROME WITH DIARRHEA: ICD-10-CM

## 2019-03-01 RX ORDER — NORTRIPTYLINE HYDROCHLORIDE 50 MG/1
CAPSULE ORAL
Qty: 90 CAPSULE | Refills: 1 | Status: SHIPPED | OUTPATIENT
Start: 2019-03-01 | End: 2019-10-02

## 2019-03-12 ENCOUNTER — TELEPHONE (OUTPATIENT)
Dept: FAMILY MEDICINE | Facility: CLINIC | Age: 64
End: 2019-03-12

## 2019-03-12 NOTE — TELEPHONE ENCOUNTER
[facility] now requests orders and shares plan of care/discharge summaries for some patients through [employer].  Please REPLY TO THIS MESSAGE OR ROUTE BACK TO THE AUTHOR in order to give authorization for orders when needed.  This is considered a verbal order, you will still receive a faxed copy of orders for signature.  Thank you for your assistance in improving collaboration for our patients.    ORDER,  Skilled nurse visits 1 X weekly X 9 Weeks, 3 PRNS     MD SUMMARY/PLAN OF CARE, SN  For assessment/ education/ medication management     DISCHARGE SUMMARY,  NA ongoing skilled visits planned.

## 2019-03-13 ENCOUNTER — OFFICE VISIT (OUTPATIENT)
Dept: FAMILY MEDICINE | Facility: CLINIC | Age: 64
End: 2019-03-13
Payer: COMMERCIAL

## 2019-03-13 ENCOUNTER — TELEPHONE (OUTPATIENT)
Dept: FAMILY MEDICINE | Facility: CLINIC | Age: 64
End: 2019-03-13

## 2019-03-13 VITALS
TEMPERATURE: 98.1 F | OXYGEN SATURATION: 99 % | HEIGHT: 70 IN | RESPIRATION RATE: 12 BRPM | WEIGHT: 191 LBS | SYSTOLIC BLOOD PRESSURE: 114 MMHG | DIASTOLIC BLOOD PRESSURE: 79 MMHG | BODY MASS INDEX: 27.35 KG/M2 | HEART RATE: 108 BPM

## 2019-03-13 DIAGNOSIS — F41.1 GENERALIZED ANXIETY DISORDER: ICD-10-CM

## 2019-03-13 DIAGNOSIS — F32.0 MILD MAJOR DEPRESSION (H): Primary | ICD-10-CM

## 2019-03-13 DIAGNOSIS — L30.9 ECZEMA, UNSPECIFIED TYPE: ICD-10-CM

## 2019-03-13 DIAGNOSIS — Z00.00 HEALTHCARE MAINTENANCE: ICD-10-CM

## 2019-03-13 PROCEDURE — 99214 OFFICE O/P EST MOD 30 MIN: CPT | Performed by: PHYSICIAN ASSISTANT

## 2019-03-13 RX ORDER — BUSPIRONE HYDROCHLORIDE 15 MG/1
TABLET ORAL
Qty: 270 TABLET | Refills: 3 | Status: ON HOLD | OUTPATIENT
Start: 2019-03-13 | End: 2019-05-05

## 2019-03-13 RX ORDER — TRIAMCINOLONE ACETONIDE 1 MG/G
OINTMENT TOPICAL 2 TIMES DAILY PRN
Qty: 80 G | Refills: 3 | Status: ON HOLD | OUTPATIENT
Start: 2019-03-13 | End: 2020-01-24

## 2019-03-13 RX ORDER — QUETIAPINE FUMARATE 25 MG/1
TABLET, FILM COATED ORAL
Qty: 90 TABLET | Refills: 1 | Status: SHIPPED | OUTPATIENT
Start: 2019-03-13 | End: 2019-06-06

## 2019-03-13 RX ORDER — HYDROXYZINE HYDROCHLORIDE 25 MG/1
25 TABLET, FILM COATED ORAL 3 TIMES DAILY PRN
Qty: 90 TABLET | Refills: 0 | Status: SHIPPED | OUTPATIENT
Start: 2019-03-13 | End: 2019-05-02

## 2019-03-13 ASSESSMENT — MIFFLIN-ST. JEOR: SCORE: 1497.65

## 2019-03-13 NOTE — TELEPHONE ENCOUNTER
Discussed with stanton MERCER to inform pt of home care change, LENNY for pt to call, TIMOTEO Strong, RN, BSN  Message handled by Nurse Triage with [employer] - provider name: RUSLAN.

## 2019-03-13 NOTE — TELEPHONE ENCOUNTER
Patient called back and received message:    providers agree OK to discontinue home care services.    Pt expressed understanding and acceptance of the plan.  Pt had no further questions at this time.  Advised can call back to clinic at any time with concerns.     Norma Lilly RN Flex

## 2019-03-13 NOTE — TELEPHONE ENCOUNTER
MITZY Gerardo home care calls, [phone], discussed below, informs pt moved and renting a room from someone, when she has been there resident is not helping pt, resident travels and away for the weekend, does not believe resident is helping pt with day to day needs, discussed with SH, feels pt does need assistance, recommend continue home care, see visit notes today, Humaira would like us to call pt to inform, thinks pt was traveling to [place] later today and will be gone until Monday     PLEASE CONFIRM, OK TO CALL PT AND INFORM CHANGE OF PLANS?        Patient wants to discontinue home health. Feels she can set up own meds.       Thelma Strong RN, BSN  Message handled by Nurse Triage with [employer] - provider name: .

## 2019-03-13 NOTE — TELEPHONE ENCOUNTER
Waiting call back from CHRIS Gerardo Case Manager [facility].   Per Talia, pt and providers agree OK to discontinue home care services.  Pt will be closely following with provider in clinic every 3 months. Pt is adjusting to well in new home and housemates and is capable of managing her own medications.    Message handled by Nurse Triage with Ba - provider name: Estefany Stanley PA-C. And Estefany barrerad with Fely's PCP, Susan Haase, JANNIE Nielson, CHRIS

## 2019-03-13 NOTE — PROGRESS NOTES
SUBJECTIVE:   Mariela Duffy is a 63 year old female who presents to clinic today for the following health issues:      Depression and Anxiety Follow-Up    Status since last visit: Improved, medication is helping    Other associated symptoms:panic attacks    Complicating factors:     Significant life event: Yes-  Sold house in November     Current substance abuse: None    Patient wants to discontinue home health. Feels she can set up own meds.     PHQ 9/19/2018 10/8/2018 12/6/2018   PHQ-9 Total Score 12 6 1   Q9: Suicide Ideation Not at all Not at all Not at all   F/U: Thoughts of suicide or self-harm - - -   F/U: Self harm-plan - - -   F/U: Self-harm action - - -   F/U: Safety concerns - - -     QUYEN-7 SCORE 9/19/2018 10/8/2018 12/6/2018   Total Score - - -   Total Score 17 (severe anxiety) 8 (mild anxiety) -   Total Score 17 8 0     In the past two weeks have you had thoughts of suicide or self-harm?  No.    Do you have concerns about your personal safety or the safety of others?   No  PHQ-9  English  PHQ-9   Any Language  QUYEN-7  Suicide Assessment Five-step Evaluation and Treatment (SAFE-T)    Amount of exercise or physical activity: 2-3 days/week for an average of 30-45 minutes    Problems taking medications regularly: No    Medication side effects: Seroquel causing drowsiness    Diet: regular (no restrictions)        Refill triamcinolone.     Problem list and histories reviewed & adjusted, as indicated.  Additional history: as documented    Patient Active Problem List   Diagnosis     Contact dermatitis and other eczema due to other specified agent     Allergic rhinitis due to other allergen     Esophageal reflux     Irritable bowel syndrome     Rosacea     Postsurgical hypothyroidism     Iron deficiency anemia     Absence of menstruation     Mild major depression (H)     CARDIOVASCULAR SCREENING; LDL GOAL LESS THAN 160     Generalized anxiety disorder     Hypothyroidism     Tubular adenoma of colon      Seasonal affective disorder (H)     Rhinitis     Headache     ADD (attention deficit disorder)     Other insomnia     TIA (transient ischemic attack)     Gastroesophageal reflux disease with esophagitis     Hiatal hernia     History of colonic polyps     BP check     Migraine with aura and without status migrainosus, not intractable     History of thyroid cancer     Mild cognitive impairment     Peripheral polyneuropathy     Vitamin D deficiency     Past Surgical History:   Procedure Laterality Date     ABDOMEN SURGERY      Hiatelherna     C NONSPECIFIC PROCEDURE      surgery hiatal hernia     C NONSPECIFIC PROCEDURE      cholecystectomy     C NONSPECIFIC PROCEDURE  multiple    cleft lip repair.     CHOLECYSTECTOMY       COLONOSCOPY  2013    [facility]     ENT SURGERY  [id number]     HEAD & NECK SURGERY      thyroid cancer surgery     SURGICAL HISTORY OF -       thyroidectomy due to cancer     WRIST SURGERY Right     2015       Social History     Tobacco Use     Smoking status: Former Smoker     Years: 5.00     Types: Cigarettes     Start date: 5/10/1973     Last attempt to quit: 1977     Years since quittin.2     Smokeless tobacco: Never Used   Substance Use Topics     Alcohol use: Yes     Comment: 2-4 mixed drinks/month     Family History   Problem Relation Age of Onset     Cancer Father         Colon, stomach -  at 75yoa     Hypertension Father      C.A.D. Father      Colon Cancer Father      Other Cancer Father      Cancer Mother         Throat, lymph, bone -  at 79yoa     Other Cancer Mother      C.A.D. Maternal Grandfather         Heart Attack -  in his late 60's     Alzheimer Disease Paternal Grandmother      C.A.D. Paternal Grandfather         Heart Attack -  at 63yoa     Thyroid Disease Other            Reviewed and updated as needed this visit by clinical staff  Tobacco  Allergies  Meds  Med Hx  Surg Hx  Fam Hx  Soc Hx      Reviewed  "and updated as needed this visit by Provider         ROS:  Constitutional, HEENT, cardiovascular, pulmonary, gi and gu systems are negative, except as otherwise noted.    OBJECTIVE:     /79 (BP Location: Right arm, Patient Position: Chair, Cuff Size: Adult Large)   Pulse 108   Temp 98.1  F (36.7  C) (Oral)   Resp 12   Ht 1.772 m (5' 9.75\")   Wt 86.6 kg (191 lb)   LMP 01/20/2004   SpO2 99%   BMI 27.60 kg/m    Body mass index is 27.6 kg/m .  GENERAL APPEARANCE: healthy, alert and no distress  RESP: lungs clear to auscultation - no rales, rhonchi or wheezes  CV: regular rates and rhythm, normal S1 S2, no S3 or S4 and no murmur, click or rub  NEURO: Normal strength and tone, mentation intact and speech normal  PSYCH: mentation appears normal and affect normal/bright        ASSESSMENT/PLAN:             1. Mild major depression (H)  Stable. Continue zoloft. Recheck 3 months.     2. Generalized anxiety disorder  May use hydroxyzine as needed   Pt requesting Ativan, discussed risks and patient agreeable not to use it.    - QUEtiapine (SEROQUEL) 25 MG tablet; Take 1/2-1 tablet up to three times daily for anxiety.  Dispense: 90 tablet; Refill: 1    3. Eczema, unspecified type  emollients  - triamcinolone (KENALOG) 0.1 % external ointment; Apply topically 2 times daily as needed for irritation  Dispense: 80 g; Refill: 3    4. Healthcare maintenance  Due for pap, mammogram, colon CA screen.  Pt declined to discuss.           Estefany Stanley PA-C  [facility]"

## 2019-03-21 ENCOUNTER — TELEPHONE (OUTPATIENT)
Dept: FAMILY MEDICINE | Facility: CLINIC | Age: 64
End: 2019-03-21

## 2019-03-21 NOTE — TELEPHONE ENCOUNTER
"See 3/13/19 phone call, called pt, informs Humaira, home care called her this morning, frustrated as informs Humaira is \"always late\", discussed, recommend express concerns with [facility] home care intake, pt has number and agrees, also wants CJ as PCP, updated, FYI to CJ      Thelma Strong RN, BSN  Message handled by Nurse Triage.    "

## 2019-03-21 NOTE — TELEPHONE ENCOUNTER
Patient is calling and requesting for ROBIN bess to give her a call today, patient has questions regarding the home care orders that were placed for her. Patient thought she only had to see the HC nurse once a week but the HC nurse told patient she would be coming every day.    Vida Goldstein

## 2019-03-22 ENCOUNTER — TELEPHONE (OUTPATIENT)
Dept: FAMILY MEDICINE | Facility: CLINIC | Age: 64
End: 2019-03-22

## 2019-03-26 ENCOUNTER — TELEPHONE (OUTPATIENT)
Dept: FAMILY MEDICINE | Facility: CLINIC | Age: 64
End: 2019-03-26

## 2019-03-26 NOTE — TELEPHONE ENCOUNTER
home, Humaira, [phone], calls, pt declinesd home care appointment last week, pt called to [facility], discussed with pt, sorted out issues, pt in agreement to decrease visit to every other week, will go out on Wednesday morning, verbal order provided, also for prn when needed for medication management, FYI to TIMOTEO Strong RN, BSN  Message handled by Nurse Triage.
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
## 2019-04-04 DIAGNOSIS — M54.50 LOW BACK PAIN: ICD-10-CM

## 2019-04-04 DIAGNOSIS — G44.86 CERVICOGENIC HEADACHE: ICD-10-CM

## 2019-04-04 DIAGNOSIS — R41.3 MEMORY PROBLEM: ICD-10-CM

## 2019-04-04 DIAGNOSIS — R51.9 HEAD ACHE: ICD-10-CM

## 2019-04-04 DIAGNOSIS — G60.9 PERIPHERAL NEUROPATHY, IDIOPATHIC: Primary | ICD-10-CM

## 2019-04-04 DIAGNOSIS — M54.2 NECK PAIN: ICD-10-CM

## 2019-04-04 DIAGNOSIS — S16.1XXA CERVICAL STRAIN: ICD-10-CM

## 2019-04-04 DIAGNOSIS — R29.898 ARM WEAKNESS: ICD-10-CM

## 2019-04-04 DIAGNOSIS — M54.9 DORSAL PAIN: ICD-10-CM

## 2019-04-04 DIAGNOSIS — S39.012A LUMBAR STRAIN: ICD-10-CM

## 2019-04-04 DIAGNOSIS — G45.9 TRANSIENT ISCHEMIC ATTACK: ICD-10-CM

## 2019-04-04 DIAGNOSIS — R20.2 PARESTHESIA: ICD-10-CM

## 2019-04-04 DIAGNOSIS — M50.30 DEGENERATIVE CERVICAL DISC: ICD-10-CM

## 2019-04-04 PROCEDURE — 83921 ORGANIC ACID SINGLE QUANT: CPT | Mod: 90 | Performed by: PSYCHIATRY & NEUROLOGY

## 2019-04-04 PROCEDURE — 99000 SPECIMEN HANDLING OFFICE-LAB: CPT | Performed by: PSYCHIATRY & NEUROLOGY

## 2019-04-04 PROCEDURE — 36415 COLL VENOUS BLD VENIPUNCTURE: CPT | Performed by: PSYCHIATRY & NEUROLOGY

## 2019-04-06 LAB — METHYLMALONATE SERPL-SCNC: 0.31 UMOL/L (ref 0–0.4)

## 2019-04-08 NOTE — MR AVS SNAPSHOT
After Visit Summary   7/27/2018    Mariela Duffy    MRN: [id number]           Patient Information     Date Of Birth          1955        Visit Information        Provider Department      7/27/2018 1:30 PM Catie Crane APRN CNP [facility]        Today's Diagnoses     Postoperative hypothyroidism    -  1    History of thyroid cancer          Care Instructions      Try changing to name brand Levoxyl    Make a lab appointment 6 weeks after starting the name brand Levoxyl    Take Levoxyl on an empty stomach, and wait 30-60 minutes before eating.  Separate any iron or calcium supplements by 4 hours from levoxyl dose  Check with pharmacy - how far apart to take levoxyl and protonix.    Schedule a neck ultrasound at [facility].  I'm checking thyroid cancer recurrent marker today - thyroglobulin    We'll discuss future plans/changes, if any later after repeat thyroid tests in 6+ weeks.    Catie Crane NP  Endocrinology            Follow-ups after your visit        Your next 10 appointments already scheduled     Oct 15, 2018 10:00 AM CDT   SHORT with Jerry Jordan RPH   [facility] ([facility])    [address] [facility] [id number] [phone]              Future tests that were ordered for you today     Open Future Orders        Priority Expected Expires Ordered    US Head Neck Soft Tissue Routine 10/25/2018 1/23/2019 7/27/2018            Who to contact     If you have questions or need follow up information about today's clinic visit or your schedule please contact [facility] directly at [phone].  Normal or non-critical lab and imaging results will be communicated to you by MyChart, letter or phone within 4 business days after the clinic has received the results. If you do not hear from us within 7 days, please contact the clinic through MyChart or phone. If you have a critical or abnormal lab  Response received from the Dr. Marr regarding lab results:    Plan:  1. Stop warfarin as it is now over 3 months and there is no clot noted.   2. Start aspirin 162 mg daily once the warfarin has been stopped.   3. Recommend that she schedule thyroid biopsy.     A message has been left requesting a return call from Tati to convey results and recommendations.    result, we will notify you by phone as soon as possible.  Submit refill requests through [employer] or call your pharmacy and they will forward the refill request to us. Please allow 3 business days for your refill to be completed.          Additional Information About Your Visit        [employer] Information     [employer] gives you secure access to your electronic health record. If you see a primary care provider, you can also send messages to your care team and make appointments. If you have questions, please call your primary care clinic.  If you do not have a primary care provider, please call [phone] and they will assist you.        Care EveryWhere ID     This is your Care EveryWhere ID. This could be used by other organizations to access your [facility] medical records  [id number]        Your Vitals Were     Pulse Temperature Last Period Breastfeeding? BMI (Body Mass Index)       114 98.1  F (36.7  C) (Oral) 01/20/2004 No 28.98 kg/m2        Blood Pressure from Last 3 Encounters:   07/27/18 124/69   07/02/18 126/80   06/28/18 (!) 145/95    Weight from Last 3 Encounters:   07/27/18 86.5 kg (190 lb 9.6 oz)   07/02/18 86.2 kg (190 lb)   06/27/18 88 kg (194 lb)              We Performed the Following     Thyroglobulin and antibody          Today's Medication Changes          These changes are accurate as of 7/27/18  2:28 PM.  If you have any questions, ask your nurse or doctor.               These medicines have changed or have updated prescriptions.        Dose/Directions    * levothyroxine 175 MCG tablet   Commonly known as:  SYNTHROID/LEVOTHROID   This may have changed:  Another medication with the same name was added. Make sure you understand how and when to take each.   Used for:  Primary hypothyroidism   Changed by:  Catie Crane APRN CNP        Dose:  175 mcg   Take 1 tablet (175 mcg) by mouth daily   Quantity:  90 tablet   Refills:  1       * LEVOXYL 175 MCG tablet   This may have changed:  You were  already taking a medication with the same name, and this prescription was added. Make sure you understand how and when to take each.   Used for:  Postoperative hypothyroidism, History of thyroid cancer   Generic drug:  levothyroxine   Changed by:  Catie Crane APRN CNP        Dose:  175 mcg   Take 1 tablet (175 mcg) by mouth daily Dispense name brand Levoxyl   Quantity:  30 tablet   Refills:  3       * Notice:  This list has 2 medication(s) that are the same as other medications prescribed for you. Read the directions carefully, and ask your doctor or other care provider to review them with you.         Where to get your medicines      These medications were sent to [facility] [address]  [address] [facility] [address]     Phone:  [phone]     LEVOXYL 175 MCG tablet                Primary Care Provider Office Phone # Fax #    Estefany Stanley PA-C [phone] [address] [facility] [address]        Equal Access to Services     [facility] Hospital[facility]IA ANTONIA : Hadii aad ku hadasho Soomaali, waaxda luqadaha, qaybta kaalmada adeegyada, waxay idiin hayroselia guillermo . So [facility] [phone].    ATENCIÓN: Si habla español, tiene a romero disposición servicios gratuitos de asistencia lingüística. [facility] [phone].    We comply with applicable federal civil rights laws and [place] laws. We do not discriminate on the basis of race, color, national origin, age, disability, sex, sexual orientation, or gender identity.            Thank you!     Thank you for choosing [facility]  for your care. Our goal is always to provide you with excellent care. Hearing back from our patients is one way we can continue to improve our services. Please take a few minutes to complete the written survey that you may receive in the mail after your visit with us. Thank you!             Your Updated Medication List - Protect others around you:  Learn how to safely use, store and throw away your medicines at www.disposemymeds.org.          This list is accurate as of 7/27/18  2:28 PM.  Always use your most recent med list.                   Brand Name Dispense Instructions for use Diagnosis    ALEVE PO      Take 220 mg by mouth as needed for moderate pain        aspirin 325 MG tablet     90 tablet    Take 1 tablet (325 mg) by mouth daily    Transient cerebral ischemia, unspecified type       busPIRone 15 MG tablet    BUSPAR    270 tablet    TAKE 2 TABLETS BY MOUTH IN THE MORNING AND TAKE 1 TABLET BY MOUTH IN THE EVENING    Anxiety       fluticasone 50 MCG/ACT spray    FLONASE    1 Bottle    Spray 1-2 sprays into both nostrils daily    Post-nasal drip       * levothyroxine 175 MCG tablet    SYNTHROID/LEVOTHROID    90 tablet    Take 1 tablet (175 mcg) by mouth daily    Primary hypothyroidism       * LEVOXYL 175 MCG tablet   Generic drug:  levothyroxine     30 tablet    Take 1 tablet (175 mcg) by mouth daily Dispense name brand Levoxyl    Postoperative hypothyroidism, History of thyroid cancer       LORazepam 0.5 MG tablet    ATIVAN    60 tablet    Take 0.5 tablets (0.25 mg) by mouth 2 times daily as needed for anxiety . May take 2 tabs ( 1 mg) bid PO 2 times daily as needed until current anxiety flare improves.    Anxiety       nortriptyline 50 MG capsule    PAMELOR    90 capsule    Take 1 capsule (50 mg) by mouth At Bedtime    Irritable bowel syndrome with diarrhea       pantoprazole 20 MG EC tablet    PROTONIX    90 tablet    TAKE ONE TABLET BY MOUTH ONCE DAILY. TAKE BY MOUTH 30 TO 60 MINUTES BEFORE A MEAL.    Gastroesophageal reflux disease with esophagitis, Hiatal hernia       ranitidine 300 MG tablet    ZANTAC    90 tablet    Take 1 tablet (300 mg) by mouth At Bedtime    Gastroesophageal reflux disease with esophagitis       sertraline 100 MG tablet    ZOLOFT    180 tablet    2 tabs (200 mg) by mouth nightly.    Major depressive disorder, recurrent  episode, mild (H), Anxiety       SUMAtriptan 100 MG tablet    IMITREX    9 tablet    Take 1 tablet (100 mg) by mouth at onset of headache for migraine May repeat in 2 hours. Max 2 tablets/24 hours.    Migraine with aura and without status migrainosus, not intractable       triamcinolone 0.1 % ointment    KENALOG    80 g    Apply topically 2 times daily as needed for irritation    Eczema, unspecified type       * Notice:  This list has 2 medication(s) that are the same as other medications prescribed for you. Read the directions carefully, and ask your doctor or other care provider to review them with you.

## 2019-04-10 DIAGNOSIS — Z85.850 HISTORY OF THYROID CANCER: ICD-10-CM

## 2019-04-10 DIAGNOSIS — E89.0 POSTOPERATIVE HYPOTHYROIDISM: ICD-10-CM

## 2019-04-10 RX ORDER — LEVOTHYROXINE SODIUM 175 UG/1
TABLET ORAL
Qty: 90 TABLET | Refills: 0 | Status: ON HOLD | OUTPATIENT
Start: 2019-04-10 | End: 2019-05-05

## 2019-04-10 NOTE — TELEPHONE ENCOUNTER
"Last Written Prescription Date:  12/21/18  Last Fill Quantity: 30,  # refills: 3   Last office visit: 7/27/2018 with prescribing provider:  SUsan Haase   Future Office Visit:   Next 5 appointments (look out 90 days)    Jun 06, 2019  9:30 AM CDT  Office Visit with Estefany Stanley PA-C  [facility] ([facility]) [address] [place] [address]  [phone]   Jun 06, 2019 10:00 AM CDT  SHORT with Jerry Jordan RPH  [facility]) [address] [place] [address]  [phone]             Requested Prescriptions   Pending Prescriptions Disp Refills     LEVOXYL 175 MCG tablet [Pharmacy Med Name: LEVOXYL 175MCG TABS] 30 tablet 3     Sig: TAKE ONE TABLET BY MOUTH ONCE DAILY       Thyroid Protocol Passed - 4/10/2019  8:57 AM        Passed - Patient is 12 years or older        Passed - Recent (12 mo) or future (30 days) visit within the authorizing provider's specialty     Patient had office visit in the last 12 months or has a visit in the next 30 days with authorizing provider or within the authorizing provider's specialty.  See \"Patient Info\" tab in inbasket, or \"Choose Columns\" in Meds & Orders section of the refill encounter.              Passed - Medication is active on med list        Passed - Normal TSH on file in past 12 months     Recent Labs   Lab Test 12/11/18  0932   TSH 1.78              Passed - No active pregnancy on record     If patient is pregnant or has had a positive pregnancy test, please check TSH.          Passed - No positive pregnancy test in past 12 months     If patient is pregnant or has had a positive pregnancy test, please check TSH.            "

## 2019-04-10 NOTE — TELEPHONE ENCOUNTER
Prescription approved per [facility] Refill Protocol.  Donald Concepcion RN, BSN

## 2019-04-24 DIAGNOSIS — E89.0 POSTOPERATIVE HYPOTHYROIDISM: ICD-10-CM

## 2019-04-24 DIAGNOSIS — Z85.850 HISTORY OF THYROID CANCER: ICD-10-CM

## 2019-04-24 NOTE — TELEPHONE ENCOUNTER
"Requested Prescriptions   Pending Prescriptions Disp Refills     LEVOXYL 175 MCG tablet [Pharmacy Med Name: LEVOXYL 175MCG TABS] 30 tablet 3     Sig: TAKE ONE TABLET BY MOUTH ONCE DAILY   Last Written Prescription Date:  4/10/19  Last Fill Quantity: 90,  # refills: 0   Last Office Visit: 7/27/2018 Jaden      Return in about 3 months (around 6/13/2019) for Medication Check.     Future Office Visit:    Next 5 appointments (look out 90 days)    Jun 06, 2019  9:30 AM CDT  Office Visit with Estefany Stanley PA-C  [facility] [address] [place] [address]  [phone]   Jun 06, 2019 10:00 AM CDT  SHORT with Jerry Jordan RPH  [facility] ([facility]) [address] [place] [id number]  [phone]             Thyroid Protocol Passed - 4/24/2019 10:02 AM        Passed - Patient is 12 years or older        Passed - Recent (12 mo) or future (30 days) visit within the authorizing provider's specialty     Patient had office visit in the last 12 months or has a visit in the next 30 days with authorizing provider or within the authorizing provider's specialty.  See \"Patient Info\" tab in inbasket, or \"Choose Columns\" in Meds & Orders section of the refill encounter.              Passed - Medication is active on med list        Passed - Normal TSH on file in past 12 months     Recent Labs   Lab Test 12/11/18  0932   TSH 1.78              Passed - No active pregnancy on record     If patient is pregnant or has had a positive pregnancy test, please check TSH.          Passed - No positive pregnancy test in past 12 months     If patient is pregnant or has had a positive pregnancy test, please check TSH.          "

## 2019-04-27 ENCOUNTER — ANCILLARY PROCEDURE (OUTPATIENT)
Dept: GENERAL RADIOLOGY | Facility: CLINIC | Age: 64
End: 2019-04-27
Attending: FAMILY MEDICINE
Payer: COMMERCIAL

## 2019-04-27 ENCOUNTER — OFFICE VISIT (OUTPATIENT)
Dept: URGENT CARE | Facility: URGENT CARE | Age: 64
End: 2019-04-27
Payer: COMMERCIAL

## 2019-04-27 VITALS
DIASTOLIC BLOOD PRESSURE: 74 MMHG | RESPIRATION RATE: 18 BRPM | HEART RATE: 124 BPM | WEIGHT: 191 LBS | TEMPERATURE: 99.2 F | SYSTOLIC BLOOD PRESSURE: 118 MMHG | OXYGEN SATURATION: 98 % | BODY MASS INDEX: 27.6 KG/M2

## 2019-04-27 DIAGNOSIS — J06.9 UPPER RESPIRATORY TRACT INFECTION, UNSPECIFIED TYPE: Primary | ICD-10-CM

## 2019-04-27 DIAGNOSIS — R05.9 COUGH: ICD-10-CM

## 2019-04-27 DIAGNOSIS — K44.9 HIATAL HERNIA: ICD-10-CM

## 2019-04-27 PROCEDURE — 99214 OFFICE O/P EST MOD 30 MIN: CPT | Performed by: FAMILY MEDICINE

## 2019-04-27 PROCEDURE — 71046 X-RAY EXAM CHEST 2 VIEWS: CPT

## 2019-04-27 RX ORDER — BENZONATATE 100 MG/1
100 CAPSULE ORAL 3 TIMES DAILY PRN
Qty: 30 CAPSULE | Refills: 0 | Status: SHIPPED | OUTPATIENT
Start: 2019-04-27 | End: 2019-06-06
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
## 2019-04-29 RX ORDER — LEVOTHYROXINE SODIUM 175 UG/1
TABLET ORAL
Qty: 90 TABLET | Refills: 0 | Status: SHIPPED | OUTPATIENT
Start: 2019-04-29 | End: 2019-06-06

## 2019-04-29 NOTE — TELEPHONE ENCOUNTER
Medication is being filled for 1 time refill only due to:  Patient needs to be seen because Due for visit in July.     Mora Calle RN -- [facility] Work[facility]

## 2019-04-30 ENCOUNTER — TELEPHONE (OUTPATIENT)
Dept: FAMILY MEDICINE | Facility: CLINIC | Age: 64
End: 2019-04-30

## 2019-04-30 ENCOUNTER — HOSPITAL ENCOUNTER (EMERGENCY)
Facility: CLINIC | Age: 64
Discharge: HOME | End: 2019-04-30
Attending: EMERGENCY MEDICINE | Admitting: EMERGENCY MEDICINE
Payer: COMMERCIAL

## 2019-04-30 ENCOUNTER — APPOINTMENT (OUTPATIENT)
Dept: GENERAL RADIOLOGY | Facility: CLINIC | Age: 64
End: 2019-04-30
Attending: EMERGENCY MEDICINE
Payer: COMMERCIAL

## 2019-04-30 VITALS
TEMPERATURE: 98.7 F | HEART RATE: 109 BPM | OXYGEN SATURATION: 95 % | SYSTOLIC BLOOD PRESSURE: 121 MMHG | RESPIRATION RATE: 16 BRPM | DIASTOLIC BLOOD PRESSURE: 66 MMHG

## 2019-04-30 DIAGNOSIS — J18.9 PNEUMONIA OF LEFT LOWER LOBE DUE TO INFECTIOUS ORGANISM: ICD-10-CM

## 2019-04-30 LAB
ANION GAP SERPL CALCULATED.3IONS-SCNC: 6 MMOL/L (ref 3–14)
BASOPHILS # BLD AUTO: 0 10E9/L (ref 0–0.2)
BASOPHILS NFR BLD AUTO: 0.2 %
BUN SERPL-MCNC: 12 MG/DL (ref 7–30)
CALCIUM SERPL-MCNC: 8 MG/DL (ref 8.5–10.1)
CHLORIDE SERPL-SCNC: 104 MMOL/L (ref 94–109)
CO2 SERPL-SCNC: 27 MMOL/L (ref 20–32)
CREAT SERPL-MCNC: 0.83 MG/DL (ref 0.52–1.04)
DIFFERENTIAL METHOD BLD: ABNORMAL
EOSINOPHIL # BLD AUTO: 0 10E9/L (ref 0–0.7)
EOSINOPHIL NFR BLD AUTO: 0.1 %
ERYTHROCYTE [DISTWIDTH] IN BLOOD BY AUTOMATED COUNT: 13.8 % (ref 10–15)
GFR SERPL CREATININE-BSD FRML MDRD: 75 ML/MIN/{1.73_M2}
GLUCOSE SERPL-MCNC: 108 MG/DL (ref 70–99)
HCT VFR BLD AUTO: 38.5 % (ref 35–47)
HGB BLD-MCNC: 12.6 G/DL (ref 11.7–15.7)
IMM GRANULOCYTES # BLD: 0.1 10E9/L (ref 0–0.4)
IMM GRANULOCYTES NFR BLD: 0.6 %
LACTATE BLD-SCNC: 1.1 MMOL/L (ref 0.7–2)
LYMPHOCYTES # BLD AUTO: 1.7 10E9/L (ref 0.8–5.3)
LYMPHOCYTES NFR BLD AUTO: 9.3 %
MCH RBC QN AUTO: 27.8 PG (ref 26.5–33)
MCHC RBC AUTO-ENTMCNC: 32.7 G/DL (ref 31.5–36.5)
MCV RBC AUTO: 85 FL (ref 78–100)
MONOCYTES # BLD AUTO: 1.1 10E9/L (ref 0–1.3)
MONOCYTES NFR BLD AUTO: 6.1 %
NEUTROPHILS # BLD AUTO: 15.2 10E9/L (ref 1.6–8.3)
NEUTROPHILS NFR BLD AUTO: 83.7 %
NRBC # BLD AUTO: 0 10*3/UL
NRBC BLD AUTO-RTO: 0 /100
PLATELET # BLD AUTO: 267 10E9/L (ref 150–450)
POTASSIUM SERPL-SCNC: 3.3 MMOL/L (ref 3.4–5.3)
RBC # BLD AUTO: 4.54 10E12/L (ref 3.8–5.2)
SODIUM SERPL-SCNC: 137 MMOL/L (ref 133–144)
WBC # BLD AUTO: 18.1 10E9/L (ref 4–11)

## 2019-04-30 PROCEDURE — 25000128 H RX IP 250 OP 636: Performed by: EMERGENCY MEDICINE

## 2019-04-30 PROCEDURE — 99284 EMERGENCY DEPT VISIT MOD MDM: CPT | Mod: 25

## 2019-04-30 PROCEDURE — 80048 BASIC METABOLIC PNL TOTAL CA: CPT | Performed by: EMERGENCY MEDICINE

## 2019-04-30 PROCEDURE — 85025 COMPLETE CBC W/AUTO DIFF WBC: CPT | Performed by: EMERGENCY MEDICINE

## 2019-04-30 PROCEDURE — 83605 ASSAY OF LACTIC ACID: CPT | Performed by: EMERGENCY MEDICINE

## 2019-04-30 PROCEDURE — 71046 X-RAY EXAM CHEST 2 VIEWS: CPT

## 2019-04-30 PROCEDURE — 96361 HYDRATE IV INFUSION ADD-ON: CPT

## 2019-04-30 PROCEDURE — 25000132 ZZH RX MED GY IP 250 OP 250 PS 637: Performed by: EMERGENCY MEDICINE

## 2019-04-30 PROCEDURE — 87040 BLOOD CULTURE FOR BACTERIA: CPT | Performed by: EMERGENCY MEDICINE

## 2019-04-30 PROCEDURE — 36415 COLL VENOUS BLD VENIPUNCTURE: CPT | Performed by: EMERGENCY MEDICINE

## 2019-04-30 PROCEDURE — 96360 HYDRATION IV INFUSION INIT: CPT

## 2019-04-30 RX ORDER — POTASSIUM CHLORIDE 1500 MG/1
20 TABLET, EXTENDED RELEASE ORAL ONCE
Status: COMPLETED | OUTPATIENT
Start: 2019-04-30 | End: 2019-04-30

## 2019-04-30 RX ORDER — DOXYCYCLINE 100 MG/1
100 CAPSULE ORAL 2 TIMES DAILY
Qty: 14 CAPSULE | Refills: 0 | Status: SHIPPED | OUTPATIENT
Start: 2019-04-30 | End: 2019-06-06

## 2019-04-30 RX ADMIN — POTASSIUM CHLORIDE 20 MEQ: 1500 TABLET, EXTENDED RELEASE ORAL at 11:15

## 2019-04-30 RX ADMIN — SODIUM CHLORIDE 1000 ML: 9 INJECTION, SOLUTION INTRAVENOUS at 09:28

## 2019-04-30 ASSESSMENT — ENCOUNTER SYMPTOMS
HEADACHES: 1
CONSTIPATION: 1
COUGH: 1
DIARRHEA: 0
FEVER: 0
VOMITING: 0

## 2019-04-30 NOTE — TELEPHONE ENCOUNTER
Per Estefany Wallace home care skilled nurse visits once per week.    Left message with [facility] central. They paged Humaira RN with HC to me call back.  Prashant PEREZ inside # provided.   Eliud Nielson RN

## 2019-04-30 NOTE — ED PROVIDER NOTES
Fely Duffy is a 63-year-old female presenting to the ER for evaluation of a cough.  VS on presentation reveal elevated HR which improved during patient's emergency department course.  Work-up included x-ray and laboratory studies.  Overall presentation is concerning for community-acquired pneumonia.  Chest x-ray demonstrates a left basilar opacity, new compared with previous study from 4/27/2019.  Clinically, this fits with the patient's presenting symptoms.  Laboratory evaluation reveals elevated WBC count 18.1, though normal lactate.  Blood culture x2 obtained and are presently pending.  Labs reveal potassium 3.3 for which oral supplementation was provided.  Patient does have a remote history of thyroid cancer status post thyroidectomy though is not currently on chemotherapy nor immunosuppressive therapy.  She is a non-smoker.  She clinically is well-appearing, and aside from intermittent harsh cough, is not demonstrating signs of respiratory distress.  Given her above exam, and above work-up, I do feel with reasonable clinical certainty that outpatient management is appropriate.  She will be started on doxycycline to be taken twice daily for 7 days.  I recommended follow-up with PCP later this week for reassessment which she verbalizes understanding of and which she has available to her.  She will return in the meantime to the ER should she develop worsening respiratory distress, fatigue, fevers, or any other new or troubling symptoms.  All of her questions were answered prior to discharge.     Gonzales Barron MD  04/30/19 9210

## 2019-04-30 NOTE — ED TRIAGE NOTES
Pt c/o productive cough. Pt with frequent cough. Pt seen by MD on Saturday. Pt states worse since then. Pt ABCs intact.

## 2019-04-30 NOTE — ED NOTES
Bed: ED14  Expected date: 4/30/19  Expected time: 8:35 AM  Means of arrival: Ambulance  Comments:  olga Galvez0

## 2019-04-30 NOTE — TELEPHONE ENCOUNTER
Patient calling clinic and states has been sick since noon yesterday.  Very bad cough while on phone with me.  States called her daughter but no one can help take care of her today.  Has only drank about a cup in the last 12 hours.  Urinating very little.  Did get up around midnight to urinate a very small amount.  Advised ER for evaluation.  Advised if daughter can not bring her that she should call 911.  advised I would also let Humaira her home care nurse know what is happening.  Humaira [phone].  L/M for Humaira to call back.  Abril Harris RN

## 2019-04-30 NOTE — TELEPHONE ENCOUNTER
Humaira,  Home Care Nurse returned call.  Updated on Fely's situation.  Discussed I advised ER due to possible dehydration.  Humaira could send as needed nurse out to check her today if needed also.  Humaira will also call Fely.  Abril Harris RN

## 2019-04-30 NOTE — ED AVS SNAPSHOT
[facility] Emergency Department  [address]  [facility] [address]  Phone:  [phone]  Fax:  [phone]                                    Mariela Duffy   MRN: [id number]    Department:  [facility] Emergency Department   Date of Visit:  4/30/2019           After Visit Summary Signature Page    I have received my discharge instructions, and my questions have been answered. I have discussed any challenges I see with this plan with the nurse or doctor.    ..........................................................................................................................................  Patient/Patient Representative Signature      ..........................................................................................................................................  Patient Representative Print Name and Relationship to Patient    ..................................................               ................................................  Date                                   Time    ..........................................................................................................................................  Reviewed by Signature/Title    ...................................................              ..............................................  Date                                               Time          22EPIC Rev 08/18

## 2019-04-30 NOTE — TELEPHONE ENCOUNTER
Please call daughter Joy at [phone],  Regarding medication and status of treatment. Concerned has not been taking medication. Requesting more home health care visits.

## 2019-04-30 NOTE — TELEPHONE ENCOUNTER
Humaira PEREZ VO once weekly skilled nurse visits given   Home Care staff will fax these orders for provider signature.   Left message gaudencio's  HC set up weekly.  Call back if questions.   Eliud Nielson RN

## 2019-04-30 NOTE — TELEPHONE ENCOUNTER
FYI - CTC is on file.  There are a few of them on file.  The 2nd or 3rd one has the boxes checked for being able to talk to Jen. Shari Schoenberger, [facility]
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
## 2019-05-02 ENCOUNTER — PATIENT OUTREACH (OUTPATIENT)
Dept: CARE COORDINATION | Facility: CLINIC | Age: 64
End: 2019-05-02

## 2019-05-02 ENCOUNTER — OFFICE VISIT (OUTPATIENT)
Dept: FAMILY MEDICINE | Facility: CLINIC | Age: 64
End: 2019-05-02
Payer: COMMERCIAL

## 2019-05-02 VITALS
SYSTOLIC BLOOD PRESSURE: 104 MMHG | HEART RATE: 104 BPM | DIASTOLIC BLOOD PRESSURE: 70 MMHG | OXYGEN SATURATION: 97 % | TEMPERATURE: 98.1 F | RESPIRATION RATE: 16 BRPM | BODY MASS INDEX: 27.31 KG/M2 | WEIGHT: 189 LBS

## 2019-05-02 DIAGNOSIS — J18.9 PNEUMONIA DUE TO INFECTIOUS ORGANISM, UNSPECIFIED LATERALITY, UNSPECIFIED PART OF LUNG: Primary | ICD-10-CM

## 2019-05-02 DIAGNOSIS — G31.84 MILD COGNITIVE IMPAIRMENT: ICD-10-CM

## 2019-05-02 DIAGNOSIS — F41.9 ANXIETY: ICD-10-CM

## 2019-05-02 DIAGNOSIS — G45.9 TIA (TRANSIENT ISCHEMIC ATTACK): ICD-10-CM

## 2019-05-02 PROCEDURE — 99214 OFFICE O/P EST MOD 30 MIN: CPT | Performed by: PHYSICIAN ASSISTANT

## 2019-05-02 RX ORDER — HYDROXYZINE HYDROCHLORIDE 25 MG/1
25 TABLET, FILM COATED ORAL 3 TIMES DAILY PRN
Qty: 270 TABLET | Refills: 0 | Status: SHIPPED | OUTPATIENT
Start: 2019-05-02 | End: 2021-07-06

## 2019-05-02 RX ORDER — ALBUTEROL SULFATE 0.83 MG/ML
2.5 SOLUTION RESPIRATORY (INHALATION) EVERY 6 HOURS PRN
Qty: 1 BOX | Refills: 0 | Status: ON HOLD | OUTPATIENT
Start: 2019-05-02 | End: 2020-01-14

## 2019-05-02 NOTE — LETTER
[facility]  [id number]  [facility] [address]     May 3, 2019    Mariela Duffy  [id number]  [facility] [address]      Dear Mariela,    I am a clinic care coordinator who works with Estefany Stanley PA-C at [facility]. I wanted to thank you for spending the time to talk with me. Please call me with questions or concerns about your health care. Below is a description of clinic care coordination and how I can further assist you.     The clinic care coordinator is a registered nurse and/or  who understand the health care system. The goal of clinic care coordination is to help you manage your health and improve access to the [facility] in the most efficient manner. The registered nurse can assist you in meeting your health care goals by providing education, coordinating services, and strengthening the communication among your providers. The  can assist you with financial, behavioral, psychosocial, chemical dependency, counseling, and/or psychiatric resources.    Please feel free to contact me at [phone], with any questions or concerns. We at [facility] are focused on providing you with the highest-quality healthcare experience possible and that all starts with you.     Sincerely,     Beth Thompson RN  Care Coordination  Phone:  [phone]  Email: [email or web address]

## 2019-05-02 NOTE — PROGRESS NOTES
SUBJECTIVE:   Mariela Duffy is a 63 year old female who presents to clinic today for the following   health issues:      ED/UC Followup:    Facility:  [facility]  Date of visit: 4/30/2019  Reason for visit: pneumonia  Current Status: some improvement  Still having SOB and intermittent productive cough  Taking doxycycline daily, as directed         Daughter with patient today. Wondering if they need to get higher level of care with living situation.      Additional history: as documented    Reviewed  and updated as needed this visit by clinical staff         Reviewed and updated as needed this visit by Provider         Patient Active Problem List   Diagnosis     Contact dermatitis and other eczema due to other specified agent     Allergic rhinitis due to other allergen     Esophageal reflux     Irritable bowel syndrome     Rosacea     Postsurgical hypothyroidism     Iron deficiency anemia     Absence of menstruation     Mild major depression (H)     CARDIOVASCULAR SCREENING; LDL GOAL LESS THAN 160     Generalized anxiety disorder     Hypothyroidism     Tubular adenoma of colon     Seasonal affective disorder (H)     Rhinitis     Headache     ADD (attention deficit disorder)     Other insomnia     TIA (transient ischemic attack)     Gastroesophageal reflux disease with esophagitis     Hiatal hernia     History of colonic polyps     BP check     Migraine with aura and without status migrainosus, not intractable     History of thyroid cancer     Mild cognitive impairment     Peripheral polyneuropathy     Vitamin D deficiency     Past Surgical History:   Procedure Laterality Date     ABDOMEN SURGERY  5/97    Hiatelherna     C NONSPECIFIC PROCEDURE  1997    surgery hiatal hernia     C NONSPECIFIC PROCEDURE  1993    cholecystectomy     C NONSPECIFIC PROCEDURE  multiple    cleft lip repair.     CHOLECYSTECTOMY       COLONOSCOPY  6/14/2013    Colonoscopy Dr[facility]     ENT SURGERY  [id number]     HEAD  & NECK SURGERY      thyroid cancer surgery     SURGICAL HISTORY OF -       thyroidectomy due to cancer     WRIST SURGERY Right     2015       Social History     Tobacco Use     Smoking status: Former Smoker     Years: 5.00     Types: Cigarettes     Start date: 5/10/1973     Last attempt to quit: 1977     Years since quittin.3     Smokeless tobacco: Never Used   Substance Use Topics     Alcohol use: Yes     Comment: 2-4 mixed drinks/month     Family History   Problem Relation Age of Onset     Cancer Father         Colon, stomach -  at 75yoa     Hypertension Father      C.A.D. Father      Colon Cancer Father      Other Cancer Father      Cancer Mother         Throat, lymph, bone -  at 79yoa     Other Cancer Mother      C.A.D. Maternal Grandfather         Heart Attack -  in his late 60's     Alzheimer Disease Paternal Grandmother      C.A.D. Paternal Grandfather         Heart Attack -  at 63yoa     Thyroid Disease Other            ROS:  Constitutional, HEENT, cardiovascular, pulmonary, gi and gu systems are negative, except as otherwise noted.    OBJECTIVE:     /70 (BP Location: Right arm, Patient Position: Chair, Cuff Size: Adult Regular)   Pulse 104   Temp 98.1  F (36.7  C) (Oral)   Resp 16   Wt 85.7 kg (189 lb)   LMP 2004   SpO2 97%   BMI 27.31 kg/m    Body mass index is 27.31 kg/m .  GENERAL APPEARANCE: healthy, alert and no distress  HENT: ear canals and TM's normal and nose and mouth without ulcers or lesions  RESP: rales bilateral  CV: regular rates and rhythm, normal S1 S2, no S3 or S4 and no murmur, click or rub  PSYCH: anxious and confused        ASSESSMENT/PLAN:             1. Pneumonia due to infectious organism, unspecified laterality, unspecified part of lung  Continue doxy.  return to clinic 10 days for CXR and recheck, sooner if symptoms worsen.   - albuterol (PROVENTIL) (2.5 MG/3ML) 0.083% neb solution; Take 1 vial (2.5 mg) by nebulization every 6  hours as needed for shortness of breath / dyspnea or wheezing  Dispense: 1 Box; Refill: 0  - order for DME; Equipment being ordered: Nebulizer  Dispense: 1 Units; Refill: 0    2. Anxiety    - hydrOXYzine (ATARAX) 25 MG tablet; Take 1 tablet (25 mg) by mouth 3 times daily as needed for anxiety  Dispense: 270 tablet; Refill: 0  - CARE COORDINATION REFERRAL    3. Mild cognitive impairment    - CARE COORDINATION REFERRAL    4. TIA (transient ischemic attack)    - CARE COORDINATION REFERRAL        Estefany Stanley PA-C  [facility]

## 2019-05-02 NOTE — LETTER
[facility]  Complex Care Plan  About Me:    Patient Name:  Mariela Edmond    YOB: 1955  Age:         63 year old   [facility] MRN:    [id number] Telephone Information:  Home Phone [phone]   Mobile [phone]       Address:  [address] [facility] [address] Email address:  [email or web address]      Emergency Contact(s)    Name Relationship Lgl Grd Work Phone Home Phone Mobile Phone   1. ERIN EDMOND Son   [phone]    2. LISA EDMOND Daughter   [phone]   3. VASQUEZ OROZCO Son   [phone]   4. BEV STANLEY Friend   [phone]           Primary language:  English     needed? No   [facility]:  [phone] op. 1  Other communication barriers: Cognitive impairment  Preferred Method of Communication:  Mail  Current living arrangement: I live in a private home(Rents a room in a home.)  Mobility Status/ Medical Equipment: Independent    Health Maintenance  Health Maintenance Reviewed: Not assessed    My Access Plan  Medical Emergency 911   Primary Clinic Line [facility] - [phone]   24 Hour Appointment Line [phone] or  [phone]) (toll-free)   24 Hour Nurse Line [phone] (toll-free)   Preferred Urgent Care [facility], [phone]   Preferred Hospital [facility]  [phone]   Preferred Pharmacy [facility] [place], [place] - [address]     Behavioral Health Crisis Line The National Suicide Prevention Lifeline at 1-643.216.2130 or 911       My Care Team Members  Patient Care Team       Relationship Specialty Notifications Start End    Estefany Stanley PA-C PCP - General Physician Assistant  3/21/19     Phone: [phone] Fax: [phone] [address] [facility] [address]    Hospice[facility]    8/1/18     Fax: [phone]           [address] [place] [address]    Haase, Susan Rachele, APRN CNP Assigned PCP   12/16/18     Phone: [phone] Fax: [phone] [address] [facility] [address]    Beth Thompson, RN Lead Care Coordinator Primary Care - CC  5/2/19     Phone: [phone] Fax: [phone]                     My Medical and Care Information  Problem List   Patient Active Problem List   Diagnosis     Contact dermatitis and other eczema due to other specified agent     Allergic rhinitis due to other allergen     Esophageal reflux     Irritable bowel syndrome     Rosacea     Postsurgical hypothyroidism     Iron deficiency anemia     Absence of menstruation     Mild major depression (H)     CARDIOVASCULAR SCREENING; LDL GOAL LESS THAN 160     Generalized anxiety disorder     Hypothyroidism     Tubular adenoma of colon     Seasonal affective disorder (H)     Rhinitis     Headache     ADD (attention deficit disorder)     Other insomnia     TIA (transient ischemic attack)     Gastroesophageal reflux disease with esophagitis     Hiatal hernia     History of colonic polyps     BP check     Migraine with aura and without status migrainosus, not intractable     History of thyroid cancer     Mild cognitive impairment     Peripheral polyneuropathy     Vitamin D deficiency          Care Coordination Start Date: No linked episodes   Frequency of Care Coordination: monthly   Form Last Updated: 05/03/2019
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
## 2019-05-04 ENCOUNTER — HOSPITAL ENCOUNTER (OUTPATIENT)
Facility: CLINIC | Age: 64
Setting detail: OBSERVATION
Discharge: HOME | End: 2019-05-05
Attending: EMERGENCY MEDICINE | Admitting: INTERNAL MEDICINE
Payer: COMMERCIAL

## 2019-05-04 ENCOUNTER — APPOINTMENT (OUTPATIENT)
Dept: GENERAL RADIOLOGY | Facility: CLINIC | Age: 64
End: 2019-05-04
Attending: EMERGENCY MEDICINE
Payer: COMMERCIAL

## 2019-05-04 ENCOUNTER — APPOINTMENT (OUTPATIENT)
Dept: CT IMAGING | Facility: CLINIC | Age: 64
End: 2019-05-04
Attending: EMERGENCY MEDICINE
Payer: COMMERCIAL

## 2019-05-04 DIAGNOSIS — R46.89 COGNITIVE AND BEHAVIORAL CHANGES: ICD-10-CM

## 2019-05-04 DIAGNOSIS — R41.0 CONFUSION: Primary | ICD-10-CM

## 2019-05-04 DIAGNOSIS — R41.89 COGNITIVE AND BEHAVIORAL CHANGES: ICD-10-CM

## 2019-05-04 LAB
ALBUMIN SERPL-MCNC: 3.7 G/DL (ref 3.4–5)
ALBUMIN UR-MCNC: 10 MG/DL
ALP SERPL-CCNC: 105 U/L (ref 40–150)
ALT SERPL W P-5'-P-CCNC: 16 U/L (ref 0–50)
ANION GAP SERPL CALCULATED.3IONS-SCNC: 8 MMOL/L (ref 3–14)
APPEARANCE UR: ABNORMAL
AST SERPL W P-5'-P-CCNC: 16 U/L (ref 0–45)
BACTERIA #/AREA URNS HPF: ABNORMAL /HPF
BASOPHILS # BLD AUTO: 0.1 10E9/L (ref 0–0.2)
BASOPHILS NFR BLD AUTO: 0.6 %
BILIRUB SERPL-MCNC: 0.4 MG/DL (ref 0.2–1.3)
BILIRUB UR QL STRIP: NEGATIVE
BUN SERPL-MCNC: 10 MG/DL (ref 7–30)
CALCIUM SERPL-MCNC: 9 MG/DL (ref 8.5–10.1)
CHLORIDE SERPL-SCNC: 104 MMOL/L (ref 94–109)
CO2 SERPL-SCNC: 26 MMOL/L (ref 20–32)
COLOR UR AUTO: YELLOW
CREAT SERPL-MCNC: 0.83 MG/DL (ref 0.52–1.04)
DIFFERENTIAL METHOD BLD: NORMAL
EOSINOPHIL # BLD AUTO: 0.3 10E9/L (ref 0–0.7)
EOSINOPHIL NFR BLD AUTO: 2.9 %
ERYTHROCYTE [DISTWIDTH] IN BLOOD BY AUTOMATED COUNT: 13.5 % (ref 10–15)
ETHANOL SERPL-MCNC: <0.01 G/DL
GFR SERPL CREATININE-BSD FRML MDRD: 75 ML/MIN/{1.73_M2}
GLUCOSE SERPL-MCNC: 98 MG/DL (ref 70–99)
GLUCOSE UR STRIP-MCNC: NEGATIVE MG/DL
HCT VFR BLD AUTO: 39.4 % (ref 35–47)
HGB BLD-MCNC: 13.2 G/DL (ref 11.7–15.7)
HGB UR QL STRIP: NEGATIVE
HYALINE CASTS #/AREA URNS LPF: 4 /LPF (ref 0–2)
IMM GRANULOCYTES # BLD: 0.2 10E9/L (ref 0–0.4)
IMM GRANULOCYTES NFR BLD: 2.1 %
KETONES UR STRIP-MCNC: NEGATIVE MG/DL
LEUKOCYTE ESTERASE UR QL STRIP: ABNORMAL
LYMPHOCYTES # BLD AUTO: 1.9 10E9/L (ref 0.8–5.3)
LYMPHOCYTES NFR BLD AUTO: 17.9 %
MCH RBC QN AUTO: 28.2 PG (ref 26.5–33)
MCHC RBC AUTO-ENTMCNC: 33.5 G/DL (ref 31.5–36.5)
MCV RBC AUTO: 84 FL (ref 78–100)
MONOCYTES # BLD AUTO: 0.7 10E9/L (ref 0–1.3)
MONOCYTES NFR BLD AUTO: 6.4 %
MUCOUS THREADS #/AREA URNS LPF: PRESENT /LPF
NEUTROPHILS # BLD AUTO: 7.3 10E9/L (ref 1.6–8.3)
NEUTROPHILS NFR BLD AUTO: 70.1 %
NITRATE UR QL: NEGATIVE
NRBC # BLD AUTO: 0 10*3/UL
NRBC BLD AUTO-RTO: 0 /100
PH UR STRIP: 5.5 PH (ref 5–7)
PLATELET # BLD AUTO: 367 10E9/L (ref 150–450)
POTASSIUM SERPL-SCNC: 2.9 MMOL/L (ref 3.4–5.3)
PROT SERPL-MCNC: 7.8 G/DL (ref 6.8–8.8)
RBC # BLD AUTO: 4.68 10E12/L (ref 3.8–5.2)
RBC #/AREA URNS AUTO: 2 /HPF (ref 0–2)
SODIUM SERPL-SCNC: 138 MMOL/L (ref 133–144)
SOURCE: ABNORMAL
SP GR UR STRIP: 1.02 (ref 1–1.03)
SQUAMOUS #/AREA URNS AUTO: 9 /HPF (ref 0–1)
UROBILINOGEN UR STRIP-MCNC: NORMAL MG/DL (ref 0–2)
WBC # BLD AUTO: 10.4 10E9/L (ref 4–11)
WBC #/AREA URNS AUTO: 8 /HPF (ref 0–5)

## 2019-05-04 PROCEDURE — 87086 URINE CULTURE/COLONY COUNT: CPT | Performed by: INTERNAL MEDICINE

## 2019-05-04 PROCEDURE — 99285 EMERGENCY DEPT VISIT HI MDM: CPT | Mod: 25

## 2019-05-04 PROCEDURE — 71046 X-RAY EXAM CHEST 2 VIEWS: CPT

## 2019-05-04 PROCEDURE — 25000132 ZZH RX MED GY IP 250 OP 250 PS 637: Performed by: EMERGENCY MEDICINE

## 2019-05-04 PROCEDURE — 80320 DRUG SCREEN QUANTALCOHOLS: CPT | Performed by: EMERGENCY MEDICINE

## 2019-05-04 PROCEDURE — 81001 URINALYSIS AUTO W/SCOPE: CPT | Performed by: EMERGENCY MEDICINE

## 2019-05-04 PROCEDURE — 85025 COMPLETE CBC W/AUTO DIFF WBC: CPT | Performed by: EMERGENCY MEDICINE

## 2019-05-04 PROCEDURE — 80053 COMPREHEN METABOLIC PANEL: CPT | Performed by: EMERGENCY MEDICINE

## 2019-05-04 PROCEDURE — 96374 THER/PROPH/DIAG INJ IV PUSH: CPT

## 2019-05-04 PROCEDURE — 70450 CT HEAD/BRAIN W/O DYE: CPT

## 2019-05-04 PROCEDURE — 84481 FREE ASSAY (FT-3): CPT | Performed by: EMERGENCY MEDICINE

## 2019-05-04 PROCEDURE — 99220 ZZC INITIAL OBSERVATION CARE,LEVL III: CPT | Performed by: INTERNAL MEDICINE

## 2019-05-04 PROCEDURE — 25000128 H RX IP 250 OP 636: Performed by: EMERGENCY MEDICINE

## 2019-05-04 RX ORDER — POTASSIUM CHLORIDE 1.5 G/1.58G
40 POWDER, FOR SOLUTION ORAL ONCE
Status: COMPLETED | OUTPATIENT
Start: 2019-05-04 | End: 2019-05-04

## 2019-05-04 RX ORDER — LORAZEPAM 2 MG/ML
1 INJECTION INTRAMUSCULAR ONCE
Status: COMPLETED | OUTPATIENT
Start: 2019-05-04 | End: 2019-05-04

## 2019-05-04 RX ADMIN — LORAZEPAM 1 MG: 2 INJECTION INTRAMUSCULAR; INTRAVENOUS at 22:29

## 2019-05-04 RX ADMIN — POTASSIUM CHLORIDE 40 MEQ: 1.5 POWDER, FOR SOLUTION ORAL at 22:54

## 2019-05-04 ASSESSMENT — MIFFLIN-ST. JEOR: SCORE: 1485.75

## 2019-05-05 VITALS
SYSTOLIC BLOOD PRESSURE: 102 MMHG | HEART RATE: 109 BPM | OXYGEN SATURATION: 94 % | DIASTOLIC BLOOD PRESSURE: 75 MMHG | HEIGHT: 68 IN | TEMPERATURE: 96.7 F | BODY MASS INDEX: 29.48 KG/M2 | WEIGHT: 194.5 LBS | RESPIRATION RATE: 16 BRPM

## 2019-05-05 PROBLEM — R41.0 CONFUSION: Status: ACTIVE | Noted: 2019-05-05

## 2019-05-05 LAB
ANION GAP SERPL CALCULATED.3IONS-SCNC: 8 MMOL/L (ref 3–14)
BUN SERPL-MCNC: 10 MG/DL (ref 7–30)
CALCIUM SERPL-MCNC: 8.4 MG/DL (ref 8.5–10.1)
CHLORIDE SERPL-SCNC: 108 MMOL/L (ref 94–109)
CO2 SERPL-SCNC: 24 MMOL/L (ref 20–32)
CREAT SERPL-MCNC: 0.79 MG/DL (ref 0.52–1.04)
GFR SERPL CREATININE-BSD FRML MDRD: 79 ML/MIN/{1.73_M2}
GLUCOSE SERPL-MCNC: 139 MG/DL (ref 70–99)
POTASSIUM SERPL-SCNC: 3.4 MMOL/L (ref 3.4–5.3)
POTASSIUM SERPL-SCNC: 3.4 MMOL/L (ref 3.4–5.3)
POTASSIUM SERPL-SCNC: 3.6 MMOL/L (ref 3.4–5.3)
SODIUM SERPL-SCNC: 140 MMOL/L (ref 133–144)
T3FREE SERPL-MCNC: 2.4 PG/ML (ref 2.3–4.2)
TSH SERPL DL<=0.005 MIU/L-ACNC: 1.56 MU/L (ref 0.4–4)

## 2019-05-05 PROCEDURE — 80048 BASIC METABOLIC PNL TOTAL CA: CPT | Performed by: INTERNAL MEDICINE

## 2019-05-05 PROCEDURE — 99217 ZZC OBSERVATION CARE DISCHARGE: CPT | Performed by: PHYSICIAN ASSISTANT

## 2019-05-05 PROCEDURE — 84443 ASSAY THYROID STIM HORMONE: CPT | Performed by: INTERNAL MEDICINE

## 2019-05-05 PROCEDURE — G0378 HOSPITAL OBSERVATION PER HR: HCPCS

## 2019-05-05 PROCEDURE — 36415 COLL VENOUS BLD VENIPUNCTURE: CPT | Performed by: EMERGENCY MEDICINE

## 2019-05-05 PROCEDURE — 25000132 ZZH RX MED GY IP 250 OP 250 PS 637: Performed by: INTERNAL MEDICINE

## 2019-05-05 PROCEDURE — 87086 URINE CULTURE/COLONY COUNT: CPT | Performed by: PHYSICIAN ASSISTANT

## 2019-05-05 PROCEDURE — 84132 ASSAY OF SERUM POTASSIUM: CPT | Performed by: EMERGENCY MEDICINE

## 2019-05-05 RX ORDER — PANTOPRAZOLE SODIUM 40 MG/1
40 TABLET, DELAYED RELEASE ORAL
Status: DISCONTINUED | OUTPATIENT
Start: 2019-05-05 | End: 2019-05-05 | Stop reason: HOSPADM

## 2019-05-05 RX ORDER — NALOXONE HYDROCHLORIDE 0.4 MG/ML
.1-.4 INJECTION, SOLUTION INTRAMUSCULAR; INTRAVENOUS; SUBCUTANEOUS
Status: DISCONTINUED | OUTPATIENT
Start: 2019-05-05 | End: 2019-05-05 | Stop reason: HOSPADM

## 2019-05-05 RX ORDER — LEVOTHYROXINE SODIUM 175 UG/1
175 TABLET ORAL DAILY
Status: DISCONTINUED | OUTPATIENT
Start: 2019-05-05 | End: 2019-05-05 | Stop reason: HOSPADM

## 2019-05-05 RX ORDER — ONDANSETRON 2 MG/ML
4 INJECTION INTRAMUSCULAR; INTRAVENOUS EVERY 6 HOURS PRN
Status: DISCONTINUED | OUTPATIENT
Start: 2019-05-05 | End: 2019-05-05 | Stop reason: HOSPADM

## 2019-05-05 RX ORDER — BENZONATATE 100 MG/1
100 CAPSULE ORAL 3 TIMES DAILY PRN
Status: DISCONTINUED | OUTPATIENT
Start: 2019-05-05 | End: 2019-05-05 | Stop reason: HOSPADM

## 2019-05-05 RX ORDER — POTASSIUM CL/LIDO/0.9 % NACL 10MEQ/0.1L
10 INTRAVENOUS SOLUTION, PIGGYBACK (ML) INTRAVENOUS
Status: DISCONTINUED | OUTPATIENT
Start: 2019-05-05 | End: 2019-05-05 | Stop reason: HOSPADM

## 2019-05-05 RX ORDER — ONDANSETRON 4 MG/1
4 TABLET, ORALLY DISINTEGRATING ORAL EVERY 6 HOURS PRN
Status: DISCONTINUED | OUTPATIENT
Start: 2019-05-05 | End: 2019-05-05 | Stop reason: HOSPADM

## 2019-05-05 RX ORDER — POTASSIUM CHLORIDE 1.5 G/1.58G
20-40 POWDER, FOR SOLUTION ORAL
Status: DISCONTINUED | OUTPATIENT
Start: 2019-05-05 | End: 2019-05-05 | Stop reason: HOSPADM

## 2019-05-05 RX ORDER — ERGOCALCIFEROL 1.25 MG/1
1 CAPSULE, LIQUID FILLED ORAL
Refills: 2 | Status: ON HOLD | COMMUNITY
Start: 2019-04-10 | End: 2020-01-24

## 2019-05-05 RX ORDER — BUSPIRONE HYDROCHLORIDE 30 MG/1
15 TABLET ORAL EVERY EVENING
COMMUNITY
End: 2019-06-06 | Stop reason: DRUGHIGH

## 2019-05-05 RX ORDER — BUSPIRONE HYDROCHLORIDE 30 MG/1
30 TABLET ORAL EVERY MORNING
Refills: 1 | Status: ON HOLD | COMMUNITY
Start: 2019-04-21 | End: 2020-01-24

## 2019-05-05 RX ORDER — ALBUTEROL SULFATE 0.83 MG/ML
2.5 SOLUTION RESPIRATORY (INHALATION) EVERY 6 HOURS PRN
Status: DISCONTINUED | OUTPATIENT
Start: 2019-05-05 | End: 2019-05-05 | Stop reason: HOSPADM

## 2019-05-05 RX ORDER — POTASSIUM CHLORIDE 1500 MG/1
20-40 TABLET, EXTENDED RELEASE ORAL
Status: DISCONTINUED | OUTPATIENT
Start: 2019-05-05 | End: 2019-05-05 | Stop reason: HOSPADM

## 2019-05-05 RX ORDER — ASPIRIN 81 MG/1
81 TABLET ORAL DAILY
Status: DISCONTINUED | OUTPATIENT
Start: 2019-05-05 | End: 2019-05-05 | Stop reason: HOSPADM

## 2019-05-05 RX ORDER — MAGNESIUM SULFATE HEPTAHYDRATE 40 MG/ML
4 INJECTION, SOLUTION INTRAVENOUS EVERY 4 HOURS PRN
Status: DISCONTINUED | OUTPATIENT
Start: 2019-05-05 | End: 2019-05-05 | Stop reason: HOSPADM

## 2019-05-05 RX ORDER — POTASSIUM CHLORIDE 29.8 MG/ML
20 INJECTION INTRAVENOUS
Status: DISCONTINUED | OUTPATIENT
Start: 2019-05-05 | End: 2019-05-05 | Stop reason: HOSPADM

## 2019-05-05 RX ORDER — FLUTICASONE PROPIONATE 50 MCG
1-2 SPRAY, SUSPENSION (ML) NASAL DAILY
Status: DISCONTINUED | OUTPATIENT
Start: 2019-05-05 | End: 2019-05-05 | Stop reason: HOSPADM

## 2019-05-05 RX ORDER — HYDROXYZINE HYDROCHLORIDE 25 MG/1
25 TABLET, FILM COATED ORAL 3 TIMES DAILY PRN
Status: DISCONTINUED | OUTPATIENT
Start: 2019-05-05 | End: 2019-05-05 | Stop reason: HOSPADM

## 2019-05-05 RX ORDER — POTASSIUM CHLORIDE 7.45 MG/ML
10 INJECTION INTRAVENOUS
Status: DISCONTINUED | OUTPATIENT
Start: 2019-05-05 | End: 2019-05-05 | Stop reason: HOSPADM

## 2019-05-05 RX ORDER — CALCIUM CARBONATE 500 MG/1
500 TABLET, CHEWABLE ORAL DAILY
Refills: 2 | COMMUNITY
Start: 2019-04-14 | End: 2021-10-19

## 2019-05-05 RX ADMIN — ASPIRIN 81 MG: 81 TABLET, COATED ORAL at 10:24

## 2019-05-05 RX ADMIN — BENZONATATE 100 MG: 100 CAPSULE ORAL at 10:55

## 2019-05-05 RX ADMIN — HYDROXYZINE HYDROCHLORIDE 25 MG: 25 TABLET ORAL at 10:55

## 2019-05-05 RX ADMIN — LEVOTHYROXINE SODIUM 175 MCG: 175 TABLET ORAL at 10:55

## 2019-05-05 RX ADMIN — PANTOPRAZOLE SODIUM 40 MG: 40 TABLET, DELAYED RELEASE ORAL at 10:55

## 2019-05-05 NOTE — ED NOTES
Bed: ED33  Expected date: 5/4/19  Expected time: 8:19 PM  Means of arrival: Ambulance  Comments:  Nuha 598  63 yo confusion

## 2019-05-05 NOTE — ED NOTES
[facility]  ED Nurse Handoff Report    Mariela Duffy is a 63 year old female   ED Chief complaint: Altered Mental Status  . ED Diagnosis:   Final diagnoses:   Cognitive and behavioral changes     Allergies:   Allergies   Allergen Reactions     Levaquin Nausea and Vomiting       Code Status: Full Code  Activity level - Baseline/Home:  Independent. Activity Level - Current:   Independent. Lift room needed: No. Bariatric: No   Needed: No   Isolation: No. Infection: Not Applicable.     Vital Signs:   Vitals:    05/04/19 2034   BP: 126/81   Pulse: 102   Resp: 18   Temp: 98.5  F (36.9  C)   TempSrc: Oral   SpO2: 99%       Cardiac Rhythm:  ,      Pain level:    Patient confused: Yes. Patient Falls Risk: No.   Elimination Status: Has voided   Patient Report - Initial Complaint: Increased confusion and disorientation recently.  Took 2 Seroquel tonight instead of 1.  Lives independently with roommate. . Focused Assessment: Cognitive - Cognitive/Neuro/Behavioral WDL: -WDL except  Level Of Consciousness: confused  Orientation: oriented x 4   [place] Coma Scale - Best Eye Response: 4-->(E4) spontaneous  Best Motor Response: 6-->(M6) obeys commands  Best Verbal Response: 4-->(V4) confused  [place] Coma Scale Score: 14    Tests Performed: Lab, UA, CT. Abnormal Results:   Labs Ordered and Resulted from Time of ED Arrival Up to the Time of Departure from the ED   COMPREHENSIVE METABOLIC PANEL - Abnormal; Notable for the following components:       Result Value    Potassium 2.9 (*)     All other components within normal limits   ROUTINE UA WITH MICROSCOPIC - Abnormal; Notable for the following components:    Protein Albumin Urine 10 (*)     Leukocyte Esterase Urine Moderate (*)     WBC Urine 8 (*)     Bacteria Urine Many (*)     Squamous Epithelial /HPF Urine 9 (*)     Mucous Urine Present (*)     Hyaline Casts 4 (*)     All other components within normal limits   CBC WITH PLATELETS DIFFERENTIAL   ALCOHOL  ETHYL   PERIPHERAL IV CATHETER     CT Head w/o Contrast    (Results Pending)   XR Chest 2 Views    (Results Pending)   .   Treatments provided: see MAR  Family Comments: None present.  Spoke with pt daughter on phone.  OBS brochure/video discussed/provided to patient:  Yes  ED Medications:   Medications   potassium chloride (KLOR-CON) Packet 40 mEq (has no administration in time range)   LORazepam (ATIVAN) injection 1 mg (1 mg Intravenous Given 5/4/19 2229)     Drips infusing:  No  For the majority of the shift, the patient's behavior Green. Interventions performed were none.     Severe Sepsis OR Septic Shock Diagnosis Present: No      ED Nurse Name/Phone Number: Juan M BORREGOMartinez Farooq,   10:33 PM  RECEIVING UNIT ED HANDOFF REVIEW    Above ED Nurse Handoff Report was reviewed: Yes  Reviewed by: Tatianna Beckford on May 5, 2019 at 12:28 AM

## 2019-05-05 NOTE — ED TRIAGE NOTES
Increased confusion and disorientation recently.  Took 2 Seroquel tonight instead of 1.  Lives independently with roommate.
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
## 2019-05-05 NOTE — DISCHARGE SUMMARY
[facility]  Discharge Summary        Mariela Duffy MRN# [id number]   YOB: 1955 Age: 63 year old     Date of Admission:  5/4/2019  Date of Discharge:  5/5/2019 12:30 PM  Admitting Physician:  Ting Vidales MD  Discharge Physician: Crys Sparks PA-C  Discharging Service: Hospitalist     Primary Provider: Estefany Stanley  Primary Care Physician Phone Number: [phone]         Discharge Diagnoses/Problem Oriented Hospital Course (Providers):    Mariela Duffy was admitted on 5/4/2019 by Ting Vidales MD and I would refer you to their history and physical.  The following problems were addressed during her hospitalization:    1. Accidental ingestion of an additional half dose Seroquel ( extra 25 mg)  2. Hx of anxiety and major depression   3. Dx of mild cognitive impairment followed by outpt Neuropsyche. Recommend outpt follow up. -- SW and OT to perform CPT eval and set up to help assist pt and family with possible higher level of care.         Code Status:      Full Code        Brief Hospital Stay Summary Sent Home With Patient in AVS:        Reason for your hospital stay      You were admitted for concerns of cognitive decline in the setting of   accidentally taking a half dose extra of Seroquel. The extra dose of   Seroquel has no negative effects on you, you can just resume your usual   home dose today. As far as your cognition, there is nothing acute. No   signs of infection or metabolic derangement. You will need to have   continued supervision of your medications. We recommend that you follow up   with your PCP and Neurology team as outpt. In the meantime, we will have   home OT, SW and RN to see you in your home and make further assessment.           Mariela Duffy is a 63 year old female with past medical history significant for depression and anxiety, irritable bowel syndrome, history of thyroid cancer status post resection on replacement,  presented to the emergency room after she called 911.  Per patient she took additional Seroquel could not split her Seroquel as recommended and was concerned about it.  She called poison control and she called her daughter who called 911.      Per report from EMS and ED nurse who was able to contact patient's daughter there is concern of patient's well-being.  Per report patient has not been compliant with her meds and has not been able to manage to live independently at home.  There is concern for her safety.  Per report she has been declining for at least a year.     In the emergency room work-up was essentially negative for any acute abnormality to account for patient's cognitive decline and confusion.  Patient was found to have hypokalemia.  She is being admitted for safe disposition and further evaluation.    The pt was admitted to the OBS unit. Her vs remains stable and there was no focal changes or deficit. She did have some word finding difficulty but states that it's not uncommon given her anxiety in the hospital. She was also very distractable but can be redirected. The admitting physician tried to call the family and was not able to reach, I too tried to call dtr and son but no one returned my phone call. I did an extensive chart review and it seems like she was already evaluated by the Neurologist for her cognition and was dx with mild cognitive impairment.  She was instructed to stay active and start Aricept (which I don't see that she is on). I was not able to get a hold of her home nurse that normally sets up her meds. I asked to follow up with her PCP in 1 week and recommend follow up with her neurologist. Pt was picked up by her ex- and left.             Important Results:      Recent Labs   Lab 05/04/19 2120 04/30/19  0931   WBC 10.4 18.1*   HGB 13.2 12.6   HCT 39.4 38.5   MCV 84 85    267     Recent Labs   Lab 05/05/19  0252 05/05/19  0101 05/04/19 2120 04/30/19  0931     --   138 137   POTASSIUM 3.4  3.4 3.6 2.9* 3.3*   CHLORIDE 108  --  104 104   CO2 24  --  26 27   ANIONGAP 8  --  8 6   *  --  98 108*   BUN 10  --  10 12   CR 0.79  --  0.83 0.83   GFRESTIMATED 79  --  75 75   GFRESTBLACK >90  --  87 87   JETT 8.4*  --  9.0 8.0*     Recent Labs   Lab 05/04/19  0800 04/30/19  0932 04/30/19  0930   CULT Culture negative < 24 hours, reincubate No growth No growth              Pending Results:        Unresulted Labs Ordered in the Past 30 Days of this Admission     Date and Time Order Name Status Description    5/5/2019 2120 Urine Culture Aerobic Bacterial In process     5/4/2019 2120 T3 Free In process     4/30/2019 0912 Blood culture Preliminary     4/30/2019 0907 Blood culture Preliminary             Discharge Instructions and Follow-Up:      Follow-up Appointments     Follow-up and recommended labs and tests       Follow up with primary care provider, Estefany Stanley, within 7 days   for hospital follow- up.  No follow up labs or test are needed.  Return to see Neurologist as instructed.         Follow-up and recommended labs and tests       Follow up with primary care provider, Estefany Stanley, within 7 days   for hospital follow- up.  No follow up labs or test are needed.  See outpt Neurologist as directed.               Discharge Disposition:      Discharged to home         Discharge Medications:        Current Discharge Medication List      CONTINUE these medications which have NOT CHANGED    Details   albuterol (PROVENTIL) (2.5 MG/3ML) 0.083% neb solution Take 1 vial (2.5 mg) by nebulization every 6 hours as needed for shortness of breath / dyspnea or wheezing  Qty: 1 Box, Refills: 0    Associated Diagnoses: Pneumonia due to infectious organism, unspecified laterality, unspecified part of lung      alendronate (FOSAMAX) 70 MG tablet Take 70 mg by mouth every 7 days On Fridays.      aspirin (ASA) 81 MG tablet Take 81 mg by mouth daily      benzonatate (TESSALON) 100 MG  capsule Take 1 capsule (100 mg) by mouth 3 times daily as needed for cough  Qty: 30 capsule, Refills: 0    Associated Diagnoses: Upper respiratory tract infection, unspecified type      !! busPIRone HCl (BUSPAR) 30 MG tablet Take 30 mg by mouth every morning  Refills: 1      !! busPIRone HCl (BUSPAR) 30 MG tablet Take 15 mg by mouth every evening      CALCIUM ANTACID 500 MG chewable tablet Take 500 mg by mouth daily  Refills: 2      doxycycline hyclate (VIBRAMYCIN) 100 MG capsule Take 1 capsule (100 mg) by mouth 2 times daily for 7 days  Qty: 14 capsule, Refills: 0      fluticasone (FLONASE) 50 MCG/ACT spray Spray 1-2 sprays into both nostrils daily  Qty: 1 Bottle, Refills: 11    Associated Diagnoses: Post-nasal drip      hydrOXYzine (ATARAX) 25 MG tablet Take 1 tablet (25 mg) by mouth 3 times daily as needed for anxiety  Qty: 270 tablet, Refills: 0    Associated Diagnoses: Anxiety      LEVOXYL 175 MCG tablet TAKE ONE TABLET BY MOUTH ONCE DAILY  Qty: 90 tablet, Refills: 0    Associated Diagnoses: Postoperative hypothyroidism; History of thyroid cancer      nortriptyline (PAMELOR) 50 MG capsule TAKE ONE CAPSULE BY MOUTH AT BEDTIME  Qty: 90 capsule, Refills: 1    Associated Diagnoses: Irritable bowel syndrome with diarrhea      pantoprazole (PROTONIX) 20 MG EC tablet TAKE ONE TABLET BY MOUTH ONCE DAILY. TAKE BY MOUTH 30 TO 60 MINUTES BEFORE A MEAL.  Qty: 90 tablet, Refills: 1    Associated Diagnoses: Gastroesophageal reflux disease with esophagitis; Hiatal hernia      QUEtiapine (SEROQUEL) 25 MG tablet Take 1/2-1 tablet up to three times daily for anxiety.  Qty: 90 tablet, Refills: 1    Associated Diagnoses: Generalized anxiety disorder      ranitidine (ZANTAC) 300 MG tablet TAKE ONE TABLET BY MOUTH AT BEDTIME  Qty: 90 tablet, Refills: 3    Associated Diagnoses: Gastroesophageal reflux disease with esophagitis      sertraline (ZOLOFT) 100 MG tablet TAKE TWO TABLETS BY MOUTH NIGHTLY  Qty: 180 tablet, Refills: 1     "Associated Diagnoses: Major depressive disorder, recurrent episode, mild (H); Anxiety      SUMAtriptan (IMITREX) 100 MG tablet Take 1 tablet (100 mg) by mouth at onset of headache for migraine May repeat in 2 hours. Max 2 tablets/24 hours.  Qty: 9 tablet, Refills: 1    Associated Diagnoses: Migraine with aura and without status migrainosus, not intractable      triamcinolone (KENALOG) 0.1 % external ointment Apply topically 2 times daily as needed for irritation  Qty: 80 g, Refills: 3    Associated Diagnoses: Eczema, unspecified type      vitamin D2 (ERGOCALCIFEROL) 48079 units (1250 mcg) capsule Take 1 capsule by mouth once a week  Refills: 2      order for DME Equipment being ordered: Nebulizer  Qty: 1 Units, Refills: 0    Associated Diagnoses: Pneumonia due to infectious organism, unspecified laterality, unspecified part of lung       !! - Potential duplicate medications found. Please discuss with provider.            Allergies:         Allergies   Allergen Reactions     Levaquin Nausea and Vomiting           Consultations This Hospital Stay:      No consultations were requested during this admission         Condition and Physical on Discharge:      Discharge condition: Stable   Vitals: Blood pressure 102/75, pulse 109, temperature 96.7  F (35.9  C), temperature source Oral, resp. rate 16, height 1.727 m (5' 8\"), weight 88.2 kg (194 lb 8 oz), last menstrual period 01/20/2004, SpO2 94 %, not currently breastfeeding.  194 lbs 8 oz      GENERAL:  Anxious some word finding difficulty.  PSYCH: pleasant, oriented, No acute distress.  HEENT:  PERRLA. Normal conjunctiva, normal hearing, nasal mucosa and Oropharynx are normal.  NECK:  Supple, no neck vein distention, adenopathy or bruits, normal thyroid.  HEART:  Normal S1, S2 with no murmur, no pericardial rub, gallops or S3 or S4.  LUNGS:  Clear to auscultation, normal Respiratory effort. No wheezing, rales or ronchi.  ABDOMEN:  Soft, no hepatosplenomegaly, normal bowel " sounds. Non-tender, non distended.   EXTREMITIES:  No pedal edema, +2 pulses bilateral and equal.  SKIN:  Dry to touch, No rash, wound or ulcerations.  NEUROLOGIC:  CN 2-12 intact, BL 5/5 symmetric upper and lower extremity strength, sensation is intact with no focal deficits. NO FOCAL deficits.         Discharge Time:      <30 mins        Image Results From This Hospital Stay (For Non-EPIC Providers):        Results for orders placed or performed during the hospital encounter of 05/04/19   CT Head w/o Contrast    Narrative    CT OF THE HEAD WITHOUT CONTRAST 5/4/2019 10:29 PM     COMPARISON: Brain MR 6/28/2018.    HISTORY:  Altered level of consciousness (LOC), unexplained.    TECHNIQUE: Axial CT images of the head from the skull base to the  vertex were acquired without IV contrast.    FINDINGS: Minimal diffuse cerebral volume loss again noted. The  ventricles and basal cisterns are within normal limits in  configuration. There is no midline shift. There are no extra-axial  fluid collections.  Gray-white differentiation is well maintained.    No intracranial hemorrhage, mass or recent infarct.    The visualized paranasal sinuses are well-aerated. There is no  mastoiditis. There are no fractures of the visualized bones.      Impression    IMPRESSION: Minimal diffuse cerebral volume loss again noted.  Otherwise, normal head CT.      Radiation dose for this scan was reduced using automated exposure  control, adjustment of the mA and/or kV according to patient size, or  iterative reconstruction technique    LAVERNE COLMENARES MD   XR Chest 2 Views    Narrative    CHEST TWO VIEW   5/4/2019 10:50 PM     HISTORY: Reevaluate pneumonia.    COMPARISON: Chest x-ray 4/30/2019.      Impression    IMPRESSION: Two views of the chest are performed. Infiltrate at the  lateral left lung base is no longer evident. Lungs are now clear.  Heart is normal in size. No pneumothorax or pleural effusions. Hiatal  hernia is again noted with  air-fluid level.    RADHA JOYCE MD

## 2019-05-05 NOTE — PLAN OF CARE
Patient's After Visit Summary was reviewed with patient.   Patient verbalized understanding of After Visit Summary, recommended follow up and was given an opportunity to ask questions.   Discharge medications sent home with patient/family: No new prescriptions.     Discharged with other: ex-, Elver.    OBSERVATION patient END time: 12:30pm

## 2019-05-05 NOTE — PLAN OF CARE
PRIMARY DIAGNOSIS: AMS  OUTPATIENT/OBSERVATION GOALS TO BE MET BEFORE DISCHARGE:  1. ADLs back to baseline: Yes    2. Activity and level of assistance: Ambulating independently.    3. Pain status: Pain free.    4. Return to near baseline physical activity: Yes     Discharge Planner Nurse   Safe discharge environment identified: Yes  Barriers to discharge: No       Entered by: Lisbet Ortiz 05/05/2019 12:44 PM  VSS, on RA. Up independently. Did have one episode of diarrhea and one episode the day before coming to the hospital. Provider notified, not concerned. Tolerating regular diet. Denies pain, SOB, dizziness, nausea. Pt very anxious, was searching for a words. Very pleasant and cooperative. Pt given PRN Atarax for anxiety and Tessalon for frequent cough d/t recent diagnosis of Pneumonia. Spoke with daughter, Joy over the phone early this morning and updated her of plan. Potassium came back at 3.4. Plan- will discharge, Elver, ex- will be picking her up to bring her home. Elver requested a copy of pt discharge paperwork per pt approval he was given a copy.        Please review provider order for any additional goals.   Nurse to notify provider when observation goals have been met and patient is ready for discharge.

## 2019-05-05 NOTE — PLAN OF CARE
PRIMARY DIAGNOSIS: AMS ? ,medication induced  OUTPATIENT/OBSERVATION GOALS TO BE MET BEFORE DISCHARGE:  ADLs back to baseline: independent but risk for fals     Activity and level of assistance: Ambulating independently.  SBA    Pain status: Pain free.    Return to near baseline physical activity: Yes    Patient here from home. See md note. Patient has been disoriented for past week, not taking her prescribed medications correctly, not caring for herself.  She does word searching when asked questions in admission interview.  Has pleasant demeanor during interview.  Lab work is ordered for am.  Neuro assessment intact other then memory and word searching at this time.  Will continue to monitor and assess     Discharge Planner Nurse   Safe discharge environment identified: to be determined, SW consult   Barriers to discharge: to be determined.         Entered by: Tatianna Beckford 05/05/2019 1:24 AM     Please review provider order for any additional goals.   Nurse to notify provider when observation goals have been met and patient is ready for discharge.

## 2019-05-05 NOTE — ED NOTES
This nurse spoke with pt daughter Joy Duffy.  Family expresses concerns r/t pt increased confusion.  Family states that pt still drives and that she gets lost while driving.  Also report that patient has not been taking any medications including recently prescribed antibiotic.
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
## 2019-05-05 NOTE — PLAN OF CARE
Discharge Planner OT   Patient plan for discharge: not provided to writer  Current status: Received message from rehab liaison who attend OBS rounds to cancel OT evalaution. Per chart, patient has been seen at [facility] in December of 2018 and was diagnosed with Mild Cognitive Impairment. It was recommended patient start taking Aricept 5mg daily, but this med is not listed on her medlist. Care team to follow up with family on current recommendations. IP OT not needed per MD staff, will discharge from OT services.   Barriers to return to prior living situation: defer to care team  Recommendations for discharge: defer to care team  Rationale for recommendations: MD staff requested OT cancel evaluation during daily rounds, order was completed.        Entered by: Sanaz Pritchett 05/05/2019 11:19 AM

## 2019-05-05 NOTE — PHARMACY-ADMISSION MEDICATION HISTORY
Admission medication history interview status for this patient is complete. See [facility] admission navigator for allergy information, prior to admission medications and immunization status.     Medication history interview source(s):Patient  Medication history resources (including written lists, pill bottles, clinic record): [facility] dispense records  Primary pharmacy: [facility] [place], [place] - [address]    Changes made to PTA medication list:  Added: Vitamin D 50,000 units; calcium carbonate 500mg chew tab;  Deleted: Os-Marcus; levoxyl [duplicate Rx];   Changed: Buspirone 15mg tablet was switched to 30mg --> 1 tablet AM + 1/2 tablet PM;     Actions taken by pharmacist (provider contacted, etc): Spoke with provider.     Additional medication history information: Patient had disorganized thoughts and speech during interview, however, was able to be redirected; she was able to name her medications with and without prompting.  She would benefit from MT services outpatient as well as [facility] for managing medications (she has requested this in the past).    Medication reconciliation/reorder completed by provider prior to medication history? Yes    Do you take OTC medications (eg tylenol, ibuprofen, fish oil, eye/ear drops, etc)? Yes - listed        Prior to Admission medications    Medication Sig Last Dose Taking? Auth Provider   albuterol (PROVENTIL) (2.5 MG/3ML) 0.083% neb solution Take 1 vial (2.5 mg) by nebulization every 6 hours as needed for shortness of breath / dyspnea or wheezing 5/4/2019 at Unknown time Yes Estefany Stanley PA-C   alendronate (FOSAMAX) 70 MG tablet Take 70 mg by mouth every 7 days On Fridays. 5/3/2019 Yes Haase, Susan Rachele, APRN CNP   aspirin (ASA) 81 MG tablet Take 81 mg by mouth daily 5/4/2019 at Unknown time Yes Reported, Patient   benzonatate (TESSALON) 100 MG capsule Take 1 capsule (100 mg) by mouth 3 times daily as needed for cough 5/4/2019 at Unknown  time Yes Megan Schuler MD   busPIRone HCl (BUSPAR) 30 MG tablet Take 30 mg by mouth every morning 5/4/2019 at Unknown time Yes Unknown, Entered By History   busPIRone HCl (BUSPAR) 30 MG tablet Take 15 mg by mouth every evening 5/4/2019 at Unknown time Yes Unknown, Entered By History   CALCIUM ANTACID 500 MG chewable tablet Take 500 mg by mouth daily 5/4/2019 at Unknown time Yes Unknown, Entered By History   doxycycline hyclate (VIBRAMYCIN) 100 MG capsule Take 1 capsule (100 mg) by mouth 2 times daily for 7 days 5/4/2019 at Unknown time Yes Gonzales Barron MD   fluticasone (FLONASE) 50 MCG/ACT spray Spray 1-2 sprays into both nostrils daily 5/4/2019 at Unknown time Yes Gonzales Yang MD   hydrOXYzine (ATARAX) 25 MG tablet Take 1 tablet (25 mg) by mouth 3 times daily as needed for anxiety Has yet to  from pharmacy Yes Estefany Stanley PA-C   nortriptyline (PAMELOR) 50 MG capsule TAKE ONE CAPSULE BY MOUTH AT BEDTIME 5/4/2019 at Unknown time Yes Haase, Susan Rachele, APRN CNP   pantoprazole (PROTONIX) 20 MG EC tablet TAKE ONE TABLET BY MOUTH ONCE DAILY. TAKE BY MOUTH 30 TO 60 MINUTES BEFORE A MEAL. 5/4/2019 at Unknown time Yes Haase, Susan Rachele, APRN CNP   QUEtiapine (SEROQUEL) 25 MG tablet Take 1/2-1 tablet up to three times daily for anxiety. 5/4/2019 at Unknown time Yes Estefany Stanley PA-C   ranitidine (ZANTAC) 300 MG tablet TAKE ONE TABLET BY MOUTH AT BEDTIME 5/4/2019 at Unknown time Yes Estefany Stanley PA-C   sertraline (ZOLOFT) 100 MG tablet TAKE TWO TABLETS BY MOUTH NIGHTLY 5/4/2019 at Unknown time Yes Haase, Susan Rachele, APRN CNP   SUMAtriptan (IMITREX) 100 MG tablet Take 1 tablet (100 mg) by mouth at onset of headache for migraine May repeat in 2 hours. Max 2 tablets/24 hours.  Yes Estefany Stanley PA-C   triamcinolone (KENALOG) 0.1 % external ointment Apply topically 2 times daily as needed for irritation  Yes Estefany Stanley, PA-C   vitamin D2 (ERGOCALCIFEROL) 44583 units  (1250 mcg) capsule Take 1 capsule by mouth once a week Past Week at Unknown time Yes Unknown, Entered By History   LEVOXYL 175 MCG tablet TAKE ONE TABLET BY MOUTH ONCE DAILY Unknown at Unknown time  Haase, Susan Rachele, REGINE CNP   order for DME Equipment being ordered: Nebulizer   Estefany Stanley, PAUL Webb, PharmD./PGY-1 Resident

## 2019-05-05 NOTE — PLAN OF CARE
PRIMARY DIAGNOSIS: AMS  OUTPATIENT/OBSERVATION GOALS TO BE MET BEFORE DISCHARGE:  ADLs back to baseline: independent but risk for fals      Activity and level of assistance: Ambulating independently.  SBA     Pain status: Pain free.     Return to near baseline physical activity: Yes     Patient here from home. See md note. Patient has been disoriented for past week, not taking her prescribed medications correctly, not caring for herself.  She does word searching when asked questions in admission interview.  Has pleasant demeanor during interview.  Lab work is ordered for am.  Neuro assessment intact other then memory and word searching at this time.  Will continue to monitor and assess          Discharge Planner Nurse   Safe discharge environment identified: to be determined, SW consult   Barriers to discharge: to be determined.         Entered by: Tatianna Beckford 05/05/2019 1:24 AM  Please review provider order for any additional goals.   Nurse to notify provider when observation goals have been met and patient is ready for discharge.

## 2019-05-06 ENCOUNTER — PATIENT OUTREACH (OUTPATIENT)
Dept: CARE COORDINATION | Facility: CLINIC | Age: 64
End: 2019-05-06

## 2019-05-06 LAB
BACTERIA SPEC CULT: NO GROWTH
BACTERIA SPEC CULT: NO GROWTH
BACTERIA SPEC CULT: NORMAL
Lab: NORMAL
SPECIMEN SOURCE: NORMAL

## 2019-05-06 NOTE — PROGRESS NOTES
Clinic Care Coordination Contact  ED follow up      Patient admitted to observation unit at [facility] 5/4-5/5 for accidental ingestion of an additional half dose of Seroquel.    RN CC did have VM from patient dating 5/4/19 at 10am.  VM stated patient name and phone number, but no reason for call.      RN CC returned call to patient today.  Spoke with patient over phone.  Patient stated she does not remember calling and it was likely her roommate.  Patient could not give reason why roommate would have called writer.  RN CC explained to patient role in clinic and discussed recent hospital stay.  Patient states she was in the hospital due to pneumonia.  When discussing hospitalization, patients anxiety level increased and she has difficulty with word finding.  She had difficult focusing on conversation.  Writer asked if she is doing well at home, she stated yes.  Home care was coming to see her 1 x per week.  Patient acknowledged she does get confused with medications and not sure what she is taking and why.  When asked what I can help her with she stated she needs a job.  We discussed that home care has SW that can assist with that and other needs like housing and getting affiliated with [facility].  Patient was in agreement to having home care SW.  RN CC will update home care Humaira GUZMAN.    RN CC spoke with daughter, Gaudencio who is very involved with patient's care.  gaudencio states she is the one who called police to do welfare check on patient Saturday morning due to phone conversation that morning.  Patient seemed very agitated and thoughts racing.  Patient wasn't making sense.  When police arrived they felt patient needed to be seen in ED and called EMS.    Gaudencio stated frustration with providers in hospital.  She had spoken to numerous people about her concerns with patient's mental status and she had asked for SW to see patient.  Gaudencio stated none of her concerns were addressed.  Per chart review SW did not see patient.  Gaudencio  asked about the Neurologist notes from a previous visit and writer explained finding on Mild cog impairment and recommendations for Aricept.  Joy is very worried about patient driving as well.     Writer told Joy about conversation with patient and that she agreed to see a home care SW.  Joy is happy about this.    PLAN:  RN CC will update [facility] RN THOMAS, Humaira [phone].  Will ask for HC SW to see patient and address needs.  Joy would like patient to start taking Aricept as recommended by neurologist.  Will ask PCP to order.  Joy would also like to see if PCP would address driving concern with patient and write letter to DMV with recommendations.  RN CC will message PCP with request.    RN CC will continue to follow.    Beth Thompson RN  Care Coordination  Phone:  [phone]  Email: [email or web address]

## 2019-05-07 NOTE — PROGRESS NOTES
Primary care typically does not manage Aricept.  Neurology should determine and Rx this is appropriate.     As far as the driving, I cannot write a letter and recommend this, without patient's knowledge. Patient's typically get form from DMV and the DMV makes a determination or has a form for pt to have Neurology fill out to make determination if pt is safe to drive based on cognitive studies.    I've never had to write a letter to DMV before, discussed with Dr. Zayas, who recommended the above guidelines.    Estefany Stanley PA-C

## 2019-05-08 ENCOUNTER — DOCUMENTATION ONLY (OUTPATIENT)
Dept: CARE COORDINATION | Facility: CLINIC | Age: 64
End: 2019-05-08

## 2019-05-08 NOTE — PROGRESS NOTES
It's ok to continue those meds.until back with ryley or deepak next week. Interactions are built into the dosing.

## 2019-05-08 NOTE — PROGRESS NOTES
Pt has med interactions with Buspar and Atarax PRN as well as Atarax and Nortriptyline. Okay to continue these meds? Please advise. Thanks.

## 2019-05-14 ENCOUNTER — TELEPHONE (OUTPATIENT)
Dept: FAMILY MEDICINE | Facility: CLINIC | Age: 64
End: 2019-05-14

## 2019-05-14 NOTE — TELEPHONE ENCOUNTER
Home care RN calls to clarify Medications:     Dr. Vail (endocrinology) made medication changes that are reflected on Pt's printed medication list at home, but not in Epic- as he is outside Provider)      Medication needing clarification:     1) Calcium (Tums) Chews:   - Dr. Vail RX'd 1 tab daily   - Med List from recent OV indicates 2 tabs daily     Should pt take 1 tab or 2 tabs daily?       2) Vitamin D   - This is not on Med List  - Dr. Vail RX'd 50,000 unit capsules  twice weekly    OK to add to Medlist?       Thank you,   Aleyda FLANAGAN, RN          Also requests verbals for:     Continue Skilled Nursing Visit 1/week x 8 weeks with 3 PRN Visit.     Verbal approval given per request below.Homecare/hospice agency to fax orders for provider signature.     Aleyda FLANAGAN RN    Callback [phone]  Detail VM OK

## 2019-05-14 NOTE — TELEPHONE ENCOUNTER
Yes, please update with Endo's recommendations. Fine to take 2 calcium's (Tums) daily per Endo recommendation

## 2019-05-14 NOTE — TELEPHONE ENCOUNTER
Spoke with Humaira ( Home Care RN) confirmed message below to follow recommendations/orders per Dr. Vail.     Humaira agreed with plan    Aleyda FLANAGAN RN

## 2019-05-31 ENCOUNTER — TELEPHONE (OUTPATIENT)
Dept: FAMILY MEDICINE | Facility: CLINIC | Age: 64
End: 2019-05-31

## 2019-05-31 NOTE — TELEPHONE ENCOUNTER
Recd 5 page fax from [facility].  Please sign Home Health Certification and Plan of Care orders and fax to [phone].  Form in AA folder at [place].    Milagro Santiago

## 2019-06-06 ENCOUNTER — OFFICE VISIT (OUTPATIENT)
Dept: FAMILY MEDICINE | Facility: CLINIC | Age: 64
End: 2019-06-06
Payer: COMMERCIAL

## 2019-06-06 ENCOUNTER — TELEPHONE (OUTPATIENT)
Dept: FAMILY MEDICINE | Facility: CLINIC | Age: 64
End: 2019-06-06

## 2019-06-06 ENCOUNTER — OFFICE VISIT (OUTPATIENT)
Dept: PHARMACY | Facility: CLINIC | Age: 64
End: 2019-06-06
Payer: COMMERCIAL

## 2019-06-06 VITALS
WEIGHT: 188.4 LBS | OXYGEN SATURATION: 96 % | SYSTOLIC BLOOD PRESSURE: 112 MMHG | BODY MASS INDEX: 28.55 KG/M2 | TEMPERATURE: 98.2 F | HEIGHT: 68 IN | DIASTOLIC BLOOD PRESSURE: 72 MMHG | HEART RATE: 113 BPM

## 2019-06-06 VITALS — WEIGHT: 188 LBS | DIASTOLIC BLOOD PRESSURE: 72 MMHG | SYSTOLIC BLOOD PRESSURE: 112 MMHG | BODY MASS INDEX: 28.59 KG/M2

## 2019-06-06 DIAGNOSIS — K21.00 GASTROESOPHAGEAL REFLUX DISEASE WITH ESOPHAGITIS: ICD-10-CM

## 2019-06-06 DIAGNOSIS — F41.1 GENERALIZED ANXIETY DISORDER: Primary | ICD-10-CM

## 2019-06-06 DIAGNOSIS — K44.9 HIATAL HERNIA: ICD-10-CM

## 2019-06-06 DIAGNOSIS — E89.0 POSTOPERATIVE HYPOTHYROIDISM: ICD-10-CM

## 2019-06-06 DIAGNOSIS — E55.9 VITAMIN D DEFICIENCY: ICD-10-CM

## 2019-06-06 DIAGNOSIS — J30.1 CHRONIC SEASONAL ALLERGIC RHINITIS DUE TO POLLEN: ICD-10-CM

## 2019-06-06 DIAGNOSIS — E53.8 VITAMIN B12 DEFICIENCY (NON ANEMIC): ICD-10-CM

## 2019-06-06 DIAGNOSIS — Z23 ENCOUNTER FOR IMMUNIZATION: ICD-10-CM

## 2019-06-06 DIAGNOSIS — F32.0 MILD MAJOR DEPRESSION (H): ICD-10-CM

## 2019-06-06 DIAGNOSIS — Z85.850 HISTORY OF THYROID CANCER: ICD-10-CM

## 2019-06-06 DIAGNOSIS — Z53.9 DIAGNOSIS NOT YET DEFINED: Primary | ICD-10-CM

## 2019-06-06 DIAGNOSIS — G43.109 MIGRAINE WITH AURA AND WITHOUT STATUS MIGRAINOSUS, NOT INTRACTABLE: Primary | ICD-10-CM

## 2019-06-06 PROCEDURE — 99214 OFFICE O/P EST MOD 30 MIN: CPT

## 2019-06-06 PROCEDURE — G0179 MD RECERTIFICATION HHA PT: HCPCS | Performed by: FAMILY MEDICINE

## 2019-06-06 PROCEDURE — 99606 MTMS BY PHARM EST 15 MIN: CPT | Performed by: PHARMACIST

## 2019-06-06 PROCEDURE — 99607 MTMS BY PHARM ADDL 15 MIN: CPT | Performed by: PHARMACIST

## 2019-06-06 RX ORDER — LEVOTHYROXINE SODIUM 175 UG/1
175 TABLET ORAL DAILY
Qty: 90 TABLET | Refills: 1 | Status: SHIPPED | OUTPATIENT
Start: 2019-06-06 | End: 2021-07-06

## 2019-06-06 RX ORDER — PANTOPRAZOLE SODIUM 20 MG/1
TABLET, DELAYED RELEASE ORAL
Qty: 90 TABLET | Refills: 1 | Status: ON HOLD | OUTPATIENT
Start: 2019-06-06 | End: 2020-01-14

## 2019-06-06 RX ORDER — SUMATRIPTAN 100 MG/1
100 TABLET, FILM COATED ORAL
Qty: 9 TABLET | Refills: 1 | Status: ON HOLD | OUTPATIENT
Start: 2019-06-06 | End: 2020-01-14

## 2019-06-06 RX ORDER — PANTOPRAZOLE SODIUM 20 MG/1
TABLET, DELAYED RELEASE ORAL
Qty: 90 TABLET | Refills: 1 | Status: SHIPPED | OUTPATIENT
Start: 2019-06-06 | End: 2019-06-06

## 2019-06-06 ASSESSMENT — MIFFLIN-ST. JEOR: SCORE: 1458.08

## 2019-06-06 NOTE — TELEPHONE ENCOUNTER
Prior authorization is needed for the protonix 20mg.  Patient states this is the only medication that works for her.  Not sure if a pa was denied before.  Would you like to do a pa or have her get the otc omeprazole or nexium?    Here's the information for the pa if you want to pursue that route.    Please do not close this encounter until this has been addressed.  (prior auth approved/denied, prescriber refusal to complete prior auth or medication changed/discontinued)    Prior Authorization needed on: pantoprazole 20mg  Drug NDC: [address]-42     Insurance: [employer] through [place]  Bin:  [id number], Pcn:  [id number]  Member ID: [id number]   Insurance phone #: [phone]    Pharmacy NPI: [id number]  Pharmacy Phone #: [phone]  Pharmacy Fax #: [phone]    Please let us know if the PA gets approved or denied or if medication is changed    Please change medication or provide reasoning/documentation and forward to PA Team at [id number].    Thanks,  Sanaz Khan CPhT  [facility]  [phone]

## 2019-06-06 NOTE — PATIENT INSTRUCTIONS
Recommendations from today's MTM visit:                                                        1. fyi--if you feel a cold coming on and breathing a little off --use your albuterol in nebulizer as needed.    2. fyi--stop the PM dose of buspirone now --just take Am dose. If you have anxiety/panic attack --use your hydroxyzine.        Next MTM visit:  See me next Thursday December 5th -2019 at 8;50am   For med review.     To schedule another MTM appointment, please call the clinic directly or you may call the MTM scheduling line at [phone] or toll-free at [phone].     My Clinical Pharmacist's contact information:                                                      It was a pleasure talking with you today!  Please feel free to contact me with any questions or concerns you have.      Jerry Jordan [facility].  Medication Therapy Management Provider  [phone]    You may receive a survey about the MTM services you received by email and/or US Mail.  I would appreciate your feedback to help me serve you better in the future. Your comments will be anonymous.

## 2019-06-06 NOTE — TELEPHONE ENCOUNTER
Please do PA:  Patient has used aciphex, lansoprazole, nexium, omeprazole without relief.     Symptoms are controlled with Protonix.    Patient with GERD with esophagitis and hiatal hernia

## 2019-06-06 NOTE — LETTER
2019        RE: Mariela Duffy  [address] [address] [facility] [address]  : 1955  MRN: [id number]        To Whom It May Concern,    I am writing on behalf of my patient, Mariela Duffy, to document the medical necessity of Protonix(pantoprazole) for the treatment of  gastroesophageal reflux disease with esophagitis and hiatal hernia.      Patient History and Diagnosis  Mariela Duffy is under my care. Her symptoms significantly worsen without use of pantoprazole daily.    I have recommended  Pantoprazole for these reasons:    Clinically the patient may progress to more advanced disease (eg. Gibson's esophagus) if left untreated.    Mariela Duffy has tried and failed the following medications: lansoprazole 2003, esomeprazole 2008, rabiprazole 2013, omeprazole 8/15/2013, ranitidine 10/11/2017.     She even tried going off of a proton pump inhibitor in , tried ranitidine and calcium to help symptoms, without ANY relief.     Summary  Pantoprazole daily is medically necessary for this patient s medical condition.  I ask you to approve pantoprazole taken daily,  which is necessary to halt the progression of gastroesophageal reflux disease.     Please call my office at [phone] if I can provide additional information. I look forward to receiving your timely response and approval of this request.     Sincerely,    Estefany Stanley PA-C

## 2019-06-06 NOTE — PROGRESS NOTES
Subjective     Mariela Duffy is a 63 year old female who presents to clinic today for the following health issues:    HPI   Medication Followup of  hydroxyzine    Taking Medication as prescribed: yes    Side Effects:  None    Medication Helping Symptoms:  yes     Medication Followup of protonix    Taking Medication as prescribed: yes    Side Effects:  None    Medication Helping Symptoms:  yes     GERD/Heartburn    Hypothyroidism Follow-up      Since last visit, patient describes the following symptoms: Weight stable, no hair loss, no skin changes, no constipation, no loose stools      Patient Active Problem List   Diagnosis     Contact dermatitis and other eczema due to other specified agent     Allergic rhinitis due to other allergen     Esophageal reflux     Irritable bowel syndrome     Rosacea     Postsurgical hypothyroidism     Iron deficiency anemia     Absence of menstruation     Mild major depression (H)     CARDIOVASCULAR SCREENING; LDL GOAL LESS THAN 160     Generalized anxiety disorder     Hypothyroidism     Tubular adenoma of colon     Seasonal affective disorder (H)     Rhinitis     Headache     ADD (attention deficit disorder)     Other insomnia     TIA (transient ischemic attack)     Gastroesophageal reflux disease with esophagitis     Hiatal hernia     History of colonic polyps     BP check     Migraine with aura and without status migrainosus, not intractable     History of thyroid cancer     Mild cognitive impairment     Peripheral polyneuropathy     Vitamin D deficiency     Confusion     Past Surgical History:   Procedure Laterality Date     ABDOMEN SURGERY  5/97    Hiatelherna     C NONSPECIFIC PROCEDURE  1997    surgery hiatal hernia     C NONSPECIFIC PROCEDURE  1993    cholecystectomy     C NONSPECIFIC PROCEDURE  multiple    cleft lip repair.     CHOLECYSTECTOMY       COLONOSCOPY  6/14/2013    Colonoscopy Dr. Vallejo [facility]     ENT SURGERY  [id number]     HEAD & NECK SURGERY      thyroid cancer  "surgery     SURGICAL HISTORY OF -       thyroidectomy due to cancer     WRIST SURGERY Right     2015       Social History     Tobacco Use     Smoking status: Former Smoker     Years: 5.00     Types: Cigarettes     Start date: 5/10/1973     Last attempt to quit: 1977     Years since quittin.4     Smokeless tobacco: Never Used   Substance Use Topics     Alcohol use: Yes     Comment: 2-4 mixed drinks/month     Family History   Problem Relation Age of Onset     Cancer Father         Colon, stomach -  at 75yoa     Hypertension Father      C.A.D. Father      Colon Cancer Father      Other Cancer Father      Cancer Mother         Throat, lymph, bone -  at 79yoa     Other Cancer Mother      C.A.D. Maternal Grandfather         Heart Attack -  in his late 60's     Alzheimer Disease Paternal Grandmother      C.A.D. Paternal Grandfather         Heart Attack -  at 63yoa     Thyroid Disease Other              Reviewed and updated as needed this visit by Provider         Review of Systems   ROS COMP: Constitutional, HEENT, cardiovascular, pulmonary, gi and gu systems are negative, except as otherwise noted.      Objective    /72   Pulse 113   Temp 98.2  F (36.8  C) (Oral)   Ht 1.727 m (5' 8\")   Wt 85.5 kg (188 lb 6.4 oz)   LMP 2004   SpO2 96%   BMI 28.65 kg/m    Body mass index is 28.65 kg/m .  Physical Exam   GENERAL APPEARANCE: healthy, alert and no distress  RESP: lungs clear to auscultation - no rales, rhonchi or wheezes  CV: regular rates and rhythm, normal S1 S2, no S3 or S4 and no murmur, click or rub  PSYCH: mentation appears normal and affect normal/bright            Assessment & Plan     1. Migraine with aura and without status migrainosus, not intractable    - SUMAtriptan (IMITREX) 100 MG tablet; Take 1 tablet (100 mg) by mouth at onset of headache for migraine May repeat in 2 hours. Max 2 tablets/24 hours.  Dispense: 9 tablet; Refill: 1    2. Gastroesophageal reflux " "disease with esophagitis  Stable with protonix    3. Hiatal hernia  Stable symptoms with protonix    4. Postoperative hypothyroidism  Stable.   - levothyroxine (LEVOXYL) 175 MCG tablet; Take 1 tablet (175 mcg) by mouth daily  Dispense: 90 tablet; Refill: 1    5. History of thyroid cancer    - levothyroxine (LEVOXYL) 175 MCG tablet; Take 1 tablet (175 mcg) by mouth daily  Dispense: 90 tablet; Refill: 1     BMI:   Estimated body mass index is 28.65 kg/m  as calculated from the following:    Height as of this encounter: 1.727 m (5' 8\").    Weight as of this encounter: 85.5 kg (188 lb 6.4 oz).               No follow-ups on file.    RN  [facility]        "

## 2019-06-06 NOTE — PROGRESS NOTES
"SUBJECTIVE/OBJECTIVE:                           Mariela Duffy is a 63 year old female coming in for a follow up visit (12-6-18) for Medication Therapy Management.  She was referred to me from Estefany Stanley          Chief Complaint: Follow up on depression and anxiety.   Now has hydroxyzine for anxiety issues.     Had neuropsych eval - she states it was inconclusive , only med change off lorazepam and on hydroxyzine.     Loves her new residence.     She has a question --if she gets a cold can she use her albuterol in nebulizer ?          Her children(daughter lucero) tell her she cannot drive but yet Fely has no issues driving.     Tomorrow she is looking for a place to move tomorrow as her house will be foreclosed 11-7-18.  She feels she may have 2-3 options she can move to or a couple GF's she can stay at temporarily.     Personal Healthcare Goals: To become physically stronger. To take all medications.     Allergies/ADRs: Reviewed in Epic  Tobacco: No tobacco use  Alcohol: Less than 1 beverage / month  Caffeine: 1.5 cups/day of coffee, 3 cans dr pepper or coke per day  Activity: loves dancing, but is not currently. She likes to swim, but is not currently swimming.   PMH: Reviewed in Epic    Medication Adherence/Access:  Doing well now --has fv HHN checking on this weekly now for her.     Depression:  Current medications include: Sertraline 200 mg once daily QHS. Pt reports that she feels more \"shaky\" and attributes this to her sertraline. She found that sertraline was effective for her but she expresses interest in trying a different medication in the future due to side effects. Patient reports the following stressors:  None today   Therapies tried and response: She has tried several antidepressants in the past, but could not recall today.   PHQ-9 SCORE 10/8/2018 11/19/2018 12/6/2018   PHQ-9 Total Score - - -   PHQ-9 Total Score MyChart 6 (Mild depression) Incomplete -   PHQ-9 Total Score 6 - 1 "     fyi--she see's weekly counselor as well as talks to her best friend daily --they both help her cope. Recently saw Lam Gallardodevora  Our clinic psychiatrist--he stated no med changes at this time --have neuropsych eval .        Anxiety: Current therapy : off all  Quetiapine + Buspirone 30mg. qam  and one-half tab in the evening and off all lorazepam 0.5 mg  Now and on hydroxyzine 25mg tid prn.        She has an extensive support system, including her daughters, friend,  therapist weekly andARMS worker. She states that her ARMS worker was helping her with organization in her home, but that the ARMS worker became very overwhelmed due to her ex-fiance being a hoarder. She was proud that she has cleaned out half of her living room. Her kids are helping with her yard work. She took early orker was helping her with organization in her home, but that the ARMS worker became very overwhelmed due to her ex-fiance being a hoarder. She was proud that she has cleaned out half of her living room. She took social security early, which is helping her financially. She feels her memory worsens when she is stressed or anxious.    Psych nurse gave ekg recheck 10 days post seroquel start.     QUYEN-7 SCORE 9/19/2018 10/8/2018 12/6/2018   Total Score - - -   Total Score 17 (severe anxiety) 8 (mild anxiety) -   Total Score 17 8 0            Allergic Rhinitis: Current therapy is flonase 1-2 sprays in each nostril daily. She is not currently experiencing congestion, but she has some postnasal drip.      IBS/Sleep/Migraines:  Currently take 50mg nortriptyline in the evening and naproxen 220 mg PRN for headaches. She feels as if the nortriptyline dose is too high due to experiencing constipation. She denies having a difficult time sleeping.     henryi--has imitrex to abort migraine but hasnt used it in 6 months.    Vitamin D3: level was 18 0n 12-11-18. Pt has a calcium + vitamin D rx 50,000 D2 --twice weekly.      Vitamin B12: level was 220 on  "6/12/18. Vitamin B12 helps support memory and lessens fatigue. Pt has a B Complex vitamin and wonders if this is a good option?    Hypothyroidism: Patient is taking levothyroxine 175 (up from 150) mcg daily. Patient is having the following symptoms: Pt has an appointment with endocrinology because she is wondering about taking liothyronine. She states that her brother experienced benefit from liothyronine in the past. Patient is having the following symptoms: hypothyroidism -  constipation.   TSH   Date Value Ref Range Status   05/05/2019 1.56 0.40 - 4.00 mU/L Final     Immunizations:  Up to date .      GERD: Current medications include: Protonix (pantoprazole) 20mg.qam and Zantac (ranitidine) 150mg. At bedtime. Pt c/o belching and eructation, dysphagia.   The patient does not have a history of GI bleed.  The patient does notice symptoms if they miss a dose.  Patient has not tried a trial off of therapy and is not interested in doing so. Patient feels that current regimen is mostly effective.      BP Readings from Last 1 Encounters:   06/06/19 112/72     Pulse Readings from Last 1 Encounters:   06/06/19 113     Wt Readings from Last 1 Encounters:   06/06/19 188 lb (85.3 kg)     Ht Readings from Last 1 Encounters:   06/06/19 5' 8\" (1.727 m)     Estimated body mass index is 28.59 kg/m  as calculated from the following:    Height as of an earlier encounter on 6/6/19: 5' 8\" (1.727 m).    Weight as of this encounter: 188 lb (85.3 kg).    Temp Readings from Last 1 Encounters:   06/06/19 98.2  F (36.8  C) (Oral)           ASSESSMENT:                             Current medications were reviewed today.     Medication Adherence: good    Depression: Stable. Continue current therapy.     Anxiety:  Stable --mtm feels she no longer needs Pm dose of buspar - see plan.     Allergic Rhinitis: improved.     IBS/Sleep/Migraines:  Improved --lower dose Nortriptyline working well.     Vitamin D3: is not at goal of 50-75ng/mL. Pt would " benefit from vitamin D3 lab recheck in 6 months.    Vitamin B12: is not at goal of 600-1000pg/mL. Pt would benefit from Vitamin B12 lab recheck in 6 months.  .  Hypothyroidism: stable    Immunizations:  Stable.    GERD: Stable.  Current treatment is effective. Chronic PPI use places patient at an increased risk of C. Diff, hypomagnesemia and lower bone mineral density, these risks were reviewed with the patient.  Pt would benefit from the following changes: none      PLAN:                          1. fyi--if you feel a cold coming on and breathing a little off --use your albuterol in nebulizer as needed.    2. fyi--stop the PM dose of buspirone now --just take Am dose. If you have anxiety/panic attack --use your hydroxyzine.        Next MTM visit:  See me next Thursday December 5th -2019 at 8;50am   For med review.     I spent 30 minutes with this patient today. All changes were made via collaborative practice agreement with Estefany Stanley. A copy of the visit note was provided to the patient's primary care provider.        The patient was given a summary of these recommendations as an after visit summary.     Jerry Jordan [facility].  Medication Therapy Management Provider  [phone]  Jarrell Bah, Pharm-D4

## 2019-06-06 NOTE — Clinical Note
giancarlo--I stopped her pm buspar dose --not needed since you got her off lorazepam and using hydroxyzine --great job ryley --she is stable and off a benzo.yvonne

## 2019-06-10 NOTE — TELEPHONE ENCOUNTER
PRIOR AUTHORIZATION DENIED    Medication: pa needed for protonix    Denial Date: 6/10/2019    Denial Rational:  Plan limits one tablet per day for a maximum of 120 days within 365.      Appeal Information:    If you would like to appeal, please supply P/A team with a letter of medical necessity with clinical reason.

## 2019-06-10 NOTE — TELEPHONE ENCOUNTER
Central Prior Authorization Team   Phone: [phone]      PA Initiation    Medication: pa needed for protonix  Insurance Company: [employer] [place] - Phone [phone] Fax [phone]  Pharmacy Filling the Rx: [place], [place] - [address]  Filling Pharmacy Phone: [phone]  Filling Pharmacy Fax:    Start Date: 6/10/2019

## 2019-06-11 ENCOUNTER — TELEPHONE (OUTPATIENT)
Dept: FAMILY MEDICINE | Facility: CLINIC | Age: 64
End: 2019-06-11

## 2019-06-11 NOTE — TELEPHONE ENCOUNTER
Letter written, it will not print for me (printer/Epic error).    Please print and bring for me to sign and we can fax it.    Estefany Stanley PA-C

## 2019-06-11 NOTE — TELEPHONE ENCOUNTER
"CLEMENTE Allison   Caller Destinee PEREZ [facility] [phone]   Destinee has been unable to reach patient by phone - several calls over the last 2 weeks and 3 calls today without a call back. Destinee left messages for patient to call her if wishes to continue skilled nurse visits.   Destinee reports Humaira, , also has been calling and stopped by the home without response.  \"I saw her 2 weeks ago and was happy and smiling and engaged.  I think she might want to graduate from home care.\"   Eliud Nielson RN      "

## 2019-06-12 NOTE — TELEPHONE ENCOUNTER
Routed to [facility] for appeal.  I did not think letter needed to be signed.  If it does route back and I will have Estefany sign.  Thanks, Abril Harris RN

## 2019-06-12 NOTE — TELEPHONE ENCOUNTER
Medication Appeal Initiation    We have initiated an appeal for the requested medication:  Medication: pa needed for protonix  Appeal Start Date:  6/12/2019  Insurance Company: [employer] [place] - Phone [phone] Fax [phone]  Comments:

## 2019-06-25 ENCOUNTER — PATIENT OUTREACH (OUTPATIENT)
Dept: CARE COORDINATION | Facility: CLINIC | Age: 64
End: 2019-06-25

## 2019-06-25 NOTE — PROGRESS NOTES
Clinic Care Coordination Contact  Care Coordination Communication    Chart reviewed in [facility] and Homecare Advisor.   Patient continues to be engaged in home care services.     Home Care Services:  [facility] RN services.  , Humaira RN  [phone]    Patient has an extensive support system, including her daughters, friend,  therapist weekly andARMS worker.      Plan:  Anticipate long term need for home care services.  No clinic care coordination needs identified at this time.  No further outreaches will be made unless a new referral is made or a change in the pt's status occurs. Patient was provided with this writer's contact information.  Letter sent via mail on 5/3/19.    Beth Thompson RN  Care Coordination  Phone:  [phone]  Email: [email or web address]

## 2019-06-26 DIAGNOSIS — F33.0 MAJOR DEPRESSIVE DISORDER, RECURRENT EPISODE, MILD (H): ICD-10-CM

## 2019-06-26 DIAGNOSIS — F41.9 ANXIETY: ICD-10-CM

## 2019-06-26 RX ORDER — SERTRALINE HYDROCHLORIDE 100 MG/1
TABLET, FILM COATED ORAL
Qty: 180 TABLET | Refills: 1 | Status: ON HOLD | OUTPATIENT
Start: 2019-06-26 | End: 2020-01-24

## 2019-06-26 NOTE — TELEPHONE ENCOUNTER
Apryl Allison RN, home care nurse, calling says pt did not take her sertraline today and seems she lost the bottle as she verified that it was picked up from pharm.  Pended a new order.  Please authorize if appropriate.  Also rates depression 1 and anxiety 0 today so she is doing good per apryl.  Apryl says pt is agreeable to home care now.  They have gone out there one time last week and one time this week and scheduled to go out next week.  Thanks,  Cele Wiggins RN

## 2019-06-28 NOTE — TELEPHONE ENCOUNTER
Phone call to patient to advise that PA was approved for Protonix     Patient had been notified by pharmacy already     No questions from patient at this time     Mariely Mackenzie, Registered Nurse   [facility]

## 2019-06-28 NOTE — TELEPHONE ENCOUNTER
MEDICATION APPEAL APPROVED    Medication: pa needed for protonix  Authorization Effective Date: 3/27/2019  Authorization Expiration Date: 6/27/2020  Approved Dose/Quantity:    Reference #: [id number]   Insurance Company: [employer] [place] - Phone [phone] Fax [phone]  Expected CoPay:       CoPay Card Available:      Foundation Assistance Needed:    Which Pharmacy is filling the prescription (Not needed for infusion/clinic administered): [facility] [address] [address]

## 2019-07-10 ENCOUNTER — TELEPHONE (OUTPATIENT)
Dept: FAMILY MEDICINE | Facility: CLINIC | Age: 64
End: 2019-07-10

## 2019-07-10 NOTE — TELEPHONE ENCOUNTER
MITZY Felipe home care calls, verbal orders provided for Weekly visits x 9 weeks and 3 prn, FYI to CJ  Thelma Strong RN, BSN  Message handled by Nurse Triage.

## 2019-07-15 ENCOUNTER — TELEPHONE (OUTPATIENT)
Dept: FAMILY MEDICINE | Facility: CLINIC | Age: 64
End: 2019-07-15

## 2019-07-15 DIAGNOSIS — H91.93 DECREASED HEARING OF BOTH EARS: Primary | ICD-10-CM

## 2019-07-15 NOTE — TELEPHONE ENCOUNTER
Mariela needs a Referral to [facility]    For hearing aids     Phone number for Audiology is [phone]    Fax # [phone]    Please call patient after is has been done.

## 2019-07-16 NOTE — TELEPHONE ENCOUNTER
Message left for patient to return call to triage nurse     Mariely Mackenzie, Registered Nurse   [facility]

## 2019-07-16 NOTE — TELEPHONE ENCOUNTER
Can we call pt and let her know the information from Referrals.    New referral T'd up, please give pt information if she wants to go there.     Estefany Stanley PA-C

## 2019-07-16 NOTE — TELEPHONE ENCOUNTER
Pt has a managed care plan which means pt needs to stay within the [facility].    [facility] is out of [facility].    Pt can go to [facility] [phone].    Lynn-Referrals

## 2019-07-17 NOTE — TELEPHONE ENCOUNTER
Signed order under written as pt was informed.  Melissa Olson RN     It does appear the patient has made some progress with Colestid but we have to find the correct titration for her.  In the meantime I will also go ahead and start her on a TCA and she may also take Lomotil as needed.  Her upper throat symptoms appear more consistent with possible postnasal drip or reflux-induced inflammation.  It does not appear to be esophageal in nature based on her description.  Because of this we will give her a trial of PPI and I did recommend she consider taking Claritin or Zyrtec.  If this persists we can consider EGD, barium esophagram, or modified barium swallow versus evaluation by an ENT physician.  She should notify us if this worsens.  We did also discuss the need for compliance with medications and making sure that she take the medicines as directed.  I did recommend that she also start taking fiber as well.

## 2019-07-24 ENCOUNTER — TELEPHONE (OUTPATIENT)
Dept: FAMILY MEDICINE | Facility: CLINIC | Age: 64
End: 2019-07-24
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
## 2019-07-26 ENCOUNTER — TELEPHONE (OUTPATIENT)
Dept: FAMILY MEDICINE | Facility: CLINIC | Age: 64
End: 2019-07-26

## 2019-07-26 NOTE — TELEPHONE ENCOUNTER
Recd 5 page fax from [facility].  Please sign Home Health Certification and Plan of Care orders and fax to [phone].  Form in AA folder at [place].    Milagro Santiago

## 2019-07-30 DIAGNOSIS — Z53.9 DIAGNOSIS NOT YET DEFINED: Primary | ICD-10-CM

## 2019-07-30 PROCEDURE — G0179 MD RECERTIFICATION HHA PT: HCPCS | Performed by: FAMILY MEDICINE

## 2019-08-05 ENCOUNTER — TRANSFERRED RECORDS (OUTPATIENT)
Dept: HEALTH INFORMATION MANAGEMENT | Facility: CLINIC | Age: 64
End: 2019-08-05

## 2019-08-08 ENCOUNTER — TRANSFERRED RECORDS (OUTPATIENT)
Dept: HEALTH INFORMATION MANAGEMENT | Facility: CLINIC | Age: 64
End: 2019-08-08

## 2019-08-08 ENCOUNTER — TELEPHONE (OUTPATIENT)
Dept: FAMILY MEDICINE | Facility: CLINIC | Age: 64
End: 2019-08-08

## 2019-08-08 DIAGNOSIS — D50.9 IRON DEFICIENCY ANEMIA, UNSPECIFIED IRON DEFICIENCY ANEMIA TYPE: ICD-10-CM

## 2019-08-08 DIAGNOSIS — F32.0 MILD MAJOR DEPRESSION (H): ICD-10-CM

## 2019-08-08 DIAGNOSIS — R41.0 CONFUSION: Primary | ICD-10-CM

## 2019-08-08 DIAGNOSIS — E89.0 POSTOPERATIVE HYPOTHYROIDISM: ICD-10-CM

## 2019-08-08 DIAGNOSIS — F41.1 GENERALIZED ANXIETY DISORDER: ICD-10-CM

## 2019-08-08 DIAGNOSIS — G45.9 TIA (TRANSIENT ISCHEMIC ATTACK): ICD-10-CM

## 2019-08-08 DIAGNOSIS — G31.84 MILD COGNITIVE IMPAIRMENT: ICD-10-CM
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
## 2019-08-13 ENCOUNTER — TELEPHONE (OUTPATIENT)
Dept: FAMILY MEDICINE | Facility: CLINIC | Age: 64
End: 2019-08-13

## 2019-08-13 ENCOUNTER — OFFICE VISIT (OUTPATIENT)
Dept: FAMILY MEDICINE | Facility: CLINIC | Age: 64
End: 2019-08-13
Payer: COMMERCIAL

## 2019-08-13 VITALS — HEART RATE: 84 BPM | DIASTOLIC BLOOD PRESSURE: 71 MMHG | SYSTOLIC BLOOD PRESSURE: 110 MMHG

## 2019-08-13 DIAGNOSIS — F41.9 ANXIETY: ICD-10-CM

## 2019-08-13 DIAGNOSIS — R41.0 CONFUSION: Primary | ICD-10-CM

## 2019-08-13 DIAGNOSIS — R82.90 NONSPECIFIC FINDING ON EXAMINATION OF URINE: ICD-10-CM

## 2019-08-13 LAB
ALBUMIN UR-MCNC: NEGATIVE MG/DL
APPEARANCE UR: CLEAR
BACTERIA #/AREA URNS HPF: ABNORMAL /HPF
BILIRUB UR QL STRIP: NEGATIVE
COLOR UR AUTO: YELLOW
ERYTHROCYTE [DISTWIDTH] IN BLOOD BY AUTOMATED COUNT: 14.2 % (ref 10–15)
GLUCOSE UR STRIP-MCNC: NEGATIVE MG/DL
HCT VFR BLD AUTO: 42.4 % (ref 35–47)
HGB BLD-MCNC: 14.4 G/DL (ref 11.7–15.7)
HGB UR QL STRIP: NEGATIVE
KETONES UR STRIP-MCNC: NEGATIVE MG/DL
LEUKOCYTE ESTERASE UR QL STRIP: ABNORMAL
MCH RBC QN AUTO: 28.1 PG (ref 26.5–33)
MCHC RBC AUTO-ENTMCNC: 34 G/DL (ref 31.5–36.5)
MCV RBC AUTO: 83 FL (ref 78–100)
NITRATE UR QL: NEGATIVE
NON-SQ EPI CELLS #/AREA URNS LPF: ABNORMAL /LPF
PH UR STRIP: 5.5 PH (ref 5–7)
PLATELET # BLD AUTO: 298 10E9/L (ref 150–450)
RBC # BLD AUTO: 5.12 10E12/L (ref 3.8–5.2)
RBC #/AREA URNS AUTO: ABNORMAL /HPF
SOURCE: ABNORMAL
SP GR UR STRIP: 1.01 (ref 1–1.03)
UROBILINOGEN UR STRIP-ACNC: 0.2 EU/DL (ref 0.2–1)
WBC # BLD AUTO: 7.6 10E9/L (ref 4–11)
WBC #/AREA URNS AUTO: ABNORMAL /HPF

## 2019-08-13 PROCEDURE — 84443 ASSAY THYROID STIM HORMONE: CPT | Performed by: PHYSICIAN ASSISTANT

## 2019-08-13 PROCEDURE — 36415 COLL VENOUS BLD VENIPUNCTURE: CPT | Performed by: PHYSICIAN ASSISTANT

## 2019-08-13 PROCEDURE — 81001 URINALYSIS AUTO W/SCOPE: CPT | Performed by: PHYSICIAN ASSISTANT

## 2019-08-13 PROCEDURE — 85027 COMPLETE CBC AUTOMATED: CPT | Performed by: PHYSICIAN ASSISTANT

## 2019-08-13 PROCEDURE — 80053 COMPREHEN METABOLIC PANEL: CPT | Performed by: PHYSICIAN ASSISTANT

## 2019-08-13 PROCEDURE — 84439 ASSAY OF FREE THYROXINE: CPT | Performed by: PHYSICIAN ASSISTANT

## 2019-08-13 PROCEDURE — 87086 URINE CULTURE/COLONY COUNT: CPT | Performed by: PHYSICIAN ASSISTANT

## 2019-08-13 PROCEDURE — 99214 OFFICE O/P EST MOD 30 MIN: CPT | Performed by: PHYSICIAN ASSISTANT

## 2019-08-13 RX ORDER — QUETIAPINE FUMARATE 25 MG/1
TABLET, FILM COATED ORAL
Refills: 1 | COMMUNITY
Start: 2019-07-18 | End: 2019-12-05

## 2019-08-13 RX ORDER — MEMANTINE HYDROCHLORIDE 10 MG/1
TABLET ORAL
Refills: 0 | COMMUNITY
Start: 2019-08-05 | End: 2019-12-05 | Stop reason: SINTOL
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
## 2019-08-13 NOTE — TELEPHONE ENCOUNTER
NATALIA left at  for Sally Garcia (from [facility]) to call me back about pt.   Please get me out of room.  Estefany Stanley PA-C

## 2019-08-14 ENCOUNTER — PATIENT OUTREACH (OUTPATIENT)
Dept: CARE COORDINATION | Facility: CLINIC | Age: 64
End: 2019-08-14

## 2019-08-14 LAB
ALBUMIN SERPL-MCNC: 4.2 G/DL (ref 3.4–5)
ALP SERPL-CCNC: 101 U/L (ref 40–150)
ALT SERPL W P-5'-P-CCNC: 24 U/L (ref 0–50)
ANION GAP SERPL CALCULATED.3IONS-SCNC: 6 MMOL/L (ref 3–14)
AST SERPL W P-5'-P-CCNC: 22 U/L (ref 0–45)
BACTERIA SPEC CULT: NORMAL
BILIRUB SERPL-MCNC: 0.5 MG/DL (ref 0.2–1.3)
BUN SERPL-MCNC: 9 MG/DL (ref 7–30)
CALCIUM SERPL-MCNC: 9.4 MG/DL (ref 8.5–10.1)
CHLORIDE SERPL-SCNC: 109 MMOL/L (ref 94–109)
CO2 SERPL-SCNC: 26 MMOL/L (ref 20–32)
CREAT SERPL-MCNC: 0.8 MG/DL (ref 0.52–1.04)
GFR SERPL CREATININE-BSD FRML MDRD: 78 ML/MIN/{1.73_M2}
GLUCOSE SERPL-MCNC: 110 MG/DL (ref 70–99)
POTASSIUM SERPL-SCNC: 3.9 MMOL/L (ref 3.4–5.3)
PROT SERPL-MCNC: 7.6 G/DL (ref 6.8–8.8)
SODIUM SERPL-SCNC: 141 MMOL/L (ref 133–144)
SPECIMEN SOURCE: NORMAL
T4 FREE SERPL-MCNC: 1.12 NG/DL (ref 0.76–1.46)
TSH SERPL DL<=0.005 MIU/L-ACNC: 0.17 MU/L (ref 0.4–4)

## 2019-08-14 ASSESSMENT — ACTIVITIES OF DAILY LIVING (ADL): DEPENDENT_IADLS:: INDEPENDENT

## 2019-08-14 NOTE — TELEPHONE ENCOUNTER
Spoke with Marilee, RN--who works with Sally.  Marilee will relay our concerns to Sally. I'm unsure if Fely is taking her medications.     Marilee reports Fely has ARMS worker    Seeing therapist at [facility]    8/7/19 saw Sally for the first time. Has appt 9/4/19.
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
## 2019-08-19 ENCOUNTER — TELEPHONE (OUTPATIENT)
Dept: FAMILY MEDICINE | Facility: CLINIC | Age: 64
End: 2019-08-19

## 2019-08-19 NOTE — TELEPHONE ENCOUNTER
Received 5 page fax for Home Health Certification and Plan of Care for Dr Zayas to complete. Form in the in-basket at [facility] in AA's folder.

## 2019-08-26 ENCOUNTER — PATIENT OUTREACH (OUTPATIENT)
Dept: FAMILY MEDICINE | Facility: CLINIC | Age: 64
End: 2019-08-26

## 2019-08-26 NOTE — TELEPHONE ENCOUNTER
PAL outreach complete. Spoke to patient, she would like to call me back later. She expressed appreciation for the outreach and will call back with any needs or concerns.

## 2019-09-10 ENCOUNTER — TELEPHONE (OUTPATIENT)
Dept: FAMILY MEDICINE | Facility: CLINIC | Age: 64
End: 2019-09-10

## 2019-09-10 NOTE — TELEPHONE ENCOUNTER
[facility] now requests orders and shares plan of care/discharge summaries for some patients through [employer].  Please REPLY TO THIS MESSAGE OR ROUTE BACK TO THE AUTHOR in order to give authorization for orders when needed.  This is considered a verbal order, you will still receive a faxed copy of orders for signature.  Thank you for your assistance in improving collaboration for our patients.    ORDER, SN 1 X weekly X 9 weeks 3 as needed.     MD SUMMARY/PLAN OF CARE, skilled nurse visits weekly for assessment / needed education/ medication management / weekly set up/ coordination of cares /      DISCHARGE SUMMARY, NA ongoing visits planned.

## 2019-09-12 ENCOUNTER — PATIENT OUTREACH (OUTPATIENT)
Dept: CARE COORDINATION | Facility: CLINIC | Age: 64
End: 2019-09-12

## 2019-09-12 ASSESSMENT — ACTIVITIES OF DAILY LIVING (ADL): DEPENDENT_IADLS:: INDEPENDENT

## 2019-09-12 NOTE — PROGRESS NOTES
Clinic Care Coordination Contact  [facility]/Voicemail    Referral Source: PCP  Clinical Data: Care Coordinator Outreach  Outreach attempted x 1.  Left message on patient's voicemail with call back information and requested return call.  Plan: Care Coordinator will send care coordination introduction letter with care coordinator contact information and explanation of care coordination services via mail. Care Coordinator will try to reach patient again in 3-5 business days.      MICHAEL Mendiola   Care Coordination Team  [phone]

## 2019-09-12 NOTE — LETTER
[place] CARE COORDINATION  [facility]   September 12, 2019    Mariela Duffy  [address] [facility] [address]      Dear Mariela,    I am a clinic care coordinator who works with Estefany Stanley PA-C at [facility] . I wanted to introduce myself and provide you with my contact information for you to be able to call me with any questions or concerns. I wanted to introduce myself and provide you with my contact information so that you can call me with questions or concerns about your health care. Below is a description of clinic care coordination and how I can further assist you.     The clinic care coordinator is a registered nurse and/or  who understand the health care system. The goal of clinic care coordination is to help you manage your health and improve access to the [facility] system in the most efficient manner. The registered nurse can assist you in meeting your health care goals by providing education, coordinating services, and strengthening the communication among your providers. The  can assist you with financial, behavioral, psychosocial, chemical dependency, counseling, and/or other community resources.    Please feel free to contact me at [phone], with any questions or concerns. We at [facility] are focused on providing you with the highest-quality healthcare experience possible and that all starts with you.     Sincerely,     MICHAEL Owens    Enclosed: I have enclosed a copy of a 24 Hour Access Plan. This has helpful phone numbers for you to call when needed. Please keep this in an easy to access place to use as needed.

## 2019-09-12 NOTE — LETTER
Health Care Home - Access Care Plan    About Me:    Patient Name:  Mariela Edmond    YOB: 1955  Age:                      63 year old   [facility] MRN:     [id number] Telephone Information:   Home Phone [phone]   Mobile [phone]       Address:  [address] [place] [place] [facility] [address] Email address:  [email or web address]      Emergency Contact(s)   Name Relationship Lgl Grd Work Phone Home Phone Mobile Phone   1. ERIN EDMOND Son   [phone]    2. LISA EDMOND Daughter   [phone]   3. ERNESTO, VASQUEZ Son   [phone]   4. BEV STANLEY Friend   [phone]             Health Maintenance:      My Access Plan  Medical Emergency 911   Questions or concerns during clinic hours Primary Clinic Line, I will call the clinic directly: [facility] - [phone]   24 Hour Appointment Line [phone] or  [phone]) (toll free)   24 Hour Nurse Line [phone] (toll free)   Questions or concerns outside clinic hours 24 Hour Appointment Line, I will call the after-hours on-call line:   [facility] [phone] or [phone] ([phone]) (toll-free)   Preferred Urgent Care [facility], [phone]   Preferred Hospital [facility]  [phone]   Preferred Pharmacy [facility] [place], [place] - [address]     Behavioral Health Crisis Line The National Suicide Prevention Lifeline at 1-275.589.2357 or 911                     My Care Team Members  Patient Care Team       Relationship Specialty Notifications Start End    Estefany Stanley PA-C PCP - General Physician Assistant  3/21/19     Phone: [phone] Fax: [phone] [address] [facility] [address]    Hospice, [facility]    8/1/18     Fax: [phone]          [address] [place] [address]    Estefany Stanley PA-C  Assigned PCP   6/9/19     Phone: [phone] Fax: [phone]         [address] [facility] [address]    Crystal Ritchie, MSW Personal Advocate & Liaison (PAL) Primary Care - [facility] Admissions 8/14/19     Phone: [phone] Fax: [phone]        Kathryn Scales, LSW Clinic Care Coordinator Primary Care - CC  8/14/19     Phone: [phone]                My Medical and Care Information  Problem List   Patient Active Problem List   Diagnosis     Contact dermatitis and other eczema due to other specified agent     Allergic rhinitis due to other allergen     Esophageal reflux     Irritable bowel syndrome     Rosacea     Postsurgical hypothyroidism     Iron deficiency anemia     Absence of menstruation     Mild major depression (H)     CARDIOVASCULAR SCREENING; LDL GOAL LESS THAN 160     Generalized anxiety disorder     Hypothyroidism     Tubular adenoma of colon     Seasonal affective disorder (H)     Rhinitis     Headache     ADD (attention deficit disorder)     Other insomnia     TIA (transient ischemic attack)     Gastroesophageal reflux disease with esophagitis     Hiatal hernia     History of colonic polyps     BP check     Migraine with aura and without status migrainosus, not intractable     History of thyroid cancer     Mild cognitive impairment     Peripheral polyneuropathy     Vitamin D deficiency     Confusion      Current Medications and Allergies:  See printed Medication Report

## 2019-09-16 ENCOUNTER — TELEPHONE (OUTPATIENT)
Dept: FAMILY MEDICINE | Facility: CLINIC | Age: 64
End: 2019-09-16

## 2019-09-16 NOTE — TELEPHONE ENCOUNTER
Received 6 page fax for Home Health Certification and Plan of Care for Dr Zayas to complete. Firm in the ib-basket at [facility] in AA's folder.

## 2019-09-19 DIAGNOSIS — Z53.9 DIAGNOSIS NOT YET DEFINED: Primary | ICD-10-CM

## 2019-09-19 PROCEDURE — G0179 MD RECERTIFICATION HHA PT: HCPCS | Performed by: FAMILY MEDICINE

## 2019-09-25 ENCOUNTER — HOSPITAL ENCOUNTER (EMERGENCY)
Facility: CLINIC | Age: 64
Discharge: HOME | End: 2019-09-26
Attending: EMERGENCY MEDICINE | Admitting: EMERGENCY MEDICINE
Payer: COMMERCIAL

## 2019-09-25 DIAGNOSIS — E86.0 DEHYDRATION: ICD-10-CM

## 2019-09-25 DIAGNOSIS — R19.7 DIARRHEA, UNSPECIFIED TYPE: ICD-10-CM

## 2019-09-25 LAB
ALBUMIN SERPL-MCNC: 4.1 G/DL (ref 3.4–5)
ALP SERPL-CCNC: 104 U/L (ref 40–150)
ALT SERPL W P-5'-P-CCNC: 22 U/L (ref 0–50)
ANION GAP SERPL CALCULATED.3IONS-SCNC: 7 MMOL/L (ref 3–14)
AST SERPL W P-5'-P-CCNC: 18 U/L (ref 0–45)
BASOPHILS # BLD AUTO: 0 10E9/L (ref 0–0.2)
BASOPHILS NFR BLD AUTO: 0.5 %
BILIRUB SERPL-MCNC: 0.6 MG/DL (ref 0.2–1.3)
BUN SERPL-MCNC: 13 MG/DL (ref 7–30)
CALCIUM SERPL-MCNC: 9.2 MG/DL (ref 8.5–10.1)
CHLORIDE SERPL-SCNC: 107 MMOL/L (ref 94–109)
CO2 SERPL-SCNC: 26 MMOL/L (ref 20–32)
CREAT SERPL-MCNC: 0.94 MG/DL (ref 0.52–1.04)
DIFFERENTIAL METHOD BLD: NORMAL
EOSINOPHIL # BLD AUTO: 0.3 10E9/L (ref 0–0.7)
EOSINOPHIL NFR BLD AUTO: 4.4 %
ERYTHROCYTE [DISTWIDTH] IN BLOOD BY AUTOMATED COUNT: 13.6 % (ref 10–15)
GFR SERPL CREATININE-BSD FRML MDRD: 64 ML/MIN/{1.73_M2}
GLUCOSE SERPL-MCNC: 121 MG/DL (ref 70–99)
HCT VFR BLD AUTO: 40.6 % (ref 35–47)
HGB BLD-MCNC: 13.7 G/DL (ref 11.7–15.7)
IMM GRANULOCYTES # BLD: 0.1 10E9/L (ref 0–0.4)
IMM GRANULOCYTES NFR BLD: 0.7 %
LACTATE BLD-SCNC: 0.7 MMOL/L (ref 0.7–2)
LIPASE SERPL-CCNC: 122 U/L (ref 73–393)
LYMPHOCYTES # BLD AUTO: 2.1 10E9/L (ref 0.8–5.3)
LYMPHOCYTES NFR BLD AUTO: 28.3 %
MCH RBC QN AUTO: 28.6 PG (ref 26.5–33)
MCHC RBC AUTO-ENTMCNC: 33.7 G/DL (ref 31.5–36.5)
MCV RBC AUTO: 85 FL (ref 78–100)
MONOCYTES # BLD AUTO: 0.6 10E9/L (ref 0–1.3)
MONOCYTES NFR BLD AUTO: 7.6 %
NEUTROPHILS # BLD AUTO: 4.3 10E9/L (ref 1.6–8.3)
NEUTROPHILS NFR BLD AUTO: 58.5 %
NRBC # BLD AUTO: 0 10*3/UL
NRBC BLD AUTO-RTO: 0 /100
PLATELET # BLD AUTO: 294 10E9/L (ref 150–450)
POTASSIUM SERPL-SCNC: 3.2 MMOL/L (ref 3.4–5.3)
PROT SERPL-MCNC: 7.3 G/DL (ref 6.8–8.8)
RBC # BLD AUTO: 4.79 10E12/L (ref 3.8–5.2)
SODIUM SERPL-SCNC: 140 MMOL/L (ref 133–144)
WBC # BLD AUTO: 7.3 10E9/L (ref 4–11)

## 2019-09-25 PROCEDURE — 81001 URINALYSIS AUTO W/SCOPE: CPT | Performed by: EMERGENCY MEDICINE

## 2019-09-25 PROCEDURE — 25000128 H RX IP 250 OP 636: Performed by: EMERGENCY MEDICINE

## 2019-09-25 PROCEDURE — 80053 COMPREHEN METABOLIC PANEL: CPT | Performed by: EMERGENCY MEDICINE

## 2019-09-25 PROCEDURE — 99283 EMERGENCY DEPT VISIT LOW MDM: CPT | Mod: 25

## 2019-09-25 PROCEDURE — 96360 HYDRATION IV INFUSION INIT: CPT

## 2019-09-25 PROCEDURE — 87086 URINE CULTURE/COLONY COUNT: CPT | Performed by: EMERGENCY MEDICINE

## 2019-09-25 PROCEDURE — 83605 ASSAY OF LACTIC ACID: CPT | Performed by: EMERGENCY MEDICINE

## 2019-09-25 PROCEDURE — 25000132 ZZH RX MED GY IP 250 OP 250 PS 637: Performed by: EMERGENCY MEDICINE

## 2019-09-25 PROCEDURE — 83690 ASSAY OF LIPASE: CPT | Performed by: EMERGENCY MEDICINE

## 2019-09-25 PROCEDURE — 85025 COMPLETE CBC W/AUTO DIFF WBC: CPT | Performed by: EMERGENCY MEDICINE

## 2019-09-25 RX ORDER — POTASSIUM CHLORIDE 1.5 G/1.58G
20 POWDER, FOR SOLUTION ORAL ONCE
Status: COMPLETED | OUTPATIENT
Start: 2019-09-25 | End: 2019-09-25

## 2019-09-25 RX ORDER — DIPHENOXYLATE HCL/ATROPINE 2.5-.025MG
1 TABLET ORAL ONCE
Status: COMPLETED | OUTPATIENT
Start: 2019-09-25 | End: 2019-09-25

## 2019-09-25 RX ADMIN — SODIUM CHLORIDE 1000 ML: 9 INJECTION, SOLUTION INTRAVENOUS at 23:00

## 2019-09-25 RX ADMIN — DIPHENOXYLATE HYDROCHLORIDE AND ATROPINE SULFATE 1 TABLET: 2.5; .025 TABLET ORAL at 23:53

## 2019-09-25 RX ADMIN — POTASSIUM CHLORIDE 20 MEQ: 1.5 POWDER, FOR SOLUTION ORAL at 23:53

## 2019-09-25 ASSESSMENT — ENCOUNTER SYMPTOMS
ABDOMINAL PAIN: 1
FEVER: 0
DIARRHEA: 1
HEMATURIA: 0
VOMITING: 0
DYSURIA: 0

## 2019-09-25 NOTE — ED AVS SNAPSHOT
[facility] Emergency Department  [address]  [facility] [address]  Phone:  [phone]  Fax:  [phone]                                    Mariela Duffy   MRN: [id number]    Department:  [facility] Emergency Department   Date of Visit:  9/25/2019           After Visit Summary Signature Page    I have received my discharge instructions, and my questions have been answered. I have discussed any challenges I see with this plan with the nurse or doctor.    ..........................................................................................................................................  Patient/Patient Representative Signature      ..........................................................................................................................................  Patient Representative Print Name and Relationship to Patient    ..................................................               ................................................  Date                                   Time    ..........................................................................................................................................  Reviewed by Signature/Title    ...................................................              ..............................................  Date                                               Time          22EPIC Rev 08/18

## 2019-09-26 ENCOUNTER — PATIENT OUTREACH (OUTPATIENT)
Dept: FAMILY MEDICINE | Facility: CLINIC | Age: 64
End: 2019-09-26

## 2019-09-26 VITALS
SYSTOLIC BLOOD PRESSURE: 128 MMHG | HEIGHT: 69 IN | TEMPERATURE: 97.8 F | RESPIRATION RATE: 18 BRPM | HEART RATE: 86 BPM | WEIGHT: 185 LBS | OXYGEN SATURATION: 100 % | DIASTOLIC BLOOD PRESSURE: 81 MMHG | BODY MASS INDEX: 27.4 KG/M2

## 2019-09-26 VITALS
HEART RATE: 91 BPM | RESPIRATION RATE: 18 BRPM | OXYGEN SATURATION: 94 % | TEMPERATURE: 97 F | DIASTOLIC BLOOD PRESSURE: 76 MMHG | SYSTOLIC BLOOD PRESSURE: 108 MMHG

## 2019-09-26 DIAGNOSIS — F41.0 ANXIETY ATTACK: ICD-10-CM

## 2019-09-26 DIAGNOSIS — R51.9 LEFT-SIDED HEADACHE: ICD-10-CM

## 2019-09-26 LAB
ALBUMIN UR-MCNC: NEGATIVE MG/DL
APPEARANCE UR: ABNORMAL
BACTERIA #/AREA URNS HPF: ABNORMAL /HPF
BILIRUB UR QL STRIP: NEGATIVE
COLOR UR AUTO: YELLOW
GLUCOSE UR STRIP-MCNC: NEGATIVE MG/DL
HGB UR QL STRIP: NEGATIVE
KETONES UR STRIP-MCNC: NEGATIVE MG/DL
LEUKOCYTE ESTERASE UR QL STRIP: ABNORMAL
MUCOUS THREADS #/AREA URNS LPF: PRESENT /LPF
NITRATE UR QL: NEGATIVE
PH UR STRIP: 5 PH (ref 5–7)
RBC #/AREA URNS AUTO: 4 /HPF (ref 0–2)
SOURCE: ABNORMAL
SP GR UR STRIP: 1.02 (ref 1–1.03)
SQUAMOUS #/AREA URNS AUTO: 15 /HPF (ref 0–1)
UROBILINOGEN UR STRIP-MCNC: NORMAL MG/DL (ref 0–2)
WBC #/AREA URNS AUTO: 13 /HPF (ref 0–5)

## 2019-09-26 PROCEDURE — 99283 EMERGENCY DEPT VISIT LOW MDM: CPT

## 2019-09-26 PROCEDURE — 25000132 ZZH RX MED GY IP 250 OP 250 PS 637: Performed by: EMERGENCY MEDICINE

## 2019-09-26 RX ORDER — ACETAMINOPHEN 500 MG
1000 TABLET ORAL ONCE
Status: COMPLETED | OUTPATIENT
Start: 2019-09-26 | End: 2019-09-26

## 2019-09-26 RX ORDER — HYDROXYZINE HYDROCHLORIDE 25 MG/1
25 TABLET, FILM COATED ORAL ONCE
Status: COMPLETED | OUTPATIENT
Start: 2019-09-26 | End: 2019-09-26

## 2019-09-26 RX ADMIN — ACETAMINOPHEN 1000 MG: 500 TABLET, FILM COATED ORAL at 15:47

## 2019-09-26 RX ADMIN — HYDROXYZINE HYDROCHLORIDE 25 MG: 25 TABLET, FILM COATED ORAL at 15:47

## 2019-09-26 ASSESSMENT — ENCOUNTER SYMPTOMS
HEADACHES: 1
SHORTNESS OF BREATH: 0
NERVOUS/ANXIOUS: 1
WEAKNESS: 0
NUMBNESS: 0

## 2019-09-26 ASSESSMENT — MIFFLIN-ST. JEOR: SCORE: 1458.53

## 2019-09-26 NOTE — ED PROVIDER NOTES
History     Chief Complaint:  Abdominal Pain & Diarrhea     HPI   Mariela Duffy is a 63 year old female, with a history of IBS, GERD, and s/p cholecystectomy, who presents to the ED for evaluation of lower abdominal pain and diarrhea. The patient reports she developed abdominal pain a couple days ago. She also has been having nonbloody diarrhea for the past 3 days. She normally does not have abdominal pain with her IBS. She has been hydrating, but her urine output has decreased. The patient denies any recent antibiotics, fever, vomiting, dysuria, or hematuria. No blood in the stools.  Abdominal pain is intermittent and crampy.  Loose stools.  Normal urination.  No fevers.  Pain is better currently with no fevers.  Nausea but no vomiting.      Allergies:  Levaquin: nausea & vomiting      Medications:    Albuterol neb  Fosamax  Aspirin 81mg  Buspar  Calcium   Atarax  Levothyroxine   Namenda  Pamelor  Protonix  Seroquel   Zantac  Zoloft  Imitrex  Vitamin D2    Past Medical History:      Colon tubular adenoma   ADD  TIA  Hiatal hernia  Migraine   Peripheral polyneuropathy   Contact dermatitis & eczema   Depression     Colon diverticulosis     GERD  IBS     Anxiety    Blood transfusion      Colonic polyps           Thyroid gland cancer   Anemia, iron deficiency   Hypothyroidism      Past Surgical History:    Hiatal herniorrhaphy  Cholecystectomy   Cleft lip repair  ENT surgery   Thyroidectomy   Right wrist surgery     Family History:    Colon cancer, stomach cancer, HTN, CAD,  75: father  Throat cancer, lymph cancer, bone cancer,  79: mother    Social History:  Smoking status: Former smoker, Quit   Alcohol use: Yes   Presents to ED alone    Marital Status:   [4]     Review of Systems   Constitutional: Negative for fever.   Gastrointestinal: Positive for abdominal pain and diarrhea. Negative for vomiting.   Genitourinary: Positive for decreased urine volume. Negative for dysuria and hematuria.    All other systems reviewed and are negative.  Patient states she has diarrhea and poor intake for three days.    Last urination about 12 hours ago.      No melena or hematochezia.    Physical Exam     Patient Vitals for the past 24 hrs:   BP Temp Temp src Pulse Resp SpO2   09/25/19 2355 108/76 -- -- 91 18 94 %   09/25/19 2203 123/89 97  F (36.1  C) Oral 120 22 97 %     Physical Exam  GEN: patient anxious but conversive  HEAD: atraumatic, normocephalic  EYES: pupils reactive, conjunctivae normal  ENT: TMs flat and white bilaterally, oropharynx normal with no erythema or exudate, mucus membranes dry  NECK: no cervical LAD  RESPIRATORY: no tachypnea, breath sounds clear to auscultation (no rales, wheezes, rhonchi), chest wall nontender, normal phonation  CVS: normal S1/S2, no murmurs/rubs/gallops  ABDOMEN: soft, nontender, no masses or organomegaly, no rebound, decreased bowel sounds, no peritoneal signs  BACK: no costovertebral angle tenderness,  EXTREMITIES: intact pulses x 4, full range of motion at joints, no edema  MUSCULOSKELETAL: no deformities  SKIN: warm and dry, no acute rashes  NEURO: GCS 15, cranial nerves intact.  Motor- moves all 4 extremities.  Overall symmetrical exam  HEME: no bruising or petechiae/contusions  LYMPH: no lymphadenopathy    Emergency Department Course     Laboratory:  Laboratory findings were communicated with the patient who voiced understanding of the findings.    Lactic acid (2303): 0.7    Lipase: 122    CBC: o/w WNL (WBC 7.3, HGB 13.7, )  CMP: Potassium 3.2(L), Glucose 121(H), o/w WNL (Creatinine 0.94)     UA: Leukocyte esterase large, RBC 4(H), WBC 13(H), Bacteria many, Squamous epithelial 15(H), Mucous present, o/w Negative     Urine culture: In process     Interventions:  2300: NS 1L Bolus IV  2353: Lomotil 2.5-0.025mg 1 tablet PO  2353: Potassium chloride 20mEq PO  Heplock  Cardiac/Sp02 monitoring    Emergency Department Course:  Past medical records, nursing notes, and  vitals reviewed.  2227: I performed an exam of the patient and obtained history, as documented above.    2253: IV inserted and blood drawn. Patient provided a urine sample.     2347: Recheck.     0013: Recheck. Patient reports she feels better.     Findings and plan explained to the Patient. Patient discharged home with instructions regarding supportive care, medications, and reasons to return. The importance of close follow-up was reviewed.     /76   Pulse 91   Temp 97  F (36.1  C) (Oral)   Resp 18   LMP 01/20/2004   SpO2 94%   Positive PO challenge.     Patient improved and repeat abdominal exam soft.    Discussed results with patient.  Gave patient copies of all results (applicable labs, CT scans and/or ultrasounds).  Answered questions.      Impression & Plan      Medical Decision Making:  Fely is a pleasant 63 year old female who does have a history of irritable bowel syndrome who feels as though she has been having increasing cramping in addition to abdominal pain. She states there's not been on blood in her stool but is feeling increasingly crampy and uncomfortable. When she first arrived, she in fact was tachycardic and had not recently urinated. An IV was placed and labs were sent. She was given IV fluids resuscitation.   She was eventually- after the IV fluids- able to urinate for us. She was given 1 dose of Lomotil to help with the diarrhea. Her urine today show 13 white cells but there's many bacteria that's contaminated with epithelial cells. Urine culture is pending.  Lipase is negative showing no pancreatitis. CBC and chemistry panel were otherwise normal.     The patient is currently feeling better and repeat abdominal exam is soft. Likely, she was having abdominal pain secondary to the diarrhea and/or irritable bowel. She improved at this point, and I think she can be discharged home for follow-up.   Slight hypokalemia is noted, likely secondary to diarrhea.  Patient given PO potassium  supplementation.    Plan- hydration, immodium as needed.  Zofran for nausea.  Patient understands the reasons to return.    Diagnosis:    ICD-10-CM   1. Diarrhea, unspecified type R19.7   2. Dehydration E86.0   3. Abdominal pain    Disposition: Patient discharged to home     Kamryn Zuri  9/25/2019   [facility] EMERGENCY DEPARTMENT    Scribe Disclosure:  I, Kamryn Zuri, am serving as a scribe at 10:27 PM on 9/25/2019 to document services personally performed by Kiley Prather MD based on my observations and the provider's statements to me.        Kiley Prather MD  09/26/19 6141

## 2019-09-26 NOTE — ED TRIAGE NOTES
Patient states she has diarrhea and poor intake for three days.      Patient is very unsteady on her feet and a very poor historian.      ABCs intact.  Alert and oriented x 3.

## 2019-09-26 NOTE — ED AVS SNAPSHOT
[facility] Emergency Department  [address]  [facility] [address]  Phone:  [phone]  Fax:  [phone]                                    Mariela Duffy   MRN: [id number]    Department:  [facility] Emergency Department   Date of Visit:  9/26/2019           After Visit Summary Signature Page    I have received my discharge instructions, and my questions have been answered. I have discussed any challenges I see with this plan with the nurse or doctor.    ..........................................................................................................................................  Patient/Patient Representative Signature      ..........................................................................................................................................  Patient Representative Print Name and Relationship to Patient    ..................................................               ................................................  Date                                   Time    ..........................................................................................................................................  Reviewed by Signature/Title    ...................................................              ..............................................  Date                                               Time          22EPIC Rev 08/18

## 2019-09-26 NOTE — TELEPHONE ENCOUNTER
Patient called in response to my voicemail. She is feeling ok, acknowledged that she needs to drink water and Gatorade as directed by ED provider. She appreciated the follow up call and will reach out with any questions.

## 2019-09-26 NOTE — ED PROVIDER NOTES
History     Chief Complaint:  Anxiety      HPI   Mariela Duffy is a 63 year old female who presents via EMS with anxiety. Per chart review the patient was seen yesterday for dehydration. The patient says that she had a panic attack at 0800. She says that she got a call from Famely reminding her to take her medications. She says that she got really anxious after she forgot to taker her medications. The patient also says that some of the anxiety was when she forgot to mention that she hit her head yesterday and forgot to talk about it when she was at the ED. She says that she hit her head when she ran into a cupboard. She says that she has pain on the left side of her head. She denies any chest pain, shortness of breath, numbness or weakness, or any bleeding from hitting her head yesterday.       Allergies:  Levaquin     Medications:    Albuterol neb  Fosamax  Aspirin 81mg  Buspar  Calcium   Atarax  Levothyroxine   Namenda  Pamelor  Protonix  Seroquel   Zantac  Zoloft  Imitrex  Vitamin D2     Past Medical History:      Colon tubular adenoma   ADD  TIA  Hiatal hernia  Migraine   Peripheral polyneuropathy   Contact dermatitis & eczema   Depression     Colon diverticulosis     GERD  IBS     Anxiety    Blood transfusion      Colonic polyps           Thyroid gland cancer   Anemia, iron deficiency   Hypothyroidism       Past Surgical History:    Hiatal herniorrhaphy  Cholecystectomy   Cleft lip repair  ENT surgery   Thyroidectomy   Right wrist surgery    Family History:    Stomach cancer  hypertension  coronary artery disease  Throat cancer  Alzheimer disease   Thyroid disease     Social History:  Smoking Status: Former Smoker  Smokeless Tobacco: Never Used  Alcohol Use: Positive  Drug Use: Negative  PCP: Estefany Stanley   Marital Status:          Review of Systems   Respiratory: Negative for shortness of breath.    Cardiovascular: Negative for chest pain.   Neurological: Positive for headaches.  "Negative for weakness and numbness.   Psychiatric/Behavioral: The patient is nervous/anxious.    All other systems reviewed and are negative.      Physical Exam     Patient Vitals for the past 24 hrs:   BP Temp Temp src Pulse Heart Rate Resp SpO2 Height Weight   09/26/19 1525 128/81 -- -- 86 -- -- 100 % -- --   09/26/19 1524 128/81 97.8  F (36.6  C) Oral 86 86 18 96 % 1.753 m (5' 9\") 83.9 kg (185 lb)        Physical Exam   General: Alert, no acute distress; nontoxic appearing  Neuro:  PERRL.  EOMI.  Gait stable, no focal deficits; GCS 15  HEENT:  Moist mucous membranes.  Conjunctiva normal.   CV:  RRR, no m/r/g, skin warm and well perfused  Pulm:  CTAB, no wheezes/ronchi/rales.  No acute distress, breathing comfortably  GI:  Soft, nontender, nondistended.  No rebound or guarding.  Normal bowel sounds  MSK:  Moving all extremities.  No focal areas of edema, erythema, or tenderness  Skin:  WWP, no rashes, no lower extremity edema, skin color normal, no diaphoresis  Psych:  Well-appearing, normal affect, regular speech        Emergency Department Course     Interventions:  1547 Atarax 25 mg Oral   Tylenol 1000 mg Oral     Emergency Department Course:    1528 Nursing notes and vitals reviewed.    1535 I performed an exam of the patient as documented above.     1600 The patient is discharged to home.     Impression & Plan      Medical Decision Making:  Mariela Duffy is a 63 year old female who presents for evaluation of anxiety.  There is a history of anxiety in the past and they are on medications which she did not take in the ED.  She feels improved after interventions as noted above in the Emergency Department.  There is no signs at this point of a general medical problem causing anxiety (PE, hyperthyroidism, other metabolic derangement, infection, etc).  There are no signs of serious decompensation warranting psychiatric hospitalization or 72 hold (no suicidal or homicidal ideation, no danger to self).  Supportive " outpatient management is therefore indicated.          Diagnosis:    ICD-10-CM    1. Anxiety attack F41.0    2. Left-sided headache R51      Disposition:   Findings and plan explained to the Patient. Patient discharged home with instructions regarding supportive care, medications, and reasons to return. The importance of close follow-up was reviewed     Discharge Medications:  No discharge medications.      Scribe Disclosure:  I, Seng Lyons, am serving as a scribe at 3:29 PM on 9/26/2019 to document services personally performed by Jim Enrique MD based on my observations and the provider's statements to me.      [facility] EMERGENCY DEPARTMENT       iJm Enrique MD  09/26/19 2312

## 2019-09-26 NOTE — ED TRIAGE NOTES
"Pt arrives to the ED via EMS from home due to a panic attack that started around 0800. Pt states that she did not take her normal anxiety meds today since she \"forgot\" and was \"to anxious\". Pt states she was seen yesterday for dehydration and forgot to tell them that she hit her head and has head pain. Denies LOC. States she has not taken any ibuprofen or tylenol today.   "

## 2019-09-27 LAB
BACTERIA SPEC CULT: NORMAL
SPECIMEN SOURCE: NORMAL

## 2019-09-27 NOTE — RESULT ENCOUNTER NOTE
Final urine culture report is NEGATIVE per [facility] Result protocol.    If NEGATIVE result, no change in treatment, per [facility] Result protocol.

## 2019-10-01 ENCOUNTER — PATIENT OUTREACH (OUTPATIENT)
Dept: CARE COORDINATION | Facility: CLINIC | Age: 64
End: 2019-10-01

## 2019-10-01 DIAGNOSIS — R51.9 LEFT-SIDED HEADACHE: ICD-10-CM

## 2019-10-01 DIAGNOSIS — F41.0 ANXIETY ATTACK: Primary | ICD-10-CM

## 2019-10-01 ASSESSMENT — ACTIVITIES OF DAILY LIVING (ADL): DEPENDENT_IADLS:: INDEPENDENT

## 2019-10-01 NOTE — PROGRESS NOTES
Clinic Care Coordination Contact  [facility]/Voicemail    Referral Source: PCP  Clinical Data: Care Coordinator Outreach  Outreach attempted x 1.  Left message on patient's voicemail with call back information and requested return call.  Plan: Care Coordinator will send care coordination introduction letter with care coordinator contact information and explanation of care coordination services via mail. Care Coordinator will try to reach patient again in 1-2 business days.      MICHAEL Mendiola   Care Coordination Team  [phone]

## 2019-10-02 DIAGNOSIS — K58.0 IRRITABLE BOWEL SYNDROME WITH DIARRHEA: ICD-10-CM

## 2019-10-02 RX ORDER — NORTRIPTYLINE HYDROCHLORIDE 50 MG/1
CAPSULE ORAL
Qty: 90 CAPSULE | Refills: 2 | Status: ON HOLD | OUTPATIENT
Start: 2019-10-02 | End: 2020-01-24

## 2019-10-02 NOTE — TELEPHONE ENCOUNTER
"Requested Prescriptions   Pending Prescriptions Disp Refills     nortriptyline (PAMELOR) 50 MG capsule [Pharmacy Med Name: NORTRIPTYLINE HCL 50MG CAPS] 90 capsule 1     Sig: TAKE ONE CAPSULE BY MOUTH EVERY NIGHT AT BEDTIME       Tricyclic Agents ( Annual appt and no PHQ9) Passed - 10/2/2019  8:41 AM        Passed - Blood Pressure under 140/90 in past 12 mos     BP Readings from Last 3 Encounters:   09/26/19 128/81   09/25/19 108/76   08/13/19 110/71                 Passed - Recent (12 mo) or future (30 days) visit within authorizing provider's specialty     Patient has had an office visit with the authorizing provider or a provider within the authorizing providers department within the previous 12 mos or has a future within next 30 days. See \"Patient Info\" tab in inbasket, or \"Choose Columns\" in Meds & Orders section of the refill encounter.              Passed - Medication is active on med list        Passed - Patient is age 18 or older        Passed - Patient is not pregnant        Passed - No positive pregnancy test on record in past 12 mos          Last Written Prescription Date:  03/01/2019  Last Fill Quantity: 90,  # refills: 1   Last office visit: 8/13/2019-Estefany Stanley with prescribing provider:    Future Office Visit:   Next 5 appointments (look out 90 days)    Dec 05, 2019  8:50 AM CST  Pharmacist visit with Jerry Jordan RPH, JERRY EXAM ROOM 01  [facility] ([facility]) [address] [place] [id number] [phone]           "

## 2019-10-30 ENCOUNTER — HEALTH MAINTENANCE LETTER (OUTPATIENT)
Age: 64
End: 2019-10-30

## 2019-11-01 ENCOUNTER — PATIENT OUTREACH (OUTPATIENT)
Dept: CARE COORDINATION | Facility: CLINIC | Age: 64
End: 2019-11-01

## 2019-11-01 ASSESSMENT — ACTIVITIES OF DAILY LIVING (ADL)
DEPENDENT_IADLS:: INDEPENDENT;MEDICATION MANAGEMENT
DEPENDENT_IADLS:: INDEPENDENT
DEPENDENT_IADLS:: INDEPENDENT

## 2019-11-01 NOTE — LETTER
[facility]  Complex Care Plan  About Me:    Patient Name:  Marilea Edmond    YOB: 1955  Age:         64 year old   [facility] MRN:    [id number] Telephone Information:  Home Phone [phone]   Mobile [phone]       Address:  [address] [facility] [address] Email address:  [email or web address]      Emergency Contact(s)    Name Relationship Lgl Grd Work Phone Home Phone Mobile Phone   1. ERIN EDMOND Son   [phone]    2. LISA EDMOND Daughter   [phone]   3. VASQUEZ OROZCO Son   [phone]   4. JADENBEV Friend   [phone]           Primary language:  English     needed? No   [facility]:  [phone] op. 1  Other communication barriers: None  Preferred Method of Communication:  Mail  Current living arrangement: I live in a private home  Mobility Status/ Medical Equipment: Independent    Health Maintenance  Health Maintenance Reviewed: Not assessed    My Access Plan  Medical Emergency 911   Primary Clinic Line [facility] - [phone]   24 Hour Appointment Line [phone] or  [phone]) (toll-free)   24 Hour Nurse Line [phone] (toll-free)   Preferred Urgent Care [facility], [phone]   Preferred Hospital [facility]  [phone]   Preferred Pharmacy [facility] [place], [place] - [address]     Behavioral Health Crisis Line The National Suicide Prevention Lifeline at 1-899.672.7686 or 911             My Care Team Members  Patient Care Team       Relationship Specialty Notifications Start End    Estefany Stanley PA-C PCP - General Physician Assistant  3/21/19     Phone: [phone] Fax: [phone] [address] [facility] [address]    Hospice, [facility]    8/1/18     Fax: [phone]          [address] [place] [address]  [facility] [address]    Estefany Stanley PA-C Assigned PCP   6/9/19     Phone: [phone] Fax: [phone] [address] [facility] [address]    Crystal Ritchie, MSW Personal Advocate & Liaison (PAL) Primary Care - [facility] Admissions 8/14/19     Phone: [phone] Fax: [phone]        Kathryn Scales LSW Lead Care Coordinator Primary Care - [facility]  11/1/19     Phone: [phone]                 My Care Plans  Self Management and Treatment Plan  Goals and (Comments)  Goals        General    Mental Health Management (pt-stated)     Notes - Note created  11/1/2019 11:57 AM by Kathryn Scales LSW    Goal Statement: I will continue to manage my anxiety and I will not feel the darryl to go to the emergency room for anxiety.    Measure of Success: I will report that I am doing well with managing my anxiety.   Supportive Steps to Achieve:     I will continue to do activities I enjoy including playing the guitar, reading my bible and doing water aerobics.    I will continue to work with my ACP providers.  Barriers: None identified.  Strengths: I have a good support network of friends and family.  Date to Achieve By: 1/30/19  Patient expressed understanding of goal: Yes        Other (pt-stated)     Notes - Note edited  11/1/2019 11:22 AM by Kathryn Scales LSW    Goal Statement: I will find affordable new housing  Measure of Success: I will be in a new place near one of my children.   Supportive Steps to Achieve:     I will use housing search resources that I know of and the care coordinator will send me a list of additional housing resources including the Senior Housing guide, [facility] Subsidized housing listing, and  [facility] emergency housing information.    I am am on the [facility] Section 8 Housing list.    Barriers: I need to be out of my current place by 12/2/19.  Strengths: I have supportive family and friends.  Date to Achieve By: 12/30/19  Patient expressed understanding of goal: Yes                Action Plans on File:            Depression          Advance Care Plans/Directives Type:        My Medical and Care Information  Problem List   Patient Active Problem List   Diagnosis     Contact dermatitis and other eczema due to other specified agent     Allergic rhinitis due to other allergen     Esophageal reflux     Irritable bowel syndrome     Rosacea     Postsurgical hypothyroidism     Iron deficiency anemia     Absence of menstruation     Mild major depression (H)     CARDIOVASCULAR SCREENING; LDL GOAL LESS THAN 160     Generalized anxiety disorder     Hypothyroidism     Tubular adenoma of colon     Seasonal affective disorder (H)     Rhinitis     Headache     ADD (attention deficit disorder)     Other insomnia     TIA (transient ischemic attack)     Gastroesophageal reflux disease with esophagitis     Hiatal hernia     History of colonic polyps     BP check     Migraine with aura and without status migrainosus, not intractable     History of thyroid cancer     Mild cognitive impairment     Peripheral polyneuropathy     Vitamin D deficiency     Confusion      Current Medications and Allergies:  See printed Medication Report.    Care Coordination Start Date: 11/1/2019   Frequency of Care Coordination: monthly   Form Last Updated: 11/01/2019

## 2019-11-01 NOTE — LETTER
[place] CARE COORDINATION  [facility]   November 1, 2019    Mariela Duffy  [address] [facility] [address]      Dear Mariela,    I am a clinic care coordinator who works with Estefany Stanley PA-C at [facility] . I wanted to thank you for spending the time to talk with me.  I wanted to provide you with my contact information so that you can call me with questions or concerns about your health care. Below is a description of clinic care coordination and how I can further assist you.     The clinic care coordinator is a registered nurse and/or  who understand the health care system. The goal of clinic care coordination is to help you manage your health and improve access to the [facility] system in the most efficient manner. The registered nurse can assist you in meeting your health care goals by providing education, coordinating services, and strengthening the communication among your providers. The  can assist you with financial, housing, chemical dependency, counseling, and/or other community resources.    Please feel free to contact me at [phone], with any questions or concerns. We at [facility] are focused on providing you with the highest-quality healthcare experience possible and that all starts with you.     Sincerely,     MICHAEL Owens    Enclosed: I have enclosed a copy of the Complex Care Plan. This has helpful information and goals that we have talked about. Please keep this in an easy to access place to use as needed.

## 2019-11-01 NOTE — PROGRESS NOTES
Clinic Care Coordination Contact    Clinic Care Coordination Contact  OUTREACH    Referral Information:  Referral Source: PCP    Primary Diagnosis: Behavioral Health    Chief Complaint   Patient presents with     Clinic Care Coordination - Post Hospital     Anxiety        Universal Utilization: 79 % Risk of Admission or ED Visit.    Clinic Utilization  Difficulty keeping appointments:: No  Compliance Concerns: No  No-Show Concerns: No  No PCP office visit in Past Year: No  Utilization    Last refreshed: 11/1/2019 11:19 AM:  Hospital Admissions 1           Last refreshed: 11/1/2019 11:19 AM:  ED Visits 3           Last refreshed: 11/1/2019 11:19 AM:  No Show Count (past year) 2              Current as of: 11/1/2019 11:19 AM              Clinical Concerns:  Current Medical Concerns:  Last ED visit was on 9/26/19 when she presented with anxiety and left-sided headache.. She was sen in the ED the day prior to that (on 9/25/19 for diarrhea, dehydration, and abdominal pain.    Current Behavioral Concerns: Recently presented in ED for anxiety concerns. She reports that she has been reducing her stress by doing what she enjoys including playing her guitar, going to the gym to do water arobics,  and reading her bible.  She reports no concerns with anxiety during call today.  She works with a therapist and Sally Garcia  at [facility].       Fely is currently living in same place and has given notice with a move out date planned for 12/2/19. She  She is moving because her roommate is charging $1,000 for rent and utilities for renting a room in her townhouse.  She has not yet secured new housing and has been checking on [email or web address].  She hopes to find new housing near her children in [place] or [place].  Fely continues to receive skilled nursing for assistance with medication management. 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 SW CC asssisted Fely with scheduling a  physical with PCP.        Education Provided to patient: Discussed housing resources and will mail her housing resources.   Pain  Pain (GOAL):: No  Health Maintenance Reviewed: Not assessed  Clinical Pathway: None    Medication Management:  Manages with SNV.     Functional Status:  Dependent ADLs:: Independent  Dependent IADLs:: Independent, Medication Management  Bed or wheelchair confined:: No  Mobility Status: Independent  Fallen 2 or more times in the past year?: No  Any fall with injury in the past year?: No    Living Situation:  Current living arrangement:: I live in a private home  Type of residence:: [facility]    Diet/Exercise/Sleep:  Inadequate nutrition (GOAL):: No  Food Insecurity: No  Tube Feeding: No  Exercise:: Yes  Days per week of moderate to strenuous exercise (like a brisk walk): 3  On average, minutes per day of exercise at this level: 40  How intense was your typical exercise? : Moderate (like brisk walking)  Exercise Minutes per Week: 120  Inadequate activity/exercise (GOAL):: No  Significant changes in sleep pattern (GOAL): No    Transportation:  Transportation concerns (GOAL):: No  Transportation means:: Accessible car     Psychosocial:  Baptism or spiritual beliefs that impact treatment:: No  Mental health DX:: Yes  Mental health DX how managed:: Medication, Outpatient Counseling, Psychiatrist  Mental health management concern (GOAL):: No  Informal Support system:: Children, Friends     Financial/Insurance:   Financial/Insurance concerns (GOAL):: No       Resources and Interventions:  Current Resources:   List of home care services:: Skilled Nursing;   Community Resources: OP Mental Health  Supplies used at home:: None  Equipment Currently Used at Home: none    Advance Care Plan/Directive  Advanced Care Plans/Directives on file:: No    Referrals Placed: None     Goals:   Goals        General    Other (pt-stated)     Notes - Note edited  11/1/2019 11:22 AM by Kathryn Scales LSW    Goal  Statement: I will find affordable new housing  Measure of Success: I will be in a new place near one of my children.   Supportive Steps to Achieve:     I will use housing search resources that I know of and the care coordinator will send me a list of additional housing resources including the Senior Housing guide, [facility] Subsidized housing listing, and  [facility] emergency housing information.    I am am on the [facility] Section 8 Housing list.    Barriers: I need to be out of my current place by 12/2/19.  Strengths: I have supportive family and friends.  Date to Achieve By: 12/30/19  Patient expressed understanding of goal: Yes                Patient/Caregiver understanding: Fely expressed understanding of what was discussed and AIDET communication was used during the encounter.    Outreach Frequency: monthly  Future Appointments              In 1 month Jerry Jordan RPH; CR EXAM ROOM 01 [facility], JERRY    In 1 month Estefany Stanley PA-C; CR EXAM ROOM 01 [facility]           Plan: Fely plans to see PCP on 12/17/19.  She will continue to work with her ACP team and will continue doing activities she enjoys to reduce her anxiety.  She is looking for housing and  CC will send her housing resources.   CC will follow up in 3-4 weeks and Fely will contact  CC if anything is needed before then.    MICHAEL Mendiola   Care Coordination Team  [phone]

## 2019-11-06 ENCOUNTER — TELEPHONE (OUTPATIENT)
Dept: FAMILY MEDICINE | Facility: CLINIC | Age: 64
End: 2019-11-06

## 2019-11-06 NOTE — TELEPHONE ENCOUNTER
[facility] now requests orders and shares plan of care/discharge summaries for some patients through [employer].  Please REPLY TO THIS MESSAGE OR ROUTE BACK TO THE AUTHOR in order to give authorization for orders when needed.  This is considered a verbal order, you will still receive a faxed copy of orders for signature.  Thank you for your assistance in improving collaboration for our patients.    ORDER, skilled nurse visits 1 X weekly X 9 weeks , 3 PNRs    MD SUMMARY/PLAN OF CARE, SN for assessment / education of disease process / Medication management/ weekly MSU    DISCHARGE SUMMARY, NA ongoing visits planned     Thank you.

## 2019-11-19 ENCOUNTER — PATIENT OUTREACH (OUTPATIENT)
Dept: CARE COORDINATION | Facility: CLINIC | Age: 64
End: 2019-11-19

## 2019-11-19 ENCOUNTER — TELEPHONE (OUTPATIENT)
Dept: CARE COORDINATION | Facility: CLINIC | Age: 64
End: 2019-11-19

## 2019-11-19 ENCOUNTER — HOSPITAL ENCOUNTER (EMERGENCY)
Facility: CLINIC | Age: 64
Discharge: HOME | End: 2019-11-19
Attending: EMERGENCY MEDICINE | Admitting: EMERGENCY MEDICINE
Payer: COMMERCIAL

## 2019-11-19 VITALS
DIASTOLIC BLOOD PRESSURE: 98 MMHG | SYSTOLIC BLOOD PRESSURE: 133 MMHG | OXYGEN SATURATION: 99 % | RESPIRATION RATE: 16 BRPM | TEMPERATURE: 97.8 F

## 2019-11-19 DIAGNOSIS — G44.209 TENSION HEADACHE: ICD-10-CM

## 2019-11-19 DIAGNOSIS — F41.9 ANXIETY: ICD-10-CM

## 2019-11-19 PROCEDURE — 99283 EMERGENCY DEPT VISIT LOW MDM: CPT

## 2019-11-19 PROCEDURE — 25000132 ZZH RX MED GY IP 250 OP 250 PS 637: Performed by: EMERGENCY MEDICINE

## 2019-11-19 RX ORDER — LORAZEPAM 0.5 MG/1
0.5 TABLET ORAL ONCE
Status: COMPLETED | OUTPATIENT
Start: 2019-11-19 | End: 2019-11-19

## 2019-11-19 RX ORDER — ACETAMINOPHEN 500 MG
1000 TABLET ORAL ONCE
Status: COMPLETED | OUTPATIENT
Start: 2019-11-19 | End: 2019-11-19

## 2019-11-19 RX ADMIN — LORAZEPAM 0.5 MG: 0.5 TABLET ORAL at 13:32

## 2019-11-19 RX ADMIN — ACETAMINOPHEN 1000 MG: 500 TABLET, FILM COATED ORAL at 13:32

## 2019-11-19 ASSESSMENT — ACTIVITIES OF DAILY LIVING (ADL): DEPENDENT_IADLS:: INDEPENDENT;MEDICATION MANAGEMENT

## 2019-11-19 ASSESSMENT — ENCOUNTER SYMPTOMS
HEADACHES: 1
NECK PAIN: 0
FEVER: 0
NERVOUS/ANXIOUS: 1
PHOTOPHOBIA: 0
COUGH: 0
NUMBNESS: 0
VOMITING: 0
WEAKNESS: 0

## 2019-11-19 NOTE — ED PROVIDER NOTES
History     Chief Complaint:  Headache    The history is provided by the patient.      Mariela Duffy is a 64 year old female, with a history of thyroid cancer, depression, anxiety, ADD, and TIA, who presents to the emergency department for evaluation of a headache. The patient reports she started experiencing a headache on the top of her head this morning after she woke up today. She notes she has also been more anxious today and shaky. She denies any weakness, numbness, neck pain, photophobia, fever, cough or vomiting. No trauma/falls. She denies any recent medication changes. She notes she called her clinic this morning, who advises her to come for evaluation. She notes she sees a therapist once monthly. She reports that she attributes her headache to increased anxiety symptoms. She reports she has been taking her mental health medications as prescribed. She denies any numbness, tingling, or weakness.     Allergies:  Levaquin     Medications:    Fosamax  81 mg Aspirin  Buspirone  Flonase  Hydroxyzine  Levothyroxine  Pamelor  Protonix  Seroquel  Zantac  Sertraline  Imitrex    Past Medical History:    Mild cognitive impairment  Peripheral polyneuropathy  Vitamin D deficiency  Thyroid cancer  Migraine  GERD  Hiatal hernia  TIA  ADD  Seasonal affective disorder  Tubular adenoma of colon  Hypothyroidism  Depression  Anemia  Rosacea  IBS  Contact dermatitis  Allergic rhinitis  Diverticulosis    Past Surgical History:    Hiatal hernia repair  Abdomen surgery  Cholecystectomy  Cleft lip repair  Cholecystectomy  ENT surgery  Thyroid cancer surgery  Wrist surgery    Family History:    Cancer  HTN  CAD    Social History:  Smoking status: Former smoker of 5 years. Quit date: 1/1/1977  Alcohol use: Yes  Drug use: No  The patient presents to the emergency department by herself.  PCP: Estefany Stanley  Marital Status:   [4]     Review of Systems   Constitutional: Negative for fever.   Eyes: Negative for  photophobia.   Respiratory: Negative for cough.    Gastrointestinal: Negative for vomiting.   Musculoskeletal: Negative for neck pain.   Neurological: Positive for headaches. Negative for weakness and numbness.   Psychiatric/Behavioral: The patient is nervous/anxious.    All other systems reviewed and are negative.      Physical Exam   Patient Vitals for the past 24 hrs:   BP Temp Temp src Heart Rate Resp SpO2   11/19/19 1414 -- -- -- -- 16 --   11/19/19 1159 (!) 133/98 97.8  F (36.6  C) Temporal 110 18 99 %     Physical Exam  General: Nontoxic. Well appearing. Appears anxious.  Head:  Scalp, face, and head appear normal, atraumatic.  No tenderness over the bilateral temporal areas.  Eyes:  Pupils are equal, round, and reactive to light, EOMI, no nystagmus     Conjunctivae non-injected and sclerae white  ENT:    The external nose is normal    Pinnae are normal  Neck:  Normal range of motion    There is no rigidity noted    Trachea is in the midline  CV:  Regular rate and rhythm     Normal S1/S2, no S3/S4    No murmur or rub. Radial pulses 2+ bilaterally    Resp:  Lungs are clear and equal bilaterally    There is no tachypnea    No increased work of breathing    No rales, wheezing, or rhonchi  MS:  Normal muscular tone    Symmetric motor strength    No lower extremity edema  Skin:  No rash or acute skin lesions noted  Neuro: A&Ox3, GCS 15    CN II - XII intact    Speech clear, fluent, and normal    Strength 5/5 and symmetric in bilateral upper and lower extremities.    No pronator drift. No leg drift. SILT throughout.    No ankle clonus    No tremor.     No meningismus   Psych:  Anxious affect. Patient with increased psychomotor activity. No agitation.  Appropriate interactions.    Emergency Department Course   Interventions:  1332 Ativan 0.5 mg PO  1332 Tylenol 1000 mg PO    Emergency Department Course:  Past medical records, nursing notes, and vitals reviewed.  1304: I performed an exam of the patient and obtained  history, as documented above.     I rechecked the patient. Findings and plan explained to the Patient. Patient discharged home with instructions regarding supportive care, medications, and reasons to return. The importance of close follow-up was reviewed.   Impression & Plan    Medical Decision Making:  Mariela Duffy is a 64 year old female who presents to the ED for evaluation of a mild headache. She does not have a prior history of similar headaches. On my evaluation she is well appearing and afebrile. She has a normal neurological examination without any focal neurologic deficits. There is not a history or evidence of trauma or injury. The patient has not had any fever, weakness, numbness, paresthesia, neck stiffness or confusion. Meningitis/encephalitis, intracranial hemorrhage, subarachnoid hemorrhage, CNS tumor, temporal arteritis, idiopathic intracranial hypertension are considered as part of the differential, and considered unlikely given the history and physical examination. At this time there was not an indication for emergent neuroimaging. Symptomatic treatment was administered as noted above and the patient felt improved. At this point I feel the patient's symptoms are due to tension headache and anxiety. This and the findings of my evaluation was discussed with the patient. I recommended the patient should follow-up with her primary physician and MH therapist within 3 days if symptoms continue. If the headache continues or the frequency increases, consultation with neurology may be indicated.  Return precautions provided. Patient agreeable with plan of care and she was discharged in stable condition.      Diagnosis:    ICD-10-CM   1. Tension headache G44.209   2. Anxiety F41.9     Disposition:  Discharged to home with instructions for follow up.    Kristina Denson  11/19/2019   [facility] EMERGENCY DEPARTMENT  Scribe Disclosure:  I, Kristina Denson, am serving as a scribe at 1:04 PM on  11/19/2019 to document services personally performed by Trung Wolf MD based on my observations and the provider's statements to me.      Trung Wolf MD  11/20/19 9956

## 2019-11-19 NOTE — TELEPHONE ENCOUNTER
Fely is asking if she can get in to see Estefany sooner then her scheduled 12/17 appointment?  She said she has been shaky and anxious.

## 2019-11-19 NOTE — TELEPHONE ENCOUNTER
Estefany,    Would you like to use jorge spot,  phone visit or leave as is?    Olga Guadarrama/UBALDO

## 2019-11-19 NOTE — ED AVS SNAPSHOT
[facility] Emergency Department  [address]  [facility] [address]  Phone:  [phone]  Fax:  [phone]                                    Mariela Duffy   MRN: [id number]    Department:  [facility] Emergency Department   Date of Visit:  11/19/2019           After Visit Summary Signature Page    I have received my discharge instructions, and my questions have been answered. I have discussed any challenges I see with this plan with the nurse or doctor.    ..........................................................................................................................................  Patient/Patient Representative Signature      ..........................................................................................................................................  Patient Representative Print Name and Relationship to Patient    ..................................................               ................................................  Date                                   Time    ..........................................................................................................................................  Reviewed by Signature/Title    ...................................................              ..............................................  Date                                               Time          22EPIC Rev 08/18

## 2019-11-19 NOTE — PROGRESS NOTES
Clinic Care Coordination Contact    Follow Up Progress Note      Assessment: Fely called to ask for assistance getting scheduled with her PCP and/or MTM.  She reported that she was feeling shaky and had tingly sensations on the top half of her body and she has not felt this way before. She thinks it is a panic attack but she is uncertain. She is concerned about how some of her medications are interacting together. She had formerly been seeing [facility] Psychiatrist Sally Garcia at [facility] but she no longer wishes to see her because she no longer trusts her.  She saw her last around the beginning of October. Fely continues to have home care SNV coming on wednesdays.     SHA SPENCER huddled with Fely's PCO who recommended that she be evaluated today at the ED which was communicated to Fely and she agreed with this plan.     Goals addressed this encounter:   Goals Addressed                 This Visit's Progress       Patient Stated      Other (pt-stated)        Goal Statement: I will find affordable new housing  Measure of Success: I will be in a new place near one of my children.   Supportive Steps to Achieve:     I will use housing search resources that I know of and the care coordinator will send me a list of additional housing resources including the Senior Housing guide, [facility] Subsidized housing listing, and  [facility] emergency housing information.    I am am on the [facility] Section 8 Housing list.    Barriers: I need to be out of my current place by 12/31/19.  Strengths: I have supportive family and friends.  Date to Achieve By: 12/30/19  Patient expressed understanding of goal: Yes               Intervention/Education provided during outreach: Made plan with her to go to the ED today.     Outreach Frequency: monthly    Plan:   Fely plans to go to the ED to have her symptoms evaluated.She has friends from [employer] who can drive her.      Care Coordinator will follow up in 1-2 days.    Kathryn Scales,  MICHAEL   Care Coordination Team  [phone]

## 2019-11-19 NOTE — PROGRESS NOTES
Clinic Care Coordination Contact    Called Fely to check in after she was in ED.  She stated she was much calmer and was given the medication Ativan which she found helpful.  SHA SPENCER offered to assist her with finding a new Psychiatric care provider which she was agreeable to.  She doesn't want to go back to her previous provider at [facility] and is willing to go to a different provider at [facility] if someone is available.      SHA SPENCER gave her the number for [facility] - she is interested  In accessing in home support servcices if she qualifies. Also discussed the Senior Housing Guide that was mailed to her and pointed out the sections with [facility] information and [facility] communities for seniors.  She stated that she can stay where she is living now for longer if she needs to.  She doesn't want her children to be very involved in helping her  look for a new place, etc. because they are busy and  she wishes to remain as independent as possible.  She doesn't like it when they tell her what to do.       MICHAEL Mendiola   Care Coordination Team  [phone]

## 2019-11-19 NOTE — ED TRIAGE NOTES
"Pt arrives with frontal HA starting this morning. Denies N/V or photophobia. Was told to come in by home health nurse. Pt states was \" stressful situation with my boyfriend yesterday and I began shaking. By the time I got home yesterday I was a complete basketcase\". States took anxiety medication but did not help.   "

## 2019-11-25 ENCOUNTER — PATIENT OUTREACH (OUTPATIENT)
Dept: CARE COORDINATION | Facility: CLINIC | Age: 64
End: 2019-11-25

## 2019-11-25 NOTE — PROGRESS NOTES
Clinic Care Coordination Contact  Care Team Conversations    Spoke with Home Care RN Humaira who met with Fely today. Humaira and Fely contacted [facility] and learned that in order to change Psychiatric providers Fely will need to complete a form requesting this change in writing.  [facility] mailed the form to Fely today.  A different [facility] provider, Randy Hardin, can take over from previous provider but he can't begin with Fely until March.  Fely not open to continuing with previous provider until then.      MICHAEL Mendiola   Care Coordination Team  [phone]

## 2019-11-27 ENCOUNTER — TELEPHONE (OUTPATIENT)
Dept: FAMILY MEDICINE | Facility: CLINIC | Age: 64
End: 2019-11-27

## 2019-11-27 ENCOUNTER — PATIENT OUTREACH (OUTPATIENT)
Dept: CARE COORDINATION | Facility: CLINIC | Age: 64
End: 2019-11-27

## 2019-11-27 ASSESSMENT — ACTIVITIES OF DAILY LIVING (ADL): DEPENDENT_IADLS:: INDEPENDENT;MEDICATION MANAGEMENT

## 2019-11-27 NOTE — TELEPHONE ENCOUNTER
Harleen calling from  Home Care-    Patient stopped Ranitidine due to recall.  Patient states fine with just Pantoprazole.  Offered Famotidine but patient declined.  Feels doing fine on Pantoprazole and Tums.  Will send discontinue order for Ranitidine.  Abril Harris RN

## 2019-11-29 NOTE — PROGRESS NOTES
Clinic Care Coordination Contact    Follow Up Progress Note      Assessment: Fely plans to schedule an appointment with an Audiologist and needed the number.  She states that she is in a creative mode and has been writing music which she is very happy about.  She said she may have an opportunity with [employer] doing Music Therapy.  She has not contacted [facility] to request an assessment and she said she will do that today - [facility] gave her the number.      Goals addressed this encounter:   Goals Addressed                 This Visit's Progress       Patient Stated      Other (pt-stated)   On track     Goal Statement: [place] if [place] qualify for in home services.   Measure of Success: I will access services if I qualify for them.  Supportive Steps to Achieve:     I will [facility] at [phone] to schedule an assessment   Barriers: None identified   Strengths: I am open to accessing resources that may benefit me.  Date to Achieve By: 1/30/20  Patient expressed understanding of goal: Yes               Intervention/Education provided during outreach: [place]ed [place].      Outreach Frequency: monthly    Plan:   Fely plans to schedule with an Audiologist and explore the p[possibility of getting a hearing aid.  [place].   She has a MTM appointment scheduled for  12/19/19.  Care Coordinator will follow up in  Two weeks.    MICHAEL Mendiola   Care Coordination Team  [phone]

## 2019-11-30 ENCOUNTER — TELEPHONE (OUTPATIENT)
Dept: FAMILY MEDICINE | Facility: CLINIC | Age: 64
End: 2019-11-30

## 2019-11-30 NOTE — TELEPHONE ENCOUNTER
[facility] form received completed and fax back to [phone].      Nancy Friedman [employer] patient Rep.

## 2019-12-02 ENCOUNTER — PATIENT OUTREACH (OUTPATIENT)
Dept: CARE COORDINATION | Facility: CLINIC | Age: 64
End: 2019-12-02

## 2019-12-03 ASSESSMENT — ACTIVITIES OF DAILY LIVING (ADL): DEPENDENT_IADLS:: INDEPENDENT;MEDICATION MANAGEMENT

## 2019-12-03 NOTE — PROGRESS NOTES
Clinic Care Coordination Contact    Follow Up Progress Note      Assessment: Fely stopped in clinc to meet with SHA SPENCER.  She was feeling anxious and worried about about conflict she is having with one of her daughter regarding Fely's ex.  Fely reports her  ex is stating he won't get back with her unless she is being treated for her ADD. She requested assistance with scheduling with a Psychiatric provider at [facility] for medication management. She previously worked with  [facility] provider for medicatoin managemet. She doesn't want to wait until March to see a new provider at [facility].    She states she is no longer interested [place].      Goals addressed this encounter:   Goals Addressed                 This Visit's Progress       Patient Stated      Mental Health Management (pt-stated)        Goal Statement: I will continue to manage my anxiety and I will not feel the need to go to the emergency room for anxiety.    Measure of Success: I will report that I am doing well with managing my anxiety.   Supportive Steps to Achieve:     I will establish with a Psychiatrist for medication management    I will continue to do activities I enjoy including playing the guitar, reading my bible and doing water aerobics.    I will continue to work with my [facility] providers.  Barriers: None identified.  Strengths: I have a good support network of friends and family.  Date to Achieve By: 1/30/19  Patient expressed understanding of goal: Yes               Intervention/Education provided during outreach: SHA SPENCER assisted her with scheduling an appointment at [facility] with a Psychiatric provider for medication management. Appointment scheduled with Halie Evans on Jan 29 at 8 am and follow up on Feb 24 at 1 pm.        Outreach Frequency: monthly    Plan:   Fely has [facility] appointment on 12/5/19. Fely plans to go tor [facility] appointment on Jan . 29.    Care Coordinator will follow up in 1 - 2  MICHAEL Villegas   Care Coordination Team  [phone]

## 2019-12-04 ENCOUNTER — TELEPHONE (OUTPATIENT)
Dept: FAMILY MEDICINE | Facility: CLINIC | Age: 64
End: 2019-12-04

## 2019-12-04 DIAGNOSIS — K21.00 GASTROESOPHAGEAL REFLUX DISEASE WITH ESOPHAGITIS: ICD-10-CM

## 2019-12-04 NOTE — TELEPHONE ENCOUNTER
Panel Management Review      Patient has the following on her problem list: None      Composite cancer screening  Chart review shows that this patient is due/due soon for the following Mammogram  Summary:    Patient is due/failing the following:   MAMMOGRAM    Action needed:   Patient needs referral/order: mammogram    Type of outreach:    Phone, spoke to patient.  pt declined mammogram    Questions for provider review:    None                                                                                                                                    Lacy Bañuelos MA on 12/4/2019 at 2:07 PM

## 2019-12-04 NOTE — TELEPHONE ENCOUNTER
[facility] now requests orders and shares plan of care/discharge summaries for some patients through [employer].  Please REPLY TO THIS MESSAGE OR ROUTE BACK TO THE AUTHOR in order to give authorization for orders when needed.  This is considered a verbal order, you will still receive a faxed copy of orders for signature.  Thank you for your assistance in improving collaboration for our patients.    ORDER, SW 1 day 1    MD SUMMARY/PLAN OF CARESW to eval and treat for needed assistance with community resources / assistance with up coming move that is being planned    DISCHARGE SUMMARY, NA

## 2019-12-05 ENCOUNTER — OFFICE VISIT (OUTPATIENT)
Dept: PHARMACY | Facility: CLINIC | Age: 64
End: 2019-12-05
Payer: COMMERCIAL

## 2019-12-05 ENCOUNTER — PATIENT OUTREACH (OUTPATIENT)
Dept: CARE COORDINATION | Facility: CLINIC | Age: 64
End: 2019-12-05

## 2019-12-05 VITALS
DIASTOLIC BLOOD PRESSURE: 86 MMHG | WEIGHT: 182.3 LBS | SYSTOLIC BLOOD PRESSURE: 120 MMHG | BODY MASS INDEX: 26.92 KG/M2 | OXYGEN SATURATION: 99 % | HEART RATE: 106 BPM

## 2019-12-05 DIAGNOSIS — E55.9 VITAMIN D DEFICIENCY: ICD-10-CM

## 2019-12-05 DIAGNOSIS — E89.0 POSTOPERATIVE HYPOTHYROIDISM: ICD-10-CM

## 2019-12-05 DIAGNOSIS — F41.1 GENERALIZED ANXIETY DISORDER: Primary | ICD-10-CM

## 2019-12-05 DIAGNOSIS — F32.0 MILD MAJOR DEPRESSION (H): ICD-10-CM

## 2019-12-05 DIAGNOSIS — Z23 ENCOUNTER FOR IMMUNIZATION: ICD-10-CM

## 2019-12-05 DIAGNOSIS — E53.8 VITAMIN B12 DEFICIENCY (NON ANEMIC): ICD-10-CM

## 2019-12-05 DIAGNOSIS — J30.1 CHRONIC SEASONAL ALLERGIC RHINITIS DUE TO POLLEN: ICD-10-CM

## 2019-12-05 DIAGNOSIS — F51.04 PSYCHOPHYSIOLOGICAL INSOMNIA: ICD-10-CM

## 2019-12-05 DIAGNOSIS — K21.00 GASTROESOPHAGEAL REFLUX DISEASE WITH ESOPHAGITIS: ICD-10-CM

## 2019-12-05 PROCEDURE — 99606 MTMS BY PHARM EST 15 MIN: CPT | Performed by: PHARMACIST

## 2019-12-05 PROCEDURE — 99607 MTMS BY PHARM ADDL 15 MIN: CPT | Performed by: PHARMACIST

## 2019-12-05 ASSESSMENT — ANXIETY QUESTIONNAIRES
2. NOT BEING ABLE TO STOP OR CONTROL WORRYING: NOT AT ALL
7. FEELING AFRAID AS IF SOMETHING AWFUL MIGHT HAPPEN: NOT AT ALL
GAD7 TOTAL SCORE: 3
IF YOU CHECKED OFF ANY PROBLEMS ON THIS QUESTIONNAIRE, HOW DIFFICULT HAVE THESE PROBLEMS MADE IT FOR YOU TO DO YOUR WORK, TAKE CARE OF THINGS AT HOME, OR GET ALONG WITH OTHER PEOPLE: NOT DIFFICULT AT ALL
1. FEELING NERVOUS, ANXIOUS, OR ON EDGE: SEVERAL DAYS
5. BEING SO RESTLESS THAT IT IS HARD TO SIT STILL: NOT AT ALL
6. BECOMING EASILY ANNOYED OR IRRITABLE: NOT AT ALL
3. WORRYING TOO MUCH ABOUT DIFFERENT THINGS: SEVERAL DAYS

## 2019-12-05 ASSESSMENT — PATIENT HEALTH QUESTIONNAIRE - PHQ9
5. POOR APPETITE OR OVEREATING: SEVERAL DAYS
SUM OF ALL RESPONSES TO PHQ QUESTIONS 1-9: 3

## 2019-12-05 NOTE — PATIENT INSTRUCTIONS
Recommendations from today's MTM visit:                                                      1. fyi--please call endocrinologist --your last thyroid lab called TSH here at our clinic was 0.17 --meaning you have too much thyroid medication. They may want to repeat the lab and see where your at this month .Repeat your Vitamin-D lab as well.    2. BP today is good 120/86mmhg.         It was great to speak with you today.  I value your experience and would be very thankful for your time with providing feedback on our clinic survey. You may receive a survey via email or text message in the next few days.     Next MTM visit:  Thursday June 4th -2020  At 8;50am.     To schedule another MTM appointment, please call the clinic directly or you may call the MTM scheduling line at [phone] or toll-free at [phone].     My Clinical Pharmacist's contact information:                                                      It was a pleasure talking with you today!  Please feel free to contact me with any questions or concerns you have.      Jerry Jordan Rph.  Medication Therapy Management Provider  [phone]

## 2019-12-05 NOTE — PROGRESS NOTES
"SUBJECTIVE/OBJECTIVE:                           Mariela Duffy is a 64 year old female coming in for a follow up visit (6-6-19) for Medication Therapy Management.  She was referred to me from Estefany Stanley          Chief Complaint:  6 months med review.     She does not want [facility] weekly visits anymore.  She has an offer to work for [employer] and she wants to do that .         Her children(daughter lucero) tell her she cannot drive but yet Fely has no issues driving.     Tomorrow she is looking for a place to move tomorrow as her house will be foreclosed 11-7-18.  She feels she may have 2-3 options she can move to or a couple GF's she can stay at temporarily.     Personal Healthcare Goals: To become physically stronger. To take all medications.     Allergies/ADRs: Reviewed in Epic  Tobacco: No tobacco use  Alcohol: Less than 1 beverage / month  Caffeine: 1.5 cups/day of coffee, 3 cans dr pepper or coke per day  Activity: loves dancing, but is not currently. She likes to swim, but is not currently swimming.   PMH: Reviewed in Epic    Medication Adherence/Access:  Doing well now --has fv N checking on this weekly now for her.     Depression:  Current medications include: Sertraline 200 mg once daily QHS. Pt reports that she feels more \"shaky\" and attributes this to her sertraline. She found that sertraline was effective for her but she expresses interest in trying a different medication in the future due to side effects. Patient reports the following stressors:  None today   Therapies tried and response: She has tried several antidepressants in the past, but could not recall today.   PHQ-9 SCORE 11/19/2018 12/6/2018 12/5/2019   PHQ-9 Total Score - - -   PHQ-9 Total Score MyChart Incomplete - -   PHQ-9 Total Score - 1 3     fyi--she see's weekly counselor as well as talks to her best friend daily --they both help her cope. Recently saw Lam Cordero  Our clinic psychiatrist--he stated no med changes at " this time --have neuropsych eval .        Anxiety: Current therapy : off all  Quetiapine + Buspirone 30mg. qam  and one-half tab in the evening and off all lorazepam 0.5 mg  Now and on hydroxyzine 25mg tid prn.   fyi--don't point fingers at her--reminds of her of dad (past issues).     She has an extensive support system, including her daughters, friend,  therapist weekly andARMS worker. She states that her ARMS worker was helping her with organization in her home, but that the ARMS worker became very overwhelmed due to her ex-fiance being a hoarder. She was proud that she has cleaned out half of her living room. Her kids are helping with her yard work. She took early orker was helping her with organization in her home, but that the ARMS worker became very overwhelmed due to her ex-fiance being a hoarder. She was proud that she has cleaned out half of her living room. She took social security early, which is helping her financially. She feels her memory worsens when she is stressed or anxious.    Psych nurse gave ekg recheck 10 days post seroquel start.     QUYEN-7 SCORE 10/8/2018 12/6/2018 12/5/2019   Total Score - - -   Total Score 8 (mild anxiety) - -   Total Score 8 0 3            Allergic Rhinitis: Current therapy is flonase 1-2 sprays in each nostril daily. She is not currently experiencing congestion, but she has some postnasal drip.      IBS/Sleep/Migraines:  Currently take 50mg nortriptyline in the evening and naproxen 220 mg PRN for headaches. She feels as if the nortriptyline dose is too high due to experiencing constipation. She denies having a difficult time sleeping.     fyi--has imitrex to abort migraine but hasnt used it in 6 months.    Vitamin D3: level was 18 0n 12-11-18. Pt has a calcium + vitamin D rx 50,000 D2 --twice weekly.    Repeat vitamin D lab at endo office.       Vitamin B12: level was 220 on 6/12/18. Vitamin B12 helps support memory and lessens fatigue. Pt has a B Complex vitamin and  "wonders if this is a good option?    Hypothyroidism: Patient is taking levothyroxine 175 (up from 150) mcg daily. Patient is having the following symptoms: Pt has an appointment with endocrinology because she is wondering about taking liothyronine. She states that her brother experienced benefit from liothyronine in the past. Patient is having the following symptoms:  heat intolerance, weight loss and nervousness, irritability, anxiety, tachycardia(pulse 106+ today).     TSH   Date Value Ref Range Status   08/13/2019 0.17 (L) 0.40 - 4.00 mU/L Final     Repeat TSH lab with endo this month .     Immunizations:  Up to date .      GERD: Current medications include: Protonix (pantoprazole) 20mg.qam . Pt c/o : no issues today .   The patient does not have a history of GI bleed.  The patient does notice symptoms if they miss a dose.  Patient has not tried a trial off of therapy and is not interested in doing so. Patient feels that current regimen is mostly effective.    BP Readings from Last 1 Encounters:   12/05/19 120/86     Pulse Readings from Last 1 Encounters:   12/05/19 106     Wt Readings from Last 1 Encounters:   12/05/19 182 lb 4.8 oz (82.7 kg)     Ht Readings from Last 1 Encounters:   09/26/19 5' 9\" (1.753 m)     Estimated body mass index is 26.92 kg/m  as calculated from the following:    Height as of 9/26/19: 5' 9\" (1.753 m).    Weight as of this encounter: 182 lb 4.8 oz (82.7 kg).    Temp Readings from Last 1 Encounters:   11/19/19 97.8  F (36.6  C) (Temporal)           ASSESSMENT:                             Current medications were reviewed today.     Medication Adherence: good    Depression: Stable. Continue current therapy.     Anxiety:  Stable    Allergic Rhinitis: improved.     IBS/Sleep/Migraines:  Improved --lower dose Nortriptyline working well.     Vitamin D3: is not at goal of 50-75ng/mL. Pt would benefit from vitamin D3 lab recheck this month with renetta kelly.     Vitamin B12: is not at goal of " 600-1000pg/mL. Pt would benefit from Vitamin B12 lab recheck in 12 months.  .  Hypothyroidism: Needs Improvement. Last TSH is below normal range has not been rechecked since levothyroxine dosage change is overdue to be rechecked. Pt would benefit from rechecking thyroid labs in next 4  Weeks with renetta hargrove.       Immunizations:  Stable.    GERD: Stable.  Current treatment is effective. Chronic PPI use places patient at an increased risk of C. Diff, hypomagnesemia and lower bone mineral density, these risks were reviewed with the patient.  Pt would benefit from the following changes: none      PLAN:                          1. fyi--please call endocrinologist --your last thyroid lab called TSH here at our clinic was 0.17 --meaning you have too much thyroid medication. They may want to repeat the lab and see where your at this month .Repeat your Vitamin-D lab as well.    2. BP today is good 120/86mmhg.         It was great to speak with you today.  I value your experience and would be very thankful for your time with providing feedback on our clinic survey. You may receive a survey via email or text message in the next few days.     Next MTM visit:  Thursday June 4th -2020  At 8;50am.   I spent 30 minutes with this patient today. All changes were made via collaborative practice agreement with Estefany Stanley. A copy of the visit note was provided to the patient's primary care provider.        The patient was given a summary of these recommendations as an after visit summary.     Jerry Jordan Rph.  Medication Therapy Management Provider  [phone]

## 2019-12-05 NOTE — TELEPHONE ENCOUNTER
"Per 11/27/19 telephone encounter, patient stopped taking zantac.  Declined refill.      Requested Prescriptions   Refused Prescriptions Disp Refills     ranitidine (ZANTAC) 300 MG tablet [Pharmacy Med Name: RANITIDINE HCL 300MG TABS] 90 tablet 3     Sig: TAKE ONE TABLET BY MOUTH AT BEDTIME       H2 Blockers Protocol Failed - 12/4/2019  9:37 AM        Failed - Medication is active on med list        Passed - Patient is age 12 or older        Passed - Recent (12 mo) or future (30 days) visit within the authorizing provider's specialty     Patient has had an office visit with the authorizing provider or a provider within the authorizing providers department within the previous 12 mos or has a future within next 30 days. See \"Patient Info\" tab in inbasket, or \"Choose Columns\" in Meds & Orders section of the refill encounter.                "

## 2019-12-05 NOTE — Clinical Note
fyi--she is stable phq-9 and noel-7 , needs f/up tsh and vit-d labs --she states she will get them with her endo

## 2019-12-06 ASSESSMENT — ANXIETY QUESTIONNAIRES: GAD7 TOTAL SCORE: 3

## 2019-12-06 ASSESSMENT — ACTIVITIES OF DAILY LIVING (ADL): DEPENDENT_IADLS:: INDEPENDENT;MEDICATION MANAGEMENT

## 2019-12-06 NOTE — PROGRESS NOTES
Clinic Care Coordination Contact    Follow Up Progress Note      Assessment: Met with Fely in clinic today after she met with MTM.  Fely states that she no longer wishes to work with home care because she finds it upsetting to work with them.   She believes that she can manage her medications independently and doesn't understand why PCP and her daughter insist that she have home care.     She states that she will likely leave this clinic due to this concern.  SHA SPENCER encouraged her to give this more thought as she has had home care for several years and others in her life are likely encouraging home care out of care and concerns and to ensure her safety.  She still plans to move and said she may find a room to rent and this has been easy for her to do in the past. She also states that she has a job lead with [employer] which she is excited about.  Fely didn't recall that she and SHA SPENCER scheduled an appointment at [facility]  when she was in clinic on 12/2/19 and SHA SPENCER gave her the details about that appointment.             Goals addressed this encounter:   Goals Addressed                 This Visit's Progress       Patient Stated      Mental Health Management (pt-stated)        Goal Statement: I will continue to manage my anxiety and I will not feel the need to go to the emergency room for anxiety.    Measure of Success: I will report that I am doing well with managing my anxiety.   Supportive Steps to Achieve:     I will establish with a Psychiatrist for medication management    I will continue to do activities I enjoy including playing the guitar, reading my bible and doing water aerobics.    I will continue to work with my [facility] providers.  Barriers: None identified.  Strengths: I have a good support network of friends and family.  Date to Achieve By: 1/30/19  Patient expressed understanding of goal: Yes  As of today's date 12/6/2019 goal is met at 0 - 25%.   Goal Status:   Active  Scheduled with Halie Miller at [facility] on Jan. 29 at 8:00 am           COMPLETED: Other (pt-stated)        Goal Statement: [place] if [place] qualify for in home services.   Measure of Success: I will access services if I qualify for them.  Supportive Steps to Achieve:     I will [facility] Services at [phone] to schedule an assessment   Barriers: None identified   Strengths: I am open to accessing resources that may benefit me.  Date to Achieve By: 1/30/20  Patient expressed understanding of goal: Yes               Intervention/Education provided during outreach: NA      Outreach Frequency: monthly    Plan:   SHA CC will huddle with home care nurse and PCP regarding Fely wishing to end home care services.   Care Coordinator will follow up in one week.    MICHAEL Mendiola Care Coordination Team  [phone]

## 2019-12-06 NOTE — TELEPHONE ENCOUNTER
I still recommend home care for Fely. It sounds like she is having some positive things going on right now. I worry that with life transitions medications may not be a priority and having home care just offers extra support.

## 2019-12-19 ENCOUNTER — PATIENT OUTREACH (OUTPATIENT)
Dept: CARE COORDINATION | Facility: CLINIC | Age: 64
End: 2019-12-19

## 2019-12-19 ASSESSMENT — ACTIVITIES OF DAILY LIVING (ADL): DEPENDENT_IADLS:: INDEPENDENT;MEDICATION MANAGEMENT

## 2019-12-19 NOTE — PROGRESS NOTES
Clinic Care Coordination Contact    Follow Up Progress Note      Assessment: Spoke by phone with Fely.  She discharged herself from  Home Care as of 12/18/19.  She will let PCP know if she wishes to resume  Home Care in the future.  She states that she has been managing her anxiety well.  She has not been actively looking for a new place to live and plans to begin looking for a place after the holidays.     Goals addressed this encounter:   Goals Addressed                 This Visit's Progress       Patient Stated      Mental Health Management (pt-stated)   On track     Goal Statement: I will continue to manage my anxiety and I will not feel the need to go to the emergency room for anxiety.    Measure of Success: I will report that I am doing well with managing my anxiety.   Supportive Steps to Achieve:     I will establish with a Psychiatrist for medication management    I will continue to do activities I enjoy including playing the guitar, reading my bible and doing water aerobics.    I will continue to work with my ACP providers.  Barriers: None identified.  Strengths: I have a good support network of friends and family.  Date to Achieve By: 1/30/19  Patient expressed understanding of goal: Yes  As of today's date 12/19/2019 goal is met at 0 - 25%.   Goal Status:  Active  [facility] at 8:00 am           Other (pt-stated)   On Hold     Goal Statement: I will find affordable new housing  Measure of Success: I will be in a new place near one of my children.   Supportive Steps to Achieve:     I will use housing search resources that I know of and the care coordinator will send me a list of additional housing resources including the Senior Housing guide, [facility] Subsidized housing listing, and  [facility] emergency housing information.    I am am on the [facility] Section 8 Housing list.    Barriers: Housing can be hard to find  Strengths: I have  supportive family and friends.  Date to Achieve By: 12/30/19  Patient expressed understanding of goal: Yes  As of today's date 12/19/2019 goal is met at 0 - 25%.   Goal Status:  Active  Holding off on looking for housing until after the holidays.                 Intervention/Education provided during outreach: NA     Outreach Frequency: monthly    Plan: Fely plans to begin looking for new housing in January.  She has an appointment with [facility] scheduled on 1/29/20.  [facility] will offer to arrange transportation for that appointment during next outreach.    Care Coordinator will follow up in 3-4 weeks.    MICHAEL Mendiola Care Coordination Team  [phone]

## 2020-01-13 ENCOUNTER — TELEPHONE (OUTPATIENT)
Dept: BEHAVIORAL HEALTH | Facility: CLINIC | Age: 65
End: 2020-01-13

## 2020-01-13 ENCOUNTER — HOSPITAL ENCOUNTER (EMERGENCY)
Facility: CLINIC | Age: 65
Discharge: HOME | End: 2020-01-13
Attending: EMERGENCY MEDICINE | Admitting: EMERGENCY MEDICINE
Payer: COMMERCIAL

## 2020-01-13 ENCOUNTER — HOSPITAL ENCOUNTER (EMERGENCY)
Facility: CLINIC | Age: 65
Discharge: PSYCHIATRIC OR SUBSTANCE USE FACILITY | End: 2020-01-14
Attending: EMERGENCY MEDICINE | Admitting: EMERGENCY MEDICINE
Payer: COMMERCIAL

## 2020-01-13 VITALS
DIASTOLIC BLOOD PRESSURE: 94 MMHG | RESPIRATION RATE: 18 BRPM | OXYGEN SATURATION: 99 % | HEART RATE: 97 BPM | TEMPERATURE: 98.2 F | SYSTOLIC BLOOD PRESSURE: 128 MMHG

## 2020-01-13 DIAGNOSIS — F41.9 ANXIETY: ICD-10-CM

## 2020-01-13 DIAGNOSIS — R46.89 DISORGANIZED BEHAVIOR: ICD-10-CM

## 2020-01-13 DIAGNOSIS — F29 PSYCHOSIS, UNSPECIFIED PSYCHOSIS TYPE (H): ICD-10-CM

## 2020-01-13 PROCEDURE — 81025 URINE PREGNANCY TEST: CPT | Performed by: EMERGENCY MEDICINE

## 2020-01-13 PROCEDURE — 87086 URINE CULTURE/COLONY COUNT: CPT | Performed by: EMERGENCY MEDICINE

## 2020-01-13 PROCEDURE — 81001 URINALYSIS AUTO W/SCOPE: CPT | Performed by: EMERGENCY MEDICINE

## 2020-01-13 PROCEDURE — 80048 BASIC METABOLIC PNL TOTAL CA: CPT | Performed by: EMERGENCY MEDICINE

## 2020-01-13 PROCEDURE — 25000132 ZZH RX MED GY IP 250 OP 250 PS 637: Performed by: EMERGENCY MEDICINE

## 2020-01-13 PROCEDURE — 85025 COMPLETE CBC W/AUTO DIFF WBC: CPT | Performed by: EMERGENCY MEDICINE

## 2020-01-13 PROCEDURE — 99285 EMERGENCY DEPT VISIT HI MDM: CPT | Mod: 25

## 2020-01-13 PROCEDURE — 90791 PSYCH DIAGNOSTIC EVALUATION: CPT

## 2020-01-13 PROCEDURE — 99283 EMERGENCY DEPT VISIT LOW MDM: CPT | Mod: 25,27

## 2020-01-13 PROCEDURE — 80307 DRUG TEST PRSMV CHEM ANLYZR: CPT | Performed by: EMERGENCY MEDICINE

## 2020-01-13 RX ORDER — LORAZEPAM 1 MG/1
1 TABLET ORAL EVERY 8 HOURS PRN
Status: DISCONTINUED | OUTPATIENT
Start: 2020-01-13 | End: 2020-01-14 | Stop reason: HOSPADM

## 2020-01-13 RX ORDER — HYDROXYZINE HYDROCHLORIDE 25 MG/1
50 TABLET, FILM COATED ORAL ONCE
Status: COMPLETED | OUTPATIENT
Start: 2020-01-13 | End: 2020-01-13

## 2020-01-13 RX ADMIN — HYDROXYZINE HYDROCHLORIDE 50 MG: 25 TABLET ORAL at 18:28

## 2020-01-13 ASSESSMENT — ENCOUNTER SYMPTOMS
CONFUSION: 1
NERVOUS/ANXIOUS: 1
ABDOMINAL PAIN: 0
CONFUSION: 1
HEADACHES: 0
RHINORRHEA: 1
NERVOUS/ANXIOUS: 1
COUGH: 0
VOMITING: 0
FEVER: 0

## 2020-01-13 NOTE — ED AVS SNAPSHOT
[facility]  [address]  [facility] [address]  Phone:  [phone]  Fax:  [phone]                                    Mariela Duffy   MRN: [id number]    Department:  [facility]   Date of Visit:  1/13/2020           After Visit Summary Signature Page    I have received my discharge instructions, and my questions have been answered. I have discussed any challenges I see with this plan with the nurse or doctor.    ..........................................................................................................................................  Patient/Patient Representative Signature      ..........................................................................................................................................  Patient Representative Print Name and Relationship to Patient    ..................................................               ................................................  Date                                   Time    ..........................................................................................................................................  Reviewed by Signature/Title    ...................................................              ..............................................  Date                                               Time          22EPIC Rev 08/18

## 2020-01-13 NOTE — ED TRIAGE NOTES
Patient on hydroxyzine and dropped it today and couldn't find it for her anxiety today, then called EMS. Patient feeling anxious.

## 2020-01-13 NOTE — ED NOTES
Bed: ED31  Expected date: 1/13/20  Expected time: 5:43 PM  Means of arrival: Ambulance  Comments:  A593 64F Anxiety

## 2020-01-14 ENCOUNTER — HOSPITAL ENCOUNTER (INPATIENT)
Facility: CLINIC | Age: 65
LOS: 10 days | Discharge: NURSING, REHAB OR LONG-TERM CARE | End: 2020-01-24
Attending: PSYCHIATRY & NEUROLOGY | Admitting: PSYCHIATRY & NEUROLOGY
Payer: COMMERCIAL

## 2020-01-14 VITALS
DIASTOLIC BLOOD PRESSURE: 83 MMHG | RESPIRATION RATE: 18 BRPM | BODY MASS INDEX: 27.02 KG/M2 | HEART RATE: 112 BPM | TEMPERATURE: 98.4 F | WEIGHT: 182.98 LBS | SYSTOLIC BLOOD PRESSURE: 143 MMHG | OXYGEN SATURATION: 97 %

## 2020-01-14 DIAGNOSIS — L40.9 PSORIASIS: ICD-10-CM

## 2020-01-14 DIAGNOSIS — K59.00 CONSTIPATION, UNSPECIFIED CONSTIPATION TYPE: ICD-10-CM

## 2020-01-14 DIAGNOSIS — K21.9 GASTROESOPHAGEAL REFLUX DISEASE WITHOUT ESOPHAGITIS: ICD-10-CM

## 2020-01-14 DIAGNOSIS — M81.0 OSTEOPOROSIS, UNSPECIFIED OSTEOPOROSIS TYPE, UNSPECIFIED PATHOLOGICAL FRACTURE PRESENCE: ICD-10-CM

## 2020-01-14 DIAGNOSIS — G43.109 MIGRAINE WITH AURA AND WITHOUT STATUS MIGRAINOSUS, NOT INTRACTABLE: ICD-10-CM

## 2020-01-14 DIAGNOSIS — H04.123 DRY EYES: ICD-10-CM

## 2020-01-14 DIAGNOSIS — F09 MILD COGNITIVE DISORDER: ICD-10-CM

## 2020-01-14 DIAGNOSIS — G89.29 OTHER CHRONIC PAIN: Primary | ICD-10-CM

## 2020-01-14 DIAGNOSIS — F41.1 GENERALIZED ANXIETY DISORDER: ICD-10-CM

## 2020-01-14 PROBLEM — F41.9 ANXIETY: Status: ACTIVE | Noted: 2020-01-14

## 2020-01-14 LAB
ALBUMIN UR-MCNC: NEGATIVE MG/DL
AMPHETAMINES UR QL SCN: NEGATIVE
ANION GAP SERPL CALCULATED.3IONS-SCNC: 6 MMOL/L (ref 3–14)
APPEARANCE UR: ABNORMAL
BACTERIA #/AREA URNS HPF: ABNORMAL /HPF
BARBITURATES UR QL: NEGATIVE
BASOPHILS # BLD AUTO: 0.1 10E9/L (ref 0–0.2)
BASOPHILS NFR BLD AUTO: 0.7 %
BENZODIAZ UR QL: NEGATIVE
BILIRUB UR QL STRIP: NEGATIVE
BUN SERPL-MCNC: 11 MG/DL (ref 7–30)
CALCIUM SERPL-MCNC: 9.1 MG/DL (ref 8.5–10.1)
CANNABINOIDS UR QL SCN: NEGATIVE
CHLORIDE SERPL-SCNC: 104 MMOL/L (ref 94–109)
CO2 SERPL-SCNC: 26 MMOL/L (ref 20–32)
COCAINE UR QL: NEGATIVE
COLOR UR AUTO: ABNORMAL
CREAT SERPL-MCNC: 0.86 MG/DL (ref 0.52–1.04)
DEPRECATED CALCIDIOL+CALCIFEROL SERPL-MC: 42 UG/L (ref 20–75)
DIFFERENTIAL METHOD BLD: ABNORMAL
EOSINOPHIL # BLD AUTO: 0.4 10E9/L (ref 0–0.7)
EOSINOPHIL NFR BLD AUTO: 4.6 %
ERYTHROCYTE [DISTWIDTH] IN BLOOD BY AUTOMATED COUNT: 13 % (ref 10–15)
FOLATE SERPL-MCNC: 14.7 NG/ML
GFR SERPL CREATININE-BSD FRML MDRD: 72 ML/MIN/{1.73_M2}
GLUCOSE SERPL-MCNC: 108 MG/DL (ref 70–99)
GLUCOSE UR STRIP-MCNC: NEGATIVE MG/DL
HCG UR QL: NEGATIVE
HCT VFR BLD AUTO: 44.2 % (ref 35–47)
HGB BLD-MCNC: 14.5 G/DL (ref 11.7–15.7)
HGB UR QL STRIP: NEGATIVE
IMM GRANULOCYTES # BLD: 0 10E9/L (ref 0–0.4)
IMM GRANULOCYTES NFR BLD: 0.4 %
KETONES UR STRIP-MCNC: NEGATIVE MG/DL
LEUKOCYTE ESTERASE UR QL STRIP: ABNORMAL
LYMPHOCYTES # BLD AUTO: 2.7 10E9/L (ref 0.8–5.3)
LYMPHOCYTES NFR BLD AUTO: 29 %
MCH RBC QN AUTO: 27.8 PG (ref 26.5–33)
MCHC RBC AUTO-ENTMCNC: 32.8 G/DL (ref 31.5–36.5)
MCV RBC AUTO: 85 FL (ref 78–100)
MONOCYTES # BLD AUTO: 0.8 10E9/L (ref 0–1.3)
MONOCYTES NFR BLD AUTO: 8 %
MUCOUS THREADS #/AREA URNS LPF: PRESENT /LPF
NEUTROPHILS # BLD AUTO: 5.4 10E9/L (ref 1.6–8.3)
NEUTROPHILS NFR BLD AUTO: 57.3 %
NITRATE UR QL: NEGATIVE
NRBC # BLD AUTO: 0 10*3/UL
NRBC BLD AUTO-RTO: 0 /100
OPIATES UR QL SCN: NEGATIVE
PCP UR QL SCN: NEGATIVE
PH UR STRIP: 5.5 PH (ref 5–7)
PLATELET # BLD AUTO: 351 10E9/L (ref 150–450)
POTASSIUM SERPL-SCNC: 4.2 MMOL/L (ref 3.4–5.3)
RBC # BLD AUTO: 5.21 10E12/L (ref 3.8–5.2)
RBC #/AREA URNS AUTO: 2 /HPF (ref 0–2)
SODIUM SERPL-SCNC: 136 MMOL/L (ref 133–144)
SOURCE: ABNORMAL
SP GR UR STRIP: 1.01 (ref 1–1.03)
SQUAMOUS #/AREA URNS AUTO: 4 /HPF (ref 0–1)
TSH SERPL DL<=0.005 MIU/L-ACNC: 2.48 MU/L (ref 0.4–4)
UROBILINOGEN UR STRIP-MCNC: NORMAL MG/DL (ref 0–2)
VIT B12 SERPL-MCNC: 262 PG/ML (ref 193–986)
WBC # BLD AUTO: 9.4 10E9/L (ref 4–11)
WBC #/AREA URNS AUTO: 13 /HPF (ref 0–5)

## 2020-01-14 PROCEDURE — 25000132 ZZH RX MED GY IP 250 OP 250 PS 637: Performed by: EMERGENCY MEDICINE

## 2020-01-14 PROCEDURE — 82306 VITAMIN D 25 HYDROXY: CPT | Performed by: NURSE PRACTITIONER

## 2020-01-14 PROCEDURE — 12400002 ZZH R&B MH SENIOR/ADOLESCENT

## 2020-01-14 PROCEDURE — 36415 COLL VENOUS BLD VENIPUNCTURE: CPT | Performed by: NURSE PRACTITIONER

## 2020-01-14 PROCEDURE — H2032 ACTIVITY THERAPY, PER 15 MIN: HCPCS

## 2020-01-14 PROCEDURE — G0177 OPPS/PHP; TRAIN & EDUC SERV: HCPCS

## 2020-01-14 PROCEDURE — 99222 1ST HOSP IP/OBS MODERATE 55: CPT | Performed by: PHYSICIAN ASSISTANT

## 2020-01-14 PROCEDURE — 25000132 ZZH RX MED GY IP 250 OP 250 PS 637: Performed by: NURSE PRACTITIONER

## 2020-01-14 PROCEDURE — 99207 ZZC CONSULT E&M CHANGED TO INITIAL LEVEL: CPT | Performed by: PHYSICIAN ASSISTANT

## 2020-01-14 PROCEDURE — 99223 1ST HOSP IP/OBS HIGH 75: CPT | Mod: AI | Performed by: NURSE PRACTITIONER

## 2020-01-14 PROCEDURE — 84443 ASSAY THYROID STIM HORMONE: CPT | Performed by: NURSE PRACTITIONER

## 2020-01-14 PROCEDURE — 25000132 ZZH RX MED GY IP 250 OP 250 PS 637: Performed by: PHYSICIAN ASSISTANT

## 2020-01-14 PROCEDURE — 25000132 ZZH RX MED GY IP 250 OP 250 PS 637: Performed by: PSYCHIATRY & NEUROLOGY

## 2020-01-14 PROCEDURE — 82607 VITAMIN B-12: CPT | Performed by: NURSE PRACTITIONER

## 2020-01-14 PROCEDURE — 82746 ASSAY OF FOLIC ACID SERUM: CPT | Performed by: NURSE PRACTITIONER

## 2020-01-14 RX ORDER — PANTOPRAZOLE SODIUM 20 MG/1
20 TABLET, DELAYED RELEASE ORAL
Status: DISCONTINUED | OUTPATIENT
Start: 2020-01-14 | End: 2020-01-24 | Stop reason: HOSPADM

## 2020-01-14 RX ORDER — ACETAMINOPHEN 325 MG/1
650 TABLET ORAL EVERY 4 HOURS PRN
Status: DISCONTINUED | OUTPATIENT
Start: 2020-01-14 | End: 2020-01-24 | Stop reason: HOSPADM

## 2020-01-14 RX ORDER — DIVALPROEX SODIUM 500 MG/1
500 TABLET, EXTENDED RELEASE ORAL AT BEDTIME
Status: DISCONTINUED | OUTPATIENT
Start: 2020-01-14 | End: 2020-01-16

## 2020-01-14 RX ORDER — ASPIRIN 81 MG/1
81 TABLET ORAL DAILY
Status: DISCONTINUED | OUTPATIENT
Start: 2020-01-14 | End: 2020-01-24 | Stop reason: HOSPADM

## 2020-01-14 RX ORDER — OLANZAPINE 5 MG/1
5-10 TABLET ORAL
Status: DISCONTINUED | OUTPATIENT
Start: 2020-01-14 | End: 2020-01-24 | Stop reason: HOSPADM

## 2020-01-14 RX ORDER — LEVOTHYROXINE SODIUM 175 UG/1
175 TABLET ORAL DAILY
Status: DISCONTINUED | OUTPATIENT
Start: 2020-01-15 | End: 2020-01-24 | Stop reason: HOSPADM

## 2020-01-14 RX ORDER — GABAPENTIN 100 MG/1
100 CAPSULE ORAL 3 TIMES DAILY
Status: DISCONTINUED | OUTPATIENT
Start: 2020-01-14 | End: 2020-01-15

## 2020-01-14 RX ORDER — SUMATRIPTAN 100 MG/1
100 TABLET, FILM COATED ORAL
Status: DISCONTINUED | OUTPATIENT
Start: 2020-01-14 | End: 2020-01-24 | Stop reason: HOSPADM

## 2020-01-14 RX ORDER — SULFAMETHOXAZOLE/TRIMETHOPRIM 800-160 MG
1 TABLET ORAL 2 TIMES DAILY
Status: DISCONTINUED | OUTPATIENT
Start: 2020-01-14 | End: 2020-01-15

## 2020-01-14 RX ORDER — CALCIUM CARBONATE 500 MG/1
500 TABLET, CHEWABLE ORAL DAILY
Status: DISCONTINUED | OUTPATIENT
Start: 2020-01-14 | End: 2020-01-24 | Stop reason: HOSPADM

## 2020-01-14 RX ORDER — HYDROXYZINE HYDROCHLORIDE 25 MG/1
25 TABLET, FILM COATED ORAL EVERY 4 HOURS PRN
Status: DISCONTINUED | OUTPATIENT
Start: 2020-01-14 | End: 2020-01-24 | Stop reason: HOSPADM

## 2020-01-14 RX ORDER — OLANZAPINE 10 MG/2ML
5-10 INJECTION, POWDER, FOR SOLUTION INTRAMUSCULAR
Status: DISCONTINUED | OUTPATIENT
Start: 2020-01-14 | End: 2020-01-24 | Stop reason: HOSPADM

## 2020-01-14 RX ORDER — OLANZAPINE 10 MG/1
10 TABLET ORAL
Status: DISCONTINUED | OUTPATIENT
Start: 2020-01-14 | End: 2020-01-14

## 2020-01-14 RX ORDER — ALENDRONATE SODIUM 70 MG/1
70 TABLET ORAL
Status: DISCONTINUED | OUTPATIENT
Start: 2020-01-19 | End: 2020-01-24 | Stop reason: HOSPADM

## 2020-01-14 RX ORDER — SERTRALINE HYDROCHLORIDE 100 MG/1
100 TABLET, FILM COATED ORAL AT BEDTIME
Status: DISCONTINUED | OUTPATIENT
Start: 2020-01-14 | End: 2020-01-16

## 2020-01-14 RX ORDER — OLANZAPINE 10 MG/2ML
10 INJECTION, POWDER, FOR SOLUTION INTRAMUSCULAR
Status: DISCONTINUED | OUTPATIENT
Start: 2020-01-14 | End: 2020-01-14

## 2020-01-14 RX ORDER — TRIAMCINOLONE ACETONIDE 1 MG/G
OINTMENT TOPICAL 2 TIMES DAILY PRN
Status: DISCONTINUED | OUTPATIENT
Start: 2020-01-14 | End: 2020-01-24 | Stop reason: HOSPADM

## 2020-01-14 RX ORDER — BUSPIRONE HYDROCHLORIDE 15 MG/1
30 TABLET ORAL EVERY MORNING
Status: DISCONTINUED | OUTPATIENT
Start: 2020-01-14 | End: 2020-01-15

## 2020-01-14 RX ORDER — ALBUTEROL SULFATE 0.83 MG/ML
2.5 SOLUTION RESPIRATORY (INHALATION) EVERY 6 HOURS PRN
Status: DISCONTINUED | OUTPATIENT
Start: 2020-01-14 | End: 2020-01-24 | Stop reason: HOSPADM

## 2020-01-14 RX ORDER — ALUMINA, MAGNESIA, AND SIMETHICONE 2400; 2400; 240 MG/30ML; MG/30ML; MG/30ML
30 SUSPENSION ORAL EVERY 4 HOURS PRN
Status: DISCONTINUED | OUTPATIENT
Start: 2020-01-14 | End: 2020-01-24 | Stop reason: HOSPADM

## 2020-01-14 RX ORDER — ALENDRONATE SODIUM 70 MG/1
70 TABLET ORAL
Status: DISCONTINUED | OUTPATIENT
Start: 2020-01-14 | End: 2020-01-14 | Stop reason: CLARIF

## 2020-01-14 RX ORDER — BISACODYL 10 MG
10 SUPPOSITORY, RECTAL RECTAL DAILY PRN
Status: DISCONTINUED | OUTPATIENT
Start: 2020-01-14 | End: 2020-01-24 | Stop reason: HOSPADM

## 2020-01-14 RX ORDER — TRAZODONE HYDROCHLORIDE 50 MG/1
50 TABLET, FILM COATED ORAL
Status: DISCONTINUED | OUTPATIENT
Start: 2020-01-14 | End: 2020-01-24 | Stop reason: HOSPADM

## 2020-01-14 RX ORDER — OLANZAPINE 5 MG/1
5-10 TABLET ORAL
Status: DISCONTINUED | OUTPATIENT
Start: 2020-01-14 | End: 2020-01-14

## 2020-01-14 RX ORDER — LEVOTHYROXINE SODIUM 175 UG/1
175 TABLET ORAL DAILY
Status: DISCONTINUED | OUTPATIENT
Start: 2020-01-14 | End: 2020-01-14 | Stop reason: CLARIF

## 2020-01-14 RX ORDER — DONEPEZIL HYDROCHLORIDE 5 MG/1
5 TABLET, FILM COATED ORAL AT BEDTIME
Status: DISCONTINUED | OUTPATIENT
Start: 2020-01-14 | End: 2020-01-24 | Stop reason: HOSPADM

## 2020-01-14 RX ORDER — LEVOTHYROXINE SODIUM 175 UG/1
175 TABLET ORAL ONCE
Status: COMPLETED | OUTPATIENT
Start: 2020-01-14 | End: 2020-01-14

## 2020-01-14 RX ADMIN — SULFAMETHOXAZOLE AND TRIMETHOPRIM 1 TABLET: 800; 160 TABLET ORAL at 20:07

## 2020-01-14 RX ADMIN — OLANZAPINE 5 MG: 5 TABLET, FILM COATED ORAL at 20:07

## 2020-01-14 RX ADMIN — SULFAMETHOXAZOLE AND TRIMETHOPRIM 1 TABLET: 800; 160 TABLET ORAL at 12:35

## 2020-01-14 RX ADMIN — LORAZEPAM 1 MG: 1 TABLET ORAL at 00:30

## 2020-01-14 RX ADMIN — GABAPENTIN 100 MG: 100 CAPSULE ORAL at 20:07

## 2020-01-14 RX ADMIN — LEVOTHYROXINE SODIUM 175 MCG: 175 TABLET ORAL at 12:34

## 2020-01-14 RX ADMIN — OLANZAPINE 5 MG: 5 TABLET, FILM COATED ORAL at 16:06

## 2020-01-14 RX ADMIN — GABAPENTIN 100 MG: 100 CAPSULE ORAL at 14:49

## 2020-01-14 RX ADMIN — SERTRALINE HYDROCHLORIDE 100 MG: 100 TABLET ORAL at 20:07

## 2020-01-14 RX ADMIN — DONEPEZIL HYDROCHLORIDE 5 MG: 5 TABLET ORAL at 20:08

## 2020-01-14 RX ADMIN — ASPIRIN 81 MG: 81 TABLET ORAL at 12:34

## 2020-01-14 RX ADMIN — DIVALPROEX SODIUM 500 MG: 500 TABLET, EXTENDED RELEASE ORAL at 20:07

## 2020-01-14 ASSESSMENT — ACTIVITIES OF DAILY LIVING (ADL)
ORAL_HYGIENE: INDEPENDENT
WHICH_OF_THE_ABOVE_FUNCTIONAL_RISKS_HAD_A_RECENT_ONSET_OR_CHANGE?: COGNITION
RETIRED_EATING: 0-->INDEPENDENT
HYGIENE/GROOMING: INDEPENDENT
RETIRED_COMMUNICATION: 0-->UNDERSTANDS/COMMUNICATES WITHOUT DIFFICULTY
ORAL_HYGIENE: INDEPENDENT
AMBULATION: 0-->INDEPENDENT
TOILETING: 0-->INDEPENDENT
COGNITION: 2 - DIFFICULTY WITH ORGANIZING THOUGHTS
SWALLOWING: 0-->SWALLOWS FOODS/LIQUIDS WITHOUT DIFFICULTY
DRESS: INDEPENDENT
FALL_HISTORY_WITHIN_LAST_SIX_MONTHS: NO
HYGIENE/GROOMING: INDEPENDENT
DRESS: INDEPENDENT
TRANSFERRING: 0-->INDEPENDENT
BATHING: 0-->INDEPENDENT
DRESS: 0-->INDEPENDENT

## 2020-01-14 ASSESSMENT — MIFFLIN-ST. JEOR: SCORE: 1416.34

## 2020-01-14 NOTE — PROGRESS NOTES
"   01/14/20 1100   General Information   Date Initially Attended OT 01/14/20   Clinical Impression   Affect Anxious   Orientation Oriented to person, place and time   Appearance and ADLs Neatly groomed   Attention to Internal Stimuli No observed signs   Interaction Skills Initiates appropriately with staff;Initiates appropriately with peers   Ability to Communicate Needs Independent   Verbal Content Appropriate to topic;Word finding problems   Ability to Maintain Boundaries Maintains appropriate physical boundaries;Maintains appropriate verbal boundaries;Accepts and maintains boundaries with one cue;Other (see comments)   Participation Initiates participation   Concentration Concentrates <5 minutes;Other (see comments)  (on task)   Ability to Concentrate Easily distracted;With structure;With refocus   Follows and Comprehends Directions Needs further assessment;Other (see comments)  (followed 2 step tasks for brief periods of time)   Memory Other (see comments)  (stated having difficulty with her memory)   Organization Disorganized   Decision Making Independent   Planning and Problem Solving Needs further assessment   Ability to Apply and Learn Concepts Applies within group structure   Frustrations / Stress Tolerance Independently identifies skills    Level of Insight Insightful into needs, issues, goals   Self Esteem Can identify positives   Social Supports Identifies utilizing supports   General Observation/Plan   General Observations/Plan See Comments   Attended 1 of 2 OT groups. She explained to this author she is experiencing more difficulty with memory. \"will you be part of the testing to figure out what's wrong with me?\" After group on 1:1, she explained she is a person who is always happy and smiling and now having trouble concentrating. She stated she \"used to take medications for ADHD and it has been a long time since then, maybe I need them again\". While filling out the OT goal form, she stated the " "conversation in the room to be distracting with her \"trouble concentrating\". She chose a familiar task on the IPAD to work on related to working with problem solving with numbers. \"when I'm feeling good, I am good at this, I'm a numbers person\". She stated the reason for admission as \"over the top anxiety\". She identified a personal strength as \"people skills\". She set the OT goals to focus on to work on \"boundary issues, people pleasing and Personality is ENFP\". She attended approximately 5 minutes of the afternoon OT group after finishing with a meeting with staff. She attempted to participate on arrival though seemed to need time to think of answers. She appeared interested in participating actively. Plan:  Encourage attendance. Offer opportunity for success oriented, more structured task work, grade complexity as needed, provide the opportunity to  decrease anxiety with attendance and reassurance with task focus. Provide structured assistance as difficulties are noted according to cognitive needs with the plan to decrease frustrations. Assess further in the areas of organization and problem solving and document.        "

## 2020-01-14 NOTE — ED PROVIDER NOTES
History     Chief Complaint:  Altered Mental Status    The history is provided by the patient and a relative.      Mariela Duffy is a 64 year old female with history of mild cognitive impairment, confusion, seasonal affective disorder, and depression who presents with an altered mental status.The patient came in earlier in the emergency room because she misplaced her anxiety medications, she was given a dose of Atarax and then discharged. The patient states she felt better after she was given the dosage and began to fall asleep in the waiting room which then made her feel anxious and scared so she called her daughter.    The patient's daughter explains the patient told her she got into a fight with her roommate and got upset and came to the emergency room. She states the patient didn't give a coherent message, so she tracked down the phone call to the ER and was told the patient was discharged. The patient's daughter explains she came to the ED as she thought the patient is becoming more confused and was worried the patient could not care for herself. The patient's daughter mentions the patient has been dealing with anxiety for a long time and frequently calls her daughter confused and states the patient later does not recall the phone calls. The patient's daughter states she believes her mother's anxiety is not being appropriately managed. She states she believes the patient should be admitted into the hospital or put in assisted living for a short period of time until she can figure out what is going on so that the patient does not have such frequent ED visits. The patient's daughter denies any alcohol, tobacco, or drug use.    Allergies:  Levaquin     Medications:    Alendronate  Buspirone  Hydroxyzine  Levoxyl  Pamelor  Pantoprazole  Zoloft  Imitrex  Kenalog  Vitamin D2     Past Medical History:    Confusion  Mild cognitive impairment  Peripheral polyneuropathy   Vitamin D deficiency   History of thyroid  cancer  Migraine with aura and without status migrainosus, not intractable  Gastroesophageal reflux disease with esophagitis  Hiatal hernia  Transient ischemic attack  Attention deficit disorder   Insomnia  Seasonal affective disorder  Tubular adenoma of colon  Hypothyroidism   Mild major depression   Iron deficiency anemia  Post surgical hypothyroidism  Rosacea  Irritable bowel syndrome  Contact dermatitis and other eczema due to other specified agent   Allergic rhinitis   Benign neoplasm of colon  Depressive disorder  Diverticulosis of colon  Esophageal reflux  Excessive or frequent menstruation   Generalized anxiety disorder  Headache  Colonic polyps  Irritable bowel syndrome  Malignant neoplasm of thyroid gland  Post surgical hypothyroidism  Rhinitis     Past Surgical History:    Abdomen surgery  Hiatal hernia surgery  Cholecystectomy  Cleft lip repair   Cholecystectomy  ENT Surgery  Head and neck surgery  Thyroidectomy   Wrist surgery    Family History:    Mother: Cancer  Father: Hypertension, Coronary artery disease, Colon cancer  Maternal Grandfather: Coronary artery disease  Paternal Grandmother: Alzheimer Disease  Paternal Grandfather: Coronary artery disease    Social History:  The patient was accompanied to the ED by her daughter  Smoking Status: Former Smoker  Smokeless Tobacco: Never Used  Alcohol Use: Yes  Drug Use: No  PCP: Estefany Stanley  Marital Status:      Review of Systems   Psychiatric/Behavioral: Positive for confusion. The patient is nervous/anxious.    All other systems reviewed and are negative.    Physical Exam     Patient Vitals for the past 24 hrs:   BP Temp Temp src Pulse Heart Rate Resp SpO2 Weight   01/14/20 0032 127/84 -- -- -- 101 -- 94 % 83 kg (182 lb 15.7 oz)   01/13/20 2144 (!) 129/94 -- -- -- 108 -- 97 % --   01/13/20 2111 117/89 98.4  F (36.9  C) Temporal 114 -- 18 99 % --       Physical Exam  General: Resting on the bed.  Head: No obvious trauma to head.  Ears, Nose,  Throat:  External ears normal.  Nose normal.  No pharyngeal erythema, swelling or exudate.  Midline uvula.  clear TMs.    Eyes:  Conjunctivae clear.  Pupils are equal, round, and reactive.   Neck: Normal range of motion.  Neck supple.  non nuchal rigidity   CV: Regular rate and rhythm.  No murmurs.      Respiratory: Effort normal and breath sounds normal.  No wheezing or crackles.   Gastrointestinal: Soft.  No distension. There is no tenderness.   Neuro: Alert. Moving all extremities appropriately.  Normal speech.  GCS 15.  CN II-XII grossly intact.  Gross muscle strength intact of the proximal and distal bilateral upper and lower extremities.  Sensation intact to light touch in all 4 extremities.    Skin: Skin is warm and dry.  No rash noted.   Psych: variable mood and affect. Behavior is erratic. disorganized.  Insight limited.  No SI or HI.     Emergency Department Course   Laboratory:    CBC: WBC 9.4, HGB 14.5,   BMP: glucose 08(H) o/w WNL (Creatinine 0.86)     UA with microscopic: leukocyte esterase large, WBC 3(H), bacteria many, squamous epithelial 4(H), mucous present o/w WNL   UPT: negative   Drug abuse screen 77 urine: all negative    Urine culture pending    Emergency Department Course:  Nursing notes and vitals reviewed.    2132 I performed an exam of the patient as documented above.     2304 I spoke with DEC regarding the patient's presentation and plan of care.     The patient provided a urine sample here in the emergency department. This was sent for laboratory testing, findings above.     IV was inserted and blood was drawn for laboratory testing, results above.     2350 I returned to check on the patient.     0056 Patient has bed placement at [facility]. Will sign out to Dr. See pending transfer.     Impression & Plan    Medical Decision Makin-year-old female with history of depression anxiety presents with worsening anxiety, unable effusion.  Vital signs are reassuring.  Broad  differential was pursued including not limited to develop acute electrolyte, metabolic, renal dysfunction, drug of abuse, UTI, stroke, mass, tumor, decompensated mental health, etc.  BMP shows no acute electrolyte, metabolic, renal dysfunction.  CBC shows no leukocytosis or anemia.  Patient has no neck stiffness, no fever, normal WBC, do not suspect meningitis or encephalitis.  Afebrile.  Nonfocal neurologic exam, not suggestive of acute stroke.  No falls or trauma, no indication of acute intracranial hemorrhage.  Urinalysis shows 13 white blood cells and large leuk esterase but no nitrates.  Urine culture pending.  Urine drug screen negative.  Urine pregnancy test negative.  Patient has severe anxiety and depression.  She appears to be somewhat disorganized, unable to care for self and has presented to the ER now twice for anxiety in one evening.  At this point she does not appear to be safe for discharge.  Daughter reports this is consistent with her anxiety and she appears decompensated.  She appears to require stabilization and mental health admission.  Please see dec note for further details.  Patient and daughter are agreeable with this plan.  Patient will be admitted to mental health at [facility].    Diagnosis:    ICD-10-CM    1. Psychosis, unspecified psychosis type (H) F29 UA with Microscopic     Basic metabolic panel     CBC with platelets differential     Drug abuse screen 77 urine (FL, RH, SH)     HCG qualitative urine (UPT)     Urine Culture   2. Anxiety F41.9    3. Disorganized behavior R46.89        Disposition:   Patient signed out to Dr. See pending transfer to [facility].     Scribe Disclosure:  Thuy ZIMMERMAN, am serving as a scribe at 12:00 AM on 1/14/2020 to document services personally performed by Dominique Dukes MD based on my observations and the provider's statements to me.   [facility] EMERGENCY DEPARTMENT       Dominique Dukes MD  01/14/20 0148

## 2020-01-14 NOTE — ED NOTES
Report given to ambulance crew.  All belongings sent with patient including her cell phone in her jacket pocket.  Pt is calm and cooperative.

## 2020-01-14 NOTE — ED PROVIDER NOTES
History     Chief Complaint:  Anxiety and couldn't find medication after dropping it    HPI   Mariela Duffy is a 64 year old female with a history of anxiety who presents via ambulance for the evaluation of anxiety. The patient reports that just prior to her arrival to the ED she was feeling very anxious and having a panic attack so she attempted to take one of her hydroxyzine. The patient states that she dropped her hydroxyzine, was not able to find it, and was not able to find the rest of her hydroxyzine causing for her to become more anxious and develop a panic attack, so she called 911. She notes that she was also feeling very confused. She also notes that she was feeling more anxious than usual today because her roommate told her that she could not file for bankruptcy. Pt reports she contacted her  and is expecting a call back tomorrow. The patient also endorses mild rhinorrhea. The patient denies fever, cough, chest pain, abdominal pain, vomiting, headache, and other issues.      Allergies:  Levaquin      Medications:    Fosamax  Buspar  Atarax  Levoxyl  Pamelor  Protonix  Zoloft  Imitrex  Vitamin D2    Past Medical History:    Absence of menstruation   Allergic rhinitis  Benign neoplasm of colon  Contact dermitis   Depression  Diverticulosis  GERD  Anxiety  Headache  Blood transfusion  IBS  Colonic polyps  Rhinitis  Hypothyroidism   Rosacea   TIA  Confusion     Past Surgical History:    Thyroid cancer surgery  Cholecystectomy   Hiatal hernia     Family History:    Cancer - Father  Hypertension - Father  CAD - Father    Social History:  Smoking status: Former Smoker, Quit Date: 1/1/1977  Alcohol use: Yes  Drug use: No  Marital Status:   [4]     Review of Systems   Constitutional: Negative for fever.   HENT: Positive for rhinorrhea.    Respiratory: Negative for cough.    Cardiovascular: Negative for chest pain.   Gastrointestinal: Negative for abdominal pain and vomiting.   Neurological:  Negative for headaches.   Psychiatric/Behavioral: Positive for confusion. The patient is nervous/anxious.    All other systems reviewed and are negative.        Physical Exam     Patient Vitals for the past 24 hrs:   BP Temp Temp src Pulse Resp SpO2   01/13/20 1758 (!) 128/94 98.2  F (36.8  C) Oral 97 18 99 %      Physical Exam  Nursing note and vitals reviewed.  Constitutional: Cooperative.   HENT:   Mouth/Throat: Moist mucous membranes.   Eyes: EOMI, nonicteric sclera  Cardiovascular: Normal rate, regular rhythm, no murmurs, rubs, or gallops  Pulmonary/Chest: Effort normal and breath sounds normal. No respiratory distress. No wheezes. No rales.   Abdominal: Soft. Nontender, nondistended, no guarding or rigidity. BS present.   Musculoskeletal: Normal range of motion.   Neurological: Alert. Moves all extremities spontaneously.   Skin: Skin is warm and dry. No rash noted.   Psychiatric: anxious mood, congruent affect.       Emergency Department Course   Interventions:  1828, Atarax, 50 mg, PO    Emergency Department Course:  Patient arrived via ambulance.     Past medical records, nursing notes, and vitals reviewed.  1815: I performed an exam of the patient and obtained history, as documented above.     Findings and plan explained to the Patient. Patient discharged home with instructions regarding supportive care, medications, and reasons to return. The importance of close follow-up was reviewed.       Impression & Plan    Medical Decision Making:  Pt presents with complaint of anxiety and panic attack. Panic attack has largely ended before arrival. She remains anxious therefore she was given hydroxyzine at her request. She states she has more at home, but that she couldn't find her prescription or her emergency dose in her glovebox of her car. She is at no imminent risk of self harm. She wants to return home and states she'll contact her children or take an uber for a ride. Her vital signs and physical exam are  normal. No signs of medical condition causing anxiety such as PE, thyrotoxicosis, or ingestion. She's safe/stable for discharge home. All questions answered. She's discharged in stable condition.     Diagnosis:    ICD-10-CM    1. Anxiety F41.9        Disposition:  Discharged to home.    Scribe Disclosure:  I, Lalito Alaniz, am serving as a scribe at 6:09 PM on 1/13/2020 to document services personally performed by Mj Kasper MD based on my observations and the provider's statements to me.      Lalito Alaniz  1/13/2020   [facility] EMERGENCY DEPARTMENT       Mj Kasper MD  01/13/20 6627

## 2020-01-14 NOTE — PHARMACY-ADMISSION MEDICATION HISTORY
"Admission Medication History status for the 1/14/2020 admission is complete.  See EPIC admission navigator for Prior to Admission medications.    Medication history sources:  Patient, Chart Review, Data Symmetry RX     Medication history source reliability: Moderate    Medication adherence:  Moderate    Changes made to PTA medication list (reason)  Added: None  Deleted: Albuterol Nebulizer, Pantoprazole, and Sumatriptan   Changed: Fluticasone to PRN    Additional medication history information (including reliability of information, actions taken by pharmacist):   -Pt was a moderate historian.   -Pt stopped taking her Protonix.   -Pt reports taking her aspirin and nortriptyline inconsistently.   -Pt has been taking brand \"Levoxyl\"    Time spent in this activity: 30 minutes    Medication history completed by: Quan Schmidt RPH     Prior to Admission medications    Medication Sig Last Dose Taking? Auth Provider   alendronate (FOSAMAX) 70 MG tablet Take 70 mg by mouth every 7 days On Fridays.   Haase, Susan Rachele, APRN CNP   aspirin (ASA) 81 MG tablet Take 81 mg by mouth daily   Reported, Patient   busPIRone HCl (BUSPAR) 30 MG tablet Take 30 mg by mouth every morning   Unknown, Entered By History   CALCIUM ANTACID 500 MG chewable tablet Take 500 mg by mouth daily   Unknown, Entered By History   fluticasone (FLONASE) 50 MCG/ACT spray Spray 1-2 sprays into both nostrils daily PRN   Gonzales Yang MD   hydrOXYzine (ATARAX) 25 MG tablet Take 1 tablet (25 mg) by mouth 3 times daily as needed for anxiety   Estefany Stanley PA-C   levothyroxine (LEVOXYL) 175 MCG tablet Take 1 tablet (175 mcg) by mouth daily   Estefany Stanley PA-C   nortriptyline (PAMELOR) 50 MG capsule TAKE ONE CAPSULE BY MOUTH EVERY NIGHT AT BEDTIME   Haase, Susan Rachele, APRN CNP   order for DME Equipment being ordered: Nebulizer   Estefany Stanley PA-C   sertraline (ZOLOFT) 100 MG tablet TAKE TWO TABLETS BY MOUTH NIGHTLY   Estefany Stanley PA-C "   triamcinolone (KENALOG) 0.1 % external ointment Apply topically 2 times daily as needed for irritation   Estefany Stanley, PA-C   vitamin D2 (ERGOCALCIFEROL) 04871 units (1250 mcg) capsule Take 1 capsule by mouth twice a week    Unknown, Entered By History

## 2020-01-14 NOTE — PROGRESS NOTES
Behavioral Health  Note    Behavioral Health  Spirituality Group Note    UNIT 3B    Name: Mariela Duffy YOB: 1955   MRN: [id number] Age: 64 year old      Patient attended -led group, which included discussion of spirituality, coping with illness and building resilience.    Patient attended group for 1.0 hrs.    The patient actively participated in group discussion and patient demonstrated an appreciation of topic's application for their personal circumstances.    Helen Mccoy  Chaplain Resident  Pager [phone]

## 2020-01-14 NOTE — TELEPHONE ENCOUNTER
S: Jacinta called @1035pm w clinical on 64/F presenting to [facility] ED w possible decompensation  Jacinta [phone]  [facility] [phone]    B: Pt was brought into the ED after her roommate tolder her she couldn't file for bankrupty. Pt given hydroxizine, and was sent on her way, slept in the waiting area, woke up agitated and anxious, was readmitted. Pt presented as confused and anxious.  Deompensating, delusional thinking, non sensible talk, pt's daughter reporting it has been since Fall 2019 when she lost her house due to forclosure. [facility] services were suggested to the family.   Pt declined doing a mini cog, in the middle stopped, only scored a 1.     Labs pending  Medical clearance pending    A: Voluntary     R:

## 2020-01-14 NOTE — DISCHARGE INSTRUCTIONS
Discharge Instructions  Mental Health Concerns    You were seen today for mental health concerns, such as depression, anxiety, or suicidal thinking. Your provider feels that you do not require hospitalization at this time. However, your symptoms may become worse, and you may need to return to the Emergency Department. Most treatments of depression and suicidal thoughts are a process rather than a single intervention.  Medications and counseling can take several weeks or more to help.    Generally, every Emergency Department visit should have a follow-up clinic visit with either a primary or a specialty clinic/provider. Please follow-up as instructed by your emergency provider today.    By accepting these discharge instructions:  You promise to not harm yourself or others.  You agree that if you feel you are becoming unable to keep that promise, you will do something to help yourself before you do anything to harm yourself or others.   You agree to keep any safety plan arranged on your visit here today.  You agree to take any medication prescribed or recommended by your provider.  If you are getting worse, you can contact a friend or a family member, contact your counselor or family provider, contact a crisis line, or other options discussed with the provider or therapist today.  At any time, you can call 911 and return to the Emergency Department for more help.  You understand that follow-up is essential to your treatment, and you will make and keep appointments recommended on your visit today.    How to improve your mental health and prevent suicide:  Involve others by letting family, friends, counselors know.  Do not isolate yourself.  Avoid alcohol or drugs. Remove weapons, poisons from your home.  Try to stick to routines for eating, sleeping and getting regular exercise.    Try to get into sunlight. Bright natural light not only treats seasonal affective disorder but also depression.  Increase safe activities  that you enjoy.    If you feel worse, contact 1-800-suicide (1-573.297.2527), or call 911, or your primary provider/counselor for additional assistance.    If you were given a prescription for medicine here today, be sure to read all of the information (including the package insert) that comes with your prescription.  This will include important information about the medicine, its side effects, and any warnings that you need to know about.  The pharmacist who fills the prescription can provide more information and answer questions you may have about the medicine.  If you have questions or concerns that the pharmacist cannot address, please call or return to the Emergency Department.   Remember that you can always come back to the Emergency Department if you are not able to see your regular provider in the amount of time listed above, if you get any new symptoms, or if there is anything that worries you.

## 2020-01-14 NOTE — PLAN OF CARE
Problem: General Plan of Care (Inpatient Behavioral)  Goal: Individualization/Patient Specific Goal (IP Behavioral)  Description  The patient and/or their representative will achieve their patient-specific goals related to the plan of care.    The patient-specific goals include:  Outcome: No Change  Flowsheets (Taken 1/14/2020 1431)  Areas of Vulnerability: Financial problems, cognitive decline, roommate conflict  Patient Strengths: Stable and supportive family; Engagement in hobbies, sports, arts, clubs; Has vocational interests i.e., hobbies; Adherent to medication regime    The patient completed the reasons for admit and goals for discharge in the personal plan of care.   Reasons for admit:  1)  I got overly anxious  2)  Anxiety      Goals for discharge:  1)  Be able to have privacy  2)  Placement in assisted living  3)  Holistic treatment with medication management

## 2020-01-14 NOTE — ED TRIAGE NOTES
Patient seen here earlier for anxiety brought in by EMS after she wasn't able to take her PRN hydroxyzine. 50 mg of Hydroxyzine was given and patient was discharged. Patient was sitting out in lobby and asked staff for assistance to call daughter. After talking with daughter patient would like to be seen for confusion and per report daughter would also like to be seen for confusion. Patient daughters name is Dominique # [phone] per report.
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
## 2020-01-14 NOTE — PLAN OF CARE
Problem: General Plan of Care (Inpatient Behavioral)  Goal: Team Discussion  Description  Team Plan:  Outcome: No Change  Note:   BEHAVIORAL TEAM DISCUSSION    Participants: Charles WEINER DNP, Larissa Burton CTC, Dee Erwin RN, Jackeline Clayton OT  Progress: New admit  Anticipated length of stay: 5-7 days  Continued Stay Criteria/Rationale: ALlered mental status, anxiety  Medical/Physical: Hypothyroidism   Precautions:   Behavioral Orders   Procedures    Code 1 - Restrict to Unit    Routine Programming     As clinically indicated    Status 15     Every 15 minutes.     Plan: Psychiatric Assessment. Medication Management. Therapeutic Mileu. Individual and group support.   Rationale for change in precautions or plan: Initial plan

## 2020-01-14 NOTE — PROGRESS NOTES
"Pt was an expressive leader in this group.  She was able to modulate creative non-verbal expression to interact with others as well as express herself.  She did occasionally check with this therapist about \"appropriateness\" of various behaviors and did seem to second-guess herself.  She appeared to accept reassurance.  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       01/14/20 7132   Dance Movement Therapy   Type of Intervention structured groups   Response participates, initiates socially appropriate   Hours 1     "
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
## 2020-01-14 NOTE — PROGRESS NOTES
No cash.    Sent to security...Visa debit card.    In pt's locker....coat, shirt, cellphone, I-pod, and shoes.    With pt...clothes, 2 rings, watch and glasses.  A               Admission:  I am responsible for any personal items that are not sent to the safe or pharmacy.  [facility] is not responsible for loss, theft or damage of any property in my possession.    Signature:  _________________________________ Date: _______  Time: _____                                              Staff Signature:  ____________________________ Date: ________  Time: _____      2nd Staff person, if patient is unable/unwilling to sign:    Signature: ________________________________ Date: ________  Time: _____     Discharge:  [facility] has returned all of my personal belongings:    Signature: _________________________________ Date: ________  Time: _____                                          Staff Signature:  ____________________________ Date: ________  Time: _____
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
## 2020-01-15 LAB
ALBUMIN SERPL-MCNC: 4.4 G/DL (ref 3.4–5)
ALP SERPL-CCNC: 116 U/L (ref 40–150)
ALT SERPL W P-5'-P-CCNC: 23 U/L (ref 0–50)
AMMONIA PLAS-SCNC: 19 UMOL/L (ref 10–50)
AST SERPL W P-5'-P-CCNC: 20 U/L (ref 0–45)
BACTERIA SPEC CULT: NORMAL
BILIRUB DIRECT SERPL-MCNC: 0.1 MG/DL (ref 0–0.2)
BILIRUB SERPL-MCNC: 0.7 MG/DL (ref 0.2–1.3)
PROT SERPL-MCNC: 7.8 G/DL (ref 6.8–8.8)
SPECIMEN SOURCE: NORMAL

## 2020-01-15 PROCEDURE — 82140 ASSAY OF AMMONIA: CPT | Performed by: NURSE PRACTITIONER

## 2020-01-15 PROCEDURE — 36415 COLL VENOUS BLD VENIPUNCTURE: CPT | Performed by: NURSE PRACTITIONER

## 2020-01-15 PROCEDURE — 99232 SBSQ HOSP IP/OBS MODERATE 35: CPT | Performed by: NURSE PRACTITIONER

## 2020-01-15 PROCEDURE — 12400002 ZZH R&B MH SENIOR/ADOLESCENT

## 2020-01-15 PROCEDURE — 25000132 ZZH RX MED GY IP 250 OP 250 PS 637: Performed by: NURSE PRACTITIONER

## 2020-01-15 PROCEDURE — 25000132 ZZH RX MED GY IP 250 OP 250 PS 637: Performed by: PHYSICIAN ASSISTANT

## 2020-01-15 PROCEDURE — G0177 OPPS/PHP; TRAIN & EDUC SERV: HCPCS

## 2020-01-15 PROCEDURE — 80076 HEPATIC FUNCTION PANEL: CPT | Performed by: NURSE PRACTITIONER

## 2020-01-15 PROCEDURE — 25000132 ZZH RX MED GY IP 250 OP 250 PS 637: Performed by: PSYCHIATRY & NEUROLOGY

## 2020-01-15 RX ORDER — POLYETHYLENE GLYCOL 3350 17 G/17G
17 POWDER, FOR SOLUTION ORAL DAILY
Status: DISCONTINUED | OUTPATIENT
Start: 2020-01-15 | End: 2020-01-23

## 2020-01-15 RX ORDER — AMOXICILLIN 250 MG
1-2 CAPSULE ORAL 2 TIMES DAILY
Status: DISCONTINUED | OUTPATIENT
Start: 2020-01-15 | End: 2020-01-23

## 2020-01-15 RX ORDER — GABAPENTIN 100 MG/1
200 CAPSULE ORAL 3 TIMES DAILY
Status: DISCONTINUED | OUTPATIENT
Start: 2020-01-15 | End: 2020-01-17

## 2020-01-15 RX ADMIN — ASPIRIN 81 MG: 81 TABLET ORAL at 08:09

## 2020-01-15 RX ADMIN — GABAPENTIN 100 MG: 100 CAPSULE ORAL at 08:09

## 2020-01-15 RX ADMIN — GABAPENTIN 200 MG: 100 CAPSULE ORAL at 20:27

## 2020-01-15 RX ADMIN — LEVOTHYROXINE SODIUM 175 MCG: 175 TABLET ORAL at 10:03

## 2020-01-15 RX ADMIN — SULFAMETHOXAZOLE AND TRIMETHOPRIM 1 TABLET: 800; 160 TABLET ORAL at 08:09

## 2020-01-15 RX ADMIN — SENNOSIDES AND DOCUSATE SODIUM 1 TABLET: 8.6; 5 TABLET ORAL at 12:42

## 2020-01-15 RX ADMIN — SERTRALINE HYDROCHLORIDE 100 MG: 100 TABLET ORAL at 20:27

## 2020-01-15 RX ADMIN — OLANZAPINE 5 MG: 5 TABLET, FILM COATED ORAL at 20:27

## 2020-01-15 RX ADMIN — GABAPENTIN 200 MG: 100 CAPSULE ORAL at 14:19

## 2020-01-15 RX ADMIN — DONEPEZIL HYDROCHLORIDE 5 MG: 5 TABLET ORAL at 20:27

## 2020-01-15 RX ADMIN — SENNOSIDES AND DOCUSATE SODIUM 1 TABLET: 8.6; 5 TABLET ORAL at 20:28

## 2020-01-15 RX ADMIN — ACETAMINOPHEN 650 MG: 325 TABLET, FILM COATED ORAL at 08:09

## 2020-01-15 RX ADMIN — TRIAMCINOLONE ACETONIDE: 1 OINTMENT TOPICAL at 14:19

## 2020-01-15 RX ADMIN — DIVALPROEX SODIUM 500 MG: 500 TABLET, EXTENDED RELEASE ORAL at 20:27

## 2020-01-15 ASSESSMENT — ACTIVITIES OF DAILY LIVING (ADL)
DRESS: INDEPENDENT
HYGIENE/GROOMING: INDEPENDENT
ORAL_HYGIENE: INDEPENDENT
ORAL_HYGIENE: INDEPENDENT
DRESS: INDEPENDENT
HYGIENE/GROOMING: INDEPENDENT

## 2020-01-15 NOTE — RESULT ENCOUNTER NOTE
Final urine culture report is NEGATIVE per [facility] Result protocol.    If NEGATIVE result, no change in treatment, per [facility] Result protocol.

## 2020-01-15 NOTE — PROGRESS NOTES
Brief Medicine Note    Urine cx w/ mixed urogenital zachary not c/w UTI, and patient is not reporting sx. Will stop Bactrim.     Signing off, but please call if new concerns arise.     Edna Nicolas PA-C  Hospitalist Service  Pager [phone]

## 2020-01-15 NOTE — PROGRESS NOTES
01/14/20 2000   Therapeutic Recreation   Type of Intervention structured groups   Activity game   Response Participates, initiates socially appropriate   Hours 1     Pt participated in Therapeutic Recreation group with focus on leisure participation, social engagement, and critical thinking. Engaged and focused in the group recreational intervention via a group game.  Pt was a full participant throughout the entire duration of group. Showed progress in session goals. Pt mood was calm and appeared to brighten with social interaction. Pt was very eager to participate in the activity and seemed to enjoy the game. Pt had disorganized thinking throughout the game, often needing extra cues or hints repeated to help with the game. Pt at times had pressured speech and at the beginning of group, sang instead of talking, to contribute to the group discussion to help keep composure.

## 2020-01-15 NOTE — PROGRESS NOTES
Pt declined Buspar this AM as she has been started on gabapentin for anxiety. Pt also declined Tums and Protonix and reports that she does not use those unless she needs them.   Pt's speech continues to be pressured.  Pt is appreciative of assistance.

## 2020-01-15 NOTE — PLAN OF CARE
Problem: OT General Care Plan  Goal: OT Goal 1  Description  Will attend OT groups,  process through organizing work and thoughts with concrete familiar step opportunities as needed. Assess cognition further in problem solving and organization.   Note:   Attended 2 of 2 OT groups. She worked on a familiar task on the IPAD  needing to work with familiar though more complex number configurations in problem solving, which she stated is easy when feeling well. She approached author directly when needing assistance.  During the 2nd group, she participated in an activity focused on identifying progress accomplished since admission and noting the strategies helpful in working through difficulties. She appears to continue noting pauses and word finding difficulties with speech and comments offered.

## 2020-01-15 NOTE — PROGRESS NOTES
"[facility], [place]   Psychiatric Progress Note        Interim History:   From H&P: Mariela Duffy is a 64 year old female with history of mild dementia, depression and anxiety.  The patient was brought to the emergency room for assessment of altered mental status.  The patient was seen earlier that day in the emergency room due to high anxiety.  She was given Atarax and discharge.  The patient fell asleep in the waiting room and upon awakening she was very anxious, disorganized, and not making sense.  The daughter and the patient reported that she has been increasingly confused in the last few months.  Her anxiety have been significantly increased.  Both did not feel that the patient is well enough to go home, and she has not been able to take care of herself.    The patient's care was discussed with the treatment team during the daily team meeting and/or staff's chart notes were reviewed.  Staff report patient has been calm, pleasant, cooperative. Patient is attending groups and participating appropriately. Needs cues to complete tasks. She is pressured and sings instead f talk.  Patient is eating well and taking medications as prescribed. Slept 5.5 hours.     Met with patient. She is very pleasant. Appear anxious although states that her anxiety is better. Denies depression, \"I am always joyful\".  The patient appears to be hypomanic. She is disorganized, tangential and loud. She is laughing inappropriately. Reports headache. Usually takes Imitrex at home. Received Tylenol with some relieve.          Medications:       [START ON 1/19/2020] alendronate  70 mg Oral Q7 Days     aspirin  81 mg Oral Daily     busPIRone  30 mg Oral QAM     calcium carbonate  500 mg Oral Daily     divalproex sodium extended-release  500 mg Oral At Bedtime     donepezil  5 mg Oral At Bedtime     gabapentin  100 mg Oral TID     levothyroxine  175 mcg Oral Daily     pantoprazole  20 mg Oral QAM AC     " sertraline  100 mg Oral At Bedtime     sulfamethoxazole-trimethoprim  1 tablet Oral BID          Allergies:     Allergies   Allergen Reactions     Levaquin Nausea and Vomiting          Labs:     Recent Results (from the past 672 hour(s))   UA with Microscopic    Collection Time: 01/13/20 11:08 PM   Result Value Ref Range    Color Urine Light Yellow     Appearance Urine Slightly Cloudy     Glucose Urine Negative NEG^Negative mg/dL    Bilirubin Urine Negative NEG^Negative    Ketones Urine Negative NEG^Negative mg/dL    Specific Gravity Urine 1.013 1.003 - 1.035    Blood Urine Negative NEG^Negative    pH Urine 5.5 5.0 - 7.0 pH    Protein Albumin Urine Negative NEG^Negative mg/dL    Urobilinogen mg/dL Normal 0.0 - 2.0 mg/dL    Nitrite Urine Negative NEG^Negative    Leukocyte Esterase Urine Large (A) NEG^Negative    Source Midstream Urine     WBC Urine 13 (H) 0 - 5 /HPF    RBC Urine 2 0 - 2 /HPF    Bacteria Urine Many (A) NEG^Negative /HPF    Squamous Epithelial /HPF Urine 4 (H) 0 - 1 /HPF    Mucous Urine Present (A) NEG^Negative /LPF   Drug abuse screen 77 urine (FL, RH, SH)    Collection Time: 01/13/20 11:08 PM   Result Value Ref Range    Amphetamine Qual Urine Negative NEG^Negative    Barbiturates Qual Urine Negative NEG^Negative    Benzodiazepine Qual Urine Negative NEG^Negative    Cannabinoids Qual Urine Negative NEG^Negative    Cocaine Qual Urine Negative NEG^Negative    Opiates Qualitative Urine Negative NEG^Negative    PCP Qual Urine Negative NEG^Negative   HCG qualitative urine (UPT)    Collection Time: 01/13/20 11:08 PM   Result Value Ref Range    HCG Qual Urine Negative NEG^Negative   Urine Culture    Collection Time: 01/13/20 11:54 PM   Result Value Ref Range    Specimen Description Midstream Urine     Culture Micro       10,000 to 50,000 colonies/mL  mixed urogenital zachary  Susceptibility testing not routinely done     Basic metabolic panel    Collection Time: 01/13/20 11:56 PM   Result Value Ref Range     Sodium 136 133 - 144 mmol/L    Potassium 4.2 3.4 - 5.3 mmol/L    Chloride 104 94 - 109 mmol/L    Carbon Dioxide 26 20 - 32 mmol/L    Anion Gap 6 3 - 14 mmol/L    Glucose 108 (H) 70 - 99 mg/dL    Urea Nitrogen 11 7 - 30 mg/dL    Creatinine 0.86 0.52 - 1.04 mg/dL    GFR Estimate 72 >60 mL/min/[1.73_m2]    GFR Estimate If Black 83 >60 mL/min/[1.73_m2]    Calcium 9.1 8.5 - 10.1 mg/dL   CBC with platelets differential    Collection Time: 01/13/20 11:56 PM   Result Value Ref Range    WBC 9.4 4.0 - 11.0 10e9/L    RBC Count 5.21 (H) 3.8 - 5.2 10e12/L    Hemoglobin 14.5 11.7 - 15.7 g/dL    Hematocrit 44.2 35.0 - 47.0 %    MCV 85 78 - 100 fl    MCH 27.8 26.5 - 33.0 pg    MCHC 32.8 31.5 - 36.5 g/dL    RDW 13.0 10.0 - 15.0 %    Platelet Count 351 150 - 450 10e9/L    Diff Method Automated Method     % Neutrophils 57.3 %    % Lymphocytes 29.0 %    % Monocytes 8.0 %    % Eosinophils 4.6 %    % Basophils 0.7 %    % Immature Granulocytes 0.4 %    Nucleated RBCs 0 0 /100    Absolute Neutrophil 5.4 1.6 - 8.3 10e9/L    Absolute Lymphocytes 2.7 0.8 - 5.3 10e9/L    Absolute Monocytes 0.8 0.0 - 1.3 10e9/L    Absolute Eosinophils 0.4 0.0 - 0.7 10e9/L    Absolute Basophils 0.1 0.0 - 0.2 10e9/L    Abs Immature Granulocytes 0.0 0 - 0.4 10e9/L    Absolute Nucleated RBC 0.0    Vitamin D    Collection Time: 01/14/20  8:13 AM   Result Value Ref Range    Vitamin D Deficiency screening 42 20 - 75 ug/L   Folate    Collection Time: 01/14/20  8:13 AM   Result Value Ref Range    Folate 14.7 >5.4 ng/mL   Vitamin B12    Collection Time: 01/14/20  8:13 AM   Result Value Ref Range    Vitamin B12 262 193 - 986 pg/mL   TSH with free T4 reflex and/or T3 as indicated    Collection Time: 01/14/20  8:13 AM   Result Value Ref Range    TSH 2.48 0.40 - 4.00 mU/L   Ammonia    Collection Time: 01/15/20  7:33 AM   Result Value Ref Range    Ammonia 19 10 - 50 umol/L   Hepatic panel    Collection Time: 01/15/20  7:33 AM   Result Value Ref Range    Bilirubin Direct 0.1  0.0 - 0.2 mg/dL    Bilirubin Total 0.7 0.2 - 1.3 mg/dL    Albumin 4.4 3.4 - 5.0 g/dL    Protein Total 7.8 6.8 - 8.8 g/dL    Alkaline Phosphatase 116 40 - 150 U/L    ALT 23 0 - 50 U/L    AST 20 0 - 45 U/L            Psychiatric Examination:   Temp: 98.3  F (36.8  C) Temp src: Tympanic BP: 122/73 Pulse: 110   Resp: 16 SpO2: 94 % O2 Device: None (Room air)    Weight is 180 lbs 4.8 oz  Body mass index is 27.41 kg/m .    Appearance: well groomed, awake, alert, cooperative, mild distress and normal weight  Attitude:  cooperative  Eye Contact:  good  Mood:  anxious and better  Affect:  full range  Speech:  pressured speech  Psychomotor Behavior:  no evidence of tardive dyskinesia, dystonia, or tics  Throught Process:  disorganized, tangental and circumstantial  Associations:  no loose associations  Thought Content:  no evidence of suicidal ideation or homicidal ideation  Insight:  good  Judgement:  intact  Oriented to:  time, person, and place  Attention Span and Concentration:  intact  Recent and Remote Memory:  intact         Precautions:     Behavioral Orders   Procedures     Code 1 - Restrict to Unit     Routine Programming     As clinically indicated     Status 15     Every 15 minutes.          DIagnoses:   1.  Major neurocognitive disorder, with behavioral disturbances  2.  History of depression and anxiety  3. UTI         Plan:   The patient is a very pleasant,  female who was admitted with increased anxiety, and inability to function.  The patient was a poor historian.  His she appeared to be in a hypomanic state.  It is not clear if she has been taking her medications as prescribed as her home health care nurse quit weeks ago.  The patient was not able to discuss any of her medications.  The plan will be as follow:     --Discontinue Pamelor and Buspar.    --Taper off of Zoloft.  Will consider Effexor or Cymbalta.    --Start gabapentin. Increase to 200 mg 3 times a day for anxiety.    --Start Depakote 500  mg at bedtime.    --Start Aricept 5 mg at bedtime.    --Blood work was reviewed.    --Internal medicine follow-up for medical problems. They are treating her for UTI.   --The patient was consulted on nature of illness and treatment options.   --Care was coordinated with the treatment team.     Disposition Plan   Reason for ongoing admission: is unable to care for self due to anxiety, tatianna, inability to function on her own  Disposition: TBD  Estimated length of stay: 7-10 days  Legal Status: voluntary  Discharge will be granted once symptoms improved.    Charles WEINER, CNP  Date: 01/15/20  Time: 12:12 PM

## 2020-01-16 ENCOUNTER — APPOINTMENT (OUTPATIENT)
Dept: MRI IMAGING | Facility: CLINIC | Age: 65
End: 2020-01-16
Attending: NURSE PRACTITIONER
Payer: COMMERCIAL

## 2020-01-16 PROCEDURE — 25000132 ZZH RX MED GY IP 250 OP 250 PS 637: Performed by: NURSE PRACTITIONER

## 2020-01-16 PROCEDURE — 25000132 ZZH RX MED GY IP 250 OP 250 PS 637: Performed by: PHYSICIAN ASSISTANT

## 2020-01-16 PROCEDURE — 12400002 ZZH R&B MH SENIOR/ADOLESCENT

## 2020-01-16 PROCEDURE — 99232 SBSQ HOSP IP/OBS MODERATE 35: CPT | Performed by: NURSE PRACTITIONER

## 2020-01-16 PROCEDURE — G0177 OPPS/PHP; TRAIN & EDUC SERV: HCPCS

## 2020-01-16 PROCEDURE — 70551 MRI BRAIN STEM W/O DYE: CPT

## 2020-01-16 PROCEDURE — H2032 ACTIVITY THERAPY, PER 15 MIN: HCPCS

## 2020-01-16 RX ORDER — SERTRALINE HYDROCHLORIDE 25 MG/1
25 TABLET, FILM COATED ORAL AT BEDTIME
Status: COMPLETED | OUTPATIENT
Start: 2020-01-16 | End: 2020-01-17

## 2020-01-16 RX ADMIN — ASPIRIN 81 MG: 81 TABLET ORAL at 08:14

## 2020-01-16 RX ADMIN — GABAPENTIN 200 MG: 100 CAPSULE ORAL at 14:16

## 2020-01-16 RX ADMIN — LEVOTHYROXINE SODIUM 175 MCG: 175 TABLET ORAL at 12:31

## 2020-01-16 RX ADMIN — GABAPENTIN 200 MG: 100 CAPSULE ORAL at 08:14

## 2020-01-16 RX ADMIN — TRAZODONE HYDROCHLORIDE 50 MG: 50 TABLET ORAL at 22:05

## 2020-01-16 RX ADMIN — OLANZAPINE 5 MG: 5 TABLET, FILM COATED ORAL at 16:20

## 2020-01-16 RX ADMIN — DONEPEZIL HYDROCHLORIDE 5 MG: 5 TABLET ORAL at 22:05

## 2020-01-16 RX ADMIN — TRAZODONE HYDROCHLORIDE 50 MG: 50 TABLET ORAL at 00:01

## 2020-01-16 RX ADMIN — OLANZAPINE 5 MG: 5 TABLET, FILM COATED ORAL at 10:33

## 2020-01-16 RX ADMIN — SENNOSIDES AND DOCUSATE SODIUM 2 TABLET: 8.6; 5 TABLET ORAL at 08:14

## 2020-01-16 RX ADMIN — GABAPENTIN 200 MG: 100 CAPSULE ORAL at 22:05

## 2020-01-16 RX ADMIN — SENNOSIDES AND DOCUSATE SODIUM 2 TABLET: 8.6; 5 TABLET ORAL at 22:05

## 2020-01-16 RX ADMIN — POLYETHYLENE GLYCOL 3350 17 G: 17 POWDER, FOR SOLUTION ORAL at 08:14

## 2020-01-16 RX ADMIN — SERTRALINE HYDROCHLORIDE 25 MG: 25 TABLET ORAL at 22:07

## 2020-01-16 RX ADMIN — CALCIUM CARBONATE (ANTACID) CHEW TAB 500 MG 500 MG: 500 CHEW TAB at 08:14

## 2020-01-16 RX ADMIN — DIVALPROEX SODIUM 750 MG: 250 TABLET, FILM COATED, EXTENDED RELEASE ORAL at 22:06

## 2020-01-16 RX ADMIN — PANTOPRAZOLE SODIUM 20 MG: 20 TABLET, DELAYED RELEASE ORAL at 08:14

## 2020-01-16 ASSESSMENT — ACTIVITIES OF DAILY LIVING (ADL)
LAUNDRY: UNABLE TO COMPLETE
HYGIENE/GROOMING: INDEPENDENT
DRESS: INDEPENDENT
DRESS: SCRUBS (BEHAVIORAL HEALTH);INDEPENDENT
ORAL_HYGIENE: INDEPENDENT
ORAL_HYGIENE: INDEPENDENT
HYGIENE/GROOMING: HANDWASHING;INDEPENDENT

## 2020-01-16 ASSESSMENT — MIFFLIN-ST. JEOR: SCORE: 1412.71

## 2020-01-16 NOTE — PLAN OF CARE
"Problem: OT General Care Plan  Goal: OT Goal 1  Will attend OT groups,  process through organizing work and thoughts with concrete familiar step opportunities as needed. Assess cognition further in problem solving and organization.     Pt attended 2 out of 2 OT groups offered. Pt actively participated in occupational therapy clinic. Pt was able to ask for assistance as needed, and independently initiated a familiar, cognitive task. Attentive to task for about x5 minute intervals, and took frequent breaks to socialize with peers and staff. Tangential and difficult to follow in conversation, though very pleasant and polite in interactions. Pt actively participated in a structured occupational therapy group with a focus on connecting song titles to life events and personal feelings. Using a list of song titles, pt identified All By Myself, I Can See Clearly Now, and [place] as song titles that relate to her life. Pt also independently identified \"She's Leaving Home by The Beatles\" as a song that she personally relates to, and shared a story about running away from home when she was \"19 or 20.\" She politely declined the visual scanning task offered, and spent the remainder of group socializing and singing along to music. Pleasant, cooperative, and engaged throughout groups. Bright affect. Will continue to assess.        "

## 2020-01-16 NOTE — PROGRESS NOTES
Pt was highly expressive in her dance moves, though mildly uncoordinated with an unsteady appearance.  She verbalized her practicing respecting personal space and even apologized to a peer.  She was socially supportive and uplifting.       01/16/20 1130   Dance Movement Therapy   Type of Intervention structured groups   Response participates, initiates socially appropriate   Hours 1

## 2020-01-16 NOTE — PLAN OF CARE
"Pt appeared quite anxious, saroj, and somewhat wary, this morning. She described her mood as \"happy but paranoid.\" She denied paranoia when initially asked re s/x psychosis. She then said her paranoia and anxiety were due to interaction with her nurse, last night. She denies si or depression; rates her anxiety 5; states her concentration is \"fair.\" She said she would attend groups, \"depending on my anxiety level.\" Pt reports her appetite is somewhat decreased, and she has difficulty staying asleep. She is in the milieu, and had bkfst, this am. 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 Support and reassurance given to pt.  "

## 2020-01-16 NOTE — PLAN OF CARE
"  Problem: Memory Impairment Comorbidity  Goal: Memory Impairment Comorbidity  Description  Patient comorbidity will be monitored for signs and symptoms of Memory Impairment condition.  Problems will be absent, minimized or managed by discharge/transition of care.  Outcome: No Change     Problem: Anxiety  Goal: Anxiety Reduction or Resolution  Description  1.Verbalizes own anxiety and coping patterns.  2.Pt will effectively use 3 coping mechanisms to deal with anxiety.  3. Pt will be able to approach staff when feeling anxious  4.Pt will be able to maintain  a desired level of  role functioning and problem solving.  5. Pt will be compliant with medications and tx plans.  6. Pt will actively participate in unit activities and programming.  7. Pt will sleep at least 7 hours/ night.  8. Pt will demonstrate improved concentration and accuracy of thoughts.  9. After care in place.     Outcome: Improving     Problem: Adult Behavioral Health Plan of Care  Goal: Optimized Coping Skills in Response to Life Stressors  Outcome: Improving    Pt has been very active this shift.  She has spent a lot of her free time playing the keyboard and singing.  She is pleasant and laughs and smiles.  Some confusion.  Needs to write things down.  She enjoyed watching Mama Charito.  At point, got out of seat and danced to the music.  States she is feeling \"much better\".     "

## 2020-01-16 NOTE — PROGRESS NOTES
Met with patient ot find out who is helping her with housing. Patient appeared disorganized and had trouble clarifying if she has a  or [facility] worker. Patient was able to find numbers in her phone for daughter Kiley, son Yusuf and a friend Cinthya. Writer ANDREA with daughter Kiley requesting call back. LISA on file.    Phone call with [facility] to inquire if patient has any services with the [facility]. Writer was informed that there was a case open in 2018, but nothing is open now.    Referral was faxed to [facility].    Contacted [facility] to make referral for CADI waiver. A message was left for social workers. Writer was informed it could take a week to hear back.    Phone call with Harleen from [facility] admissions who reported that patient would be appropriate for their facility. She will talk to Augustine, . They will assess the patient on the unit on Monday. Patient notified.

## 2020-01-16 NOTE — PROGRESS NOTES
Pt agreed to take a prn of Zyprexa 5mg this morning, as suggested by NIECY Higginbotham. She said she was doing better, but was still feeing somewhat anxious, and appeared saroj. She was smiling, pleasant, and is attending groups.

## 2020-01-16 NOTE — PROGRESS NOTES
"[facility], [place]   Psychiatric Progress Note        Interim History:   From H&P: Mariela Duffy is a 64 year old female with history of mild dementia, depression and anxiety.  The patient was brought to the emergency room for assessment of altered mental status.  The patient was seen earlier that day in the emergency room due to high anxiety.  She was given Atarax and discharge.  The patient fell asleep in the waiting room and upon awakening she was very anxious, disorganized, and not making sense.  The daughter and the patient reported that she has been increasingly confused in the last few months.  Her anxiety have been significantly increased.  Both did not feel that the patient is well enough to go home, and she has not been able to take care of herself.    The patient's care was discussed with the treatment team during the daily team meeting and/or staff's chart notes were reviewed.  Staff report patient has been calm, pleasant, cooperative. Patient is attending groups and participating appropriately. Needs cues to complete tasks. She is pressured and sings instead of talk. She spend a lot of time singing and playing the keyboard. She danced during the movie \"Mama martin\".  Patient is eating well and taking medications as prescribed. Slept 7 hours.     Met with patient. She is very pleasant. Appear anxious although states that her anxiety is better. She is labile. Started crying during the interview, not clear why. She talked about a conversation with her nurse last night that didn't go well, inability to call her BF, her thyroid medication and her breakfast. She is rambling and tangential. She is not able to complete sentences. She is having difficulties expressing herself. She repeated statements from yesterday regarding medications. She is very confused. Will order MRI.          Medications:       [START ON 1/19/2020] alendronate  70 mg Oral Q7 Days     aspirin  81 mg Oral " Daily     calcium carbonate  500 mg Oral Daily     divalproex sodium extended-release  500 mg Oral At Bedtime     donepezil  5 mg Oral At Bedtime     gabapentin  200 mg Oral TID     levothyroxine  175 mcg Oral Daily     pantoprazole  20 mg Oral QAM AC     polyethylene glycol  17 g Oral Daily     senna-docusate  1-2 tablet Oral BID     sertraline  25 mg Oral At Bedtime          Allergies:     Allergies   Allergen Reactions     Levaquin Nausea and Vomiting          Labs:     Recent Results (from the past 672 hour(s))   UA with Microscopic    Collection Time: 01/13/20 11:08 PM   Result Value Ref Range    Color Urine Light Yellow     Appearance Urine Slightly Cloudy     Glucose Urine Negative NEG^Negative mg/dL    Bilirubin Urine Negative NEG^Negative    Ketones Urine Negative NEG^Negative mg/dL    Specific Gravity Urine 1.013 1.003 - 1.035    Blood Urine Negative NEG^Negative    pH Urine 5.5 5.0 - 7.0 pH    Protein Albumin Urine Negative NEG^Negative mg/dL    Urobilinogen mg/dL Normal 0.0 - 2.0 mg/dL    Nitrite Urine Negative NEG^Negative    Leukocyte Esterase Urine Large (A) NEG^Negative    Source Midstream Urine     WBC Urine 13 (H) 0 - 5 /HPF    RBC Urine 2 0 - 2 /HPF    Bacteria Urine Many (A) NEG^Negative /HPF    Squamous Epithelial /HPF Urine 4 (H) 0 - 1 /HPF    Mucous Urine Present (A) NEG^Negative /LPF   Drug abuse screen 77 urine (FL, RH, SH)    Collection Time: 01/13/20 11:08 PM   Result Value Ref Range    Amphetamine Qual Urine Negative NEG^Negative    Barbiturates Qual Urine Negative NEG^Negative    Benzodiazepine Qual Urine Negative NEG^Negative    Cannabinoids Qual Urine Negative NEG^Negative    Cocaine Qual Urine Negative NEG^Negative    Opiates Qualitative Urine Negative NEG^Negative    PCP Qual Urine Negative NEG^Negative   HCG qualitative urine (UPT)    Collection Time: 01/13/20 11:08 PM   Result Value Ref Range    HCG Qual Urine Negative NEG^Negative   Urine Culture    Collection Time: 01/13/20 11:54  PM   Result Value Ref Range    Specimen Description Midstream Urine     Culture Micro       10,000 to 50,000 colonies/mL  mixed urogenital zachary  Susceptibility testing not routinely done     Basic metabolic panel    Collection Time: 01/13/20 11:56 PM   Result Value Ref Range    Sodium 136 133 - 144 mmol/L    Potassium 4.2 3.4 - 5.3 mmol/L    Chloride 104 94 - 109 mmol/L    Carbon Dioxide 26 20 - 32 mmol/L    Anion Gap 6 3 - 14 mmol/L    Glucose 108 (H) 70 - 99 mg/dL    Urea Nitrogen 11 7 - 30 mg/dL    Creatinine 0.86 0.52 - 1.04 mg/dL    GFR Estimate 72 >60 mL/min/[1.73_m2]    GFR Estimate If Black 83 >60 mL/min/[1.73_m2]    Calcium 9.1 8.5 - 10.1 mg/dL   CBC with platelets differential    Collection Time: 01/13/20 11:56 PM   Result Value Ref Range    WBC 9.4 4.0 - 11.0 10e9/L    RBC Count 5.21 (H) 3.8 - 5.2 10e12/L    Hemoglobin 14.5 11.7 - 15.7 g/dL    Hematocrit 44.2 35.0 - 47.0 %    MCV 85 78 - 100 fl    MCH 27.8 26.5 - 33.0 pg    MCHC 32.8 31.5 - 36.5 g/dL    RDW 13.0 10.0 - 15.0 %    Platelet Count 351 150 - 450 10e9/L    Diff Method Automated Method     % Neutrophils 57.3 %    % Lymphocytes 29.0 %    % Monocytes 8.0 %    % Eosinophils 4.6 %    % Basophils 0.7 %    % Immature Granulocytes 0.4 %    Nucleated RBCs 0 0 /100    Absolute Neutrophil 5.4 1.6 - 8.3 10e9/L    Absolute Lymphocytes 2.7 0.8 - 5.3 10e9/L    Absolute Monocytes 0.8 0.0 - 1.3 10e9/L    Absolute Eosinophils 0.4 0.0 - 0.7 10e9/L    Absolute Basophils 0.1 0.0 - 0.2 10e9/L    Abs Immature Granulocytes 0.0 0 - 0.4 10e9/L    Absolute Nucleated RBC 0.0    Vitamin D    Collection Time: 01/14/20  8:13 AM   Result Value Ref Range    Vitamin D Deficiency screening 42 20 - 75 ug/L   Folate    Collection Time: 01/14/20  8:13 AM   Result Value Ref Range    Folate 14.7 >5.4 ng/mL   Vitamin B12    Collection Time: 01/14/20  8:13 AM   Result Value Ref Range    Vitamin B12 262 193 - 986 pg/mL   TSH with free T4 reflex and/or T3 as indicated    Collection  Time: 01/14/20  8:13 AM   Result Value Ref Range    TSH 2.48 0.40 - 4.00 mU/L   Ammonia    Collection Time: 01/15/20  7:33 AM   Result Value Ref Range    Ammonia 19 10 - 50 umol/L   Hepatic panel    Collection Time: 01/15/20  7:33 AM   Result Value Ref Range    Bilirubin Direct 0.1 0.0 - 0.2 mg/dL    Bilirubin Total 0.7 0.2 - 1.3 mg/dL    Albumin 4.4 3.4 - 5.0 g/dL    Protein Total 7.8 6.8 - 8.8 g/dL    Alkaline Phosphatase 116 40 - 150 U/L    ALT 23 0 - 50 U/L    AST 20 0 - 45 U/L            Psychiatric Examination:   Temp: 98  F (36.7  C) Temp src: Tympanic BP: 130/64 Pulse: 113   Resp: 16 SpO2: 96 % O2 Device: None (Room air)    Weight is 180 lbs 4.8 oz  Body mass index is 27.41 kg/m .    Appearance: well groomed, awake, alert, cooperative, mild distress and normal weight  Attitude:  cooperative  Eye Contact:  good  Mood:  anxious and better  Affect:  full range  Speech:  pressured speech  Psychomotor Behavior:  no evidence of tardive dyskinesia, dystonia, or tics  Throught Process:  disorganized, tangental and circumstantial  Associations:  no loose associations  Thought Content:  no evidence of suicidal ideation or homicidal ideation  Insight:  good  Judgement:  intact  Oriented to:  time, person, and place  Attention Span and Concentration:  intact  Recent and Remote Memory:  intact         Precautions:     Behavioral Orders   Procedures     Code 1 - Restrict to Unit     Routine Programming     As clinically indicated     Status 15     Every 15 minutes.          DIagnoses:   1.  Major neurocognitive disorder, with behavioral disturbances  2.  History of depression and anxiety  3. UTI         Plan:   The patient is a very pleasant,  female who was admitted with increased anxiety, and inability to function.  The patient was a poor historian.  His she appeared to be in a hypomanic state.  It is not clear if she has been taking her medications as prescribed as her home health care nurse quit weeks ago.   The patient was not able to discuss any of her medications.  The plan will be as follow:     --Discontinue Pamelor and Buspar.    --Taper off of Zoloft.  Will consider Effexor or Cymbalta.    --Start gabapentin. Increase to 200 mg 3 times a day for anxiety.    --Start Depakote 500 mg at bedtime.    --Start Aricept 5 mg at bedtime.    --Blood work was reviewed.    --Internal medicine follow-up for medical problems. They are treating her for UTI.   --The patient was consulted on nature of illness and treatment options.   --Care was coordinated with the treatment team.  --MRI  To rule out dementia     Disposition Plan   Reason for ongoing admission: is unable to care for self due to anxiety, tatianna, inability to function on her own  Disposition: TBD  Estimated length of stay: 7-10 days  Legal Status: voluntary  Discharge will be granted once symptoms improved.    Charles WEINER CNP  Date: 01/16/20  Time: 9:09 AM

## 2020-01-17 PROCEDURE — 25000132 ZZH RX MED GY IP 250 OP 250 PS 637: Performed by: NURSE PRACTITIONER

## 2020-01-17 PROCEDURE — H2032 ACTIVITY THERAPY, PER 15 MIN: HCPCS

## 2020-01-17 PROCEDURE — 12400002 ZZH R&B MH SENIOR/ADOLESCENT

## 2020-01-17 PROCEDURE — 99232 SBSQ HOSP IP/OBS MODERATE 35: CPT | Performed by: NURSE PRACTITIONER

## 2020-01-17 PROCEDURE — 25000132 ZZH RX MED GY IP 250 OP 250 PS 637: Performed by: PHYSICIAN ASSISTANT

## 2020-01-17 PROCEDURE — G0177 OPPS/PHP; TRAIN & EDUC SERV: HCPCS

## 2020-01-17 RX ORDER — GABAPENTIN 300 MG/1
300 CAPSULE ORAL 3 TIMES DAILY
Status: DISCONTINUED | OUTPATIENT
Start: 2020-01-17 | End: 2020-01-20

## 2020-01-17 RX ORDER — TETRAHYDROZOLINE HCL 0.05 %
1 DROPS OPHTHALMIC (EYE) 3 TIMES DAILY PRN
Status: DISCONTINUED | OUTPATIENT
Start: 2020-01-17 | End: 2020-01-24 | Stop reason: HOSPADM

## 2020-01-17 RX ORDER — QUETIAPINE FUMARATE 25 MG/1
25 TABLET, FILM COATED ORAL AT BEDTIME
Status: DISCONTINUED | OUTPATIENT
Start: 2020-01-17 | End: 2020-01-20

## 2020-01-17 RX ADMIN — SENNOSIDES AND DOCUSATE SODIUM 1 TABLET: 8.6; 5 TABLET ORAL at 08:28

## 2020-01-17 RX ADMIN — ASPIRIN 81 MG: 81 TABLET ORAL at 08:27

## 2020-01-17 RX ADMIN — GABAPENTIN 300 MG: 300 CAPSULE ORAL at 20:54

## 2020-01-17 RX ADMIN — PANTOPRAZOLE SODIUM 20 MG: 20 TABLET, DELAYED RELEASE ORAL at 08:28

## 2020-01-17 RX ADMIN — DONEPEZIL HYDROCHLORIDE 5 MG: 5 TABLET ORAL at 20:54

## 2020-01-17 RX ADMIN — CALCIUM CARBONATE (ANTACID) CHEW TAB 500 MG 500 MG: 500 CHEW TAB at 08:28

## 2020-01-17 RX ADMIN — POLYETHYLENE GLYCOL 3350 17 G: 17 POWDER, FOR SOLUTION ORAL at 08:28

## 2020-01-17 RX ADMIN — SERTRALINE HYDROCHLORIDE 25 MG: 25 TABLET ORAL at 20:54

## 2020-01-17 RX ADMIN — ACETAMINOPHEN 650 MG: 325 TABLET, FILM COATED ORAL at 05:06

## 2020-01-17 RX ADMIN — TRAZODONE HYDROCHLORIDE 50 MG: 50 TABLET ORAL at 00:08

## 2020-01-17 RX ADMIN — GABAPENTIN 300 MG: 300 CAPSULE ORAL at 08:28

## 2020-01-17 RX ADMIN — DIVALPROEX SODIUM 750 MG: 250 TABLET, FILM COATED, EXTENDED RELEASE ORAL at 20:54

## 2020-01-17 RX ADMIN — GABAPENTIN 300 MG: 300 CAPSULE ORAL at 14:47

## 2020-01-17 RX ADMIN — LEVOTHYROXINE SODIUM 175 MCG: 175 TABLET ORAL at 06:40

## 2020-01-17 RX ADMIN — QUETIAPINE FUMARATE 25 MG: 25 TABLET ORAL at 20:56

## 2020-01-17 ASSESSMENT — ACTIVITIES OF DAILY LIVING (ADL)
ORAL_HYGIENE: INDEPENDENT
DRESS: INDEPENDENT
HYGIENE/GROOMING: INDEPENDENT

## 2020-01-17 NOTE — PLAN OF CARE
"  Problem: Anxiety  Goal: Anxiety Reduction or Resolution  Description  1.Verbalizes own anxiety and coping patterns.  2.Pt will effectively use 3 coping mechanisms to deal with anxiety.  3. Pt will be able to approach staff when feeling anxious  4.Pt will be able to maintain  a desired level of  role functioning and problem solving.  5. Pt will be compliant with medications and tx plans.  6. Pt will actively participate in unit activities and programming.  7. Pt will sleep at least 7 hours/ night.  8. Pt will demonstrate improved concentration and accuracy of thoughts.  9. After care in place.     Outcome: No Change     Problem: Memory Impairment Comorbidity  Goal: Memory Impairment Comorbidity  Description  Patient comorbidity will be monitored for signs and symptoms of Memory Impairment condition.  Problems will be absent, minimized or managed by discharge/transition of care.  Outcome: No Change    Pt continues to be anxious and disorganized.  She was tearful at the start of the shift, telling this writer that she has to leave on Monday.  \"I don't care if its an assisted living, but I dont was to have to stay there\" She was upset about that uncertainty.  It was explained that someone from a care center was coming to evaluate her.  She asked for medication.  Zyprexa PRN was given.  She has been busy making phone calls all evening.  A couple of her sons called and were inquiring about what's going on.  LISA were obtained and filed in chart.  The eldest son, Roshan, would like a phone call for an update.  Slept for a couple of hours and awoke at 2200 to take medication.  She was happy and smiling.  Thoughts more organized.  She would like information about [facility].     "

## 2020-01-17 NOTE — PROGRESS NOTES
[facility], [place]   Psychiatric Progress Note        Interim History:   From H&P: Mariela Duffy is a 64 year old female with history of mild dementia, depression and anxiety.  The patient was brought to the emergency room for assessment of altered mental status.  The patient was seen earlier that day in the emergency room due to high anxiety.  She was given Atarax and discharge.  The patient fell asleep in the waiting room and upon awakening she was very anxious, disorganized, and not making sense.  The daughter and the patient reported that she has been increasingly confused in the last few months.  Her anxiety have been significantly increased.  Both did not feel that the patient is well enough to go home, and she has not been able to take care of herself.    The patient's care was discussed with the treatment team during the daily team meeting and/or staff's chart notes were reviewed.  Staff report patient has been calm, pleasant, cooperative. Patient is attending groups and participating appropriately. Needs cues to complete tasks. She is pressured and sings instead of talk. She spend a lot of time singing and playing the keyboard. She was highly expressive during dance movement group. She was anxious in the evening and making multiple phone calls. Zyprexa was helpful. Patient is eating well and taking medications as prescribed. Slept 6 hours.     Met with patient.  She is her pleasant.  Reports that she just spoke with her fiancé and states that he is doing very well which makes her happy.  She is hoping that they can get her back together at some point.  He is living in assisted living and has Parkinson's.  The patient feels better in terms of her anxiety.  Still has racing thoughts.  Denies depression and SI.  Discussed disposition.  The patient is excited to go to [facility].  She is also afraid of the change but is open to it.  The patient is still disorganized  but not nearly as much as she was on the day of admission.  Medication changes will include discontinuation of Zoloft as I believe that that is been causing manic symptoms.  Start Seroquel 25 mg at bedtime to help her sleep and for mood stabilization.  The patient is very pleasant.    [facility] accepted her. Assessment on Monday.          Medications:       [START ON 1/19/2020] alendronate  70 mg Oral Q7 Days     aspirin  81 mg Oral Daily     calcium carbonate  500 mg Oral Daily     divalproex sodium extended-release  750 mg Oral At Bedtime     donepezil  5 mg Oral At Bedtime     gabapentin  300 mg Oral TID     levothyroxine  175 mcg Oral Daily     pantoprazole  20 mg Oral QAM AC     polyethylene glycol  17 g Oral Daily     senna-docusate  1-2 tablet Oral BID     sertraline  25 mg Oral At Bedtime          Allergies:     Allergies   Allergen Reactions     Levaquin Nausea and Vomiting          Labs:     Recent Results (from the past 672 hour(s))   UA with Microscopic    Collection Time: 01/13/20 11:08 PM   Result Value Ref Range    Color Urine Light Yellow     Appearance Urine Slightly Cloudy     Glucose Urine Negative NEG^Negative mg/dL    Bilirubin Urine Negative NEG^Negative    Ketones Urine Negative NEG^Negative mg/dL    Specific Gravity Urine 1.013 1.003 - 1.035    Blood Urine Negative NEG^Negative    pH Urine 5.5 5.0 - 7.0 pH    Protein Albumin Urine Negative NEG^Negative mg/dL    Urobilinogen mg/dL Normal 0.0 - 2.0 mg/dL    Nitrite Urine Negative NEG^Negative    Leukocyte Esterase Urine Large (A) NEG^Negative    Source Midstream Urine     WBC Urine 13 (H) 0 - 5 /HPF    RBC Urine 2 0 - 2 /HPF    Bacteria Urine Many (A) NEG^Negative /HPF    Squamous Epithelial /HPF Urine 4 (H) 0 - 1 /HPF    Mucous Urine Present (A) NEG^Negative /LPF   Drug abuse screen 77 urine (FL, RH, SH)    Collection Time: 01/13/20 11:08 PM   Result Value Ref Range    Amphetamine Qual Urine Negative NEG^Negative    Barbiturates  Qual Urine Negative NEG^Negative    Benzodiazepine Qual Urine Negative NEG^Negative    Cannabinoids Qual Urine Negative NEG^Negative    Cocaine Qual Urine Negative NEG^Negative    Opiates Qualitative Urine Negative NEG^Negative    PCP Qual Urine Negative NEG^Negative   HCG qualitative urine (UPT)    Collection Time: 01/13/20 11:08 PM   Result Value Ref Range    HCG Qual Urine Negative NEG^Negative   Urine Culture    Collection Time: 01/13/20 11:54 PM   Result Value Ref Range    Specimen Description Midstream Urine     Culture Micro       10,000 to 50,000 colonies/mL  mixed urogenital zachary  Susceptibility testing not routinely done     Basic metabolic panel    Collection Time: 01/13/20 11:56 PM   Result Value Ref Range    Sodium 136 133 - 144 mmol/L    Potassium 4.2 3.4 - 5.3 mmol/L    Chloride 104 94 - 109 mmol/L    Carbon Dioxide 26 20 - 32 mmol/L    Anion Gap 6 3 - 14 mmol/L    Glucose 108 (H) 70 - 99 mg/dL    Urea Nitrogen 11 7 - 30 mg/dL    Creatinine 0.86 0.52 - 1.04 mg/dL    GFR Estimate 72 >60 mL/min/[1.73_m2]    GFR Estimate If Black 83 >60 mL/min/[1.73_m2]    Calcium 9.1 8.5 - 10.1 mg/dL   CBC with platelets differential    Collection Time: 01/13/20 11:56 PM   Result Value Ref Range    WBC 9.4 4.0 - 11.0 10e9/L    RBC Count 5.21 (H) 3.8 - 5.2 10e12/L    Hemoglobin 14.5 11.7 - 15.7 g/dL    Hematocrit 44.2 35.0 - 47.0 %    MCV 85 78 - 100 fl    MCH 27.8 26.5 - 33.0 pg    MCHC 32.8 31.5 - 36.5 g/dL    RDW 13.0 10.0 - 15.0 %    Platelet Count 351 150 - 450 10e9/L    Diff Method Automated Method     % Neutrophils 57.3 %    % Lymphocytes 29.0 %    % Monocytes 8.0 %    % Eosinophils 4.6 %    % Basophils 0.7 %    % Immature Granulocytes 0.4 %    Nucleated RBCs 0 0 /100    Absolute Neutrophil 5.4 1.6 - 8.3 10e9/L    Absolute Lymphocytes 2.7 0.8 - 5.3 10e9/L    Absolute Monocytes 0.8 0.0 - 1.3 10e9/L    Absolute Eosinophils 0.4 0.0 - 0.7 10e9/L    Absolute Basophils 0.1 0.0 - 0.2 10e9/L    Abs Immature Granulocytes  0.0 0 - 0.4 10e9/L    Absolute Nucleated RBC 0.0    Vitamin D    Collection Time: 01/14/20  8:13 AM   Result Value Ref Range    Vitamin D Deficiency screening 42 20 - 75 ug/L   Folate    Collection Time: 01/14/20  8:13 AM   Result Value Ref Range    Folate 14.7 >5.4 ng/mL   Vitamin B12    Collection Time: 01/14/20  8:13 AM   Result Value Ref Range    Vitamin B12 262 193 - 986 pg/mL   TSH with free T4 reflex and/or T3 as indicated    Collection Time: 01/14/20  8:13 AM   Result Value Ref Range    TSH 2.48 0.40 - 4.00 mU/L   Ammonia    Collection Time: 01/15/20  7:33 AM   Result Value Ref Range    Ammonia 19 10 - 50 umol/L   Hepatic panel    Collection Time: 01/15/20  7:33 AM   Result Value Ref Range    Bilirubin Direct 0.1 0.0 - 0.2 mg/dL    Bilirubin Total 0.7 0.2 - 1.3 mg/dL    Albumin 4.4 3.4 - 5.0 g/dL    Protein Total 7.8 6.8 - 8.8 g/dL    Alkaline Phosphatase 116 40 - 150 U/L    ALT 23 0 - 50 U/L    AST 20 0 - 45 U/L            Psychiatric Examination:   Temp: 97.8  F (36.6  C) Temp src: Tympanic BP: 133/79 Pulse: 104   Resp: 16 SpO2: 99 %      Weight is 179 lbs 8 oz  Body mass index is 27.29 kg/m .    Appearance: well groomed, awake, alert, cooperative, mild distress and normal weight  Attitude:  cooperative  Eye Contact:  good  Mood:  anxious and better  Affect:  full range  Speech:  pressured speech  Psychomotor Behavior:  no evidence of tardive dyskinesia, dystonia, or tics  Throught Process:  disorganized, tangental and circumstantial  Associations:  no loose associations  Thought Content:  no evidence of suicidal ideation or homicidal ideation  Insight:  good  Judgement:  intact  Oriented to:  time, person, and place  Attention Span and Concentration:  intact  Recent and Remote Memory:  intact         Precautions:     Behavioral Orders   Procedures     Code 1 - Restrict to Unit     Code 2     For MRI only     Routine Programming     As clinically indicated     Status 15     Every 15 minutes.           DIagnoses:   1.  Major neurocognitive disorder, with behavioral disturbances  2.  History of depression and anxiety  3. UTI         Plan:   The patient is a very pleasant,  female who was admitted with increased anxiety, and inability to function.  The patient was a poor historian.  His she appeared to be in a hypomanic state.  It is not clear if she has been taking her medications as prescribed as her home health care nurse quit weeks ago.  The patient was not able to discuss any of her medications.  The plan will be as follow:     --Discontinue Pamelor and Buspar.    --Taper off of Zoloft.  Will consider Effexor or Cymbalta.    --Start gabapentin. Increase to 300 mg 3 times a day for anxiety.    --Start Depakote 500 mg at bedtime.    --Start Seroquel 25 mg, qhs  --Start Aricept 5 mg at bedtime.    --Blood work was reviewed.  Valproic acid, ammonia level, CBC with diff, and hepatic panel will be ordered for Monday.  --Internal medicine follow-up for medical problems. They are treating her for UTI.   --The patient was consulted on nature of illness and treatment options.   --Care was coordinated with the treatment team.  --MRI  To rule out dementia. WNL.   --CT scan was normal     Disposition Plan   Reason for ongoing admission: is unable to care for self due to anxiety, tatianna, inability to function on her own  Disposition: TBD  Estimated length of stay: 7-10 days  Legal Status: voluntary  Discharge will be granted once symptoms improved.    Charles WEINER CNP  Date: 01/17/20  Time: 12:02 PM

## 2020-01-17 NOTE — PROGRESS NOTES
Pt was visible in the milieu most of the shift. Pt was quite tense and anxious throughout the shift. Pt reports her day as busy but feels better than before. Pt attended and participated in all groups. Pt was pretty preoccupied with making phone calls and working on paperwork. Pt was otherwise pleasant, cooperative and mood was calm. Pt denied all other mental health symptoms including SI/SIB.          01/16/20 2138   Behavioral Health   Hallucinations denies / not responding to hallucinations   Thinking poor concentration   Orientation person: oriented;place: oriented   Memory baseline memory   Insight poor   Judgement impaired   Eye Contact at examiner   Affect tense   Mood mood is calm   Physical Appearance/Attire attire appropriate to age and situation   Hygiene well groomed   Suicidality   (pt denies)   1. Wish to be Dead (Recent) No   2. Non-Specific Active Suicidal Thoughts (Recent) No   Self Injury   (None observed and none stated )   Elopement   (None observed )   Activity restless   Speech clear;pressured   Medication Sensitivity no stated side effects;no observed side effects   Psychomotor / Gait balanced;steady   Activities of Daily Living   Hygiene/Grooming independent   Oral Hygiene independent   Dress independent   Laundry unable to complete   Room Organization independent   Activity   Activity Assistance Provided independent

## 2020-01-17 NOTE — PLAN OF CARE
"Problem: OT General Care Plan  Goal: OT Goal 1  Will attend OT groups,  process through organizing work and thoughts with concrete familiar step opportunities as needed. Assess cognition further in problem solving and organization.     Pt attended 2 out of 2 OT groups offered. Pt actively participated in occupational therapy clinic. Pt was able to ask for assistance as needed, and independently initiated a familiar, goal-directed task. Pt demonstrated limited attention to task, and took frequent breaks to write down song titles and interact with peers. Pt actively participated in a structured occupational therapy group involving a self-reflecting, group leisure task. Pt appeared comfortable sharing positive past experiences and personal information with peers, and was respectful in listening and responding to peers. Demonstrated active listening by leaning towards peers and making eye contact when peers were sharing. She briefly became tearful when sharing a story about dancing with her dad and uncle at an anniversary party, describing it as \"a special memory.\" Affect appeared elated, as evidenced by frequent laughter. She reported feeling \"dizzy\" at the end of group; RN notified.     "

## 2020-01-17 NOTE — PLAN OF CARE
"Pt has been present in the milieu throughout the entire shift. Pt attended and participated in groups. Pt was extremely social and engaged in the milieu with peers often seeking out social interactions. Pt presents with elated mood often speaking very quickly. Pt smiles and laughs often and states that her mood is happy and denies SI/SIB. However, pt was seen tearful multiple times. Pt told this writer they were \"happy tears\". Pt is pleasant upon approach, and does not stay on task or topic for very long. Pt denies hallucinations and does not appear to be responding at this time. Pt's appetite is good and she is independent of ADLs.  "

## 2020-01-17 NOTE — PROGRESS NOTES
Patient woke up @ 0500 and complained of headache and generalized body ache. Tylenol 650 mg.given @ 0506 PRN for pain. Went back to sleep @ 0530. Slept a total of 6 hours.

## 2020-01-18 PROCEDURE — 25000132 ZZH RX MED GY IP 250 OP 250 PS 637: Performed by: NURSE PRACTITIONER

## 2020-01-18 PROCEDURE — 12400002 ZZH R&B MH SENIOR/ADOLESCENT

## 2020-01-18 PROCEDURE — 25000132 ZZH RX MED GY IP 250 OP 250 PS 637: Performed by: PHYSICIAN ASSISTANT

## 2020-01-18 RX ADMIN — GABAPENTIN 300 MG: 300 CAPSULE ORAL at 14:24

## 2020-01-18 RX ADMIN — GABAPENTIN 300 MG: 300 CAPSULE ORAL at 09:15

## 2020-01-18 RX ADMIN — HYDROXYZINE HYDROCHLORIDE 25 MG: 25 TABLET, FILM COATED ORAL at 03:06

## 2020-01-18 RX ADMIN — LEVOTHYROXINE SODIUM 175 MCG: 175 TABLET ORAL at 06:50

## 2020-01-18 RX ADMIN — QUETIAPINE FUMARATE 25 MG: 25 TABLET ORAL at 21:39

## 2020-01-18 RX ADMIN — DONEPEZIL HYDROCHLORIDE 5 MG: 5 TABLET ORAL at 21:39

## 2020-01-18 RX ADMIN — PANTOPRAZOLE SODIUM 20 MG: 20 TABLET, DELAYED RELEASE ORAL at 09:15

## 2020-01-18 RX ADMIN — CALCIUM CARBONATE (ANTACID) CHEW TAB 500 MG 500 MG: 500 CHEW TAB at 09:15

## 2020-01-18 RX ADMIN — SENNOSIDES AND DOCUSATE SODIUM 1 TABLET: 8.6; 5 TABLET ORAL at 09:15

## 2020-01-18 RX ADMIN — ASPIRIN 81 MG: 81 TABLET ORAL at 09:15

## 2020-01-18 RX ADMIN — GABAPENTIN 300 MG: 300 CAPSULE ORAL at 21:38

## 2020-01-18 RX ADMIN — DIVALPROEX SODIUM 750 MG: 250 TABLET, FILM COATED, EXTENDED RELEASE ORAL at 21:39

## 2020-01-18 ASSESSMENT — ACTIVITIES OF DAILY LIVING (ADL)
DRESS: INDEPENDENT
LAUNDRY: UNABLE TO COMPLETE
LAUNDRY: UNABLE TO COMPLETE
HYGIENE/GROOMING: INDEPENDENT
HYGIENE/GROOMING: INDEPENDENT
ORAL_HYGIENE: INDEPENDENT
DRESS: STREET CLOTHES
ORAL_HYGIENE: INDEPENDENT

## 2020-01-18 NOTE — PROGRESS NOTES
"Patient has been visible in the milieu.     Poor memory and concentration; she needed questions re-asked due to her forgetting.     She denied anxiety; although appeared anxious. Pt rated depression 2/10. She denied SI/SIB/Hallucinations.     Patient ate 75% of her dinner. Pt informed staff that another patient told her not to eat her \"brownie\"; she became really upset and tearful.     Pt reported playing the piano to cope to stressful situations.    We'll continue to monitor.      "

## 2020-01-18 NOTE — PROGRESS NOTES
Patient woke up @ 0300, reported that she was feeling anxious and requested for her anxiety medicaton. Hydroxyzine 25 mg. Given @ 0306. Noted to be sleeping the next few minutes. Slept a total of 6.5 hours.

## 2020-01-18 NOTE — PROGRESS NOTES
Visited with pt on the basis of [facility] consult order for spiritual support of the pt. Reflected with the pt around her hospital experience, sources of spiritual and emotional support and current spiritual health needs. During my conversation with her, she expresses her emotion and sadness. She talked her children and grandchildren. She said  I have nine children and a lot of grandchildren .      Emotional support. Reflective conversation integrating illness elements and family spiritual narratives. I shared conversation that would invite God into the room and to bless to support her in her suffering.  Active listening with the patient was used. Offered alternative ways to view her own suffering and see how her miguelina can be a powerful source of her strength. I pray for her asking God to help her and to ease and eliminate any suffering and pain that she feels.     Pt received spiritual support and reflective conversation in the context of this hospitalization. She expressed appreciation for the visit.     I will inform unit  for follow-up.

## 2020-01-18 NOTE — PLAN OF CARE
"Problem: Anxiety  Goal: Anxiety Reduction or Resolution  Description  1.Verbalizes own anxiety and coping patterns.  2.Pt will effectively use 3 coping mechanisms to deal with anxiety.  3. Pt will be able to approach staff when feeling anxious  4.Pt will be able to maintain  a desired level of  role functioning and problem solving.  5. Pt will be compliant with medications and tx plans.  6. Pt will actively participate in unit activities and programming.  7. Pt will sleep at least 7 hours/ night.  8. Pt will demonstrate improved concentration and accuracy of thoughts.  9. After care in place.     Outcome: Cuco Geiger asked to speak to staff about an incident yesterday where she states staff were rude when she was trying to help another patient by playing music for her [pt worked as music therapist in past]. She got tearful when discussing this. She then collected herself and appropriately asked \"Can you let me know if I'm keeping good boundaries and not intruding?\" She was given positive reinforcement for being aware of her behavior and being open to redirection at times.     She states that some staff have been \"rude when asking me to go to group or to stop doing something.\" At one point, another intrusive peer went into the Resiliancy room while she was alone playing keyboard. Staff asked him to leave, and she was upset about this. After discussing, she realized it was a good idea. Her thinking is very tangential and she emotionally oscillates between being elated and tearful. She has rambling speech and loses her train of thought. She is aware her memory is poor and states Alzheimers runs in her family.     She seems anxious about wanting to do the right things: \"I don't want to get in trouble.\" She was reassured that she can do things she enjoys as long as it is appropriate for the milieu. She did ask if she could play keyboard in OT room and \"run a group.\" I said she \"can't run a music therapy group\" " and a staff member needs to be in the room.    She asked for a printout of information about the [facility] to relieve anxiety about Monday's assessment interview for placement there. This was given.

## 2020-01-19 LAB
ALBUMIN UR-MCNC: NEGATIVE MG/DL
APPEARANCE UR: CLEAR
BILIRUB UR QL STRIP: NEGATIVE
COLOR UR AUTO: ABNORMAL
GLUCOSE UR STRIP-MCNC: NEGATIVE MG/DL
HGB UR QL STRIP: NEGATIVE
KETONES UR STRIP-MCNC: NEGATIVE MG/DL
LEUKOCYTE ESTERASE UR QL STRIP: NEGATIVE
MUCOUS THREADS #/AREA URNS LPF: PRESENT /LPF
NITRATE UR QL: NEGATIVE
PH UR STRIP: 6.5 PH (ref 5–7)
RBC #/AREA URNS AUTO: <1 /HPF (ref 0–2)
SOURCE: ABNORMAL
SP GR UR STRIP: 1.01 (ref 1–1.03)
SQUAMOUS #/AREA URNS AUTO: 2 /HPF (ref 0–1)
UROBILINOGEN UR STRIP-MCNC: NORMAL MG/DL (ref 0–2)
WBC #/AREA URNS AUTO: 1 /HPF (ref 0–5)

## 2020-01-19 PROCEDURE — 81001 URINALYSIS AUTO W/SCOPE: CPT | Performed by: CLINICAL NURSE SPECIALIST

## 2020-01-19 PROCEDURE — 25000132 ZZH RX MED GY IP 250 OP 250 PS 637: Performed by: NURSE PRACTITIONER

## 2020-01-19 PROCEDURE — 25000132 ZZH RX MED GY IP 250 OP 250 PS 637: Performed by: PHYSICIAN ASSISTANT

## 2020-01-19 PROCEDURE — H2032 ACTIVITY THERAPY, PER 15 MIN: HCPCS

## 2020-01-19 PROCEDURE — 12400002 ZZH R&B MH SENIOR/ADOLESCENT

## 2020-01-19 PROCEDURE — 25000132 ZZH RX MED GY IP 250 OP 250 PS 637: Performed by: PSYCHIATRY & NEUROLOGY

## 2020-01-19 RX ADMIN — SENNOSIDES AND DOCUSATE SODIUM 2 TABLET: 8.6; 5 TABLET ORAL at 21:25

## 2020-01-19 RX ADMIN — HYDROXYZINE HYDROCHLORIDE 25 MG: 25 TABLET, FILM COATED ORAL at 03:44

## 2020-01-19 RX ADMIN — ASPIRIN 81 MG: 81 TABLET ORAL at 09:41

## 2020-01-19 RX ADMIN — GABAPENTIN 300 MG: 300 CAPSULE ORAL at 21:25

## 2020-01-19 RX ADMIN — LEVOTHYROXINE SODIUM 175 MCG: 175 TABLET ORAL at 06:16

## 2020-01-19 RX ADMIN — DONEPEZIL HYDROCHLORIDE 5 MG: 5 TABLET ORAL at 21:26

## 2020-01-19 RX ADMIN — GABAPENTIN 300 MG: 300 CAPSULE ORAL at 09:40

## 2020-01-19 RX ADMIN — DIVALPROEX SODIUM 750 MG: 250 TABLET, FILM COATED, EXTENDED RELEASE ORAL at 21:26

## 2020-01-19 RX ADMIN — GABAPENTIN 300 MG: 300 CAPSULE ORAL at 14:40

## 2020-01-19 RX ADMIN — QUETIAPINE FUMARATE 25 MG: 25 TABLET ORAL at 21:26

## 2020-01-19 RX ADMIN — PANTOPRAZOLE SODIUM 20 MG: 20 TABLET, DELAYED RELEASE ORAL at 09:40

## 2020-01-19 RX ADMIN — ALENDRONATE SODIUM 70 MG: 70 TABLET ORAL at 06:16

## 2020-01-19 RX ADMIN — CALCIUM CARBONATE (ANTACID) CHEW TAB 500 MG 500 MG: 500 CHEW TAB at 09:41

## 2020-01-19 RX ADMIN — SENNOSIDES AND DOCUSATE SODIUM 2 TABLET: 8.6; 5 TABLET ORAL at 09:40

## 2020-01-19 ASSESSMENT — ACTIVITIES OF DAILY LIVING (ADL)
DRESS: STREET CLOTHES
LAUNDRY: UNABLE TO COMPLETE
HYGIENE/GROOMING: INDEPENDENT
ORAL_HYGIENE: INDEPENDENT

## 2020-01-19 ASSESSMENT — MIFFLIN-ST. JEOR: SCORE: 1431.76

## 2020-01-19 NOTE — PLAN OF CARE
"Problem: Anxiety  Goal: Anxiety Reduction or Resolution     Outcome: No Change  Note:   Pt continues to be anxious, manic and paranoid. She denies depression or hallucinations. She makes accusations about \"certain staff being rude to me, treating me like a child, putting limits on me.\" I asked for specifics and it was related to using her phone, making calls and being awake at night and needing to go to lounge. I told her it was fine to go to lounge when she has bad insomnia. Lying in bed for hours isn't helpful. I just reminded her she would have to do a quiet activity, no lights or TV.     She is medication compliant, pleasant & cooperative with this writer. Attends groups, entertains herself with writing, reading, spiritual practices.    Of note: She did report blood in her urine, beginning last evening and continuing today. UA ordered.     "

## 2020-01-19 NOTE — PROGRESS NOTES
Patient was awake for about one hour and was singing in her room. She said she cannot sleep and wanted to relax herself by singing. Patient was cautioned to tone down her voice as she was quite loud and her door was kept closed. Later, she came to the nurses' station  and requested for an anxiety pill. Hydroxyzine 25 mg. given @ 0346 PRN for anxiety. Went back to sleep a few minutes after but woke up early again. Slept a total of 3 hours.
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
## 2020-01-19 NOTE — PROGRESS NOTES
Daughter Joy called (LISA signed) for an update on pt. Pt is requesting that daughter be present for the interview with [facility] on Monday. Joy plans to leave a message with Larissa asking what time the interview will take place.

## 2020-01-19 NOTE — PLAN OF CARE
Patient pleasant and cooperative, visible in milieu.  Flat affect, denies SI/SIB, anxiety, endorsed depression as low.  Good appetite at supper and snack time.  Socialized and watched movie with peers.  Medication compliant; no aggressive behavior noted.  Will continue to monitor closely.

## 2020-01-20 LAB
ALBUMIN SERPL-MCNC: 3.8 G/DL (ref 3.4–5)
ALP SERPL-CCNC: 94 U/L (ref 40–150)
ALT SERPL W P-5'-P-CCNC: 27 U/L (ref 0–50)
AMMONIA PLAS-SCNC: 26 UMOL/L (ref 10–50)
AST SERPL W P-5'-P-CCNC: 24 U/L (ref 0–45)
BILIRUB DIRECT SERPL-MCNC: 0.1 MG/DL (ref 0–0.2)
BILIRUB SERPL-MCNC: 0.6 MG/DL (ref 0.2–1.3)
ERYTHROCYTE [DISTWIDTH] IN BLOOD BY AUTOMATED COUNT: 13.4 % (ref 10–15)
HCT VFR BLD AUTO: 41.1 % (ref 35–47)
HGB BLD-MCNC: 13.4 G/DL (ref 11.7–15.7)
MCH RBC QN AUTO: 28.6 PG (ref 26.5–33)
MCHC RBC AUTO-ENTMCNC: 32.6 G/DL (ref 31.5–36.5)
MCV RBC AUTO: 88 FL (ref 78–100)
PLATELET # BLD AUTO: 271 10E9/L (ref 150–450)
PROT SERPL-MCNC: 7 G/DL (ref 6.8–8.8)
RBC # BLD AUTO: 4.68 10E12/L (ref 3.8–5.2)
VALPROATE SERPL-MCNC: 86 MG/L (ref 50–100)
WBC # BLD AUTO: 5.4 10E9/L (ref 4–11)

## 2020-01-20 PROCEDURE — 99232 SBSQ HOSP IP/OBS MODERATE 35: CPT | Performed by: NURSE PRACTITIONER

## 2020-01-20 PROCEDURE — G0177 OPPS/PHP; TRAIN & EDUC SERV: HCPCS

## 2020-01-20 PROCEDURE — 25000132 ZZH RX MED GY IP 250 OP 250 PS 637: Performed by: NURSE PRACTITIONER

## 2020-01-20 PROCEDURE — 80076 HEPATIC FUNCTION PANEL: CPT | Performed by: NURSE PRACTITIONER

## 2020-01-20 PROCEDURE — 80164 ASSAY DIPROPYLACETIC ACD TOT: CPT | Performed by: NURSE PRACTITIONER

## 2020-01-20 PROCEDURE — 36415 COLL VENOUS BLD VENIPUNCTURE: CPT | Performed by: NURSE PRACTITIONER

## 2020-01-20 PROCEDURE — 96125 COGNITIVE TEST BY HC PRO: CPT | Mod: GO

## 2020-01-20 PROCEDURE — 82140 ASSAY OF AMMONIA: CPT | Performed by: NURSE PRACTITIONER

## 2020-01-20 PROCEDURE — 12400002 ZZH R&B MH SENIOR/ADOLESCENT

## 2020-01-20 PROCEDURE — 99232 SBSQ HOSP IP/OBS MODERATE 35: CPT | Performed by: PHYSICIAN ASSISTANT

## 2020-01-20 PROCEDURE — 85027 COMPLETE CBC AUTOMATED: CPT | Performed by: NURSE PRACTITIONER

## 2020-01-20 PROCEDURE — 25000132 ZZH RX MED GY IP 250 OP 250 PS 637: Performed by: PHYSICIAN ASSISTANT

## 2020-01-20 RX ORDER — OLANZAPINE 2.5 MG/1
2.5 TABLET, FILM COATED ORAL EVERY MORNING
Status: DISCONTINUED | OUTPATIENT
Start: 2020-01-20 | End: 2020-01-21

## 2020-01-20 RX ORDER — OLANZAPINE 5 MG/1
5 TABLET ORAL AT BEDTIME
Status: DISCONTINUED | OUTPATIENT
Start: 2020-01-20 | End: 2020-01-21

## 2020-01-20 RX ORDER — QUETIAPINE FUMARATE 50 MG/1
50 TABLET, FILM COATED ORAL AT BEDTIME
Status: DISCONTINUED | OUTPATIENT
Start: 2020-01-20 | End: 2020-01-20

## 2020-01-20 RX ADMIN — OLANZAPINE 5 MG: 5 TABLET, FILM COATED ORAL at 22:03

## 2020-01-20 RX ADMIN — OLANZAPINE 2.5 MG: 2.5 TABLET, FILM COATED ORAL at 07:53

## 2020-01-20 RX ADMIN — SENNOSIDES AND DOCUSATE SODIUM 2 TABLET: 8.6; 5 TABLET ORAL at 22:03

## 2020-01-20 RX ADMIN — LEVOTHYROXINE SODIUM 175 MCG: 175 TABLET ORAL at 06:37

## 2020-01-20 RX ADMIN — ASPIRIN 81 MG: 81 TABLET ORAL at 07:53

## 2020-01-20 RX ADMIN — SENNOSIDES AND DOCUSATE SODIUM 2 TABLET: 8.6; 5 TABLET ORAL at 07:53

## 2020-01-20 RX ADMIN — CALCIUM CARBONATE (ANTACID) CHEW TAB 500 MG 500 MG: 500 CHEW TAB at 07:53

## 2020-01-20 RX ADMIN — DIVALPROEX SODIUM 750 MG: 250 TABLET, FILM COATED, EXTENDED RELEASE ORAL at 22:03

## 2020-01-20 RX ADMIN — DONEPEZIL HYDROCHLORIDE 5 MG: 5 TABLET ORAL at 22:03

## 2020-01-20 RX ADMIN — PANTOPRAZOLE SODIUM 20 MG: 20 TABLET, DELAYED RELEASE ORAL at 06:37

## 2020-01-20 RX ADMIN — HYDROXYZINE HYDROCHLORIDE 25 MG: 25 TABLET, FILM COATED ORAL at 10:25

## 2020-01-20 ASSESSMENT — ACTIVITIES OF DAILY LIVING (ADL)
DRESS: INDEPENDENT
ORAL_HYGIENE: INDEPENDENT
HYGIENE/GROOMING: INDEPENDENT
ORAL_HYGIENE: INDEPENDENT
LAUNDRY: UNABLE TO COMPLETE
DRESS: INDEPENDENT
HYGIENE/GROOMING: INDEPENDENT

## 2020-01-20 NOTE — PROGRESS NOTES
Phone call with patient's daughter Joy Scales ([phone]) to discuss discharge planning. Writer gave an overview on [facility] and updated on meeting today. Joy would like to join the meeting by telephone. Writer will notify about time of meeting. LVM with [facility] requesting call back with time of interview.    Phone call with Augustine,  for [facility] ([phone]). Augustine reported that the interview will be tomorrow (Tuesay, January 21) at 1:30. Patient and daughter Joy were notified.

## 2020-01-20 NOTE — PLAN OF CARE
Patient had a good shift, attended group meeting.  Bright affect, denies SI/SIB/HI, depression, anxiety.  Patient endorsed happiness to mood swing.  Patient called this staff to her room that she wanted to share something with her.  When getting to patient's room, she tearfully reported that a staff was rude to her.  Patient said she is a spiritual person that love to help people, but staff would not allow her to help a patient.  This staff explained to patient about boundaries and that's what the other staff was trying to tell her.  Patient's became bright again and was smiling asking when it's dinner.  Then came to the lounge, socialized with other peers and did some writing on her journal book.  Patient elated, took her scheduled medications with no problem.  Presently awake in room, will continue to monitor closely.

## 2020-01-20 NOTE — PROGRESS NOTES
01/19/20 2200   Therapeutic Recreation   Type of Intervention structured groups   Activity game   Response Participates, initiates socially appropriate   Hours 0.5     Pt participated in Therapeutic Recreation group with focus on stress reduction, socialization, cognitive processes, and leisure participation. Engaged and cooperative in group recreational intervention; word puzzles. Pt entered group late, but participated for approximately half of the group. Showed progress in session goals. Pt affect was bright and mood was calm. Pt slightly added to the group discussion during the activity.

## 2020-01-20 NOTE — PROGRESS NOTES
Patient participated in a 60 minute psychoeducation group that was a social activity involving the games [employer] and Life Skills. Participants shared coping skills and answered questions about their lives. Patient was an active participant and had a bright affect through out the group.

## 2020-01-20 NOTE — PROGRESS NOTES
[facility], [place]   Psychiatric Progress Note        Interim History:   From H&P: Mariela Duffy is a 64 year old female with history of mild dementia, depression and anxiety.  The patient was brought to the emergency room for assessment of altered mental status.  The patient was seen earlier that day in the emergency room due to high anxiety.  She was given Atarax and discharge.  The patient fell asleep in the waiting room and upon awakening she was very anxious, disorganized, and not making sense.  The daughter and the patient reported that she has been increasingly confused in the last few months.  Her anxiety have been significantly increased.  Both did not feel that the patient is well enough to go home, and she has not been able to take care of herself.    The patient's care was discussed with the treatment team during the daily team meeting and/or staff's chart notes were reviewed.  Staff report patient has been calm, pleasant, cooperative. Patient is attending groups and participating appropriately. Needs cues to complete tasks. She is pressured and sings instead of talk. She spend a lot of time playing the keyboard. She was highly expressive during dance movement group. She was anxious on and off and making multiple phone calls. Endorses labile mood and paranoia. Upset about her children not calling or visiting her in the hospital.  Patient is eating well and taking medications as prescribed. Slept 4.5 hours.     Met with patient.  She is much calmer compared to yesterday.  She is still disorganized but better.  She teared up only once during the interview.  Reports feeling little bit better.  Again was not able to sleep well last night.  She was having a bad day yesterday thinking about her fiancé and her children.  Reports that she misses him greatly.  The patient is aware that she has an interview with [facility] today at 130.  Patient is pleasant, smiling,  and cooperative.    Increase Zyprexa. Change Depakote to 250, tid.   [facility] accepted her. Assessment today.   MoCA 26, CPT 5.1.     Spoke with patient's daughter Joy Scales, ([phone]).  Reports that at baseline, the patient is talkative, people person, very active, disorganized, and hoarding things.  At baseline, the patient often act as a child, asking permission for simple things or seeking approval.  She often loses things, is labile, and has difficulties functioning.  Reports that she started getting more forgetful and confused about 2 years ago.  At that time she started having more behavioral problems.  She would not take a shower, would not take her medications or taking care of her living environment.  Reports that the patient had a neuropsych testing with Ana Cristina and  but her mom was not sure what of the testing shows.         Medications:       alendronate  70 mg Oral Q7 Days     aspirin  81 mg Oral Daily     calcium carbonate  500 mg Oral Daily     divalproex sodium extended-release  750 mg Oral At Bedtime     donepezil  5 mg Oral At Bedtime     gabapentin  300 mg Oral TID     levothyroxine  175 mcg Oral Daily     pantoprazole  20 mg Oral QAM AC     polyethylene glycol  17 g Oral Daily     QUEtiapine  50 mg Oral At Bedtime     senna-docusate  1-2 tablet Oral BID          Allergies:     Allergies   Allergen Reactions     Levaquin Nausea and Vomiting          Labs:     Recent Results (from the past 672 hour(s))   UA with Microscopic    Collection Time: 01/13/20 11:08 PM   Result Value Ref Range    Color Urine Light Yellow     Appearance Urine Slightly Cloudy     Glucose Urine Negative NEG^Negative mg/dL    Bilirubin Urine Negative NEG^Negative    Ketones Urine Negative NEG^Negative mg/dL    Specific Gravity Urine 1.013 1.003 - 1.035    Blood Urine Negative NEG^Negative    pH Urine 5.5 5.0 - 7.0 pH    Protein Albumin Urine Negative NEG^Negative mg/dL    Urobilinogen mg/dL Normal  0.0 - 2.0 mg/dL    Nitrite Urine Negative NEG^Negative    Leukocyte Esterase Urine Large (A) NEG^Negative    Source Midstream Urine     WBC Urine 13 (H) 0 - 5 /HPF    RBC Urine 2 0 - 2 /HPF    Bacteria Urine Many (A) NEG^Negative /HPF    Squamous Epithelial /HPF Urine 4 (H) 0 - 1 /HPF    Mucous Urine Present (A) NEG^Negative /LPF   Drug abuse screen 77 urine (FL, [place], SH)    Collection Time: 01/13/20 11:08 PM   Result Value Ref Range    Amphetamine Qual Urine Negative NEG^Negative    Barbiturates Qual Urine Negative NEG^Negative    Benzodiazepine Qual Urine Negative NEG^Negative    Cannabinoids Qual Urine Negative NEG^Negative    Cocaine Qual Urine Negative NEG^Negative    Opiates Qualitative Urine Negative NEG^Negative    PCP Qual Urine Negative NEG^Negative   HCG qualitative urine (UPT)    Collection Time: 01/13/20 11:08 PM   Result Value Ref Range    HCG Qual Urine Negative NEG^Negative   Urine Culture    Collection Time: 01/13/20 11:54 PM   Result Value Ref Range    Specimen Description Midstream Urine     Culture Micro       10,000 to 50,000 colonies/mL  mixed urogenital zachary  Susceptibility testing not routinely done     Basic metabolic panel    Collection Time: 01/13/20 11:56 PM   Result Value Ref Range    Sodium 136 133 - 144 mmol/L    Potassium 4.2 3.4 - 5.3 mmol/L    Chloride 104 94 - 109 mmol/L    Carbon Dioxide 26 20 - 32 mmol/L    Anion Gap 6 3 - 14 mmol/L    Glucose 108 (H) 70 - 99 mg/dL    Urea Nitrogen 11 7 - 30 mg/dL    Creatinine 0.86 0.52 - 1.04 mg/dL    GFR Estimate 72 >60 mL/min/[1.73_m2]    GFR Estimate If Black 83 >60 mL/min/[1.73_m2]    Calcium 9.1 8.5 - 10.1 mg/dL   CBC with platelets differential    Collection Time: 01/13/20 11:56 PM   Result Value Ref Range    WBC 9.4 4.0 - 11.0 10e9/L    RBC Count 5.21 (H) 3.8 - 5.2 10e12/L    Hemoglobin 14.5 11.7 - 15.7 g/dL    Hematocrit 44.2 35.0 - 47.0 %    MCV 85 78 - 100 fl    MCH 27.8 26.5 - 33.0 pg    MCHC 32.8 31.5 - 36.5 g/dL    RDW 13.0 10.0 -  15.0 %    Platelet Count 351 150 - 450 10e9/L    Diff Method Automated Method     % Neutrophils 57.3 %    % Lymphocytes 29.0 %    % Monocytes 8.0 %    % Eosinophils 4.6 %    % Basophils 0.7 %    % Immature Granulocytes 0.4 %    Nucleated RBCs 0 0 /100    Absolute Neutrophil 5.4 1.6 - 8.3 10e9/L    Absolute Lymphocytes 2.7 0.8 - 5.3 10e9/L    Absolute Monocytes 0.8 0.0 - 1.3 10e9/L    Absolute Eosinophils 0.4 0.0 - 0.7 10e9/L    Absolute Basophils 0.1 0.0 - 0.2 10e9/L    Abs Immature Granulocytes 0.0 0 - 0.4 10e9/L    Absolute Nucleated RBC 0.0    Vitamin D    Collection Time: 01/14/20  8:13 AM   Result Value Ref Range    Vitamin D Deficiency screening 42 20 - 75 ug/L   Folate    Collection Time: 01/14/20  8:13 AM   Result Value Ref Range    Folate 14.7 >5.4 ng/mL   Vitamin B12    Collection Time: 01/14/20  8:13 AM   Result Value Ref Range    Vitamin B12 262 193 - 986 pg/mL   TSH with free T4 reflex and/or T3 as indicated    Collection Time: 01/14/20  8:13 AM   Result Value Ref Range    TSH 2.48 0.40 - 4.00 mU/L   Ammonia    Collection Time: 01/15/20  7:33 AM   Result Value Ref Range    Ammonia 19 10 - 50 umol/L   Hepatic panel    Collection Time: 01/15/20  7:33 AM   Result Value Ref Range    Bilirubin Direct 0.1 0.0 - 0.2 mg/dL    Bilirubin Total 0.7 0.2 - 1.3 mg/dL    Albumin 4.4 3.4 - 5.0 g/dL    Protein Total 7.8 6.8 - 8.8 g/dL    Alkaline Phosphatase 116 40 - 150 U/L    ALT 23 0 - 50 U/L    AST 20 0 - 45 U/L   UA with Microscopic reflex to Culture    Collection Time: 01/19/20  5:37 PM   Result Value Ref Range    Color Urine Light Yellow     Appearance Urine Clear     Glucose Urine Negative NEG^Negative mg/dL    Bilirubin Urine Negative NEG^Negative    Ketones Urine Negative NEG^Negative mg/dL    Specific Gravity Urine 1.008 1.003 - 1.035    Blood Urine Negative NEG^Negative    pH Urine 6.5 5.0 - 7.0 pH    Protein Albumin Urine Negative NEG^Negative mg/dL    Urobilinogen mg/dL Normal 0.0 - 2.0 mg/dL    Nitrite  Urine Negative NEG^Negative    Leukocyte Esterase Urine Negative NEG^Negative    Source Unspecified Urine     WBC Urine 1 0 - 5 /HPF    RBC Urine <1 0 - 2 /HPF    Squamous Epithelial /HPF Urine 2 (H) 0 - 1 /HPF    Mucous Urine Present (A) NEG^Negative /LPF   Valproic acid    Collection Time: 01/20/20  6:43 AM   Result Value Ref Range    Valproic Acid Level 86 50 - 100 mg/L   Ammonia    Collection Time: 01/20/20  6:43 AM   Result Value Ref Range    Ammonia 26 10 - 50 umol/L   Hepatic panel    Collection Time: 01/20/20  6:43 AM   Result Value Ref Range    Bilirubin Direct 0.1 0.0 - 0.2 mg/dL    Bilirubin Total 0.6 0.2 - 1.3 mg/dL    Albumin 3.8 3.4 - 5.0 g/dL    Protein Total 7.0 6.8 - 8.8 g/dL    Alkaline Phosphatase 94 40 - 150 U/L    ALT 27 0 - 50 U/L    AST 24 0 - 45 U/L   CBC with platelets    Collection Time: 01/20/20  6:43 AM   Result Value Ref Range    WBC 5.4 4.0 - 11.0 10e9/L    RBC Count 4.68 3.8 - 5.2 10e12/L    Hemoglobin 13.4 11.7 - 15.7 g/dL    Hematocrit 41.1 35.0 - 47.0 %    MCV 88 78 - 100 fl    MCH 28.6 26.5 - 33.0 pg    MCHC 32.6 31.5 - 36.5 g/dL    RDW 13.4 10.0 - 15.0 %    Platelet Count 271 150 - 450 10e9/L            Psychiatric Examination:   Temp: 96.9  F (36.1  C) Temp src: Tympanic BP: (!) 148/88 Pulse: 107   Resp: 16 SpO2: 99 % O2 Device: None (Room air)    Weight is 183 lbs 11.2 oz  Body mass index is 27.93 kg/m .    Appearance: well groomed, awake, alert, cooperative, mild distress and normal weight  Attitude:  cooperative  Eye Contact:  good  Mood:  anxious and better  Affect:  full range  Speech:  pressured speech  Psychomotor Behavior:  no evidence of tardive dyskinesia, dystonia, or tics  Throught Process:  disorganized, tangental and circumstantial  Associations:  no loose associations  Thought Content:  no evidence of suicidal ideation or homicidal ideation  Insight:  good  Judgement:  intact  Oriented to:  time, person, and place  Attention Span and Concentration:  intact  Recent  and Remote Memory:  intact         Precautions:     Behavioral Orders   Procedures     Code 1 - Restrict to Unit     Code 2     For MRI only     Routine Programming     As clinically indicated     Status 15     Every 15 minutes.          DIagnoses:   1.  Major neurocognitive disorder, with behavioral disturbances  2.  History of depression and anxiety  3. UTI         Plan:   The patient is a very pleasant,  female who was admitted with increased anxiety, and inability to function.  The patient was a poor historian.  His she appeared to be in a hypomanic state.  It is not clear if she has been taking her medications as prescribed as her home health care nurse quit weeks ago.  The patient was not able to discuss any of her medications.  The plan will be as follow:     --Discontinue Pamelor and Buspar.  --Taper off of Zoloft.  Will consider Effexor or Cymbalta.    --A trial of Gabapentin and Seroquel did not provide the desired outcome.   --Change Depakote to 250 mg, tid. Valproic acid level 86 on 1/20/20.     --Start Zyprexa. Increase to 5 mg qam and 7.5 mg, at bedtime.    --Start Aricept 5 mg at bedtime.    --Blood work was reviewed.  Valproic acid, ammonia level, CBC with diff, and hepatic panel will be ordered for Monday. All WNL.   --Internal medicine follow-up for medical problems. They are treating her for UTI.   --The patient was consulted on nature of illness and treatment options.   --Care was coordinated with the treatment team.  --MRI  To rule out dementia. WNL.   --CT scan was normal  --Cognitive test: MoCA 26, CPT 5.1     Disposition Plan   Reason for ongoing admission: is unable to care for self due to anxiety, tatianna, inability to function on her own  Disposition: TBD  Estimated length of stay: 7-10 days  Legal Status: voluntary  Discharge will be granted once symptoms improved.    Charles WEINER CNP  Date: 01/21/20  Time: 12:13 PM

## 2020-01-20 NOTE — PLAN OF CARE
Problem: OT General Care Plan  Goal: OT Goal 1  Description  Will attend OT groups,  process through organizing work and thoughts with concrete familiar step opportunities as needed. Assess cognition further in problem solving and organization.   Note:   Attended 1 of 2 OT groups, though attempted to attend and stay during the 1st group, became tearful and left, to not return until the 2nd group which she stayed the full session for. She continued to appear tearful on occasion, though worked through those moments quietly, asking the discussion to come back to her in a moment. Answers were in context. She stated appreciating the assistance she is receiving here. Order was received for the OT Cognitive Assessment. Pt was seen briefly to begin testing for the OT Cognitive Assessment in the am. She startled when a staff opened the door of the room and pt started to cry. Testing was postponed to the afternoon.

## 2020-01-20 NOTE — CONSULTS
"Brief medicine note     Medicine was asked to see patient for assistance with H&P and for abnormal UA.    Please see also consult note by Dori Santiago PA-C 1/14/20.     Nursing patient reported visible blood in urine.  Patient denies seeing hematuria or bloody vaginal discharge and is postmenopausal.  UA with reflex is grossly negative on 1/19/2020.  Prior urinalysis on 1/13/2020 is also negative for hematuria.    Demertius Sierra is a 64 year old female with history of osteoporosis, GERD, anxiety, depression, IBS, hypothyroidism, and TIA who was admitted to station [facility] with worsening anxiety     Other PMH:      Depression, anxiety: With worsening anxiety  - Management per psych      Tachycardia: -111 in the setting of increased anxiety. HR 100s on exam  - Contact medicine if patient symptomatic or HR >125     Abnormal UA, concern for UTI: UA not c/w UTI.  Asx  - Follow UC     Hypothyroidism: TSH 0.17, T4 1.12 8/2019. Continue PTA levothyroxine. Repeat TSH normal 1/14 at 2.48     GERD: Stable. Continue PPI     Osteoporosis: Continue PTA Fosamax      Headaches: Stable. Continue PTA Imitrex         Medicine will follow UC. Please contact if future questions or concerns arise. Thank you for the opportunity to be a part of this patient's care.      Vital signs:  Temp: 98.2  F (36.8  C) Temp src: Oral BP: 120/87 Pulse: 111   Resp: 16 SpO2: 96 % O2 Device: None (Room air)   Height: 172.7 cm (5' 8\") Weight: 83.3 kg (183 lb 11.2 oz)  Estimated body mass index is 27.93 kg/m  as calculated from the following:    Height as of this encounter: 1.727 m (5' 8\").    Weight as of this encounter: 83.3 kg (183 lb 11.2 oz).    GENERAL: Alert and oriented x 3. NAD. Ambulatory. Cooperative.   HEENT: Anicteric sclera. Mucous membranes moist. NC. AT. EOMI.  CV: RRR. S1, S2. No murmurs appreciated.   RESPIRATORY: Effort normal on RA. Lungs CTAB with no wheezing, rales, rhonchi.   GI: Abdomen soft and non distended with normoactive bowel " sounds present in all quadrants. No tenderness, rebound, guarding. No lesions.   NEUROLOGICAL: No focal deficits. Moves all extremities.  CN 2-12 grossly intact.  EXTREMITIES: No peripheral edema. Intact bilateral pedal pulses.   SKIN: No jaundice. No rashes.  BACK: no CVA tenderness, no spinous tenderness    Admission on 01/13/2020, Discharged on 01/14/2020   Component Date Value Ref Range Status     Color Urine 01/13/2020 Light Yellow   Final     Appearance Urine 01/13/2020 Slightly Cloudy   Final     Glucose Urine 01/13/2020 Negative  NEG^Negative mg/dL Final     Bilirubin Urine 01/13/2020 Negative  NEG^Negative Final     Ketones Urine 01/13/2020 Negative  NEG^Negative mg/dL Final     Specific Gravity Urine 01/13/2020 1.013  1.003 - 1.035 Final     Blood Urine 01/13/2020 Negative  NEG^Negative Final     pH Urine 01/13/2020 5.5  5.0 - 7.0 pH Final     Protein Albumin Urine 01/13/2020 Negative  NEG^Negative mg/dL Final     Urobilinogen mg/dL 01/13/2020 Normal  0.0 - 2.0 mg/dL Final     Nitrite Urine 01/13/2020 Negative  NEG^Negative Final     Leukocyte Esterase Urine 01/13/2020 Large* NEG^Negative Final     Source 01/13/2020 Midstream Urine   Final     WBC Urine 01/13/2020 13* 0 - 5 /HPF Final     RBC Urine 01/13/2020 2  0 - 2 /HPF Final     Bacteria Urine 01/13/2020 Many* NEG^Negative /HPF Final     Squamous Epithelial /HPF Urine 01/13/2020 4* 0 - 1 /HPF Final     Mucous Urine 01/13/2020 Present* NEG^Negative /LPF Final     Sodium 01/13/2020 136  133 - 144 mmol/L Final     Potassium 01/13/2020 4.2  3.4 - 5.3 mmol/L Final    Specimen slightly hemolyzed, potassium may be falsely elevated     Chloride 01/13/2020 104  94 - 109 mmol/L Final     Carbon Dioxide 01/13/2020 26  20 - 32 mmol/L Final     Anion Gap 01/13/2020 6  3 - 14 mmol/L Final     Glucose 01/13/2020 108* 70 - 99 mg/dL Final     Urea Nitrogen 01/13/2020 11  7 - 30 mg/dL Final     Creatinine 01/13/2020 0.86  0.52 - 1.04 mg/dL Final     GFR Estimate  01/13/2020 72  >60 mL/min/[1.73_m2] Final    Comment: Non  GFR Calc  Starting 12/18/2018, serum creatinine based estimated GFR (eGFR) will be   calculated using the Chronic Kidney Disease Epidemiology Collaboration   (CKD-EPI) equation.       GFR Estimate If Black 01/13/2020 83  >60 mL/min/[1.73_m2] Final    Comment:  GFR Calc  Starting 12/18/2018, serum creatinine based estimated GFR (eGFR) will be   calculated using the Chronic Kidney Disease Epidemiology Collaboration   (CKD-EPI) equation.       Calcium 01/13/2020 9.1  8.5 - 10.1 mg/dL Final     WBC 01/13/2020 9.4  4.0 - 11.0 10e9/L Final     RBC Count 01/13/2020 5.21* 3.8 - 5.2 10e12/L Final     Hemoglobin 01/13/2020 14.5  11.7 - 15.7 g/dL Final     Hematocrit 01/13/2020 44.2  35.0 - 47.0 % Final     MCV 01/13/2020 85  78 - 100 fl Final     MCH 01/13/2020 27.8  26.5 - 33.0 pg Final     MCHC 01/13/2020 32.8  31.5 - 36.5 g/dL Final     RDW 01/13/2020 13.0  10.0 - 15.0 % Final     Platelet Count 01/13/2020 351  150 - 450 10e9/L Final     Diff Method 01/13/2020 Automated Method   Final     % Neutrophils 01/13/2020 57.3  % Final     % Lymphocytes 01/13/2020 29.0  % Final     % Monocytes 01/13/2020 8.0  % Final     % Eosinophils 01/13/2020 4.6  % Final     % Basophils 01/13/2020 0.7  % Final     % Immature Granulocytes 01/13/2020 0.4  % Final     Nucleated RBCs 01/13/2020 0  0 /100 Final     Absolute Neutrophil 01/13/2020 5.4  1.6 - 8.3 10e9/L Final     Absolute Lymphocytes 01/13/2020 2.7  0.8 - 5.3 10e9/L Final     Absolute Monocytes 01/13/2020 0.8  0.0 - 1.3 10e9/L Final     Absolute Eosinophils 01/13/2020 0.4  0.0 - 0.7 10e9/L Final     Absolute Basophils 01/13/2020 0.1  0.0 - 0.2 10e9/L Final     Abs Immature Granulocytes 01/13/2020 0.0  0 - 0.4 10e9/L Final     Absolute Nucleated RBC 01/13/2020 0.0   Final     Amphetamine Qual Urine 01/13/2020 Negative  NEG^Negative Final    Cutoff for a negative amphetamine is 500 ng/mL or less.      Barbiturates Qual Urine 01/13/2020 Negative  NEG^Negative Final    Cutoff for a negative barbiturate is 200 ng/mL or less.     Benzodiazepine Qual Urine 01/13/2020 Negative  NEG^Negative Final    Cutoff for a negative benzodiazepine is 200 ng/mL or less.     Cannabinoids Qual Urine 01/13/2020 Negative  NEG^Negative Final    Cutoff for a negative cannabinoid is 50 ng/mL or less.     Cocaine Qual Urine 01/13/2020 Negative  NEG^Negative Final    Cutoff for a negative cocaine is 300 ng/mL or less.     Opiates Qualitative Urine 01/13/2020 Negative  NEG^Negative Final    Cutoff for a negative opiate is 300 ng/mL or less.     PCP Qual Urine 01/13/2020 Negative  NEG^Negative Final    Cutoff for a negative PCP is 25 ng/mL or less.     HCG Qual Urine 01/13/2020 Negative  NEG^Negative Final    Comment: This test is for screening purposes.  Results should be interpreted along with   the clinical picture.  Confirmation testing is available if warranted by   ordering [id number], HCG Quantitative Pregnancy.       Specimen Description 01/13/2020 Midstream Urine   Final     Culture Micro 01/13/2020    Final                    Value:10,000 to 50,000 colonies/mL  mixed urogenital zachary  Susceptibility testing not routinely done

## 2020-01-20 NOTE — PLAN OF CARE
"Fely is labile today, elated at times, then tearful. She is tangential & rambling. Flails arms when speaking, at times almost dropping medications. Needs redirection to hold hand still while taking meds. Denies all mental health symptoms, including anxiety or suicidal thoughts.     Upset about not being allowed to help other patients [worked as a music therapist]. She also has frequent complaints about staff \"being rude when setting limits.\" She asks for her phone a lot. Is quite anxious about assessment today for placement at [facility].    She is eating all meals, showered today, medication compliant, attending groups, social but somewhat intrusive with peers. Asks appropriate questions about medications. States she has a good primary MD. She reads Taoist books and writes in journal as coping skills. She has better concentration than on admission and able to quietly stay on task for some periods of time. Goes to room when over-stimulated.  "

## 2020-01-20 NOTE — CONSULTS
OCCUPATIONAL THERAPY:  Independent Living Skills Evaluation / CPT / MoCA   [facility],   [address]         Jackeline Clayton, OTR/L    Patient Name    Mariela Duffy (Laurie)      Date of Assessment:      1-   Time of day:   mid afternoon    THE RESULTS OF THIS ASSESSMENT PROVIDE RECOMMENDATIONS BASED UPON FUNCTION AT THE TIME OF ADMINISTRATION ONLY, MAY NOT REFLECT BASELINE FUNCTION.    COGNITIVE PERFORMANCE TEST: The CPT is a standardized, performance-based assessment used to assess cognitive-functional information processing and executive processing.  This test is based on performance of some common activities. The level of performance may help to describe how information is being processed, potential safety risks and recommendations for supervision needs in the individual s living environment.  Updated with 2018 guidelines    SCOT COGNITIVE ASSESSMENT (MoCA)      VERSION _8.1   The Scot Cognitive Assessment (MoCA) was designed as a rapid screening instrument for mild cognitive dysfunction. It assesses different cognitive domains: attention and concentration, executive functions, memory, language, visuoconstructional skills, conceptual thinking, calculations, and orientation. The total possible score is 30 points; a score of 26 or above is considered normal.    GENERAL RECOMMENDATIONS BASED UPON THE COGNITIVE PERFORMANCE TEST (CPT) SCORE.  Note these are ranges and there may be fluctuations in abilities for an individual at different times of days                                                                                                             SCOT COGNITIVE ASSESSMENT SCORE:  26 /30  which is within the normal range.    Draw a Clock Subtest: Score:    3  / 3      SUBTASK SCORE SUBTASK SCORE   Visuospatial/Executive    5  /5 Abstraction    2  /2   Naming    2  /3 Delayed Recall    3  /5   Attention    6  /6 Orientation    6  /6   Language    2  /3         CPT  RESULTS:    SUBTASK SCORE COMMENTS   Phone   6  /6 Completed task correctly, though was easily distracted and redirected herself.   Shop   5  /6 Did not preplan by checking amount of funds available prior to making purchase.    Med Box   4.5  /6 Became distracted and did not complete task so did not correct errors.   Wash   5  /5 Completed concrete task correctly.   Toast   5  /5 Completed concrete task correctly.     TOTAL CPT 5 TASK AVERAGE SCORE:     5.1   / 5.6  which is just slightly below the normal range.    LEVEL 5.6 - 5.2  Normal range  Indicates normal functioning (absence of cognitive - functional disability). Information needed can be retrieved from stored memory to carry out complex purposeful activity with safety and accuracy.     1. Independent in managing personal affairs, seeking help or advice as needed.  2. Uses complex information to carry out daily activities with safety and accuracy.    3. Written information, numbers, symbols and hypothetical thoughts are well understood.  4. Problems are anticipated, errors are avoided, and consequences of actions are considered.     LEVEL 5.0   Mild functional decline due to deficits in executive control functions.  Basic cognitive functioning is intact, but higher-level functions are impaired and deficits may not be readily apparent.  May have trouble with complex information or activities; and problems are observed with memory, judgment, reasoning and planning ahead.  1. Safety:  May require assistance to plan ahead; or to manage complex medication schedules, appointments or finances.   2. IADL:  Generally able to complete basic self-cares and routine household tasks.  May begin to have difficulty with complex daily tasks such as reading, writing, meal preparation, shopping, finance management, job performance, management of complex medication regime, or driving. Check in support may be needed with monitoring of areas listed. ADLs typically are managed  without difficulty.  3. New learning may consist of trial and error.  4. May have mild problems with conversation and are often able to conceal deficits.  Driving ability may be affected. Recommend monitoring and consider whether a driving assessment is needed. Driving assessment recommended at least at 4.7 unless noted difficulties are noted otherwise.    ASSESSMENT/RECOMMENDATIONS (based upon assessment/group observation)    Fely was pleasant and cooperative during this assessment session, though tearful at times. She stated the plan for a change in living environment and is pleased with the change. She explained to be independent in all cares except believes her son is helping with financial management. She uses a med box in setting up her medications. She stated she drives and reports no difficulties with this. Fely appears easily distracted, to the point of not finishing one of the task details even after being reminded with the directions repeated. This also affected her having the opportunity to be cued to correct mistakes made in the medications she set up.     Fely scored on the CPT 5.1, which is slightly below the normal range. She scored on the MoCA 26/30 which is within the normal range. Note the score on the MoCA Visualspatial/Executive score of 5/5. Note the score on Delayed Recall of 3/5. The Memory Index Score was 12/15, with spontaneous recall of  3 words, recalling  1 word with general cues, and 1 word with multiple choice cues.     The scores on these assessments may indicate a possible mild functional decline. One might note difficulties in the areas of more complex problem solving, judgment, memory, planning and organizing. This author has noticed difficulty with distractibility on several occasions in group participation as well as during this testing session.    However, it is difficult to know if this is a change in abilities and function for Fely, with her score being close to the  normal range. She stated she has been easily distracted since remembering at the age of 5 and stated she has  ADD, talk to my family, I have always been like this, I get so distracted . She uses several organization tools to aid her at home including a calendar and schedule. She keeps a notebook for notes to remember things and begins in January with a new folder and notebook. She also uses a timer and sets them for every mealtime  to remember to eat breakfast, lunch and dinner  as well as additional times set for other daily reminders. It is the opinion of this author that though her test scores were close to and within the normal range, at this time, Fely might benefit and consider living in a structured living environment to help by providing a daily planned schedule and support when needed.    If Fely continues to perform at the level demonstrated during this assessment, recommendations include assistance with monitoring of accuracy of medication management including being observed in medication med box set up and accuracy of compliance until this is mastered. It may be also beneficial to monitor Financial management as needed, as her son sounds to be currently doing for her.     Additional recommendations discussed included general brain health tips of utilizing brain stimulating activities on a daily basis, such as cross word puzzles, word searches, reading, etc. Additional information was also discussed such as following as best possible to eat  heart healthy , sleeping adequate number of hours, getting physical activity within range of comfort ability, and managing stress levels as best possible.

## 2020-01-20 NOTE — PROGRESS NOTES
Patient had abnormal UA result, MD notified.  There is order for internal medicine consult to follow up.

## 2020-01-21 PROCEDURE — 25000132 ZZH RX MED GY IP 250 OP 250 PS 637: Performed by: NURSE PRACTITIONER

## 2020-01-21 PROCEDURE — 93010 ELECTROCARDIOGRAM REPORT: CPT | Performed by: INTERNAL MEDICINE

## 2020-01-21 PROCEDURE — 93005 ELECTROCARDIOGRAM TRACING: CPT

## 2020-01-21 PROCEDURE — G0177 OPPS/PHP; TRAIN & EDUC SERV: HCPCS

## 2020-01-21 PROCEDURE — 25000132 ZZH RX MED GY IP 250 OP 250 PS 637: Performed by: PHYSICIAN ASSISTANT

## 2020-01-21 PROCEDURE — 12400002 ZZH R&B MH SENIOR/ADOLESCENT

## 2020-01-21 PROCEDURE — 99233 SBSQ HOSP IP/OBS HIGH 50: CPT | Performed by: NURSE PRACTITIONER

## 2020-01-21 PROCEDURE — H2032 ACTIVITY THERAPY, PER 15 MIN: HCPCS

## 2020-01-21 RX ORDER — DIVALPROEX SODIUM 250 MG/1
250 TABLET, DELAYED RELEASE ORAL 3 TIMES DAILY
Status: DISCONTINUED | OUTPATIENT
Start: 2020-01-21 | End: 2020-01-24 | Stop reason: HOSPADM

## 2020-01-21 RX ORDER — OLANZAPINE 5 MG/1
5 TABLET ORAL EVERY MORNING
Status: DISCONTINUED | OUTPATIENT
Start: 2020-01-21 | End: 2020-01-24 | Stop reason: HOSPADM

## 2020-01-21 RX ADMIN — PANTOPRAZOLE SODIUM 20 MG: 20 TABLET, DELAYED RELEASE ORAL at 05:58

## 2020-01-21 RX ADMIN — LEVOTHYROXINE SODIUM 175 MCG: 175 TABLET ORAL at 05:58

## 2020-01-21 RX ADMIN — CALCIUM CARBONATE (ANTACID) CHEW TAB 500 MG 500 MG: 500 CHEW TAB at 08:18

## 2020-01-21 RX ADMIN — OLANZAPINE 5 MG: 5 TABLET, FILM COATED ORAL at 08:18

## 2020-01-21 RX ADMIN — SENNOSIDES AND DOCUSATE SODIUM 2 TABLET: 8.6; 5 TABLET ORAL at 08:18

## 2020-01-21 RX ADMIN — ASPIRIN 81 MG: 81 TABLET ORAL at 08:18

## 2020-01-21 RX ADMIN — DIVALPROEX SODIUM 250 MG: 250 TABLET, DELAYED RELEASE ORAL at 15:06

## 2020-01-21 RX ADMIN — DIVALPROEX SODIUM 250 MG: 250 TABLET, DELAYED RELEASE ORAL at 08:18

## 2020-01-21 RX ADMIN — DONEPEZIL HYDROCHLORIDE 5 MG: 5 TABLET ORAL at 21:07

## 2020-01-21 RX ADMIN — DIVALPROEX SODIUM 250 MG: 250 TABLET, DELAYED RELEASE ORAL at 21:07

## 2020-01-21 RX ADMIN — OLANZAPINE 7.5 MG: 5 TABLET, FILM COATED ORAL at 21:07

## 2020-01-21 ASSESSMENT — MIFFLIN-ST. JEOR: SCORE: 1422.69

## 2020-01-21 ASSESSMENT — ACTIVITIES OF DAILY LIVING (ADL)
ORAL_HYGIENE: INDEPENDENT
HYGIENE/GROOMING: INDEPENDENT
DRESS: INDEPENDENT

## 2020-01-21 NOTE — PROGRESS NOTES
Patient had an interview with the  and Director of Nursing for [facility]. Patient has been accepted for admission and is agreeable to go. Pre-admission screening was completed and submitted. Confirmation number: [id number]    Phone call with patient's daughter Joy to answer questions about funding for placement and how family can manage patient's finances. She requested that the treatment team request results for neuropsych testing completed in August 2019. Writer [facility] and faxed LISA with request for testing results.

## 2020-01-21 NOTE — PROGRESS NOTES
Pt stated she still isn't sleeping more than a few hours at a time and this is affected her energy level.  She became increasingly more energized when moving and dancing to music.  She appeared slightly more mentally focused than previous sessions, though still loose.  She expressed gratitude for an improved relationship with a peer on the unit crediting moving together in support group.      She reminisced about a previous  at [place] who was also a music therapist.  Her memories of that person appeared accurate as they align with the memories this therapist has of that retired staff person.         01/21/20 1130   Dance Movement Therapy   Type of Intervention structured groups   Response participates with encouragement   Hours 1

## 2020-01-21 NOTE — PROGRESS NOTES
Behavioral Health  Note    Behavioral Health  Spirituality Group Note    [address]    Name: Mariela Duffy YOB: 1955   MRN: [id number] Age: 64 year old      Patient attended -led group, which included discussion of spirituality, coping with illness and building resilience.    Patient attended group for <45 hrs.    patient demonstrated an appreciation of topic's application for their personal circumstances.    Helen Mccoy  Chaplain Resident  Pager [phone]

## 2020-01-21 NOTE — PROGRESS NOTES
Urinalysis not reflexing to urine culture and is not c/w UTI.  If patient is observed to have + gross hematuria, should follow up with PCP +/- urology outpatient    Medicine will sign off, please call with questions or concerns

## 2020-01-21 NOTE — PLAN OF CARE
Problem: OT General Care Plan  Goal: OT Goal 1  Description  Will attend OT groups,  process through organizing work and thoughts with concrete familiar step opportunities as needed. Assess cognition further in problem solving and organization.   Note:   Attended 1 of 2 OT groups. She worked independently, chose carefully where to sit in the room to be a distance away from a peer who she stated feeling uncomfortable being near which she explained this quietly and in private to author. Work was more organized once settled and moved away from that peer. She took time to plan and organize her design on her work. She was pleasant and appeared a bit more focused.

## 2020-01-21 NOTE — PLAN OF CARE
Pt is visible in the milieu. Pleasant,cooperative. Compliant to medications but miralax. Denies SI,SIB,HI nor wish to be dead.  Denies being anxious nor depressed.  Less labile? Affect full range. Attends and participates in groups. Resting in room between cares.

## 2020-01-21 NOTE — PLAN OF CARE
"Writer went to pt's room to talk as pt was agitated after phone call. Pt had thrown papers all over room stating \"my kids don't care about me and I am so frustrated\". Writer listened to patient and gave examples of coping skills. Pt was labile going from crying to happy. Pt educated to try and go to groups and interact in day room with peers. Pt agreed. Pt denies SI/HI/SIB. Will continue to encourage pt to be active in plan of care.   "

## 2020-01-21 NOTE — PROGRESS NOTES
Pt denies SI/SIB. Pt restless. Pt attended community meeting.  Pt does not believe she should be here - pt stated her daughter the [place] and it os her children that should be in the hospital no her. Pt stated she has 9 children and none of them have reach out to her in 7 days - that is hurtful. Pt stated if they don't want her she can go live with her sister in [place].      01/20/20 1954   Behavioral Health   Hallucinations denies / not responding to hallucinations   Thinking distractable;poor concentration   Orientation person: oriented;place: oriented   Memory baseline memory   Insight insight appropriate to situation   Eye Contact at examiner   Affect blunted, flat   Suicidality other (see comments)  (pt denies)   Self Injury   (pt denies)   Activity restless   Activities of Daily Living   Hygiene/Grooming independent   Oral Hygiene independent   Dress independent   Room Organization independent

## 2020-01-22 LAB — INTERPRETATION ECG - MUSE: NORMAL

## 2020-01-22 PROCEDURE — 12400002 ZZH R&B MH SENIOR/ADOLESCENT

## 2020-01-22 PROCEDURE — G0177 OPPS/PHP; TRAIN & EDUC SERV: HCPCS

## 2020-01-22 PROCEDURE — 25000132 ZZH RX MED GY IP 250 OP 250 PS 637: Performed by: NURSE PRACTITIONER

## 2020-01-22 PROCEDURE — 99232 SBSQ HOSP IP/OBS MODERATE 35: CPT | Performed by: NURSE PRACTITIONER

## 2020-01-22 PROCEDURE — H2032 ACTIVITY THERAPY, PER 15 MIN: HCPCS

## 2020-01-22 RX ADMIN — DIVALPROEX SODIUM 250 MG: 250 TABLET, DELAYED RELEASE ORAL at 14:08

## 2020-01-22 RX ADMIN — DONEPEZIL HYDROCHLORIDE 5 MG: 5 TABLET ORAL at 21:26

## 2020-01-22 RX ADMIN — PANTOPRAZOLE SODIUM 20 MG: 20 TABLET, DELAYED RELEASE ORAL at 06:36

## 2020-01-22 RX ADMIN — DIVALPROEX SODIUM 250 MG: 250 TABLET, DELAYED RELEASE ORAL at 21:27

## 2020-01-22 RX ADMIN — CALCIUM CARBONATE (ANTACID) CHEW TAB 500 MG 500 MG: 500 CHEW TAB at 08:03

## 2020-01-22 RX ADMIN — DIVALPROEX SODIUM 250 MG: 250 TABLET, DELAYED RELEASE ORAL at 08:03

## 2020-01-22 RX ADMIN — OLANZAPINE 5 MG: 5 TABLET, FILM COATED ORAL at 08:03

## 2020-01-22 RX ADMIN — LEVOTHYROXINE SODIUM 175 MCG: 175 TABLET ORAL at 06:36

## 2020-01-22 RX ADMIN — ASPIRIN 81 MG: 81 TABLET ORAL at 08:03

## 2020-01-22 RX ADMIN — OLANZAPINE 7.5 MG: 5 TABLET, FILM COATED ORAL at 21:27

## 2020-01-22 ASSESSMENT — ACTIVITIES OF DAILY LIVING (ADL)
DRESS: INDEPENDENT
HYGIENE/GROOMING: INDEPENDENT
ORAL_HYGIENE: INDEPENDENT

## 2020-01-22 NOTE — PROGRESS NOTES
01/21/20 [facility]   Therapeutic Recreation   Type of Intervention structured groups   Activity game   Response Participates, initiates socially appropriate   Hours 1     Pt actively participated in a structured Therapeutic Recreation group with a focus on leisure participation, stress reduction, and social engagement via a group game. Pt remained focused and engaged throughout full duration of group.  Showed progress in session goals. Pt mood was calm and appeared to brighten with social interaction. Pt picked 3 cards from the game that represented positive things about self and shared with the group.

## 2020-01-22 NOTE — PROGRESS NOTES
[facility], [place]   Psychiatric Progress Note        Interim History:   From H&P: Mariela Duffy is a 64 year old female with history of mild dementia, depression and anxiety.  The patient was brought to the emergency room for assessment of altered mental status.  The patient was seen earlier that day in the emergency room due to high anxiety.  She was given Atarax and discharge.  The patient fell asleep in the waiting room and upon awakening she was very anxious, disorganized, and not making sense.  The daughter and the patient reported that she has been increasingly confused in the last few months.  Her anxiety have been significantly increased.  Both did not feel that the patient is well enough to go home, and she has not been able to take care of herself.    The patient's care was discussed with the treatment team during the daily team meeting and/or staff's chart notes were reviewed.  Staff report patient has been calm, pleasant, cooperative. Patient is attending groups and participating appropriately. Needs cues to complete tasks. She is pressured and sings instead of talk. She spend a lot of time playing the keyboard. She was highly expressive during dance movement group. She was anxious on and off and making multiple phone calls. Endorses labile mood and paranoia. Upset about her children not calling or visiting her in the hospital.  Patient is eating well and taking medications as prescribed. Slept 4.5 hours.     Met with patient.  Increase Zyprexa. Calmer, more organized. Not nearly as labile. Had on episode of tearing up but did not last long. Feels she is better. Change Depakote to 250, tid.  [facility] her. Assessment completed.   MoCA 26, CPT 5.1.     Spoke with patient's daughter Joy Scales, ([phone]).  Reports that at baseline, the patient is talkative, people person, very active, disorganized, and hoarding things.  At baseline, the patient  often act as a child, asking permission for simple things or seeking approval.  She often loses things, is labile, and has difficulties functioning.  Reports that she started getting more forgetful and confused about 2 years ago.  At that time she started having more behavioral problems.  She would not take a shower, would not take her medications or taking care of her living environment.  Reports that the patient had a neuropsych testing with Ana Cristina and  but her mom was not sure what of the testing shows.         Medications:       alendronate  70 mg Oral Q7 Days     aspirin  81 mg Oral Daily     calcium carbonate  500 mg Oral Daily     divalproex sodium delayed-release  250 mg Oral TID     donepezil  5 mg Oral At Bedtime     levothyroxine  175 mcg Oral Daily     OLANZapine  5 mg Oral QAM     OLANZapine  7.5 mg Oral At Bedtime     pantoprazole  20 mg Oral QAM AC     polyethylene glycol  17 g Oral Daily     senna-docusate  1-2 tablet Oral BID          Allergies:     Allergies   Allergen Reactions     Levaquin Nausea and Vomiting          Labs:     Recent Results (from the past 672 hour(s))   UA with Microscopic    Collection Time: 01/13/20 11:08 PM   Result Value Ref Range    Color Urine Light Yellow     Appearance Urine Slightly Cloudy     Glucose Urine Negative NEG^Negative mg/dL    Bilirubin Urine Negative NEG^Negative    Ketones Urine Negative NEG^Negative mg/dL    Specific Gravity Urine 1.013 1.003 - 1.035    Blood Urine Negative NEG^Negative    pH Urine 5.5 5.0 - 7.0 pH    Protein Albumin Urine Negative NEG^Negative mg/dL    Urobilinogen mg/dL Normal 0.0 - 2.0 mg/dL    Nitrite Urine Negative NEG^Negative    Leukocyte Esterase Urine Large (A) NEG^Negative    Source Midstream Urine     WBC Urine 13 (H) 0 - 5 /HPF    RBC Urine 2 0 - 2 /HPF    Bacteria Urine Many (A) NEG^Negative /HPF    Squamous Epithelial /HPF Urine 4 (H) 0 - 1 /HPF    Mucous Urine Present (A) NEG^Negative /LPF   Drug abuse screen 77  urine (FL, RH, SH)    Collection Time: 01/13/20 11:08 PM   Result Value Ref Range    Amphetamine Qual Urine Negative NEG^Negative    Barbiturates Qual Urine Negative NEG^Negative    Benzodiazepine Qual Urine Negative NEG^Negative    Cannabinoids Qual Urine Negative NEG^Negative    Cocaine Qual Urine Negative NEG^Negative    Opiates Qualitative Urine Negative NEG^Negative    PCP Qual Urine Negative NEG^Negative   HCG qualitative urine (UPT)    Collection Time: 01/13/20 11:08 PM   Result Value Ref Range    HCG Qual Urine Negative NEG^Negative   Urine Culture    Collection Time: 01/13/20 11:54 PM   Result Value Ref Range    Specimen Description Midstream Urine     Culture Micro       10,000 to 50,000 colonies/mL  mixed urogenital zachary  Susceptibility testing not routinely done     Basic metabolic panel    Collection Time: 01/13/20 11:56 PM   Result Value Ref Range    Sodium 136 133 - 144 mmol/L    Potassium 4.2 3.4 - 5.3 mmol/L    Chloride 104 94 - 109 mmol/L    Carbon Dioxide 26 20 - 32 mmol/L    Anion Gap 6 3 - 14 mmol/L    Glucose 108 (H) 70 - 99 mg/dL    Urea Nitrogen 11 7 - 30 mg/dL    Creatinine 0.86 0.52 - 1.04 mg/dL    GFR Estimate 72 >60 mL/min/[1.73_m2]    GFR Estimate If Black 83 >60 mL/min/[1.73_m2]    Calcium 9.1 8.5 - 10.1 mg/dL   CBC with platelets differential    Collection Time: 01/13/20 11:56 PM   Result Value Ref Range    WBC 9.4 4.0 - 11.0 10e9/L    RBC Count 5.21 (H) 3.8 - 5.2 10e12/L    Hemoglobin 14.5 11.7 - 15.7 g/dL    Hematocrit 44.2 35.0 - 47.0 %    MCV 85 78 - 100 fl    MCH 27.8 26.5 - 33.0 pg    MCHC 32.8 31.5 - 36.5 g/dL    RDW 13.0 10.0 - 15.0 %    Platelet Count 351 150 - 450 10e9/L    Diff Method Automated Method     % Neutrophils 57.3 %    % Lymphocytes 29.0 %    % Monocytes 8.0 %    % Eosinophils 4.6 %    % Basophils 0.7 %    % Immature Granulocytes 0.4 %    Nucleated RBCs 0 0 /100    Absolute Neutrophil 5.4 1.6 - 8.3 10e9/L    Absolute Lymphocytes 2.7 0.8 - 5.3 10e9/L    Absolute  Monocytes 0.8 0.0 - 1.3 10e9/L    Absolute Eosinophils 0.4 0.0 - 0.7 10e9/L    Absolute Basophils 0.1 0.0 - 0.2 10e9/L    Abs Immature Granulocytes 0.0 0 - 0.4 10e9/L    Absolute Nucleated RBC 0.0    Vitamin D    Collection Time: 01/14/20  8:13 AM   Result Value Ref Range    Vitamin D Deficiency screening 42 20 - 75 ug/L   Folate    Collection Time: 01/14/20  8:13 AM   Result Value Ref Range    Folate 14.7 >5.4 ng/mL   Vitamin B12    Collection Time: 01/14/20  8:13 AM   Result Value Ref Range    Vitamin B12 262 193 - 986 pg/mL   TSH with free T4 reflex and/or T3 as indicated    Collection Time: 01/14/20  8:13 AM   Result Value Ref Range    TSH 2.48 0.40 - 4.00 mU/L   Ammonia    Collection Time: 01/15/20  7:33 AM   Result Value Ref Range    Ammonia 19 10 - 50 umol/L   Hepatic panel    Collection Time: 01/15/20  7:33 AM   Result Value Ref Range    Bilirubin Direct 0.1 0.0 - 0.2 mg/dL    Bilirubin Total 0.7 0.2 - 1.3 mg/dL    Albumin 4.4 3.4 - 5.0 g/dL    Protein Total 7.8 6.8 - 8.8 g/dL    Alkaline Phosphatase 116 40 - 150 U/L    ALT 23 0 - 50 U/L    AST 20 0 - 45 U/L   UA with Microscopic reflex to Culture    Collection Time: 01/19/20  5:37 PM   Result Value Ref Range    Color Urine Light Yellow     Appearance Urine Clear     Glucose Urine Negative NEG^Negative mg/dL    Bilirubin Urine Negative NEG^Negative    Ketones Urine Negative NEG^Negative mg/dL    Specific Gravity Urine 1.008 1.003 - 1.035    Blood Urine Negative NEG^Negative    pH Urine 6.5 5.0 - 7.0 pH    Protein Albumin Urine Negative NEG^Negative mg/dL    Urobilinogen mg/dL Normal 0.0 - 2.0 mg/dL    Nitrite Urine Negative NEG^Negative    Leukocyte Esterase Urine Negative NEG^Negative    Source Unspecified Urine     WBC Urine 1 0 - 5 /HPF    RBC Urine <1 0 - 2 /HPF    Squamous Epithelial /HPF Urine 2 (H) 0 - 1 /HPF    Mucous Urine Present (A) NEG^Negative /LPF   Valproic acid    Collection Time: 01/20/20  6:43 AM   Result Value Ref Range    Valproic Acid  Level 86 50 - 100 mg/L   Ammonia    Collection Time: 01/20/20  6:43 AM   Result Value Ref Range    Ammonia 26 10 - 50 umol/L   Hepatic panel    Collection Time: 01/20/20  6:43 AM   Result Value Ref Range    Bilirubin Direct 0.1 0.0 - 0.2 mg/dL    Bilirubin Total 0.6 0.2 - 1.3 mg/dL    Albumin 3.8 3.4 - 5.0 g/dL    Protein Total 7.0 6.8 - 8.8 g/dL    Alkaline Phosphatase 94 40 - 150 U/L    ALT 27 0 - 50 U/L    AST 24 0 - 45 U/L   CBC with platelets    Collection Time: 01/20/20  6:43 AM   Result Value Ref Range    WBC 5.4 4.0 - 11.0 10e9/L    RBC Count 4.68 3.8 - 5.2 10e12/L    Hemoglobin 13.4 11.7 - 15.7 g/dL    Hematocrit 41.1 35.0 - 47.0 %    MCV 88 78 - 100 fl    MCH 28.6 26.5 - 33.0 pg    MCHC 32.6 31.5 - 36.5 g/dL    RDW 13.4 10.0 - 15.0 %    Platelet Count 271 150 - 450 10e9/L   EKG 12-lead, complete    Collection Time: 01/21/20  9:52 AM   Result Value Ref Range    Interpretation ECG Click View Image link to view waveform and result             Psychiatric Examination:   Temp: 96.3  F (35.7  C) Temp src: Tympanic BP: 103/71 Pulse: 115   Resp: 16 SpO2: 97 % O2 Device: None (Room air)    Weight is 181 lbs 11.2 oz  Body mass index is 27.63 kg/m .    Appearance: well groomed, awake, alert, cooperative, mild distress and normal weight  Attitude:  cooperative  Eye Contact:  good  Mood:  anxious and better  Affect:  full range  Speech:  pressured speech  Psychomotor Behavior:  no evidence of tardive dyskinesia, dystonia, or tics  Throught Process:  disorganized, tangental and circumstantial  Associations:  no loose associations  Thought Content:  no evidence of suicidal ideation or homicidal ideation  Insight:  good  Judgement:  intact  Oriented to:  time, person, and place  Attention Span and Concentration:  intact  Recent and Remote Memory:  intact         Precautions:     Behavioral Orders   Procedures     Code 1 - Restrict to Unit     Code 2     For MRI only     Occupational Therapy on the Unit     Order Specific  Question:   Reason for Consult     Answer:   Eval of thought process, functional skills and behavior     Order Specific Question:   Course of Action:     Answer:   Eval & Treat as indicated     Routine Programming     As clinically indicated     Status 15     Every 15 minutes.          DIagnoses:   1.  Major neurocognitive disorder, with behavioral disturbances  2.  History of depression and anxiety  3. UTI         Plan:   The patient is a very pleasant,  female who was admitted with increased anxiety, and inability to function.  The patient was a poor historian.  His she appeared to be in a hypomanic state.  It is not clear if she has been taking her medications as prescribed as her home health care nurse quit weeks ago.  The patient was not able to discuss any of her medications.  The plan will be as follow:     --Discontinue Pamelor and Buspar.  --Taper off of Zoloft.  Will consider Effexor or Cymbalta.    --A trial of Gabapentin and Seroquel did not provide the desired outcome.   --Change Depakote to 250 mg, tid. Valproic acid level 86 on 1/20/20.     --Start Zyprexa. Increase to 5 mg qam and 7.5 mg, at bedtime.    --Start Aricept 5 mg at bedtime.    --Blood work was reviewed.  Valproic acid, ammonia level, CBC with diff, and hepatic panel will be ordered for Monday. All WNL.   --Internal medicine follow-up for medical problems. They are treating her for UTI.   --The patient was consulted on nature of illness and treatment options.   --Care was coordinated with the treatment team.  --MRI  To rule out dementia. WNL.   --CT scan was normal  --Cognitive test: MoCA 26, CPT 5.1     Disposition Plan   Reason for ongoing admission: is unable to care for self due to anxiety, tatianna, inability to function on her own  Disposition: TBD  Estimated length of stay: 7-10 days  Legal Status: voluntary  Discharge will be granted once symptoms improved.    Charles WEINER CNP  Date: 01/21/20  Time: 12:13  PM

## 2020-01-22 NOTE — PROGRESS NOTES
[facility], [place]   Psychiatric Progress Note        Interim History:   From H&P: Mariela Duffy is a 64 year old female with history of mild dementia, depression and anxiety.  The patient was brought to the emergency room for assessment of altered mental status.  The patient was seen earlier that day in the emergency room due to high anxiety.  She was given Atarax and discharge.  The patient fell asleep in the waiting room and upon awakening she was very anxious, disorganized, and not making sense.  The daughter and the patient reported that she has been increasingly confused in the last few months.  Her anxiety have been significantly increased.  Both did not feel that the patient is well enough to go home, and she has not been able to take care of herself.    Spoke with patient's daughter Joy Scales, ([phone]).  Reports that at baseline, the patient is talkative, people person, very active, disorganized, and hoarding things.  At baseline, the patient often act as a child, asking permission for simple things or seeking approval.  She often loses things, is labile, and has difficulties functioning.  Reports that she started getting more forgetful and confused about 2 years ago.  At that time she started having more behavioral problems.  She would not take a shower, would not take her medications or taking care of her living environment.  Reports that the patient had a neuropsych testing with Ana Cristina and  but her mom was not sure what of the testing shows.    The patient's care was discussed with the treatment team during the daily team meeting and/or staff's chart notes were reviewed.  Staff report patient has been calm, pleasant, cooperative. Patient is attending groups and participating appropriately. Needs cues to complete tasks. Mood is good. Appears to be more organized, not as labile.     Met with patient.  She is calm. Tearful once during the interview when  talking about her children. Wants to stay with her daughter Joy until [facility] can take her. Spoke with Joy , patient's daughter who states that they have not made such plans.          Medications:       alendronate  70 mg Oral Q7 Days     aspirin  81 mg Oral Daily     calcium carbonate  500 mg Oral Daily     divalproex sodium delayed-release  250 mg Oral TID     donepezil  5 mg Oral At Bedtime     levothyroxine  175 mcg Oral Daily     OLANZapine  5 mg Oral QAM     OLANZapine  7.5 mg Oral At Bedtime     pantoprazole  20 mg Oral QAM AC     polyethylene glycol  17 g Oral Daily     senna-docusate  1-2 tablet Oral BID          Allergies:     Allergies   Allergen Reactions     Levaquin Nausea and Vomiting          Labs:     Recent Results (from the past 672 hour(s))   UA with Microscopic    Collection Time: 01/13/20 11:08 PM   Result Value Ref Range    Color Urine Light Yellow     Appearance Urine Slightly Cloudy     Glucose Urine Negative NEG^Negative mg/dL    Bilirubin Urine Negative NEG^Negative    Ketones Urine Negative NEG^Negative mg/dL    Specific Gravity Urine 1.013 1.003 - 1.035    Blood Urine Negative NEG^Negative    pH Urine 5.5 5.0 - 7.0 pH    Protein Albumin Urine Negative NEG^Negative mg/dL    Urobilinogen mg/dL Normal 0.0 - 2.0 mg/dL    Nitrite Urine Negative NEG^Negative    Leukocyte Esterase Urine Large (A) NEG^Negative    Source Midstream Urine     WBC Urine 13 (H) 0 - 5 /HPF    RBC Urine 2 0 - 2 /HPF    Bacteria Urine Many (A) NEG^Negative /HPF    Squamous Epithelial /HPF Urine 4 (H) 0 - 1 /HPF    Mucous Urine Present (A) NEG^Negative /LPF   Drug abuse screen 77 urine (FL, RH, SH)    Collection Time: 01/13/20 11:08 PM   Result Value Ref Range    Amphetamine Qual Urine Negative NEG^Negative    Barbiturates Qual Urine Negative NEG^Negative    Benzodiazepine Qual Urine Negative NEG^Negative    Cannabinoids Qual Urine Negative NEG^Negative    Cocaine Qual Urine Negative NEG^Negative     Opiates Qualitative Urine Negative NEG^Negative    PCP Qual Urine Negative NEG^Negative   HCG qualitative urine (UPT)    Collection Time: 01/13/20 11:08 PM   Result Value Ref Range    HCG Qual Urine Negative NEG^Negative   Urine Culture    Collection Time: 01/13/20 11:54 PM   Result Value Ref Range    Specimen Description Midstream Urine     Culture Micro       10,000 to 50,000 colonies/mL  mixed urogenital zachary  Susceptibility testing not routinely done     Basic metabolic panel    Collection Time: 01/13/20 11:56 PM   Result Value Ref Range    Sodium 136 133 - 144 mmol/L    Potassium 4.2 3.4 - 5.3 mmol/L    Chloride 104 94 - 109 mmol/L    Carbon Dioxide 26 20 - 32 mmol/L    Anion Gap 6 3 - 14 mmol/L    Glucose 108 (H) 70 - 99 mg/dL    Urea Nitrogen 11 7 - 30 mg/dL    Creatinine 0.86 0.52 - 1.04 mg/dL    GFR Estimate 72 >60 mL/min/[1.73_m2]    GFR Estimate If Black 83 >60 mL/min/[1.73_m2]    Calcium 9.1 8.5 - 10.1 mg/dL   CBC with platelets differential    Collection Time: 01/13/20 11:56 PM   Result Value Ref Range    WBC 9.4 4.0 - 11.0 10e9/L    RBC Count 5.21 (H) 3.8 - 5.2 10e12/L    Hemoglobin 14.5 11.7 - 15.7 g/dL    Hematocrit 44.2 35.0 - 47.0 %    MCV 85 78 - 100 fl    MCH 27.8 26.5 - 33.0 pg    MCHC 32.8 31.5 - 36.5 g/dL    RDW 13.0 10.0 - 15.0 %    Platelet Count 351 150 - 450 10e9/L    Diff Method Automated Method     % Neutrophils 57.3 %    % Lymphocytes 29.0 %    % Monocytes 8.0 %    % Eosinophils 4.6 %    % Basophils 0.7 %    % Immature Granulocytes 0.4 %    Nucleated RBCs 0 0 /100    Absolute Neutrophil 5.4 1.6 - 8.3 10e9/L    Absolute Lymphocytes 2.7 0.8 - 5.3 10e9/L    Absolute Monocytes 0.8 0.0 - 1.3 10e9/L    Absolute Eosinophils 0.4 0.0 - 0.7 10e9/L    Absolute Basophils 0.1 0.0 - 0.2 10e9/L    Abs Immature Granulocytes 0.0 0 - 0.4 10e9/L    Absolute Nucleated RBC 0.0    Vitamin D    Collection Time: 01/14/20  8:13 AM   Result Value Ref Range    Vitamin D Deficiency screening 42 20 - 75 ug/L    Folate    Collection Time: 01/14/20  8:13 AM   Result Value Ref Range    Folate 14.7 >5.4 ng/mL   Vitamin B12    Collection Time: 01/14/20  8:13 AM   Result Value Ref Range    Vitamin B12 262 193 - 986 pg/mL   TSH with free T4 reflex and/or T3 as indicated    Collection Time: 01/14/20  8:13 AM   Result Value Ref Range    TSH 2.48 0.40 - 4.00 mU/L   Ammonia    Collection Time: 01/15/20  7:33 AM   Result Value Ref Range    Ammonia 19 10 - 50 umol/L   Hepatic panel    Collection Time: 01/15/20  7:33 AM   Result Value Ref Range    Bilirubin Direct 0.1 0.0 - 0.2 mg/dL    Bilirubin Total 0.7 0.2 - 1.3 mg/dL    Albumin 4.4 3.4 - 5.0 g/dL    Protein Total 7.8 6.8 - 8.8 g/dL    Alkaline Phosphatase 116 40 - 150 U/L    ALT 23 0 - 50 U/L    AST 20 0 - 45 U/L   UA with Microscopic reflex to Culture    Collection Time: 01/19/20  5:37 PM   Result Value Ref Range    Color Urine Light Yellow     Appearance Urine Clear     Glucose Urine Negative NEG^Negative mg/dL    Bilirubin Urine Negative NEG^Negative    Ketones Urine Negative NEG^Negative mg/dL    Specific Gravity Urine 1.008 1.003 - 1.035    Blood Urine Negative NEG^Negative    pH Urine 6.5 5.0 - 7.0 pH    Protein Albumin Urine Negative NEG^Negative mg/dL    Urobilinogen mg/dL Normal 0.0 - 2.0 mg/dL    Nitrite Urine Negative NEG^Negative    Leukocyte Esterase Urine Negative NEG^Negative    Source Unspecified Urine     WBC Urine 1 0 - 5 /HPF    RBC Urine <1 0 - 2 /HPF    Squamous Epithelial /HPF Urine 2 (H) 0 - 1 /HPF    Mucous Urine Present (A) NEG^Negative /LPF   Valproic acid    Collection Time: 01/20/20  6:43 AM   Result Value Ref Range    Valproic Acid Level 86 50 - 100 mg/L   Ammonia    Collection Time: 01/20/20  6:43 AM   Result Value Ref Range    Ammonia 26 10 - 50 umol/L   Hepatic panel    Collection Time: 01/20/20  6:43 AM   Result Value Ref Range    Bilirubin Direct 0.1 0.0 - 0.2 mg/dL    Bilirubin Total 0.6 0.2 - 1.3 mg/dL    Albumin 3.8 3.4 - 5.0 g/dL    Protein  Total 7.0 6.8 - 8.8 g/dL    Alkaline Phosphatase 94 40 - 150 U/L    ALT 27 0 - 50 U/L    AST 24 0 - 45 U/L   CBC with platelets    Collection Time: 01/20/20  6:43 AM   Result Value Ref Range    WBC 5.4 4.0 - 11.0 10e9/L    RBC Count 4.68 3.8 - 5.2 10e12/L    Hemoglobin 13.4 11.7 - 15.7 g/dL    Hematocrit 41.1 35.0 - 47.0 %    MCV 88 78 - 100 fl    MCH 28.6 26.5 - 33.0 pg    MCHC 32.6 31.5 - 36.5 g/dL    RDW 13.4 10.0 - 15.0 %    Platelet Count 271 150 - 450 10e9/L   EKG 12-lead, complete    Collection Time: 01/21/20  9:52 AM   Result Value Ref Range    Interpretation ECG Click View Image link to view waveform and result             Psychiatric Examination:   Temp: 96.3  F (35.7  C) Temp src: Tympanic BP: 103/71 Pulse: 115   Resp: 16 SpO2: 97 % O2 Device: None (Room air)    Weight is 181 lbs 11.2 oz  Body mass index is 27.63 kg/m .    Appearance: well groomed, awake, alert, cooperative, mild distress and normal weight  Attitude:  cooperative  Eye Contact:  good  Mood:  anxious and better  Affect:  full range  Speech:  pressured speech  Psychomotor Behavior:  no evidence of tardive dyskinesia, dystonia, or tics  Throught Process:  disorganized, tangental and circumstantial  Associations:  no loose associations  Thought Content:  no evidence of suicidal ideation or homicidal ideation  Insight:  good  Judgement:  intact  Oriented to:  time, person, and place  Attention Span and Concentration:  intact  Recent and Remote Memory:  intact         Precautions:     Behavioral Orders   Procedures     Code 1 - Restrict to Unit     Code 2     For MRI only     Occupational Therapy on the Unit     Order Specific Question:   Reason for Consult     Answer:   Eval of thought process, functional skills and behavior     Order Specific Question:   Course of Action:     Answer:   Eval & Treat as indicated     Routine Programming     As clinically indicated     Status 15     Every 15 minutes.          DIagnoses:   1.  Major neurocognitive  disorder, with behavioral disturbances  2.  History of depression and anxiety  3. UTI         Plan:   The patient is a very pleasant,  female who was admitted with increased anxiety, and inability to function.  The patient was a poor historian.  His she appeared to be in a hypomanic state.  It is not clear if she has been taking her medications as prescribed as her home health care nurse quit weeks ago.  The patient was not able to discuss any of her medications.  The plan will be as follow:     --Discontinue Pamelor and Buspar.  --Taper off of Zoloft.  Will consider Effexor or Cymbalta.    --A trial of Gabapentin and Seroquel did not provide the desired outcome.   --Change Depakote to 250 mg, tid. Valproic acid level 86 on 1/20/20.     --Start Zyprexa. Increase to 5 mg qam and 7.5 mg, at bedtime.    --Start Aricept 5 mg at bedtime.    --Blood work was reviewed.  Valproic acid, ammonia level, CBC with diff, and hepatic panel will be ordered for Monday. All WNL.   --Internal medicine follow-up for medical problems. They are treating her for UTI.   --The patient was consulted on nature of illness and treatment options.   --Care was coordinated with the treatment team.  --MRI  To rule out dementia. WNL.   --CT scan was normal  --Cognitive test: MoCA 26, CPT 5.1     Disposition Plan   Reason for ongoing admission: is unable to care for self due to anxiety, tatianna, inability to function on her own  Disposition: TBD  Estimated length of stay: 7-10 days  Legal Status: voluntary  Discharge will be granted once symptoms improved.    Charles WEINER, CNP  Date: 01/22/20  Time: 1:43 PM

## 2020-01-22 NOTE — PLAN OF CARE
"Pt  is active in the milieu. Social to few peers. Pleasant, cooperative with cares. Compliant meds. Denies MH symptoms. Mood calm,affect full range. Less rambling this shift. Denies SI,SIB ,HI nor wish to be dead. Attends and participates groups. \"Slept good.\"\" good appetite\". Please hold stool softener. Resting between cares.  "

## 2020-01-22 NOTE — PLAN OF CARE
Problem: OT General Care Plan  Goal: OT Goal 1  Description  Will attend OT groups,  process through organizing work and thoughts with concrete familiar step opportunities as needed. Assess cognition further in problem solving and organization.   Note:   Attended 2 of 2 T groups. She was social, initiated comments in context and supported a peer in a non intrusive manner. Work was organized and with pt taking more time to plan and followed through on ideas, appearing less distracted and more focused. She appeared calm and comfortable, attentive and invested in her work and conversation.

## 2020-01-22 NOTE — PROGRESS NOTES
Denies SI/wish to die. States mood and anxiety are much improved. Social. Looks forward to finding a new place to live. Bright affect.

## 2020-01-23 ENCOUNTER — PATIENT OUTREACH (OUTPATIENT)
Dept: CARE COORDINATION | Facility: CLINIC | Age: 65
End: 2020-01-23

## 2020-01-23 LAB
ALBUMIN UR-MCNC: NEGATIVE MG/DL
ANION GAP SERPL CALCULATED.3IONS-SCNC: 5 MMOL/L (ref 3–14)
APPEARANCE UR: CLEAR
BACTERIA #/AREA URNS HPF: ABNORMAL /HPF
BILIRUB UR QL STRIP: NEGATIVE
BUN SERPL-MCNC: 12 MG/DL (ref 7–30)
CALCIUM SERPL-MCNC: 9.1 MG/DL (ref 8.5–10.1)
CHLORIDE SERPL-SCNC: 106 MMOL/L (ref 94–109)
CO2 SERPL-SCNC: 29 MMOL/L (ref 20–32)
COLOR UR AUTO: ABNORMAL
CREAT SERPL-MCNC: 0.75 MG/DL (ref 0.52–1.04)
ERYTHROCYTE [DISTWIDTH] IN BLOOD BY AUTOMATED COUNT: 13.3 % (ref 10–15)
GFR SERPL CREATININE-BSD FRML MDRD: 84 ML/MIN/{1.73_M2}
GLUCOSE SERPL-MCNC: 108 MG/DL (ref 70–99)
GLUCOSE UR STRIP-MCNC: NEGATIVE MG/DL
HCT VFR BLD AUTO: 41.3 % (ref 35–47)
HGB BLD-MCNC: 13.5 G/DL (ref 11.7–15.7)
HGB UR QL STRIP: NEGATIVE
KETONES UR STRIP-MCNC: NEGATIVE MG/DL
LEUKOCYTE ESTERASE UR QL STRIP: ABNORMAL
MCH RBC QN AUTO: 28.8 PG (ref 26.5–33)
MCHC RBC AUTO-ENTMCNC: 32.7 G/DL (ref 31.5–36.5)
MCV RBC AUTO: 88 FL (ref 78–100)
NITRATE UR QL: NEGATIVE
PH UR STRIP: 5.5 PH (ref 5–7)
PLATELET # BLD AUTO: 273 10E9/L (ref 150–450)
POTASSIUM SERPL-SCNC: 3.8 MMOL/L (ref 3.4–5.3)
RBC # BLD AUTO: 4.69 10E12/L (ref 3.8–5.2)
RBC #/AREA URNS AUTO: 2 /HPF (ref 0–2)
SODIUM SERPL-SCNC: 140 MMOL/L (ref 133–144)
SOURCE: ABNORMAL
SP GR UR STRIP: 1.01 (ref 1–1.03)
SQUAMOUS #/AREA URNS AUTO: 2 /HPF (ref 0–1)
UROBILINOGEN UR STRIP-MCNC: NORMAL MG/DL (ref 0–2)
WBC # BLD AUTO: 5.5 10E9/L (ref 4–11)
WBC #/AREA URNS AUTO: <1 /HPF (ref 0–5)

## 2020-01-23 PROCEDURE — 85027 COMPLETE CBC AUTOMATED: CPT | Performed by: PHYSICIAN ASSISTANT

## 2020-01-23 PROCEDURE — 25000132 ZZH RX MED GY IP 250 OP 250 PS 637: Performed by: NURSE PRACTITIONER

## 2020-01-23 PROCEDURE — 99232 SBSQ HOSP IP/OBS MODERATE 35: CPT | Performed by: NURSE PRACTITIONER

## 2020-01-23 PROCEDURE — H2032 ACTIVITY THERAPY, PER 15 MIN: HCPCS

## 2020-01-23 PROCEDURE — 12400002 ZZH R&B MH SENIOR/ADOLESCENT

## 2020-01-23 PROCEDURE — 36415 COLL VENOUS BLD VENIPUNCTURE: CPT | Performed by: PHYSICIAN ASSISTANT

## 2020-01-23 PROCEDURE — 80048 BASIC METABOLIC PNL TOTAL CA: CPT | Performed by: PHYSICIAN ASSISTANT

## 2020-01-23 PROCEDURE — 81001 URINALYSIS AUTO W/SCOPE: CPT | Performed by: PHYSICIAN ASSISTANT

## 2020-01-23 PROCEDURE — G0177 OPPS/PHP; TRAIN & EDUC SERV: HCPCS

## 2020-01-23 PROCEDURE — 25000132 ZZH RX MED GY IP 250 OP 250 PS 637: Performed by: PHYSICIAN ASSISTANT

## 2020-01-23 RX ORDER — DOCUSATE SODIUM 100 MG/1
100 CAPSULE, LIQUID FILLED ORAL 2 TIMES DAILY
Status: DISCONTINUED | OUTPATIENT
Start: 2020-01-23 | End: 2020-01-24 | Stop reason: HOSPADM

## 2020-01-23 RX ORDER — POLYETHYLENE GLYCOL 3350 17 G/17G
17 POWDER, FOR SOLUTION ORAL DAILY PRN
Status: DISCONTINUED | OUTPATIENT
Start: 2020-01-23 | End: 2020-01-24 | Stop reason: HOSPADM

## 2020-01-23 RX ADMIN — DIVALPROEX SODIUM 250 MG: 250 TABLET, DELAYED RELEASE ORAL at 09:34

## 2020-01-23 RX ADMIN — DOCUSATE SODIUM 100 MG: 100 CAPSULE, LIQUID FILLED ORAL at 19:31

## 2020-01-23 RX ADMIN — DICLOFENAC SODIUM 4 G: 10 GEL TOPICAL at 19:31

## 2020-01-23 RX ADMIN — OLANZAPINE 7.5 MG: 5 TABLET, FILM COATED ORAL at 21:08

## 2020-01-23 RX ADMIN — OLANZAPINE 5 MG: 5 TABLET, FILM COATED ORAL at 09:34

## 2020-01-23 RX ADMIN — DIVALPROEX SODIUM 250 MG: 250 TABLET, DELAYED RELEASE ORAL at 19:31

## 2020-01-23 RX ADMIN — CALCIUM CARBONATE (ANTACID) CHEW TAB 500 MG 500 MG: 500 CHEW TAB at 09:34

## 2020-01-23 RX ADMIN — DICLOFENAC SODIUM 4 G: 10 GEL TOPICAL at 13:25

## 2020-01-23 RX ADMIN — LEVOTHYROXINE SODIUM 175 MCG: 175 TABLET ORAL at 05:20

## 2020-01-23 RX ADMIN — PANTOPRAZOLE SODIUM 20 MG: 20 TABLET, DELAYED RELEASE ORAL at 05:20

## 2020-01-23 RX ADMIN — DIVALPROEX SODIUM 250 MG: 250 TABLET, DELAYED RELEASE ORAL at 14:42

## 2020-01-23 RX ADMIN — ASPIRIN 81 MG: 81 TABLET ORAL at 09:34

## 2020-01-23 RX ADMIN — DONEPEZIL HYDROCHLORIDE 5 MG: 5 TABLET ORAL at 21:08

## 2020-01-23 ASSESSMENT — ACTIVITIES OF DAILY LIVING (ADL)
HYGIENE/GROOMING: INDEPENDENT
ORAL_HYGIENE: INDEPENDENT
DEPENDENT_IADLS:: INDEPENDENT;MEDICATION MANAGEMENT
LAUNDRY: UNABLE TO COMPLETE
DRESS: INDEPENDENT

## 2020-01-23 ASSESSMENT — MIFFLIN-ST. JEOR: SCORE: 1424.5

## 2020-01-23 NOTE — PROGRESS NOTES
01/22/20 2000   Therapeutic Recreation   Type of Intervention structured groups   Activity game   Response Participates, initiates socially appropriate   Hours 1     Pt participated in Therapeutic Recreation group with focus on leisure participation, social engagement, and critical thinking. Engaged and focused in the group recreational intervention via a group game.  Pt was a full participant throughout the entire duration of group. Showed progress in session goals. Pt mood was pleasant and calm. Stated that her day wasn't going so well. Pt seemed more organized and coherent when interacting, compared to prior groups. Appropriately shared sense of humor with peers during groups, and appeared to brighten slightly with social interaction.

## 2020-01-23 NOTE — PROGRESS NOTES
Pt was labile but it helped her to move around the space in a more controlled manner than previous sessions.  She was tearful about losing her home.  She shared that her children blame her fiance for that and it's stressful for her.      She was expressive and tender both verbally and nonverbally,     01/23/20 1015   Dance Movement Therapy   Type of Intervention structured groups   Response participates with encouragement   Hours 1

## 2020-01-23 NOTE — PROGRESS NOTES
Clinic Care Coordination Contact    Situation: Patient chart reviewed by care coordinator.    Background:   The patient was brought to the emergency room on 1/13/20  for assessment of altered mental status.  The patient was seen earlier that day in the emergency room due to high anxiety.  She was given Atarax and discharge.  The patient fell asleep in the waiting room and upon awakening she was very anxious, disorganized, and not making sense.  The daughter and the patient reported that she has been increasingly confused in the last few months.  Her anxiety have been significantly increased.  Both did not feel that the patient is well enough to go home, and she has not been able to take care of herself.    Assessment: Has been hospitalized  At [facility] since 1/14/20.    DIAGNOSIS:  1.  Major neurocognitive disorder, with behavioral disturbances  2.  History of depression and anxiety  3. UTI  PLAN AND RECOMMENDATIONS: The patient is a very pleasant,  female who was admitted with increased anxiety, and inability to function.  The patient was a poor historian.  His she appeared to be in a hypomanic state.  It is not clear if she has been taking her medications as prescribed as her home health care nurse quit weeks ago.  The patient was not able to discuss any of her medications.  The plan will be as follow: Discontinue Pamelor.  Taper off of Zoloft.  Will consider Effexor or Cymbalta.  Start gabapentin 100 mg 3 times a day for anxiety.  Continue BuSpar 30 mg every morning.  Start Depakote 500 mg at bedtime.  Start Aricept 5 mg at bedtime.  Blood work was reviewed.  Internal medicine follow-up for medical problems. They are treating her for UTI. Estimated length of stay 7 to 10 days.  Disposition, to be determined.  The patient was consulted on nature of illness and treatment options. Care was coordinated with the treatment team.        Plan/Recommendations:   Per chart review note from  1/21/20   Patient had an  interview with the  and Director of Nursing for [facility]. Patient has been accepted for admission and is agreeable to go. Pre-admission screening was completed and submitted. Confirmation number: [id number]     Phone call with patient's daughter Joy to answer questions about funding for placement and how family can manage patient's finances. She requested that the treatment team request results for neuropsych testing completed in August 2019. Writer had [facility] and faxed LISA with request for testing results.    [facility] CC will outreach if appropriate once Fely is discharged from hospital.    MICHALE Mendiola Care Coordination Team  [phone]

## 2020-01-23 NOTE — PLAN OF CARE
"  Problem: Memory Impairment Comorbidity  Goal: Memory Impairment Comorbidity  Description  Patient comorbidity will be monitored for signs and symptoms of Memory Impairment condition.  Problems will be absent, minimized or managed by discharge/transition of care.  Outcome: No Change     Problem: Adult Behavioral Health Plan of Care  Goal: Optimized Coping Skills in Response to Life Stressors  Outcome: Improving    Pt was upset and frustrated at the start of the shift.  She was anxious, refused PRN medication when offered. \"My brother is asking me why I'm still here and I'm wondering the same thing.  I could go stay with my daughter until I can go to the [facility] center.  I told him it was a 48 hr thing that is nationwide.  Is that right?\"   She was happier after she was allowed to vent and look up phone numbers. She did not remember me even though I was her nurse last week for several days.  She spent the evening on the phone talking  to her brother and children.  She remains disorganized, her room is chaotic with bedding all over and crumbs and broken chips on the floor and paperwork strewn about.     Pt calmer later in shift.  She did clean her room.  Concerned that the Depakote is responsible for the blood in her stool.  This writer observed scant bright red blood in toilet.  \"I haven't had problems with hemorrhoids in 10 years.  These aren't my regular medications.\"  Shared that she may have a job opportunity at  [employer] helping with the choir.  "

## 2020-01-23 NOTE — PLAN OF CARE
"  Problem: OT General Care Plan  Goal: OT Goal 1  Description  Will attend OT groups,  process through organizing work and thoughts with concrete familiar step opportunities as needed. Assess cognition further in problem solving and organization.   Note:   Attended 2 of 2 OT groups. She stated \"I'm down to 2 folders. I'm working on getting organized.\" She initiated work on a familiar step task, took time to plan and followed through on a structured and detailed design she formed. She was social and supported peers in a thoughtful manner, adding in conversation on topic and appearing engaged and interested. She offers direct eye contact with speakers. Affect appears animated and comfortable.  In the afternoon OT group, she participated in an activity focused on Gratitude and Resiliency. She took an active role offering answers in context and presented in an appearing calm tone of voice. She identified multiple answers of what she is grateful for.           "

## 2020-01-23 NOTE — PROGRESS NOTES
"Brief Medicine Note    Contacted by nursing regarding blood in the toilet ~1tbsp \"glob\" of blood, patient is unsure of source and denies pain or other complaints      Today's vital signs, medications, and nursing notes were reviewed.     BP (!) 155/95   Pulse 105   Temp 98.4  F (36.9  C) (Tympanic)   Resp 16   Ht 1.727 m (5' 8\")   Wt 82.6 kg (182 lb 1.6 oz)   LMP 01/20/2004   SpO2 96%   BMI 27.69 kg/m        A/P:  BRB  Suspect this is rectal and may be d/t internal hemorrhoids  -check CBC/BMP  -check UA  -follow vitals (pt has basline persistent tachycardia)    Addendum: reviewed labs:  H/h stable  BMP stable and Cr 0.7 (normal)  UA continues to be w/o RBC   -continue to monitor    Terri Castro PA-C  Hospitalist Service  Pager: [phone]    "

## 2020-01-23 NOTE — PROGRESS NOTES
[facility], [place]   Psychiatric Progress Note        Interim History:   From H&P: Mariela Duffy is a 64 year old female with history of mild dementia, depression and anxiety.  The patient was brought to the emergency room for assessment of altered mental status.  The patient was seen earlier that day in the emergency room due to high anxiety.  She was given Atarax and discharge.  The patient fell asleep in the waiting room and upon awakening she was very anxious, disorganized, and not making sense.  The daughter and the patient reported that she has been increasingly confused in the last few months.  Her anxiety have been significantly increased.  Both did not feel that the patient is well enough to go home, and she has not been able to take care of herself.    Spoke with patient's daughter Joy Scales, ([phone]).  Reports that at baseline, the patient is talkative, people person, very active, disorganized, and hoarding things.  At baseline, the patient often act as a child, asking permission for simple things or seeking approval.  She often loses things, is labile, and has difficulties functioning.  Reports that she started getting more forgetful and confused about 2 years ago.  At that time she started having more behavioral problems.  She would not take a shower, would not take her medications or taking care of her living environment.  Reports that the patient had a neuropsych testing with Ana Cristina and  but her mom was not sure what of the testing shows.    The patient's care was discussed with the treatment team during the daily team meeting and/or staff's chart notes were reviewed.  Staff report patient has been calm, pleasant, cooperative. The patient seems to get more agitated confused and labile in the evening. She makes bizarre statements. Her room was a mess, but she cleaned it up. Frustrated for still eing in the hospital. Wants to discharge. Patient is  "attending groups and participating the best she can. She appears more organized and calm in groups. Needs cues to complete tasks. Mood is good. Slept 4 hours.     Met with patient and SW Larissa Wendydawit. Continues to be confused, tangential and disorganized. She is upset because \"I feel like a bounsing ball between friends and family\". Unable to elaborate.  Does not have an idea why she is hospitalized. C/O of back pain. Will order Voltaren gel. Dicussed disposition. She is aware that we are waiting for the [facility] to approve the financial support. Tentative discharge tomorrow or early next week.          Medications:       alendronate  70 mg Oral Q7 Days     aspirin  81 mg Oral Daily     calcium carbonate  500 mg Oral Daily     divalproex sodium delayed-release  250 mg Oral TID     donepezil  5 mg Oral At Bedtime     levothyroxine  175 mcg Oral Daily     OLANZapine  5 mg Oral QAM     OLANZapine  7.5 mg Oral At Bedtime     pantoprazole  20 mg Oral QAM AC     polyethylene glycol  17 g Oral Daily     senna-docusate  1-2 tablet Oral BID          Allergies:     Allergies   Allergen Reactions     Levaquin Nausea and Vomiting          Labs:     Recent Results (from the past 672 hour(s))   UA with Microscopic    Collection Time: 01/13/20 11:08 PM   Result Value Ref Range    Color Urine Light Yellow     Appearance Urine Slightly Cloudy     Glucose Urine Negative NEG^Negative mg/dL    Bilirubin Urine Negative NEG^Negative    Ketones Urine Negative NEG^Negative mg/dL    Specific Gravity Urine 1.013 1.003 - 1.035    Blood Urine Negative NEG^Negative    pH Urine 5.5 5.0 - 7.0 pH    Protein Albumin Urine Negative NEG^Negative mg/dL    Urobilinogen mg/dL Normal 0.0 - 2.0 mg/dL    Nitrite Urine Negative NEG^Negative    Leukocyte Esterase Urine Large (A) NEG^Negative    Source Midstream Urine     WBC Urine 13 (H) 0 - 5 /HPF    RBC Urine 2 0 - 2 /HPF    Bacteria Urine Many (A) NEG^Negative /HPF    Squamous Epithelial /HPF Urine 4 (H) 0 - " 1 /HPF    Mucous Urine Present (A) NEG^Negative /LPF   Drug abuse screen 77 urine (FL, RH, SH)    Collection Time: 01/13/20 11:08 PM   Result Value Ref Range    Amphetamine Qual Urine Negative NEG^Negative    Barbiturates Qual Urine Negative NEG^Negative    Benzodiazepine Qual Urine Negative NEG^Negative    Cannabinoids Qual Urine Negative NEG^Negative    Cocaine Qual Urine Negative NEG^Negative    Opiates Qualitative Urine Negative NEG^Negative    PCP Qual Urine Negative NEG^Negative   HCG qualitative urine (UPT)    Collection Time: 01/13/20 11:08 PM   Result Value Ref Range    HCG Qual Urine Negative NEG^Negative   Urine Culture    Collection Time: 01/13/20 11:54 PM   Result Value Ref Range    Specimen Description Midstream Urine     Culture Micro       10,000 to 50,000 colonies/mL  mixed urogenital zachary  Susceptibility testing not routinely done     Basic metabolic panel    Collection Time: 01/13/20 11:56 PM   Result Value Ref Range    Sodium 136 133 - 144 mmol/L    Potassium 4.2 3.4 - 5.3 mmol/L    Chloride 104 94 - 109 mmol/L    Carbon Dioxide 26 20 - 32 mmol/L    Anion Gap 6 3 - 14 mmol/L    Glucose 108 (H) 70 - 99 mg/dL    Urea Nitrogen 11 7 - 30 mg/dL    Creatinine 0.86 0.52 - 1.04 mg/dL    GFR Estimate 72 >60 mL/min/[1.73_m2]    GFR Estimate If Black 83 >60 mL/min/[1.73_m2]    Calcium 9.1 8.5 - 10.1 mg/dL   CBC with platelets differential    Collection Time: 01/13/20 11:56 PM   Result Value Ref Range    WBC 9.4 4.0 - 11.0 10e9/L    RBC Count 5.21 (H) 3.8 - 5.2 10e12/L    Hemoglobin 14.5 11.7 - 15.7 g/dL    Hematocrit 44.2 35.0 - 47.0 %    MCV 85 78 - 100 fl    MCH 27.8 26.5 - 33.0 pg    MCHC 32.8 31.5 - 36.5 g/dL    RDW 13.0 10.0 - 15.0 %    Platelet Count 351 150 - 450 10e9/L    Diff Method Automated Method     % Neutrophils 57.3 %    % Lymphocytes 29.0 %    % Monocytes 8.0 %    % Eosinophils 4.6 %    % Basophils 0.7 %    % Immature Granulocytes 0.4 %    Nucleated RBCs 0 0 /100    Absolute Neutrophil 5.4  1.6 - 8.3 10e9/L    Absolute Lymphocytes 2.7 0.8 - 5.3 10e9/L    Absolute Monocytes 0.8 0.0 - 1.3 10e9/L    Absolute Eosinophils 0.4 0.0 - 0.7 10e9/L    Absolute Basophils 0.1 0.0 - 0.2 10e9/L    Abs Immature Granulocytes 0.0 0 - 0.4 10e9/L    Absolute Nucleated RBC 0.0    Vitamin D    Collection Time: 01/14/20  8:13 AM   Result Value Ref Range    Vitamin D Deficiency screening 42 20 - 75 ug/L   Folate    Collection Time: 01/14/20  8:13 AM   Result Value Ref Range    Folate 14.7 >5.4 ng/mL   Vitamin B12    Collection Time: 01/14/20  8:13 AM   Result Value Ref Range    Vitamin B12 262 193 - 986 pg/mL   TSH with free T4 reflex and/or T3 as indicated    Collection Time: 01/14/20  8:13 AM   Result Value Ref Range    TSH 2.48 0.40 - 4.00 mU/L   Ammonia    Collection Time: 01/15/20  7:33 AM   Result Value Ref Range    Ammonia 19 10 - 50 umol/L   Hepatic panel    Collection Time: 01/15/20  7:33 AM   Result Value Ref Range    Bilirubin Direct 0.1 0.0 - 0.2 mg/dL    Bilirubin Total 0.7 0.2 - 1.3 mg/dL    Albumin 4.4 3.4 - 5.0 g/dL    Protein Total 7.8 6.8 - 8.8 g/dL    Alkaline Phosphatase 116 40 - 150 U/L    ALT 23 0 - 50 U/L    AST 20 0 - 45 U/L   UA with Microscopic reflex to Culture    Collection Time: 01/19/20  5:37 PM   Result Value Ref Range    Color Urine Light Yellow     Appearance Urine Clear     Glucose Urine Negative NEG^Negative mg/dL    Bilirubin Urine Negative NEG^Negative    Ketones Urine Negative NEG^Negative mg/dL    Specific Gravity Urine 1.008 1.003 - 1.035    Blood Urine Negative NEG^Negative    pH Urine 6.5 5.0 - 7.0 pH    Protein Albumin Urine Negative NEG^Negative mg/dL    Urobilinogen mg/dL Normal 0.0 - 2.0 mg/dL    Nitrite Urine Negative NEG^Negative    Leukocyte Esterase Urine Negative NEG^Negative    Source Unspecified Urine     WBC Urine 1 0 - 5 /HPF    RBC Urine <1 0 - 2 /HPF    Squamous Epithelial /HPF Urine 2 (H) 0 - 1 /HPF    Mucous Urine Present (A) NEG^Negative /LPF   Valproic acid     Collection Time: 01/20/20  6:43 AM   Result Value Ref Range    Valproic Acid Level 86 50 - 100 mg/L   Ammonia    Collection Time: 01/20/20  6:43 AM   Result Value Ref Range    Ammonia 26 10 - 50 umol/L   Hepatic panel    Collection Time: 01/20/20  6:43 AM   Result Value Ref Range    Bilirubin Direct 0.1 0.0 - 0.2 mg/dL    Bilirubin Total 0.6 0.2 - 1.3 mg/dL    Albumin 3.8 3.4 - 5.0 g/dL    Protein Total 7.0 6.8 - 8.8 g/dL    Alkaline Phosphatase 94 40 - 150 U/L    ALT 27 0 - 50 U/L    AST 24 0 - 45 U/L   CBC with platelets    Collection Time: 01/20/20  6:43 AM   Result Value Ref Range    WBC 5.4 4.0 - 11.0 10e9/L    RBC Count 4.68 3.8 - 5.2 10e12/L    Hemoglobin 13.4 11.7 - 15.7 g/dL    Hematocrit 41.1 35.0 - 47.0 %    MCV 88 78 - 100 fl    MCH 28.6 26.5 - 33.0 pg    MCHC 32.6 31.5 - 36.5 g/dL    RDW 13.4 10.0 - 15.0 %    Platelet Count 271 150 - 450 10e9/L   EKG 12-lead, complete    Collection Time: 01/21/20  9:52 AM   Result Value Ref Range    Interpretation ECG Click View Image link to view waveform and result             Psychiatric Examination:   Temp: 98.4  F (36.9  C) Temp src: Tympanic BP: (!) 155/95 Pulse: 105   Resp: 16 SpO2: 96 % O2 Device: None (Room air)    Weight is 182 lbs 1.6 oz  Body mass index is 27.69 kg/m .    Appearance: well groomed, awake, alert, cooperative, mild distress and normal weight  Attitude:  cooperative  Eye Contact:  good  Mood:  anxious and better  Affect:  full range  Speech:  pressured speech  Psychomotor Behavior:  no evidence of tardive dyskinesia, dystonia, or tics  Throught Process:  disorganized, tangental and circumstantial  Associations:  no loose associations  Thought Content:  no evidence of suicidal ideation or homicidal ideation  Insight:  good  Judgement:  intact  Oriented to:  time, person, and place  Attention Span and Concentration:  intact  Recent and Remote Memory:  intact         Precautions:     Behavioral Orders   Procedures     Code 1 - Restrict to Unit      Code 2     For MRI only     Occupational Therapy on the Unit     Order Specific Question:   Reason for Consult     Answer:   Eval of thought process, functional skills and behavior     Order Specific Question:   Course of Action:     Answer:   Eval & Treat as indicated     Routine Programming     As clinically indicated     Status 15     Every 15 minutes.          DIagnoses:   1.  Major neurocognitive disorder, with behavioral disturbances  2.  History of depression and anxiety  3. UTI         Plan:   The patient is a very pleasant,  female who was admitted with increased anxiety, and inability to function.  The patient was a poor historian.  His she appeared to be in a hypomanic state.  It is not clear if she has been taking her medications as prescribed as her home health care nurse quit weeks ago.  The patient was not able to discuss any of her medications.  The plan will be as follow:     --Discontinue Pamelor and Buspar.  --Taper off of Zoloft.  Will consider Effexor or Cymbalta.    --A trial of Gabapentin and Seroquel did not provide the desired outcome.   --Change Depakote to 250 mg, tid. Valproic acid level 86 on 1/20/20.     --Start Zyprexa. Increase to 5 mg qam and 7.5 mg, at bedtime.    --Start Aricept 5 mg at bedtime.    --Blood work was reviewed.  Valproic acid, ammonia level, CBC with diff, and hepatic panel will be ordered for Monday. All WNL.   --Internal medicine follow-up for medical problems. They are treating her for UTI.   --The patient was consulted on nature of illness and treatment options.   --Care was coordinated with the treatment team.  --MRI  To rule out dementia. WNL.   --CT scan was normal  --Cognitive test: MoCA 26, CPT 5.1     Disposition Plan   Reason for ongoing admission: is unable to care for self due to anxiety, tatianna, inability to function on her own  Disposition: TBD  Estimated length of stay: 7-10 days  Legal Status: voluntary  Discharge will be granted once symptoms  improved.      Charles WEINER, CNP  Date: 01/23/20  Time: 10:52 AM

## 2020-01-23 NOTE — PROGRESS NOTES
Phone all with Aleta [facility] Pre-admission screening ([phone]), to inquire about the status of Obra level 2 screening. Aleta reported that it is in the system, but it has not been assigned yet. Someone will start working on it when it is assigned.

## 2020-01-23 NOTE — PLAN OF CARE
Problem: Anxiety  Goal: Anxiety Reduction or Resolution  Description  1.Verbalizes own anxiety and coping patterns.  2.Pt will effectively use 3 coping mechanisms to deal with anxiety.  3. Pt will be able to approach staff when feeling anxious  4.Pt will be able to maintain  a desired level of  role functioning and problem solving.  5. Pt will be compliant with medications and tx plans.  6. Pt will actively participate in unit activities and programming.  7. Pt will sleep at least 7 hours/ night.  8. Pt will demonstrate improved concentration and accuracy of thoughts.  9. After care in place.     1/23/2020 1402 by Michelle Carmona, RN  Outcome: Improving  Note:   States had bright red blood per rectum on evenings, but none today so far. They believe it is due to hemorrhoids, and this will be monitored today. Told pt to not flush toilet after BM today and call nurse to observe stool.     Observed output x 2. First was urine only with 1 tbsp bright red blood. Second was urine + small stool {soft, formed] with 1 tbsp bright red blood. Internal Med notified; ordered CBC & UA/UC [both done @ 1300]. Results pending.    She slept 5 hours last night and using coping skills of reading, sudoku, playing keyboard, writing music.   Pt ate very little lunch, saying she had some nausea. She also felt somewhat dizzy. She was encouraged to lie down.     Still disorganized, flight of ideas,  tangential, intrusive with new pt [whispering to her]. Redirected pt & reminded her about appropriate boundaries with peers. She is more focused than on admission, but still disorganized and labile.    Still very fixated on being discharged, wanting to go somewhere [rather than here] while waiting for bed at [facility].    Pt has been calling various family members with various ideas about where she can go to get out of the hospital. She mentioned a friend who has motel in [place], [place]. Daughter, Joy, called today asking that pt be  prevented from calling her after 9:00 pm. Order placed. Discussed with patient.

## 2020-01-24 VITALS
DIASTOLIC BLOOD PRESSURE: 86 MMHG | SYSTOLIC BLOOD PRESSURE: 128 MMHG | RESPIRATION RATE: 16 BRPM | WEIGHT: 182.1 LBS | HEIGHT: 68 IN | HEART RATE: 105 BPM | OXYGEN SATURATION: 96 % | TEMPERATURE: 98.4 F | BODY MASS INDEX: 27.6 KG/M2

## 2020-01-24 PROCEDURE — 99207 ZZC CDG-CODE CATEGORY CHANGED: CPT | Performed by: NURSE PRACTITIONER

## 2020-01-24 PROCEDURE — 25000132 ZZH RX MED GY IP 250 OP 250 PS 637: Performed by: NURSE PRACTITIONER

## 2020-01-24 PROCEDURE — 99238 HOSP IP/OBS DSCHRG MGMT 30/<: CPT | Performed by: NURSE PRACTITIONER

## 2020-01-24 PROCEDURE — 25000132 ZZH RX MED GY IP 250 OP 250 PS 637: Performed by: PHYSICIAN ASSISTANT

## 2020-01-24 PROCEDURE — G0177 OPPS/PHP; TRAIN & EDUC SERV: HCPCS

## 2020-01-24 RX ORDER — TETRAHYDROZOLINE HCL 0.05 %
1 DROPS OPHTHALMIC (EYE) 3 TIMES DAILY PRN
Start: 2020-01-24 | End: 2021-07-06

## 2020-01-24 RX ORDER — PANTOPRAZOLE SODIUM 20 MG/1
20 TABLET, DELAYED RELEASE ORAL
Start: 2020-01-25 | End: 2023-04-17

## 2020-01-24 RX ORDER — POLYETHYLENE GLYCOL 3350 17 G/17G
17 POWDER, FOR SOLUTION ORAL DAILY PRN
Start: 2020-01-24 | End: 2021-07-06

## 2020-01-24 RX ORDER — ALENDRONATE SODIUM 70 MG/1
70 TABLET ORAL
Start: 2020-01-26 | End: 2022-10-11

## 2020-01-24 RX ORDER — DONEPEZIL HYDROCHLORIDE 5 MG/1
5 TABLET, FILM COATED ORAL AT BEDTIME
Start: 2020-01-24 | End: 2021-07-23

## 2020-01-24 RX ORDER — SUMATRIPTAN 100 MG/1
100 TABLET, FILM COATED ORAL
Start: 2020-01-24 | End: 2021-07-06

## 2020-01-24 RX ORDER — OLANZAPINE 5 MG/1
5 TABLET ORAL EVERY MORNING
Start: 2020-01-25 | End: 2021-07-06 | Stop reason: DRUGHIGH

## 2020-01-24 RX ORDER — TRAZODONE HYDROCHLORIDE 50 MG/1
50 TABLET, FILM COATED ORAL
Start: 2020-01-24 | End: 2021-07-06

## 2020-01-24 RX ORDER — TRIAMCINOLONE ACETONIDE 1 MG/G
OINTMENT TOPICAL 2 TIMES DAILY PRN
Start: 2020-01-24 | End: 2021-07-06

## 2020-01-24 RX ORDER — OLANZAPINE 5 MG/1
5-10 TABLET ORAL 2 TIMES DAILY PRN
Start: 2020-01-24 | End: 2021-07-06 | Stop reason: DRUGHIGH

## 2020-01-24 RX ORDER — DOCUSATE SODIUM 100 MG/1
100 CAPSULE, LIQUID FILLED ORAL 2 TIMES DAILY
Start: 2020-01-24 | End: 2021-10-19

## 2020-01-24 RX ORDER — OLANZAPINE 7.5 MG/1
7.5 TABLET, FILM COATED ORAL AT BEDTIME
Start: 2020-01-24 | End: 2021-07-23

## 2020-01-24 RX ORDER — DIVALPROEX SODIUM 250 MG/1
250 TABLET, DELAYED RELEASE ORAL 3 TIMES DAILY
Start: 2020-01-24 | End: 2021-07-06

## 2020-01-24 RX ADMIN — OLANZAPINE 5 MG: 5 TABLET, FILM COATED ORAL at 08:08

## 2020-01-24 RX ADMIN — LEVOTHYROXINE SODIUM 175 MCG: 175 TABLET ORAL at 05:24

## 2020-01-24 RX ADMIN — DIVALPROEX SODIUM 250 MG: 250 TABLET, DELAYED RELEASE ORAL at 08:09

## 2020-01-24 RX ADMIN — CALCIUM CARBONATE (ANTACID) CHEW TAB 500 MG 500 MG: 500 CHEW TAB at 09:37

## 2020-01-24 RX ADMIN — DIVALPROEX SODIUM 250 MG: 250 TABLET, DELAYED RELEASE ORAL at 14:00

## 2020-01-24 RX ADMIN — DICLOFENAC SODIUM 4 G: 10 GEL TOPICAL at 08:11

## 2020-01-24 RX ADMIN — DOCUSATE SODIUM 100 MG: 100 CAPSULE, LIQUID FILLED ORAL at 08:08

## 2020-01-24 RX ADMIN — PANTOPRAZOLE SODIUM 20 MG: 20 TABLET, DELAYED RELEASE ORAL at 05:24

## 2020-01-24 RX ADMIN — ACETAMINOPHEN 650 MG: 325 TABLET, FILM COATED ORAL at 09:37

## 2020-01-24 RX ADMIN — ASPIRIN 81 MG: 81 TABLET ORAL at 08:09

## 2020-01-24 ASSESSMENT — ACTIVITIES OF DAILY LIVING (ADL)
ORAL_HYGIENE: INDEPENDENT
DRESS: SCRUBS (BEHAVIORAL HEALTH)
HYGIENE/GROOMING: INDEPENDENT

## 2020-01-24 NOTE — PROGRESS NOTES
DISCHARGE NOTE:  Pt discharged to [facility] via cab.  She is upbeat and happy to be going.  Discharge paperwork was reviewed and questions answered.  All belongings and including secure envelop where sent with her.  Discharge paperwork was included with belongings.

## 2020-01-24 NOTE — PROGRESS NOTES
Labs and vitals have remained stable (persistently tachy 100-110s).  Unclear whether bleeding is vaginal or rectal and is not c/w hematuria base on UA x 2.     Placed orders for follow up with PCP for consideration of gynecologic exam and discussion of colonoscopy.     Medicine will sign off, please call with new concerns.

## 2020-01-24 NOTE — PROGRESS NOTES
Phone call from Aleta with [facility] who reported that she received the Obra level 2 PAS today and requested the H&P and progress notes be faxed to [phone] which was done.    Update: Voice message from Aleta who reported that she completed the Obra level 2 screening and faxed it to [facility].

## 2020-01-24 NOTE — DISCHARGE INSTRUCTIONS
Behavioral Discharge Planning and Instructions      Summary:  You were admitted on 1/14/2020  due to Anxiety and Altered mental status.  You were treated by REGINE Ocampo DNP and discharged on 1/24/2020 from [facility] to [facility]  [address], [place] [address]  Phone:  [phone]    Principal Diagnosis:   1.  Major neurocognitive disorder, with behavioral disturbances  2.  History of depression and anxiety  3.  UTI    Health Care Follow-up Appointments:   Medication Management appointment:  Patient to schedule  Provider: Je Vail MD:   JONES Méndez  [address]  [place] [address]  Phone: [phone]    Formerly [facility] worker: [employer]    Attend all scheduled appointments with your outpatient providers. Call at least 24 hours in advance if you need to reschedule an appointment to ensure continued access to your outpatient providers.   Major Treatments, Procedures and Findings:  You were provided with: a psychiatric assessment, assessed for medical stability, medication evaluation and/or management, group therapy, milieu management and medical interventions    Symptoms to Report: feeling more aggressive, increased confusion, losing more sleep, mood getting worse or thoughts of suicide    Early warning signs can include: increased depression or anxiety sleep disturbances increased thoughts or behaviors of suicide or self-harm  increased unusual thinking, such as paranoia or hearing voices    Safety and Wellness:  Take all medicines as directed.  Make no changes unless your doctor suggests them.  Follow treatment recommendations.  Refrain from alcohol and non-prescribed drugs.  If there is a concern for safety, call 911.    Resources:   Crisis Intervention: 395.762.2230 or [phone] (TTY: 106.532.2211).  Call anytime for help.  [place] [employer] [place] (www.[place].ari.org): 351.616.7491 or 619-984-8465.  Mental Health  Consumer/Surviv[employer] Network of [place] (www.mhcsn.net): 699-536-4979 or 632-505-1175  [facility] Crisis Response [phone]  [facility] Crisis (COPE) Response - Adult [phone]      The treatment team has appreciated the opportunity to work with you.     Fely,  please take care and make your recovery a daily recovery.   If you have any questions or concerns our unit number is [phone]

## 2020-01-24 NOTE — PROGRESS NOTES
ANDREA with [facility] to report the level 2 screening is complete. Requested return call to find out when patient can admit to facility.    Faxed signed orders to [facility].    Phone call with Harleen PEREZ from [facility]. Harleen received the completed PAS and prefers that the patient admits on Monday morning. They do not need medications. She will need new signed orders that do not include psychotropics (Zyprexa and Hydroxyzine) for PRN. Provider and patient were notified.    Patient asked writer to talk on the phone with her brother Patrice and sister-in-law Saskia who have offered the patient to come live with them. They want to provide a loving atmosphere and take care of patient for a few months. After the phone call, patient asked to leave and informed staff she is going to [place]. She had no plans about how to get there and was talking about flying there and her kids picking her up, but nothing confirmed. Writer called Patrice to tell him and he was surprised because they left it as an offer for her to think about and let them know. They have not made arrangement for her transport there. They want a safe plan involving help from family. RN and writer talked to patient and explained that if she can get family to pick her up and take her home until plans are made for travel to [place], she can discharge.    Phone with Yoselin from [facility] to answer questions and update on patient's plans. She reported that patient can come today. Patient was notified and agreed to go. Plan is to send patient by cab and call [facility] to inform of timing. Phone ia [phone], option 1 or 2 and ask for Harleen.

## 2020-01-24 NOTE — PLAN OF CARE
"Pt appears on unit with peers interacting appropriately. Pt states \"I am feeling better today\". Pt rates anxiety/depression a 3/10. Pt denies SI. \"I wanted to talk to my kids more but they don't answer phone\". Pt watched movie this evening with peers. Pt starts on a task and has a hard time finishing the tasks she starts. Pt took shower with encouragement.   "

## 2020-01-24 NOTE — PLAN OF CARE
Problem: OT General Care Plan  Goal: OT Goal 1  Description  Will attend OT groups,  process through organizing work and thoughts with concrete familiar step opportunities as needed. Assess cognition further in problem solving and organization.   Note:   Attended 1 of 2 OT groups. She accomplished little on task though took kelli to plan and organize her ideas. She was pleasant on approach and socialized with others. She stated having a HA and left to lie down.

## 2020-01-24 NOTE — PROGRESS NOTES
[facility] [place]   Psychiatric Progress Note        Interim History:   From H&P: Mariela Duffy is a 64 year old female with history of mild dementia, depression and anxiety.  The patient was brought to the emergency room for assessment of altered mental status.  The patient was seen earlier that day in the emergency room due to high anxiety.  She was given Atarax and discharge.  The patient fell asleep in the waiting room and upon awakening she was very anxious, disorganized, and not making sense.  The daughter and the patient reported that she has been increasingly confused in the last few months.  Her anxiety have been significantly increased.  Both did not feel that the patient is well enough to go home, and she has not been able to take care of herself.    Spoke with patient's daughter Joy Scales, ([phone]).  Reports that at baseline, the patient is talkative, people person, very active, disorganized, and hoarding things.  At baseline, the patient often act as a child, asking permission for simple things or seeking approval.  She often loses things, is labile, and has difficulties functioning.  Reports that she started getting more forgetful and confused about 2 years ago.  At that time she started having more behavioral problems.  She would not take a shower, would not take her medications or taking care of her living environment.  Reports that the patient had a neuropsych testing with Ana Cristina and  but her mom was not sure what of the testing shows.    The patient's care was discussed with the treatment team during the daily team meeting and/or staff's chart notes were reviewed.  Staff report patient has been calm, pleasant, cooperative. The patient seems to get more agitated confused and labile in the evening. She makes bizarre statements. Her room was a mess, but she cleaned it up. Frustrated for still being in the hospital. Wants to discharge. Patient is  attending groups and participating the best she can. She appears more organized and calm in groups. Needs cues to complete tasks. Mood is good. Slept 5+1.5 hours.     Met with patient and SW Larissa Micheal. Very pleasant and cooperative. States she feels calmer today. Still disorganized and tangential but better. Still asking why can't she stay with her children while waiting to go to the [facility]. Accepting the explanation. Hopes to be discharged today but is OK staying until next week.          Medications:       alendronate  70 mg Oral Q7 Days     aspirin  81 mg Oral Daily     calcium carbonate  500 mg Oral Daily     diclofenac  4 g Transdermal 4x Daily     divalproex sodium delayed-release  250 mg Oral TID     docusate sodium  100 mg Oral BID     donepezil  5 mg Oral At Bedtime     levothyroxine  175 mcg Oral Daily     OLANZapine  5 mg Oral QAM     OLANZapine  7.5 mg Oral At Bedtime     pantoprazole  20 mg Oral QAM AC          Allergies:     Allergies   Allergen Reactions     Levaquin Nausea and Vomiting          Labs:     Recent Results (from the past 672 hour(s))   UA with Microscopic    Collection Time: 01/13/20 11:08 PM   Result Value Ref Range    Color Urine Light Yellow     Appearance Urine Slightly Cloudy     Glucose Urine Negative NEG^Negative mg/dL    Bilirubin Urine Negative NEG^Negative    Ketones Urine Negative NEG^Negative mg/dL    Specific Gravity Urine 1.013 1.003 - 1.035    Blood Urine Negative NEG^Negative    pH Urine 5.5 5.0 - 7.0 pH    Protein Albumin Urine Negative NEG^Negative mg/dL    Urobilinogen mg/dL Normal 0.0 - 2.0 mg/dL    Nitrite Urine Negative NEG^Negative    Leukocyte Esterase Urine Large (A) NEG^Negative    Source Midstream Urine     WBC Urine 13 (H) 0 - 5 /HPF    RBC Urine 2 0 - 2 /HPF    Bacteria Urine Many (A) NEG^Negative /HPF    Squamous Epithelial /HPF Urine 4 (H) 0 - 1 /HPF    Mucous Urine Present (A) NEG^Negative /LPF   Drug abuse screen 77 urine (FL, RH, SH)     Collection Time: 01/13/20 11:08 PM   Result Value Ref Range    Amphetamine Qual Urine Negative NEG^Negative    Barbiturates Qual Urine Negative NEG^Negative    Benzodiazepine Qual Urine Negative NEG^Negative    Cannabinoids Qual Urine Negative NEG^Negative    Cocaine Qual Urine Negative NEG^Negative    Opiates Qualitative Urine Negative NEG^Negative    PCP Qual Urine Negative NEG^Negative   HCG qualitative urine (UPT)    Collection Time: 01/13/20 11:08 PM   Result Value Ref Range    HCG Qual Urine Negative NEG^Negative   Urine Culture    Collection Time: 01/13/20 11:54 PM   Result Value Ref Range    Specimen Description Midstream Urine     Culture Micro       10,000 to 50,000 colonies/mL  mixed urogenital zachary  Susceptibility testing not routinely done     Basic metabolic panel    Collection Time: 01/13/20 11:56 PM   Result Value Ref Range    Sodium 136 133 - 144 mmol/L    Potassium 4.2 3.4 - 5.3 mmol/L    Chloride 104 94 - 109 mmol/L    Carbon Dioxide 26 20 - 32 mmol/L    Anion Gap 6 3 - 14 mmol/L    Glucose 108 (H) 70 - 99 mg/dL    Urea Nitrogen 11 7 - 30 mg/dL    Creatinine 0.86 0.52 - 1.04 mg/dL    GFR Estimate 72 >60 mL/min/[1.73_m2]    GFR Estimate If Black 83 >60 mL/min/[1.73_m2]    Calcium 9.1 8.5 - 10.1 mg/dL   CBC with platelets differential    Collection Time: 01/13/20 11:56 PM   Result Value Ref Range    WBC 9.4 4.0 - 11.0 10e9/L    RBC Count 5.21 (H) 3.8 - 5.2 10e12/L    Hemoglobin 14.5 11.7 - 15.7 g/dL    Hematocrit 44.2 35.0 - 47.0 %    MCV 85 78 - 100 fl    MCH 27.8 26.5 - 33.0 pg    MCHC 32.8 31.5 - 36.5 g/dL    RDW 13.0 10.0 - 15.0 %    Platelet Count 351 150 - 450 10e9/L    Diff Method Automated Method     % Neutrophils 57.3 %    % Lymphocytes 29.0 %    % Monocytes 8.0 %    % Eosinophils 4.6 %    % Basophils 0.7 %    % Immature Granulocytes 0.4 %    Nucleated RBCs 0 0 /100    Absolute Neutrophil 5.4 1.6 - 8.3 10e9/L    Absolute Lymphocytes 2.7 0.8 - 5.3 10e9/L    Absolute Monocytes 0.8 0.0 - 1.3  10e9/L    Absolute Eosinophils 0.4 0.0 - 0.7 10e9/L    Absolute Basophils 0.1 0.0 - 0.2 10e9/L    Abs Immature Granulocytes 0.0 0 - 0.4 10e9/L    Absolute Nucleated RBC 0.0    Vitamin D    Collection Time: 01/14/20  8:13 AM   Result Value Ref Range    Vitamin D Deficiency screening 42 20 - 75 ug/L   Folate    Collection Time: 01/14/20  8:13 AM   Result Value Ref Range    Folate 14.7 >5.4 ng/mL   Vitamin B12    Collection Time: 01/14/20  8:13 AM   Result Value Ref Range    Vitamin B12 262 193 - 986 pg/mL   TSH with free T4 reflex and/or T3 as indicated    Collection Time: 01/14/20  8:13 AM   Result Value Ref Range    TSH 2.48 0.40 - 4.00 mU/L   Ammonia    Collection Time: 01/15/20  7:33 AM   Result Value Ref Range    Ammonia 19 10 - 50 umol/L   Hepatic panel    Collection Time: 01/15/20  7:33 AM   Result Value Ref Range    Bilirubin Direct 0.1 0.0 - 0.2 mg/dL    Bilirubin Total 0.7 0.2 - 1.3 mg/dL    Albumin 4.4 3.4 - 5.0 g/dL    Protein Total 7.8 6.8 - 8.8 g/dL    Alkaline Phosphatase 116 40 - 150 U/L    ALT 23 0 - 50 U/L    AST 20 0 - 45 U/L   UA with Microscopic reflex to Culture    Collection Time: 01/19/20  5:37 PM   Result Value Ref Range    Color Urine Light Yellow     Appearance Urine Clear     Glucose Urine Negative NEG^Negative mg/dL    Bilirubin Urine Negative NEG^Negative    Ketones Urine Negative NEG^Negative mg/dL    Specific Gravity Urine 1.008 1.003 - 1.035    Blood Urine Negative NEG^Negative    pH Urine 6.5 5.0 - 7.0 pH    Protein Albumin Urine Negative NEG^Negative mg/dL    Urobilinogen mg/dL Normal 0.0 - 2.0 mg/dL    Nitrite Urine Negative NEG^Negative    Leukocyte Esterase Urine Negative NEG^Negative    Source Unspecified Urine     WBC Urine 1 0 - 5 /HPF    RBC Urine <1 0 - 2 /HPF    Squamous Epithelial /HPF Urine 2 (H) 0 - 1 /HPF    Mucous Urine Present (A) NEG^Negative /LPF   Valproic acid    Collection Time: 01/20/20  6:43 AM   Result Value Ref Range    Valproic Acid Level 86 50 - 100 mg/L    Ammonia    Collection Time: 01/20/20  6:43 AM   Result Value Ref Range    Ammonia 26 10 - 50 umol/L   Hepatic panel    Collection Time: 01/20/20  6:43 AM   Result Value Ref Range    Bilirubin Direct 0.1 0.0 - 0.2 mg/dL    Bilirubin Total 0.6 0.2 - 1.3 mg/dL    Albumin 3.8 3.4 - 5.0 g/dL    Protein Total 7.0 6.8 - 8.8 g/dL    Alkaline Phosphatase 94 40 - 150 U/L    ALT 27 0 - 50 U/L    AST 24 0 - 45 U/L   CBC with platelets    Collection Time: 01/20/20  6:43 AM   Result Value Ref Range    WBC 5.4 4.0 - 11.0 10e9/L    RBC Count 4.68 3.8 - 5.2 10e12/L    Hemoglobin 13.4 11.7 - 15.7 g/dL    Hematocrit 41.1 35.0 - 47.0 %    MCV 88 78 - 100 fl    MCH 28.6 26.5 - 33.0 pg    MCHC 32.6 31.5 - 36.5 g/dL    RDW 13.4 10.0 - 15.0 %    Platelet Count 271 150 - 450 10e9/L   EKG 12-lead, complete    Collection Time: 01/21/20  9:52 AM   Result Value Ref Range    Interpretation ECG Click View Image link to view waveform and result    CBC with platelets    Collection Time: 01/23/20  1:01 PM   Result Value Ref Range    WBC 5.5 4.0 - 11.0 10e9/L    RBC Count 4.69 3.8 - 5.2 10e12/L    Hemoglobin 13.5 11.7 - 15.7 g/dL    Hematocrit 41.3 35.0 - 47.0 %    MCV 88 78 - 100 fl    MCH 28.8 26.5 - 33.0 pg    MCHC 32.7 31.5 - 36.5 g/dL    RDW 13.3 10.0 - 15.0 %    Platelet Count 273 150 - 450 10e9/L   Basic metabolic panel    Collection Time: 01/23/20  1:01 PM   Result Value Ref Range    Sodium 140 133 - 144 mmol/L    Potassium 3.8 3.4 - 5.3 mmol/L    Chloride 106 94 - 109 mmol/L    Carbon Dioxide 29 20 - 32 mmol/L    Anion Gap 5 3 - 14 mmol/L    Glucose 108 (H) 70 - 99 mg/dL    Urea Nitrogen 12 7 - 30 mg/dL    Creatinine 0.75 0.52 - 1.04 mg/dL    GFR Estimate 84 >60 mL/min/[1.73_m2]    GFR Estimate If Black >90 >60 mL/min/[1.73_m2]    Calcium 9.1 8.5 - 10.1 mg/dL   UA with Microscopic reflex to Culture    Collection Time: 01/23/20  1:05 PM   Result Value Ref Range    Color Urine Light Yellow     Appearance Urine Clear     Glucose Urine  Negative NEG^Negative mg/dL    Bilirubin Urine Negative NEG^Negative    Ketones Urine Negative NEG^Negative mg/dL    Specific Gravity Urine 1.007 1.003 - 1.035    Blood Urine Negative NEG^Negative    pH Urine 5.5 5.0 - 7.0 pH    Protein Albumin Urine Negative NEG^Negative mg/dL    Urobilinogen mg/dL Normal 0.0 - 2.0 mg/dL    Nitrite Urine Negative NEG^Negative    Leukocyte Esterase Urine Trace (A) NEG^Negative    Source Midstream Urine     WBC Urine <1 0 - 5 /HPF    RBC Urine 2 0 - 2 /HPF    Bacteria Urine Few (A) NEG^Negative /HPF    Squamous Epithelial /HPF Urine 2 (H) 0 - 1 /HPF            Psychiatric Examination:                     Weight is 182 lbs 1.6 oz  Body mass index is 27.69 kg/m .    Appearance: well groomed, awake, alert, cooperative, mild distress and normal weight  Attitude:  cooperative  Eye Contact:  good  Mood:  anxious and better  Affect:  full range  Speech:  pressured speech  Psychomotor Behavior:  no evidence of tardive dyskinesia, dystonia, or tics  Throught Process:  disorganized, tangental and circumstantial  Associations:  no loose associations  Thought Content:  no evidence of suicidal ideation or homicidal ideation  Insight:  good  Judgement:  intact  Oriented to:  time, person, and place  Attention Span and Concentration:  intact  Recent and Remote Memory:  intact         Precautions:     Behavioral Orders   Procedures     Code 1 - Restrict to Unit     Code 2     For MRI only     Occupational Therapy on the Unit     Order Specific Question:   Reason for Consult     Answer:   Eval of thought process, functional skills and behavior     Order Specific Question:   Course of Action:     Answer:   Eval & Treat as indicated     Routine Programming     As clinically indicated     Status 15     Every 15 minutes.          DIagnoses:   1.  Major neurocognitive disorder, with behavioral disturbances  2.  History of depression and anxiety  3. UTI         Plan:   The patient is a very pleasant,   female who was admitted with increased anxiety, and inability to function.  The patient was a poor historian.  His she appeared to be in a hypomanic state.  It is not clear if she has been taking her medications as prescribed as her home health care nurse quit weeks ago.  The patient was not able to discuss any of her medications.  The plan will be as follow:     --Discontinue Pamelor and Buspar.  --Taper off of Zoloft.  Will consider Effexor or Cymbalta.    --A trial of Gabapentin and Seroquel did not provide the desired outcome.   --Change Depakote to 250 mg, tid. Valproic acid level 86 on 1/20/20.     --Start Zyprexa. Increase to 5 mg qam and 7.5 mg, at bedtime.    --Start Aricept 5 mg at bedtime.    --Blood work was reviewed.  Valproic acid, ammonia level, CBC with diff, and hepatic panel will be ordered for Monday. All WNL.   --Internal medicine follow-up for medical problems. They are treating her for UTI.   --The patient was consulted on nature of illness and treatment options.   --Care was coordinated with the treatment team.  --MRI  To rule out dementia. WNL.   --CT scan was normal  --Cognitive test: MoCA 26, CPT 5.1     Disposition Plan   Reason for ongoing admission: is unable to care for self due to anxiety, tatianna, inability to function on her own  Disposition: TBD  Estimated length of stay: 7-10 days  Legal Status: voluntary  Discharge will be granted once symptoms improved.      Charles WEINER CNP  Date: 01/24/20  Time: 12:43 PM
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
## 2020-01-28 ENCOUNTER — PATIENT OUTREACH (OUTPATIENT)
Dept: CARE COORDINATION | Facility: CLINIC | Age: 65
End: 2020-01-28

## 2020-01-28 ASSESSMENT — ACTIVITIES OF DAILY LIVING (ADL): DEPENDENT_IADLS:: MEDICATION MANAGEMENT

## 2020-01-28 NOTE — PROGRESS NOTES
Clinic Care Coordination Contact    Situation: Patient chart reviewed by care coordinator.    Background: Fely has a history of mild dementia, depression and anxiety.  The patient was brought to the emergency room for assessment of altered mental status on 1/14/20 and then hospitalized at [facility] from 1/14/20 - 1/24/20 with the following discharge diagnoses:  1.  Major neurocognitive disorder, with behavioral disturbances  2.  History of depression and anxiety  3. UTI, resolved     Assessment:   Mariela Duffy was released to long term care skilled nursing [place] [address]  [phone]    Plan/Recommendations:   Per chart review Fely  was upbeat and happy about the discharge plan .  No further outreaches will be made at this time.    MICHAEL Mendiola   Care Coordination Team  [phone]

## 2020-01-30 ENCOUNTER — DOCUMENTATION ONLY (OUTPATIENT)
Dept: FAMILY MEDICINE | Facility: CLINIC | Age: 65
End: 2020-01-30

## 2020-01-30 NOTE — PROGRESS NOTES
To be completed in Nursing note:    Please reference list for forms that require a visit for completion.  Please remind patients that providers are given 3-5 business days to complete and return forms.      Form type: [facility]/ Physician Certification     Date form received: 2020    Date form completed by Physician: 2020    How was form returned to patient (mailed, faxed, or at  for patient to ): Faxed to [facility] @ [phone]    Date form mailed/faxed/left at  for patient and sent to HIM for [email or web address]      Ender Aparicio CMA

## 2020-03-06 ENCOUNTER — MEDICAL CORRESPONDENCE (OUTPATIENT)
Dept: HEALTH INFORMATION MANAGEMENT | Facility: CLINIC | Age: 65
End: 2020-03-06

## 2020-06-04 ENCOUNTER — ALLIED HEALTH/NURSE VISIT (OUTPATIENT)
Dept: PHARMACY | Facility: CLINIC | Age: 65
End: 2020-06-04
Payer: COMMERCIAL

## 2020-06-04 DIAGNOSIS — Z53.9 ERRONEOUS ENCOUNTER--DISREGARD: Primary | ICD-10-CM

## 2020-06-04 NOTE — Clinical Note
Radha--I tried to call surendra today --she left me vmail --she is getting care for now in [place] unsure if or when she will rtc in [place]?    -Teagan

## 2020-06-04 NOTE — PROGRESS NOTES
MTM ENCOUNTER  SUBJECTIVE/OBJECTIVE:                           Mariela Duffy is a 64 year old female called for a follow-up visit. She was referred to me from Luca Welsh  Today's visit is a follow-up MTM visit from 12-5-2019.     Patient consented to a telehealth visit: yes  Telemedicine Visit Details  Type of service:  Telephone visit  Start Time: 12:06 PM  End Time: 12:07pm  Originating Location (pt. Location): Home  Distant Location (provider location):  [facility] MTM  Mode of Communication:  Telephone        Chief Complaint:  6 months med review.     Unable to reach patient she called back to say she has had mental health issues and is currently living and receiving her care in [place]--unsure when or if she will be back to see us in [place].      Jerry Jordan [facility].  Medication Therapy Management Provider  [phone]

## 2021-01-15 ENCOUNTER — HEALTH MAINTENANCE LETTER (OUTPATIENT)
Age: 66
End: 2021-01-15

## 2021-03-23 ENCOUNTER — TRANSFERRED RECORDS (OUTPATIENT)
Dept: HEALTH INFORMATION MANAGEMENT | Facility: CLINIC | Age: 66
End: 2021-03-23

## 2021-03-29 ENCOUNTER — TRANSFERRED RECORDS (OUTPATIENT)
Dept: HEALTH INFORMATION MANAGEMENT | Facility: CLINIC | Age: 66
End: 2021-03-29

## 2021-04-14 ENCOUNTER — TRANSFERRED RECORDS (OUTPATIENT)
Dept: HEALTH INFORMATION MANAGEMENT | Facility: CLINIC | Age: 66
End: 2021-04-14

## 2021-04-24 ENCOUNTER — MEDICAL CORRESPONDENCE (OUTPATIENT)
Dept: HEALTH INFORMATION MANAGEMENT | Facility: CLINIC | Age: 66
End: 2021-04-24

## 2021-05-04 ENCOUNTER — TRANSFERRED RECORDS (OUTPATIENT)
Dept: HEALTH INFORMATION MANAGEMENT | Facility: CLINIC | Age: 66
End: 2021-05-04

## 2021-05-11 ENCOUNTER — TRANSFERRED RECORDS (OUTPATIENT)
Dept: HEALTH INFORMATION MANAGEMENT | Facility: CLINIC | Age: 66
End: 2021-05-11

## 2021-05-18 NOTE — ED PROVIDER NOTES
History     Chief Complaint:  Cough    HPI   Mariela Duffy is a 63 year old female with a history of thyroid cancer s/p thyroidectomy who presents to the emergency department today via EMS for evaluation of a cough. Per chart review, the patient was seen at urgent care 3 days ago with complaints of a cough. She was discharged with Tessalon, chest XR negative and noted below. Today, she reports that her cough is not improving, and now productive. She also notes a headache and possibly some constipation this morning. She otherwise denies diarrhea, vomiting, fevers, ill contacts, or recent travel. No history of lung issues.     XR Chest 2 Views; 04/27/2019   Small hiatal hernia is again noted. No active pulmonary disease or change since the prior exam.    Allergies:  Levaquin      Medications:    Fosamax   Aspirin   Buspar   Atarax   Levoxyl   Pamelor   Seroquel   Zoloft     Past Medical History:    Depression and anxiety    Diverticulosis of colon (without mention of hemorrhage)   Esophageal reflux    Irritable bowel syndrome   Malignant neoplasm of thyroid gland    Postsurgical hypothyroidism      Past Surgical History:    Hiatal hernia repair   Cholecystectomy   Cleft lip repair   Thyroidectomy   Wrist surgery, right     Family History:    Father: colon, stomach cancer, hypertension, heart disease   Mother: throat, lymph, bone cancer, heart disease   Maternal grandfather: heart disease  Paternal grandmother: alzheimer's   Paternal grandfather: heart disease   Other family history of thyroid disease.     Social History:  Smoking Status: Former cigarette smoker for 5 years, quit 42 years ago   Smokeless Tobacco: Never Used  Alcohol Use: Positive, rare   Drug Use: Negative    Marital Status:       Review of Systems   Constitutional: Negative for fever.   Respiratory: Positive for cough (productive).    Gastrointestinal: Positive for constipation. Negative for diarrhea and vomiting.   Neurological: Positive  Wellness Visit for Adults   AMBULATORY CARE:   A wellness visit  is when you see your healthcare provider to get screened for health problems  Your healthcare provider will also give you advice on how to stay healthy  Write down your questions so you remember to ask them  Ask your healthcare provider how often you should have a wellness visit  What happens at a wellness visit:  Your healthcare provider will ask about your health, and your family history of health problems  This includes high blood pressure, heart disease, and cancer  He or she will ask if you have symptoms that concern you, if you smoke, and about your mood  You may also be asked about your intake of medicines, supplements, food, and alcohol  Any of the following may be done:  · Your weight  will be checked  Your height may also be checked so your body mass index (BMI) can be calculated  Your BMI shows if you are at a healthy weight  · Your blood pressure  and heart rate will be checked  Your temperature may also be checked  · Blood and urine tests  may be done  Blood tests may be done to check your cholesterol levels  Abnormal cholesterol levels increase your risk for heart disease and stroke  You may also need a blood or urine test to check for diabetes if you are at increased risk  Urine tests may be done to look for signs of an infection or kidney disease  · A physical exam  includes checking your heartbeat and lungs with a stethoscope  Your healthcare provider may also check your skin to look for sun damage  · Screening tests  may be recommended  A screening test is done to check for diseases that may not cause symptoms  The screening tests you may need depend on your age, gender, family history, and lifestyle habits  For example, colorectal screening may be recommended if you are 48years old or older  Screening tests you need if you are a woman:   · A Pap smear  is used to screen for cervical cancer   Pap smears are usually done every 3 to 5 years depending on your age  You may need them more often if you have had abnormal Pap smear test results in the past  Ask your healthcare provider how often you should have a Pap smear  · A mammogram  is an x-ray of your breasts to screen for breast cancer  Experts recommend mammograms every 2 years starting at age 48 years  You may need a mammogram at age 52 years or younger if you have an increased risk for breast cancer  Talk to your healthcare provider about when you should start having mammograms and how often you need them  Vaccines you may need:   · Get an influenza vaccine  every year  The influenza vaccine protects you from the flu  Several types of viruses cause the flu  The viruses change over time, so new vaccines are made each year  · Get a tetanus-diphtheria (Td) booster vaccine  every 10 years  This vaccine protects you against tetanus and diphtheria  Tetanus is a severe infection that may cause painful muscle spasms and lockjaw  Diphtheria is a severe bacterial infection that causes a thick covering in the back of your mouth and throat  · Get a human papillomavirus (HPV) vaccine  if you are female and aged 23 to 32 or male 23 to 24 and never received it  This vaccine protects you from HPV infection  HPV is the most common infection spread by sexual contact  HPV may also cause vaginal, penile, and anal cancers  · Get a pneumococcal vaccine  if you are aged 72 years or older  The pneumococcal vaccine is an injection given to protect you from pneumococcal disease  Pneumococcal disease is an infection caused by pneumococcal bacteria  The infection may cause pneumonia, meningitis, or an ear infection  · Get a shingles vaccine  if you are 60 or older, even if you have had shingles before  The shingles vaccine is an injection to protect you from the varicella-zoster virus  This is the same virus that causes chickenpox   Shingles is a painful rash that develops in people for headaches.   All other systems reviewed and are negative.      Physical Exam     Patient Vitals for the past 24 hrs:   BP Temp Temp src Pulse Heart Rate Resp SpO2   04/30/19 1117 121/66 -- -- 109 -- -- --   04/30/19 1116 -- -- -- -- -- -- 94 %   04/30/19 1045 -- -- -- -- 99 -- 94 %   04/30/19 1036 -- -- -- -- -- -- 94 %   04/30/19 1035 -- -- -- -- -- 18 95 %   04/30/19 1020 103/58 -- -- -- -- -- --   04/30/19 0856 129/66 98.7  F (37.1  C) Oral -- 109 20 94 %     Physical Exam  General:   Well-nourished   Speaking in full sentences   Occasional harsh cough   Eyes:   Conjunctiva without injection or scleral icterus  ENT:   Dry mucous membranes   Nares patent   Pinnae normal  Neck:   Full ROM   No stiffness appreciated  Resp:   Coarse breath sounds bilaterally. No wheezing  CV:    Tachycardic rate, regular rhythm   S1 and S2 present   No murmur, gallop or rub  GI:   BS present   Abdomen soft without distention   Non-tender to light and deep palpation   No guarding or rebound tenderness  Skin:   Warm, dry, well perfused   No rashes or open wounds on exposed skin  MSK:   Moves all extremities   No focal deformities or swelling  Neuro:   Alert   Answers questions appropriately   Moves all extremities equally   Gait stable  Psych:   Normal affect, normal mood    Emergency Department Course     Imaging:  Radiology findings were communicated with the patient who voiced understanding of the findings.    XR Chest 2 Views  Since April 27, 2019, minimal left basilar opacity is new. This could be atelectasis, though early pneumonia is also possible. Hiatal hernia is visible. No pneumothorax. Minimal, if any left pleural effusion.  JOSE LUIS OLGUIN MD  Reading per radiology     Laboratory:  Laboratory findings were communicated with the patient who voiced understanding of the findings.    CBC: WBC 18.1(H), HGB 12.6,   BMP: Potassium 3.3(L), Glucose 108(H), Calcium 8.0(L) o/w WNL (Creatinine 0.83)  Lactic Acid (Resulted  0950): 1.1    Blood culture In process x2    Interventions:  0908 NS 1,000 mL IV  1115 Potassium chloride 20 mEq Oral     Emergency Department Course:    0853 Nursing notes and vitals reviewed.    0901 I performed an exam of the patient as documented above.     0931 IV was inserted and blood was drawn for laboratory testing, results above.    0949 The patient was sent for an XR while in the emergency department, results above.     1110 Rechecked and updated.      1135 I personally reviewed the lab and imaging results with the patient and answered all related questions prior to discharge.    Impression & Plan      Medical Decision Making:  Fely Duffy is a 63-year-old female presenting to the ER for evaluation of a cough.  VS on presentation reveal elevated HR which improved during patient's emergency department course.  Work-up included x-ray and laboratory studies.  Overall presentation is concerning for community-acquired pneumonia.  Chest x-ray demonstrates a left basilar opacity, new compared with previous study from 4/27/2019.  Clinically, this fits with the patient's presenting symptoms.  Laboratory evaluation reveals elevated WBC count 18.1, though normal lactate.  Blood culture x2 obtained and are presently pending.  Labs reveal potassium 3.3 for which oral supplementation was provided.  Patient does have a remote history of thyroid cancer status post thyroidectomy though is not currently on chemotherapy nor immunosuppressive therapy.  She is a non-smoker.  She clinically is well-appearing, and aside from intermittent harsh cough, is not demonstrating signs of respiratory distress.  Given her above exam, and above work-up, I do feel with reasonable clinical certainty that outpatient management is appropriate.  She will be started on doxycycline to be taken twice daily for 7 days.  I recommended follow-up with PCP later this week for reassessment which she verbalizes understanding of and which she has  who had chickenpox or have been exposed to the virus  How to eat healthy:  My Plate is a model for planning healthy meals  It shows the types and amounts of foods that should go on your plate  Fruits and vegetables make up about half of your plate, and grains and protein make up the other half  A serving of dairy is included on the side of your plate  The amount of calories and serving sizes you need depends on your age, gender, weight, and height  Examples of healthy foods are listed below:  · Eat a variety of vegetables  such as dark green, red, and orange vegetables  You can also include canned vegetables low in sodium (salt) and frozen vegetables without added butter or sauces  · Eat a variety of fresh fruits , canned fruit in 100% juice, frozen fruit, and dried fruit  · Include whole grains  At least half of the grains you eat should be whole grains  Examples include whole-wheat bread, wheat pasta, brown rice, and whole-grain cereals such as oatmeal     · Eat a variety of protein foods such as seafood (fish and shellfish), lean meat, and poultry without skin (turkey and chicken)  Examples of lean meats include pork leg, shoulder, or tenderloin, and beef round, sirloin, tenderloin, and extra lean ground beef  Other protein foods include eggs and egg substitutes, beans, peas, soy products, nuts, and seeds  · Choose low-fat dairy products such as skim or 1% milk or low-fat yogurt, cheese, and cottage cheese  · Limit unhealthy fats  such as butter, hard margarine, and shortening  Exercise:  Exercise at least 30 minutes per day on most days of the week  Some examples of exercise include walking, biking, dancing, and swimming  You can also fit in more physical activity by taking the stairs instead of the elevator or parking farther away from stores  Include muscle strengthening activities 2 days each week  Regular exercise provides many health benefits   It helps you manage your weight, and available to her.  She will return in the meantime to the ER should she develop worsening respiratory distress, fatigue, fevers, or any other new or troubling symptoms.  All of her questions were answered prior to discharge.    Diagnosis:    ICD-10-CM    1. Pneumonia of left lower lobe due to infectious organism (H) J18.1      Disposition:   The patient is discharged to home.    Discharge Medications:  START taking      Dose / Directions   doxycycline hyclate 100 MG capsule  Commonly known as:  VIBRAMYCIN      Dose:  100 mg  Take 1 capsule (100 mg) by mouth 2 times daily for 7 days  Quantity:  14 capsule  Refills:  0       Scribe Disclosure:  Radha ZIMMERMAN, am serving as a scribe at 8:54 AM on 4/30/2019 to document services personally performed by Goznales Barron MD based on my observations and the provider's statements to me.      [facility] EMERGENCY DEPARTMENT       Gonzales Barron MD  05/07/19 1111     decreases your risk for type 2 diabetes, heart disease, stroke, and high blood pressure  Exercise can also help improve your mood  Ask your healthcare provider about the best exercise plan for you  General health and safety guidelines:   · Do not smoke  Nicotine and other chemicals in cigarettes and cigars can cause lung damage  Ask your healthcare provider for information if you currently smoke and need help to quit  E-cigarettes or smokeless tobacco still contain nicotine  Talk to your healthcare provider before you use these products  · Limit alcohol  A drink of alcohol is 12 ounces of beer, 5 ounces of wine, or 1½ ounces of liquor  · Lose weight, if needed  Being overweight increases your risk of certain health conditions  These include heart disease, high blood pressure, type 2 diabetes, and certain types of cancer  · Protect your skin  Do not sunbathe or use tanning beds  Use sunscreen with a SPF 15 or higher  Apply sunscreen at least 15 minutes before you go outside  Reapply sunscreen every 2 hours  Wear protective clothing, hats, and sunglasses when you are outside  · Drive safely  Always wear your seatbelt  Make sure everyone in your car wears a seatbelt  A seatbelt can save your life if you are in an accident  Do not use your cell phone when you are driving  This could distract you and cause an accident  Pull over if you need to make a call or send a text message  · Practice safe sex  Use latex condoms if are sexually active and have more than one partner  Your healthcare provider may recommend screening tests for sexually transmitted infections (STIs)  · Wear helmets, lifejackets, and protective gear  Always wear a helmet when you ride a bike or motorcycle, go skiing, or play sports that could cause a head injury  Wear protective equipment when you play sports  Wear a lifejacket when you are on a boat or doing water sports      © Copyright [employer] 2020 Information is for End User's use only and may not be sold, redistributed or otherwise used for commercial purposes  All illustrations and images included in CareNotes® are the copyrighted property of A D A M [employer] Inc  or Kinjal Farias  The above information is an  only  It is not intended as medical advice for individual conditions or treatments  Talk to your doctor, nurse or pharmacist before following any medical regimen to see if it is safe and effective for you

## 2021-05-27 ENCOUNTER — TRANSFERRED RECORDS (OUTPATIENT)
Dept: HEALTH INFORMATION MANAGEMENT | Facility: CLINIC | Age: 66
End: 2021-05-27

## 2021-07-06 ENCOUNTER — TELEPHONE (OUTPATIENT)
Dept: FAMILY MEDICINE | Facility: CLINIC | Age: 66
End: 2021-07-06

## 2021-07-06 ENCOUNTER — TELEPHONE (OUTPATIENT)
Dept: OTHER | Facility: CLINIC | Age: 66
End: 2021-07-06

## 2021-07-06 ENCOUNTER — OFFICE VISIT (OUTPATIENT)
Dept: FAMILY MEDICINE | Facility: CLINIC | Age: 66
End: 2021-07-06
Payer: COMMERCIAL

## 2021-07-06 VITALS
HEART RATE: 118 BPM | SYSTOLIC BLOOD PRESSURE: 126 MMHG | BODY MASS INDEX: 24.59 KG/M2 | RESPIRATION RATE: 16 BRPM | DIASTOLIC BLOOD PRESSURE: 83 MMHG | WEIGHT: 166 LBS | OXYGEN SATURATION: 97 % | HEIGHT: 69 IN | TEMPERATURE: 98.6 F

## 2021-07-06 DIAGNOSIS — R53.83 FATIGUE, UNSPECIFIED TYPE: ICD-10-CM

## 2021-07-06 DIAGNOSIS — F29 PSYCHOSIS, UNSPECIFIED PSYCHOSIS TYPE (H): ICD-10-CM

## 2021-07-06 DIAGNOSIS — Z93.59 SUPRAPUBIC CATHETER (H): ICD-10-CM

## 2021-07-06 DIAGNOSIS — R82.90 CLOUDY URINE: ICD-10-CM

## 2021-07-06 DIAGNOSIS — E55.9 VITAMIN D DEFICIENCY: ICD-10-CM

## 2021-07-06 DIAGNOSIS — E89.0 POSTOPERATIVE HYPOTHYROIDISM: ICD-10-CM

## 2021-07-06 DIAGNOSIS — M81.8 OTHER OSTEOPOROSIS WITHOUT CURRENT PATHOLOGICAL FRACTURE: ICD-10-CM

## 2021-07-06 DIAGNOSIS — R82.90 NONSPECIFIC FINDING ON EXAMINATION OF URINE: ICD-10-CM

## 2021-07-06 DIAGNOSIS — N30.01 ACUTE CYSTITIS WITH HEMATURIA: ICD-10-CM

## 2021-07-06 DIAGNOSIS — Z76.89 ENCOUNTER TO ESTABLISH CARE WITH NEW DOCTOR: Primary | ICD-10-CM

## 2021-07-06 DIAGNOSIS — D50.9 IRON DEFICIENCY ANEMIA, UNSPECIFIED IRON DEFICIENCY ANEMIA TYPE: ICD-10-CM

## 2021-07-06 DIAGNOSIS — F41.1 GENERALIZED ANXIETY DISORDER: ICD-10-CM

## 2021-07-06 DIAGNOSIS — L03.90 CELLULITIS, UNSPECIFIED CELLULITIS SITE: Primary | ICD-10-CM

## 2021-07-06 DIAGNOSIS — F01.518 VASCULAR DEMENTIA WITH BEHAVIORAL DISTURBANCE (H): ICD-10-CM

## 2021-07-06 DIAGNOSIS — I72.8 SPLENIC ARTERY ANEURYSM (H): ICD-10-CM

## 2021-07-06 PROBLEM — E87.1 HYPONATREMIA: Status: ACTIVE | Noted: 2021-04-22
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
## 2021-07-06 PROBLEM — L89.156 PRESSURE INJURY OF DEEP TISSUE OF SACRAL REGION: Status: RESOLVED | Noted: 2021-04-22 | Resolved: 2021-07-06

## 2021-07-06 PROBLEM — L89.156 PRESSURE INJURY OF DEEP TISSUE OF SACRAL REGION: Status: ACTIVE | Noted: 2021-04-22

## 2021-07-06 PROBLEM — R33.9 RETENTION OF URINE: Status: ACTIVE | Noted: 2021-04-14
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
## 2021-07-06 PROBLEM — N30.91 CYSTITIS WITH HEMATURIA: Status: ACTIVE | Noted: 2021-04-13

## 2021-07-06 PROBLEM — F03.90 DEMENTIA WITHOUT BEHAVIORAL DISTURBANCE (H): Status: ACTIVE | Noted: 2021-04-14

## 2021-07-06 LAB
ALBUMIN UR-MCNC: >=300 MG/DL
APPEARANCE UR: ABNORMAL
BACTERIA #/AREA URNS HPF: ABNORMAL /HPF
BILIRUB UR QL STRIP: NEGATIVE
COLOR UR AUTO: YELLOW
ERYTHROCYTE [DISTWIDTH] IN BLOOD BY AUTOMATED COUNT: 13.9 % (ref 10–15)
GLUCOSE UR STRIP-MCNC: NEGATIVE MG/DL
HCT VFR BLD AUTO: 41.7 % (ref 35–47)
HGB BLD-MCNC: 13.8 G/DL (ref 11.7–15.7)
HGB UR QL STRIP: ABNORMAL
KETONES UR STRIP-MCNC: NEGATIVE MG/DL
LEUKOCYTE ESTERASE UR QL STRIP: ABNORMAL
MCH RBC QN AUTO: 29.2 PG (ref 26.5–33)
MCHC RBC AUTO-ENTMCNC: 33.1 G/DL (ref 31.5–36.5)
MCV RBC AUTO: 88 FL (ref 78–100)
NITRATE UR QL: POSITIVE
NON-SQ EPI CELLS #/AREA URNS LPF: ABNORMAL /LPF
PH UR STRIP: 6 PH (ref 5–7)
PLATELET # BLD AUTO: 293 10E9/L (ref 150–450)
RBC # BLD AUTO: 4.72 10E12/L (ref 3.8–5.2)
RBC #/AREA URNS AUTO: ABNORMAL /HPF
SOURCE: ABNORMAL
SP GR UR STRIP: 1.01 (ref 1–1.03)
UROBILINOGEN UR STRIP-ACNC: 0.2 EU/DL (ref 0.2–1)
WBC # BLD AUTO: 8.7 10E9/L (ref 4–11)
WBC #/AREA URNS AUTO: ABNORMAL /HPF

## 2021-07-06 PROCEDURE — 87186 SC STD MICRODIL/AGAR DIL: CPT | Performed by: FAMILY MEDICINE

## 2021-07-06 PROCEDURE — 80053 COMPREHEN METABOLIC PANEL: CPT | Performed by: FAMILY MEDICINE

## 2021-07-06 PROCEDURE — 36415 COLL VENOUS BLD VENIPUNCTURE: CPT | Performed by: FAMILY MEDICINE

## 2021-07-06 PROCEDURE — 82306 VITAMIN D 25 HYDROXY: CPT | Performed by: FAMILY MEDICINE

## 2021-07-06 PROCEDURE — 82728 ASSAY OF FERRITIN: CPT | Performed by: FAMILY MEDICINE

## 2021-07-06 PROCEDURE — 85027 COMPLETE CBC AUTOMATED: CPT | Performed by: FAMILY MEDICINE

## 2021-07-06 PROCEDURE — 83550 IRON BINDING TEST: CPT | Performed by: FAMILY MEDICINE

## 2021-07-06 PROCEDURE — 99215 OFFICE O/P EST HI 40 MIN: CPT | Performed by: FAMILY MEDICINE

## 2021-07-06 PROCEDURE — 81001 URINALYSIS AUTO W/SCOPE: CPT | Performed by: FAMILY MEDICINE

## 2021-07-06 PROCEDURE — 87088 URINE BACTERIA CULTURE: CPT | Performed by: FAMILY MEDICINE

## 2021-07-06 PROCEDURE — 87086 URINE CULTURE/COLONY COUNT: CPT | Performed by: FAMILY MEDICINE

## 2021-07-06 PROCEDURE — 84443 ASSAY THYROID STIM HORMONE: CPT | Performed by: FAMILY MEDICINE

## 2021-07-06 PROCEDURE — 83540 ASSAY OF IRON: CPT | Performed by: FAMILY MEDICINE

## 2021-07-06 RX ORDER — DIPHENOXYLATE HCL/ATROPINE 2.5-.025MG
TABLET ORAL
COMMUNITY
Start: 2020-12-07 | End: 2021-10-19

## 2021-07-06 RX ORDER — LOPERAMIDE HYDROCHLORIDE 2 MG/1
TABLET ORAL
COMMUNITY
End: 2021-07-06

## 2021-07-06 RX ORDER — CEPHALEXIN 500 MG/1
500 CAPSULE ORAL 4 TIMES DAILY
Qty: 40 CAPSULE | Refills: 0 | Status: SHIPPED | OUTPATIENT
Start: 2021-07-06 | End: 2021-07-16

## 2021-07-06 RX ORDER — LEVOTHYROXINE SODIUM 137 UG/1
137 TABLET ORAL DAILY
COMMUNITY
Start: 2021-07-06 | End: 2022-04-07

## 2021-07-06 RX ORDER — CALCIUM CARBONATE 500(1250)
TABLET,CHEWABLE ORAL
COMMUNITY
End: 2021-07-06

## 2021-07-06 RX ORDER — TAMSULOSIN HYDROCHLORIDE 0.4 MG/1
0.4 CAPSULE ORAL
COMMUNITY
Start: 2021-03-23 | End: 2022-02-01

## 2021-07-06 RX ORDER — BUSPIRONE HYDROCHLORIDE 5 MG/1
5 TABLET ORAL
COMMUNITY
Start: 2021-03-23 | End: 2023-07-25

## 2021-07-06 RX ORDER — ERGOCALCIFEROL 1.25 MG/1
CAPSULE ORAL
COMMUNITY
Start: 2020-12-05 | End: 2021-07-06 | Stop reason: DRUGHIGH

## 2021-07-06 RX ORDER — MULTIPLE VITAMINS W/ MINERALS TAB 9MG-400MCG
1 TAB ORAL DAILY
COMMUNITY

## 2021-07-06 RX ORDER — DIVALPROEX SODIUM 250 MG/1
750 TABLET, DELAYED RELEASE ORAL DAILY
COMMUNITY
Start: 2021-07-06 | End: 2021-07-23

## 2021-07-06 ASSESSMENT — ANXIETY QUESTIONNAIRES
3. WORRYING TOO MUCH ABOUT DIFFERENT THINGS: NOT AT ALL
7. FEELING AFRAID AS IF SOMETHING AWFUL MIGHT HAPPEN: NOT AT ALL
6. BECOMING EASILY ANNOYED OR IRRITABLE: NOT AT ALL
2. NOT BEING ABLE TO STOP OR CONTROL WORRYING: NOT AT ALL
GAD7 TOTAL SCORE: 0
4. TROUBLE RELAXING: NOT AT ALL
1. FEELING NERVOUS, ANXIOUS, OR ON EDGE: NOT AT ALL
5. BEING SO RESTLESS THAT IT IS HARD TO SIT STILL: NOT AT ALL
7. FEELING AFRAID AS IF SOMETHING AWFUL MIGHT HAPPEN: NOT AT ALL
GAD7 TOTAL SCORE: 0
GAD7 TOTAL SCORE: 0

## 2021-07-06 ASSESSMENT — PATIENT HEALTH QUESTIONNAIRE - PHQ9
SUM OF ALL RESPONSES TO PHQ QUESTIONS 1-9: 2
SUM OF ALL RESPONSES TO PHQ QUESTIONS 1-9: 2
10. IF YOU CHECKED OFF ANY PROBLEMS, HOW DIFFICULT HAVE THESE PROBLEMS MADE IT FOR YOU TO DO YOUR WORK, TAKE CARE OF THINGS AT HOME, OR GET ALONG WITH OTHER PEOPLE: NOT DIFFICULT AT ALL

## 2021-07-06 ASSESSMENT — MIFFLIN-ST. JEOR: SCORE: 1358.38

## 2021-07-06 NOTE — TELEPHONE ENCOUNTER
"Called [facility] [phone], scheduled appointment for 7/7 at 3:30pm. Talked with Larissa, per note, \"SPT problems, will get records. Probably from [place] [facility]\" patient's daughter reported she would get records to clinic, number and address given. Spoke to patient and daughter during visit.     Introduced self as PAL RN and number given with card for any questions or concerns.     AHSAN Meredith, RN - Patient Advocate and Liaison (PAL)   [facility]   [phone]  "

## 2021-07-06 NOTE — PROGRESS NOTES
Suprapubic Catheter Urine collection by RN at this time.    DESTINI MeredithN, RN - Patient Advocate and Liaison (PAL)   [facility]   [phone]

## 2021-07-06 NOTE — TELEPHONE ENCOUNTER
"Referred to [facility] by April Morgan Tillman MD for splenic artery aneurysm.    H&P 4/13/21 includes \"recent incidental finding of splenic artery aneurysm on a CT by urology.\"    CT C/A/P w/contrast 4/14/21:    1. Findings of bilateral acute pyelonephritis and pyelitis.  2. Urinary bladder wall thickening may be due to cystitis or under  distention.  3. No acute chest process.  \"The spleen and pancreas are grossly unremarkable.\"    Called Fely to determine which urologist ordered the CT that documents a splenic artery aneurysm and where that was done.    Dr. Phuong Grijalva in [place] \"[facility]\"  - phone number [phone].  Requested image disc be sent to [facility], report of CT to be faxed.      Nyla Wheatley, DESTININ, RN  [facility] [place]    "
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
## 2021-07-06 NOTE — Clinical Note
Can you call urology in [place] and see if there is any way to get the patient in for a suprapubic catheter change? Patient is due for change NOW, but has not seen urology yet, just moved here.  Urology referral placed today.

## 2021-07-07 ENCOUNTER — OFFICE VISIT (OUTPATIENT)
Dept: UROLOGY | Facility: CLINIC | Age: 66
End: 2021-07-07
Payer: COMMERCIAL

## 2021-07-07 ENCOUNTER — TELEPHONE (OUTPATIENT)
Dept: FAMILY MEDICINE | Facility: CLINIC | Age: 66
End: 2021-07-07

## 2021-07-07 VITALS
WEIGHT: 166 LBS | OXYGEN SATURATION: 98 % | BODY MASS INDEX: 24.59 KG/M2 | HEIGHT: 69 IN | HEART RATE: 90 BPM | SYSTOLIC BLOOD PRESSURE: 126 MMHG | DIASTOLIC BLOOD PRESSURE: 72 MMHG

## 2021-07-07 DIAGNOSIS — Z93.59 SUPRAPUBIC CATHETER (H): ICD-10-CM

## 2021-07-07 DIAGNOSIS — R33.9 RETENTION OF URINE: Primary | ICD-10-CM

## 2021-07-07 LAB
ALBUMIN SERPL-MCNC: 3.6 G/DL (ref 3.4–5)
ALP SERPL-CCNC: 81 U/L (ref 40–150)
ALT SERPL W P-5'-P-CCNC: 15 U/L (ref 0–50)
ANION GAP SERPL CALCULATED.3IONS-SCNC: 6 MMOL/L (ref 3–14)
AST SERPL W P-5'-P-CCNC: 11 U/L (ref 0–45)
BILIRUB SERPL-MCNC: 0.4 MG/DL (ref 0.2–1.3)
BUN SERPL-MCNC: 10 MG/DL (ref 7–30)
CALCIUM SERPL-MCNC: 9.3 MG/DL (ref 8.5–10.1)
CHLORIDE SERPL-SCNC: 104 MMOL/L (ref 94–109)
CO2 SERPL-SCNC: 31 MMOL/L (ref 20–32)
CREAT SERPL-MCNC: 0.92 MG/DL (ref 0.52–1.04)
DEPRECATED CALCIDIOL+CALCIFEROL SERPL-MC: 52 UG/L (ref 20–75)
FERRITIN SERPL-MCNC: 25 NG/ML (ref 8–252)
GFR SERPL CREATININE-BSD FRML MDRD: 65 ML/MIN/{1.73_M2}
GLUCOSE SERPL-MCNC: 87 MG/DL (ref 70–99)
IRON SATN MFR SERPL: 12 % (ref 15–46)
IRON SERPL-MCNC: 45 UG/DL (ref 35–180)
POTASSIUM SERPL-SCNC: 3.9 MMOL/L (ref 3.4–5.3)
PROT SERPL-MCNC: 7.3 G/DL (ref 6.8–8.8)
SODIUM SERPL-SCNC: 141 MMOL/L (ref 133–144)
TIBC SERPL-MCNC: 373 UG/DL (ref 240–430)
TSH SERPL DL<=0.005 MIU/L-ACNC: 1.17 MU/L (ref 0.4–4)

## 2021-07-07 PROCEDURE — 99203 OFFICE O/P NEW LOW 30 MIN: CPT | Performed by: PHYSICIAN ASSISTANT

## 2021-07-07 ASSESSMENT — PAIN SCALES - GENERAL: PAINLEVEL: NO PAIN (0)

## 2021-07-07 ASSESSMENT — MIFFLIN-ST. JEOR: SCORE: 1358.38

## 2021-07-07 ASSESSMENT — PATIENT HEALTH QUESTIONNAIRE - PHQ9: SUM OF ALL RESPONSES TO PHQ QUESTIONS 1-9: 2

## 2021-07-07 ASSESSMENT — ANXIETY QUESTIONNAIRES: GAD7 TOTAL SCORE: 0

## 2021-07-07 NOTE — TELEPHONE ENCOUNTER
----- Message from April Lizbeth Tillman MD sent at 7/6/2021  6:44 PM CDT -----  Please call patient and let her know Urinalysis is positive for infection, will treat with Cephalexin 500 mg four times per day, which will also cover the possible skin infection around the catheter.  Prescription has been sent to the pharmacy
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
## 2021-07-07 NOTE — PATIENT INSTRUCTIONS
Sign release today for records from [place].     Seems to me that the catheter is a chronic one and will need to be changed and up-sized in interventional radiology. Once it is 16 Yi in size, we can change it in our office every 4 weeks or can be done with home care.     I will Native back with Joy once I have hospital records.     The tissue by the surgery site is just healthy granulation tissue. Does not appear infected.
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
## 2021-07-08 NOTE — TELEPHONE ENCOUNTER
left message for call back #2, called daughter as CTC on file, confirms receipt and medication has been picked up yesterday  Thelma Strong RN

## 2021-07-09 LAB
BACTERIA SPEC CULT: ABNORMAL
BACTERIA SPEC CULT: ABNORMAL
Lab: ABNORMAL
SPECIMEN SOURCE: ABNORMAL

## 2021-07-13 ENCOUNTER — TELEPHONE (OUTPATIENT)
Dept: UROLOGY | Facility: CLINIC | Age: 66
End: 2021-07-13

## 2021-07-13 DIAGNOSIS — F09 MILD COGNITIVE DISORDER: ICD-10-CM

## 2021-07-13 DIAGNOSIS — F41.1 GENERALIZED ANXIETY DISORDER: ICD-10-CM

## 2021-07-13 NOTE — TELEPHONE ENCOUNTER
LM to schedule at NEW CONSULT at next available in [place] with Vascular Surgery.     Appt Note:   Referred to [facility] [place] by April Coming MD Primo for splenic artery aneurysm.    Lizz MARCIAL

## 2021-07-13 NOTE — TELEPHONE ENCOUNTER
UROLOGY    Received records from [place] [facility] today. SPT is meant to be chronic and will need this exchanged and upsized in IR.  Left message for Joy to return my call to discuss.     Lacy Birmingham PA-C  [facility] Urology  [phone]

## 2021-07-13 NOTE — TELEPHONE ENCOUNTER
Disc received from the [facility].    Given to MAs to bring to radiology for upload.    Medical images on a white disk.  To open - click on Run [email or web address].  Click on advanced Viewer next to the requested image.    CD User =  CD    CD password  =  [phone].    Nyla Wheatley RN BSN  [facility]  [phone]

## 2021-07-13 NOTE — TELEPHONE ENCOUNTER
Referred to [facility] by April Coming MD Primo for splenic artery aneurysm.      Please arrange for new patient consult with vascular surgery at next available.    Nyla Wheatley RN BSN  [facility] Vascular [facility]  [phone]

## 2021-07-15 ENCOUNTER — TELEPHONE (OUTPATIENT)
Dept: FAMILY MEDICINE | Facility: CLINIC | Age: 66
End: 2021-07-15

## 2021-07-15 RX ORDER — DONEPEZIL HYDROCHLORIDE 5 MG/1
5 TABLET, FILM COATED ORAL AT BEDTIME
OUTPATIENT
Start: 2021-07-15

## 2021-07-15 RX ORDER — DIVALPROEX SODIUM 250 MG/1
750 TABLET, DELAYED RELEASE ORAL DAILY
OUTPATIENT
Start: 2021-07-15

## 2021-07-15 RX ORDER — OLANZAPINE 7.5 MG/1
7.5 TABLET, FILM COATED ORAL AT BEDTIME
OUTPATIENT
Start: 2021-07-15

## 2021-07-15 NOTE — TELEPHONE ENCOUNTER
Patient's daughter calls regarding mother's health. She reports a recent decline in cognition. She reports she often is forgetful about things like her phone number, meeting for meals, etc. Patient is inquiring about potentially transferring patient to nursing home due to condition. Patient is reportedly lethargic but daughter feels it might be an unsafe environment as patient does not eat frequently, maybe two times a day, and has been taking her medications as prescribed. Patient is wanting to drive and wander by herself and daughter reminds patient she can't do that.     [facility]waiting [facility] and well as some from [place]. Of note, urology did not change suprapubic catheter because they reported they did not have the records to do at that time.     Please advise, daughter is concerned.     AHSAN Meredith, RN - Patient Advocate and Liaison (PAL)   [facility]   [phone]

## 2021-07-15 NOTE — TELEPHONE ENCOUNTER
Patient should be evaluated for persistent or returned infection given recent UTI.  She will likely need a neuropsych evaluation.  If she is concerned for acute changes in cognition, she will need to have patient evaluated in clinic, or potentially ER/Urgent Care.

## 2021-07-15 NOTE — TELEPHONE ENCOUNTER
7/15/2021 LMTCB and schedule - 2nd and final attempt     Please arrange for new patient consult with vascular surgery at next available.     Appt Note:   Referred to [facility] by Reba Tillman MD for splenic artery aneurysm.      Michelle EDWARD

## 2021-07-15 NOTE — TELEPHONE ENCOUNTER
Routing refill request to provider for review/approval because:  Labs not current:  LDL  Patient had recent prescription change - Divaloprex.     Asmita Ying, DESTININ, RN  [facility]

## 2021-07-16 NOTE — TELEPHONE ENCOUNTER
Attempted to reach patient's daughter x2.     DESTINI MeredithN, RN - Patient Advocate and Liaison (PAL)   [facility]   [phone]

## 2021-07-16 NOTE — TELEPHONE ENCOUNTER
Called patient's daughter to discuss PCP recommendations, awaiting call back.    DESTINI MeredithN, RN - Patient Advocate and Liaison (PAL)   [facility]   [phone]

## 2021-07-16 NOTE — TELEPHONE ENCOUNTER
Attempted x3, if patient or daughter calls clinic, please triage and send to appropriate area, see below.    DESTINI MeredithN, RN - Patient Advocate and Liaison (PAL)   [facility]   [phone]

## 2021-07-19 ENCOUNTER — TELEPHONE (OUTPATIENT)
Dept: UROLOGY | Facility: CLINIC | Age: 66
End: 2021-07-19

## 2021-07-19 DIAGNOSIS — Z87.440 HISTORY OF UTI: Primary | ICD-10-CM

## 2021-07-19 DIAGNOSIS — R33.9 RETENTION OF URINE: Primary | ICD-10-CM

## 2021-07-19 DIAGNOSIS — Z93.59 SUPRAPUBIC CATHETER (H): ICD-10-CM

## 2021-07-19 RX ORDER — METHENAMINE HIPPURATE 1000 MG/1
1 TABLET ORAL 2 TIMES DAILY
Qty: 180 TABLET | Refills: 3 | Status: SHIPPED | OUTPATIENT
Start: 2021-07-19 | End: 2022-07-15

## 2021-07-19 NOTE — TELEPHONE ENCOUNTER
Health Call Center    Phone Message    May a detailed message be left on voicemail: yes     Reason for Call: Other: Joy returning Lacy's call.  She states she has further questions and would greatly appreciate a call back at her earliest convenience.  Thank you!     Action Taken: Message routed to:  Other: [facility]    Travel Screening: Not Applicable

## 2021-07-19 NOTE — TELEPHONE ENCOUNTER
Detailed message left for Joy about the catheter being over due for change. This will be done via IR and hopefully can be upsized. Will ask scheduling to call her to arrange this.     Lacy Birmingham PA-C  [facility] Urology  [phone]      ----- Message from Areli Ta LPN sent at 7/15/2021  2:33 PM CDT -----  This patient's daughter Joy called and I informed her that you were out of the office. She was returning your call about next steps. Her number is [phone]. I informed her to expect a call back next week.   Thanks!  ~Areli

## 2021-07-19 NOTE — TELEPHONE ENCOUNTER
UROLOGY    Dicussed with daughter, Joy. Needs IR exchange of SPT (original was placed in IR in [place]). If this can be upsized to 16Fr can do exchanges in our clinic going forward q 4wks or with home care.     Hiprex + Vit C for UTI prevention.     Virtual visit in 3 months.     Lacy Birmingham PA-C  [facility] Urology

## 2021-07-22 DIAGNOSIS — F09 MILD COGNITIVE DISORDER: ICD-10-CM

## 2021-07-22 DIAGNOSIS — F41.1 GENERALIZED ANXIETY DISORDER: ICD-10-CM

## 2021-07-22 NOTE — TELEPHONE ENCOUNTER
Routing refill request to provider for review/approval because:  Medication is reported/historical, previously prescribed by PCP listed  Thelma Strong RN, BSN  Message handled by CLINIC NURSE.

## 2021-07-23 RX ORDER — OLANZAPINE 7.5 MG/1
7.5 TABLET, FILM COATED ORAL AT BEDTIME
Qty: 90 TABLET | Refills: 0 | Status: SHIPPED | OUTPATIENT
Start: 2021-07-23 | End: 2021-11-02

## 2021-07-23 RX ORDER — DONEPEZIL HYDROCHLORIDE 5 MG/1
5 TABLET, FILM COATED ORAL AT BEDTIME
Qty: 90 TABLET | Refills: 0 | Status: SHIPPED | OUTPATIENT
Start: 2021-07-23 | End: 2021-11-02

## 2021-07-23 RX ORDER — DIVALPROEX SODIUM 250 MG/1
750 TABLET, DELAYED RELEASE ORAL DAILY
Qty: 270 TABLET | Refills: 0 | Status: SHIPPED | OUTPATIENT
Start: 2021-07-23 | End: 2021-10-19

## 2021-07-26 RX ORDER — DIVALPROEX SODIUM 250 MG/1
750 TABLET, EXTENDED RELEASE ORAL DAILY
Qty: 270 TABLET | Refills: 0 | Status: SHIPPED | OUTPATIENT
Start: 2021-07-26 | End: 2021-11-02

## 2021-07-27 DIAGNOSIS — Z11.59 ENCOUNTER FOR SCREENING FOR OTHER VIRAL DISEASES: ICD-10-CM

## 2021-07-27 DIAGNOSIS — R33.9 URINARY RETENTION: Primary | ICD-10-CM

## 2021-07-28 ENCOUNTER — TELEPHONE (OUTPATIENT)
Dept: FAMILY MEDICINE | Facility: CLINIC | Age: 66
End: 2021-07-28

## 2021-07-28 NOTE — TELEPHONE ENCOUNTER
Patient's daughter Joy requesting a FMLA form for herself in regards to her mother's condition with dementia/medical appointments so she can take 1-2 weeks to handle mother's affairs. Asking if we can draft a letter, able to  at [facility] if possible. Miguel Butler () would maybe be able to  also.     Please advise, AVELINO RN can draft if appropriate.    AHSAN Meredith, RN - Patient Advocate and Liaison (PAL)   [facility]   [phone]

## 2021-07-28 NOTE — TELEPHONE ENCOUNTER
Patient needs a visit to fill out the [place] paperwork, so she can either drop the paperwork off and we can do a virtual visit, or she needs to bring the paperwork to the clinic to be filled out during an office visit.

## 2021-07-29 ENCOUNTER — TELEPHONE (OUTPATIENT)
Dept: INTERVENTIONAL RADIOLOGY/VASCULAR | Facility: CLINIC | Age: 66
End: 2021-07-29

## 2021-07-29 ENCOUNTER — LAB (OUTPATIENT)
Dept: LAB | Facility: CLINIC | Age: 66
End: 2021-07-29
Attending: PHYSICIAN ASSISTANT
Payer: COMMERCIAL

## 2021-07-29 ENCOUNTER — MYC MEDICAL ADVICE (OUTPATIENT)
Dept: FAMILY MEDICINE | Facility: CLINIC | Age: 66
End: 2021-07-29

## 2021-07-29 DIAGNOSIS — Z11.59 ENCOUNTER FOR SCREENING FOR OTHER VIRAL DISEASES: ICD-10-CM

## 2021-07-29 PROCEDURE — U0005 INFEC AGEN DETEC AMPLI PROBE: HCPCS

## 2021-07-29 PROCEDURE — U0003 INFECTIOUS AGENT DETECTION BY NUCLEIC ACID (DNA OR RNA); SEVERE ACUTE RESPIRATORY SYNDROME CORONAVIRUS 2 (SARS-COV-2) (CORONAVIRUS DISEASE [COVID-19]), AMPLIFIED PROBE TECHNIQUE, MAKING USE OF HIGH THROUGHPUT TECHNOLOGIES AS DESCRIBED BY CMS-2020-01-R: HCPCS

## 2021-07-29 NOTE — TELEPHONE ENCOUNTER
Pt called for pre-procedure/covid. Covid test scheduled for today 7/29/21.  Pt answered all questions and had no further questions at this time.

## 2021-07-29 NOTE — TELEPHONE ENCOUNTER
Called patient and daughter and left message per her request regarding need for appointment for FMLA paperwork, patient reported last time on call she will watch for voicemail and call back when able.    AHSAN Meredith, RN - Patient Advocate and Liaison (PAL)   [facility]   [phone]

## 2021-07-29 NOTE — TELEPHONE ENCOUNTER
Patient and daughter will get in touch with benefits office and get back to us.    DESTINI MeredithN, RN - Patient Advocate and Liaison (PAL)   [facility]   [phone]

## 2021-07-30 LAB — SARS-COV-2 RNA RESP QL NAA+PROBE: NEGATIVE

## 2021-07-30 NOTE — TELEPHONE ENCOUNTER
MyChart reply sent.     DESTINI MeredithN, RN - Patient Advocate and Liaison (PAL)   [facility]   [phone]

## 2021-08-02 ENCOUNTER — HOSPITAL ENCOUNTER (OUTPATIENT)
Facility: CLINIC | Age: 66
Discharge: HOME | End: 2021-08-02
Attending: RADIOLOGY | Admitting: RADIOLOGY
Payer: COMMERCIAL

## 2021-08-02 ENCOUNTER — APPOINTMENT (OUTPATIENT)
Dept: INTERVENTIONAL RADIOLOGY/VASCULAR | Facility: CLINIC | Age: 66
End: 2021-08-02
Attending: PHYSICIAN ASSISTANT
Payer: COMMERCIAL

## 2021-08-02 VITALS
DIASTOLIC BLOOD PRESSURE: 84 MMHG | RESPIRATION RATE: 16 BRPM | SYSTOLIC BLOOD PRESSURE: 134 MMHG | HEART RATE: 85 BPM | OXYGEN SATURATION: 98 % | TEMPERATURE: 98.7 F

## 2021-08-02 DIAGNOSIS — R33.9 URINARY RETENTION: ICD-10-CM

## 2021-08-02 PROCEDURE — 51705 CHANGE OF BLADDER TUBE: CPT

## 2021-08-02 PROCEDURE — C1769 GUIDE WIRE: HCPCS

## 2021-08-02 PROCEDURE — 272N000571 HC SHEATH CR8

## 2021-08-02 PROCEDURE — 250N000011 HC RX IP 250 OP 636: Performed by: NURSE PRACTITIONER

## 2021-08-02 PROCEDURE — 999N000163 HC STATISTIC SIMPLE TUBE INSERTION/CHARGE, PORT, CATH, FISTULOGRAM

## 2021-08-02 PROCEDURE — 250N000009 HC RX 250: Performed by: NURSE PRACTITIONER

## 2021-08-02 RX ORDER — NALOXONE HYDROCHLORIDE 0.4 MG/ML
0.2 INJECTION, SOLUTION INTRAMUSCULAR; INTRAVENOUS; SUBCUTANEOUS
Status: DISCONTINUED | OUTPATIENT
Start: 2021-08-02 | End: 2021-08-02 | Stop reason: HOSPADM

## 2021-08-02 RX ORDER — NALOXONE HYDROCHLORIDE 0.4 MG/ML
0.4 INJECTION, SOLUTION INTRAMUSCULAR; INTRAVENOUS; SUBCUTANEOUS
Status: DISCONTINUED | OUTPATIENT
Start: 2021-08-02 | End: 2021-08-02 | Stop reason: HOSPADM

## 2021-08-02 RX ORDER — FENTANYL CITRATE 50 UG/ML
25-50 INJECTION, SOLUTION INTRAMUSCULAR; INTRAVENOUS EVERY 5 MIN PRN
Status: DISCONTINUED | OUTPATIENT
Start: 2021-08-02 | End: 2021-08-02 | Stop reason: HOSPADM

## 2021-08-02 RX ORDER — FLUMAZENIL 0.1 MG/ML
0.2 INJECTION, SOLUTION INTRAVENOUS
Status: DISCONTINUED | OUTPATIENT
Start: 2021-08-02 | End: 2021-08-02 | Stop reason: HOSPADM

## 2021-08-02 RX ORDER — CEFTAZIDIME 1 G/1
1 INJECTION, POWDER, FOR SOLUTION INTRAMUSCULAR; INTRAVENOUS
Status: COMPLETED | OUTPATIENT
Start: 2021-08-02 | End: 2021-08-02

## 2021-08-02 RX ADMIN — CEFTAZIDIME 1 G: 1 INJECTION, POWDER, FOR SOLUTION INTRAMUSCULAR; INTRAVENOUS at 11:59

## 2021-08-02 RX ADMIN — LIDOCAINE HYDROCHLORIDE 10 ML: 10 INJECTION, SOLUTION INFILTRATION; PERINEURAL at 12:24

## 2021-08-02 RX ADMIN — MIDAZOLAM HYDROCHLORIDE 1 MG: 1 INJECTION, SOLUTION INTRAMUSCULAR; INTRAVENOUS at 12:15

## 2021-08-02 RX ADMIN — SODIUM CHLORIDE 500 ML: 9 INJECTION, SOLUTION INTRAVENOUS at 12:25

## 2021-08-02 RX ADMIN — FENTANYL CITRATE 50 MCG: 50 INJECTION, SOLUTION INTRAMUSCULAR; INTRAVENOUS at 12:15

## 2021-08-02 NOTE — PROCEDURES
[facility]    Procedure: Superpubic catheter exchange    Date/Time: 8/2/2021 1:18 PM  Performed by: Dominique John DO  Authorized by: Dominique John DO     UNIVERSAL PROTOCOL   Site Marked: NA  Prior Images Obtained and Reviewed:  Yes  Required items: Required blood products, implants, devices and special equipment available    Patient identity confirmed:  Verbally with patient, arm band, provided demographic data and hospital-assigned identification number  Patient was reevaluated immediately before administering moderate or deep sedation or anesthesia  Confirmation Checklist:  Patient's identity using two indicators, relevant allergies, procedure was appropriate and matched the consent or emergent situation and correct equipment/implants were available  Time out: Immediately prior to the procedure a time out was called    Universal Protocol: the Joint Commission Universal Protocol was followed    Preparation: Patient was prepped and draped in usual sterile fashion           ANESTHESIA    Anesthesia: Local infiltration  Local Anesthetic:  Lidocaine 1% without epinephrine      SEDATION    Patient Sedated: Yes    Sedation Type:  Moderate (conscious) sedation  Vital signs: Vital signs monitored during sedation    See dictated procedure note for full details.  Findings: Superpubic catheter exchange and upsize to a 16 Sami catheter.    Specimens: none    Complications: None    Condition: Stable    Plan: Urology to do future changes.    PROCEDURE   Patient Tolerance:  Patient tolerated the procedure well with no immediate complications    Length of time physician/provider present for 1:1 monitoring during sedation: 5

## 2021-08-02 NOTE — DISCHARGE INSTRUCTIONS
Suprapubic catheter Insertion/Exchange Discharge Instructions     After you go home:      You may resume your normal diet    Drink plenty of fluids, especially water    Have an adult stay with you for 6 hours if you received sedation       For 24 hours - due to the sedation you received:    Relax and take it easy    Do NOT make any important or legal decisions    Do NOT drive or operate machines at home or at work    Do NOT drink alcohol    Care of Tube Site:      For the first 48 hrs, check your puncture site every couple hours while you are awake     Check the tube site twice a day for signs of infection    Keep the dressing around the tube dry    Clean the site with soap and water, rinse and pat dry with a clean gauze and change the gauze/tape dressing every 2-3 days.     Change the dressing if it becomes wet or dirty    You may shower but do not get the dressing wet. Place a waterproof cover over the dressing (such as plastic wrap).     No tub baths, whirlpools or swimming until the tube is removed     Activity:      You may go back to normal activity in 24 hours    Wait 48 hours before lifting, straining, exercise or other strenuous activity    Medicines:      You may resume all medications    Resume your Warfarin/Coumadin at your regular dose today. Follow up with your provider to have your INR rechecked    Resume your Platelet Inhibitors and Aspirin tomorrow at your regular dose    For minor pain, you may take Acetaminophen (Tylenol) or Ibuprofen (Advil)               Call the provider who ordered this procedure if:      The site is red, swollen, hot or tender    There is foul-smelling urine or drainage from the tube site    You have pain that is getting worse or that does not improve with pain medication    You have chills or a fever greater than 101 F (38 C)    Fluid stops draining or is leaking around the tube onto your skin    The tube falls out     Any questions or concerns    Call  911 or go to the  Emergency Room if you have:      Severe pain or trouble breathing    Bleeding that you cannot control    Other Instructions:      If the tube falls out - cover the opening with gauze & tape    Record urine drainage output amounts & bring sheet to return appointments    Take your temperature daily      If you have questions call:          [facility] Radiology Dept @ [phone]

## 2021-08-02 NOTE — PRE-PROCEDURE
GENERAL PRE-PROCEDURE:   Procedure:  Suprapubic catheter exchange and upsize with possible procedural sedation  Date/Time:  8/2/2021 11:34 AM    Written consent obtained?: Yes    Risks and benefits: Risks, benefits and alternatives were discussed    Consent given by:  Patient  Patient states understanding of procedure being performed: Yes    Patient's understanding of procedure matches consent: Yes    Procedure consent matches procedure scheduled: Yes    Expected level of sedation:  Moderate  Appropriately NPO:  Yes  Mallampati  :  Grade 2- soft palate, base of uvula, tonsillar pillars, and portion of posterior pharyngeal wall visible  Lungs:  Lungs clear with good breath sounds bilaterally  Heart:  Normal heart sounds and rate  History & Physical reviewed:  History and physical reviewed and no updates needed  Statement of review:  I have reviewed the lab findings, diagnostic data, medications, and the plan for sedation

## 2021-08-02 NOTE — IR NOTE
Interventional Radiology Intra-procedural Nursing Note    Patient Name: Mariela Dufyf  Medical Record Number: [id number]  Today's Date: August 2, 2021    Start Time: 1220  End of procedure time: 1224  Procedure: Suprapubic catheter exchange  Report given to: ALKA Perez RN  Time pt departs:  1227  : no    Other Notes: Pt came to IR suite 1 on a cart.  Pt was moved over to the table and was placed in supine position.  Pt was draped and prepped with chlorhexidine soap.  Pt was given fentanyl and versed for comfort.      MEDICATIONS: Last Dose 1215    Fentanyl 50 mcg  Versed 1 mg  Lidocaine 10 ml's      Brady Oneill RN

## 2021-08-02 NOTE — PROGRESS NOTES
PATIENT/VISITOR WELLNESS SCREENING    Step 1 Patient Screening    1. In the last month, have you been in contact with someone who was confirmed or suspected to have Coronavirus/COVID-19? No    2. Do you have the following symptoms?  Fever/Chills? No   Cough? No   Shortness of breath? No   New loss of taste or smell? No  Sore throat? No  Muscle or body aches? No  Headaches? No  Fatigue? No  Vomiting or diarrhea? No    Step 2 Visitor Screening    1. Name of Visitor (1 visitor per patient): gaudencio moya      2. In the last month, have you been in contact with someone who was confirmed or suspected to have Coronavirus/COVID-19? No    3. Do you have the following symptoms?  Fever/Chills? No   Cough? No   Shortness of breath? No   Skin rash? No   Loss of taste or smell? No  Sore throat? No  Runny or stuffy nose? No  Muscle or body aches? No  Headaches? No  Fatigue? No  Vomiting or diarrhea? No    If the visitor has positive symptoms, notify supervisor/manger  Per policy, the visitor will need to leave the facility     Step 3 Refer to logic grid below for actions    NO SYMPTOM(S)    ACTIONS:  1. Standard rooming process  2. Provider to assess per normal protocol  3. Implement precautions as needed and per guidelines     POSITIVE SYMPTOM(S)  If positive for ANY of the following symptoms: fever, cough, shortness of breath, rash    ACTION:  1. Continue to have the patient wear a mask   2. Room patient as soon as possible  3. Don appropriate PPE when entering room  4. Provider evaluation

## 2021-08-02 NOTE — PROGRESS NOTES
Care Suites Admission Nursing Note    Patient Information  Name: Mariela Duffy  Age: 65 year old  Reason for admission: supra pubic cath exchange and upsize    Care Suites arrival time: 1100 am     Visitor Information  Name: gaudencio  Informed of visitor restrictions: Yes  1 visitor allowed per patient   Visitor must screen negative for COVID symptoms   Visitor must wear a mask  Waiting rooms closed to visitors    Patient Admission/Assessment   Pre-procedure assessment complete: Yes  If abnormal assessment/labs, provider notified: Yes  NPO: Yes  Medications held per instructions/orders: Yes  Consent: deferred  If applicable, pregnancy test status: obtained  Patient oriented to room: Yes  Education/questions answered: Yes  Plan/other: supra pubic exchange    Discharge Planning  Discharge name/phone number: daughter GAUDENCIO [phone]  Overnight post sedation caregiver: daughter  Discharge location: home    Chris Mccarthy RN

## 2021-08-02 NOTE — PROGRESS NOTES
Care Suites Discharge Nursing Note    Patient Information  Name: Mariela Duffy  Age: 65 year old    Discharge Education:  Discharge instructions reviewed: Yes  Additional education/resources provided: AVS.  Patient/patient representative verbalizes understanding: Yes  Patient discharging on new medications: No  Medication education completed: N/A    Discharge Plans:   Discharge location: home  Discharge ride contacted: Yes  Approximate discharge time: 1430    Discharge Criteria:  Discharge criteria met and vital signs stable: Yes.  Suprapubic cath site covered with gauze and secured with tape.  Josefa CDI.  Cath bag changed to leg bag per patient request.    Patient Belongs:  Patient belongings returned to patient: Yes    Heaven Cartagena RN

## 2021-08-02 NOTE — PROGRESS NOTES
Care Suites Post Procedure Note    Patient Information  Name: Mariela Duffy  Age: 65 year old    Post Procedure  Time patient returned to Care Suites: 1237  Concerns/abnormal assessment: None at this time.    If abnormal assessment, provider notified: N/A  Plan/Other: Per orders.    Heaven Cartagena RN

## 2021-08-02 NOTE — PROVIDER NOTIFICATION
Text page sent to Nyla KEVIN: SP catheter originally placed in [place]. Patient and dtr. have never received education on how to care for and clean site and cath bags. Can you stop over to educate? Please advise.    Per Nyla, continue with previous cares as at home. Review AVS istructions and f/u with Urology RN on Monday, Aug. 23rd.

## 2021-08-05 ENCOUNTER — TELEPHONE (OUTPATIENT)
Dept: UROLOGY | Facility: CLINIC | Age: 66
End: 2021-08-05

## 2021-08-05 ENCOUNTER — TELEPHONE (OUTPATIENT)
Dept: FAMILY MEDICINE | Facility: CLINIC | Age: 66
End: 2021-08-05

## 2021-08-05 NOTE — TELEPHONE ENCOUNTER
Rescheduled video visit for more convenient:    AUG 24   2021 10:00 AM - Video Visit  [facility] - April J. Morgan Tillman MD     Patient's daughter will print off papers and bring to clinic.     AHSAN Meredith, RN - Patient Advocate and Liaison (PAL)   [facility]   [phone]

## 2021-08-05 NOTE — TELEPHONE ENCOUNTER
Forms/Letter Request    Name of form/letter: ABEL    Have you been seen for this request: Yes     Do we have the form/letter: Yes    When is form/letter needed by: 8/10/2021    How would you like the form/letter returned:     Patient Notified form requests are processed in 3-5 business days:Yes    Okay to leave a detailed message? Yes Cell number on file:    Telephone Information:   Mobile [phone]

## 2021-08-06 NOTE — TELEPHONE ENCOUNTER
Per Dr. RANDOLPH, pt is needing a virtual appt to discuss/complete FMLA forms.  Gave to TC to contact patient to schedule---form will be placed in Dr. RANDOLPH folder     Marsha Arana/FIONA  Greig---[facility]

## 2021-08-10 NOTE — TELEPHONE ENCOUNTER
After multiple attempts, unable to reach patient or daughter, sent letter to inform daughter of virtual appointment on Tuesday 08/24/21 with Dr. Morgan Tillman to complete FMLA form. Form in Dr. Morgan Tillman folder @ [facility]. Ruth Behrens.

## 2021-08-11 ENCOUNTER — ALLIED HEALTH/NURSE VISIT (OUTPATIENT)
Dept: UROLOGY | Facility: CLINIC | Age: 66
End: 2021-08-11
Payer: COMMERCIAL

## 2021-08-11 DIAGNOSIS — R33.9 URINARY RETENTION: Primary | ICD-10-CM

## 2021-08-11 PROCEDURE — 99207 PR NO CHARGE NURSE ONLY: CPT

## 2021-08-11 NOTE — PROGRESS NOTES
Chief Complaint   Patient presents with     Urinary Retention     Catheter bag replacement     Patient's leg bag was leaking, and she is here for a replacement of the bag.     Patient's catheter was disconnected from the leg bag and a new sterile tubing and drainage bag were attached to the catheter and secured to the thigh.     Verónica Humphrey, EMT

## 2021-08-31 ENCOUNTER — TELEPHONE (OUTPATIENT)
Dept: FAMILY MEDICINE | Facility: CLINIC | Age: 66
End: 2021-08-31

## 2021-08-31 ENCOUNTER — OFFICE VISIT (OUTPATIENT)
Dept: URGENT CARE | Facility: URGENT CARE | Age: 66
End: 2021-08-31
Payer: COMMERCIAL

## 2021-08-31 VITALS
TEMPERATURE: 98.1 F | OXYGEN SATURATION: 96 % | DIASTOLIC BLOOD PRESSURE: 69 MMHG | SYSTOLIC BLOOD PRESSURE: 102 MMHG | RESPIRATION RATE: 16 BRPM | HEART RATE: 92 BPM

## 2021-08-31 DIAGNOSIS — L08.9 LOCAL SKIN INFECTION: Primary | ICD-10-CM

## 2021-08-31 DIAGNOSIS — Z87.19 HX OF IRRITABLE BOWEL SYNDROME: ICD-10-CM

## 2021-08-31 PROCEDURE — 99214 OFFICE O/P EST MOD 30 MIN: CPT | Performed by: PHYSICIAN ASSISTANT

## 2021-08-31 RX ORDER — DIPHENOXYLATE HCL/ATROPINE 2.5-.025MG
1 TABLET ORAL DAILY PRN
Qty: 30 TABLET | Refills: 0 | Status: SHIPPED | OUTPATIENT
Start: 2021-08-31 | End: 2021-10-19

## 2021-08-31 RX ORDER — CEPHALEXIN 500 MG/1
500 CAPSULE ORAL 3 TIMES DAILY
Qty: 21 CAPSULE | Refills: 0 | Status: SHIPPED | OUTPATIENT
Start: 2021-08-31 | End: 2021-09-07

## 2021-08-31 NOTE — TELEPHONE ENCOUNTER
Vascular Medicine appointment scheduled per PAL RN for 9/2 at 2:30 with Dr. Sheppard at [facility].     Still inquiring about home health and [facility] Neurology, see below information and advise, thank you.    DESTINI MeredithN, RN - Patient Advocate and Liaison (PAL)   [facility]   [phone]

## 2021-08-31 NOTE — PROGRESS NOTES
Assessment & Plan     Local skin infection  Around suprapubic catheter insertion site. Advised to keep site clean and dry. Affected area cleaned today and telfa gauze put on. Keflex Rx.  Keep monitoring symptoms.  Follow-up if any worsening symptoms. She agrees.  - cephALEXin (KEFLEX) 500 MG capsule  Dispense: 21 capsule; Refill: 0    Hx of irritable bowel syndrome  Lomotil refilled. Follow up with pcp for further refills. She agrees.  - diphenoxylate-atropine (LOMOTIL) 2.5-0.025 MG tablet  Dispense: 30 tablet; Refill: 0         Return in about 1 week (around 9/7/2021) for Symptoms failing to improve.    Angelita Thompson PA-C  [facility]RG[facility] C[facility] [place] is [place] 65 year old female who presents to clinic today for the following health issues:  Chief Complaint   Patient presents with     Urgent Care     Infection     Concerns of infection in cath-Patient has a castañeda cath-Odor and pain     HPI    Patient presenting to urgent care today with concerns for infection at suprapubic catheter insertion site.  In the past couple days she has noted some erythema around the catheter insertion site, she also reports mild pain and an odor.  No drainage from the affected site.  No fevers or chills.  Urine output is normal. No abdominal pain.     Also reports today Hx of IBS, would like a refill on Lomotil.    Review of Systems  Constitutional, HEENT, cardiovascular, pulmonary, GI, , musculoskeletal, neuro, skin, endocrine and psych systems are negative, except as otherwise noted.      Objective    /69   Pulse 92   Temp 98.1  F (36.7  C) (Tympanic)   Resp 16   LMP 01/20/2004   SpO2 96%   Breastfeeding No   Physical Exam   GENERAL: healthy, alert and no distress  ABDOMEN: soft, nontender, no masses and bowel sounds normal  SKIN: there is mild erythema surrounding the suprapubic catheter insertion site, mild to moderate tenderness to palpation, no evidence of abscess. Urine is clear in  catheter bag.

## 2021-08-31 NOTE — TELEPHONE ENCOUNTER
Patient and daughter are requesting home health referral for urinary catheter care. Please advise.    Vascular medicine follow-up needed, will call [facility]  [address]  [address] [place] [address]  Appointments: [phone] to schedule consultation.     Also inquiring about [facility] follow-up, best to do it now? Please advise, thank you.    AHSAN Meredith, RN - Patient Advocate and Liaison (PAL)   [facility]   [phone]

## 2021-09-02 ENCOUNTER — OFFICE VISIT (OUTPATIENT)
Dept: SURGERY | Facility: CLINIC | Age: 66
End: 2021-09-02
Payer: COMMERCIAL

## 2021-09-02 VITALS
DIASTOLIC BLOOD PRESSURE: 72 MMHG | OXYGEN SATURATION: 96 % | WEIGHT: 170 LBS | SYSTOLIC BLOOD PRESSURE: 116 MMHG | RESPIRATION RATE: 16 BRPM | HEART RATE: 103 BPM | BODY MASS INDEX: 25.18 KG/M2 | HEIGHT: 69 IN

## 2021-09-02 DIAGNOSIS — I72.8 SPLENIC ARTERY ANEURYSM (H): Primary | ICD-10-CM

## 2021-09-02 PROCEDURE — 99203 OFFICE O/P NEW LOW 30 MIN: CPT | Performed by: SURGERY

## 2021-09-02 ASSESSMENT — MIFFLIN-ST. JEOR: SCORE: 1376.52

## 2021-09-02 NOTE — TELEPHONE ENCOUNTER
They will need a visit in order for home health referral to be placed.  Patient needs to be seen, and this was not discussed back in July, so we cannot make a referral based off of our visit then.    We can get neurology referral placed then also, but if she was seen by [facility], she should not need another referral unless they require one due to it being so long in between visits.

## 2021-09-02 NOTE — TELEPHONE ENCOUNTER
Joy informed. F2F visit scheduled 9/16/21. She'll call [facility] to find out if referral needed. Inside RN lines provided.   Eliud Nielson RN

## 2021-09-02 NOTE — PROGRESS NOTES
I was requested by Dr. Reba Tillman to evaluate Fely Duffy for splenic artery aneurysm.  These were found incidentally on a CT scan that was done when she was admitted to the hospital with a urinary tract infection.  This was done sometime at the end of March of this year.    Past medical and surgical history are reviewed.    I reviewed the CT scan that was performed and showed the images to the patient and to her family member.  From the mid splenic artery there is an aneurysm arising which measures about 13 mm.  I looked back at the old imaging as far back as 2002 and no comparative images are available.  There is also one smaller 7 mm aneurysm at the hilum.  Both are calcified.    Diagnosis: Small, asymptomatic, splenic artery aneurysms.     Plan: No need for intervention based on size. I have reassured the patient and her accompanying family member.   We will get a CTA of the abdomen and pelvis in one year for longitudinal follow up since we do not have any. I will see her back in one year.

## 2021-09-02 NOTE — TELEPHONE ENCOUNTER
Left message for Fely or Joy to call back for a message from Fely's primary care provider  Eliud Nielson, RN - Patient Advocate and Liaison (PAL)  [facility]   [phone] or [phone]

## 2021-09-04 ENCOUNTER — HEALTH MAINTENANCE LETTER (OUTPATIENT)
Age: 66
End: 2021-09-04

## 2021-09-06 ENCOUNTER — MEDICAL CORRESPONDENCE (OUTPATIENT)
Dept: HEALTH INFORMATION MANAGEMENT | Facility: CLINIC | Age: 66
End: 2021-09-06

## 2021-09-13 ENCOUNTER — MYC MEDICAL ADVICE (OUTPATIENT)
Dept: FAMILY MEDICINE | Facility: CLINIC | Age: 66
End: 2021-09-13

## 2021-09-14 NOTE — TELEPHONE ENCOUNTER
Confirming [employer] message, routed to triage  Thelma Strong RN, BSN  Message handled by CLINIC NURSE.

## 2021-09-14 NOTE — TELEPHONE ENCOUNTER
Called daughter, discussed, will call urology to schedule as home care not set up yet, needs F2F visit  Thelma Strong RN, BSN  Message handled by CLINIC NURSE.

## 2021-09-16 ENCOUNTER — OFFICE VISIT (OUTPATIENT)
Dept: FAMILY MEDICINE | Facility: CLINIC | Age: 66
End: 2021-09-16
Payer: COMMERCIAL

## 2021-09-16 VITALS
WEIGHT: 175 LBS | HEIGHT: 68 IN | HEART RATE: 102 BPM | BODY MASS INDEX: 26.52 KG/M2 | DIASTOLIC BLOOD PRESSURE: 72 MMHG | SYSTOLIC BLOOD PRESSURE: 109 MMHG | OXYGEN SATURATION: 97 % | TEMPERATURE: 98.5 F

## 2021-09-16 DIAGNOSIS — Z93.59 SUPRAPUBIC CATHETER (H): Primary | ICD-10-CM

## 2021-09-16 PROCEDURE — 99213 OFFICE O/P EST LOW 20 MIN: CPT | Performed by: FAMILY MEDICINE

## 2021-09-16 ASSESSMENT — PATIENT HEALTH QUESTIONNAIRE - PHQ9
SUM OF ALL RESPONSES TO PHQ QUESTIONS 1-9: 2
10. IF YOU CHECKED OFF ANY PROBLEMS, HOW DIFFICULT HAVE THESE PROBLEMS MADE IT FOR YOU TO DO YOUR WORK, TAKE CARE OF THINGS AT HOME, OR GET ALONG WITH OTHER PEOPLE: NOT DIFFICULT AT ALL
SUM OF ALL RESPONSES TO PHQ QUESTIONS 1-9: 2

## 2021-09-16 ASSESSMENT — ANXIETY QUESTIONNAIRES
GAD7 TOTAL SCORE: 0
7. FEELING AFRAID AS IF SOMETHING AWFUL MIGHT HAPPEN: NOT AT ALL
GAD7 TOTAL SCORE: 0
2. NOT BEING ABLE TO STOP OR CONTROL WORRYING: NOT AT ALL
5. BEING SO RESTLESS THAT IT IS HARD TO SIT STILL: NOT AT ALL
8. IF YOU CHECKED OFF ANY PROBLEMS, HOW DIFFICULT HAVE THESE MADE IT FOR YOU TO DO YOUR WORK, TAKE CARE OF THINGS AT HOME, OR GET ALONG WITH OTHER PEOPLE?: NOT DIFFICULT AT ALL
1. FEELING NERVOUS, ANXIOUS, OR ON EDGE: NOT AT ALL
4. TROUBLE RELAXING: NOT AT ALL
7. FEELING AFRAID AS IF SOMETHING AWFUL MIGHT HAPPEN: NOT AT ALL
6. BECOMING EASILY ANNOYED OR IRRITABLE: NOT AT ALL
3. WORRYING TOO MUCH ABOUT DIFFERENT THINGS: NOT AT ALL
GAD7 TOTAL SCORE: 0

## 2021-09-16 ASSESSMENT — MIFFLIN-ST. JEOR: SCORE: 1387.29

## 2021-09-16 NOTE — PROGRESS NOTES
"    Assessment & Plan     Suprapubic catheter (H)  Patient has urology appointment tomorrow, does not look like infection.  Pt will show site to urologist for additional input.  Given dementia history, I do think home catheter changes would be appropriate for patient if this can be done.   - HOME CARE NURSING REFERRAL    15 minutes spent on the date of the encounter doing chart review, history and exam, documentation and further activities per the note     BMI:   Estimated body mass index is 26.61 kg/m  as calculated from the following:    Height as of this encounter: 1.727 m (5' 8\").    Weight as of this encounter: 79.4 kg (175 lb).       See Patient Instructions    Return in about 3 months (around 12/16/2021) for Preventative Care Visit.    Reba Tillman MD  [facility]    Shmuel Geiger is a 65 year old who presents for the following health issues  accompanied by her son:    History of Present Illness       She eats 2-3 servings of fruits and vegetables daily.She consumes 4 sweetened beverage(s) daily.She exercises with enough effort to increase her heart rate 9 or less minutes per day.  She exercises with enough effort to increase her heart rate 3 or less days per week. She is missing 1 dose(s) of medications per week.  She is not taking prescribed medications regularly due to remembering to take.       Pt here requesting home care orders.  Look at tube site for infection.      Patient/Family would like to get an order for Home health, so that she can get her catheters changed at home instead of having to travel to the clinic.  She has a history of vascular dementia which makes leaving the home difficult.  They also have concerns about whether or not her catheter site has an infection or not.  Notes the site is sometimes tender.    Review of Systems   Constitutional, HEENT, cardiovascular, pulmonary, gi and gu systems are negative, except as otherwise noted.      Objective  " "  /72   Pulse 102   Temp 98.5  F (36.9  C) (Oral)   Ht 1.727 m (5' 8\")   Wt 79.4 kg (175 lb)   LMP 01/20/2004   SpO2 97%   BMI 26.61 kg/m    Body mass index is 26.61 kg/m .  Physical Exam   GENERAL: healthy, alert and no distress  ABDOMEN: soft, nontender, no hepatosplenomegaly, no masses and bowel sounds normal  SKIN:  Skin surrounding the catheter insertion on pubis is moist, pink.  Slight odor to area, uncertain if it is hygiene or purulent discharge, as tissue appears to have moist granulation tissue moreso than discharge.  Urine is dark gold, clear.            "

## 2021-09-17 ENCOUNTER — TELEPHONE (OUTPATIENT)
Dept: FAMILY MEDICINE | Facility: CLINIC | Age: 66
End: 2021-09-17

## 2021-09-17 ENCOUNTER — TELEPHONE (OUTPATIENT)
Dept: UROLOGY | Facility: CLINIC | Age: 66
End: 2021-09-17

## 2021-09-17 ENCOUNTER — OFFICE VISIT (OUTPATIENT)
Dept: UROLOGY | Facility: CLINIC | Age: 66
End: 2021-09-17
Payer: COMMERCIAL

## 2021-09-17 DIAGNOSIS — Z79.2 PROPHYLACTIC ANTIBIOTIC: Primary | ICD-10-CM

## 2021-09-17 DIAGNOSIS — R33.9 URINARY RETENTION: ICD-10-CM

## 2021-09-17 PROCEDURE — 99213 OFFICE O/P EST LOW 20 MIN: CPT | Performed by: PHYSICIAN ASSISTANT

## 2021-09-17 RX ORDER — CIPROFLOXACIN 500 MG/1
500 TABLET, FILM COATED ORAL ONCE
Qty: 1 TABLET | Refills: 0 | Status: SHIPPED | OUTPATIENT
Start: 2021-09-17 | End: 2021-09-17

## 2021-09-17 ASSESSMENT — ANXIETY QUESTIONNAIRES: GAD7 TOTAL SCORE: 0

## 2021-09-17 ASSESSMENT — PATIENT HEALTH QUESTIONNAIRE - PHQ9: SUM OF ALL RESPONSES TO PHQ QUESTIONS 1-9: 2
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
## 2021-09-17 NOTE — TELEPHONE ENCOUNTER
LM on  to call this RN back if he should call the main clinic please forward to me [phone]  Tory Forbes RN

## 2021-09-17 NOTE — NURSING NOTE
Chief Complaint   Patient presents with     Urinary Retention     supra pubic catheter change today     Catheter removal documentation on 9/17/2021:    Mariela Duffy presents to the clinic for catheter removal.  Reason for removal: urinary retention  Order has been verified. yes  Catheter successfully removed at 1:19 PM without immediate complication.  150cc's of urine present in the catheter bag.  Urethral meatus is free of secretions and encrustation.  The patient is afebrile.  The patient tolerated the procedure and was instructed to monitor for pain or discomfort    Catheter insertion documentation on 9/17/2021:    Mariela Duffy presents to the clinic for catheter insertion.  Reason for insertion: scheduled insertion  Order has been verified. yes  Catheter successfully inserted into the urethral meatus in the usual sterile fashion without immediate complication.  Type of catheter placed: 16 Gabonese supra pubic catheter  Urine is yellow in color.  30 cc's of urine output returned.  Balloon was filled with 5cc's of normal saline.  Securement device placed for the catheter.  The patient tolerated the procedure and was instructed to return or call for pain, fever, leakage or decreased urine flow Take antibiotic today that was sent to her pharmacy.      Dominique Sawyer LPN
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
## 2021-09-17 NOTE — TELEPHONE ENCOUNTER
Dominique humphreys called in.  Pt is getting her catheter changed monthly.  She has an appt with urology today and have it changed.  She also wanted home care for check in with her mother.    Advised to ask urology what they suggest to get cath changed going forward.  Dtr will ask    Tory Forbes. RN, BSN, PAL (Patient Advocate Liaison)  [facility]   [phone]

## 2021-09-17 NOTE — TELEPHONE ENCOUNTER
----- Message from April Lizbeth Tillman MD sent at 9/16/2021  6:20 PM CDT -----  Patient was looking to try to get home care to come and do catheter changes.  Im not sure if you can help and look into this, or if the patient will need to do catheter changes at the urology clinic.    ACH  ----- Message -----  From: Delia Vogel  Sent: 9/16/2021   3:56 PM CDT  To: MD Rob Pantoja,   Thank you for this home care PT referral on this patient. At this time, [facility] is experiencing staffing complication within this patients' geographical area and are unable to take this patient on to be seen for these services being requested. We sincerely apologize for any inconvenience this may cause, as we understand we are all in quite a tough spot and are doing our very best to accommodate appropriately. We went ahead and attempted to also try and help send this referral elsewhere, we found ourselves to be unsuccessful as they are also either at capacity or are unable to work with their insurance. We will need to ask that your clinic reaches out to another agency that could possibly take this referral on at this time. Please call our office or respond to this message with any questions: [phone]. We will need to ask that your clinic notifies the patient of these struggles so they are in communication as to where their referral and services are at.     Agencies we have tried:  [facility]  [facility]  [facility]  [facility]  [facility]  [facility]  [facility] [place]    Thank you,   [facility]

## 2021-09-17 NOTE — TELEPHONE ENCOUNTER
----- Message from Lacy Birmingham PA-C sent at 9/17/2021  2:59 PM CDT -----  DME for belly bag ordered. Pls fax to [employer].

## 2021-09-18 ENCOUNTER — MYC MEDICAL ADVICE (OUTPATIENT)
Dept: FAMILY MEDICINE | Facility: CLINIC | Age: 66
End: 2021-09-18

## 2021-09-20 NOTE — TELEPHONE ENCOUNTER
See MyChart message, please advise, thank you.    DESTINI MeredithN, RN - Patient Advocate and Liaison (PAL)   [facility]   [phone]

## 2021-09-22 ENCOUNTER — TELEPHONE (OUTPATIENT)
Dept: UROLOGY | Facility: CLINIC | Age: 66
End: 2021-09-22

## 2021-09-22 NOTE — TELEPHONE ENCOUNTER
" Health Call Center    Phone Message    May a detailed message be left on voicemail: yes     Reason for Call: Other: Dominique is calling to get an appt scheduled for her mother's cath bag change. Fely has a video visit with Lacy on 10/19 and she is  wondering if that can be changed into a in person visit for the cath change. She also is wanting to know if the \"belly bag\" Fely is supposed to be switching to came in. Please give Dominique a call back to discuss options. Thanks     Action Taken: Other: uro    Travel Screening: Not Applicable                                                                        "

## 2021-09-22 NOTE — TELEPHONE ENCOUNTER
MyChart reply sent.     DESTINI MeredithN, RN - Patient Advocate and Liaison (PAL)   [facility]   [phone]

## 2021-09-28 ENCOUNTER — TRANSFERRED RECORDS (OUTPATIENT)
Dept: HEALTH INFORMATION MANAGEMENT | Facility: CLINIC | Age: 66
End: 2021-09-28

## 2021-10-15 ENCOUNTER — TELEPHONE (OUTPATIENT)
Dept: FAMILY MEDICINE | Facility: CLINIC | Age: 66
End: 2021-10-15

## 2021-10-15 NOTE — TELEPHONE ENCOUNTER
"Daughter called with a couple of concerns, CTC on file.     Patient has been experiencing worsening IBS symptoms, appointment scheduled for early next week. Daughter and writer discussed reasons for her to be seen sooner than Tuesday 10/19/2021, including signs of dehydration.     Daughter states that the patient ordered a weight loss medication called \"Nucific Bio 4.\" Daughter calling to make sure that this is okay to take along with the rest of her medication.     Routing to provider to inform. Please advise regarding weight loss medication.     DESTINI BoyceN, RN  [facility]    "

## 2021-10-18 ASSESSMENT — PATIENT HEALTH QUESTIONNAIRE - PHQ9
10. IF YOU CHECKED OFF ANY PROBLEMS, HOW DIFFICULT HAVE THESE PROBLEMS MADE IT FOR YOU TO DO YOUR WORK, TAKE CARE OF THINGS AT HOME, OR GET ALONG WITH OTHER PEOPLE: SOMEWHAT DIFFICULT
SUM OF ALL RESPONSES TO PHQ QUESTIONS 1-9: 12
SUM OF ALL RESPONSES TO PHQ QUESTIONS 1-9: 12

## 2021-10-19 ENCOUNTER — OFFICE VISIT (OUTPATIENT)
Dept: UROLOGY | Facility: CLINIC | Age: 66
End: 2021-10-19
Payer: COMMERCIAL

## 2021-10-19 ENCOUNTER — OFFICE VISIT (OUTPATIENT)
Dept: FAMILY MEDICINE | Facility: CLINIC | Age: 66
End: 2021-10-19
Payer: COMMERCIAL

## 2021-10-19 VITALS
DIASTOLIC BLOOD PRESSURE: 87 MMHG | WEIGHT: 172 LBS | TEMPERATURE: 98.3 F | OXYGEN SATURATION: 99 % | RESPIRATION RATE: 18 BRPM | HEIGHT: 68 IN | HEART RATE: 80 BPM | SYSTOLIC BLOOD PRESSURE: 133 MMHG | BODY MASS INDEX: 26.07 KG/M2

## 2021-10-19 VITALS
WEIGHT: 172 LBS | OXYGEN SATURATION: 98 % | BODY MASS INDEX: 26.07 KG/M2 | SYSTOLIC BLOOD PRESSURE: 130 MMHG | HEART RATE: 112 BPM | DIASTOLIC BLOOD PRESSURE: 80 MMHG | HEIGHT: 68 IN

## 2021-10-19 DIAGNOSIS — Z87.19 HX OF IRRITABLE BOWEL SYNDROME: ICD-10-CM

## 2021-10-19 DIAGNOSIS — R33.9 URINARY RETENTION: ICD-10-CM

## 2021-10-19 DIAGNOSIS — Z79.2 PROPHYLACTIC ANTIBIOTIC: Primary | ICD-10-CM

## 2021-10-19 DIAGNOSIS — Z12.11 SPECIAL SCREENING FOR MALIGNANT NEOPLASMS, COLON: ICD-10-CM

## 2021-10-19 DIAGNOSIS — R19.7 DIARRHEA, UNSPECIFIED TYPE: Primary | ICD-10-CM

## 2021-10-19 LAB
CRP SERPL-MCNC: 24 MG/L (ref 0–8)
ERYTHROCYTE [DISTWIDTH] IN BLOOD BY AUTOMATED COUNT: 13.4 % (ref 10–15)
ERYTHROCYTE [SEDIMENTATION RATE] IN BLOOD BY WESTERGREN METHOD: 18 MM/HR (ref 0–30)
HCT VFR BLD AUTO: 42.1 % (ref 35–47)
HGB BLD-MCNC: 14.1 G/DL (ref 11.7–15.7)
MCH RBC QN AUTO: 29.6 PG (ref 26.5–33)
MCHC RBC AUTO-ENTMCNC: 33.5 G/DL (ref 31.5–36.5)
MCV RBC AUTO: 88 FL (ref 78–100)
PLATELET # BLD AUTO: 246 10E3/UL (ref 150–450)
RBC # BLD AUTO: 4.76 10E6/UL (ref 3.8–5.2)
WBC # BLD AUTO: 6.1 10E3/UL (ref 4–11)

## 2021-10-19 PROCEDURE — 86140 C-REACTIVE PROTEIN: CPT | Performed by: PHYSICIAN ASSISTANT

## 2021-10-19 PROCEDURE — 36415 COLL VENOUS BLD VENIPUNCTURE: CPT | Performed by: PHYSICIAN ASSISTANT

## 2021-10-19 PROCEDURE — 84443 ASSAY THYROID STIM HORMONE: CPT | Performed by: PHYSICIAN ASSISTANT

## 2021-10-19 PROCEDURE — 99215 OFFICE O/P EST HI 40 MIN: CPT | Performed by: PHYSICIAN ASSISTANT

## 2021-10-19 PROCEDURE — 99213 OFFICE O/P EST LOW 20 MIN: CPT | Mod: 25 | Performed by: PHYSICIAN ASSISTANT

## 2021-10-19 PROCEDURE — 51702 INSERT TEMP BLADDER CATH: CPT | Performed by: PHYSICIAN ASSISTANT

## 2021-10-19 PROCEDURE — 85027 COMPLETE CBC AUTOMATED: CPT | Performed by: PHYSICIAN ASSISTANT

## 2021-10-19 PROCEDURE — 80053 COMPREHEN METABOLIC PANEL: CPT | Performed by: PHYSICIAN ASSISTANT

## 2021-10-19 PROCEDURE — 85652 RBC SED RATE AUTOMATED: CPT | Performed by: PHYSICIAN ASSISTANT

## 2021-10-19 RX ORDER — CIPROFLOXACIN 500 MG/1
500 TABLET, FILM COATED ORAL ONCE
Qty: 1 TABLET | Refills: 0 | Status: SHIPPED | OUTPATIENT
Start: 2021-10-19 | End: 2021-10-19

## 2021-10-19 RX ORDER — DIPHENOXYLATE HCL/ATROPINE 2.5-.025MG
1 TABLET ORAL DAILY PRN
Qty: 30 TABLET | Refills: 1 | Status: SHIPPED | OUTPATIENT
Start: 2021-10-19 | End: 2022-07-07

## 2021-10-19 RX ORDER — HYOSCYAMINE SULFATE 0.125 MG
0.12 TABLET ORAL EVERY 6 HOURS PRN
Qty: 60 TABLET | Refills: 1 | Status: SHIPPED | OUTPATIENT
Start: 2021-10-19 | End: 2021-10-26

## 2021-10-19 ASSESSMENT — MIFFLIN-ST. JEOR
SCORE: 1368.69
SCORE: 1368.69

## 2021-10-19 ASSESSMENT — PAIN SCALES - GENERAL
PAINLEVEL: MILD PAIN (2)
PAINLEVEL: NO PAIN (0)

## 2021-10-19 ASSESSMENT — PATIENT HEALTH QUESTIONNAIRE - PHQ9: SUM OF ALL RESPONSES TO PHQ QUESTIONS 1-9: 12

## 2021-10-19 NOTE — NURSING NOTE
Chief Complaint   Patient presents with     Urinary Retention     Catheter change       Catheter removal documentation on 10/19/2021:    Mariela Duffy presents to the clinic for catheter removal.  Reason for removal: urinary retention  Order has been verified. YES  Catheter successfully removed at 1:33 PM without immediate complication.  250 cc's of urine present in the catheter bag.  Urethral meatus is free of secretions and encrustation.  The patient is afebrile.  The patient tolerated the procedure and was instructed to follow up with their PCP or consultant as planned, monitor for catheter dysfunction, monitor for pain or discomfort, return or call for pain, fever, leakage or decreased urine flow and watch for signs of infection        Catheter insertion documentation on 10/19/2021:    Mariela Duffy presents to the clinic for catheter insertion.  Reason for insertion: scheduled insertion  Order has been verified. YES  Catheter successfully inserted into the urethral meatus in the usual sterile fashion without immediate complication.  Type of catheter placed: 16 Wallisian straight catheter  Urine is clear in color.  15 cc's of urine output returned.  Balloon was filled with 5 cc's of normal saline.  Securement device placed for the catheter.  The patient tolerated the procedure and was instructed to follow up with their PCP or consultant as planned, monitor for catheter dysfunction, monitor for pain or discomfort, return or call for pain, fever, leakage or decreased urine flow and watch for signs of infection        Lida Sampson
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
## 2021-10-19 NOTE — TELEPHONE ENCOUNTER
Dominique said Fely was seen in clinic earlier today and provider gave similar supplement advise. No questions.   Eliud Nielson RN
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
## 2021-10-19 NOTE — PROGRESS NOTES
"    Assessment & Plan     Diarrhea, unspecified type  Further evaluation with labs as noted below.  Recommended colonoscopy because not only is patient due for screening but she has had diarrhea for many months.  Will continue the Lomotil and added hyoscyamine.  Consider GI referral if not improving.  Reviewed low FODMAPS diet with patient.  - CBC with platelets; Future  - Comprehensive metabolic panel (BMP + Alb, Alk Phos, ALT, AST, Total. Bili, TP); Future  - TSH with free T4 reflex; Future  - CRP, inflammation; Future  - ESR: Erythrocyte sedimentation rate; Future  - Adult Gastro Ref - Procedure Only; Future  - diphenoxylate-atropine (LOMOTIL) 2.5-0.025 MG tablet; Take 1 tablet by mouth daily as needed for diarrhea  - hyoscyamine (LEVSIN) 0.125 MG tablet; Take 1 tablet (125 mcg) by mouth every 6 hours as needed for cramping or diarrhea  - CBC with platelets  - Comprehensive metabolic panel (BMP + Alb, Alk Phos, ALT, AST, Total. Bili, TP)  - TSH with free T4 reflex  - CRP, inflammation  - ESR: Erythrocyte sedimentation rate    Special screening for malignant neoplasms, colon    - Adult Gastro Ref - Procedure Only; Future    Hx of irritable bowel syndrome    - diphenoxylate-atropine (LOMOTIL) 2.5-0.025 MG tablet; Take 1 tablet by mouth daily as needed for diarrhea  - hyoscyamine (LEVSIN) 0.125 MG tablet; Take 1 tablet (125 mcg) by mouth every 6 hours as needed for cramping or diarrhea    Review of external notes as documented elsewhere in note  Review of the result(s) of each unique test - ESR, CBC  Ordering of each unique test  Prescription drug management  41 minutes spent on the date of the encounter doing chart review, history and exam, documentation and further activities per the note       BMI:   Estimated body mass index is 26.15 kg/m  as calculated from the following:    Height as of this encounter: 1.727 m (5' 8\").    Weight as of this encounter: 78 kg (172 lb).       Return in about 2 months (around " "12/19/2021) for Annual medicare wellness, In Person, With PCP.    Katelynn Moreira PA-C  [facility]    Shmuel Geiger is a 66 year old who presents for the following health issues  accompanied by her daughter:    History of Present Illness       She eats 0-1 servings of fruits and vegetables daily.She consumes 4 sweetened beverage(s) daily.She exercises with enough effort to increase her heart rate 9 or less minutes per day.  She exercises with enough effort to increase her heart rate 3 or less days per week. She is missing 1 dose(s) of medications per week.  She is not taking prescribed medications regularly due to remembering to take.       Abdominal/Flank Pain  Onset/Duration: Several months  Patient here for worsening IBS symptoms.  She is having diarrhea (watery) and losing control of her stools.  2-3 times daily if not taking the Lomotil (taking Lomotil twice weekly).  She does have some cramping and pain with the IBS    She denies any hematochezia, fevers, nausea or vomiting.  She denies any decreased appetite or weight loss.    She has used Lomotil with some improvement.  She has also tried imodium which did not seem to help.    Patient would like to start Nucific Bio 4 (a supplement). She is wondering if it will affect her other medications.    Patient has had a colonoscopy; last one was in 2015 (she is due for screening colonoscopy).    Patient's last menstrual period was 01/20/2004.      Review of Systems   GENERAL:  No fevers  GI:  As noted in HPI        Objective    /87 (BP Location: Right arm, Patient Position: Sitting, Cuff Size: Adult Large)   Pulse 80   Temp 98.3  F (36.8  C) (Oral)   Resp 18   Ht 1.727 m (5' 8\")   Wt 78 kg (172 lb)   LMP 01/20/2004   SpO2 99%   BMI 26.15 kg/m    Body mass index is 26.15 kg/m .  Physical Exam   GENERAL: No acute distress  HEENT: Normocephalic,  CARDIAC: Regular rate and rhythm. No murmurs.  PULMONARY: Lungs are clear " to auscultation bilaterally. No wheezes, rhonchi or crackles.  GI: Active bowel sounds, abdomen is soft and non-tender  NEURO: Alert and non-focal      Results for orders placed or performed in visit on 10/19/21 (from the past 24 hour(s))   CBC with platelets   Result Value Ref Range    WBC Count 6.1 4.0 - 11.0 10e3/uL    RBC Count 4.76 3.80 - 5.20 10e6/uL    Hemoglobin 14.1 11.7 - 15.7 g/dL    Hematocrit 42.1 35.0 - 47.0 %    MCV 88 78 - 100 fL    MCH 29.6 26.5 - 33.0 pg    MCHC 33.5 31.5 - 36.5 g/dL    RDW 13.4 10.0 - 15.0 %    Platelet Count 246 150 - 450 10e3/uL   ESR: Erythrocyte sedimentation rate   Result Value Ref Range    Erythrocyte Sedimentation Rate 18 0 - 30 mm/hr           Answers for HPI/ROS submitted by the patient on 10/18/2021  If you checked off any problems, how difficult have these problems made it for you to do your work, take care of things at home, or get along with other people?: Somewhat difficult  PHQ9 TOTAL SCORE: 12

## 2021-10-19 NOTE — TELEPHONE ENCOUNTER
pcp out of office. Can discuss with her at future visit. However, Reviewed message and looked up supplement. It appears this is a probiotic with some digestive enzymes. This shouldn't be an issue, but I caution anyone who takes any type of supplement because these are not fda regulated and there may be other ingredients present that are not stated. There is no way to no for sure so patient's should just use extra caution when using supplements otc.     -joselo camarena, pac

## 2021-10-20 ENCOUNTER — TELEPHONE (OUTPATIENT)
Dept: UROLOGY | Facility: CLINIC | Age: 66
End: 2021-10-20

## 2021-10-20 NOTE — TELEPHONE ENCOUNTER
----- Message from Vida Cortez sent at 10/20/2021  8:23 AM CDT -----  4 wk, nurse, SPT change    LIVAN

## 2021-10-21 LAB
ALBUMIN SERPL-MCNC: 3.4 G/DL (ref 3.4–5)
ALP SERPL-CCNC: 69 U/L (ref 40–150)
ALT SERPL W P-5'-P-CCNC: 17 U/L (ref 0–50)
ANION GAP SERPL CALCULATED.3IONS-SCNC: 8 MMOL/L (ref 3–14)
AST SERPL W P-5'-P-CCNC: 20 U/L (ref 0–45)
BILIRUB SERPL-MCNC: 0.4 MG/DL (ref 0.2–1.3)
BUN SERPL-MCNC: 9 MG/DL (ref 7–30)
CALCIUM SERPL-MCNC: 9.3 MG/DL (ref 8.5–10.1)
CHLORIDE BLD-SCNC: 105 MMOL/L (ref 94–109)
CO2 SERPL-SCNC: 29 MMOL/L (ref 20–32)
CREAT SERPL-MCNC: 0.8 MG/DL (ref 0.52–1.04)
GFR SERPL CREATININE-BSD FRML MDRD: 77 ML/MIN/1.73M2
GLUCOSE BLD-MCNC: 86 MG/DL (ref 70–99)
POTASSIUM BLD-SCNC: 4.2 MMOL/L (ref 3.4–5.3)
PROT SERPL-MCNC: 7.6 G/DL (ref 6.8–8.8)
SODIUM SERPL-SCNC: 142 MMOL/L (ref 133–144)
TSH SERPL DL<=0.005 MIU/L-ACNC: 2.17 MU/L (ref 0.4–4)

## 2021-10-25 ENCOUNTER — TELEPHONE (OUTPATIENT)
Dept: FAMILY MEDICINE | Facility: CLINIC | Age: 66
End: 2021-10-25

## 2021-10-25 DIAGNOSIS — R19.7 DIARRHEA, UNSPECIFIED TYPE: Primary | ICD-10-CM

## 2021-10-25 DIAGNOSIS — Z87.19 HX OF IRRITABLE BOWEL SYNDROME: ICD-10-CM

## 2021-10-25 NOTE — TELEPHONE ENCOUNTER
Pharmacy Benefit Information, PA started via CMM    Provider: RICHARD  CMM: KEY: [id number]  Medication: HYOSCYAMINE  Pharmacy: [facility]    Routed to , please advise RX CHANGE OR PA, route to [facility] PA pool or inform pharmacy of plan  Thelma Strong, RN, BSN  Message handled by CLINIC NURSE.

## 2021-10-26 RX ORDER — DICYCLOMINE HCL 20 MG
20 TABLET ORAL 4 TIMES DAILY PRN
Qty: 30 TABLET | Refills: 1 | Status: SHIPPED | OUTPATIENT
Start: 2021-10-26 | End: 2022-03-11 | Stop reason: ALTCHOICE

## 2021-10-26 NOTE — TELEPHONE ENCOUNTER
Faxed PA denial informing [facility] rx change to damaso Strong, RN, BSN  Message handled by CLINIC NURSE.

## 2021-11-01 ENCOUNTER — MYC MEDICAL ADVICE (OUTPATIENT)
Dept: FAMILY MEDICINE | Facility: CLINIC | Age: 66
End: 2021-11-01

## 2021-11-01 DIAGNOSIS — F09 MILD COGNITIVE DISORDER: ICD-10-CM

## 2021-11-01 DIAGNOSIS — Z93.59 SUPRAPUBIC CATHETER (H): Primary | ICD-10-CM

## 2021-11-02 ENCOUNTER — TELEPHONE (OUTPATIENT)
Dept: UROLOGY | Facility: CLINIC | Age: 66
End: 2021-11-02

## 2021-11-02 ENCOUNTER — TRANSFERRED RECORDS (OUTPATIENT)
Dept: HEALTH INFORMATION MANAGEMENT | Facility: CLINIC | Age: 66
End: 2021-11-02
Payer: COMMERCIAL

## 2021-11-02 NOTE — TELEPHONE ENCOUNTER
[facility]  [phone].    Please call and see if anything has changed with regards to staffing.    This was the list of other places Basia contacted:  Agencies we have tried:   [facility]   [facility]   [facility]   [facility]   [facility]   [facility]   [facility]       My only thought would be to see if Care Coordination can help.  I cannot recall if we put in a referral in the past or not?  Referral placed

## 2021-11-02 NOTE — TELEPHONE ENCOUNTER
FYI Care Coordinator:     From: Delia Vogel  Sent: 11/2/2021   3:06 PM CDT  To: April Lizbeth Tillman MD  Subject: HOMECARE REFERRAL DECLINED                       Thank you for your home care referral on Fely . Due to the current capacity at our branch, we are unable to accept this referral.  We have attempted to find an agency for this patient and have not been successful.  Please reach out to the Clinic Care Coordinators and or Social Workers at your clinic for their assistance in continuing to find options for this patient.  We apologize and are continuing to work towards accepting every referral.     Delia Vogel  Lead    [facility]  [phone]

## 2021-11-02 NOTE — TELEPHONE ENCOUNTER
Daughter called with a couple of concerns, CTC on file.     Daughter informed RN that the current [facility] referral is unable to start HHC due to staffing issues. Wondering about new referral to new [facility] to see if they are able to provide help instead.     Daughter also looking for urinary catheter leg bags as patient's was soiled at colonoscopy procedure today.     Routing to provider.     Asmita Ying, DESTININ, RN  [facility]

## 2021-11-02 NOTE — TELEPHONE ENCOUNTER
Patient informed. We did not address the cath bags, patient had since reached out to urology team.   Eliud Nielson RN

## 2021-11-02 NOTE — TELEPHONE ENCOUNTER
Call to [facility]. Staffing has since changed and it's possible Fely would now be accepted. They suggest PCP re-order home care (skilled nursing and home health aide) because September orders no longer valid.   Eliud Nielson RN

## 2021-11-03 ENCOUNTER — TELEPHONE (OUTPATIENT)
Dept: FAMILY MEDICINE | Facility: CLINIC | Age: 66
End: 2021-11-03

## 2021-11-03 ENCOUNTER — TELEPHONE (OUTPATIENT)
Dept: UROLOGY | Facility: CLINIC | Age: 66
End: 2021-11-03

## 2021-11-03 NOTE — TELEPHONE ENCOUNTER
Have attempted to reach Fely multiple times at [phone] ( the phone # below in wrong). Messages left but no ID on phone so messages were general with clinic contact information and requests to call back. There is a home care referral in her chart from her PCP re: catheter care.   ROBIN Montilla RN       Health Call Center    Phone Message    May a detailed message be left on voicemail: yes     Reason for Call: Other: Pt calling to say her catheter keeps opening up (wont stay in).  Please call Pt to discuss [phone]     Action Taken: Message routed to:  Other: Urology    Travel Screening: Not Applicable

## 2021-11-03 NOTE — TELEPHONE ENCOUNTER
Patient calling regarding catheter issues.  Discussed calling Urology with her concerns as they manage her catheter.  Gave Urology phone #.  She will call them with her concerns.  Abril Harris RN

## 2021-11-04 ENCOUNTER — PATIENT OUTREACH (OUTPATIENT)
Dept: CARE COORDINATION | Facility: CLINIC | Age: 66
End: 2021-11-04

## 2021-11-04 NOTE — PROGRESS NOTES
Clinic Care Coordination Contact  [facility]/Voicemail    Referral Source: PCP  Clinical Data: Care Coordinator Outreach  Outreach attempted x 1.  Left message on patient's voicemail with call back information and requested return call.  Plan: Care Coordinator will try to reach patient again in 1-2 business days.  RN CC has sent new home care referrals to [facility] P) [phone] Fax [phone]. Left  Message with [facility] ([phone]) for return call.     Mable Crane RN, Casual Care Coordinator  [facility] Care Coordination  [phone]

## 2021-11-04 NOTE — TELEPHONE ENCOUNTER
Caller Shantell from [facility] [phone]. She received partial fax re home care orders for RN catheter care but appears pages might be missing. She received a cover sheet and a partial face sheet only.   We tried calling Mable Crane RN at the number listed [phone] but lines are down or it is not a working number.  We'll ask a Care Coordinator to follow through with Shantell.   Eliud Nielson, RN

## 2021-11-05 ENCOUNTER — PATIENT OUTREACH (OUTPATIENT)
Dept: NURSING | Facility: CLINIC | Age: 66
End: 2021-11-05
Attending: FAMILY MEDICINE
Payer: COMMERCIAL

## 2021-11-05 ENCOUNTER — TELEPHONE (OUTPATIENT)
Dept: FAMILY MEDICINE | Facility: CLINIC | Age: 66
End: 2021-11-05

## 2021-11-05 DIAGNOSIS — F09 MILD COGNITIVE DISORDER: ICD-10-CM

## 2021-11-05 DIAGNOSIS — Z93.59 SUPRAPUBIC CATHETER (H): ICD-10-CM

## 2021-11-05 ASSESSMENT — ACTIVITIES OF DAILY LIVING (ADL): DEPENDENT_IADLS:: CLEANING;COOKING;LAUNDRY;SHOPPING;MEDICATION MANAGEMENT;TRANSPORTATION

## 2021-11-05 NOTE — TELEPHONE ENCOUNTER
CLEMENTE Wiley found home care for Fely. [facility] starts 11/9/21. Patient has or will be informed by [facility] staff.   Eliud Nielson RN

## 2021-11-05 NOTE — LETTER
[facility] CARE COORDINATION  [address] [facility] [address]      November 9, 2021    Mariela Duffy  [address] [facility] [address]      Dear Mariela,    I am a clinic care coordinator who works with Reba Tillman MD at [facility]. I wanted to thank you for spending the time to talk with me.  Below is a description of clinic care coordination and how I can further assist you.      The clinic care coordination team is made up of a registered nurse,  and community health worker who understand the health care system. The goal of clinic care coordination is to help you manage your health and improve access to the health care system in the most efficient manner. The team can assist you in meeting your health care goals by providing education, coordinating services, strengthening the communication among your providers and supporting you with any resource needs.    Please feel free to contact me at [phone] with any questions or concerns. We are focused on providing you with the highest-quality healthcare experience possible and that all starts with you.     Sincerely,     Inez Ontiveros RN Care Coordinator  [facility], Shawna Méndez  Email: [email or web address]  Phone: [phone]       Enclosed: I have enclosed a copy of the Patient Centered Plan of Care. This has helpful information and goals that we have talked about. Please keep this in an easy to access place to use as needed.

## 2021-11-05 NOTE — PROGRESS NOTES
Clinic Care Coordination Contact  OUTREACH    Referral Information:  Referral Source: PCP    Primary Diagnosis: Genitourinary Disorders    Chief Complaint   Patient presents with     Clinic Care Coordination - Initial     home care     [place] Utilization:   Clinic Utilization  Difficulty keeping appointments:: No  Compliance Concerns: No  No-Show Concerns: No  No PCP office visit in Past Year: No  Utilization    Hospital Admissions  1             ED Visits  0             No Show Count (past year)  0                Current as of: 11/8/2021  9:58 AM            Clinical Concerns:  Current Medical Concerns:    Patient Active Problem List   Diagnosis     Allergic rhinitis due to other allergen     Irritable bowel syndrome     Postsurgical hypothyroidism     Iron deficiency anemia     Mild major depression (H)     CARDIOVASCULAR SCREENING; LDL GOAL LESS THAN 160     Generalized anxiety disorder     Tubular adenoma of colon     Seasonal affective disorder (H)     Headache     ADD (attention deficit disorder)     TIA (transient ischemic attack)     Gastroesophageal reflux disease with esophagitis     Hiatal hernia     History of colonic polyps     Migraine with aura and without status migrainosus, not intractable     History of thyroid cancer     Mild cognitive impairment     Peripheral polyneuropathy     Vitamin D deficiency     Cystitis with hematuria     Fall in home     Hyponatremia     Retention of urine     Splenic artery aneurysm (H)     Dementia without behavioral disturbance (H)     Other osteoporosis without current pathological fracture     Vascular dementia with behavioral disturbance (H)     Suprapubic catheter (H)      Daughter states that she has not heard anything regarding status of county worker and verbalizes plan to contact them and follow up on this.     Neuropsych eval scheduled for beginning of December to compare with results from three years ago. Trying to determine if confusion is related to  UTI's or dementia. Daughter unsure if patient would be receptive to seeing a therapist/psychiatrist.   Patient's telephone number: #[phone].     Patient was previously at [facility], then went to [place] with her brother and sister, and now staying with her. Daughter notes patient needs a higher level of care and cannot meet patient's care needs long term.      Current Behavioral Concerns: none noted.    Education Provided to patient: CC role, clinic after hours, appointments, home care.    Pain  Pain (GOAL):: No  Health Maintenance Reviewed: Due/Overdue   Health Maintenance Due   Topic Date Due     DEXA  Never done     COVID-19 Vaccine (1) Never done     ZOSTER IMMUNIZATION (1 of 2) Never done     ADVANCE CARE PLANNING  04/19/2018     COLORECTAL CANCER SCREENING  06/14/2018     MAMMO SCREENING  07/28/2018     DTAP/TDAP/TD IMMUNIZATION (4 - Td or Tdap) 08/20/2019     MEDICARE ANNUAL WELLNESS VISIT  10/12/2020     Pneumococcal Vaccine: 65+ Years (1 of 1 - PPSV23) Never done     INFLUENZA VACCINE (1) 09/01/2021     LIPID  09/08/2021      Clinical Pathway: None    Medication Management:  Medication review status: Medications reviewed and no changes reported per patient.      Daughter assists with medication management. Patient taking medications as prescribed with no questions, concerns, side effects regarding medications at this time.     Functional Status:  Dependent ADLs:: Independent  Dependent IADLs:: Cleaning,Cooking,Laundry,Shopping,Medication Management,Transportation  Bed or wheelchair confined:: No  Mobility Status: Independent  Fallen 2 or more times in the past year?: Yes  Any fall with injury in the past year?: No    Living Situation:  Current living arrangement:: I live in a private home with family  Type of residence:: Private home - stairs    Lifestyle & Psychosocial Needs:    Social Determinants of Health     Tobacco Use: Medium Risk     Smoking Tobacco Use: Former Smoker      Smokeless Tobacco Use: Never Used   Alcohol Use: Not At Risk     Frequency of Alcohol Consumption: Monthly or less     Average Number of Drinks: 1 or 2     Frequency of Binge Drinking: Never   Financial Resource Strain: Low Risk      Difficulty of Paying Living Expenses: Not very hard   Food Insecurity: No Food Insecurity     Worried About Running Out of Food in the Last Year: Never true     Ran Out of Food in the Last Year: Never true   Transportation Needs: Unmet Transportation Needs     Lack of Transportation (Medical): No     Lack of Transportation (Non-Medical): Yes   Physical Activity: Inactive     Days of Exercise per Week: 0 days     Minutes of Exercise per Session: 0 min   Stress: Stress Concern Present     Feeling of Stress : To some extent   Social Connections: Socially Isolated     Frequency of Communication with Friends and Family: Once a week     Frequency of Social Gatherings with Friends and Family: Once a week     Attends Gnosticism Services: 1 to 4 times per year     Active Member of Clubs or Organizations: No     Attends Club or Organization Meetings: Not on file     Marital Status:    Intimate Partner Violence: Not on file   Depression: Not at risk     PHQ-2 Score: 0   Housing Stability: Low Risk      Unable to Pay for Housing in the Last Year: No     Number of Places Lived in the Last Year: 2     Unstable Housing in the Last Year: No     Diet:: Regular  Inadequate nutrition (GOAL):: No  Tube Feeding: No  Inadequate activity/exercise (GOAL):: No  Significant changes in sleep pattern (GOAL): No  Transportation means:: Family,Regular car  Gnosticism or spiritual beliefs that impact treatment:: No  Mental health DX:: Yes  Mental health DX how managed:: Medication  Mental health management concern (GOAL):: Yes (poor short term memory)  Chemical Dependency Status: No Current Concerns  Chemical Dependency Management:  (NA)  Informal Support system:: Children      Resources and  Interventions:  Current Resources:   Community Resources: Meals on Wheels,DME  Supplies used at home:: Incontinence Supplies  Equipment Currently Used at Home: walker, rolling,shower chair,grab bar, tub/shower (Catheter supplies)  Employment Status: retired  Advance Care Plan/Directive  Advanced Care Plans/Directives on file:: No  Advanced Care Plan/Directive Status: Considering Options     Goals:   Goals        General     1. Problem Solving (pt-stated)      Notes - Note edited  11/9/2021  1:07 PM by Inez Ontiveros RN     Goal Statement: I would like additional resources, support, assistance in ensuring my care needs remain safely met/higher level of care.   Date Goal set: 11/05/2021  Barriers: difficulties finding home care provider, provider availability - wait time to complete appointments, unknown source of confusion (UTI's versus dementia).   Strengths: daughter supportive, engaged in care coordination  Date to Achieve By: 05/2022  Patient expressed understanding of goal: Yes  Action steps to achieve this goal:  1. I will contact and follow up with the [facility] to determine if waiver/assistance remains active or if I need to re-apply.   2. I will contact and follow up with [facility] to check on the status of home care referral.   3. I will follow up with my providers as scheduled/recommended. (Primary Care Provider 11/11/2021, Urology 11/19/2021, Neuropsych 12/2021, etc).   4. I will discuss, review, schedule and complete recommended overdue health maintenance with my Primary Care Provider.   5. I will consider additional support for managing my mental health and wellness (therapist, psychiatrist, WILLAM LLANOS, [facility], etc).   6. I will contact my care team with questions, concerns, support needs. I will use the clinic as a resource and I understand I can contact my clinic with 24/7 after hours services available. Care Coordinator will remain available as needed.               Patient/Caregiver understanding: Patient's daughter verbalized understanding and denies any additional questions or concerns at this time. RNCC engaged in [employer] communications during encounter.      Outreach Frequency: 2 weeks  Future Appointments              In 2 days Abhishek Syed PA-C [facility], CR    In 1 week  [facility]SE [facility]        Plan: RNCC to send introduction letter, complex care plan, medication list to patient via [employer].  Patient's daughter was provided with writers contact information and encouraged to call with questions, concerns, support needs. RNCC will remain available as needed. CHW CC will follow up with patient/daughter again in 2 weeks. RNCC will review chart in 6 weeks.     CHW will:  CHW Delegation:   1)  Due Date:  12/01/2021 & ongoing       Delegation: Please attempt to contact patient's daughter AND patient during follow up outreaches. Discuss with patient if she is open to any additional mental health support. May need to collaborate with home care as needed.     Inez Ontiveros RN Care Coordinator  [facility] [place]  Email: [email or web address]  Phone: [phone]

## 2021-11-05 NOTE — TELEPHONE ENCOUNTER
[2:51 PM] Inez Ontiveros  Need to re-fax home care orders to [facility] and then call Shantell and give her verbal order for okay to delay start of care since home care order is greater then 48 hours    [2:53 PM] Eliud Nielson  which orders are we supposed to refax?  I see orders for [place] on 11/1.  Do I just print those and fax them to ?     [2:53 PM] Inez Ontiveros  yes  Shantell's number is: [phone]. Fax number is: [phone] - please fax order, face sheet, and last office visit note with provider  10/19 Lillie and 10/19 Urology visit    Phone call to Shantell. Unformed orders faxed and gave verbal okay to delay start of care to 11/9/21.   Eliud Nielson, RN

## 2021-11-08 SDOH — HEALTH STABILITY: PHYSICAL HEALTH: ON AVERAGE, HOW MANY MINUTES DO YOU ENGAGE IN EXERCISE AT THIS LEVEL?: 0 MIN

## 2021-11-08 SDOH — ECONOMIC STABILITY: FOOD INSECURITY: WITHIN THE PAST 12 MONTHS, THE FOOD YOU BOUGHT JUST DIDN'T LAST AND YOU DIDN'T HAVE MONEY TO GET MORE.: NEVER TRUE

## 2021-11-08 SDOH — ECONOMIC STABILITY: INCOME INSECURITY: IN THE LAST 12 MONTHS, WAS THERE A TIME WHEN YOU WERE NOT ABLE TO PAY THE MORTGAGE OR RENT ON TIME?: NO

## 2021-11-08 SDOH — ECONOMIC STABILITY: TRANSPORTATION INSECURITY
IN THE PAST 12 MONTHS, HAS LACK OF TRANSPORTATION KEPT YOU FROM MEETINGS, WORK, OR FROM GETTING THINGS NEEDED FOR DAILY LIVING?: YES

## 2021-11-08 SDOH — HEALTH STABILITY: PHYSICAL HEALTH: ON AVERAGE, HOW MANY DAYS PER WEEK DO YOU ENGAGE IN MODERATE TO STRENUOUS EXERCISE (LIKE A BRISK WALK)?: 0 DAYS

## 2021-11-08 SDOH — ECONOMIC STABILITY: TRANSPORTATION INSECURITY
IN THE PAST 12 MONTHS, HAS THE LACK OF TRANSPORTATION KEPT YOU FROM MEDICAL APPOINTMENTS OR FROM GETTING MEDICATIONS?: NO

## 2021-11-08 SDOH — ECONOMIC STABILITY: FOOD INSECURITY: WITHIN THE PAST 12 MONTHS, YOU WORRIED THAT YOUR FOOD WOULD RUN OUT BEFORE YOU GOT MONEY TO BUY MORE.: NEVER TRUE

## 2021-11-08 SDOH — ECONOMIC STABILITY: INCOME INSECURITY: HOW HARD IS IT FOR YOU TO PAY FOR THE VERY BASICS LIKE FOOD, HOUSING, MEDICAL CARE, AND HEATING?: NOT VERY HARD

## 2021-11-08 ASSESSMENT — LIFESTYLE VARIABLES
HOW MANY STANDARD DRINKS CONTAINING ALCOHOL DO YOU HAVE ON A TYPICAL DAY: 1 OR 2
HOW OFTEN DO YOU HAVE A DRINK CONTAINING ALCOHOL: MONTHLY OR LESS
HOW OFTEN DO YOU HAVE SIX OR MORE DRINKS ON ONE OCCASION: NEVER

## 2021-11-08 ASSESSMENT — SOCIAL DETERMINANTS OF HEALTH (SDOH)
HOW OFTEN DO YOU ATTEND CHURCH OR RELIGIOUS SERVICES?: 1 TO 4 TIMES PER YEAR
HOW OFTEN DO YOU GET TOGETHER WITH FRIENDS OR RELATIVES?: ONCE A WEEK
DO YOU BELONG TO ANY CLUBS OR ORGANIZATIONS SUCH AS CHURCH GROUPS UNIONS, FRATERNAL OR ATHLETIC GROUPS, OR SCHOOL GROUPS?: NO
IN A TYPICAL WEEK, HOW MANY TIMES DO YOU TALK ON THE PHONE WITH FAMILY, FRIENDS, OR NEIGHBORS?: ONCE A WEEK

## 2021-11-10 ENCOUNTER — HOSPITAL ENCOUNTER (OUTPATIENT)
Facility: CLINIC | Age: 66
End: 2021-11-10
Attending: INTERNAL MEDICINE | Admitting: INTERNAL MEDICINE
Payer: COMMERCIAL

## 2021-11-11 ENCOUNTER — OFFICE VISIT (OUTPATIENT)
Dept: FAMILY MEDICINE | Facility: CLINIC | Age: 66
End: 2021-11-11
Payer: COMMERCIAL

## 2021-11-11 VITALS
RESPIRATION RATE: 18 BRPM | TEMPERATURE: 99.2 F | SYSTOLIC BLOOD PRESSURE: 108 MMHG | DIASTOLIC BLOOD PRESSURE: 62 MMHG | WEIGHT: 173 LBS | HEART RATE: 87 BPM | OXYGEN SATURATION: 97 % | HEIGHT: 69 IN | BODY MASS INDEX: 25.62 KG/M2

## 2021-11-11 DIAGNOSIS — Z01.818 PREOP GENERAL PHYSICAL EXAM: Primary | ICD-10-CM

## 2021-11-11 DIAGNOSIS — Z86.0100 HISTORY OF COLONIC POLYPS: ICD-10-CM

## 2021-11-11 DIAGNOSIS — Z93.59 SUPRAPUBIC CATHETER (H): ICD-10-CM

## 2021-11-11 DIAGNOSIS — F09 MILD COGNITIVE DISORDER: ICD-10-CM

## 2021-11-11 PROCEDURE — 99214 OFFICE O/P EST MOD 30 MIN: CPT | Performed by: PHYSICIAN ASSISTANT

## 2021-11-11 ASSESSMENT — MIFFLIN-ST. JEOR: SCORE: 1381.16

## 2021-11-11 NOTE — PATIENT INSTRUCTIONS
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
## 2021-11-12 ENCOUNTER — MEDICAL CORRESPONDENCE (OUTPATIENT)
Dept: HEALTH INFORMATION MANAGEMENT | Facility: CLINIC | Age: 66
End: 2021-11-12
Payer: COMMERCIAL

## 2021-11-12 ENCOUNTER — TELEPHONE (OUTPATIENT)
Dept: FAMILY MEDICINE | Facility: CLINIC | Age: 66
End: 2021-11-12
Payer: COMMERCIAL

## 2021-11-12 NOTE — TELEPHONE ENCOUNTER
Per phone call with Shantell, Home Care went to the home for visit 11/9/21 but no one answered the door. They've since talked with daughter Joy who agrees to be there for visit Monday 11/15/21. It was family request to delay start of care to 11/15.   VO approved for skilled nurse visit to eval and treat.   Eliud Nielson RN - Patient Advocate and Liaison (PAL)  [facility]   [phone]

## 2021-11-12 NOTE — TELEPHONE ENCOUNTER
Left message for Shantell okay to delay start of care to 11/15/21.  Eliud Nielson RN - Patient Advocate and Liaison (PAL)  [facility]   [phone]

## 2021-11-12 NOTE — TELEPHONE ENCOUNTER
Reason for Call: Request for an order or referral:    Order or referral being requested: Orders to delay start of care starting Monday 11/15    Date needed: as soon as possible    Has the patient been seen by the PCP for this problem? YES    Additional comments: [facility] tried to see patient the week of Nov. 8 - 12 th but it did not work out. Requesting to delay start of care to 11/15    Phone number Patient can be reached at:  Other phone number:  [phone]    Best Time:  any    Can we leave a detailed message on this number?  YES    Call taken on 11/12/2021 at 11:16 AM by Linda Taylor

## 2021-11-15 ENCOUNTER — MEDICAL CORRESPONDENCE (OUTPATIENT)
Dept: HEALTH INFORMATION MANAGEMENT | Facility: CLINIC | Age: 66
End: 2021-11-15
Payer: COMMERCIAL

## 2021-11-15 ENCOUNTER — MEDICAL CORRESPONDENCE (OUTPATIENT)
Dept: HEALTH INFORMATION MANAGEMENT | Facility: CLINIC | Age: 66
End: 2021-11-15

## 2021-11-15 ENCOUNTER — TRANSFERRED RECORDS (OUTPATIENT)
Dept: HEALTH INFORMATION MANAGEMENT | Facility: CLINIC | Age: 66
End: 2021-11-15

## 2021-11-19 ENCOUNTER — PATIENT OUTREACH (OUTPATIENT)
Dept: NURSING | Facility: CLINIC | Age: 66
End: 2021-11-19
Payer: COMMERCIAL

## 2021-11-19 NOTE — PROGRESS NOTES
Clinic Care Coordination Contact  Community Health Worker Follow Up  Spoke with patient     Care Gaps:   Health Maintenance Due   Topic Date Due     DEXA  Never done     COVID-19 Vaccine (1) Never done     ZOSTER IMMUNIZATION (1 of 2) Never done     ADVANCE CARE PLANNING  04/19/2018     MAMMO SCREENING  07/28/2018     DTAP/TDAP/TD IMMUNIZATION (4 - Td or Tdap) 08/20/2019     MEDICARE ANNUAL WELLNESS VISIT  10/12/2020     Pneumococcal Vaccine: 65+ Years (1 of 1 - PPSV23) Never done     INFLUENZA VACCINE (1) 09/01/2021     LIPID  09/08/2021     Care Gap Goal set: Yes    Goals:   Goals Addressed as of 11/19/2021 at 2:08 PM                    Today       1. Problem Solving (pt-stated)   10%    Added 11/9/21 by Inez Ontiveros, RN      Goal Statement: I would like additional resources, support, assistance in ensuring my care needs remain safely met/higher level of care.   Date Goal set: 11/05/2021  Barriers: difficulties finding home care provider, provider availability - wait time to complete appointments, unknown source of confusion (UTI's versus dementia).   Strengths: daughter supportive, engaged in care coordination  Date to Achieve By: 05/2022  Patient expressed understanding of goal: Yes  Action steps to achieve this goal:  1. I will contact and follow up with the [facility] to determine if waiver/assistance remains active or if I need to re-apply.   2. I will contact and follow up with [facility] to check on the status of home care referral.   3. I will follow up with my providers as scheduled/recommended. (Primary Care Provider 11/11/2021, Urology 11/19/2021, Neuropsych 12/2021, etc).   4. I will discuss, review, schedule and complete recommended overdue health maintenance with my Primary Care Provider.   5. I will consider additional support for managing my mental health and wellness (therapist, psychiatrist, WILLAM LLANOS, [employer], etc).   6. I will contact my care team with  questions, concerns, support needs. I will use the clinic as a resource and I understand I can contact my clinic with 24/7 after hours services available. Care Coordinator will remain available as needed.    11/19/21 CHW:    Patient states that home care just started, getting nursing and possibly OT. CHW inquired status of urology appt that was scheduled for today, patient asks daughter who says they cancelled because she is receiving home care now.     Patient declines discussing other items in goal and states that she is focusing on upcoming surgery and prepping for that.           Intervention and Education during outreach: CC goal, patient will continue with home care and attend upcoming appts and surgery as planned.     CHW Plan: Next outreach in two weeks, will address delegation from RNCC below at that time.     Kaci Phillips, JUANA, B.S. [facility] Care Coordination  [facility] Clinics:  [facility] [place]  [phone]  [email or web address]      CHW Delegation:   1)  Due Date:  12/01/2021 & ongoing       Delegation: Please attempt to contact patient's daughter AND patient during follow up outreaches. Discuss with patient if she is open to any additional mental health support. May need to collaborate with home care as needed.

## 2021-11-22 ENCOUNTER — TELEPHONE (OUTPATIENT)
Dept: FAMILY MEDICINE | Facility: CLINIC | Age: 66
End: 2021-11-22
Payer: COMMERCIAL

## 2021-11-22 NOTE — TELEPHONE ENCOUNTER
CHRIS Garza from [facility] left a voice message regarding Home care orders:      Verbal orders given for PT evaluation, OT evaluation, RN visit every other week, Mtz cath changes once per month.     Informed approved per standing orders. Home Care agency will forward orders for MD signature.  Carmela Chen RN

## 2021-11-23 ENCOUNTER — TELEPHONE (OUTPATIENT)
Dept: FAMILY MEDICINE | Facility: CLINIC | Age: 66
End: 2021-11-23
Payer: COMMERCIAL

## 2021-11-23 DIAGNOSIS — Z53.9 DIAGNOSIS NOT YET DEFINED: Primary | ICD-10-CM

## 2021-11-23 PROCEDURE — G0180 MD CERTIFICATION HHA PATIENT: HCPCS | Performed by: FAMILY MEDICINE

## 2021-11-23 NOTE — TELEPHONE ENCOUNTER
[facility] calling for orders-    Need extension of home care orders.  OT evauluation next Monday per daughter's request.      Verbal order given.  Will fax for MD signature.  Abril Harris RN

## 2021-11-23 NOTE — TELEPHONE ENCOUNTER
[facility] calling.    Delay of care til next week per daughter request.      Verbal order given.  Will fax for MD signature. Abril Harris RN

## 2021-11-24 ENCOUNTER — MEDICAL CORRESPONDENCE (OUTPATIENT)
Dept: HEALTH INFORMATION MANAGEMENT | Facility: CLINIC | Age: 66
End: 2021-11-24
Payer: COMMERCIAL

## 2021-12-03 ENCOUNTER — TELEPHONE (OUTPATIENT)
Dept: FAMILY MEDICINE | Facility: CLINIC | Age: 66
End: 2021-12-03
Payer: COMMERCIAL

## 2021-12-03 NOTE — TELEPHONE ENCOUNTER
Destinee calling from [facility].  States faxes have been sent and not received back.  Advised no faxes received.  Destinee will refax for Cornelius fax [phone].  Also states face to face 11/11/21 does not qualify as does not state she is homebound or need for skilled nurse.  Advised face to face should be scheduled as not reason she was seen that day.  Abril Harris RN

## 2021-12-08 ENCOUNTER — APPOINTMENT (OUTPATIENT)
Dept: NURSING | Facility: CLINIC | Age: 66
End: 2021-12-08
Payer: COMMERCIAL

## 2021-12-08 ENCOUNTER — MEDICAL CORRESPONDENCE (OUTPATIENT)
Dept: HEALTH INFORMATION MANAGEMENT | Facility: CLINIC | Age: 66
End: 2021-12-08
Payer: COMMERCIAL

## 2021-12-08 ENCOUNTER — PATIENT OUTREACH (OUTPATIENT)
Dept: CARE COORDINATION | Facility: CLINIC | Age: 66
End: 2021-12-08
Payer: COMMERCIAL

## 2021-12-08 NOTE — PROGRESS NOTES
Clinic Care Coordination Contact    Community Health Worker Follow Up  Spoke with Joy (Daughter)    Care Gaps:     Health Maintenance Due   Topic Date Due     DEXA  Never done     COVID-19 Vaccine (1) Never done     ADVANCE CARE PLANNING  04/19/2018     MAMMO SCREENING  07/28/2018     DTAP/TDAP/TD IMMUNIZATION (4 - Td or Tdap) 08/20/2019     MEDICARE ANNUAL WELLNESS VISIT  10/12/2020     Pneumococcal Vaccine: 65+ Years (1 of 1 - PPSV23) Never done     INFLUENZA VACCINE (1) 09/01/2021     LIPID  09/08/2021     CHW to review care gaps at next follow up.     Goals:   Goals Addressed as of 12/8/2021 at 3:20 PM                    Today    11/19/21       1. Problem Solving (pt-stated)   20%  10%    Added 11/9/21 by Inez Ontiveros, RN      Goal Statement: I would like additional resources, support, assistance in ensuring my care needs remain safely met/higher level of care.   Date Goal set: 11/05/2021  Barriers: difficulties finding home care provider, provider availability - wait time to complete appointments, unknown source of confusion (UTI's versus dementia).   Strengths: daughter supportive, engaged in care coordination  Date to Achieve By: 05/2022  Patient expressed understanding of goal: Yes  Action steps to achieve this goal:  1. I will contact and follow up with the [facility] to determine if waiver/assistance remains active or if I need to re-apply.   2. I will contact and follow up with [facility] to check on the status of home care referral.   3. I will follow up with my providers as scheduled/recommended. (Primary Care Provider 11/11/2021, Urology 11/19/2021, Neuropsych 12/2021, etc).   4. I will discuss, review, schedule and complete recommended overdue health maintenance with my Primary Care Provider.   5. I will consider additional support for managing my mental health and wellness (therapist, psychiatrist, WILLAM LLANOS, [employer], etc).   6. I will contact my care team with  questions, concerns, support needs. I will use the clinic as a resource and I understand I can contact my clinic with 24/7 after hours services available. Care Coordinator will remain available as needed.      12/8/21 CHW     Joy returned call and shares that home care started 3 or 4 weeks ago. Patient is not comfortable with a male nurse, he speaks really fast and patient cannot understand him. Joy was informed that another face to face appointment is needed to continue home care.     CHW encouraged that Joy relay patients concern with male nurse to [facility] or directly to the nurse if she feels comfortable. Joy will notify home care and reach out to CC if any support is needed.    Joy shares that they were told OT and PT not needed. Patient was receiving skilled nursing and catheter care every two weeks.    CHW informed Joy that will reach out to Dr. Morgan Tillman's team to clarify if new face to face is needed or if plan to addend 11/11/21 face to face visit.    12/9/21 4:55PM Per Dr. Morgan Tillman: I did not see the patient on 11-11-21.  She was seen by another provider, so I cannot addend that visit.  I assume the note from 9-16-21 is too old? If not, I can addend that one further, otherwise yes, she will need a visit.    12/10/21 CHW    CHW informed Joy of above message. Joy agreed with appointment. Per  next available appointment not until Jan.     CHW left message informing CHRIS Kline of appointment needed, please call daughter Joy.    12/10/21 10:57AM     Joy (Daughter) called, states she just received a call from Radha at [facility] [phone]. Radha states that if the provider can addend 11/11/21 notes to include what's needed and send it back before 12/15th, they can continue services. Or new face to face is needed. Service will end 12/15th.     CHW informed Joy that will notify PCP's team and reach out to Inez Ontiveros RNCC for support as needed.     Joy shares that patient had  colonoscopy at [facility] and they have not herd back about results yet. Joy called [facility] yesterday and RN told her that they did not have records yet. She's hoping to get a call today and will reach out to CC for support as needed.       Intervention and Education during outreach: CHW introduced self and role with CC while CHW Kaci is on KECIA. CHW inquired if patient needs any additional support or resources.  Joy shares that home care started 3 or 4 weeks ago. Patient is not comfortable with a male nurse, he speaks really fast and patient cannot understand him. Joy was informed that another face to face appointment is needed to continue home care.   CHW encouraged that Joy relay patients concern with male nurse to [facility] or directly to the nurse if she feels comfortable. Joy will notify home care and reach out to CC if any support is needed.  Joy shares that they were told OT and PT not needed. Patient was receiving skilled nursing and catheter care every two weeks. CHW informed Joy that will reach out to Dr. Morgan Tillman's team to clarify if new face to face is needed or if plan to addend 11/11/21 face to face visit.    CHW Plan: CHW to follow up in 1 month or as needed per clinician or PCP. CHW will route encounter to PCP's nurse Samuels. CHW will reach out to RNCC clinician as needed for support.     [facility]  [facility], and [facility]    Phone: [phone]  ____________________    Next Outreach:  1/8/21  Planned Outreach Frequency: Monthly  Preferred Phone Number: [phone]    Enrollment Date:  11/05/21  Last Care Plan Assessment:  11/09/21

## 2021-12-09 NOTE — TELEPHONE ENCOUNTER
Dr. Morgan Tillman- see below.  Care Coordination spoke to daughter and wondering if you want face to face.  Abril Harris RN

## 2021-12-09 NOTE — TELEPHONE ENCOUNTER
I did not see the patient on 11-11-21.  She was seen by another provider, so I cannot addend that visit.  I assume the note from 9-16-21 is too old? If not, I can addend that one further, otherwise yes, she will need a visit.

## 2021-12-09 NOTE — TELEPHONE ENCOUNTER
Am I understanding correctly that the problem is that the patient does not feel comfortable with a male provider?  If that is the case, they should speak with the home care group to see if another provider is available.    I am not certain why she needs another visit?

## 2021-12-10 ENCOUNTER — APPOINTMENT (OUTPATIENT)
Dept: NURSING | Facility: CLINIC | Age: 66
End: 2021-12-10
Payer: COMMERCIAL

## 2021-12-10 ENCOUNTER — TELEPHONE (OUTPATIENT)
Dept: FAMILY MEDICINE | Facility: CLINIC | Age: 66
End: 2021-12-10

## 2021-12-10 ENCOUNTER — TELEPHONE (OUTPATIENT)
Dept: CARE COORDINATION | Facility: CLINIC | Age: 66
End: 2021-12-10

## 2021-12-10 NOTE — TELEPHONE ENCOUNTER
Per Radha at [facility] #[phone], home care services will end for patient on 12/15/2021 unless face to face visit with provider completed documenting homebound status and skilled need.   Please contact patient's daughter, Joy to assist in scheduling a video/in person provider appointment for continuing home care services.     Thank you,     Inez Ontiveros RN Care Coordinator  [facility] [place]  Email: [email or web address]  Phone: [phone]

## 2021-12-10 NOTE — TELEPHONE ENCOUNTER
AVELINO RN to forward message to Abhishek on Monday Dec 13.     AVELINO Weiss RN received voicemail from care coordination and talked with patient's daughter, Kerrie by phone this morning.     Will you review chart and addend 11/11/21 note to include patient is homebound? Huddle with Dr. Morgan Tillman if questions.     Daughter Kerrie is understandably concerned that without that documentation, home care services will end 12/15/21.  Dr. Morgan Tillman's first open appointment is after this date.     Huddle with PAL RN or PCP if questions.  Kerrie expects call back from us on Monday Dec 13.   Eliud Nielson RN       .

## 2021-12-10 NOTE — TELEPHONE ENCOUNTER
Kendall Wiley,   We are first going to try to see if Abhishek Yahaira would addend her 11/11/21 visit note that patient is homebound.  I will check with Abhishek when she is back on Monday Dec 13. Kerrie daughter aware.     If you know of a reason this would not be acceptable, will you let me know?      Eliud

## 2021-12-13 NOTE — TELEPHONE ENCOUNTER
Abhishek Syed, the provider who saw Fely most recently, was going to addend her note to add in that patient still needs home care.

## 2021-12-13 NOTE — TELEPHONE ENCOUNTER
Call to Radha [phone], Abhishek's addended note reviewed over the phone. She expects acceptable. If not, she will call me back.   Note faxed to Radha at [facility] [phone].   Call to daughter Dominique. Informed of above. We informed Radha and Dominique that Dr. Morgan Tillman will see patient F2F if required rather than risk lapse in home care services.   Eliud Nielson RN - Patient Advocate and Liaison (PAL)  [facility]   [phone]

## 2021-12-13 NOTE — TELEPHONE ENCOUNTER
Note addended to state that patient still needs home care services.    Abhishek Syed PA-C on 12/13/2021 at 11:29 AM

## 2021-12-23 ENCOUNTER — PATIENT OUTREACH (OUTPATIENT)
Dept: CARE COORDINATION | Facility: CLINIC | Age: 66
End: 2021-12-23
Payer: COMMERCIAL

## 2021-12-23 NOTE — PROGRESS NOTES
Clinic Care Coordination Contact  Care Coordination Clinician Chart review    Situation: Patient chart reviewed by care coordinator.       Background: Care Coordination initial assessment and enrollment took place 11/5/2021.   Upon initial assessment patient-centered goals were discussed and developed with participation from patient.  RNCC handed patient follow up and monitoring of goal progression off to CHW for continued outreach every 30 days.        Assessment: Per chart review, patient outreach completed by CC CHW on 11/19/2021 & 12/08/2021.  Patient is actively working to accomplish goal and patient's goal remains appropriate and relevant at this time.       Goals        Patient Stated       1. Problem Solving (pt-stated)       Goal Statement: I would like additional resources, support, assistance in ensuring my care needs remain safely met/higher level of care.   Date Goal set: 11/05/2021  Barriers: difficulties finding home care provider, provider availability - wait time to complete appointments, unknown source of confusion (UTI's versus dementia).   Strengths: daughter supportive, engaged in care coordination  Date to Achieve By: 05/2022  Patient expressed understanding of goal: Yes  Action steps to achieve this goal:  1. I will contact and follow up with the [facility] to determine if waiver/assistance remains active or if I need to re-apply.   2. I will contact and follow up with [facility] to check on the status of home care referral.   3. I will follow up with my providers as scheduled/recommended. (Primary Care Provider 11/11/2021, Urology 11/19/2021, Neuropsych 12/2021, etc).   4. I will discuss, review, schedule and complete recommended overdue health maintenance with my Primary Care Provider.   5. I will consider additional support for managing my mental health and wellness (therapist, psychiatrist, WILLAM LLANOS, [employer], etc).   6. I will contact my care team with  questions, concerns, support needs. I will use the clinic as a resource and I understand I can contact my clinic with 24/7 after hours services available. Care Coordinator will remain available as needed.                Plan/Recommendations: RNCC Clinical Assessments to be completed annually or as needed. Annual Assessment will be due 11/2022. The patient will continue working with Care Coordination to achieve goal as above.  CHW will involve RNCC as needed or if patient is ready to transition to goal status of Maintenance.  RNCC will continue to review progress to goals and CHW outreaches every 6 weeks.     Complex Care Plan:  Patient is not due for Complex Care Plan to be updated.  Care Plan updated and sent to patient: No    CHW will:  CHW Delegation:   1)  Due Date:  01/31/2022 & ongoing       Delegation: Please attempt to contact patient's daughter AND patient during follow up outreaches. Patient's telephone number: #[phone]. Discuss with patient if she is open to any additional mental health support. May need to collaborate with home care as needed.     Inez Ontiveros RN Care Coordinator  [facility] [place]  Email: [email or web address]  Phone: [phone]

## 2021-12-31 ENCOUNTER — TELEPHONE (OUTPATIENT)
Dept: UROLOGY | Facility: CLINIC | Age: 66
End: 2021-12-31
Payer: COMMERCIAL

## 2021-12-31 NOTE — TELEPHONE ENCOUNTER
Urology pt of Lacy Birmingham PA-C last seen 10/19/2021.  Returned call to Dominique, pt's daughter. She reports that Fely has an old Rx for Flomax that was prescribed by a provider in [place] sometime in 2020, before she had her SP tube. Joy isn't sure why it was prescribed. Rx will be running out soon and pt will need a refill, if it's a med she should continue. Will forward this question to provider. In Basket message sent. Will await response from provider.  ROBIN Montilla RN      [facility]    Phone Message    May a detailed message be left on voicemail: yes     Reason for Call: Joy would like to speak with Lacy or the care team in regards to a prescription that will be expiring.  Please reach out to Joy.    Action Taken: Message routed to:  [facility] Urology    Travel Screening: Not Applicable

## 2022-01-04 ENCOUNTER — TELEPHONE (OUTPATIENT)
Dept: UROLOGY | Facility: CLINIC | Age: 67
End: 2022-01-04
Payer: COMMERCIAL

## 2022-01-04 NOTE — TELEPHONE ENCOUNTER
Returned call to pt's daughter, Dominique, with the information below.  ROBIN Montilla RN      ----- Message from Lacy Birmingham PA-C sent at 12/31/2021  4:25 PM CST -----  Regarding: RE: med question  It doesn t work in women for retention. So, she no longer needs to be on that.   Lacy    ----- Message -----  From: Georgie Montilla RN  Sent: 12/31/2021   1:13 PM CST  To: Lacy Birmingham PA-C  Subject: med question                                     Rob,  Call from Dominique, pt's daughter. Fely has an old Rx for Flomax that was prescribed by a provider in [place] sometime in 2020, before she had her SP tube. Joy isn't sure why it was prescribed. Rx will be running out soon and pt will need a refill, if it's a med she should continue. Do you want Fely to stay on Flomax?  Thank you,  Georgie

## 2022-01-14 ENCOUNTER — MEDICAL CORRESPONDENCE (OUTPATIENT)
Dept: HEALTH INFORMATION MANAGEMENT | Facility: CLINIC | Age: 67
End: 2022-01-14
Payer: COMMERCIAL

## 2022-01-17 ENCOUNTER — TELEPHONE (OUTPATIENT)
Dept: FAMILY MEDICINE | Facility: CLINIC | Age: 67
End: 2022-01-17
Payer: COMMERCIAL

## 2022-01-17 NOTE — TELEPHONE ENCOUNTER
Orders received and placed in providers basket.    Fax when completed.    Lida CONNER  [facility]   [facility]

## 2022-01-18 ENCOUNTER — MEDICAL CORRESPONDENCE (OUTPATIENT)
Dept: HEALTH INFORMATION MANAGEMENT | Facility: CLINIC | Age: 67
End: 2022-01-18

## 2022-01-18 DIAGNOSIS — Z53.9 DIAGNOSIS NOT YET DEFINED: Primary | ICD-10-CM

## 2022-01-18 PROCEDURE — G0179 MD RECERTIFICATION HHA PT: HCPCS | Performed by: FAMILY MEDICINE

## 2022-01-31 ENCOUNTER — PATIENT OUTREACH (OUTPATIENT)
Dept: CARE COORDINATION | Facility: CLINIC | Age: 67
End: 2022-01-31
Payer: COMMERCIAL

## 2022-01-31 NOTE — PROGRESS NOTES
Clinic Care Coordination Contact  [facility]/Voicemail    Clinical Data: Care Coordinator Outreach  Outreach attempted x 1.  Left message on Dominique's (Daughter) voicemail with call back information and requested return call.    Plan: Care Coordinator will try to reach patient again in 10 business days.    Delegation from ANA MARIA Miner, on 12/23/22:  1)  Due Date:  01/31/2022 & ongoing       Delegation: Please attempt to contact patient's daughter AND patient during follow up outreaches. Patient's telephone number: #[phone]. Discuss with patient if she is open to any additional mental health support. May need to collaborate with home care as needed.   Delegation is pending.    Aleta Hawkins  [facility] Care Coordination  [facility]    Phone: [phone]

## 2022-02-01 ENCOUNTER — OFFICE VISIT (OUTPATIENT)
Dept: FAMILY MEDICINE | Facility: CLINIC | Age: 67
End: 2022-02-01
Payer: COMMERCIAL

## 2022-02-01 VITALS
WEIGHT: 180 LBS | HEART RATE: 96 BPM | HEIGHT: 69 IN | BODY MASS INDEX: 26.66 KG/M2 | DIASTOLIC BLOOD PRESSURE: 80 MMHG | RESPIRATION RATE: 16 BRPM | TEMPERATURE: 97.7 F | SYSTOLIC BLOOD PRESSURE: 128 MMHG | OXYGEN SATURATION: 98 %

## 2022-02-01 DIAGNOSIS — K52.9 CHRONIC DIARRHEA: Primary | ICD-10-CM

## 2022-02-01 PROCEDURE — 99214 OFFICE O/P EST MOD 30 MIN: CPT | Performed by: FAMILY MEDICINE

## 2022-02-01 RX ORDER — PERPHENAZINE 8 MG
1 TABLET ORAL DAILY
Qty: 30 CAPSULE | Refills: 1 | Status: SHIPPED | OUTPATIENT
Start: 2022-02-01 | End: 2022-04-05

## 2022-02-01 RX ORDER — CALCIUM POLYCARBOPHIL 625 MG 625 MG/1
2 TABLET ORAL DAILY
Qty: 60 TABLET | Refills: 1 | Status: SHIPPED | OUTPATIENT
Start: 2022-02-01 | End: 2022-04-07

## 2022-02-01 ASSESSMENT — ENCOUNTER SYMPTOMS
HEMATOCHEZIA: 0
ABDOMINAL PAIN: 1
NAUSEA: 0
DIARRHEA: 1
FEVER: 0

## 2022-02-01 ASSESSMENT — MIFFLIN-ST. JEOR: SCORE: 1412.91

## 2022-02-01 NOTE — PROGRESS NOTES
Assessment & Plan     Chronic diarrhea  Chronic problem.  History of IBS, Last colonoscopy 11-2-2021, resected 5 mm tubular adenoma in the transverse colon.  Exam findings not suggestive of diverticulitis, bowel obstruction, history not suggestive of ovarian torsion.  Given the diarrhea chronicity, recommend stool studies, I will follow up on this.  Otherwise start enteric-coated peppermint oil and FiberCon as adjunctive therapies for her IBS.  - Peppermint Oil 50 MG CPDR  Dispense: 30 capsule; Refill: 1  - calcium polycarbophil (FIBERCON) 625 MG tablet  Dispense: 60 tablet; Refill: 1  - Enteric Bacteria and Virus Panel by [facility] Stool  - Enteric Bacteria and Virus Panel by [facility] Stool  - Ova and Parasites ([facility]); Future        Return in about 1 month (around 3/1/2022) for diarrhea.    Gibran Alejo MD  [facility]ESS[facility] is a 66 year old who presents for the following health issues     Diarrhea  Associated symptoms include abdominal pain. Pertinent negatives include no fever or nausea.        Diarrhea  Onset/Duration: 2 months   Description:       Consistency of stool: watery, runny and loose       Blood in stool: no       Number of loose stools past 24 hours: 1  Progression of Symptoms: worsening  Accompanying signs and symptoms:       Fever: no       Nausea/Vomiting: no       Abdominal pain: YES       Weight loss: no       Episodes of constipation: no  History   Ill contacts: no  Recent use of antibiotics: no  Recent travels: no  Recent medication-new or changes(Rx or OTC): no  Precipitating or alleviating factors: None  Therapies tried and outcome: Imodium AD    66-year-old female history of IBS, comes to clinic with concerns of diarrhea x2 months.  1-2 nonbloody loose stools daily, associate abdominal pain with improvement after defecation.    Review of Systems   Constitutional: Negative for fever.   Gastrointestinal: Positive for abdominal pain and diarrhea. Negative  "for hematochezia and nausea.            Objective    /80 (Cuff Size: Adult Regular)   Pulse 96   Temp 97.7  F (36.5  C)   Resp 16   Ht 1.74 m (5' 8.5\")   Wt 81.6 kg (180 lb)   LMP 01/20/2004   SpO2 98%   BMI 26.97 kg/m    Body mass index is 26.97 kg/m .  Physical Exam  Cardiovascular:      Rate and Rhythm: Normal rate and regular rhythm.   Pulmonary:      Effort: Pulmonary effort is normal.      Breath sounds: Normal breath sounds.   Abdominal:      General: Abdomen is flat. There is no distension.      Palpations: Abdomen is soft.      Tenderness: There is no abdominal tenderness.      Comments: Suprapubic catheter in place, clear yellow urine in bag.            TSH   Date Value Ref Range Status   10/19/2021 2.17 0.40 - 4.00 mU/L Final   07/06/2021 1.17 0.40 - 4.00 mU/L Final                 "

## 2022-02-02 ENCOUNTER — PATIENT OUTREACH (OUTPATIENT)
Dept: CARE COORDINATION | Facility: CLINIC | Age: 67
End: 2022-02-02
Payer: COMMERCIAL

## 2022-02-02 ENCOUNTER — APPOINTMENT (OUTPATIENT)
Dept: LAB | Facility: CLINIC | Age: 67
End: 2022-02-02
Payer: COMMERCIAL

## 2022-02-02 NOTE — PROGRESS NOTES
Clinic Care Coordination Contact  [facility]/Voicemail    Clinical Data: Care Coordinator Outreach  Outreach attempted x 2.  Left message on Dominique's (Daughter) voicemail with call back information and requested return call.    Called patient, left message on voicemail with call back information and requested return call.     Plan: Care Coordinator will try to reach patient again in 10 business days.    2/1/22  Daughter Joy called, left message stating she available all day or after 3PM tomorrow. [phone]    Delegation from ANA MARIA Miner, on 12/23/22:  1)  Due Date:  01/31/2022 & ongoing       Delegation: Please attempt to contact patient's daughter AND patient during follow up outreaches. Patient's telephone number: #[phone]. Discuss with patient if she is open to any additional mental health support. May need to collaborate with home care as needed.   Delegation is pending.    Aleta Hawkins  [facility] Care Coordination  [facility]    Phone: [phone]

## 2022-02-04 ENCOUNTER — PATIENT OUTREACH (OUTPATIENT)
Dept: CARE COORDINATION | Facility: CLINIC | Age: 67
End: 2022-02-04
Payer: COMMERCIAL

## 2022-02-04 LAB
C COLI+JEJUNI+LARI FUSA STL QL NAA+PROBE: ABNORMAL
EC STX1 GENE STL QL NAA+PROBE: ABNORMAL
EC STX2 GENE STL QL NAA+PROBE: ABNORMAL
NOROV GI+II ORF1-ORF2 JNC STL QL NAA+PR: ABNORMAL
RVA NSP5 STL QL NAA+PROBE: ABNORMAL
SALMONELLA SP RPOD STL QL NAA+PROBE: ABNORMAL
SHIGELLA SP+EIEC IPAH STL QL NAA+PROBE: ABNORMAL
V CHOL+PARA RFBL+TRKH+TNAA STL QL NAA+PR: ABNORMAL
Y ENTERO RECN STL QL NAA+PROBE: ABNORMAL

## 2022-02-04 NOTE — PROGRESS NOTES
Clinic Care Coordination Contact    Situation: Patient chart reviewed by care coordinator.    Background: Care Coordination initial assessment and enrollment to Care Coordination was 11/5/2021. Patient centered goal was developed with participation from patient.  RN CC handed patient off to CHW for continued outreach every 30 days. Patient is due for an updated complex care plan. Annual Assessment will be due 11/2022  .  Assessment: CHW CC is in process of outreaching to patient, recently unable to contact patient 01/31/2022 & 02/02/2022. Next CHW CC outreach attempt scheduled for 02/09/2022.     Plan/Recommendations:  RNCC will extend and complete clinical review after CHW CC has made contact with patient. RNCC will remain available as needed.     Inez Ontiveros RN Care Coordinator  [facility] [place]  Email: [email or web address]  Phone: [phone]

## 2022-02-04 NOTE — LETTER
[facility] CARE COORDINATION  [address] [facility] [address]     February 17, 2022        Mariela Edmond  [address] [address] [facility] [address]          Dear Mariela,     Attached is an updated Patient Centered Plan of Care for your continued enrollment in Care Coordination. Please let us know if you have additional questions, concerns, or goals that we can assist with.    Sincerely,    Inez Ontiveros, RN Care Coordinator  [facility] [place]  Email: [email or web address]  Phone: [phone]                  [facility]  Patient Centered Plan of Care  About Me:        Patient Name:  Mariela Edmond    YOB: 1955  Age:         66 year old   [place] MRN:    [id number] Telephone Information:  Home Phone [phone]   Mobile [phone]       Address:  [address] [address] [address] Email address:  [email or web address]      Emergency Contact(s)    Name Relationship Lgl Grd Work Phone Home Phone Mobile Phone   1. LISA EDMOND Daughter   [phone]   2. ERIN EDMOND Son   [phone]   3. VASQUEZ OROZCO Son   [phone]   4. JADENBEV Friend   [phone]           Primary language:  English     needed? No   [place] [employer]:  [phone] op. 1  Other communication barriers:Glasses    Preferred Method of Communication:  Mail  Current living arrangement: I live in a private home with family    Mobility Status/ Medical Equipment: Independent    Health Maintenance  Health Maintenance Reviewed: Due/Overdue   Health Maintenance Due   Topic Date Due     DEXA  Never done     COVID-19 Vaccine (1) Never done     ADVANCE CARE PLANNING  04/19/2018     MAMMO SCREENING  07/28/2018     DTAP/TDAP/TD IMMUNIZATION (4 - Td or Tdap) 08/20/2019     MEDICARE ANNUAL WELLNESS VISIT  10/12/2020     Pneumococcal Vaccine: 65+ Years (1 of 1 - PPSV23)  Never done     INFLUENZA VACCINE (1) 09/01/2021     LIPID  09/08/2021     ZOSTER IMMUNIZATION (2 of 2) 01/27/2022      My Access Plan  Medical Emergency 911   Primary Clinic Line [facility] - [phone]   24 Hour Appointment Line [phone] or  [phone]) (toll-free)   24 Hour Nurse Line [phone] (toll-free)   Preferred Urgent Care [facility], [phone]     Preferred Hospital [facility]  [phone]     Preferred Pharmacy [facility], [place] - [address]     Behavioral Health Crisis Line The National Suicide Prevention Lifeline at 1-420.728.9951 or 911     My Care Team Members  Patient Care Team       Relationship Specialty Notifications Start End    Coming Reba Tillman MD PCP - General Family Medicine  8/5/21     Phone: [phone] Fax: [phone] [address] [facility] [address]    Coming Reba Tillman MD Assigned PCP   7/16/21     Phone: [phone] Fax: [phone] [address] [facility] [address]    Ramiro Sheppard MD Assigned Heart and Vascular Provider   9/5/21     Phone: [phone] Fax: [phone] [address] [place] [address]    Inez Ontiveros, RN Lead Care Coordinator  Admissions 11/4/21     Phone: [phone]         Kaci Phillips MA Community Health Worker   11/9/21     Lacy Birmingham PA-C Assigned OBGYN Provider   11/21/21     Phone: [phone] Fax: [phone] [address] [place] [address]    Aleta Hawkins Community Health Worker Primary Care - CC  11/29/21     Terri James, RN Personal Advocate & Liaison (PAL) Family Medicine Admissions 2/16/22     [phone]            My Care Plans  Self Management and Treatment Plan  Goals and (Comments)  Goals        General     1. Problem Solving (pt-stated)      Notes - Note edited  2/17/2022 10:37 AM by  Inez Ontiveros, RN     Goal Statement: I would like additional resources, support, assistance in ensuring my care needs remain safely met/higher level of care.   Date Goal set: 11/05/2021 - updated/modified: 02/17/2022  Barriers: difficulties finding home care provider, provider availability - wait time to complete appointments, unknown source of confusion (UTI's versus dementia).   Strengths: daughter supportive, engaged in care coordination, has knowledge and paperwork to apply for elderly waiver  Date to Achieve By: 05/2022  Patient expressed understanding of goal: Yes  Action steps to achieve this goal:  1. I will complete and submit application for elderly waiver and follow up with the county on the status  2. I will follow up with my providers as scheduled/recommended. (Primary Care Provider 03/08/2022, Urology 11/19/2021, Neuropsych 12/2021, etc).   3. I will discuss, review, schedule and complete recommended overdue health maintenance with my Primary Care Provider.   4. I will consider additional support for managing my mental health and wellness (therapist, psychiatrist, WILLAM LLANOS, [employer], etc).   5. I will contact my care team with questions, concerns, support needs. I will use the clinic as a resource and I understand I can contact my clinic with 24/7 after hours services available. Care Coordinator will remain available as needed.      *ANNUAL ASSESSMENT DUE: 11/2022               Action Plans on File:            Depression          Advance Care Plans/Directives Type:   No data recorded    My Medical and Care Information  Problem List   Patient Active Problem List   Diagnosis     Allergic rhinitis due to other allergen     Irritable bowel syndrome     Postsurgical hypothyroidism     Iron deficiency anemia     Mild major depression (H)     CARDIOVASCULAR SCREENING; LDL GOAL LESS THAN 160     Generalized anxiety disorder     Tubular adenoma of colon     Seasonal affective  disorder (H)     Headache     ADD (attention deficit disorder)     TIA (transient ischemic attack)     Gastroesophageal reflux disease with esophagitis     Hiatal hernia     History of colonic polyps     Migraine with aura and without status migrainosus, not intractable     History of thyroid cancer     Mild cognitive impairment     Peripheral polyneuropathy     Vitamin D deficiency     Cystitis with hematuria     Fall in home     Hyponatremia     Retention of urine     Splenic artery aneurysm (H)     Dementia without behavioral disturbance (H)     Other osteoporosis without current pathological fracture     Vascular dementia with behavioral disturbance (H)     Suprapubic catheter (H)      Current Medications and Allergies:    Allergies   Allergen Reactions     Levaquin Nausea and Vomiting      Current Outpatient Medications   Medication     alendronate (FOSAMAX) 70 MG tablet     Ascorbic Acid (VITAMIN C) 500 MG CHEW     busPIRone (BUSPAR) 5 MG tablet     calcium polycarbophil (FIBERCON) 625 MG tablet     dicyclomine (BENTYL) 20 MG tablet     diphenoxylate-atropine (LOMOTIL) 2.5-0.025 MG tablet     divalproex sodium extended-release (DEPAKOTE ER) 250 MG 24 hr tablet     donepezil (ARICEPT) 5 MG tablet     levothyroxine (LEVOXYL) 137 MCG tablet     methenamine (HIPREX) 1 g tablet     multivitamin w/minerals (THERA-VIT-M) tablet     OLANZapine (ZYPREXA) 7.5 MG tablet     pantoprazole (PROTONIX) 20 MG EC tablet     Peppermint Oil 50 MG CPDR     vitamin D3 (CHOLECALCIFEROL) 125 MCG (5000 UT) tablet     No current facility-administered medications for this visit.      Care Coordination Start Date: 11/5/2021   Frequency of Care Coordination: monthly     Form Last Updated: 02/17/2022

## 2022-02-08 ENCOUNTER — TELEPHONE (OUTPATIENT)
Dept: FAMILY MEDICINE | Facility: CLINIC | Age: 67
End: 2022-02-08
Payer: COMMERCIAL

## 2022-02-08 DIAGNOSIS — K52.9 CHRONIC DIARRHEA: Primary | ICD-10-CM

## 2022-02-08 NOTE — TELEPHONE ENCOUNTER
Pt daughter calling requesting new stool sample order to be placed for pt. Previous stool sample on 2/2/22 was invalid. BREANA informed pt via [employer] 2/4/22 to come into clinic for new collection cup to redo test, orders placed. Pt daughter will  today 2/8/22 after work. No further questions or concerns at this time. Will route to BREANA as CLEMENTE .    Tyrell BIGGS RN

## 2022-02-14 ENCOUNTER — PATIENT OUTREACH (OUTPATIENT)
Dept: NURSING | Facility: CLINIC | Age: 67
End: 2022-02-14
Payer: COMMERCIAL

## 2022-02-14 NOTE — PROGRESS NOTES
Clinic Care Coordination Contact    Community Health Worker Follow Up  Spoke with Dominique (Daughter)    Care Gaps:     Health Maintenance Due   Topic Date Due     DEXA  Never done     COVID-19 Vaccine (1) Never done     ADVANCE CARE PLANNING  04/19/2018     MAMMO SCREENING  07/28/2018     DTAP/TDAP/TD IMMUNIZATION (4 - Td or Tdap) 08/20/2019     MEDICARE ANNUAL WELLNESS VISIT  10/12/2020     Pneumococcal Vaccine: 65+ Years (1 of 1 - PPSV23) Never done     INFLUENZA VACCINE (1) 09/01/2021     LIPID  09/08/2021     ZOSTER IMMUNIZATION (2 of 2) 01/27/2022     Appointment scheduled 3/8/22 at 10:10AM with Dr. Morgan Tillman. Patient to review care gaps at visit.     Goals:   Goals Addressed as of 2/14/2022 at 10:37 AM                    Today    12/8/21       1. Problem Solving (pt-stated)   30%  20%    Added 11/9/21 by Inez Ontiveros, RN      Goal Statement: I would like additional resources, support, assistance in ensuring my care needs remain safely met/higher level of care.   Date Goal set: 11/05/2021  Barriers: difficulties finding home care provider, provider availability - wait time to complete appointments, unknown source of confusion (UTI's versus dementia).   Strengths: daughter supportive, engaged in care coordination  Date to Achieve By: 05/2022  Patient expressed understanding of goal: Yes  Action steps to achieve this goal:  1. I will contact and follow up with the [facility] to determine if waiver/assistance remains active or if I need to re-apply.   2. I will contact and follow up with [facility] to check on the status of home care referral.   3. I will follow up with my providers as scheduled/recommended. (Primary Care Provider 11/11/2021, Urology 11/19/2021, Neuropsych 12/2021, etc).   4. I will discuss, review, schedule and complete recommended overdue health maintenance with my Primary Care Provider.   5. I will consider additional support for managing my mental health and wellness (therapist,  psychiatrist, WILLAM LLANOS, [employer], etc).   6. I will contact my care team with questions, concerns, support needs. I will use the clinic as a resource and I understand I can contact my clinic with 24/7 after hours services available. Care Coordinator will remain available as needed.      2/14/22 CHW    Dominique states, patient is sleeping. She will discuss reviewing additional mental health support resources with CHW to see if patient might be open to it and call back later this week with patient.  CHW offered to make calls with patient and daughter and will assist as needed.    Dominique shares that she has paperwork to apply for elderly waiver but has not completed it yet. They are in the process of finding out medical needs and if patient would benefit from being in nursing home or assisted living.     Dominique shares that she's had to cancel and reschedule appointments due to timing and covid and patient has not made it to all of her tastings. A barrier for Dominique is that patient is in denial and haven't wanted to schedule appointments.    Dominique shares that caring for patient is becoming way too much for her to deal with. CHW expressed understanding and offered to assist patient with following up on resources and scheduling appointments. She will reach out to CC for support as needed.          Intervention and Education during outreach:     CHW Plan: CHW to follow up in 1 week    Delegation from ANA MARIA Miner, on 12/23/22:  1)  Due Date:  01/31/2022 & ongoing       Delegation: Please attempt to contact patient's daughter AND patient during follow up outreaches. Patient's telephone number: #[phone]. Discuss with patient if she is open to any additional mental health support. May need to collaborate with home care as needed.   Delegation is completed.    Aleta Hawkins  [facility] Care Coordination  [facility]    Phone: [phone]

## 2022-02-16 ENCOUNTER — PATIENT OUTREACH (OUTPATIENT)
Dept: FAMILY MEDICINE | Facility: CLINIC | Age: 67
End: 2022-02-16
Payer: COMMERCIAL

## 2022-02-16 NOTE — LETTER
Mariela Duffy  [address] [address] [address]    Rob Geiger,     Thank you for choosing [facility] today for your health care needs.     [facility] is transforming primary care  At [facility], we re dedicated to constantly improve how we serve the health care needs of our patients and communities. We re currently making changes to the way we deliver care.     Changes you ll notice include:    An emphasis on building a relationship with a primary care provider    Access to a PAL (patient advocate and liaison) to help guide you with your care needs    Appointment lengths tailored to your specific needs and greater access to a care team to help you and your provider improve and maintain your health and well-being    Improved online access to your care team    Benefits of a primary care provider  If you don t have a designated primary care provider, we encourage you to get to know our care team online and find a provider you d like to see. Most of our providers have a short video on their online provider page. Visit [email or web address] to explore our providers and locations.    Benefits of having a primary care provider include:      They get to know you - your health history, family history and goals, making it easier to make a health plan together.     You get to know them - making health-related conversations and decisions easier      Primary care doctors help you when you re sick or hurt - but also focus on keeping you healthy with preventive care and screenings.      A doctor who sees you regularly is more likely to notice changes in your health.     You ll be connected to a broad care team who partners with your provider to support you.    Patient Advocate Liaison (PAL)   To help make sure you get the right care, at the right time, we include PALs, or Patient Advocate Liaisons, as part of your care team. Your PAL will be your first line of contact. They ll advocate for your needs and help  you navigate our services, connecting you with care team members and community resources to ensure your care is well coordinated. You ll be introduced to a PAL in an upcoming visit.     Expanded care team access with tailored appointment lengths  Depending on your health care needs, you may have longer or shorter appointments and see additional care team providers - including Medication Therapy Management (MTM) pharmacists, diabetes educators, behavioral health clinicians, or social workers. At times, they may be included in your visit with your provider, or you may see them individually.     Online access to your health care records and care team  [employer] is our online tool that makes it easy to see your health care information and communicate with your care team.     [employer] allows you to:     View your health maintenance plan so you know when you re due for a preventive screening    Send secure messages to your care team    View your health history and visit summaries     Schedule appointments     Complete questionnaires and eCheck-in before appointments      Get care from your provider with an e-visit      View and pay your bill     Sign up at [employer]. Once you have an account, you also can download the mobile glendy.     Connecting to fast and convenient care  When you need fast, convenient care - consider one of the following options:       Video Visit: A convenient care option for visiting with your provider out of the comfort of your own home. Most of the things you come to the clinic to address with your provider can now be done virtually through a video. This includes your chronic medication follow up, questions or concerns you may have, and even your annual Medicare Wellness Visit.       Phone Visit: Another convenient option for follow up of common problems that may require a more in-depth discussion with your provider.       E-visit: When you need acute care quickly, or have a quick question  about your medication, an E-visit is completed through [employer] (T/A) and your provider will respond within one business day.      Terri FLANAGAN RN, BSN, PHN - Patient Advocate [place] - PAL RN  [facility]  [phone]

## 2022-02-16 NOTE — TELEPHONE ENCOUNTER
SB 3 PAL Welcome Letter Sent  Terri FLANAGAN RN, BSN, PHN - Patient Advocate [place] - PAL RN  [facility]  [phone]

## 2022-02-17 ASSESSMENT — ACTIVITIES OF DAILY LIVING (ADL): DEPENDENT_IADLS:: CLEANING;COOKING;LAUNDRY;SHOPPING;MEDICATION MANAGEMENT;TRANSPORTATION

## 2022-02-17 NOTE — PROGRESS NOTES
Clinic Care Coordination Contact  Care Coordination Clinician Chart review    Situation: Patient chart reviewed by care coordinator.       Background: Care Coordination initial assessment and enrollment took place 11/5/2021.   Upon initial assessment patient-centered goals were discussed and developed with participation from patient.  RNCC handed patient follow up and monitoring of goal progression off to CHW for continued outreach every 30 days.        Assessment: Per chart review, patient outreach completed by CC CHW on 02/14/2022.  Patient is actively working to accomplish goal(s) and patient's goal(s) remain(s) appropriate and relevant at this time.       Goals        Patient Stated       1. Problem Solving (pt-stated)       Goal Statement: I would like additional resources, support, assistance in ensuring my care needs remain safely met/higher level of care.   Date Goal set: 11/05/2021 - updated/modified: 02/17/2022  Barriers: difficulties finding home care provider, provider availability - wait time to complete appointments, unknown source of confusion (UTI's versus dementia).   Strengths: daughter supportive, engaged in care coordination, has knowledge and paperwork to apply for elderly waiver  Date to Achieve By: 05/2022  Patient expressed understanding of goal: Yes  Action steps to achieve this goal:  1. I will complete and submit application for elderly waiver and follow up with the county on the status  2. I will follow up with my providers as scheduled/recommended. (Primary Care Provider 03/08/2022, Urology 11/19/2021, Neuropsych 12/2021, etc).   3. I will discuss, review, schedule and complete recommended overdue health maintenance with my Primary Care Provider.   4. I will consider additional support for managing my mental health and wellness (therapist, psychiatrist, WILLAM LLANOS, [employer], etc).   5. I will contact my care team with questions, concerns, support needs. I will  use the clinic as a resource and I understand I can contact my clinic with 24/7 after hours services available. Care Coordinator will remain available as needed.      *ANNUAL ASSESSMENT DUE: 11/2022              Plan/Recommendations: RNCC Clinical Assessments to be completed annually or as needed. Annual Assessment will be due 11/2022. The patient will continue working with Care Coordination to achieve goal(s) as above.  CHW will involve RNCC as needed or if patient is ready to transition to goal status of Maintenance.  RNCC will continue to review progress to goal(s) and CHW outreaches every 6 weeks.     Complex Care Plan:  Patient is due for updated Plan of Care.  Care Plan updated and sent to patient: Yes, via Flores Ontiveros RN Care Coordinator  [facility] [place]  Email: [email or web address]  Phone: [phone]

## 2022-02-19 ENCOUNTER — HEALTH MAINTENANCE LETTER (OUTPATIENT)
Age: 67
End: 2022-02-19

## 2022-02-25 ENCOUNTER — PATIENT OUTREACH (OUTPATIENT)
Dept: NURSING | Facility: CLINIC | Age: 67
End: 2022-02-25
Payer: COMMERCIAL

## 2022-02-25 NOTE — PROGRESS NOTES
Clinic Care Coordination Contact  Community Health Worker Follow Up  Spoke with patients daughterDominique    Care Gaps:   Health Maintenance Due   Topic Date Due     DEXA  Never done     COVID-19 Vaccine (1) Never done     ADVANCE CARE PLANNING  04/19/2018     MAMMO SCREENING  07/28/2018     DTAP/TDAP/TD IMMUNIZATION (4 - Td or Tdap) 08/20/2019     MEDICARE ANNUAL WELLNESS VISIT  10/12/2020     Pneumococcal Vaccine: 65+ Years (1 of 1 - PPSV23) Never done     INFLUENZA VACCINE (1) 09/01/2021     LIPID  09/08/2021     ZOSTER IMMUNIZATION (2 of 2) 01/27/2022     Not discussed today    Goals:   Goals Addressed as of 2/25/2022 at 12:15 PM                    Today    2/14/22       1. Problem Solving (pt-stated)   40%  30%    Added 11/9/21 by Inez Ontiveros, RN      Goal Statement: I would like additional resources, support, assistance in ensuring my care needs remain safely met/higher level of care.   Date Goal set: 11/05/2021 - updated/modified: 02/17/2022  Barriers: difficulties finding home care provider, provider availability - wait time to complete appointments, unknown source of confusion (UTI's versus dementia).   Strengths: daughter supportive, engaged in care coordination, has knowledge and paperwork to apply for elderly waiver  Date to Achieve By: 05/2022  Patient expressed understanding of goal: Yes  Action steps to achieve this goal:  1. I will complete and submit application for elderly waiver and follow up with the county on the status  2. I will follow up with my providers as scheduled/recommended. (Primary Care Provider 03/08/2022, Urology 11/19/2021, Neuropsych 12/2021, etc).   3. I will discuss, review, schedule and complete recommended overdue health maintenance with my Primary Care Provider.   4. I will consider additional support for managing my mental health and wellness (therapist, psychiatrist, WILLAM LLANOS, [employer], etc).   5. I will contact my care team with  questions, concerns, support needs. I will use the clinic as a resource and I understand I can contact my clinic with 24/7 after hours services available. Care Coordinator will remain available as needed.      *ANNUAL ASSESSMENT DUE: 11/2022 2/25/22 CHW:    Dominique states that patient is still living with her, she continues to go through the paperwork and determine what their next steps will be. Ultimately they are waiting for patient to have her neuropsych evaluation in April. This has been postponed multiple times and they are eager to have results.     Patient is feeling better, seems to be doing better with her IBS symptoms. Dominique states that she has had a lot to do, stool sample came back invalid, pt has more tests to be done. Struggling to find time to complete these things.     Home care continues to come every 3-4 weeks to change patients catheter.           Intervention and Education during outreach: CC goal, CHW offered assistance with scheduling or coordinating appointments however patients daughter declines at this time. She has no outstanding needs or concerns to be addressed by CC team.     CHW Plan: Next outreach in one month to follow up/check in on goal.     JUANA Mccall, B.S. [facility] Care Coordination  [facility]:  [place], [place]  [phone]  [email or web address]

## 2022-03-11 ENCOUNTER — OFFICE VISIT (OUTPATIENT)
Dept: FAMILY MEDICINE | Facility: CLINIC | Age: 67
End: 2022-03-11
Payer: COMMERCIAL

## 2022-03-11 ENCOUNTER — NURSE TRIAGE (OUTPATIENT)
Dept: FAMILY MEDICINE | Facility: CLINIC | Age: 67
End: 2022-03-11

## 2022-03-11 VITALS
OXYGEN SATURATION: 96 % | HEART RATE: 102 BPM | SYSTOLIC BLOOD PRESSURE: 110 MMHG | HEIGHT: 69 IN | DIASTOLIC BLOOD PRESSURE: 80 MMHG | BODY MASS INDEX: 26.72 KG/M2 | TEMPERATURE: 97.7 F | WEIGHT: 180.4 LBS

## 2022-03-11 DIAGNOSIS — F32.0 MILD MAJOR DEPRESSION (H): ICD-10-CM

## 2022-03-11 DIAGNOSIS — Z93.59 SUPRAPUBIC CATHETER (H): ICD-10-CM

## 2022-03-11 DIAGNOSIS — F01.518 VASCULAR DEMENTIA WITH BEHAVIORAL DISTURBANCE (H): ICD-10-CM

## 2022-03-11 DIAGNOSIS — R19.5 LOOSE STOOLS: Primary | ICD-10-CM

## 2022-03-11 DIAGNOSIS — Z12.31 VISIT FOR SCREENING MAMMOGRAM: ICD-10-CM

## 2022-03-11 DIAGNOSIS — Z13.220 SCREENING FOR HYPERLIPIDEMIA: ICD-10-CM

## 2022-03-11 DIAGNOSIS — I72.8 SPLENIC ARTERY ANEURYSM (H): ICD-10-CM

## 2022-03-11 DIAGNOSIS — K52.9 CHRONIC DIARRHEA: ICD-10-CM

## 2022-03-11 PROCEDURE — 90471 IMMUNIZATION ADMIN: CPT | Performed by: FAMILY MEDICINE

## 2022-03-11 PROCEDURE — 90715 TDAP VACCINE 7 YRS/> IM: CPT | Performed by: FAMILY MEDICINE

## 2022-03-11 PROCEDURE — 36415 COLL VENOUS BLD VENIPUNCTURE: CPT | Performed by: FAMILY MEDICINE

## 2022-03-11 PROCEDURE — 99214 OFFICE O/P EST MOD 30 MIN: CPT | Mod: 25 | Performed by: FAMILY MEDICINE

## 2022-03-11 PROCEDURE — 80061 LIPID PANEL: CPT | Performed by: FAMILY MEDICINE

## 2022-03-11 PROCEDURE — 80053 COMPREHEN METABOLIC PANEL: CPT | Performed by: FAMILY MEDICINE

## 2022-03-11 NOTE — PROGRESS NOTES
"Shmuel Geiger is a 66 year old who presents for the following health issues    HPI     Diarrhea  Onset/Duration: Several Months  Description:       Consistency of stool: loose       Blood in stool: no       Number of loose stools past 24 hours: 6  Progression of Symptoms: worsening  Accompanying signs and symptoms:       Fever: no       Nausea/Vomiting: no       Abdominal pain: no       Weight loss: no       Episodes of constipation: no  History   Ill contacts: no  Recent use of antibiotics: no  Recent travels: no  Recent medication-new or changes(Rx or OTC): no        Review of Systems   Constitutional, HEENT, cardiovascular, pulmonary, GI, , musculoskeletal, neuro, skin, endocrine and psych systems are negative, except as otherwise noted.      Objective    /80 (BP Location: Right arm, Patient Position: Sitting, Cuff Size: Adult Regular)   Pulse 102   Temp 97.7  F (36.5  C) (Oral)   Ht 1.74 m (5' 8.5\")   Wt 81.8 kg (180 lb 6.4 oz)   LMP 01/20/2004   SpO2 96%   BMI 27.03 kg/m    Body mass index is 27.03 kg/m .  Physical Exam   GENERAL: healthy, alert and no distress  EYES: Eyes grossly normal to inspection, PERRL and conjunctivae and sclerae normal  HENT: ear canals and TM's normal, nose and mouth without ulcers or lesions  NECK: no adenopathy, no asymmetry, masses, or scars and thyroid normal to palpation  RESP: lungs clear to auscultation - no rales, rhonchi or wheezes  CV: regular rate and rhythm, normal S1 S2, no S3 or S4, no murmur, click or rub, no peripheral edema and peripheral pulses strong  ABDOMEN: soft, nontender, no hepatosplenomegaly, no masses and bowel sounds normal  MS: no gross musculoskeletal defects noted, no edema  SKIN: no suspicious lesions or rashes  NEURO: Normal strength and tone, mentation intact and speech normal  PSYCH: mentation appears normal, affect normal/bright    Feels generally comfortable, wants the mammogram, discussed stools and retesting.     (R19.5) Loose " stools  (primary encounter diagnosis)  Comment:   Plan: Routine parasitology exam, CANCELED: Enteric         Bacteria and Virus Panel by [facility] Stool            (Z12.31) Visit for screening mammogram  Comment:   Plan: MA SCREENING DIGITAL BILAT - Future  (s+30)            (Z13.220) Screening for hyperlipidemia  Comment:   Plan: Lipid panel reflex to direct LDL Non-fasting            (Z93.59) Suprapubic catheter (H)  Comment:   Plan:     (F01.51) Vascular dementia with behavioral disturbance (H)  Comment:   Plan: REVIEW OF HEALTH MAINTENANCE PROTOCOL ORDERS,         Comprehensive metabolic panel (BMP + Alb, Alk         Phos, ALT, AST, Total. Bili, TP)        Carries on conversation well      (K52.9) Chronic diarrhea  Comment:   Plan: check for culture

## 2022-03-11 NOTE — TELEPHONE ENCOUNTER
Verbal order given to Timothy from [facility] to continue skilled nursing visits one time every three weeks for suprapubic catheter management.    Harleen Ta RN

## 2022-03-12 LAB
ALBUMIN SERPL-MCNC: 3.6 G/DL (ref 3.4–5)
ALP SERPL-CCNC: 81 U/L (ref 40–150)
ALT SERPL W P-5'-P-CCNC: 20 U/L (ref 0–50)
ANION GAP SERPL CALCULATED.3IONS-SCNC: 6 MMOL/L (ref 3–14)
AST SERPL W P-5'-P-CCNC: 16 U/L (ref 0–45)
BILIRUB SERPL-MCNC: 0.6 MG/DL (ref 0.2–1.3)
BUN SERPL-MCNC: 10 MG/DL (ref 7–30)
CALCIUM SERPL-MCNC: 9.6 MG/DL (ref 8.5–10.1)
CHLORIDE BLD-SCNC: 106 MMOL/L (ref 94–109)
CHOLEST SERPL-MCNC: 192 MG/DL
CO2 SERPL-SCNC: 28 MMOL/L (ref 20–32)
CREAT SERPL-MCNC: 0.67 MG/DL (ref 0.52–1.04)
FASTING STATUS PATIENT QL REPORTED: ABNORMAL
GFR SERPL CREATININE-BSD FRML MDRD: >90 ML/MIN/1.73M2
GLUCOSE BLD-MCNC: 88 MG/DL (ref 70–99)
HDLC SERPL-MCNC: 39 MG/DL
LDLC SERPL CALC-MCNC: 105 MG/DL
NONHDLC SERPL-MCNC: 153 MG/DL
POTASSIUM BLD-SCNC: 4 MMOL/L (ref 3.4–5.3)
PROT SERPL-MCNC: 7.5 G/DL (ref 6.8–8.8)
SODIUM SERPL-SCNC: 140 MMOL/L (ref 133–144)
TRIGL SERPL-MCNC: 241 MG/DL

## 2022-03-16 PROCEDURE — 87209 SMEAR COMPLEX STAIN: CPT | Performed by: FAMILY MEDICINE

## 2022-03-16 PROCEDURE — 87506 IADNA-DNA/RNA PROBE TQ 6-11: CPT | Performed by: FAMILY MEDICINE

## 2022-03-16 PROCEDURE — 87177 OVA AND PARASITES SMEARS: CPT | Performed by: FAMILY MEDICINE

## 2022-03-17 DIAGNOSIS — Z53.9 DIAGNOSIS NOT YET DEFINED: Primary | ICD-10-CM

## 2022-03-17 PROCEDURE — G0179 MD RECERTIFICATION HHA PT: HCPCS | Performed by: FAMILY MEDICINE

## 2022-03-18 ENCOUNTER — TELEPHONE (OUTPATIENT)
Dept: FAMILY MEDICINE | Facility: CLINIC | Age: 67
End: 2022-03-18
Payer: COMMERCIAL

## 2022-03-18 LAB — O+P STL MICRO: NEGATIVE

## 2022-03-18 NOTE — TELEPHONE ENCOUNTER
Fax received from [facility] Home health certification and plan of care.  Please fill out and fax back     Nancy Friedman

## 2022-03-28 ENCOUNTER — MEDICAL CORRESPONDENCE (OUTPATIENT)
Dept: HEALTH INFORMATION MANAGEMENT | Facility: CLINIC | Age: 67
End: 2022-03-28
Payer: COMMERCIAL

## 2022-03-31 ENCOUNTER — PATIENT OUTREACH (OUTPATIENT)
Dept: CARE COORDINATION | Facility: CLINIC | Age: 67
End: 2022-03-31
Payer: COMMERCIAL

## 2022-03-31 ENCOUNTER — APPOINTMENT (OUTPATIENT)
Dept: NURSING | Facility: CLINIC | Age: 67
End: 2022-03-31
Payer: COMMERCIAL

## 2022-03-31 NOTE — PROGRESS NOTES
Clinic Care Coordination Contact  [facility]/Voicemail       Clinical Data: Care Coordinator Outreach  Outreach attempted x 1.  Left message on patient's AND daughters voicemail with call back information and requested return call.    Plan: Care Coordinator will try to reach patient again in 10 business days.    JUANA Mccall, B.S. [facility] Care Coordination  [facility]:  [place], [place] and [place]  [phone]  [email or web address]

## 2022-03-31 NOTE — PROGRESS NOTES
Clinic Care Coordination Contact  Community Health Worker Follow Up    Care Gaps:     Health Maintenance Due   Topic Date Due     DEXA  Never done     ADVANCE CARE PLANNING  04/19/2018     MAMMO SCREENING  07/28/2018     MEDICARE ANNUAL WELLNESS VISIT  10/12/2020     Pneumococcal Vaccine: 65+ Years (1 of 1 - PPSV23) Never done     INFLUENZA VACCINE (1) 09/01/2021       Scheduled 4/18/22 for mammogram and in home AWV      Goals:   Goals Addressed as of 3/31/2022 at 3:07 PM                    Today    2/25/22       1. Problem Solving (pt-stated)   80%  40%    Added 11/9/21 by Inez Ontiveros, RN      Goal Statement: I would like additional resources, support, assistance in ensuring my care needs remain safely met/higher level of care.   Date Goal set: 11/05/2021 - updated/modified: 02/17/2022  Barriers: difficulties finding home care provider, provider availability - wait time to complete appointments, unknown source of confusion (UTI's versus dementia).   Strengths: daughter supportive, engaged in care coordination, has knowledge and paperwork to apply for elderly waiver  Date to Achieve By: 05/2022  Patient expressed understanding of goal: Yes  Action steps to achieve this goal:  1. I will complete and submit application for elderly waiver and follow up with the county on the status  2. I will follow up with my providers as scheduled/recommended. (Primary Care Provider 03/08/2022, Urology 11/19/2021, Neuropsych 12/2021, etc).   3. I will discuss, review, schedule and complete recommended overdue health maintenance with my Primary Care Provider.   4. I will consider additional support for managing my mental health and wellness (therapist, psychiatrist, WILLAM LLANOS, [employer], etc).   5. I will contact my care team with questions, concerns, support needs. I will use the clinic as a resource and I understand I can contact my clinic with 24/7 after hours services available. Care Coordinator will  remain available as needed.      *ANNUAL ASSESSMENT DUE: 11/2022              Intervention and Education during outreach: Patients daughter Dominique states that they are transitioning patient to independent living at [facility] in 3 weeks, 4/18. She is working on coordinating [facility].    Currently going through [facility], Dominique talked to them about getting another staff member, right now a male comes every 3 weeks to change catheter. Wanting it once every week or every 2 weeks and with a female RN. Wondering if they would be able to switch to [facility]. Daughter states that she will continue to do meds but may be interested in a medication dispenser.   Working towards completing overdue HM, got a call about AWV and will be doing it in home on 4/18 along with mammogram.    CHW Plan: Huddled with RNCC- instructed that pt should  check [facility] first to see if they have the capacity, may need a f2f and new referral to switch. CHW will relay info to Dominique.     Kaci Phillips, JUANA, B.S. [facility] Care Coordination  [facility]:  [place], [place] and [place]  [phone]  [email or web address]

## 2022-03-31 NOTE — PROGRESS NOTES
Clinic Care Coordination Contact  Care Coordination Clinician Chart review    Situation: Patient chart reviewed by care coordinator.       Background: Care Coordination initial assessment and enrollment took place 11/5/2021.   Upon initial assessment patient-centered goals were discussed and developed with participation from patient.  RNCC handed patient follow up and monitoring of goal progression off to CHW for continued outreach every 30 days.        Assessment: Per chart review, patient outreach completed by CC CHW on 02/25/2022.  Patient is actively working to accomplish goal(s) and patient's goal(s) remain(s) appropriate and relevant at this time.       Goals        Patient Stated       1. Problem Solving (pt-stated)       Goal Statement: I would like additional resources, support, assistance in ensuring my care needs remain safely met/higher level of care.   Date Goal set: 11/05/2021 - updated/modified: 02/17/2022  Barriers: difficulties finding home care provider, provider availability - wait time to complete appointments, unknown source of confusion (UTI's versus dementia).   Strengths: daughter supportive, engaged in care coordination, has knowledge and paperwork to apply for elderly waiver  Date to Achieve By: 05/2022  Patient expressed understanding of goal: Yes  Action steps to achieve this goal:  1. I will complete and submit application for elderly waiver and follow up with the county on the status  2. I will follow up with my providers as scheduled/recommended. (Primary Care Provider 03/08/2022, Urology 11/19/2021, Neuropsych 12/2021, etc).   3. I will discuss, review, schedule and complete recommended overdue health maintenance with my Primary Care Provider.   4. I will consider additional support for managing my mental health and wellness (therapist, psychiatrist, WILLAM LLANOS, [employer], etc).   5. I will contact my care team with questions, concerns, support needs. I will  use the clinic as a resource and I understand I can contact my clinic with 24/7 after hours services available. Care Coordinator will remain available as needed.      *ANNUAL ASSESSMENT DUE: 11/2022              Plan/Recommendations: RNCC Clinical Assessments to be completed annually or as needed. Annual Assessment will be due 11/2022. The patient will continue working with Care Coordination to achieve goal(s) as above.  CHW will involve RNCC as needed or if patient is ready to transition to goal status of Maintenance.  RNCC will continue to review progress to goal(s) and CHW outreaches every 6 weeks.     Complex Care Plan:  Patient is not due for updated Plan of Care.  Care Plan updated and sent to patient: Zunilda Ontiveros RN Care Coordinator  [facility] [place]  Email: [email or web address]  Phone: [phone]

## 2022-04-05 DIAGNOSIS — K52.9 CHRONIC DIARRHEA: ICD-10-CM

## 2022-04-05 RX ORDER — PERPHENAZINE 8 MG
1 TABLET ORAL DAILY
Qty: 30 CAPSULE | Refills: 1 | Status: SHIPPED | OUTPATIENT
Start: 2022-04-05 | End: 2022-07-01

## 2022-04-07 DIAGNOSIS — E89.0 POSTOPERATIVE HYPOTHYROIDISM: ICD-10-CM

## 2022-04-07 RX ORDER — LEVOTHYROXINE SODIUM 137 UG/1
137 TABLET ORAL DAILY
Qty: 90 TABLET | Refills: 0 | Status: SHIPPED | OUTPATIENT
Start: 2022-04-07 | End: 2022-07-07

## 2022-04-07 NOTE — TELEPHONE ENCOUNTER
Patient's daughter calling on behalf of patient to request refill of rx levothyroxine (LEVOXYL) 137 MCG tablet. Patient's daughter confirms patient takes 137mcg tablet 1x daily. Routing to refill pool to follow protocol.     Yovany TORRES RN

## 2022-04-07 NOTE — TELEPHONE ENCOUNTER
Routing refill request to provider for review/approval because:  Medication is reported/historical    Yovany TORRES RN

## 2022-04-08 ENCOUNTER — PATIENT OUTREACH (OUTPATIENT)
Dept: CARE COORDINATION | Facility: CLINIC | Age: 67
End: 2022-04-08
Payer: COMMERCIAL

## 2022-04-08 NOTE — PROGRESS NOTES
Clinic Care Coordination Contact  [facility]/Voicemail       Clinical Data: Care Coordinator Outreach  Outreach attempted x 1.  Left message on patient's dtrs voicemail with call back information and requested return call.    Plan: Care Coordinator will try to reach patient again in 10 business days.    JUANA Mccall, B.S. [facility] Care Coordination  [facility]:  [place], [place] and [place]  [phone]  [email or web address]

## 2022-04-18 ENCOUNTER — ANCILLARY PROCEDURE (OUTPATIENT)
Dept: MAMMOGRAPHY | Facility: CLINIC | Age: 67
End: 2022-04-18
Attending: FAMILY MEDICINE
Payer: COMMERCIAL

## 2022-04-18 DIAGNOSIS — Z12.31 VISIT FOR SCREENING MAMMOGRAM: ICD-10-CM

## 2022-04-18 PROCEDURE — 77063 BREAST TOMOSYNTHESIS BI: CPT | Mod: TC | Performed by: RADIOLOGY

## 2022-04-18 PROCEDURE — 77067 SCR MAMMO BI INCL CAD: CPT | Mod: TC | Performed by: RADIOLOGY

## 2022-04-20 ENCOUNTER — TELEPHONE (OUTPATIENT)
Dept: FAMILY MEDICINE | Facility: CLINIC | Age: 67
End: 2022-04-20
Payer: COMMERCIAL

## 2022-04-20 NOTE — TELEPHONE ENCOUNTER
The Home Care/Assisted Living/Nursing Facility is calling regarding an established patient.  Has the patient seen Home Care in the past or is currently residing in Assisted Living or Nursing Facility? Yes.     Zeeshan calling from [facility] requesting the following orders that are within the Home Care, Assisted Living or Nursing Home Eval and Treatment standing order and can be signed as standing order signature required by RN.    Preferred Call Back Number: [phone]    Home Care Visits Continuation-verbal order provided for increase skilled nursing visits, weekly x 3 weeks as pt moved to [facility]    Writer has verified Requestor will send fax to have orders signed.  NEDAI to TOMASA Strong RN, BSN  [facility]

## 2022-04-21 ENCOUNTER — MEDICAL CORRESPONDENCE (OUTPATIENT)
Dept: HEALTH INFORMATION MANAGEMENT | Facility: CLINIC | Age: 67
End: 2022-04-21
Payer: COMMERCIAL

## 2022-04-27 ENCOUNTER — PATIENT OUTREACH (OUTPATIENT)
Dept: CARE COORDINATION | Facility: CLINIC | Age: 67
End: 2022-04-27
Payer: COMMERCIAL

## 2022-04-27 NOTE — PROGRESS NOTES
Clinic Care Coordination Contact  [facility]/Voicemail       Clinical Data: Care Coordinator Outreach  Outreach attempted x 2.  Left message on patient's daughters voicemail with call back information and requested return call.    Plan: Per standard work, will route to lead CC to review for additional attempts to be made or disenrollment. Care Coordinator will do no further outreaches at this time.    JUANA Mccall, B.S. [facility] Care Coordination  [facility] Clinics:  [place], [place] and [place]  [phone]  [email or web address]

## 2022-04-27 NOTE — PROGRESS NOTES
Clinic Care Coordination Contact    Situation: Patient chart reviewed by care coordinator.    Background: Patient is enrolled in clinic care coordination and followed to assist with goal(s) progression. CHW CC routed chart to RNCC for review to determine eligibility of disenrolling from Care Coordination per standard work.     Assessment: Per Chart Review, patient has been unreachable for follow up x 2 attempts with no further engagement or return calls.     Plan/Recommendations: Per Care Coordination standard work, patient will be disenrolled from Care Coordination. Care Coordinator will send disenrollment letter with care coordinator contact information via [employer]. Care Coordinator will do no further outreaches at this time. Care Coordinator will remain available as needed. New Care Coordination referral may be ordered with any future needs for resources, assistance, and/or support if patient is agreeable and remains engaged.        Inez Ontiveros RN Care Coordinator  [facility] [place]  Email: [email or web address]  Phone: [phone]

## 2022-04-27 NOTE — LETTER
[facility] CARE COORDINATION  [address] [address]     April 27, 2022    Mariela Duffy  [address] [address]      Dear Mariela,    I have been unsuccessful in reaching you since our last contact. At this time the Care Coordination team will make no further attempts to reach you, however this does not change your ability to continue receiving care from your providers at your primary care clinic. If you need additional support from a care coordinator in the future please contact me at [phone].    All of us at [facility] are invested in your health and are here to assist you in meeting your goals.     Sincerely,    Inez Ontiveros RN Care Coordinator  [facility] [place]  Email: [email or web address]  Phone: [phone]

## 2022-04-28 ENCOUNTER — TELEPHONE (OUTPATIENT)
Dept: UROLOGY | Facility: CLINIC | Age: 67
End: 2022-04-28
Payer: COMMERCIAL

## 2022-04-29 NOTE — TELEPHONE ENCOUNTER
[facility] Call Center    Phone Message    May a detailed message be left on voicemail: yes     Reason for Call: The patients daughter called stating the patient now lives in an independent living facility. They are wondering if the patient is due for a follow up visit? Also, they would like to know if she can go swimming with this catheter? Please advise. Thank you.     Action Taken: Message routed to:  Other: [facility]    Travel Screening: Not Applicable        
Patients daughter notified ok to use cath plug and go swimming.  Nallely Ji LPN    
General

## 2022-05-01 DIAGNOSIS — F09 MILD COGNITIVE DISORDER: ICD-10-CM

## 2022-05-01 DIAGNOSIS — F41.1 GENERALIZED ANXIETY DISORDER: ICD-10-CM

## 2022-05-02 RX ORDER — OLANZAPINE 7.5 MG/1
TABLET, FILM COATED ORAL
Qty: 90 TABLET | Refills: 0 | Status: SHIPPED | OUTPATIENT
Start: 2022-05-02 | End: 2022-08-03

## 2022-05-02 NOTE — TELEPHONE ENCOUNTER
Prescription approved per [facility] Refill Protocol.  Terri James, RN, BSN, PHN  [facility]

## 2022-05-06 ENCOUNTER — OFFICE VISIT (OUTPATIENT)
Dept: FAMILY MEDICINE | Facility: CLINIC | Age: 67
End: 2022-05-06
Payer: COMMERCIAL

## 2022-05-06 VITALS
HEART RATE: 107 BPM | DIASTOLIC BLOOD PRESSURE: 76 MMHG | WEIGHT: 180.8 LBS | RESPIRATION RATE: 16 BRPM | SYSTOLIC BLOOD PRESSURE: 118 MMHG | OXYGEN SATURATION: 95 % | HEIGHT: 69 IN | BODY MASS INDEX: 26.78 KG/M2 | TEMPERATURE: 98.1 F

## 2022-05-06 DIAGNOSIS — Z01.818 PRE-OPERATIVE GENERAL PHYSICAL EXAMINATION: ICD-10-CM

## 2022-05-06 DIAGNOSIS — Z86.0100 HISTORY OF COLONIC POLYPS: ICD-10-CM

## 2022-05-06 DIAGNOSIS — Z71.1 CONCERN ABOUT URINARY TRACT DISEASE WITHOUT DIAGNOSIS: ICD-10-CM

## 2022-05-06 LAB
ALBUMIN UR-MCNC: 100 MG/DL
APPEARANCE UR: ABNORMAL
BACTERIA #/AREA URNS HPF: ABNORMAL /HPF
BILIRUB UR QL STRIP: NEGATIVE
COLOR UR AUTO: YELLOW
GLUCOSE UR STRIP-MCNC: NEGATIVE MG/DL
HGB UR QL STRIP: ABNORMAL
KETONES UR STRIP-MCNC: NEGATIVE MG/DL
LEUKOCYTE ESTERASE UR QL STRIP: ABNORMAL
NITRATE UR QL: POSITIVE
PH UR STRIP: 5.5 [PH] (ref 5–7)
RBC #/AREA URNS AUTO: ABNORMAL /HPF
SP GR UR STRIP: 1.02 (ref 1–1.03)
SQUAMOUS #/AREA URNS AUTO: ABNORMAL /LPF
UROBILINOGEN UR STRIP-ACNC: 0.2 E.U./DL
WBC #/AREA URNS AUTO: ABNORMAL /HPF

## 2022-05-06 PROCEDURE — 99214 OFFICE O/P EST MOD 30 MIN: CPT | Performed by: FAMILY MEDICINE

## 2022-05-06 PROCEDURE — 87086 URINE CULTURE/COLONY COUNT: CPT | Performed by: FAMILY MEDICINE

## 2022-05-06 PROCEDURE — 81001 URINALYSIS AUTO W/SCOPE: CPT | Performed by: FAMILY MEDICINE

## 2022-05-06 RX ORDER — HYOSCYAMINE SULFATE 0.125 MG
TABLET ORAL
COMMUNITY
Start: 2021-11-02 | End: 2022-10-11

## 2022-05-06 RX ORDER — DICYCLOMINE HCL 20 MG
20 TABLET ORAL
COMMUNITY
End: 2024-04-12
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
## 2022-05-06 NOTE — PATIENT INSTRUCTIONS
Take your usual morning prescribed medications with a smaller amount of water on early morning of surgery. Do not take ibuprofen, naproxen, or aspirin for 7 days before upcoming surgery. Use acetaminophen instead for pain relief if needed. Avoid fiber per instructions from gastroenterology.     I will review your studies via [employer] when they are available. If you have any questions or concerns please let me know via [employer]ok[employer]et, or call the clinic.

## 2022-05-08 LAB — BACTERIA UR CULT: NORMAL

## 2022-05-12 ENCOUNTER — TELEPHONE (OUTPATIENT)
Dept: FAMILY MEDICINE | Facility: CLINIC | Age: 67
End: 2022-05-12
Payer: COMMERCIAL

## 2022-05-12 NOTE — TELEPHONE ENCOUNTER
Zeeshan calling from [facility] for orders.    Skilled Nurse  1xwk/4wks  Qowk/4wks    Verbal order given.  Will fax for MD signature.  Abril Harris RN

## 2022-05-27 ENCOUNTER — OFFICE VISIT (OUTPATIENT)
Dept: UROLOGY | Facility: CLINIC | Age: 67
End: 2022-05-27
Payer: COMMERCIAL

## 2022-05-27 VITALS
HEIGHT: 69 IN | WEIGHT: 180 LBS | DIASTOLIC BLOOD PRESSURE: 70 MMHG | SYSTOLIC BLOOD PRESSURE: 130 MMHG | BODY MASS INDEX: 26.66 KG/M2

## 2022-05-27 DIAGNOSIS — Z93.59 SUPRAPUBIC CATHETER (H): ICD-10-CM

## 2022-05-27 DIAGNOSIS — R33.9 RETENTION OF URINE: Primary | ICD-10-CM

## 2022-05-27 DIAGNOSIS — Z87.440 HISTORY OF UTI: ICD-10-CM

## 2022-05-27 PROCEDURE — 99213 OFFICE O/P EST LOW 20 MIN: CPT | Performed by: PHYSICIAN ASSISTANT

## 2022-05-27 ASSESSMENT — PAIN SCALES - GENERAL: PAINLEVEL: NO PAIN (0)
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
## 2022-06-02 ENCOUNTER — TELEPHONE (OUTPATIENT)
Dept: UROLOGY | Facility: CLINIC | Age: 67
End: 2022-06-02
Payer: COMMERCIAL

## 2022-06-07 ENCOUNTER — MEDICAL CORRESPONDENCE (OUTPATIENT)
Dept: HEALTH INFORMATION MANAGEMENT | Facility: CLINIC | Age: 67
End: 2022-06-07

## 2022-06-13 ENCOUNTER — TELEPHONE (OUTPATIENT)
Dept: FAMILY MEDICINE | Facility: CLINIC | Age: 67
End: 2022-06-13
Payer: COMMERCIAL

## 2022-06-13 DIAGNOSIS — Z87.730 HISTORY OF CLEFT PALATE: Primary | ICD-10-CM

## 2022-06-13 NOTE — TELEPHONE ENCOUNTER
Pt calls,    S-(situation): wants otolaryngology referral     B-(background): hx of cleft palate, has obterator device or speech bulb device, last placed in [place] while in high school, feels device is getting loose, feels needs new device, would like referral asap    A-(assessment): cleft palate follow up    R-(recommendations): routed to [facility], please review and advise, route to inform daughter Joy of plan    Joy,   Telephone Information:   Mobile [phone]     Thelma Strong RN, BSN  [facility]

## 2022-06-14 NOTE — TELEPHONE ENCOUNTER
Left message for daughter Joy.   -Provided phone number to schedule referral [phone].  -Provided direct number for call back if pt had questions.     Terri FLANAGAN, RN, BSN, PHN - Patient Advocate Liaison - PAL RN  [facility]  [phone]

## 2022-06-20 NOTE — TELEPHONE ENCOUNTER
FUTURE VISIT INFORMATION      FUTURE VISIT INFORMATION:    Date: 22    Time: 2PM    Location: [facility]  REFERRAL INFORMATION:    Referring provider:  Reba Raymundo MD    Referring providers clinic:  [facility]    Reason for visit/diagnosis  History of cleft palate, ref'd by Reba Raymundo MD, rec's in [facility], daughter made appt [facility] location veriifed    RECORDS REQUESTED FROM:       Clinic name Comments Records Status Imaging Status   [facility] 22 referral  [facility]    Imaging 2020 MR Brain   19 CT HEad  [facility] [place] [address]  Ph. [phone]  Med recs ph. [phone]  Rad ph. [phone]  Rad Fx. [phone] 21 CT Head Brain   21 CT Neck       *trackin   Care everywhere  req 22 - received disc 22 10:38AM sent a fax to [employer] for images - Amay   22 7:47AM imaging disc received from [facility], sent to upload - Amay

## 2022-07-01 DIAGNOSIS — K52.9 CHRONIC DIARRHEA: ICD-10-CM

## 2022-07-01 RX ORDER — PERPHENAZINE 8 MG
TABLET ORAL
Qty: 30 CAPSULE | Refills: 0 | Status: SHIPPED | OUTPATIENT
Start: 2022-07-01 | End: 2022-08-03

## 2022-07-06 DIAGNOSIS — E89.0 POSTOPERATIVE HYPOTHYROIDISM: ICD-10-CM

## 2022-07-06 DIAGNOSIS — Z87.19 HX OF IRRITABLE BOWEL SYNDROME: ICD-10-CM

## 2022-07-06 DIAGNOSIS — R19.7 DIARRHEA, UNSPECIFIED TYPE: ICD-10-CM

## 2022-07-07 RX ORDER — LEVOTHYROXINE SODIUM 137 UG/1
TABLET ORAL
Qty: 90 TABLET | Refills: 1 | Status: SHIPPED | OUTPATIENT
Start: 2022-07-07 | End: 2022-12-27

## 2022-07-07 RX ORDER — DIPHENOXYLATE HCL/ATROPINE 2.5-.025MG
1 TABLET ORAL DAILY PRN
Qty: 30 TABLET | Refills: 1 | Status: SHIPPED | OUTPATIENT
Start: 2022-07-07 | End: 2022-10-07

## 2022-07-07 NOTE — TELEPHONE ENCOUNTER
Routing refill request to provider for review/approval because:  Drug not on the [facility] refill protocol     Jia Rosales RN on 7/7/2022 at 9:45 AM

## 2022-07-15 DIAGNOSIS — Z87.440 HISTORY OF UTI: ICD-10-CM

## 2022-07-15 RX ORDER — METHENAMINE HIPPURATE 1000 MG/1
TABLET ORAL
Qty: 180 TABLET | Refills: 3 | Status: SHIPPED | OUTPATIENT
Start: 2022-07-15 | End: 2024-02-24

## 2022-07-19 NOTE — Clinical Note
Ladies--giancarlo--phq-9 score =1 today , noel-7 =zero--she loves her new residence and looks great today . 2018 flu shot given today as well.   -Jerry/Jarrell lower

## 2022-07-28 ENCOUNTER — TELEPHONE (OUTPATIENT)
Dept: FAMILY MEDICINE | Facility: CLINIC | Age: 67
End: 2022-07-28

## 2022-07-28 DIAGNOSIS — Z53.9 DIAGNOSIS NOT YET DEFINED: Primary | ICD-10-CM

## 2022-07-28 PROCEDURE — G0179 MD RECERTIFICATION HHA PT: HCPCS | Performed by: FAMILY MEDICINE

## 2022-07-28 NOTE — TELEPHONE ENCOUNTER
Orders placed in pcp in basket- sign and fax to [phone].    Lida CONNER  [facility]   [facility]

## 2022-07-30 DIAGNOSIS — K52.9 CHRONIC DIARRHEA: ICD-10-CM

## 2022-07-30 DIAGNOSIS — F09 MILD COGNITIVE DISORDER: ICD-10-CM

## 2022-07-30 DIAGNOSIS — F41.1 GENERALIZED ANXIETY DISORDER: ICD-10-CM

## 2022-08-03 RX ORDER — OLANZAPINE 7.5 MG/1
TABLET, FILM COATED ORAL
Qty: 90 TABLET | Refills: 0 | Status: SHIPPED | OUTPATIENT
Start: 2022-08-03 | End: 2022-11-04

## 2022-08-03 RX ORDER — PERPHENAZINE 8 MG
TABLET ORAL
Qty: 30 CAPSULE | Refills: 0 | Status: SHIPPED | OUTPATIENT
Start: 2022-08-03 | End: 2022-09-07

## 2022-08-03 NOTE — TELEPHONE ENCOUNTER
Routing refill request to provider for review/approval because:  Peppermint Oil capsules   Drug not on the [facility] refill protocol     Olanzapine refilled per protocol   DUE for CBC A1C prior to next fill please order     Mariely Mackenzie Registered Nurse  [facility]

## 2022-08-10 ENCOUNTER — OFFICE VISIT (OUTPATIENT)
Dept: URGENT CARE | Facility: URGENT CARE | Age: 67
End: 2022-08-10
Payer: COMMERCIAL

## 2022-08-10 VITALS
WEIGHT: 181.6 LBS | OXYGEN SATURATION: 97 % | HEART RATE: 78 BPM | DIASTOLIC BLOOD PRESSURE: 60 MMHG | SYSTOLIC BLOOD PRESSURE: 120 MMHG | TEMPERATURE: 98.9 F | RESPIRATION RATE: 16 BRPM | BODY MASS INDEX: 27.01 KG/M2

## 2022-08-10 DIAGNOSIS — R19.7 DIARRHEA, UNSPECIFIED TYPE: Primary | ICD-10-CM

## 2022-08-10 DIAGNOSIS — R10.817 GENERALIZED ABDOMINAL TENDERNESS WITHOUT REBOUND TENDERNESS: ICD-10-CM

## 2022-08-10 PROCEDURE — 99213 OFFICE O/P EST LOW 20 MIN: CPT | Performed by: FAMILY MEDICINE

## 2022-08-23 ENCOUNTER — TELEPHONE (OUTPATIENT)
Dept: FAMILY MEDICINE | Facility: CLINIC | Age: 67
End: 2022-08-23

## 2022-08-23 NOTE — TELEPHONE ENCOUNTER
Patient calling requesting hand out information regarding IBS. [employer] prefers information be sent via mail. Printed info below from clinical resources and mailed per patient request.       Diet and Lifestyle Tips for Irritable Bowel Syndrome (IBS)   Your healthcare professional may suggest some lifestyle changes to help control your IBS. Changing your diet and managing stress are 2 of the most important. Follow your healthcare provider s instructions and try some of the suggestions below.   Change your diet   Your diet may be an important cause of IBS symptoms. You may want to try the following:     Pay attention to what foods bother you, and stay away from them. For example, dairy products are hard for some people to digest. Lactose-free dairy products may be better for your symptoms.    Don't eat high FODMAP foods. Other common foods that can cause symptoms contain carbohydrates called FODMAPs. These are poorly digested by your body. High FODMAP foods include some fruits such as apples, vegetables such as cabbage, and some dairy. They also include certain sweeteners such as high-fructose corn syrup, sorbitol, and xylitol. Many people find that eating a diet low in FODMAPs can ease symptoms. Talk with your healthcare provider about a low FODMAP diet.    Drink 6 to 8 glasses of water a day.    Don't have caffeine or tobacco. These are muscle stimulants and can affect the working of your digestive tract.    Don't drink alcohol. It can irritate your digestive tract and make your symptoms worse.    Eat more fiber if constipation is a problem. Fiber makes the stool softer and easier to pass through the colon.    Eat more fiber if diarrhea is a problem. Fiber also helps to bind water. This can help to firm up loose stool.   Reduce stress   If stress or anxiety makes your IBS symptoms worse, learning how to manage stress may help you feel better. Try these tips:     Identify the causes of stress in your life.    Learn new  ways to cope with them.    Regular exercise is a great way to relieve stress. It can also help ease constipation. For adults, the CDC recommends 150 minutes of moderate activity each week. It also recommends muscle-strengthening activities 2 days a week. If this sounds like a lot of time, the CDC suggests breaking physical activity into 10-minute blocks and spreading it out over a week. Developing a schedule that works for you is the key to a successful exercise program.  [employer] last reviewed this educational content on 11/1/2019 2000-2021 [employer]. All rights reserved. This information is not intended as a substitute for professional medical care. Always follow your healthcare professional's instructions.      What Is Irritable Bowel Syndrome (IBS)?  People who have irritable bowel syndrome (IBS)  have digestive tracts that react abnormally to certain substances or to stress. This leads to symptoms like cramps, gas, bloating, pain, constipation, and diarrhea. IBS is sometimes called  spastic colon.  It is a common health problem. It is not a disease. But rather it's a group of symptoms that happen together.      Muscle contraction moves food through the digestive tract.    IBS--a motility problem  The muscle movement that passes food through the digestive tract (especially the colon) is called motility.  When you have IBS, this movement is disrupted. Motility may speed up, slow down, or become irregular. If stool passes too quickly through the colon, not enough water is absorbed from it. You may have loose, watery stools (diarrhea). If stool passes through the colon too slowly, too much water is absorbed and the stool becomes hard and dry. You may then have constipation. Also, stool and gas may back up. This can cause painful pressure and cramping.   No single test can diagnose IBS. Your healthcare provider will ask about your symptoms. You may also have blood, stool, or radiologic tests. You  "may even have a colonoscopy. These tests are done mainly to rule out other illnesses.    What causes IBS?  Lots of research has been done on IBS. But the cause is still not known. Some of the possible factors are:      Smoking, eating certain foods, or drinking alcohol or caffeinated drinks can cause, or \"trigger,\" symptoms of IBS.    No one knows for sure. But IBS may be caused by a problem with the nerves or muscles in your digestive tract.    Some evidence suggests that certain bacteria found after a severe gastrointestinal infection in the small intestine and colon may cause IBS.    Stress and anxiety tend to worsen the symptoms of IBS. But it is not believed to be the cause.   What you can do  Medicine can t cure IBS. But it may help manage the symptoms. It may help your digestive tract work better. Your healthcare provider may prescribe one or more medicines for you. Because some medicines may make IBS worse, don t take any medicine, especially laxatives, unless your healthcare provider prescribes it for you.   Your healthcare provider may also suggest some lifestyle changes to help control your IBS. You may have to change your diet and learn to better manage your stress. If diet changes are advised, talk with a dietitian. He or she can help you keep a healthy nutritional balance in your food intake.    [employer] last reviewed this educational content on 6/1/2019 2000-2021 [employer]. All rights reserved. This information is not intended as a substitute for professional medical care. Always follow your healthcare professional's instructions.        Irritable Bowel Syndrome    Irritable bowel syndrome (IBS) is a disorder of the intestines. It is not a disease, but a group of symptoms caused by changes in the way the intestines work including how they move, secrete, absorb, and transit pain sensations. It is fairly common, but the cause is not well understood. There are other conditions that " cause IBS symptoms, so make sure to speak with your provider to make sure that further evaluation isn't needed.  Symptoms of IBS include:    Belly pain, discomfort, and cramping    Diarrhea    Constipation or dry, hard stools    Mucous stool    Bloating    Feeling of incomplete bowel movements  It usually results in one of 3 patterns of symptoms:    Chronic belly pain and constipation    Recurring episodes of diarrhea, with belly pain or discomfort    Alternating diarrhea and constipation  Home care  The goal of treatment is to control and relieve your symptoms, so you can lead a full and active life. There is no cure for IBS. But it can be managed.  Diet  Your diet did not cause your IBS, but it can affect it. Diet and food choices may cause IBS symptoms in some people, but not everyone. No one diet works for everyone. Finding the best foods for you may take trial and error. Keep a food log to help find what foods made your symptoms worse. Below are some tips that may help you.    Eat more slowly. Eat smaller amounts at a time, but more often. Remember, you can always eat more, but cannot eat less once you ve eaten too much.    In general, fiber is very helpful for both constipation and diarrhea. However, insoluble fiber can worsen bloating, cramping, and gas. Insoluble fiber can be found in wheat bran, certain vegetables, and whole-grains. Soluble fiber is in such foods as oat bran, barley, nuts, seeds, bean, lentils, peas, and some fruits and vegetables. Increase fiber slowly and choose a product that includes soluble fiber. It helps to keep a food diary and write down the symptoms you have with certain foods.    Try lactose-free dairy products. many people are intolerant to lactose.    Try cutting out foods that are high in fat and fatty meats.    You can control bloating or passing excess gas. Be careful with  gassy  vegetables and fruits like beans, cabbage, broccoli, and cauliflower.  Other foods called  FODMAPs are a type of carbohydrate that can cause these symptoms and diarrhea. They are poorly digestible carbohydrates. Some FODMAP examples include honey, apples, pears, nectarines, lima beans, and cabbage. Talk with your provider about how to choose low FODMAP foods if you are sensitive to them.    Be careful with carbonated beverages and fruit juices. They can make your bloating and diarrhea worse.    Caffeine, alcohol, and stimulants can make symptoms worse. These include coffee, tea, sodas, energy drinks, and chocolate.    IBS diet guidelines can be confusing. Ask your provider for a referral to a registered dietitian. This professional can help you make sense of IBS diet suggestions.  Lifestyle    Look for factors that seem to worsen your symptoms. These include stress and emotions.     Although stress does not cause IBS, it may trigger flare-ups. Counseling can help you learn to handle stress. So can self-help measures like exercise, yoga, and meditation.    Depression can occur along with IBS. Your healthcare provider may prescribe antidepressant medicine. This may help with diarrhea, constipation, and cramping, as well as with symptoms of depression.    Smoking doesn't cause IBS, but can make the symptoms worse.  Medicines  Your healthcare provider may prescribe medicines. Take them as directed. For acute flare-ups of your illness, your provider may give you prescription medicines.    Check with your healthcare provider before taking any medicines for diarrhea.    Don't take anti-inflammatory medicines like ibuprofen or naproxen.    Long-term medicines can be helpful for chronic symptoms    If you have lost enough weight to make you need nutritional supplements, talk to your provider. This may point to another more serious condition.  Follow-up care  Follow up with your healthcare provider, or as advised.  When to seek medical advice  Call your healthcare provider right away if any of these occur:    Belly  pain gets worse or does not improve after a bowel movement    Constant belly pain moves to the right-lower belly    You can't keep liquids down because of vomiting    You have severe, persistent diarrhea    You have blood (red or black color) or mucus in your stool    You have lost significant weight    You feel very weak or dizzy, faint, or have extreme thirst    You have a fever of 100.4 F (38.0 C) or higher, or as directed by your healthcare provider  "[employer]" last reviewed this educational content on 6/1/2018 2000-2021 [employer]. All rights reserved. This information is not intended as a substitute for professional medical care. Always follow your healthcare professional's instructions.    Yovany TORRES RN

## 2022-08-24 ENCOUNTER — PRE VISIT (OUTPATIENT)
Dept: OTOLARYNGOLOGY | Facility: CLINIC | Age: 67
End: 2022-08-24

## 2022-08-24 ENCOUNTER — TELEPHONE (OUTPATIENT)
Dept: FAMILY MEDICINE | Facility: CLINIC | Age: 67
End: 2022-08-24

## 2022-08-24 DIAGNOSIS — R10.817 GENERALIZED ABDOMINAL TENDERNESS, REBOUND TENDERNESS PRESENCE NOT SPECIFIED: ICD-10-CM

## 2022-08-24 DIAGNOSIS — R10.84 ABDOMINAL PAIN, GENERALIZED: ICD-10-CM

## 2022-08-24 DIAGNOSIS — R19.7 DIARRHEA, UNSPECIFIED TYPE: Primary | ICD-10-CM

## 2022-09-08 NOTE — LETTER
Patient called stating that the Flovent inhaler was $200. She called her [employer] and they told her that an albuterol inhaler was covered at $10. She would like to know if we can call in for her instead.  Please advise [place] CARE COORDINATION  [facility]   November 1, 2019    Mariela Duffy  [address] [facility] [address]      Dear Mariela,    I am a clinic care coordinator who works with Estefany Stanley PA-C at [facility] . I have been trying to reach you recently to introduce Clinic Care Coordination and to see if there was anything I could assist you with.  I wanted to introduce myself and provide you with my contact information so that you can call me with questions or concerns about your health care. Below is a description of clinic care coordination and how I can further assist you.     The clinic care coordinator is a registered nurse and/or  who understand the health care system. The goal of clinic care coordination is to help you manage your health and improve access to the [facility] system in the most efficient manner. The registered nurse can assist you in meeting your health care goals by providing education, coordinating services, and strengthening the communication among your providers. The  can assist you with financial, behavioral, psychosocial, chemical dependency, counseling, and/or other community resources.    Please feel free to contact me at [phone], with any questions or concerns. We at [facility] are focused on providing you with the highest-quality healthcare experience possible and that all starts with you.     Sincerely,     MICHAEL Owens    Enclosed: I have enclosed helpful educational material. Please review and call me with any questions.

## 2022-09-09 ENCOUNTER — TELEPHONE (OUTPATIENT)
Dept: FAMILY MEDICINE | Facility: CLINIC | Age: 67
End: 2022-09-09

## 2022-09-09 NOTE — TELEPHONE ENCOUNTER
Called [facility], and relayed provider message below. Zeeshan was given an opportunity to ask questions, verbalized understanding of plan, and is agreeable.    Citlali Merida RN

## 2022-09-09 NOTE — TELEPHONE ENCOUNTER
The Home Care/Assisted Living/Nursing Facility is calling regarding an established patient.  Has the patient seen Home Care in the past or is currently residing in Assisted Living or Nursing Facility? Yes.     Zeeshan calling from [facility] requesting the following orders that are within the Home Care, Assisted Living or Nursing Home Eval and Treatment standing order and can be signed as standing order signature required by RN.    Preferred Call Back Number: [phone]    Home Care Visits Continuation     1 visit every 3 weeks, patient only needs suprapubic catheter changed.     Any additional Orders:  Are there any orders requested, not stated above, that are outside of the standing order and must be routed to a licensed practitioner for approval?    Yes    Writer has verified [employer] will send fax to have orders signed.    Zeeshan is asking if PCP wants specific vital sign parameters or is it ok to use their standing order parameters?  Please advise. Thank you.  Vickie MARCIAL RN on 9/9/2022 at 1:41 PM

## 2022-09-20 DIAGNOSIS — Z53.9 DIAGNOSIS NOT YET DEFINED: Primary | ICD-10-CM

## 2022-09-20 PROCEDURE — G0179 MD RECERTIFICATION HHA PT: HCPCS | Performed by: FAMILY MEDICINE

## 2022-09-22 ENCOUNTER — MEDICAL CORRESPONDENCE (OUTPATIENT)
Dept: HEALTH INFORMATION MANAGEMENT | Facility: CLINIC | Age: 67
End: 2022-09-22

## 2022-10-03 DIAGNOSIS — F09 MILD COGNITIVE DISORDER: ICD-10-CM

## 2022-10-04 RX ORDER — DONEPEZIL HYDROCHLORIDE 5 MG/1
TABLET, FILM COATED ORAL
Qty: 90 TABLET | Refills: 0 | Status: SHIPPED | OUTPATIENT
Start: 2022-10-04 | End: 2022-10-11

## 2022-10-04 NOTE — TELEPHONE ENCOUNTER
Patient has upcoming appointment 10/11/22. Prescription approved per [facility] Refill Protocol.  Yovany TORRES RN

## 2022-10-07 DIAGNOSIS — R19.7 DIARRHEA, UNSPECIFIED TYPE: ICD-10-CM

## 2022-10-07 DIAGNOSIS — Z87.19 HX OF IRRITABLE BOWEL SYNDROME: ICD-10-CM

## 2022-10-07 RX ORDER — DIPHENOXYLATE HCL/ATROPINE 2.5-.025MG
TABLET ORAL
Qty: 30 TABLET | Refills: 0 | Status: SHIPPED | OUTPATIENT
Start: 2022-10-07 | End: 2022-12-29

## 2022-10-11 ENCOUNTER — OFFICE VISIT (OUTPATIENT)
Dept: FAMILY MEDICINE | Facility: CLINIC | Age: 67
End: 2022-10-11
Payer: COMMERCIAL

## 2022-10-11 VITALS
TEMPERATURE: 98.1 F | WEIGHT: 192 LBS | SYSTOLIC BLOOD PRESSURE: 102 MMHG | DIASTOLIC BLOOD PRESSURE: 64 MMHG | OXYGEN SATURATION: 95 % | HEART RATE: 100 BPM | BODY MASS INDEX: 28.56 KG/M2 | RESPIRATION RATE: 12 BRPM

## 2022-10-11 DIAGNOSIS — R19.7 DIARRHEA, UNSPECIFIED TYPE: ICD-10-CM

## 2022-10-11 DIAGNOSIS — Z87.19 HX OF IRRITABLE BOWEL SYNDROME: Primary | ICD-10-CM

## 2022-10-11 DIAGNOSIS — E89.0 POSTSURGICAL HYPOTHYROIDISM: ICD-10-CM

## 2022-10-11 PROBLEM — D12.6 ADENOMA OF LARGE INTESTINE: Status: ACTIVE | Noted: 2022-10-11

## 2022-10-11 PROBLEM — K63.5 POLYP OF COLON: Status: ACTIVE | Noted: 2021-11-02

## 2022-10-11 LAB
ANION GAP SERPL CALCULATED.3IONS-SCNC: 8 MMOL/L (ref 3–14)
BUN SERPL-MCNC: 9 MG/DL (ref 7–30)
CALCIUM SERPL-MCNC: 9.3 MG/DL (ref 8.5–10.1)
CHLORIDE BLD-SCNC: 106 MMOL/L (ref 94–109)
CO2 SERPL-SCNC: 26 MMOL/L (ref 20–32)
CREAT SERPL-MCNC: 0.6 MG/DL (ref 0.52–1.04)
GFR SERPL CREATININE-BSD FRML MDRD: >90 ML/MIN/1.73M2
GLUCOSE BLD-MCNC: 99 MG/DL (ref 70–99)
POTASSIUM BLD-SCNC: 3.8 MMOL/L (ref 3.4–5.3)
SODIUM SERPL-SCNC: 140 MMOL/L (ref 133–144)
TSH SERPL DL<=0.005 MIU/L-ACNC: 0.46 MU/L (ref 0.4–4)

## 2022-10-11 PROCEDURE — 36415 COLL VENOUS BLD VENIPUNCTURE: CPT | Performed by: FAMILY MEDICINE

## 2022-10-11 PROCEDURE — 84443 ASSAY THYROID STIM HORMONE: CPT | Performed by: FAMILY MEDICINE

## 2022-10-11 PROCEDURE — 80048 BASIC METABOLIC PNL TOTAL CA: CPT | Performed by: FAMILY MEDICINE

## 2022-10-11 PROCEDURE — 99214 OFFICE O/P EST MOD 30 MIN: CPT | Performed by: FAMILY MEDICINE

## 2022-10-11 PROCEDURE — 86364 TISS TRNSGLTMNASE EA IG CLAS: CPT | Performed by: FAMILY MEDICINE

## 2022-10-11 RX ORDER — LOPERAMIDE HCL 2 MG
2 CAPSULE ORAL 4 TIMES DAILY PRN
COMMUNITY

## 2022-10-11 ASSESSMENT — PATIENT HEALTH QUESTIONNAIRE - PHQ9
SUM OF ALL RESPONSES TO PHQ QUESTIONS 1-9: 1
SUM OF ALL RESPONSES TO PHQ QUESTIONS 1-9: 1
10. IF YOU CHECKED OFF ANY PROBLEMS, HOW DIFFICULT HAVE THESE PROBLEMS MADE IT FOR YOU TO DO YOUR WORK, TAKE CARE OF THINGS AT HOME, OR GET ALONG WITH OTHER PEOPLE: NOT DIFFICULT AT ALL

## 2022-10-11 ASSESSMENT — ANXIETY QUESTIONNAIRES
GAD7 TOTAL SCORE: 0
2. NOT BEING ABLE TO STOP OR CONTROL WORRYING: NOT AT ALL
GAD7 TOTAL SCORE: 0
GAD7 TOTAL SCORE: 0
3. WORRYING TOO MUCH ABOUT DIFFERENT THINGS: NOT AT ALL
7. FEELING AFRAID AS IF SOMETHING AWFUL MIGHT HAPPEN: NOT AT ALL
1. FEELING NERVOUS, ANXIOUS, OR ON EDGE: NOT AT ALL
7. FEELING AFRAID AS IF SOMETHING AWFUL MIGHT HAPPEN: NOT AT ALL
6. BECOMING EASILY ANNOYED OR IRRITABLE: NOT AT ALL
4. TROUBLE RELAXING: NOT AT ALL
5. BEING SO RESTLESS THAT IT IS HARD TO SIT STILL: NOT AT ALL

## 2022-10-11 NOTE — PROGRESS NOTES
"  Assessment & Plan     Hx of irritable bowel syndrome  Referral to GI  - Adult GI  Referral - Consult Only; Future    Diarrhea, unspecified type  Referral to GI.  Will update labs to make sure she is not having electrolyte changes due to loose stools, will check tissue transglutaminase while doing labs.  Discussed dietary changes and food diary.  Discussed high FODMAP diet and provided with handout of both low and high FODMAP foods that they can use to assess diet and make changes while waiting for visit.  - Adult GI  Referral - Consult Only; Future  - Basic metabolic panel  (Ca, Cl, CO2, Creat, Gluc, K, Na, BUN)  - Tissue transglutaminase tana IgA and IgG    Postsurgical hypothyroidism  Due for labs, will check to make sure not hyperthyroid given diarrhea. Recommendations pending results  - TSH with free T4 reflex      20 minutes spent on the date of the encounter doing chart review, history and exam, documentation and further activities per the note       BMI:   Estimated body mass index is 28.56 kg/m  as calculated from the following:    Height as of 5/27/22: 1.746 m (5' 8.75\").    Weight as of this encounter: 87.1 kg (192 lb).       See Patient Instructions    No follow-ups on file.    Reba Tillman MD  [facility]    Shmuel Geiger is a 66 year old, presenting for the following health issues:  Diarrhea and Referral (Gastro)      History of Present Illness       Reason for visit:  Referal for ibs symptoms    She eats 0-1 servings of fruits and vegetables daily.She consumes 2 sweetened beverage(s) daily.She exercises with enough effort to increase her heart rate 9 or less minutes per day.  She exercises with enough effort to increase her heart rate 3 or less days per week.   She is taking medications regularly.    Today's PHQ-9         PHQ-9 Total Score: 1    PHQ-9 Q9 Thoughts of better off dead/self-harm past 2 weeks :   Not at all    How difficult have " these problems made it for you to do your work, take care of things at home, or get along with other people: Not difficult at all  Today's QUYEN-7 Score: 0       Diarrhea  Onset/Duration: 6 months  Description:       Consistency of stool: runny       Blood in stool: No       Number of loose stools past 24 hours: usually about twice daily, some days worse about 5-6 times and unable to go anywhere  Progression of Symptoms: worsening and constant  Accompanying signs and symptoms:       Fever: No       Nausea/Vomiting: No       Abdominal pain: YES- sometimes       Weight loss: No has gained       Episodes of constipation: No  History   Ill contacts: No  Recent use of antibiotics: No  Recent travels: No  Recent medication-new or changes(Rx or OTC): No  Precipitating or alleviating factors: None  Therapies tried and outcome: lomotil and Imodium AD    Referral to GI, has been having diarrhea issues for several months, do not seem to be getting better.  Has always had IBS problems, but has been worse. Has been taking the lomitil, but not helping.  Can no longer to her normal acitivities like swimming.    No new or different abdominal pain. Stools not formed, sometimes get to watery.    Uses Lomotil once per day.    Review of Systems   Constitutional, HEENT, cardiovascular, pulmonary, gi and gu systems are negative, except as otherwise noted.      Objective    /64 (BP Location: Right arm, Patient Position: Chair, Cuff Size: Adult Regular)   Pulse 100   Temp 98.1  F (36.7  C) (Oral)   Resp 12   Wt 87.1 kg (192 lb)   LMP 01/20/2004   SpO2 95%   BMI 28.56 kg/m    Body mass index is 28.56 kg/m .  Physical Exam   GENERAL: healthy, alert and no distress  PSYCH: mentation appears normal, affect normal/bright    Labs reviewed in chart.

## 2022-10-12 LAB
TTG IGA SER-ACNC: 0.5 U/ML
TTG IGG SER-ACNC: <0.6 U/ML

## 2022-10-22 ENCOUNTER — HEALTH MAINTENANCE LETTER (OUTPATIENT)
Age: 67
End: 2022-10-22

## 2022-10-26 ENCOUNTER — TELEPHONE (OUTPATIENT)
Dept: GASTROENTEROLOGY | Facility: CLINIC | Age: 67
End: 2022-10-26

## 2022-10-26 NOTE — TELEPHONE ENCOUNTER
[facility] Call Center    Phone Message    May a detailed message be left on voicemail: Yes    Reason for Call: Other: Patient is currently scheduled on 01/23/2023, as a patient New GI Urgent. This is outside the expected timeline for this schedule. Patient has been added to the waitlist.      Action Taken: Message routed to:  Other: GI REFERRAL TRIAGE POOL     Travel Screening: Not Applicable

## 2022-10-27 NOTE — TELEPHONE ENCOUNTER
REFERRAL INFORMATION:    Referring Provider:  Dr. Reba Tillman     Referring Clinic:  [facility]     Reason for Visit/Diagnosis: IBS, Diarrhea      FUTURE VISIT INFORMATION:    Appointment Date: 1/31/2023    Appointment Time: 10 AM      NOTES STATUS DETAILS   OFFICE NOTE from Referring Provider Internal 10/11/2022 Office visit with Dr. Morgan Tillman      OFFICE NOTE from Other Specialist Internal/ In process  8/10/2022 Office visit with Dr. Vesna Mcgregor ([facility])    10/19/2021 Office visit with Katelynn Moreira PA-C ([facility])     5/9/17 Office visit with Estefany Stanley PA-C ([facility])         HOSPITAL DISCHARGE SUMMARY/  ED VISITS Internal 9/25/19, 12/20/17 ([facility])    OPERATIVE REPORT N/A    MEDICATION LIST Internal         ENDOSCOPY  In process    COLONOSCOPY Received/ Care Everywhere 11/2/2021 ([facility])   11/23/2021 ([facility])    ERCP N/A    EUS N/A    STOOL TESTING Internal 3/16/2022, 2/2/2022   PERTINENT LABS Internal    PATHOLOGY REPORTS (RELATED) In process    IMAGING (CT, MRI, EGD, MRCP, Small Bowel Follow Through/SBT, MR/CT Enterography) N/A      12/8/2022 1:53pm Fax request sent to [facility] for med recs. Emily

## 2022-11-04 DIAGNOSIS — F09 MILD COGNITIVE DISORDER: ICD-10-CM

## 2022-11-04 DIAGNOSIS — F41.1 GENERALIZED ANXIETY DISORDER: ICD-10-CM

## 2022-11-04 RX ORDER — OLANZAPINE 7.5 MG/1
TABLET, FILM COATED ORAL
Qty: 90 TABLET | Refills: 1 | Status: SHIPPED | OUTPATIENT
Start: 2022-11-04 | End: 2023-04-17

## 2022-11-08 NOTE — TELEPHONE ENCOUNTER
LM regarding sooner appointment. Writers number left for callback. Informed if no call back by tomorrow at 3pm will keep appointment currently scheduled 1/31/23.

## 2022-11-09 NOTE — TELEPHONE ENCOUNTER
Patient has not responded after attempts to schedule sooner. Keeping appointment as scheduled 1/31/23.

## 2022-11-18 ENCOUNTER — TELEPHONE (OUTPATIENT)
Dept: FAMILY MEDICINE | Facility: CLINIC | Age: 67
End: 2022-11-18

## 2022-11-18 NOTE — TELEPHONE ENCOUNTER
Fax received from [facility] for home health Certification and plan of care. This will be placed in [facility] in box .    Nancy Friedman

## 2022-11-22 NOTE — TELEPHONE ENCOUNTER
Forms/Letter Request    Type of form/letter: Home Health Care Certification    Have you been seen for this request: N/A    Do we have the form/letter: Yes: In Dr Morgan Hudson forms folder at the [facility]    When is form/letter needed by:     How would you like the form/letter returned: Fax    Patient Notified form requests are processed in 3-5 business days:No    Could we send this information to you in [facility] or would you prefer to receive a phone call?:   No preference   Okay to leave a detailed message?: No at Other phone number:  [employer] [facility] [phone]

## 2022-11-23 DIAGNOSIS — Z53.9 DIAGNOSIS NOT YET DEFINED: Primary | ICD-10-CM

## 2022-11-23 PROCEDURE — G0179 MD RECERTIFICATION HHA PT: HCPCS | Performed by: FAMILY MEDICINE

## 2022-12-13 ENCOUNTER — PATIENT OUTREACH (OUTPATIENT)
Dept: FAMILY MEDICINE | Facility: CLINIC | Age: 67
End: 2022-12-13

## 2022-12-13 NOTE — TELEPHONE ENCOUNTER
Future Appointments   Date Time Provider Department Center   1/31/2023 10:00 AM Jaden Gonzalez MD [facility]     Appointment Notes for this encounter:   Data Unavailable    Health Maintenance Due   Topic Date Due     DEXA  Never done     ADVANCE CARE PLANNING  04/19/2018     MEDICARE ANNUAL WELLNESS VISIT  10/12/2020     Pneumococcal Vaccine: 65+ Years (1 - PCV) Never done     COVID-19 Vaccine (2 - Moderna series) 04/11/2022     INFLUENZA VACCINE (1) 09/01/2022     Health Maintenance addressed:  PHQ9 and GAD7    PHQ9 / QUYEN / ACT Questionaire sent to patient via [employer] Status:  Active and Using

## 2022-12-16 ENCOUNTER — MEDICAL CORRESPONDENCE (OUTPATIENT)
Dept: HEALTH INFORMATION MANAGEMENT | Facility: CLINIC | Age: 67
End: 2022-12-16

## 2022-12-26 DIAGNOSIS — E89.0 POSTOPERATIVE HYPOTHYROIDISM: ICD-10-CM

## 2022-12-26 DIAGNOSIS — F09 MILD COGNITIVE DISORDER: ICD-10-CM

## 2022-12-27 RX ORDER — DONEPEZIL HYDROCHLORIDE 5 MG/1
TABLET, FILM COATED ORAL
Qty: 90 TABLET | Refills: 0 | OUTPATIENT
Start: 2022-12-27

## 2022-12-27 RX ORDER — LEVOTHYROXINE SODIUM 137 UG/1
TABLET ORAL
Qty: 90 TABLET | Refills: 2 | Status: SHIPPED | OUTPATIENT
Start: 2022-12-27 | End: 2023-10-03

## 2022-12-27 NOTE — TELEPHONE ENCOUNTER
Unsure if patient is suppose to be taking this or not.   Discontinued at the 10/11/2022 office visit.

## 2022-12-28 DIAGNOSIS — Z87.19 HX OF IRRITABLE BOWEL SYNDROME: ICD-10-CM

## 2022-12-28 DIAGNOSIS — R19.7 DIARRHEA, UNSPECIFIED TYPE: ICD-10-CM

## 2022-12-29 RX ORDER — DIPHENOXYLATE HCL/ATROPINE 2.5-.025MG
TABLET ORAL
Qty: 30 TABLET | Refills: 0 | Status: SHIPPED | OUTPATIENT
Start: 2022-12-29 | End: 2023-03-28

## 2023-01-01 NOTE — TELEPHONE ENCOUNTER
Left message.   -Advised that we will need a new sample.   -Left supplies at the .   -Advised of hours at [facility].   -Provided direct call back number if she has questions.     Terri FLANAGAN RN, BSN, PHN - Patient Advocate Liaison - PAL RN  [facility]  [phone]    
New orders for stool studies placed.  
Received call from pt's daughter CTC on file     Pt was in the UC on 8/10/22 and advised to bring in a stool sample   Daughter brought in the sample the next day and has not heard about the results    Writer unable to find order or results in pt's chart    Notes from UC visit  Diarrhea, unspecified type  Generalized abdominal tenderness without rebound tenderness      - Enteric Bacteria and Virus Panel by [facility] Stool; Future  - Enteric Bacteria and Virus Panel by [facility] Stool  - Clostridium difficile Toxin B PCR; Future     Will order stool studies today.  Recommend increased electrolyte rehydration and BRAT diet.  Close Follow-up if no change or new or worsening sx prn.     Vesna Mcgregor MD  [facility]     Per daughter pt continues to have diarrhea    Per chart review it was noted that the lab at the  did not receive the sample within the 48 hour time frame  Unknown why pt or provider was not notified  Lab was notified on 8/14/22 at 1548 and cancelled the lab    Routing to PCP and PAL for advisement    Thank you  Alize Botello RN on 8/24/2022 at 4:33 PM      
1 Hour(s) 28 Minute(s)

## 2023-01-31 ENCOUNTER — PRE VISIT (OUTPATIENT)
Dept: GASTROENTEROLOGY | Facility: CLINIC | Age: 68
End: 2023-01-31

## 2023-01-31 ENCOUNTER — PATIENT OUTREACH (OUTPATIENT)
Dept: FAMILY MEDICINE | Facility: CLINIC | Age: 68
End: 2023-01-31

## 2023-01-31 NOTE — LETTER
Thank you for choosing [facility] today for your health care needs.     [facility] is transforming primary care  At [facility], we re dedicated to constantly improve how we serve the health care needs of our patients and communities. We re currently making changes to the way we deliver care.     Changes you ll notice include:    An emphasis on building a relationship with a primary care provider    Access to a PAL (personal advocate and liaison) to help guide you with your care needs    Appointment lengths tailored to your specific needs and greater access to a care team to help you and your provider improve and maintain your health and well-being    Improved online access to your care team    Benefits of a primary care provider  If you don t have a designated primary care provider, we encourage you to get to know our care team online and find a provider you d like to see. Most of our providers have a short video on their online provider page. Visit [email or web address] to explore our providers and locations.    Benefits of having a primary care provider include:      They get to know you - your health history, family history and goals, making it easier to make a health plan together.     You get to know them - making health-related conversations and decisions easier      Primary care doctors help you when you re sick or hurt - but also focus on keeping you healthy with preventive care and screenings.      A doctor who sees you regularly is more likely to notice changes in your health.     You ll be connected to a broad care team who partners with your provider to support you.    Patient Advocate Liaison (PAL)   To help make sure you get the right care, at the right time, we include PALs, or Patient Advocate Liaisons, as part of your care team. Your PAL will be your first line of contact. They ll advocate for your needs and help you navigate our services, connecting you with care team members and  community resources to ensure your care is well coordinated. You ll be introduced to a PAL in an upcoming visit.     Expanded care team access with tailored appointment lengths  Depending on your health care needs, you may have longer or shorter appointments and see additional care team providers - including Medication Therapy Management (MTM) pharmacists, diabetes educators, behavioral health clinicians, or social workers. At times, they may be included in your visit with your provider, or you may see them individually.     Online access to your health care records and care team  [employer]ovo is our online tool that makes it easy to see your health care information and communicate with your care team.     [employer]ovo allows you to:     View your health maintenance plan so you know when you re due for a preventive screening    Send secure messages to your care team    View your health history and visit summaries     Schedule appointments     Complete questionnaires and eCheck-in before appointments      Get care from your provider with an e-visit      View and pay your bill     Sign up at [employer]ovo. Once you have an account, you also can download the mobile glendy.     Connecting to fast and convenient care  When you need fast, convenient care - consider one of the following options:       Video Visit: A convenient care option for visiting with your provider out of the comfort of your own home. Most of the things you come to the clinic to address with your provider can now be done virtually through a video. This includes your chronic medication follow up, questions or concerns you may have, and even your annual Medicare Wellness Visit.       Phone Visit: Another convenient option for follow up of common problems that may require a more in-depth discussion with your provider.       E-visit: When you need acute care quickly, or have a quick question about your medication, an E-visit is completed through [employer]ovo and  your provider will respond within one business day.        Randy GOMEZ RN - Patient Advocate Liaison (PAL)  [facility]  [phone]

## 2023-01-31 NOTE — TELEPHONE ENCOUNTER
SB 3 PAL Welcome Letter Sent  Randy Kraus - Patient Advocate Liaison - PAL RN  [facility]  [phone]

## 2023-02-01 DIAGNOSIS — Z53.9 DIAGNOSIS NOT YET DEFINED: Primary | ICD-10-CM

## 2023-02-01 PROCEDURE — G0179 MD RECERTIFICATION HHA PT: HCPCS | Performed by: FAMILY MEDICINE

## 2023-02-13 ENCOUNTER — PATIENT OUTREACH (OUTPATIENT)
Dept: FAMILY MEDICINE | Facility: CLINIC | Age: 68
End: 2023-02-13
Payer: COMMERCIAL

## 2023-02-13 NOTE — TELEPHONE ENCOUNTER
LMTCB#1    - concerning scheduling appt and follow-up to see if she needs anything.       Health Maintenance Due   Topic Date Due     DEXA  Never done     ADVANCE CARE PLANNING  04/19/2018     MEDICARE ANNUAL WELLNESS VISIT  10/12/2020     Pneumococcal Vaccine: 65+ Years (1 - PCV) Never done     COVID-19 Vaccine (2 - Moderna series) 04/11/2022     INFLUENZA VACCINE (1) 09/01/2022     FALL RISK ASSESSMENT  02/04/2023     ANNUAL REVIEW OF HM ORDERS  03/11/2023       Randy Kraus RN - Patient Advocate Liaison (PAL)  [facility]  [phone]

## 2023-02-17 NOTE — TELEPHONE ENCOUNTER
- LMTCB # 2  - MYC sent to patient and proxy (CHER Hamilton on file)  - Closing encounter for now.    Randy Kraus RN - Patient Advocate Liaison (PAL)  [facility]  [phone]

## 2023-02-27 ENCOUNTER — TELEPHONE (OUTPATIENT)
Dept: FAMILY MEDICINE | Facility: CLINIC | Age: 68
End: 2023-02-27
Payer: COMMERCIAL

## 2023-02-27 NOTE — TELEPHONE ENCOUNTER
Patient Quality Outreach    Patient is due for the following:   Physical Annual Wellness Visit      Topic Date Due     Pneumococcal Vaccine (1 - PCV) Never done     COVID-19 Vaccine (2 - Moderna series) 04/11/2022     Flu Vaccine (1) 09/01/2022       Next Steps:   Schedule a Annual Wellness Visit    Type of outreach:    Sent [employer] message.      Questions for provider review:    None     Carol Rodríguez

## 2023-03-10 ENCOUNTER — TELEPHONE (OUTPATIENT)
Dept: FAMILY MEDICINE | Facility: CLINIC | Age: 68
End: 2023-03-10
Payer: COMMERCIAL

## 2023-03-10 NOTE — TELEPHONE ENCOUNTER
Zeeshan calls back, provided verbal orders for below.    Randy Kraus RN  Patient Advocate Liaison (PAL)  [facility]  [phone]

## 2023-03-10 NOTE — TELEPHONE ENCOUNTER
[facility]    Randy Kraus RN  Patient Advocate Liaison (PAL)  [facility]  [phone]

## 2023-03-10 NOTE — TELEPHONE ENCOUNTER
The Home Care/Assisted Living/Nursing Facility is calling regarding an established patient.  Has the patient seen Home Care in the past or is currently residing in Assisted Living or Nursing Facility? Yes.     Zeeshan from [facility] calling requesting the following orders that are within the Home Care, Assisted Living or Nursing Home Eval and Treatment standing order and can be signed as standing order signature required by RN.    Preferred Call Back Number: [phone]    Home Care Visits Continuation and skilled nursing for continued suprapubic cathter mgmt     - Skilled nursing x1 every three weeks for eight weeks  - Okay PRN visits       Any additional Orders:  Are there any orders requested, not stated above, that are outside of the standing order and must be routed to a licensed practitioner for approval?    No    Writer has verified [employer] will send fax to have orders signed.    Randy Kraus RN  Patient Advocate Liaison (PAL)  [facility]  [phone]

## 2023-03-16 ENCOUNTER — PATIENT OUTREACH (OUTPATIENT)
Dept: FAMILY MEDICINE | Facility: CLINIC | Age: 68
End: 2023-03-16
Payer: COMMERCIAL

## 2023-03-16 NOTE — TELEPHONE ENCOUNTER
Patient Quality Outreach 2nd Attempt      Summary:    Type of outreach:    Phone, left message for patient/parent to call back. and Sent [employer] message.    Next Steps:  Reach out within 90 days via Phone.    Max number of attempts reached: Yes. Will try again in 90 days if patient still on fail list.     Questions for provider review:    None                                                                                               PAL RN HEALTH MAINTENANCE OUTREACH    PAL RN attempted (attempt # 3) to contact pt regarding overdue health maintenance  -Left VM requesting call back [phone] to further discuss and schedule appt, awaiting call back at this time.  -PAL notes pt is overdue for the following health maintenance:    Health Maintenance Due   Topic Date Due     DEXA  Never done     ADVANCE CARE PLANNING  04/19/2018     MEDICARE ANNUAL WELLNESS VISIT  10/12/2020     Pneumococcal Vaccine: 65+ Years (1 - PCV) Never done     COVID-19 Vaccine (2 - Moderna series) 04/11/2022     INFLUENZA VACCINE (1) 09/01/2022     FALL RISK ASSESSMENT  02/04/2023     ANNUAL REVIEW OF HM ORDERS  03/11/2023     PHQ-9  04/11/2023        -PAL RN sent [email or web address] message, advised to schedule appointment with provider    PAL will attempt to contact pt again on 6/19/2023    Randy Kraus RN   Patient Advocate Liaison (PAL)  [facility]  [phone]

## 2023-03-17 DIAGNOSIS — Z53.9 DIAGNOSIS NOT YET DEFINED: Primary | ICD-10-CM

## 2023-03-17 PROCEDURE — G0179 MD RECERTIFICATION HHA PT: HCPCS | Performed by: FAMILY MEDICINE

## 2023-03-21 NOTE — TELEPHONE ENCOUNTER
Patient Quality Outreach    Patient is due for the following:   Physical Annual Wellness Visit      Topic Date Due     Pneumococcal Vaccine (1 - PCV) Never done     Flu Vaccine (1) 09/01/2022     COVID-19 Vaccine (2 - Moderna series) 11/08/2022       Next Steps:   Patient was scheduled for 04/17/2023    Type of outreach:    Chart review performed, no outreach needed.      Questions for provider review:    None     Carol Rodríguez

## 2023-04-01 ENCOUNTER — HEALTH MAINTENANCE LETTER (OUTPATIENT)
Age: 68
End: 2023-04-01

## 2023-04-17 ENCOUNTER — OFFICE VISIT (OUTPATIENT)
Dept: FAMILY MEDICINE | Facility: CLINIC | Age: 68
End: 2023-04-17
Payer: COMMERCIAL

## 2023-04-17 VITALS
BODY MASS INDEX: 30.22 KG/M2 | TEMPERATURE: 98.6 F | RESPIRATION RATE: 16 BRPM | HEIGHT: 68 IN | SYSTOLIC BLOOD PRESSURE: 147 MMHG | HEART RATE: 108 BPM | OXYGEN SATURATION: 96 % | WEIGHT: 199.4 LBS | DIASTOLIC BLOOD PRESSURE: 89 MMHG

## 2023-04-17 DIAGNOSIS — E55.9 VITAMIN D DEFICIENCY: ICD-10-CM

## 2023-04-17 DIAGNOSIS — I72.8 SPLENIC ARTERY ANEURYSM (H): ICD-10-CM

## 2023-04-17 DIAGNOSIS — F41.1 GENERALIZED ANXIETY DISORDER: ICD-10-CM

## 2023-04-17 DIAGNOSIS — Z12.31 VISIT FOR SCREENING MAMMOGRAM: ICD-10-CM

## 2023-04-17 DIAGNOSIS — F09 MILD COGNITIVE DISORDER: ICD-10-CM

## 2023-04-17 DIAGNOSIS — Z00.00 ENCOUNTER FOR MEDICARE ANNUAL WELLNESS EXAM: Primary | ICD-10-CM

## 2023-04-17 DIAGNOSIS — E89.0 POSTOPERATIVE HYPOTHYROIDISM: ICD-10-CM

## 2023-04-17 DIAGNOSIS — F01.518 VASCULAR DEMENTIA WITH BEHAVIORAL DISTURBANCE (H): ICD-10-CM

## 2023-04-17 DIAGNOSIS — Z78.0 POST-MENOPAUSAL: ICD-10-CM

## 2023-04-17 DIAGNOSIS — Z93.59 SUPRAPUBIC CATHETER (H): ICD-10-CM

## 2023-04-17 DIAGNOSIS — F32.0 MILD MAJOR DEPRESSION (H): ICD-10-CM

## 2023-04-17 LAB
ALBUMIN SERPL BCG-MCNC: 4.3 G/DL (ref 3.5–5.2)
ALP SERPL-CCNC: 105 U/L (ref 35–104)
ALT SERPL W P-5'-P-CCNC: 37 U/L (ref 10–35)
ANION GAP SERPL CALCULATED.3IONS-SCNC: 16 MMOL/L (ref 7–15)
AST SERPL W P-5'-P-CCNC: 34 U/L (ref 10–35)
BILIRUB SERPL-MCNC: 0.5 MG/DL
BUN SERPL-MCNC: 6.4 MG/DL (ref 8–23)
CALCIUM SERPL-MCNC: 10 MG/DL (ref 8.8–10.2)
CHLORIDE SERPL-SCNC: 104 MMOL/L (ref 98–107)
CHOLEST SERPL-MCNC: 192 MG/DL
CREAT SERPL-MCNC: 0.74 MG/DL (ref 0.51–0.95)
DEPRECATED HCO3 PLAS-SCNC: 22 MMOL/L (ref 22–29)
ERYTHROCYTE [DISTWIDTH] IN BLOOD BY AUTOMATED COUNT: 13.7 % (ref 10–15)
GFR SERPL CREATININE-BSD FRML MDRD: 88 ML/MIN/1.73M2
GLUCOSE SERPL-MCNC: 94 MG/DL (ref 70–99)
HBA1C MFR BLD: 4.9 % (ref 0–5.6)
HCT VFR BLD AUTO: 45.6 % (ref 35–47)
HDLC SERPL-MCNC: 33 MG/DL
HGB BLD-MCNC: 15.7 G/DL (ref 11.7–15.7)
LDLC SERPL CALC-MCNC: 112 MG/DL
MCH RBC QN AUTO: 30.6 PG (ref 26.5–33)
MCHC RBC AUTO-ENTMCNC: 34.4 G/DL (ref 31.5–36.5)
MCV RBC AUTO: 89 FL (ref 78–100)
NONHDLC SERPL-MCNC: 159 MG/DL
PLATELET # BLD AUTO: 337 10E3/UL (ref 150–450)
POTASSIUM SERPL-SCNC: 4.2 MMOL/L (ref 3.4–5.3)
PROT SERPL-MCNC: 7.4 G/DL (ref 6.4–8.3)
RBC # BLD AUTO: 5.13 10E6/UL (ref 3.8–5.2)
SODIUM SERPL-SCNC: 142 MMOL/L (ref 136–145)
TRIGL SERPL-MCNC: 236 MG/DL
TSH SERPL DL<=0.005 MIU/L-ACNC: 2.22 UIU/ML (ref 0.3–4.2)
WBC # BLD AUTO: 8.5 10E3/UL (ref 4–11)

## 2023-04-17 PROCEDURE — G0438 PPPS, INITIAL VISIT: HCPCS | Performed by: FAMILY MEDICINE

## 2023-04-17 PROCEDURE — 80061 LIPID PANEL: CPT | Performed by: FAMILY MEDICINE

## 2023-04-17 PROCEDURE — 85027 COMPLETE CBC AUTOMATED: CPT | Performed by: FAMILY MEDICINE

## 2023-04-17 PROCEDURE — 36415 COLL VENOUS BLD VENIPUNCTURE: CPT | Performed by: FAMILY MEDICINE

## 2023-04-17 PROCEDURE — 99214 OFFICE O/P EST MOD 30 MIN: CPT | Mod: 25 | Performed by: FAMILY MEDICINE

## 2023-04-17 PROCEDURE — 84443 ASSAY THYROID STIM HORMONE: CPT | Performed by: FAMILY MEDICINE

## 2023-04-17 PROCEDURE — 82306 VITAMIN D 25 HYDROXY: CPT | Performed by: FAMILY MEDICINE

## 2023-04-17 PROCEDURE — 80053 COMPREHEN METABOLIC PANEL: CPT | Performed by: FAMILY MEDICINE

## 2023-04-17 PROCEDURE — 83036 HEMOGLOBIN GLYCOSYLATED A1C: CPT | Performed by: FAMILY MEDICINE

## 2023-04-17 RX ORDER — OLANZAPINE 7.5 MG/1
7.5 TABLET, FILM COATED ORAL AT BEDTIME
Qty: 90 TABLET | Refills: 1 | Status: SHIPPED | OUTPATIENT
Start: 2023-04-17 | End: 2024-02-24

## 2023-04-17 SDOH — ECONOMIC STABILITY: FOOD INSECURITY: WITHIN THE PAST 12 MONTHS, YOU WORRIED THAT YOUR FOOD WOULD RUN OUT BEFORE YOU GOT MONEY TO BUY MORE.: NEVER TRUE

## 2023-04-17 SDOH — HEALTH STABILITY: PHYSICAL HEALTH: ON AVERAGE, HOW MANY DAYS PER WEEK DO YOU ENGAGE IN MODERATE TO STRENUOUS EXERCISE (LIKE A BRISK WALK)?: 0 DAYS

## 2023-04-17 SDOH — ECONOMIC STABILITY: TRANSPORTATION INSECURITY
IN THE PAST 12 MONTHS, HAS LACK OF TRANSPORTATION KEPT YOU FROM MEETINGS, WORK, OR FROM GETTING THINGS NEEDED FOR DAILY LIVING?: NO

## 2023-04-17 SDOH — ECONOMIC STABILITY: INCOME INSECURITY: IN THE LAST 12 MONTHS, WAS THERE A TIME WHEN YOU WERE NOT ABLE TO PAY THE MORTGAGE OR RENT ON TIME?: NO

## 2023-04-17 SDOH — ECONOMIC STABILITY: FOOD INSECURITY: WITHIN THE PAST 12 MONTHS, THE FOOD YOU BOUGHT JUST DIDN'T LAST AND YOU DIDN'T HAVE MONEY TO GET MORE.: NEVER TRUE

## 2023-04-17 SDOH — HEALTH STABILITY: PHYSICAL HEALTH: ON AVERAGE, HOW MANY MINUTES DO YOU ENGAGE IN EXERCISE AT THIS LEVEL?: 0 MIN

## 2023-04-17 SDOH — ECONOMIC STABILITY: INCOME INSECURITY: HOW HARD IS IT FOR YOU TO PAY FOR THE VERY BASICS LIKE FOOD, HOUSING, MEDICAL CARE, AND HEATING?: NOT HARD AT ALL

## 2023-04-17 ASSESSMENT — ENCOUNTER SYMPTOMS
HEADACHES: 0
DIZZINESS: 0
HEARTBURN: 0
HEMATURIA: 0
NAUSEA: 0
MYALGIAS: 0
NERVOUS/ANXIOUS: 0
ABDOMINAL PAIN: 0
JOINT SWELLING: 0
SORE THROAT: 0
FEVER: 0
ARTHRALGIAS: 0
COUGH: 0
EYE PAIN: 0
BREAST MASS: 0
PALPITATIONS: 0
DYSURIA: 0
FREQUENCY: 0
DIARRHEA: 0
HEMATOCHEZIA: 0
SHORTNESS OF BREATH: 0
CHILLS: 0
CONSTIPATION: 0
WEAKNESS: 0
PARESTHESIAS: 0

## 2023-04-17 ASSESSMENT — LIFESTYLE VARIABLES
AUDIT-C TOTAL SCORE: 1
HOW MANY STANDARD DRINKS CONTAINING ALCOHOL DO YOU HAVE ON A TYPICAL DAY: 1 OR 2
HOW OFTEN DO YOU HAVE SIX OR MORE DRINKS ON ONE OCCASION: NEVER
SKIP TO QUESTIONS 9-10: 1
HOW OFTEN DO YOU HAVE A DRINK CONTAINING ALCOHOL: MONTHLY OR LESS

## 2023-04-17 ASSESSMENT — SOCIAL DETERMINANTS OF HEALTH (SDOH)
DO YOU BELONG TO ANY CLUBS OR ORGANIZATIONS SUCH AS CHURCH GROUPS UNIONS, FRATERNAL OR ATHLETIC GROUPS, OR SCHOOL GROUPS?: NO
HOW OFTEN DO YOU ATTEND CHURCH OR RELIGIOUS SERVICES?: NEVER
IN A TYPICAL WEEK, HOW MANY TIMES DO YOU TALK ON THE PHONE WITH FAMILY, FRIENDS, OR NEIGHBORS?: MORE THAN THREE TIMES A WEEK
HOW OFTEN DO YOU GET TOGETHER WITH FRIENDS OR RELATIVES?: MORE THAN THREE TIMES A WEEK

## 2023-04-17 ASSESSMENT — ACTIVITIES OF DAILY LIVING (ADL): CURRENT_FUNCTION: NO ASSISTANCE NEEDED

## 2023-04-17 ASSESSMENT — ANXIETY QUESTIONNAIRES
5. BEING SO RESTLESS THAT IT IS HARD TO SIT STILL: NOT AT ALL
3. WORRYING TOO MUCH ABOUT DIFFERENT THINGS: NOT AT ALL
4. TROUBLE RELAXING: NOT AT ALL
1. FEELING NERVOUS, ANXIOUS, OR ON EDGE: NOT AT ALL
2. NOT BEING ABLE TO STOP OR CONTROL WORRYING: NOT AT ALL
GAD7 TOTAL SCORE: 0
GAD7 TOTAL SCORE: 0
6. BECOMING EASILY ANNOYED OR IRRITABLE: NOT AT ALL
GAD7 TOTAL SCORE: 0
7. FEELING AFRAID AS IF SOMETHING AWFUL MIGHT HAPPEN: NOT AT ALL
7. FEELING AFRAID AS IF SOMETHING AWFUL MIGHT HAPPEN: NOT AT ALL
8. IF YOU CHECKED OFF ANY PROBLEMS, HOW DIFFICULT HAVE THESE MADE IT FOR YOU TO DO YOUR WORK, TAKE CARE OF THINGS AT HOME, OR GET ALONG WITH OTHER PEOPLE?: NOT DIFFICULT AT ALL
IF YOU CHECKED OFF ANY PROBLEMS ON THIS QUESTIONNAIRE, HOW DIFFICULT HAVE THESE PROBLEMS MADE IT FOR YOU TO DO YOUR WORK, TAKE CARE OF THINGS AT HOME, OR GET ALONG WITH OTHER PEOPLE: NOT DIFFICULT AT ALL

## 2023-04-17 ASSESSMENT — PATIENT HEALTH QUESTIONNAIRE - PHQ9
10. IF YOU CHECKED OFF ANY PROBLEMS, HOW DIFFICULT HAVE THESE PROBLEMS MADE IT FOR YOU TO DO YOUR WORK, TAKE CARE OF THINGS AT HOME, OR GET ALONG WITH OTHER PEOPLE: NOT DIFFICULT AT ALL
SUM OF ALL RESPONSES TO PHQ QUESTIONS 1-9: 0
SUM OF ALL RESPONSES TO PHQ QUESTIONS 1-9: 0

## 2023-04-17 NOTE — PROGRESS NOTES
"SUBJECTIVE:   Fely is a 67 year old who presents for Preventive Visit.      4/17/2023    10:49 AM   Additional Questions   Roomed by Radha Ramirez     Here for wellness visit.    Would like to get rid of her catheter.  For example, she had problems with the tube coming off, even though it was on tight.  She is able to urinate well.  Had the catheter placed due to frequent UTIs that caused.    Diarrhea has been better, nearly gone now.  Solid stools more frequently.  Has not been using the Bentyl or Lomotil, using Loperamide if needed.  No longer taking a probiotics.    Trouble falling asleep some nights, takes her Buspirone when she struggles.    No depression or anxiety symptoms.    Patient has been advised of split billing requirements and indicates understanding: Yes  Are you in the first 12 months of your Medicare coverage?  No    Healthy Habits:     In general, how would you rate your overall health?  Excellent    Frequency of exercise:  None    Do you usually eat at least 4 servings of fruit and vegetables a day, include whole grains    & fiber and avoid regularly eating high fat or \"junk\" foods?  No    Taking medications regularly:  Yes    Medication side effects:  None    Ability to successfully perform activities of daily living:  No assistance needed    Home Safety:  No safety concerns identified    Hearing Impairment:  No hearing concerns    In the past 6 months, have you been bothered by leaking of urine?  No    In general, how would you rate your overall mental or emotional health?  Excellent      PHQ-2 Total Score: 0    Additional concerns today:  No      Have you ever done Advance Care Planning? (For example, a Health Directive, POLST, or a discussion with a medical provider or your loved ones about your wishes): No, advance care planning information given to patient to review.  Patient plans to discuss their wishes with loved ones or provider.        Fall risk  Fallen 2 or more times in the past " year?: No  Any fall with injury in the past year?: No    Cognitive Screening   1) Repeat 3 items (Leader, Season, Table)    2) Clock draw: NORMAL  3) 3 item recall: Recalls 2 objects   Results: NORMAL clock, 1-2 items recalled: COGNITIVE IMPAIRMENT LESS LIKELY    Mini-CogTM Copyright S Chelly. Licensed by the author for use in [facility]; reprinted with permission ([email or web address]). All rights reserved.          Reviewed and updated as needed this visit by clinical staff   Tobacco  Allergies  Meds              Reviewed and updated as needed this visit by Provider                 Social History     Tobacco Use     Smoking status: Former     Years: 5.00     Types: Cigarettes     Start date: 5/10/1973     Quit date: 1977     Years since quittin.3     Smokeless tobacco: Never   Vaping Use     Vaping status: Never Used   Substance Use Topics     Alcohol use: Yes     Comment: Once a month maybe             2023    10:42 AM   Alcohol Use   Prescreen: >3 drinks/day or >7 drinks/week? Not Applicable     Do you have a current opioid prescription? No  Do you use any other controlled substances or medications that are not prescribed by a provider? None        Current providers sharing in care for this patient include:   Patient Care Team:  Reba Raymundo MD as PCP - General (Family Medicine)  Reba Raymundo MD as Assigned PCP  Lacy Birmingham PA-C as Assigned OBGYN Provider  Paul Kraus RN as Personal Advocate & Liaison (PAL) (Nurse)    The following health maintenance items are reviewed in Epic and correct as of today:  Health Maintenance   Topic Date Due     DEXA  Never done     Pneumococcal Vaccine: 65+ Years (1 - PCV) Never done     COVID-19 Vaccine (2 - Moderna series) 2022     MAMMO SCREENING  2023     INFLUENZA VACCINE (1) 2023 (Originally 2022)     PHQ-9  10/17/2023     MEDICARE ANNUAL WELLNESS VISIT  2024     TSH W/FREE T4 REFLEX   04/17/2024     ANNUAL REVIEW OF HM ORDERS  04/17/2024     FALL RISK ASSESSMENT  04/17/2024     COLORECTAL CANCER SCREENING  11/23/2026     LIPID  04/17/2028     ADVANCE CARE PLANNING  04/17/2028     DTAP/TDAP/TD IMMUNIZATION (4 - Td or Tdap) 03/11/2032     HEPATITIS C SCREENING  Completed     DEPRESSION ACTION PLAN  Completed     MIGRAINE ACTION PLAN  Completed     ZOSTER IMMUNIZATION  Completed     IPV IMMUNIZATION  Aged Out     MENINGITIS IMMUNIZATION  Aged Out     PAP  Discontinued     Lab work is in process  Labs reviewed in EPIC  BP Readings from Last 3 Encounters:   04/17/23 (!) 147/89   10/11/22 102/64   08/10/22 120/60    Wt Readings from Last 3 Encounters:   04/17/23 90.4 kg (199 lb 6.4 oz)   10/11/22 87.1 kg (192 lb)   08/10/22 82.4 kg (181 lb 9.6 oz)                  Patient Active Problem List   Diagnosis     Allergic rhinitis due to other allergen     Irritable bowel syndrome     Postsurgical hypothyroidism     Iron deficiency anemia     Mild major depression (H)     Generalized anxiety disorder     Tubular adenoma of colon     Seasonal affective disorder (H)     Headache     ADD (attention deficit disorder)     TIA (transient ischemic attack)     Gastroesophageal reflux disease with esophagitis     Hiatal hernia     History of colonic polyps     Migraine with aura and without status migrainosus, not intractable     History of thyroid cancer     Mild cognitive impairment     Peripheral polyneuropathy     Vitamin D deficiency     Cystitis with hematuria     Fall in home     Hyponatremia     Retention of urine     Splenic artery aneurysm (H)     Dementia without behavioral disturbance (H)     Other osteoporosis without current pathological fracture     Vascular dementia with behavioral disturbance (H)     Suprapubic catheter (H)     Adenoma of large intestine     Diarrhea     Polyp of colon     Past Surgical History:   Procedure Laterality Date     ABDOMEN SURGERY  5/97    [facility]elherna     CHOLECYSTECTOMY        COLONOSCOPY  2013    Colonoscopy Dr. Vallejo On license of [facility]     ENT SURGERY  [id number]     HEAD & NECK SURGERY      thyroid cancer surgery     HERNIA REPAIR       IR SUPRAPUBIC CATHETER CHANGE  2021     SURGICAL HISTORY OF -       thyroidectomy due to cancer     WRIST SURGERY Right     2015     [facility] NONSPECIFIC PROCEDURE      surgery hiatal hernia     [facility] NONSPECIFIC PROCEDURE      cholecystectomy     [facility] NONSPECIFIC PROCEDURE  multiple    cleft lip repair.       Social History     Tobacco Use     Smoking status: Former     Years: 5.00     Types: Cigarettes     Start date: 5/10/1973     Quit date: 1977     Years since quittin.3     Smokeless tobacco: Never   Vaping Use     Vaping status: Never Used   Substance Use Topics     Alcohol use: Yes     Comment: Once a month maybe     Family History   Problem Relation Age of Onset     Cancer Father         Colon, stomach -  at 75yoa     Hypertension Father      C.A.D. Father      Colon Cancer Father      Other Cancer Father      Cancer Mother         Throat, lymph, bone -  at 79yoa     Other Cancer Mother      C.A.D. Maternal Grandfather         Heart Attack -  in his late 60's     Alzheimer Disease Paternal Grandmother      C.A.D. Paternal Grandfather         Heart Attack -  at 63yoa     Thyroid Disease Other          Current Outpatient Medications   Medication Sig Dispense Refill     busPIRone (BUSPAR) 5 MG tablet Take 5 mg by mouth       CRANBERRY PO        levothyroxine (SYNTHROID/LEVOTHROID) 137 MCG tablet TAKE ONE TABLET BY MOUTH DAILY 90 tablet 2     loperamide (IMODIUM) 2 MG capsule Take 1 capsule (2 mg) by mouth 4 times daily as needed       methenamine hippurate (HIPREX) 1 g tablet Take 1 tablet (1g) by mouth 2 times daily 180 tablet 3     multivitamin w/minerals (THERA-VIT-M) tablet Take 1 tablet by mouth daily       OLANZapine (ZYPREXA) 7.5 MG tablet Take 1 tablet (7.5 mg) by mouth At Bedtime 90 tablet 1     Peppermint  Oil 50 MG CPDR TAKE 1 CAPSULE BY MOUTH DAILY 90 capsule 3     vitamin D3 (CHOLECALCIFEROL) 125 MCG (5000 UT) tablet Take 125 mcg by mouth       atorvastatin (LIPITOR) 20 MG tablet Take 1 tablet (20 mg) by mouth daily 90 tablet 1     dicyclomine (BENTYL) 20 MG tablet Take 20 mg by mouth       diphenoxylate-atropine (LOMOTIL) 2.5-0.025 MG tablet TAKE ONE TABLET BY MOUTH DAILY AS NEEDED FOR DIARRHEA 30 tablet 3     Allergies   Allergen Reactions     Dextrose Unknown     Levaquin Nausea and Vomiting     Recent Labs   Lab Test 04/17/23  1201 10/11/22  1129 03/11/22  1352 10/19/21  1115 10/19/21  1115 07/06/21  1453 01/23/20  1301 04/30/19  0931 12/11/18  0932 07/27/18  1428 07/20/18  1410 05/09/17  1349 09/08/16  0540 02/24/16  1148 10/19/15  1002   A1C 4.9  --   --   --   --   --   --   --   --   --  5.0  --   --   --  5.2   *  --  105*  --   --   --   --   --  163*  --   --   --  139*  --  128   HDL 33*  --  39*  --   --   --   --   --   --   --   --   --  46*  --  38*   TRIG 236*  --  241*  --   --   --   --   --   --   --   --   --  97  --  163*   ALT 37*  --  20  --  17 15  --    < > 24  --   --    < >  --   --   --    CR 0.74 0.60 0.67   < > 0.80 0.92 0.75   < > 0.89  --   --    < >  --    < > 0.82   GFRESTIMATED 88 >90 >90   < > 77 65 84   < > 64  --   --    < >  --    < > 71   GFRESTBLACK  --   --   --   --   --  75 >90   < > 78  --   --    < >  --    < > 86   POTASSIUM 4.2 3.8 4.0   < > 4.2 3.9 3.8   < > 3.8  --   --    < >  --    < > 4.0   TSH 2.22 0.46  --    < > 2.17 1.17  --    < > 1.78   < > 1.88   < > 1.87   < > 0.52    < > = values in this interval not displayed.            11/8/2021     8:55 AM 5/6/2022    11:12 AM 4/17/2023    10:48 AM 4/29/2023    11:20 AM   Breast CA Risk Assessment (FHS-7)   Do you have a family history of breast, colon, or ovarian cancer? Yes Yes No / Unknown No / Unknown       Mammogram Screening: Recommended mammography every 1-2 years with patient discussion and risk  "factor consideration  Pertinent mammograms are reviewed under the imaging tab.    Review of Systems   Constitutional: Negative for chills and fever.   HENT: Negative for congestion, ear pain, hearing loss and sore throat.    Eyes: Negative for pain and visual disturbance.   Respiratory: Negative for cough and shortness of breath.    Cardiovascular: Negative for chest pain, palpitations and peripheral edema.   Gastrointestinal: Negative for abdominal pain, constipation, diarrhea, heartburn, hematochezia and nausea.   Breasts:  Negative for tenderness, breast mass and discharge.   Genitourinary: Negative for dysuria, frequency, genital sores, hematuria, pelvic pain, urgency, vaginal bleeding and vaginal discharge.   Musculoskeletal: Negative for arthralgias, joint swelling and myalgias.   Skin: Negative for rash.   Neurological: Negative for dizziness, weakness, headaches and paresthesias.   Psychiatric/Behavioral: Negative for mood changes. The patient is not nervous/anxious.          OBJECTIVE:   BP (!) 147/89 (BP Location: Right arm, Patient Position: Sitting, Cuff Size: Adult Regular)   Pulse 108   Temp 98.6  F (37  C) (Oral)   Resp 16   Ht 1.727 m (5' 8\")   Wt 90.4 kg (199 lb 6.4 oz)   LMP 01/20/2004   SpO2 96%   BMI 30.32 kg/m   Estimated body mass index is 30.32 kg/m  as calculated from the following:    Height as of this encounter: 1.727 m (5' 8\").    Weight as of this encounter: 90.4 kg (199 lb 6.4 oz).  Physical Exam  Constitutional:       General: She is not in acute distress.  HENT:      Head: Normocephalic and atraumatic.   Eyes:      Conjunctiva/sclera: Conjunctivae normal.   Cardiovascular:      Rate and Rhythm: Normal rate and regular rhythm.      Heart sounds: Normal heart sounds.   Pulmonary:      Effort: Pulmonary effort is normal.      Breath sounds: Normal breath sounds.   Abdominal:      General: Bowel sounds are normal.   Neurological:      Mental Status: She is alert. Mental status is " at baseline.           Diagnostic Test Results:  Labs reviewed in Epic    ASSESSMENT / PLAN:   (Z00.00) Encounter for Medicare annual wellness exam  (primary encounter diagnosis)  Comment: Exam completed today, routine health maintenance items updated as able.  Labs ordered.  Follow up one year or sooner as needed.  Plan: Lipid panel reflex to direct LDL Fasting, CBC         with platelets, Hemoglobin A1c, Comprehensive         metabolic panel (BMP + Alb, Alk Phos, ALT, AST,        Total. Bili, TP), ADVANCE CARE PLANNING DISCUSS        DOCUMENTED IN MEDICAL RECORD            (Z93.59) Suprapubic catheter (H)  Comment: Patient is interested in getting catheter removed  Plan: Recommend follow-up with urology    (F09) Mild cognitive disorder  Comment: Stable, continue olanzapine  Plan: OLANZapine (ZYPREXA) 7.5 MG tablet            (F41.1) Generalized anxiety disorder  Comment: Stable, continue olanzapine  Plan: OLANZapine (ZYPREXA) 7.5 MG tablet            (E55.9) Vitamin D deficiency  Plan: Vitamin D Deficiency            (E89.0) Postoperative hypothyroidism  Comment: Due for labs, recommendations pending results  Plan: TSH with free T4 reflex            (I72.8) Splenic artery aneurysm (H)  Comment: Due for follow-up, recommendations pending results  Plan: CTA Abdomen Pelvis with Contrast            (F01.518) Vascular dementia with behavioral disturbance (H)  Comment: Unchanged  Plan:  monitor    (F32.0) Mild major depression (H)  Comment: Controlled  Plan: Continue olanzapine    (Z12.31) Visit for screening mammogram  Plan: MA Screen Bilateral w/Pratik            (Z78.0) Post-menopausal  Plan: DX Hip/Pelvis/Spine              Patient has been advised of split billing requirements and indicates understanding: Yes      COUNSELING:  Reviewed preventive health counseling, as reflected in patient instructions      BMI:   Estimated body mass index is 30.32 kg/m  as calculated from the following:    Height as of this  "encounter: 1.727 m (5' 8\").    Weight as of this encounter: 90.4 kg (199 lb 6.4 oz).   Weight management plan: Discussed healthy diet and exercise guidelines      She reports that she quit smoking about 46 years ago. Her smoking use included cigarettes. She started smoking about 50 years ago. She has never used smokeless tobacco.      Appropriate preventive services were discussed with this patient, including applicable screening as appropriate for cardiovascular disease, diabetes, osteopenia/osteoporosis, and glaucoma.  As appropriate for age/gender, discussed screening for colorectal cancer, prostate cancer, breast cancer, and cervical cancer. Checklist reviewing preventive services available has been given to the patient.    Reviewed patients plan of care and provided an AVS. The Basic Care Plan (routine screening as documented in Health Maintenance) for Mariela meets the Care Plan requirement. This Care Plan has been established and reviewed with the Patient and daughter.      Reba Tillman MD  [facility]    Identified Health Risks:    I have reviewed Opioid Use Disorder and Substance Use Disorder risk factors and made any needed referrals.     Answers for HPI/ROS submitted by the patient on 4/17/2023  If you checked off any problems, how difficult have these problems made it for you to do your work, take care of things at home, or get along with other people?: Not difficult at all  PHQ9 TOTAL SCORE: 0  QUYEN 7 TOTAL SCORE: 0        She is at risk for lack of exercise and has been provided with information to increase physical activity for the benefit of her well-being.  The patient was counseled and encouraged to consider modifying their diet and eating habits. She was provided with information on recommended healthy diet options.  "

## 2023-04-17 NOTE — PATIENT INSTRUCTIONS
Reach out to urology in [place] to see about removal of catheter.    Calcium citrate, 600 mg twice per day.  At least 1000 international unit(s) Vitamin D3 daily.    Look into pneumonia vaccines, which one was done:  Prevnar or Pneumovax.    Let us know the date of COVID vaccines.  Patient Education   Personalized Prevention Plan  You are due for the preventive services outlined below.  Your care team is available to assist you in scheduling these services.  If you have already completed any of these items, please share that information with your care team to update in your medical record.  Health Maintenance Due   Topic Date Due     Osteoporosis Screening  Never done     Pneumococcal Vaccine (1 - PCV) Never done     COVID-19 Vaccine (2 - Moderna series) 11/08/2022     Mammogram  04/18/2023       Exercise for a Healthier Heart  You may wonder how you can improve the health of your heart. If you re thinking about exercise, you re on the right track. You don t need to become an athlete. But you do need a certain amount of brisk exercise to help strengthen your heart. If you have been diagnosed with a heart condition, your healthcare provider may advise exercise to help your condition. To help make exercise a habit, choose safe, fun activities.      Exercise with a friend. When activity is fun, you're more likely to stick with it.     Before you start  Check with your healthcare provider before starting an exercise program. This is especially important if you haven't been active for a while. It's also important if you have a long-term (chronic) health problem such as heart disease, diabetes, or obesity. Also check with your provider if you're at high risk for having these problems.   Why exercise?  Exercising regularly offers many healthy rewards. It can help you do all of these:     Improve your blood cholesterol level to help prevent further heart trouble.    Lower your blood pressure to help prevent a stroke or heart  attack.    Control diabetes or reduce your risk of getting this disease.    Improve your heart and lung function.    Reach and stay at a healthy weight.    Make your muscles stronger so you can stay active.    Prevent falls and fractures by slowing the loss of bone mass (osteoporosis).    Manage stress better.    Improve your sense of self and your body image.  Exercise tips      Ease into your routine. Set small goals. Then build on them. Talk with your healthcare provider first before starting an exercise routine if you're not sure what your activity level should be.    Exercise on most days. Aim for a total of at least 150 minutes (2 hours and 30 minutes) or more of moderate-intensity aerobic activity each week. You could also do 75 minutes (1 hour and 15 minutes) or more of vigorous-intensity aerobic activity each week. Or try for a combination of both. Moderate activity means that you breathe heavier and your heart rate increases, but you can still talk. Think about doing at least 30 minutes of moderate exercise, 5 times a week. It's OK to work up to the 30-minute period over time. Examples of moderate-intensity activity are brisk walking, gardening, and water aerobics.    Step up your daily activity level.  Along with your exercise program, try being more active the whole day. Walk instead of drive. Or park further away so that you take more steps each day. Do more household tasks or yard work. You may not be able to meet the advised amount of physical activity. But doing some moderate- or vigorous-intensity aerobic activity can help reduce your risk for heart disease. Your healthcare provider can help you figure out what is best for you.    Choose 1 or more activities you enjoy.  Walking is one of the easiest things you can do. You can also try swimming, riding a bike, dancing, or taking an exercise class.    Call 911  Call 911 right away if any of these occur:     Chest pain that doesn't go away quickly with  rest    New burning, tightness, pressure, or heaviness in your chest, neck, shoulders, back, or arms    Abnormal or severe shortness of breath    A very fast or irregular heartbeat (palpitations)    Fainting  When to call your healthcare provider  Call your healthcare provider if you have any of these:     Dizziness or lightheadedness    Mild shortness of breath or chest pain    Increased or new joint or muscle pain    Diogo last reviewed this educational content on 7/1/2022 2000-2022 [employer]. All rights reserved. This information is not intended as a substitute for professional medical care. Always follow your healthcare professional's instructions.          Understanding USDA MyPlate  The USDA has guidelines to help you make healthy food choices. These are called MyPlate. MyPlate shows the food groups that make up healthy meals using the image of a place setting. Before you eat, think about the healthiest choices for what to put on your plate or in your cup or bowl. To learn more about building a healthy plate, visit www.choosemyplate.gov.     The food groups    Fruits. Any fruit or 100% fruit juice counts as part of the Fruit Group. Fruits may be fresh, canned, frozen, or dried, and may be whole, cut-up, or pureed. Make 1/2 of your plate fruits and vegetables.    Vegetables. Any vegetable or 100% vegetable juice counts as a member of the Vegetable Group. Vegetables may be fresh, frozen, canned, or dried. They can be served raw or cooked and may be whole, cut-up, or mashed. Make 1/2 of your plate fruits and vegetables.    Grains. All foods made from grains are part of the Grains Group. These include wheat, rice, oats, cornmeal, and barley. Grains are often used to make foods such as bread, pasta, oatmeal, cereal, tortillas, and grits. Grains should be no more than 1/4 of your plate. At least half of your grains should be whole grains.    Protein. This group includes meat, poultry, seafood,  beans and peas, eggs, processed soy products (such as tofu), nuts (including nut butters), and seeds. Make protein choices no more than 1/4 of your plate. Meat and poultry choices should be lean or low fat.    Dairy. The Dairy Group includes all fluid milk products and foods made from milk that contain calcium, such as yogurt and cheese. (Foods that have little calcium, such as cream, butter, and cream cheese, are not part of this group.) Most dairy choices should be low-fat or fat-free.    Oils. Oils aren't a food group, but they do contain essential nutrients. However it's important to watch your intake of oils. These are fats that are liquid at room temperature. They include canola, corn, olive, soybean, vegetable, and sunflower oil. Foods that are mainly oil include mayonnaise, certain salad dressings, and soft margarines. You likely already get your daily oil allowance from the foods you eat.  Things to limit  Eating healthy also means limiting these things in your diet:    Salt (sodium). Many processed foods have a lot of sodium. To keep sodium intake down, eat fresh vegetables, meats, poultry, and seafood when possible. Purchase low-sodium, reduced-sodium, or no-salt-added food products at the store. And don't add salt to your meals at home. Instead, season them with herbs and spices such as dill, oregano, cumin, and paprika. Or try adding flavor with lemon or lime zest and juice.    Saturated fat. Saturated fats are most often found in animal products such as beef, pork, and chicken. They are often solid at room temperature, such as butter. To reduce your saturated fat intake, choose leaner cuts of meat and poultry. And try healthier cooking methods such as grilling, broiling, roasting, or baking. For a simple lower-fat swap, use plain nonfat yogurt instead of mayonnaise when making potato salad or macaroni salad.    Added sugars. These are sugars added to foods. They are in foods such as ice cream, candy,  soda, fruit drinks, sports drinks, energy drinks, cookies, pastries, jams, and syrups. Cut down on added sugars by sharing sweet treats with a family member or friend. You can also choose fruit for dessert, and drink water or other unsweetened beverages.  Diogo last reviewed this educational content on 6/1/2020 2000-2022 [employer]. All rights reserved. This information is not intended as a substitute for professional medical care. Always follow your healthcare professional's instructions.

## 2023-04-18 LAB — DEPRECATED CALCIDIOL+CALCIFEROL SERPL-MC: 40 UG/L (ref 20–75)

## 2023-04-19 DIAGNOSIS — E78.5 HYPERLIPIDEMIA LDL GOAL <100: Primary | ICD-10-CM

## 2023-04-19 RX ORDER — ATORVASTATIN CALCIUM 20 MG/1
20 TABLET, FILM COATED ORAL DAILY
Qty: 90 TABLET | Refills: 1 | Status: SHIPPED | OUTPATIENT
Start: 2023-04-19 | End: 2023-10-03

## 2023-04-21 ENCOUNTER — TELEPHONE (OUTPATIENT)
Dept: UROLOGY | Facility: CLINIC | Age: 68
End: 2023-04-21
Payer: COMMERCIAL

## 2023-04-21 NOTE — TELEPHONE ENCOUNTER
[facility]    Phone Message    May a detailed message be left on voicemail: yes     Reason for Call: Patient wants suprapubic catheter removed. She usually sees Lacy Birmingham but wants to make sure this is something she will do. Please reachou to patient. Thank you.    Action Taken: Message routed to:  Other: [facility]    Travel Screening: Not Applicable

## 2023-04-26 NOTE — TELEPHONE ENCOUNTER
Called patient and informed that PA would like her to keep SP tube capped for one week. She was transferred to scheduling to arrange a nurse visit for UA/PVR to ensure she is emptying her bladder fine.     Areli Ta LPN

## 2023-04-29 ENCOUNTER — ANCILLARY PROCEDURE (OUTPATIENT)
Dept: MAMMOGRAPHY | Facility: CLINIC | Age: 68
End: 2023-04-29
Attending: FAMILY MEDICINE
Payer: COMMERCIAL

## 2023-04-29 DIAGNOSIS — Z12.31 VISIT FOR SCREENING MAMMOGRAM: ICD-10-CM

## 2023-04-29 PROCEDURE — 77067 SCR MAMMO BI INCL CAD: CPT | Mod: TC | Performed by: RADIOLOGY

## 2023-04-29 PROCEDURE — 77063 BREAST TOMOSYNTHESIS BI: CPT | Mod: TC | Performed by: RADIOLOGY

## 2023-05-01 ENCOUNTER — PATIENT OUTREACH (OUTPATIENT)
Dept: FAMILY MEDICINE | Facility: CLINIC | Age: 68
End: 2023-05-01
Payer: COMMERCIAL

## 2023-05-01 NOTE — TELEPHONE ENCOUNTER
"April Morgan Tillman MD-    -Pt is having a dexa scan on 5/17 and asks if atorvastatin should be held prior to scan as it has calcium.      Called and spoke with pt,     -Relayed message from Dr. Morgan Tillman below:     \"Please call to relay results and recommendations.   ACH'    \"Your lipid panel was abnormal.  We take these numbers and plug them into a calculator along with your age, gender, race, presence or absence of diabetes, current tobacco use, blood pressure and use of medication for treating blood pressure.  The calculator estimates your risk of having a cardiovascular event (such as heart attack or stroke) in the next 10 years.  Anything 7.5% or higher is recommended to be on a cholesterol lowering medication called a statin to reduce the risk of events.     Based on the available information, your estimated risk of cardiovascular event in the next 10 years is greater than 7.5%.  Therefore, use of a statin is recommended.  I am sending in a prescription for Atorvastatin, 20 mg daily.  We will recheck your cholesterol in 3 months.  Lab orders are in place     You may also work on lifestyle modification such as decreasing red meats,and saturated fats, increasing lean meats like chicken and fatty fish, increasing vegetables, and exercise to help lower cholesterol levels.\"      Pt verbalizes understanding of above and has already started the medication.     -Pt is having a dexa scan on 5/17 and asks if atorvastatin should be held prior to scan as it has calcium.    -Pt denies any further questions or concerns at this time.     Routed to PCP  CHRIS Torres (Patient Advocate Liaison)  [facility]  [phone]    "

## 2023-05-01 NOTE — TELEPHONE ENCOUNTER
Called and spoke to pt,     -Relayed message from Dr. Morgan Tillman below.     -Pt verbalizes understanding and will hold prior to dexa scan.     Pt had a question about urology nurse only visit.     -Writer read pt the note that states that she is to complete a UA and post voiding residual to determine emptying.     Pt verbalizes understanding and is agreeable with plan.      Terri EM RN   PAL (Patient Advocate Liaison)  [facility]  [phone]

## 2023-05-03 ENCOUNTER — TELEPHONE (OUTPATIENT)
Dept: FAMILY MEDICINE | Facility: CLINIC | Age: 68
End: 2023-05-03

## 2023-05-03 ENCOUNTER — ALLIED HEALTH/NURSE VISIT (OUTPATIENT)
Dept: UROLOGY | Facility: CLINIC | Age: 68
End: 2023-05-03
Payer: COMMERCIAL

## 2023-05-03 ENCOUNTER — TELEPHONE (OUTPATIENT)
Dept: UROLOGY | Facility: CLINIC | Age: 68
End: 2023-05-03

## 2023-05-03 DIAGNOSIS — R33.9 URINARY RETENTION: Primary | ICD-10-CM

## 2023-05-03 DIAGNOSIS — R31.9 HEMATURIA: ICD-10-CM

## 2023-05-03 LAB
ALBUMIN UR-MCNC: ABNORMAL MG/DL
APPEARANCE UR: CLEAR
BACTERIA #/AREA URNS HPF: ABNORMAL /HPF
BILIRUB UR QL STRIP: NEGATIVE
COLOR UR AUTO: YELLOW
GLUCOSE UR STRIP-MCNC: NEGATIVE MG/DL
HGB UR QL STRIP: ABNORMAL
KETONES UR STRIP-MCNC: NEGATIVE MG/DL
LEUKOCYTE ESTERASE UR QL STRIP: ABNORMAL
MUCOUS THREADS #/AREA URNS LPF: PRESENT /LPF
NITRATE UR QL: NEGATIVE
PH UR STRIP: 5.5 [PH] (ref 5–7)
RBC URINE: 2 /HPF
RESIDUAL VOLUME (RV) (EXTERNAL): 23
SP GR UR STRIP: 1.01 (ref 1–1.03)
SQUAMOUS EPITHELIAL: 1 /HPF
UROBILINOGEN UR STRIP-ACNC: 0.2 E.U./DL
WBC CLUMPS #/AREA URNS HPF: PRESENT /HPF
WBC URINE: 46 /HPF

## 2023-05-03 PROCEDURE — 51798 US URINE CAPACITY MEASURE: CPT

## 2023-05-03 PROCEDURE — 81001 URINALYSIS AUTO W/SCOPE: CPT

## 2023-05-03 NOTE — TELEPHONE ENCOUNTER
Cinthya calling from [facility].  She states patient called and said they can remove her tube?  Advised calling urology for this.  Transferred back to hospitals after giving urology provider name.  Cinthya agrees to discuss with urology.  Abril Harris RN

## 2023-05-03 NOTE — PROGRESS NOTES
Mariela OPAL Duffy comes into clinic today at the request of Lacy Birmingham PA-C Ordering Provider for [facility].    Pt's post void residual today was 23 mL by bladder scan.    This service provided today was under the supervising provider of the day Dr. Dukes, who was available if needed.    Nahed More CMA

## 2023-05-03 NOTE — TELEPHONE ENCOUNTER
[facility] Call Center    Phone Message    May a detailed message be left on voicemail: yes     Reason for Call: Order(s): Home Care Orders: Skilled Nursing: Cinthya, patient's home healthcare nurse, called stating patient contacted her that she is able to remove her urostomy tube. Cinthya would like updated orders on removal and if she needs follow up care. Please call Cinthya at [phone] and also fax orders to [phone]. Thank you.    Action Taken: Message routed to:  Other: [facility]    Travel Screening: Not Applicable

## 2023-05-04 ENCOUNTER — TELEPHONE (OUTPATIENT)
Dept: UROLOGY | Facility: CLINIC | Age: 68
End: 2023-05-04
Payer: COMMERCIAL

## 2023-05-04 NOTE — TELEPHONE ENCOUNTER
Please remove Supra pubic tube,apply sterile guaze to site.              ________________________________    Lacy BAUTISTA

## 2023-05-05 ENCOUNTER — TELEPHONE (OUTPATIENT)
Dept: UROLOGY | Facility: CLINIC | Age: 68
End: 2023-05-05
Payer: COMMERCIAL

## 2023-05-05 ENCOUNTER — MEDICAL CORRESPONDENCE (OUTPATIENT)
Dept: HEALTH INFORMATION MANAGEMENT | Facility: CLINIC | Age: 68
End: 2023-05-05
Payer: COMMERCIAL

## 2023-05-05 ENCOUNTER — TELEPHONE (OUTPATIENT)
Dept: FAMILY MEDICINE | Facility: CLINIC | Age: 68
End: 2023-05-05
Payer: COMMERCIAL

## 2023-05-05 PROBLEM — N30.91 CYSTITIS WITH HEMATURIA: Status: RESOLVED | Noted: 2021-04-13 | Resolved: 2023-05-05

## 2023-05-05 PROBLEM — Z93.59 SUPRAPUBIC CATHETER (H): Status: RESOLVED | Noted: 2021-07-06 | Resolved: 2023-05-05

## 2023-05-05 PROBLEM — R33.9 RETENTION OF URINE: Status: RESOLVED | Noted: 2021-04-14 | Resolved: 2023-05-05

## 2023-05-05 NOTE — TELEPHONE ENCOUNTER
----- Message from Nallely Ji LPN sent at 5/3/2023  2:31 PM CDT -----    ----- Message -----  From: Lacy Birmingham PA-C  Sent: 5/3/2023  11:58 AM CDT  To: [facility]    SPT can be removed as she is emptying well. May have leakage for days/weeks at site and will eventually heal over the tract.   I would like to see her in 4 wks for UA/PVR.

## 2023-05-05 NOTE — TELEPHONE ENCOUNTER
Remove SP tube and apply sterile gauze to site.              __________________________  Lacy Birmingham PA-C

## 2023-05-05 NOTE — TELEPHONE ENCOUNTER
[facility]    Phone Message    May a detailed message be left on voicemail: yes     Reason for Call: Requesting Results   Name/type of test: UA/UC  Date of test: *5/3/23  Was test done at a location other than [facility] (Please fill in the location if not [facility])?: No      Action Taken: Message routed to:  [facility]    Travel Screening: Not Applicable

## 2023-05-08 ENCOUNTER — NURSE TRIAGE (OUTPATIENT)
Dept: NURSING | Facility: CLINIC | Age: 68
End: 2023-05-08
Payer: COMMERCIAL

## 2023-05-08 ENCOUNTER — HOSPITAL ENCOUNTER (OUTPATIENT)
Dept: CT IMAGING | Facility: CLINIC | Age: 68
Discharge: HOME | End: 2023-05-08
Attending: FAMILY MEDICINE | Admitting: FAMILY MEDICINE
Payer: COMMERCIAL

## 2023-05-08 DIAGNOSIS — I72.8 SPLENIC ARTERY ANEURYSM (H): ICD-10-CM

## 2023-05-08 PROCEDURE — 250N000009 HC RX 250: Performed by: FAMILY MEDICINE

## 2023-05-08 PROCEDURE — 74174 CTA ABD&PLVS W/CONTRAST: CPT

## 2023-05-08 PROCEDURE — 250N000011 HC RX IP 250 OP 636: Performed by: FAMILY MEDICINE

## 2023-05-08 RX ORDER — IOPAMIDOL 755 MG/ML
500 INJECTION, SOLUTION INTRAVASCULAR ONCE
Status: COMPLETED | OUTPATIENT
Start: 2023-05-08 | End: 2023-05-08

## 2023-05-08 RX ADMIN — IOPAMIDOL 80 ML: 755 INJECTION, SOLUTION INTRAVENOUS at 13:35

## 2023-05-08 RX ADMIN — SODIUM CHLORIDE 80 ML: 9 INJECTION, SOLUTION INTRAVENOUS at 13:35

## 2023-05-08 NOTE — TELEPHONE ENCOUNTER
Patient calling requesting further information on CT Scan scheduled for today.   Reviewed appointment notes for 5/8/23.     Patient requesting to verify suite number for Dexa Scan.  Reviewed per [facility].    Connected to [facility] regarding further questions on upcoming CT scan.    Reason for Disposition    Information only question and nurse able to answer    Additional Information    Negative: Nursing judgment    Negative: Nursing judgment    Negative: Nursing judgment    Negative: Nursing judgment    Protocols used: INFORMATION ONLY CALL - NO TRIAGE-A-[place]

## 2023-05-15 ENCOUNTER — NURSE TRIAGE (OUTPATIENT)
Dept: NURSING | Facility: CLINIC | Age: 68
End: 2023-05-15
Payer: COMMERCIAL

## 2023-05-15 NOTE — TELEPHONE ENCOUNTER
Nurse Triage SBAR    Is this a 2nd Level Triage? NO    Situation:   Spoke with 67 yr old Fely who has questions about up coming bone density scan on 5/17/23.    Patient was advised to not take any calcium or calcium supplements.    Patient inquiring if she should take her Atorvastatin Calcium?      Assessment:   Information/question only.    Protocol Recommended Disposition:   Discuss with PCP and callback by nurse today.    Recommendation:   Will message PCP/team regarding patient's question for upcoming bone density scan.  Please advise.    Routed to provider    Does the patient meet one of the following criteria for ADS visit consideration? 16+ years old, with an [facility] PCP     TIP  Providers, please consider if this condition is appropriate for management at one of our Acute and Diagnostic Services sites.     If patient is a good candidate, please use dotphrase <dot>triageresponse and select Refer to ADS to document.    Vida Mitchell RN  [facility]      Reason for Disposition    Nursing judgment    Additional Information    Negative: Nursing judgment    Negative: Nursing judgment    Negative: Nursing judgment    Protocols used: INFORMATION ONLY CALL - NO TRIAGE-A-[place]

## 2023-06-01 NOTE — LETTER
[facility]  Patient Centered Plan of Care  About Me:        Patient Name:  Mariela Edmond    YOB: 1955  Age:         66 year old   [facility] MRN:    [id number] Telephone Information:  Home Phone [phone]   Mobile [phone]       Address:  [address]  [facility] [address] Email address:  [email or web address]      Emergency Contact(s)    Name Relationship Lgl Grd Work Phone Home Phone Mobile Phone   1. LISA EDMOND Daughter   [phone]   2. ERIN EDMOND Son   [phone]   3. VASQUEZ OROZCO Son   [phone]   4. JADENBEV Friend   [phone]           Primary language:  English     needed? No   [facility] [employer] [facility]:  [phone] op. 1  Other communication barriers:Glasses    Preferred Method of Communication:  Mail  Current living arrangement: I live in a private home with family    Mobility Status/ Medical Equipment: Independent    Health Maintenance  Health Maintenance Reviewed: Due/Overdue   Health Maintenance Due   Topic Date Due     DEXA  Never done     COVID-19 Vaccine (1) Never done     ZOSTER IMMUNIZATION (1 of 2) Never done     ADVANCE CARE PLANNING  04/19/2018     COLORECTAL CANCER SCREENING  06/14/2018     MAMMO SCREENING  07/28/2018     DTAP/TDAP/TD IMMUNIZATION (4 - Td or Tdap) 08/20/2019     MEDICARE ANNUAL WELLNESS VISIT  10/12/2020     Pneumococcal Vaccine: 65+ Years (1 of 1 - PPSV23) Never done     INFLUENZA VACCINE (1) 09/01/2021     LIPID  09/08/2021      My Access Plan  Medical Emergency 911   Primary Clinic Line [facility] [phone]   24 Hour Appointment Line [phone] or  [phone]) (toll-free)   24 Hour Nurse Line [phone] (toll-free)   Preferred Urgent Care [facility] [phone]     Preferred Hospital [facility]  [phone]     Preferred Pharmacy  [facility], [place] - [address]     Behavioral Health Crisis Line The National Suicide Prevention Lifeline at 1-998.138.1564 or 911     My Care Team Members  Patient Care Team       Relationship Specialty Notifications Start End    Coming Reba Tillman MD PCP - General Family Medicine  8/5/21     Phone: [phone] Fax: [phone]         [address] [place] [address]    Lacy Birmingham PA-C Assigned OBGYN Provider   7/16/21     Phone: [phone] Fax: [phone]         [facility] [address] [place] [address]    Coming Reba Tillman MD Assigned PCP   7/16/21     Phone: [phone] Fax: [phone] [address] [place] [address]    Ramiro Sheppard MD Assigned Heart and Vascular Provider   9/5/21     Phone: [phone] Fax: [phone]         [address] [place] [address]    Inez Ontiveros, RN Lead Care Coordinator  Admissions 11/4/21     Phone: [phone]         Kaci Phillips MA Community Health Worker   11/9/21             My Care Plans  Self Management and Treatment Plan  Goals and (Comments)  Goals        General     1. Problem Solving (pt-stated)      Notes - Note edited  11/9/2021  1:07 PM by Inez Ontiveros, RN     Goal Statement: I would like additional resources, support, assistance in ensuring my care needs remain safely met/higher level of care.   Date Goal set: 11/05/2021  Barriers: difficulties finding home care provider, provider availability - wait time to complete appointments, unknown source of confusion (UTI's versus dementia).   Strengths: daughter supportive, engaged in care coordination  Date to Achieve By: 05/2022  Patient expressed understanding of goal: Yes  Action steps to achieve this goal:  1. I will contact and follow up with the county to determine if waiver/assistance remains active or if I need to re-apply.   2. I will contact and follow up with  [facility] to check on the status of home care referral.   3. I will follow up with my providers as scheduled/recommended. (Primary Care Provider 11/11/2021, Urology 11/19/2021, Neuropsych 12/2021, etc).   4. I will discuss, review, schedule and complete recommended overdue health maintenance with my Primary Care Provider.   5. I will consider additional support for managing my mental health and wellness (therapist, psychiatrist, [facility], [facility], etc).   6. I will contact my care team with questions, concerns, support needs. I will use the clinic as a resource and I understand I can contact my clinic with 24/7 after hours services available. Care Coordinator will remain available as needed.                 Action Plans on File:            Depression          Advance Care Plans/Directives Type:   No data recorded    My Medical and Care Information  Problem List   Patient Active Problem List   Diagnosis     Allergic rhinitis due to other allergen     Irritable bowel syndrome     Postsurgical hypothyroidism     Iron deficiency anemia     Mild major depression (H)     CARDIOVASCULAR SCREENING; LDL GOAL LESS THAN 160     Generalized anxiety disorder     Tubular adenoma of colon     Seasonal affective disorder (H)     Headache     ADD (attention deficit disorder)     TIA (transient ischemic attack)     Gastroesophageal reflux disease with esophagitis     Hiatal hernia     History of colonic polyps     Migraine with aura and without status migrainosus, not intractable     History of thyroid cancer     Mild cognitive impairment     Peripheral polyneuropathy     Vitamin D deficiency     Cystitis with hematuria     Fall in home     Hyponatremia     Retention of urine     Splenic artery aneurysm (H)     Dementia without behavioral disturbance (H)     Other osteoporosis without current pathological fracture     Vascular dementia with behavioral disturbance (H)     Suprapubic catheter (H)       Current Medications and Allergies:    Allergies   Allergen Reactions     Levaquin Nausea and Vomiting      Current Outpatient Medications   Medication     alendronate (FOSAMAX) 70 MG tablet     Ascorbic Acid (VITAMIN C) 500 MG CHEW     aspirin (ASA) 81 MG tablet     busPIRone (BUSPAR) 5 MG tablet     dicyclomine (BENTYL) 20 MG tablet     diphenoxylate-atropine (LOMOTIL) 2.5-0.025 MG tablet     divalproex sodium extended-release (DEPAKOTE ER) 250 MG 24 hr tablet     donepezil (ARICEPT) 5 MG tablet     levothyroxine (LEVOXYL) 137 MCG tablet     methenamine (HIPREX) 1 g tablet     multivitamin w/minerals (THERA-VIT-M) tablet     OLANZapine (ZYPREXA) 7.5 MG tablet     pantoprazole (PROTONIX) 20 MG EC tablet     tamsulosin (FLOMAX) 0.4 MG capsule     vitamin D3 (CHOLECALCIFEROL) 125 MCG (5000 UT) tablet     No current facility-administered medications for this visit.        Care Coordination Start Date: 11/5/2021   Frequency of Care Coordination: 2 weeks     Form Last Updated: 11/09/2021        Medical Necessity Information: It is in your best interest to select a reason for this procedure from the list below. All of these items fulfill various CMS LCD requirements except lesion extends to a margin. Include Z78.9 (Other Specified Conditions Influencing Health Status) As An Associated Diagnosis?: No Medical Necessity Clause: This procedure was medically necessary because the lesion that was treated was: Lab: [id number] Lab Facility: [id number] Surgeon (Optional): Dr. Porfirio Huang Size Of Lesion In Cm: 1 X Size Of Lesion In Cm (Optional): 0 Size Of Margin In Cm: 0.2 Anesthesia Volume In Cc: 1.5 Excision Method: Slit Saucerization Depth: dermis and superficial adipose tissue Repair Type: Intermediate Suturegard Retention Suture: 2-0 Nylon Retention Suture Bite Size: 3 mm Length To Time In Minutes Device Was In Place: 10 Undermining Type: Entire Wound Debridement Text: The wound edges were debrided prior to proceeding with the closure to facilitate wound healing. Helical Rim Text: The closure involved the helical rim. Vermilion Border Text: The closure involved the vermilion border. Nostril Rim Text: The closure involved the nostril rim. Retention Suture Text: Retention sutures were placed to support the closure and prevent dehiscence. Suture Removal: 14 days Epidermal Closure Graft Donor Site (Optional): simple interrupted Graft Donor Site Bandage (Optional-Leave Blank If You Don't Want In Note): Steri-strips and a pressure bandage were applied to the donor site. Detail Level: Detailed Excision Depth: adipose tissue Scalpel Size: 15 blade Anesthesia Type: 1% lidocaine with epinephrine Hemostasis: Electrocautery Estimated Blood Loss (Cc): minimal Epidermal Sutures: 4-0 Nylon Wound Care: Petrolatum Dressing: dry sterile dressing Suturegard Intro: Intraoperative tissue expansion was performed, utilizing the SUTUREGARD device, in order to reduce wound tension. Suturegard Body: The suture ends were repeatedly re-tightened and re-clamped to achieve the desired tissue expansion. Hemigard Intro: Due to skin fragility and wound tension, it was decided to use HEMIGARD adhesive retention suture devices to permit a linear closure. The skin was cleaned and dried for a 6cm distance away from the wound. Excessive hair, if present, was removed to allow for adhesion. Hemigard Postcare Instructions: The HEMIGARD strips are to remain completely dry for at least 5-7 days. Complex Repair Preamble Text (Leave Blank If You Do Not Want): Extensive wide undermining was performed. Intermediate Repair Preamble Text (Leave Blank If You Do Not Want): Undermining was performed with blunt dissection. Fusiform Excision Additional Text (Leave Blank If You Do Not Want): The margin was drawn around the clinically apparent lesion.  A fusiform shape was then drawn on the skin incorporating the lesion and margins.  Incisions were then made along these lines to the appropriate tissue plane and the lesion was extirpated. Eliptical Excision Additional Text (Leave Blank If You Do Not Want): The margin was drawn around the clinically apparent lesion.  An elliptical shape was then drawn on the skin incorporating the lesion and margins.  Incisions were then made along these lines to the appropriate tissue plane and the lesion was extirpated. Saucerization Excision Additional Text (Leave Blank If You Do Not Want): The margin was drawn around the clinically apparent lesion.  Incisions were then made along these lines, in a tangential fashion, to the appropriate tissue plane and the lesion was extirpated. Slit Excision Additional Text (Leave Blank If You Do Not Want): A linear line was drawn on the skin overlying the lesion. An incision was made slowly until the lesion was visualized.  Once visualized, the lesion was removed with blunt dissection. Excisional Biopsy Additional Text (Leave Blank If You Do Not Want): The margin was drawn around the clinically apparent lesion. An elliptical shape was then drawn on the skin incorporating the lesion and margins.  Incisions were then made along these lines to the appropriate tissue plane and the lesion was extirpated. Perilesional Excision Additional Text (Leave Blank If You Do Not Want): The margin was drawn around the clinically apparent lesion. Incisions were then made along these lines to the appropriate tissue plane and the lesion was extirpated. Repair Performed By Another Provider Text (Leave Blank If You Do Not Want): After the tissue was excised the defect was repaired by another provider. No Repair - Repaired With Adjacent Surgical Defect Text (Leave Blank If You Do Not Want): After the excision the defect was repaired concurrently with another surgical defect which was in close approximation. Adjacent Tissue Transfer Text: The defect edges were debeveled with a #15 scalpel blade. Given the location of the defect and the proximity to free margins an adjacent tissue transfer was deemed most appropriate. Using a sterile surgical marker, an appropriate flap was drawn incorporating the defect and placing the expected incisions within the relaxed skin tension lines where possible. The area thus outlined was incised deep to adipose tissue with a #15 scalpel blade. The skin margins were undermined to an appropriate distance in all directions utilizing iris scissors and carried over to close the primary defect. Advancement Flap (Single) Text: The defect edges were debeveled with a #15 scalpel blade. Given the location of the defect and the proximity to free margins a single advancement flap was deemed most appropriate. Using a sterile surgical marker, an appropriate advancement flap was drawn incorporating the defect and placing the expected incisions within the relaxed skin tension lines where possible. The area thus outlined was incised deep to adipose tissue with a #15 scalpel blade. The skin margins were undermined to an appropriate distance in all directions utilizing iris scissors. Following this, the designed flap was advanced and carried over into the primary defect and sutured into place. Advancement Flap (Double) Text: The defect edges were debeveled with a #15 scalpel blade. Given the location of the defect and the proximity to free margins a double advancement flap was deemed most appropriate. Using a sterile surgical marker, the appropriate advancement flaps were drawn incorporating the defect and placing the expected incisions within the relaxed skin tension lines where possible. The area thus outlined was incised deep to adipose tissue with a #15 scalpel blade. The skin margins were undermined to an appropriate distance in all directions utilizing iris scissors. Following this, the designed flaps were advanced and carried over into the primary defect and sutured into place. Burow's Advancement Flap Text: The defect edges were debeveled with a #15 scalpel blade. Given the location of the defect and the proximity to free margins a Burow's advancement flap was deemed most appropriate. Using a sterile surgical marker, the appropriate advancement flap was drawn incorporating the defect and placing the expected incisions within the relaxed skin tension lines where possible. The area thus outlined was incised deep to adipose tissue with a #15 scalpel blade. The skin margins were undermined to an appropriate distance in all directions utilizing iris scissors. Following this, the designed flap was advanced and carried over into the primary defect and sutured into place. Chonodrocutaneous Helical Advancement Flap Text: The defect edges were debeveled with a #15 scalpel blade. Given the location of the defect and the proximity to free margins a chondrocutaneous helical advancement flap was deemed most appropriate. Using a sterile surgical marker, the appropriate advancement flap was drawn incorporating the defect and placing the expected incisions within the relaxed skin tension lines where possible. The area thus outlined was incised deep to adipose tissue with a #15 scalpel blade. The skin margins were undermined to an appropriate distance in all directions utilizing iris scissors. Following this, the designed flap was advanced and carried over into the primary defect and sutured into place. Crescentic Advancement Flap Text: The defect edges were debeveled with a #15 scalpel blade. Given the location of the defect and the proximity to free margins a crescentic advancement flap was deemed most appropriate. Using a sterile surgical marker, the appropriate advancement flap was drawn incorporating the defect and placing the expected incisions within the relaxed skin tension lines where possible. The area thus outlined was incised deep to adipose tissue with a #15 scalpel blade. The skin margins were undermined to an appropriate distance in all directions utilizing iris scissors. Following this, the designed flap was advanced and carried over into the primary defect and sutured into place. A-T Advancement Flap Text: The defect edges were debeveled with a #15 scalpel blade. Given the location of the defect, shape of the defect and the proximity to free margins an A-T advancement flap was deemed most appropriate. Using a sterile surgical marker, an appropriate advancement flap was drawn incorporating the defect and placing the expected incisions within the relaxed skin tension lines where possible. The area thus outlined was incised deep to adipose tissue with a #15 scalpel blade. The skin margins were undermined to an appropriate distance in all directions utilizing iris scissors. Following this, the designed flap was advanced and carried over into the primary defect and sutured into place. O-T Advancement Flap Text: The defect edges were debeveled with a #15 scalpel blade. Given the location of the defect, shape of the defect and the proximity to free margins an O-T advancement flap was deemed most appropriate. Using a sterile surgical marker, an appropriate advancement flap was drawn incorporating the defect and placing the expected incisions within the relaxed skin tension lines where possible. The area thus outlined was incised deep to adipose tissue with a #15 scalpel blade. The skin margins were undermined to an appropriate distance in all directions utilizing iris scissors. Following this, the designed flap was advanced and carried over into the primary defect and sutured into place. O-L Flap Text: The defect edges were debeveled with a #15 scalpel blade. Given the location of the defect, shape of the defect and the proximity to free margins an O-L flap was deemed most appropriate. Using a sterile surgical marker, an appropriate advancement flap was drawn incorporating the defect and placing the expected incisions within the relaxed skin tension lines where possible. The area thus outlined was incised deep to adipose tissue with a #15 scalpel blade. The skin margins were undermined to an appropriate distance in all directions utilizing iris scissors. Following this, the designed flap was advanced and carried over into the primary defect and sutured into place. O-Z Flap Text: The defect edges were debeveled with a #15 scalpel blade. Given the location of the defect, shape of the defect and the proximity to free margins an O-Z flap was deemed most appropriate. Using a sterile surgical marker, an appropriate transposition flap was drawn incorporating the defect and placing the expected incisions within the relaxed skin tension lines where possible. The area thus outlined was incised deep to adipose tissue with a #15 scalpel blade. The skin margins were undermined to an appropriate distance in all directions utilizing iris scissors. Following this, the designed flap was carried over into the primary defect and sutured into place. Double O-Z Flap Text: The defect edges were debeveled with a #15 scalpel blade. Given the location of the defect, shape of the defect and the proximity to free margins a Double O-Z flap was deemed most appropriate. Using a sterile surgical marker, an appropriate transposition flap was drawn incorporating the defect and placing the expected incisions within the relaxed skin tension lines where possible. The area thus outlined was incised deep to adipose tissue with a #15 scalpel blade. The skin margins were undermined to an appropriate distance in all directions utilizing iris scissors. Following this, the designed flap was carried over into the primary defect and sutured into place. V-Y Flap Text: The defect edges were debeveled with a #15 scalpel blade. Given the location of the defect, shape of the defect and the proximity to free margins a V-Y flap was deemed most appropriate. Using a sterile surgical marker, an appropriate advancement flap was drawn incorporating the defect and placing the expected incisions within the relaxed skin tension lines where possible. The area thus outlined was incised deep to adipose tissue with a #15 scalpel blade. The skin margins were undermined to an appropriate distance in all directions utilizing iris scissors. Following this, the designed flap was advanced and carried over into the primary defect and sutured into place. Mercedes Flap Text: The defect edges were debeveled with a #15 scalpel blade. Given the location of the defect, shape of the defect and the proximity to free margins a Mercedes flap was deemed most appropriate. Using a sterile surgical marker, an appropriate advancement flap was drawn incorporating the defect and placing the expected incisions within the relaxed skin tension lines where possible. The area thus outlined was incised deep to adipose tissue with a #15 scalpel blade. The skin margins were undermined to an appropriate distance in all directions utilizing iris scissors. Following this, the designed flap was advanced and carried over into the primary defect and sutured into place. Modified Advancement Flap Text: The defect edges were debeveled with a #15 scalpel blade. Given the location of the defect, shape of the defect and the proximity to free margins a modified advancement flap was deemed most appropriate. Using a sterile surgical marker, an appropriate advancement flap was drawn incorporating the defect and placing the expected incisions within the relaxed skin tension lines where possible. The area thus outlined was incised deep to adipose tissue with a #15 scalpel blade. The skin margins were undermined to an appropriate distance in all directions utilizing iris scissors. Following this, the designed flap was advanced and carried over into the primary defect and sutured into place. Mucosal Advancement Flap Text: Given the location of the defect, shape of the defect and the proximity to free margins a mucosal advancement flap was deemed most appropriate. Incisions were made with a 15 blade scalpel in the appropriate fashion along the cutaneous vermillion border and the mucosal lip. The remaining actinically damaged mucosal tissue was excised.  The mucosal advancement flap was then elevated to the gingival sulcus with care taken to preserve the neurovascular structures and advanced into the primary defect. Care was taken to ensure that precise realignment of the vermilion border was achieved. Hatchet Flap Text: The defect edges were debeveled with a #15 scalpel blade. Given the location of the defect, shape of the defect and the proximity to free margins a hatchet flap was deemed most appropriate. Using a sterile surgical marker, an appropriate hatchet flap was drawn incorporating the defect and placing the expected incisions within the relaxed skin tension lines where possible. The area thus outlined was incised deep to adipose tissue with a #15 scalpel blade. The skin margins were undermined to an appropriate distance in all directions utilizing iris scissors. Following this, the designed flap was carried over into the primary defect and sutured into place. Rotation Flap Text: The defect edges were debeveled with a #15 scalpel blade. Given the location of the defect, shape of the defect and the proximity to free margins a rotation flap was deemed most appropriate. Using a sterile surgical marker, an appropriate rotation flap was drawn incorporating the defect and placing the expected incisions within the relaxed skin tension lines where possible. The area thus outlined was incised deep to adipose tissue with a #15 scalpel blade. The skin margins were undermined to an appropriate distance in all directions utilizing iris scissors. Following this, the designed flap was carried over into the primary defect and sutured into place. Bilateral Rotation Flap Text: The defect edges were debeveled with a #15 scalpel blade. Given the location of the defect, shape of the defect and the proximity to free margins a bilateral rotation flap was deemed most appropriate. Using a sterile surgical marker, an appropriate rotation flap was drawn incorporating the defect and placing the expected incisions within the relaxed skin tension lines where possible. The area thus outlined was incised deep to adipose tissue with a #15 scalpel blade. The skin margins were undermined to an appropriate distance in all directions utilizing iris scissors. Following this, the designed flap was carried over into the primary defect and sutured into place. Spiral Flap Text: The defect edges were debeveled with a #15 scalpel blade. Given the location of the defect, shape of the defect and the proximity to free margins a spiral flap was deemed most appropriate. Using a sterile surgical marker, an appropriate rotation flap was drawn incorporating the defect and placing the expected incisions within the relaxed skin tension lines where possible. The area thus outlined was incised deep to adipose tissue with a #15 scalpel blade. The skin margins were undermined to an appropriate distance in all directions utilizing iris scissors. Following this, the designed flap was carried over into the primary defect and sutured into place. Staged Advancement Flap Text: The defect edges were debeveled with a #15 scalpel blade. Given the location of the defect, shape of the defect and the proximity to free margins a staged advancement flap was deemed most appropriate. Using a sterile surgical marker, an appropriate advancement flap was drawn incorporating the defect and placing the expected incisions within the relaxed skin tension lines where possible. The area thus outlined was incised deep to adipose tissue with a #15 scalpel blade. The skin margins were undermined to an appropriate distance in all directions utilizing iris scissors. Following this, the designed flap was carried over into the primary defect and sutured into place. Star Wedge Flap Text: The defect edges were debeveled with a #15 scalpel blade. Given the location of the defect, shape of the defect and the proximity to free margins a star wedge flap was deemed most appropriate. Using a sterile surgical marker, an appropriate rotation flap was drawn incorporating the defect and placing the expected incisions within the relaxed skin tension lines where possible. The area thus outlined was incised deep to adipose tissue with a #15 scalpel blade. The skin margins were undermined to an appropriate distance in all directions utilizing iris scissors. Following this, the designed flap was carried over into the primary defect and sutured into place. Transposition Flap Text: The defect edges were debeveled with a #15 scalpel blade. Given the location of the defect and the proximity to free margins a transposition flap was deemed most appropriate. Using a sterile surgical marker, an appropriate transposition flap was drawn incorporating the defect. The area thus outlined was incised deep to adipose tissue with a #15 scalpel blade. The skin margins were undermined to an appropriate distance in all directions utilizing iris scissors. Following this, the designed flap was carried over into the primary defect and sutured into place. Muscle Hinge Flap Text: The defect edges were debeveled with a #15 scalpel blade.  Given the size, depth and location of the defect and the proximity to free margins a muscle hinge flap was deemed most appropriate. Using a sterile surgical marker, an appropriate hinge flap was drawn incorporating the defect. The area thus outlined was incised with a #15 scalpel blade. The skin margins were undermined to an appropriate distance in all directions utilizing iris scissors. Following this, the designed flap was carried into the primary defect and sutured into place. Mustarde Flap Text: The defect edges were debeveled with a #15 scalpel blade.  Given the size, depth and location of the defect and the proximity to free margins a Mustarde flap was deemed most appropriate. Using a sterile surgical marker, an appropriate flap was drawn incorporating the defect. The area thus outlined was incised with a #15 scalpel blade. The skin margins were undermined to an appropriate distance in all directions utilizing iris scissors. Following this, the designed flap was carried into the primary defect and sutured into place. Nasal Turnover Hinge Flap Text: The defect edges were debeveled with a #15 scalpel blade.  Given the size, depth, location of the defect and the defect being full thickness a nasal turnover hinge flap was deemed most appropriate. Using a sterile surgical marker, an appropriate hinge flap was drawn incorporating the defect. The area thus outlined was incised with a #15 scalpel blade. The flap was designed to recreate the nasal mucosal lining and the alar rim. The skin margins were undermined to an appropriate distance in all directions utilizing iris scissors. Following this, the designed flap was carried over into the primary defect and sutured into place Nasalis-Muscle-Based Myocutaneous Island Pedicle Flap Text: Using a #15 blade, an incision was made around the donor flap to the level of the nasalis muscle. Wide lateral undermining was then performed in both the subcutaneous plane above the nasalis muscle, and in a submuscular plane just above periosteum. This allowed the formation of a free nasalis muscle axial pedicle (based on the angular artery) which was still attached to the actual cutaneous flap, increasing its mobility and vascular viability. Hemostasis was obtained with pinpoint electrocoagulation. The flap was mobilized into position and the pivotal anchor points positioned and stabilized with buried interrupted sutures. Subcutaneous and dermal tissues were closed in a multilayered fashion with sutures. Tissue redundancies were excised, and the epidermal edges were apposed without significant tension and sutured with sutures. Orbicularis Oris Muscle Flap Text: The defect edges were debeveled with a #15 scalpel blade.  Given that the defect affected the competency of the oral sphincter an orbicularis oris muscle flap was deemed most appropriate to restore this competency and normal muscle function.  Using a sterile surgical marker, an appropriate flap was drawn incorporating the defect. The area thus outlined was incised with a #15 scalpel blade. Following this, the designed flap was carried over into the primary defect and sutured into place. Melolabial Transposition Flap Text: The defect edges were debeveled with a #15 scalpel blade. Given the location of the defect and the proximity to free margins a melolabial flap was deemed most appropriate. Using a sterile surgical marker, an appropriate melolabial transposition flap was drawn incorporating the defect. The area thus outlined was incised deep to adipose tissue with a #15 scalpel blade. The skin margins were undermined to an appropriate distance in all directions utilizing iris scissors. Following this, the designed flap was carried over into the primary defect and sutured into place. Rhombic Flap Text: The defect edges were debeveled with a #15 scalpel blade. Given the location of the defect and the proximity to free margins a rhombic flap was deemed most appropriate. Using a sterile surgical marker, an appropriate rhombic flap was drawn incorporating the defect. The area thus outlined was incised deep to adipose tissue with a #15 scalpel blade. The skin margins were undermined to an appropriate distance in all directions utilizing iris scissors. Following this, the designed flap was carried over into the primary defect and sutured into place. Rhomboid Transposition Flap Text: The defect edges were debeveled with a #15 scalpel blade. Given the location of the defect and the proximity to free margins a rhomboid transposition flap was deemed most appropriate. Using a sterile surgical marker, an appropriate rhomboid flap was drawn incorporating the defect. The area thus outlined was incised deep to adipose tissue with a #15 scalpel blade. The skin margins were undermined to an appropriate distance in all directions utilizing iris scissors. Following this, the designed flap was carried over into the primary defect and sutured into place. Bi-Rhombic Flap Text: The defect edges were debeveled with a #15 scalpel blade. Given the location of the defect and the proximity to free margins a bi-rhombic flap was deemed most appropriate. Using a sterile surgical marker, an appropriate rhombic flap was drawn incorporating the defect. The area thus outlined was incised deep to adipose tissue with a #15 scalpel blade. The skin margins were undermined to an appropriate distance in all directions utilizing iris scissors. Following this, the designed flap was carried over into the primary defect and sutured into place. Helical Rim Advancement Flap Text: The defect edges were debeveled with a #15 blade scalpel.  Given the location of the defect and the proximity to free margins (helical rim) a double helical rim advancement flap was deemed most appropriate. Using a sterile surgical marker, the appropriate advancement flaps were drawn incorporating the defect and placing the expected incisions between the helical rim and antihelix where possible.  The area thus outlined was incised through and through with a #15 scalpel blade.  With a skin hook and iris scissors, the flaps were gently and sharply undermined and freed up. Folllowing this, the designed flaps were carried over into the primary defect and sutured into place. Bilateral Helical Rim Advancement Flap Text: The defect edges were debeveled with a #15 blade scalpel.  Given the location of the defect and the proximity to free margins (helical rim) a bilateral helical rim advancement flap was deemed most appropriate. Using a sterile surgical marker, the appropriate advancement flaps were drawn incorporating the defect and placing the expected incisions between the helical rim and antihelix where possible.  The area thus outlined was incised through and through with a #15 scalpel blade.  With a skin hook and iris scissors, the flaps were gently and sharply undermined and freed up. Following this, the designed flaps were placed into the primary defect and sutured into place. Ear Star Wedge Flap Text: The defect edges were debeveled with a #15 blade scalpel.  Given the location of the defect and the proximity to free margins (helical rim) an ear star wedge flap was deemed most appropriate. Using a sterile surgical marker, the appropriate flap was drawn incorporating the defect and placing the expected incisions between the helical rim and antihelix where possible.  The area thus outlined was incised through and through with a #15 scalpel blade. Following this, the designed flap was carried over into the primary defect and sutured into place. Banner Transposition Flap Text: The defect edges were debeveled with a #15 scalpel blade. Given the location of the defect and the proximity to free margins a Banner transposition flap was deemed most appropriate. Using a sterile surgical marker, an appropriate flap was drawn around the defect. The area thus outlined was incised deep to adipose tissue with a #15 scalpel blade. The skin margins were undermined to an appropriate distance in all directions utilizing iris scissors. Following this, the designed flap was carried into the primary defect and sutured into place. Bilobed Flap Text: The defect edges were debeveled with a #15 scalpel blade. Given the location of the defect and the proximity to free margins a bilobe flap was deemed most appropriate. Using a sterile surgical marker, an appropriate bilobe flap drawn around the defect. The area thus outlined was incised deep to adipose tissue with a #15 scalpel blade. The skin margins were undermined to an appropriate distance in all directions utilizing iris scissors. Following this, the designed flap was carried over into the primary defect and sutured into place. Bilobed Transposition Flap Text: The defect edges were debeveled with a #15 scalpel blade. Given the location of the defect and the proximity to free margins a bilobed transposition flap was deemed most appropriate. Using a sterile surgical marker, an appropriate bilobe flap drawn around the defect. The area thus outlined was incised deep to adipose tissue with a #15 scalpel blade. The skin margins were undermined to an appropriate distance in all directions utilizing iris scissors. Following this, the designed flap was carried over into the primary defect and sutured into place. Trilobed Flap Text: The defect edges were debeveled with a #15 scalpel blade. Given the location of the defect and the proximity to free margins a trilobed flap was deemed most appropriate. Using a sterile surgical marker, an appropriate trilobed flap was drawn around the defect. The area thus outlined was incised deep to adipose tissue with a #15 scalpel blade. The skin margins were undermined to an appropriate distance in all directions utilizing iris scissors. Following this, the designed flap was carried into the primary defect and sutured into place. Dorsal Nasal Flap Text: The defect edges were debeveled with a #15 scalpel blade. Given the location of the defect and the proximity to free margins a dorsal nasal flap was deemed most appropriate. Using a sterile surgical marker, an appropriate dorsal nasal flap was drawn around the defect. The area thus outlined was incised deep to adipose tissue with a #15 scalpel blade. The skin margins were undermined to an appropriate distance in all directions utilizing iris scissors. Following this, the designed flap was carried into the primary defect and sutured into place. Island Pedicle Flap Text: The defect edges were debeveled with a #15 scalpel blade. Given the location of the defect, shape of the defect and the proximity to free margins an island pedicle advancement flap was deemed most appropriate. Using a sterile surgical marker, an appropriate advancement flap was drawn incorporating the defect, outlining the appropriate donor tissue and placing the expected incisions within the relaxed skin tension lines where possible. The area thus outlined was incised deep to adipose tissue with a #15 scalpel blade. The skin margins were undermined to an appropriate distance in all directions around the primary defect and laterally outward around the island pedicle utilizing iris scissors.  There was minimal undermining beneath the pedicle flap. Following this, the flap was carried over into the primary defect and sutured into place. Island Pedicle Flap With Canthal Suspension Text: The defect edges were debeveled with a #15 scalpel blade. Given the location of the defect, shape of the defect and the proximity to free margins an island pedicle advancement flap was deemed most appropriate. Using a sterile surgical marker, an appropriate advancement flap was drawn incorporating the defect, outlining the appropriate donor tissue and placing the expected incisions within the relaxed skin tension lines where possible. The area thus outlined was incised deep to adipose tissue with a #15 scalpel blade. The skin margins were undermined to an appropriate distance in all directions around the primary defect and laterally outward around the island pedicle utilizing iris scissors.  There was minimal undermining beneath the pedicle flap. A suspension suture was placed in the canthal tendon to prevent tension and prevent ectropion. Following this, the designed flap was placed into the primary defect and sutured into place. Alar Island Pedicle Flap Text: The defect edges were debeveled with a #15 scalpel blade. Given the location of the defect, shape of the defect and the proximity to the alar rim an island pedicle advancement flap was deemed most appropriate. Using a sterile surgical marker, an appropriate advancement flap was drawn incorporating the defect, outlining the appropriate donor tissue and placing the expected incisions within the nasal ala running parallel to the alar rim. The area thus outlined was incised with a #15 scalpel blade. The skin margins were undermined minimally to an appropriate distance in all directions around the primary defect and laterally outward around the island pedicle utilizing iris scissors.  There was minimal undermining beneath the pedicle flap. Following this, the designed flap was carried over into the primary defect and sutured into place. Double Island Pedicle Flap Text: The defect edges were debeveled with a #15 scalpel blade. Given the location of the defect, shape of the defect and the proximity to free margins a double island pedicle advancement flap was deemed most appropriate. Using a sterile surgical marker, an appropriate advancement flap was drawn incorporating the defect, outlining the appropriate donor tissue and placing the expected incisions within the relaxed skin tension lines where possible. The area thus outlined was incised deep to adipose tissue with a #15 scalpel blade. The skin margins were undermined to an appropriate distance in all directions around the primary defect and laterally outward around the island pedicle utilizing iris scissors.  There was minimal undermining beneath the pedicle flap. Following this, the flap was carried over into the primary defect and sutured into place. Island Pedicle Flap-Requiring Vessel Identification Text: The defect edges were debeveled with a #15 scalpel blade. Given the location of the defect, shape of the defect and the proximity to free margins an island pedicle advancement flap was deemed most appropriate. Using a sterile surgical marker, an appropriate advancement flap was drawn, based on the axial vessel mentioned above, incorporating the defect, outlining the appropriate donor tissue and placing the expected incisions within the relaxed skin tension lines where possible. The area thus outlined was incised deep to adipose tissue with a #15 scalpel blade. The skin margins were undermined to an appropriate distance in all directions around the primary defect and laterally outward around the island pedicle utilizing iris scissors.  There was minimal undermining beneath the pedicle flap. Following this, the designed flap was carried over into the primary defect and sutured into place. Keystone Flap Text: The defect edges were debeveled with a #15 scalpel blade. Given the location of the defect, shape of the defect a keystone flap was deemed most appropriate. Using a sterile surgical marker, an appropriate keystone flap was drawn incorporating the defect, outlining the appropriate donor tissue and placing the expected incisions within the relaxed skin tension lines where possible. The area thus outlined was incised deep to adipose tissue with a #15 scalpel blade. The skin margins were undermined to an appropriate distance in all directions around the primary defect and laterally outward around the flap utilizing iris scissors. Following this, the designed flap was carried into the primary defect and sutured into place. O-T Plasty Text: The defect edges were debeveled with a #15 scalpel blade. Given the location of the defect, shape of the defect and the proximity to free margins an O-T plasty was deemed most appropriate. Using a sterile surgical marker, an appropriate O-T plasty was drawn incorporating the defect and placing the expected incisions within the relaxed skin tension lines where possible. The area thus outlined was incised deep to adipose tissue with a #15 scalpel blade. The skin margins were undermined to an appropriate distance in all directions utilizing iris scissors. Following this, the designed flap was carried over into the primary defect and sutured into place. O-Z Plasty Text: The defect edges were debeveled with a #15 scalpel blade. Given the location of the defect, shape of the defect and the proximity to free margins an O-Z plasty (double transposition flap) was deemed most appropriate. Using a sterile surgical marker, the appropriate transposition flaps were drawn incorporating the defect and placing the expected incisions within the relaxed skin tension lines where possible. The area thus outlined was incised deep to adipose tissue with a #15 scalpel blade. The skin margins were undermined to an appropriate distance in all directions utilizing iris scissors. Hemostasis was achieved with electrocautery. The flaps were then transposed and carried over into place, one clockwise and the other counterclockwise, and anchored with interrupted buried subcutaneous sutures. Double O-Z Plasty Text: The defect edges were debeveled with a #15 scalpel blade. Given the location of the defect, shape of the defect and the proximity to free margins a Double O-Z plasty (double transposition flap) was deemed most appropriate. Using a sterile surgical marker, the appropriate transposition flaps were drawn incorporating the defect and placing the expected incisions within the relaxed skin tension lines where possible. The area thus outlined was incised deep to adipose tissue with a #15 scalpel blade. The skin margins were undermined to an appropriate distance in all directions utilizing iris scissors. Hemostasis was achieved with electrocautery. The flaps were then transposed and carried over into place, one clockwise and the other counterclockwise, and anchored with interrupted buried subcutaneous sutures. V-Y Plasty Text: The defect edges were debeveled with a #15 scalpel blade. Given the location of the defect, shape of the defect and the proximity to free margins an V-Y advancement flap was deemed most appropriate. Using a sterile surgical marker, an appropriate advancement flap was drawn incorporating the defect and placing the expected incisions within the relaxed skin tension lines where possible. The area thus outlined was incised deep to adipose tissue with a #15 scalpel blade. The skin margins were undermined to an appropriate distance in all directions utilizing iris scissors. Following this, the designed flap was advanced and carried over into the primary defect and sutured into place. H Plasty Text: Given the location of the defect, shape of the defect and the proximity to free margins a H-plasty was deemed most appropriate for repair. Using a sterile surgical marker, the appropriate advancement arms of the H-plasty were drawn incorporating the defect and placing the expected incisions within the relaxed skin tension lines where possible. The area thus outlined was incised deep to adipose tissue with a #15 scalpel blade. The skin margins were undermined to an appropriate distance in all directions utilizing iris scissors.  The opposing advancement arms were then advanced and carried over into place in opposite direction and anchored with interrupted buried subcutaneous sutures. W Plasty Text: The lesion was extirpated to the level of the fat with a #15 scalpel blade. Given the location of the defect, shape of the defect and the proximity to free margins a W-plasty was deemed most appropriate for repair. Using a sterile surgical marker, the appropriate transposition arms of the W-plasty were drawn incorporating the defect and placing the expected incisions within the relaxed skin tension lines where possible. The area thus outlined was incised deep to adipose tissue with a #15 scalpel blade. The skin margins were undermined to an appropriate distance in all directions utilizing iris scissors. The opposing transposition arms were then transposed and carried over into place in opposite direction and anchored with interrupted buried subcutaneous sutures. Z Plasty Text: The lesion was extirpated to the level of the fat with a #15 scalpel blade. Given the location of the defect, shape of the defect and the proximity to free margins a Z-plasty was deemed most appropriate for repair. Using a sterile surgical marker, the appropriate transposition arms of the Z-plasty were drawn incorporating the defect and placing the expected incisions within the relaxed skin tension lines where possible. The area thus outlined was incised deep to adipose tissue with a #15 scalpel blade. The skin margins were undermined to an appropriate distance in all directions utilizing iris scissors. The opposing transposition arms were then transposed and carried over into place in opposite direction and anchored with interrupted buried subcutaneous sutures. Double Z Plasty Text: The lesion was extirpated to the level of the fat with a #15 scalpel blade. Given the location of the defect, shape of the defect and the proximity to free margins a double Z-plasty was deemed most appropriate for repair. Using a sterile surgical marker, the appropriate transposition arms of the double Z-plasty were drawn incorporating the defect and placing the expected incisions within the relaxed skin tension lines where possible. The area thus outlined was incised deep to adipose tissue with a #15 scalpel blade. The skin margins were undermined to an appropriate distance in all directions utilizing iris scissors. The opposing transposition arms were then transposed and carried over into place in opposite direction and anchored with interrupted buried subcutaneous sutures. Zygomaticofacial Flap Text: Given the location of the defect, shape of the defect and the proximity to free margins a zygomaticofacial flap was deemed most appropriate for repair. Using a sterile surgical marker, the appropriate flap was drawn incorporating the defect and placing the expected incisions within the relaxed skin tension lines where possible. The area thus outlined was incised deep to adipose tissue with a #15 scalpel blade with preservation of a vascular pedicle.  The skin margins were undermined to an appropriate distance in all directions utilizing iris scissors. The flap was then carried over into the defect and anchored with interrupted buried subcutaneous sutures. Cheek Interpolation Flap Text: A decision was made to reconstruct the defect utilizing an interpolation axial flap and a staged reconstruction.  A telfa template was made of the defect.  This telfa template was then used to outline the Cheek Interpolation flap.  The donor area for the pedicle flap was then injected with anesthesia.  The flap was excised through the skin and subcutaneous tissue down to the layer of the underlying musculature.  The interpolation flap was carefully excised within this deep plane to maintain its blood supply.  The edges of the donor site were undermined.   The donor site was closed in a primary fashion.  The pedicle was then rotated into position and sutured.  Once the tube was sutured into place, adequate blood supply was confirmed with blanching and refill.  The pedicle was then wrapped with xeroform gauze and dressed appropriately with a telfa and gauze bandage to ensure continued blood supply and protect the attached pedicle. Cheek-To-Nose Interpolation Flap Text: A decision was made to reconstruct the defect utilizing an interpolation axial flap and a staged reconstruction.  A telfa template was made of the defect.  This telfa template was then used to outline the Cheek-To-Nose Interpolation flap.  The donor area for the pedicle flap was then injected with anesthesia.  The flap was excised through the skin and subcutaneous tissue down to the layer of the underlying musculature.  The interpolation flap was carefully excised within this deep plane to maintain its blood supply.  The edges of the donor site were undermined.   The donor site was closed in a primary fashion.  The pedicle was then rotated into position and sutured.  Once the tube was sutured into place, adequate blood supply was confirmed with blanching and refill.  The pedicle was then wrapped with xeroform gauze and dressed appropriately with a telfa and gauze bandage to ensure continued blood supply and protect the attached pedicle. Interpolation Flap Text: A decision was made to reconstruct the defect utilizing an interpolation axial flap and a staged reconstruction.  A telfa template was made of the defect.  This telfa template was then used to outline the interpolation flap.  The donor area for the pedicle flap was then injected with anesthesia.  The flap was excised through the skin and subcutaneous tissue down to the layer of the underlying musculature.  The interpolation flap was carefully excised within this deep plane to maintain its blood supply.  The edges of the donor site were undermined.   The donor site was closed in a primary fashion.  The pedicle was then rotated into position and sutured.  Once the tube was sutured into place, adequate blood supply was confirmed with blanching and refill.  The pedicle was then wrapped with xeroform gauze and dressed appropriately with a telfa and gauze bandage to ensure continued blood supply and protect the attached pedicle. Melolabial Interpolation Flap Text: A decision was made to reconstruct the defect utilizing an interpolation axial flap and a staged reconstruction.  A telfa template was made of the defect.  This telfa template was then used to outline the melolabial interpolation flap.  The donor area for the pedicle flap was then injected with anesthesia.  The flap was excised through the skin and subcutaneous tissue down to the layer of the underlying musculature.  The pedicle flap was carefully excised within this deep plane to maintain its blood supply.  The edges of the donor site were undermined.   The donor site was closed in a primary fashion.  The pedicle was then rotated into position and sutured.  Once the tube was sutured into place, adequate blood supply was confirmed with blanching and refill.  The pedicle was then wrapped with xeroform gauze and dressed appropriately with a telfa and gauze bandage to ensure continued blood supply and protect the attached pedicle. Mastoid Interpolation Flap Text: A decision was made to reconstruct the defect utilizing an interpolation axial flap and a staged reconstruction.  A telfa template was made of the defect.  This telfa template was then used to outline the mastoid interpolation flap.  The donor area for the pedicle flap was then injected with anesthesia.  The flap was excised through the skin and subcutaneous tissue down to the layer of the underlying musculature.  The pedicle flap was carefully excised within this deep plane to maintain its blood supply.  The edges of the donor site were undermined.   The donor site was closed in a primary fashion.  The pedicle was then rotated into position and sutured.  Once the tube was sutured into place, adequate blood supply was confirmed with blanching and refill.  The pedicle was then wrapped with xeroform gauze and dressed appropriately with a telfa and gauze bandage to ensure continued blood supply and protect the attached pedicle. Posterior Auricular Interpolation Flap Text: A decision was made to reconstruct the defect utilizing an interpolation axial flap and a staged reconstruction.  A telfa template was made of the defect.  This telfa template was then used to outline the posterior auricular interpolation flap.  The donor area for the pedicle flap was then injected with anesthesia.  The flap was excised through the skin and subcutaneous tissue down to the layer of the underlying musculature.  The pedicle flap was carefully excised within this deep plane to maintain its blood supply.  The edges of the donor site were undermined.   The donor site was closed in a primary fashion.  The pedicle was then rotated into position and sutured.  Once the tube was sutured into place, adequate blood supply was confirmed with blanching and refill.  The pedicle was then wrapped with xeroform gauze and dressed appropriately with a telfa and gauze bandage to ensure continued blood supply and protect the attached pedicle. Paramedian Forehead Flap Text: A decision was made to reconstruct the defect utilizing an interpolation axial flap and a staged reconstruction.  A telfa template was made of the defect.  This telfa template was then used to outline the paramedian forehead pedicle flap.  The donor area for the pedicle flap was then injected with anesthesia.  The flap was excised through the skin and subcutaneous tissue down to the layer of the underlying musculature.  The pedicle flap was carefully excised within this deep plane to maintain its blood supply.  The edges of the donor site were undermined.   The donor site was closed in a primary fashion.  The pedicle was then rotated into position and sutured.  Once the tube was sutured into place, adequate blood supply was confirmed with blanching and refill.  The pedicle was then wrapped with xeroform gauze and dressed appropriately with a telfa and gauze bandage to ensure continued blood supply and protect the attached pedicle. Lip Wedge Excision Repair Text: Given the location of the defect and the proximity to free margins a full thickness wedge repair was deemed most appropriate. Using a sterile surgical marker, the appropriate repair was drawn incorporating the defect and placing the expected incisions perpendicular to the vermilion border.  The vermilion border was also meticulously outlined to ensure appropriate reapproximation during the repair.  The area thus outlined was incised through and through with a #15 scalpel blade.  The muscularis and dermis were reaproximated with deep sutures following hemostasis. Care was taken to realign the vermilion border before proceeding with the superficial closure.  Once the vermilion was realigned the superfical and mucosal closure was finished. Ftsg Text: The defect edges were debeveled with a #15 scalpel blade. Given the location of the defect, shape of the defect and the proximity to free margins a full thickness skin graft was deemed most appropriate. Using a sterile surgical marker, the primary defect shape was transferred to the donor site. The area thus outlined was incised deep to adipose tissue with a #15 scalpel blade.  The harvested graft was then trimmed of adipose tissue until only dermis and epidermis was left.  The skin margins of the secondary defect were undermined to an appropriate distance in all directions utilizing iris scissors.  The secondary defect was closed with interrupted buried subcutaneous sutures.  The skin edges were then re-apposed with running  sutures.  The skin graft was then placed in the primary defect and oriented appropriately. Split-Thickness Skin Graft Text: The defect edges were debeveled with a #15 scalpel blade. Given the location of the defect, shape of the defect and the proximity to free margins a split thickness skin graft was deemed most appropriate. Using a sterile surgical marker, the primary defect shape was transferred to the donor site. The split thickness graft was then harvested.  The skin graft was then placed in the primary defect and oriented appropriately. Burow's Graft Text: The defect edges were debeveled with a #15 scalpel blade. Given the location of the defect, shape of the defect, the proximity to free margins and the presence of a standing cone deformity a Burow's skin graft was deemed most appropriate. The standing cone was removed and this tissue was then trimmed to the shape of the primary defect. The adipose tissue was also removed until only dermis and epidermis were left.  The skin margins of the secondary defect were undermined to an appropriate distance in all directions utilizing iris scissors.  The secondary defect was closed with interrupted buried subcutaneous sutures.  The skin edges were then re-apposed with running  sutures.  The skin graft was then placed in the primary defect and oriented appropriately. Cartilage Graft Text: The defect edges were debeveled with a #15 scalpel blade. Given the location of the defect, shape of the defect, the fact the defect involved a full thickness cartilage defect a cartilage graft was deemed most appropriate.  An appropriate donor site was identified, cleansed, and anesthetized. The cartilage graft was then harvested and transferred to the recipient site, oriented appropriately and then sutured into place.  The secondary defect was then repaired using a primary closure. Composite Graft Text: The defect edges were debeveled with a #15 scalpel blade. Given the location of the defect, shape of the defect, the proximity to free margins and the fact the defect was full thickness a composite graft was deemed most appropriate.  The defect was outline and then transferred to the donor site.  A full thickness graft was then excised from the donor site. The graft was then placed in the primary defect, oriented appropriately and then sutured into place.  The secondary defect was then repaired using a primary closure. Epidermal Autograft Text: The defect edges were debeveled with a #15 scalpel blade. Given the location of the defect, shape of the defect and the proximity to free margins an epidermal autograft was deemed most appropriate. Using a sterile surgical marker, the primary defect shape was transferred to the donor site. The epidermal graft was then harvested.  The skin graft was then placed in the primary defect and oriented appropriately. Dermal Autograft Text: The defect edges were debeveled with a #15 scalpel blade. Given the location of the defect, shape of the defect and the proximity to free margins a dermal autograft was deemed most appropriate. Using a sterile surgical marker, the primary defect shape was transferred to the donor site. The area thus outlined was incised deep to adipose tissue with a #15 scalpel blade.  The harvested graft was then trimmed of adipose and epidermal tissue until only dermis was left.  The skin graft was then placed in the primary defect and oriented appropriately. Skin Substitute Text: The defect edges were debeveled with a #15 scalpel blade. Given the location of the defect, shape of the defect and the proximity to free margins a skin substitute graft was deemed most appropriate.  The graft material was trimmed to fit the size of the defect. The graft was then placed in the primary defect and oriented appropriately. Tissue Cultured Epidermal Autograft Text: The defect edges were debeveled with a #15 scalpel blade. Given the location of the defect, shape of the defect and the proximity to free margins a tissue cultured epidermal autograft was deemed most appropriate.  The graft was then trimmed to fit the size of the defect.  The graft was then placed in the primary defect and oriented appropriately. Xenograft Text: The defect edges were debeveled with a #15 scalpel blade. Given the location of the defect, shape of the defect and the proximity to free margins a xenograft was deemed most appropriate.  The graft was then trimmed to fit the size of the defect.  The graft was then placed in the primary defect and oriented appropriately. Purse String (Intermediate) Text: Given the location of the defect and the characteristics of the surrounding skin a purse string intermediate closure was deemed most appropriate.  Undermining was performed circumferentially around the surgical defect.  A purse string suture was then placed and tightened. Purse String (Simple) Text: Given the location of the defect and the characteristics of the surrounding skin a purse string simple closure was deemed most appropriate.  Undermining was performed circumferentially around the surgical defect.  A purse string suture was then placed and tightened. Complex Repair And Single Advancement Flap Text: The defect edges were debeveled with a #15 scalpel blade.  The primary defect was closed partially with a complex linear closure.  Given the location of the remaining defect, shape of the defect and the proximity to free margins a single advancement flap was deemed most appropriate for complete closure of the defect.  Using a sterile surgical marker, an appropriate advancement flap was drawn incorporating the defect and placing the expected incisions within the relaxed skin tension lines where possible. The area thus outlined was incised deep to adipose tissue with a #15 scalpel blade. The skin margins were undermined to an appropriate distance in all directions utilizing iris scissors and carried over to close the primary defect. Complex Repair And Double Advancement Flap Text: The defect edges were debeveled with a #15 scalpel blade.  The primary defect was closed partially with a complex linear closure.  Given the location of the remaining defect, shape of the defect and the proximity to free margins a double advancement flap was deemed most appropriate for complete closure of the defect.  Using a sterile surgical marker, an appropriate advancement flap was drawn incorporating the defect and placing the expected incisions within the relaxed skin tension lines where possible. The area thus outlined was incised deep to adipose tissue with a #15 scalpel blade. The skin margins were undermined to an appropriate distance in all directions utilizing iris scissors and carried over to close the primary defect. Complex Repair And Modified Advancement Flap Text: The defect edges were debeveled with a #15 scalpel blade.  The primary defect was closed partially with a complex linear closure.  Given the location of the remaining defect, shape of the defect and the proximity to free margins a modified advancement flap was deemed most appropriate for complete closure of the defect.  Using a sterile surgical marker, an appropriate advancement flap was drawn incorporating the defect and placing the expected incisions within the relaxed skin tension lines where possible. The area thus outlined was incised deep to adipose tissue with a #15 scalpel blade. The skin margins were undermined to an appropriate distance in all directions utilizing iris scissors and carried over to close the primary defect. Complex Repair And A-T Advancement Flap Text: The defect edges were debeveled with a #15 scalpel blade.  The primary defect was closed partially with a complex linear closure.  Given the location of the remaining defect, shape of the defect and the proximity to free margins an A-T advancement flap was deemed most appropriate for complete closure of the defect.  Using a sterile surgical marker, an appropriate advancement flap was drawn incorporating the defect and placing the expected incisions within the relaxed skin tension lines where possible. The area thus outlined was incised deep to adipose tissue with a #15 scalpel blade. The skin margins were undermined to an appropriate distance in all directions utilizing iris scissors and carried over to close the primary defect. Complex Repair And O-T Advancement Flap Text: The defect edges were debeveled with a #15 scalpel blade.  The primary defect was closed partially with a complex linear closure.  Given the location of the remaining defect, shape of the defect and the proximity to free margins an O-T advancement flap was deemed most appropriate for complete closure of the defect.  Using a sterile surgical marker, an appropriate advancement flap was drawn incorporating the defect and placing the expected incisions within the relaxed skin tension lines where possible. The area thus outlined was incised deep to adipose tissue with a #15 scalpel blade. The skin margins were undermined to an appropriate distance in all directions utilizing iris scissors and carried over to close the primary defect. Complex Repair And O-L Flap Text: The defect edges were debeveled with a #15 scalpel blade.  The primary defect was closed partially with a complex linear closure.  Given the location of the remaining defect, shape of the defect and the proximity to free margins an O-L flap was deemed most appropriate for complete closure of the defect.  Using a sterile surgical marker, an appropriate flap was drawn incorporating the defect and placing the expected incisions within the relaxed skin tension lines where possible. The area thus outlined was incised deep to adipose tissue with a #15 scalpel blade. The skin margins were undermined to an appropriate distance in all directions utilizing iris scissors and carried over to close the primary defect. Complex Repair And Bilobe Flap Text: The defect edges were debeveled with a #15 scalpel blade.  The primary defect was closed partially with a complex linear closure.  Given the location of the remaining defect, shape of the defect and the proximity to free margins a bilobe flap was deemed most appropriate for complete closure of the defect.  Using a sterile surgical marker, an appropriate advancement flap was drawn incorporating the defect and placing the expected incisions within the relaxed skin tension lines where possible. The area thus outlined was incised deep to adipose tissue with a #15 scalpel blade. The skin margins were undermined to an appropriate distance in all directions utilizing iris scissors and carried over to close the primary defect. Complex Repair And Melolabial Flap Text: The defect edges were debeveled with a #15 scalpel blade.  The primary defect was closed partially with a complex linear closure.  Given the location of the remaining defect, shape of the defect and the proximity to free margins a melolabial flap was deemed most appropriate for complete closure of the defect.  Using a sterile surgical marker, an appropriate advancement flap was drawn incorporating the defect and placing the expected incisions within the relaxed skin tension lines where possible. The area thus outlined was incised deep to adipose tissue with a #15 scalpel blade. The skin margins were undermined to an appropriate distance in all directions utilizing iris scissors and carried over to close the primary defect. Complex Repair And Rotation Flap Text: The defect edges were debeveled with a #15 scalpel blade.  The primary defect was closed partially with a complex linear closure.  Given the location of the remaining defect, shape of the defect and the proximity to free margins a rotation flap was deemed most appropriate for complete closure of the defect.  Using a sterile surgical marker, an appropriate advancement flap was drawn incorporating the defect and placing the expected incisions within the relaxed skin tension lines where possible. The area thus outlined was incised deep to adipose tissue with a #15 scalpel blade. The skin margins were undermined to an appropriate distance in all directions utilizing iris scissors and carried over to close the primary defect. Complex Repair And Rhombic Flap Text: The defect edges were debeveled with a #15 scalpel blade.  The primary defect was closed partially with a complex linear closure.  Given the location of the remaining defect, shape of the defect and the proximity to free margins a rhombic flap was deemed most appropriate for complete closure of the defect.  Using a sterile surgical marker, an appropriate advancement flap was drawn incorporating the defect and placing the expected incisions within the relaxed skin tension lines where possible. The area thus outlined was incised deep to adipose tissue with a #15 scalpel blade. The skin margins were undermined to an appropriate distance in all directions utilizing iris scissors and carried over to close the primary defect. Complex Repair And Transposition Flap Text: The defect edges were debeveled with a #15 scalpel blade.  The primary defect was closed partially with a complex linear closure.  Given the location of the remaining defect, shape of the defect and the proximity to free margins a transposition flap was deemed most appropriate for complete closure of the defect.  Using a sterile surgical marker, an appropriate advancement flap was drawn incorporating the defect and placing the expected incisions within the relaxed skin tension lines where possible. The area thus outlined was incised deep to adipose tissue with a #15 scalpel blade. The skin margins were undermined to an appropriate distance in all directions utilizing iris scissors and carried over to close the primary defect. Complex Repair And V-Y Plasty Text: The defect edges were debeveled with a #15 scalpel blade.  The primary defect was closed partially with a complex linear closure.  Given the location of the remaining defect, shape of the defect and the proximity to free margins a V-Y plasty was deemed most appropriate for complete closure of the defect.  Using a sterile surgical marker, an appropriate advancement flap was drawn incorporating the defect and placing the expected incisions within the relaxed skin tension lines where possible. The area thus outlined was incised deep to adipose tissue with a #15 scalpel blade. The skin margins were undermined to an appropriate distance in all directions utilizing iris scissors and carried over to close the primary defect. Complex Repair And M Plasty Text: The defect edges were debeveled with a #15 scalpel blade.  The primary defect was closed partially with a complex linear closure.  Given the location of the remaining defect, shape of the defect and the proximity to free margins an M plasty was deemed most appropriate for complete closure of the defect.  Using a sterile surgical marker, an appropriate advancement flap was drawn incorporating the defect and placing the expected incisions within the relaxed skin tension lines where possible. The area thus outlined was incised deep to adipose tissue with a #15 scalpel blade. The skin margins were undermined to an appropriate distance in all directions utilizing iris scissors and carried over to close the primary defect. Complex Repair And Double M Plasty Text: The defect edges were debeveled with a #15 scalpel blade.  The primary defect was closed partially with a complex linear closure.  Given the location of the remaining defect, shape of the defect and the proximity to free margins a double M plasty was deemed most appropriate for complete closure of the defect.  Using a sterile surgical marker, an appropriate advancement flap was drawn incorporating the defect and placing the expected incisions within the relaxed skin tension lines where possible. The area thus outlined was incised deep to adipose tissue with a #15 scalpel blade. The skin margins were undermined to an appropriate distance in all directions utilizing iris scissors and carried over to close the primary defect. Complex Repair And W Plasty Text: The defect edges were debeveled with a #15 scalpel blade.  The primary defect was closed partially with a complex linear closure.  Given the location of the remaining defect, shape of the defect and the proximity to free margins a W plasty was deemed most appropriate for complete closure of the defect.  Using a sterile surgical marker, an appropriate advancement flap was drawn incorporating the defect and placing the expected incisions within the relaxed skin tension lines where possible. The area thus outlined was incised deep to adipose tissue with a #15 scalpel blade. The skin margins were undermined to an appropriate distance in all directions utilizing iris scissors and carried over to close the primary defect. Complex Repair And Z Plasty Text: The defect edges were debeveled with a #15 scalpel blade.  The primary defect was closed partially with a complex linear closure.  Given the location of the remaining defect, shape of the defect and the proximity to free margins a Z plasty was deemed most appropriate for complete closure of the defect.  Using a sterile surgical marker, an appropriate advancement flap was drawn incorporating the defect and placing the expected incisions within the relaxed skin tension lines where possible. The area thus outlined was incised deep to adipose tissue with a #15 scalpel blade. The skin margins were undermined to an appropriate distance in all directions utilizing iris scissors and carried over to close the primary defect. Complex Repair And Dorsal Nasal Flap Text: The defect edges were debeveled with a #15 scalpel blade.  The primary defect was closed partially with a complex linear closure.  Given the location of the remaining defect, shape of the defect and the proximity to free margins a dorsal nasal flap was deemed most appropriate for complete closure of the defect.  Using a sterile surgical marker, an appropriate flap was drawn incorporating the defect and placing the expected incisions within the relaxed skin tension lines where possible. The area thus outlined was incised deep to adipose tissue with a #15 scalpel blade. The skin margins were undermined to an appropriate distance in all directions utilizing iris scissors and carried over to close the primary defect. Complex Repair And Ftsg Text: The defect edges were debeveled with a #15 scalpel blade.  The primary defect was closed partially with a complex linear closure.  Given the location of the defect, shape of the defect and the proximity to free margins a full thickness skin graft was deemed most appropriate to repair the remaining defect.  The graft was trimmed to fit the size of the remaining defect.  The graft was then placed in the primary defect, oriented appropriately, and sutured into place. Complex Repair And Burow's Graft Text: The defect edges were debeveled with a #15 scalpel blade.  The primary defect was closed partially with a complex linear closure.  Given the location of the defect, shape of the defect, the proximity to free margins and the presence of a standing cone deformity a Burow's graft was deemed most appropriate to repair the remaining defect.  The graft was trimmed to fit the size of the remaining defect.  The graft was then placed in the primary defect, oriented appropriately, and sutured into place. Complex Repair And Split-Thickness Skin Graft Text: The defect edges were debeveled with a #15 scalpel blade.  The primary defect was closed partially with a complex linear closure.  Given the location of the defect, shape of the defect and the proximity to free margins a split thickness skin graft was deemed most appropriate to repair the remaining defect.  The graft was trimmed to fit the size of the remaining defect.  The graft was then placed in the primary defect, oriented appropriately, and sutured into place. Complex Repair And Epidermal Autograft Text: The defect edges were debeveled with a #15 scalpel blade.  The primary defect was closed partially with a complex linear closure.  Given the location of the defect, shape of the defect and the proximity to free margins an epidermal autograft was deemed most appropriate to repair the remaining defect.  The graft was trimmed to fit the size of the remaining defect.  The graft was then placed in the primary defect, oriented appropriately, and sutured into place. Complex Repair And Dermal Autograft Text: The defect edges were debeveled with a #15 scalpel blade.  The primary defect was closed partially with a complex linear closure.  Given the location of the defect, shape of the defect and the proximity to free margins an dermal autograft was deemed most appropriate to repair the remaining defect.  The graft was trimmed to fit the size of the remaining defect.  The graft was then placed in the primary defect, oriented appropriately, and sutured into place. Complex Repair And Tissue Cultured Epidermal Autograft Text: The defect edges were debeveled with a #15 scalpel blade.  The primary defect was closed partially with a complex linear closure.  Given the location of the defect, shape of the defect and the proximity to free margins an tissue cultured epidermal autograft was deemed most appropriate to repair the remaining defect.  The graft was trimmed to fit the size of the remaining defect.  The graft was then placed in the primary defect, oriented appropriately, and sutured into place. Complex Repair And Xenograft Text: The defect edges were debeveled with a #15 scalpel blade.  The primary defect was closed partially with a complex linear closure.  Given the location of the defect, shape of the defect and the proximity to free margins a xenograft was deemed most appropriate to repair the remaining defect.  The graft was trimmed to fit the size of the remaining defect.  The graft was then placed in the primary defect, oriented appropriately, and sutured into place. Complex Repair And Skin Substitute Graft Text: The defect edges were debeveled with a #15 scalpel blade.  The primary defect was closed partially with a complex linear closure.  Given the location of the remaining defect, shape of the defect and the proximity to free margins a skin substitute graft was deemed most appropriate to repair the remaining defect.  The graft was trimmed to fit the size of the remaining defect.  The graft was then placed in the primary defect, oriented appropriately, and sutured into place. Consent was obtained from the patient. The risks and benefits to therapy were discussed in detail. Specifically, the risks of infection, scarring, bleeding, prolonged wound healing, incomplete removal, allergy to anesthesia, nerve injury and recurrence were addressed. Prior to the procedure, the treatment site was clearly identified and confirmed by the patient. All components of Universal Protocol/PAUSE Rule completed. Render Post-Care Instructions In Note?: yes Post-Care Instructions: I reviewed with the patient in detail post-care instructions. Patient is not to engage in any heavy lifting, exercise, or swimming for the next 14 days. Should the patient develop any fevers, chills, bleeding, severe pain patient will contact the office immediately. Home Suture Removal Text: Patient was provided a home suture removal kit and will remove their sutures at home.  If they have any questions or difficulties they will call the office. Where Do You Want The Question To Include Opioid Counseling Located?: Case Summary Tab Billing Type: Third-Party Bill Information: Selecting Yes will display possible errors in your note based on the variables you have selected. This validation is only offered as a suggestion for you. PLEASE NOTE THAT THE VALIDATION TEXT WILL BE REMOVED WHEN YOU FINALIZE YOUR NOTE. IF YOU WANT TO FAX A PRELIMINARY NOTE YOU WILL NEED TO TOGGLE THIS TO 'NO' IF YOU DO NOT WANT IT IN YOUR FAXED NOTE.

## 2023-06-02 ENCOUNTER — OFFICE VISIT (OUTPATIENT)
Dept: UROLOGY | Facility: CLINIC | Age: 68
End: 2023-06-02
Payer: COMMERCIAL

## 2023-06-02 VITALS
DIASTOLIC BLOOD PRESSURE: 70 MMHG | WEIGHT: 185 LBS | BODY MASS INDEX: 27.4 KG/M2 | HEART RATE: 103 BPM | OXYGEN SATURATION: 98 % | SYSTOLIC BLOOD PRESSURE: 120 MMHG | HEIGHT: 69 IN

## 2023-06-02 DIAGNOSIS — R31.21 ASYMPTOMATIC MICROSCOPIC HEMATURIA: ICD-10-CM

## 2023-06-02 DIAGNOSIS — R33.9 URINARY RETENTION: Primary | ICD-10-CM

## 2023-06-02 LAB
ALBUMIN UR-MCNC: 30 MG/DL
APPEARANCE UR: CLEAR
BILIRUB UR QL STRIP: NEGATIVE
COLOR UR AUTO: YELLOW
GLUCOSE UR STRIP-MCNC: NEGATIVE MG/DL
HGB UR QL STRIP: ABNORMAL
KETONES UR STRIP-MCNC: NEGATIVE MG/DL
LEUKOCYTE ESTERASE UR QL STRIP: ABNORMAL
NITRATE UR QL: NEGATIVE
PH UR STRIP: 5.5 [PH] (ref 5–7)
RESIDUAL VOLUME (RV) (EXTERNAL): 20
SP GR UR STRIP: 1.01 (ref 1–1.03)
UROBILINOGEN UR STRIP-ACNC: 0.2 E.U./DL

## 2023-06-02 PROCEDURE — 99213 OFFICE O/P EST LOW 20 MIN: CPT | Mod: 25 | Performed by: PHYSICIAN ASSISTANT

## 2023-06-02 PROCEDURE — 51798 US URINE CAPACITY MEASURE: CPT | Performed by: PHYSICIAN ASSISTANT

## 2023-06-02 PROCEDURE — 81003 URINALYSIS AUTO W/O SCOPE: CPT | Mod: QW | Performed by: PHYSICIAN ASSISTANT

## 2023-06-02 ASSESSMENT — PAIN SCALES - GENERAL: PAINLEVEL: NO PAIN (0)
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
## 2023-06-02 NOTE — NURSING NOTE
Chief Complaint   Patient presents with     history of retention     UA, PVR   PVR was 20 ml by a bladder scanner.  Dominique Sawyer LPN

## 2023-06-23 ENCOUNTER — PATIENT OUTREACH (OUTPATIENT)
Dept: FAMILY MEDICINE | Facility: CLINIC | Age: 68
End: 2023-06-23
Payer: COMMERCIAL

## 2023-06-23 NOTE — TELEPHONE ENCOUNTER
1st Attempt Patient Quality Outreach Health Maintenance - PAL RN    Summary:    PAL RN attempted (attempt # 1) to contact pt regarding overdue health maintenance    Patient is due/failing the following:       Topic Date Due     Pneumococcal Vaccine (1 - PCV) Never done     COVID-19 Vaccine (3 - Moderna series) 03/08/2023       Health Maintenance Due   Topic Date Due     DEXA  Never done     Pneumococcal Vaccine: 65+ Years (1 - PCV) Never done     COVID-19 Vaccine (3 - Moderna series) 03/08/2023       Type of outreach:    Phone, left message for patient/parent to call back. DEXA scheduled for 7/5/2023    - PAL RN Left VM requesting call back to further discuss and schedule appt, awaiting call back at this time.  - PAL RN sent D[employer] message, advised to schedule appointment with provider    Next Steps:  Max number of attempts reached: No. Will try again in 30 days if patient still on fail list.    Reach out within 30 days via Phone.    Questions for provider review:    None         Randy Kraus RN  Patient Advocate Liaison (PAL)  [facility]  [phone]    06/23/2023 at 9:53 AM

## 2023-07-01 DIAGNOSIS — Z87.440 HISTORY OF UTI: ICD-10-CM

## 2023-07-05 ENCOUNTER — ANCILLARY PROCEDURE (OUTPATIENT)
Dept: BONE DENSITY | Facility: CLINIC | Age: 68
End: 2023-07-05
Payer: COMMERCIAL

## 2023-07-05 DIAGNOSIS — Z78.0 ASYMPTOMATIC MENOPAUSAL STATE: ICD-10-CM

## 2023-07-05 PROCEDURE — 77080 DXA BONE DENSITY AXIAL: CPT | Performed by: INTERNAL MEDICINE

## 2023-07-06 PROBLEM — K63.5 POLYP OF COLON: Status: RESOLVED | Noted: 2021-11-02 | Resolved: 2023-07-06

## 2023-07-06 PROBLEM — D12.6 ADENOMA OF LARGE INTESTINE: Status: RESOLVED | Noted: 2022-10-11 | Resolved: 2023-07-06

## 2023-07-06 PROBLEM — Z86.0100 HISTORY OF COLONIC POLYPS: Status: RESOLVED | Noted: 2018-04-30 | Resolved: 2023-07-06

## 2023-07-07 RX ORDER — METHENAMINE HIPPURATE 1000 MG/1
TABLET ORAL
Qty: 180 TABLET | Refills: 0 | OUTPATIENT
Start: 2023-07-07

## 2023-07-08 DIAGNOSIS — Z87.440 HISTORY OF UTI: ICD-10-CM

## 2023-07-10 RX ORDER — METHENAMINE HIPPURATE 1000 MG/1
TABLET ORAL
Qty: 180 TABLET | Refills: 0 | OUTPATIENT
Start: 2023-07-10

## 2023-07-21 ENCOUNTER — TELEPHONE (OUTPATIENT)
Dept: FAMILY MEDICINE | Facility: CLINIC | Age: 68
End: 2023-07-21
Payer: COMMERCIAL

## 2023-07-21 NOTE — TELEPHONE ENCOUNTER
Reason for Call:  Medication or medication refill:    Do you use a [facility]?  Name of the pharmacy and phone number for the current request:   [id number] - [place], [place] - [address]  P: [phone]        Name of the medication requested:     busPIRone (BUSPAR) 5 MG tablet       Other request: N/A    Can we leave a detailed message on this number? YES    Phone number patient can be reached at: Cell number on file:    Telephone Information:   Mobile [phone]       Best Time: ANYTIME    Call taken on 7/21/2023 at 6:29 PM by Yohan John

## 2023-07-24 NOTE — TELEPHONE ENCOUNTER
"Called and spoke with pt,     -She states the medication has been filled by PCP and does not recall having it filled by another provider, other than when she was in [place] years ago.     Call placed to [facility] pharmacy to determine who last prescribed medication.     Spoke with Michelle with [facility] pharmacy,      -Started being seen at this [facility] pharmacy in March.-Instructed to call previous [facility] pharmacy [phone].     Spoke to Roger at the [facility] pharmacy,     -Pt was last prescribed buspirone 5 mg by Dominique Rizo NP from [place] and pt last picked it up on 6/9/2021.     Called and spoke with pt,     -Informed pt that the pharmacy states that she has not picked up the medication since 6/2021 and that it was most recently prescribed by a provider in [place].     -Pt states that she has been taking it as needed for when she is \"worked up.\"     -Informed pt that the prescription was to be taken daily for it to be effective and it is not meant to be used as an as needed medication.     -She states she was unaware that it was not to be taken on an as needed basis.     -Assisted pt in scheduling a visit with Allen Bejarano NP on 7/25 at 11:10 AM to discuss anxiety medication.     She verbalizes understanding and is agreeable with plan. She denies any further questions or concerns at this time.     Terri EM RN   PAL (Patient Advocate Liaison)  [facility]  [phone]              "

## 2023-07-25 ENCOUNTER — OFFICE VISIT (OUTPATIENT)
Dept: FAMILY MEDICINE | Facility: CLINIC | Age: 68
End: 2023-07-25
Payer: COMMERCIAL

## 2023-07-25 VITALS
RESPIRATION RATE: 16 BRPM | TEMPERATURE: 98.3 F | SYSTOLIC BLOOD PRESSURE: 120 MMHG | WEIGHT: 192.8 LBS | HEART RATE: 115 BPM | HEIGHT: 69 IN | OXYGEN SATURATION: 94 % | DIASTOLIC BLOOD PRESSURE: 70 MMHG | BODY MASS INDEX: 28.56 KG/M2

## 2023-07-25 DIAGNOSIS — R39.9 UTI SYMPTOMS: ICD-10-CM

## 2023-07-25 DIAGNOSIS — F41.1 GENERALIZED ANXIETY DISORDER: Primary | ICD-10-CM

## 2023-07-25 LAB
ALBUMIN UR-MCNC: 100 MG/DL
APPEARANCE UR: ABNORMAL
BACTERIA #/AREA URNS HPF: ABNORMAL /HPF
BILIRUB UR QL STRIP: NEGATIVE
COLOR UR AUTO: YELLOW
GLUCOSE UR STRIP-MCNC: NEGATIVE MG/DL
HGB UR QL STRIP: ABNORMAL
KETONES UR STRIP-MCNC: NEGATIVE MG/DL
LEUKOCYTE ESTERASE UR QL STRIP: ABNORMAL
NITRATE UR QL: NEGATIVE
PH UR STRIP: 5.5 [PH] (ref 5–7)
RBC #/AREA URNS AUTO: ABNORMAL /HPF
SP GR UR STRIP: 1.01 (ref 1–1.03)
SQUAMOUS #/AREA URNS AUTO: ABNORMAL /LPF
UROBILINOGEN UR STRIP-ACNC: 0.2 E.U./DL
WBC #/AREA URNS AUTO: >100 /HPF
WBC CLUMPS #/AREA URNS HPF: PRESENT /HPF

## 2023-07-25 PROCEDURE — 81001 URINALYSIS AUTO W/SCOPE: CPT

## 2023-07-25 PROCEDURE — 87086 URINE CULTURE/COLONY COUNT: CPT

## 2023-07-25 PROCEDURE — 99214 OFFICE O/P EST MOD 30 MIN: CPT

## 2023-07-25 RX ORDER — SULFAMETHOXAZOLE/TRIMETHOPRIM 800-160 MG
1 TABLET ORAL 2 TIMES DAILY
Qty: 6 TABLET | Refills: 0 | Status: SHIPPED | OUTPATIENT
Start: 2023-07-25 | End: 2023-07-28

## 2023-07-25 RX ORDER — BUSPIRONE HYDROCHLORIDE 5 MG/1
5 TABLET ORAL DAILY
Qty: 90 TABLET | Refills: 1 | Status: SHIPPED | OUTPATIENT
Start: 2023-07-25 | End: 2024-03-04

## 2023-07-25 ASSESSMENT — ENCOUNTER SYMPTOMS: NERVOUS/ANXIOUS: 1

## 2023-07-25 ASSESSMENT — PAIN SCALES - GENERAL: PAINLEVEL: NO PAIN (0)

## 2023-07-25 NOTE — PATIENT INSTRUCTIONS
UTI  -Bactrim: take one tablet today, take 1 tablet twice tomorrow, 1 table twice Thursday, 1 tablet in morning Friday    Anxiety  -Buspar daily in evening  -follow up if noticing any worsening in your anxiety .

## 2023-07-25 NOTE — PROGRESS NOTES
Assessment & Plan     (F41.1) Generalized anxiety disorder  (primary encounter diagnosis)  Comment: patient anxiety stable on olanzepine and buspar. Patient historically taking in evening a couple hours prior to olanzepine. Buspar refilled today. Follow up if noting anxiety not controlled. Patient fully understands and is agreeable with plan of care, at this point patient will follow up as needed unless acute concerns arise in the meantime.  Plan: busPIRone (BUSPAR) 5 MG tablet      (R39.9) UTI symptoms  Comment: UA indicating pyuria w/ many bacteria present. Will prophylactic treat given symptoms and reminiscence of previous UTI's. Will follow up w/ patient on urine culture and whether further direction is necessary. Will treat w/ Bactrim, Discussed medication risks and benefits of Bactrim with patient in detail with patient verbal understanding. Patient fully understands and is agreeable with plan of care, at this point patient will follow up as needed unless acute concerns arise in the meantime.  Plan: UA Macroscopic with reflex to Microscopic and         Culture - Lab Collect, Urine Microscopic Exam,         Urine Culture, sulfamethoxazole-trimethoprim         (BACTRIM DS) 800-160 MG tablet    30 minutes spent by me on the date of the encounter doing chart review, history and exam, documentation and further activities per the note         REGINE Dos Santos CNP  [facility]    Shmuel Geiger is a 67 year old, presenting for the following health issues:  Anxiety and Depression        7/25/2023    10:57 AM   Additional Questions   Roomed by Orly JOSEPH CMA     History of Present Illness       Reason for visit:  Medication refill    She eats 0-1 servings of fruits and vegetables daily.She consumes 3 sweetened beverage(s) daily.She exercises with enough effort to increase her heart rate 9 or less minutes per day.  She exercises with enough effort to increase her heart rate 3 or less  days per week.   She is taking medications regularly.      Depression and Anxiety Follow-Up  How are you doing with your depression since your last visit? No change  How are you doing with your anxiety since your last visit?  No change  Are you having other symptoms that might be associated with depression or anxiety? No  Have you had a significant life event? No   Do you have any concerns with your use of alcohol or other drugs? No    Social History     Tobacco Use    Smoking status: Former     Years: 5.00     Types: Cigarettes     Start date: 5/10/1973     Quit date: 1977     Years since quittin.5    Smokeless tobacco: Never   Vaping Use    Vaping Use: Never used   Substance Use Topics    Alcohol use: Yes     Comment: Once a month maybe    Drug use: No         2022     2:27 PM 10/11/2022    10:38 AM 2023    10:34 AM   PHQ   PHQ-9 Total Score 5 1 0   Q9: Thoughts of better off dead/self-harm past 2 weeks Not at all Not at all Not at all         2021     1:44 PM 10/11/2022    10:39 AM 2023    10:35 AM   QUYEN-7 SCORE   Total Score 0 (minimal anxiety) 0 (minimal anxiety) 0 (minimal anxiety)   Total Score 0 0 0         2023    10:34 AM   Last PHQ-9   1.  Little interest or pleasure in doing things 0   2.  Feeling down, depressed, or hopeless 0   3.  Trouble falling or staying asleep, or sleeping too much 0   4.  Feeling tired or having little energy 0   5.  Poor appetite or overeating 0   6.  Feeling bad about yourself 0   7.  Trouble concentrating 0   8.  Moving slowly or restless 0   Q9: Thoughts of better off dead/self-harm past 2 weeks 0   PHQ-9 Total Score 0         2023    10:35 AM   QUYEN-7    1. Feeling nervous, anxious, or on edge 0   2. Not being able to stop or control worrying 0   3. Worrying too much about different things 0   4. Trouble relaxing 0   5. Being so restless that it is hard to sit still 0   6. Becoming easily annoyed or irritable 0   7. Feeling afraid, as if  "something awful might happen 0   QUYEN-7 Total Score 0   If you checked any problems, how difficult have they made it for you to do your work, take care of things at home, or get along with other people? Not difficult at all     Suicide Assessment Five-step Evaluation and Treatment (SAFE-T)    Genitourinary - Female  Onset/Duration: Several days  Description:   Painful urination (Dysuria): No           Frequency: YES  Blood in urine (Hematuria): No, urine has been cloudy  Delay in urine (Hesitency): No  Intensity: mild  Progression of Symptoms:  same  Accompanying Signs & Symptoms:  Fever/chills: No  Flank pain: No  Nausea and vomiting: No  Vaginal symptoms: none  Abdominal/Pelvic Pain: No  History:   History of frequent UTI s: No  History of kidney stones: No  Sexually Active: No  Possibility of pregnancy: No  Precipitating or alleviating factors: None  Therapies tried and outcome: None    Current symptoms are reminiscent of previous UTI's in past, only difference is patient does not have dysuria currently      Review of Systems   Psychiatric/Behavioral:  The patient is nervous/anxious.       Constitutional, HEENT, cardiovascular, pulmonary, GI, , musculoskeletal, neuro, skin, endocrine and psych systems are negative, except as otherwise noted.      Objective    Ht 1.746 m (5' 8.75\")   LMP 01/20/2004   BMI 27.52 kg/m    Body mass index is 27.52 kg/m .  Physical Exam  Vitals and nursing note reviewed.   Constitutional:       General: She is not in acute distress.     Appearance: Normal appearance. She is well-developed. She is not ill-appearing, toxic-appearing or diaphoretic.   Cardiovascular:      Rate and Rhythm: Normal rate and regular rhythm.      Pulses: Normal pulses.      Heart sounds: Normal heart sounds, S1 normal and S2 normal. No murmur heard.     No friction rub. No gallop. No S3 or S4 sounds.   Pulmonary:      Effort: Pulmonary effort is normal. No respiratory distress.      Breath sounds: Normal " breath sounds. No stridor. No wheezing, rhonchi or rales.   Chest:      Chest wall: No tenderness.   Abdominal:      General: Bowel sounds are normal. There is no distension.      Palpations: Abdomen is soft. There is no mass.      Tenderness: There is no abdominal tenderness. There is no right CVA tenderness, left CVA tenderness, guarding or rebound.   Skin:     General: Skin is warm and dry.   Neurological:      General: No focal deficit present.      Mental Status: She is alert and oriented to person, place, and time.      Motor: No abnormal muscle tone.      Deep Tendon Reflexes: Reflexes are normal and symmetric.   Psychiatric:         Attention and Perception: Attention normal.         Mood and Affect: Mood is anxious.         Speech: Speech normal.         Behavior: Behavior normal.         Thought Content: Thought content normal.         Judgment: Judgment normal.

## 2023-07-27 ENCOUNTER — TELEPHONE (OUTPATIENT)
Dept: FAMILY MEDICINE | Facility: CLINIC | Age: 68
End: 2023-07-27
Payer: COMMERCIAL

## 2023-07-27 LAB — BACTERIA UR CULT: NORMAL

## 2023-08-11 ENCOUNTER — VIRTUAL VISIT (OUTPATIENT)
Dept: FAMILY MEDICINE | Facility: CLINIC | Age: 68
End: 2023-08-11
Payer: COMMERCIAL

## 2023-08-11 DIAGNOSIS — R30.0 DYSURIA: Primary | ICD-10-CM

## 2023-08-11 PROCEDURE — 99442 PR PHYSICIAN TELEPHONE EVALUATION 11-20 MIN: CPT | Mod: 93 | Performed by: FAMILY MEDICINE

## 2023-08-11 NOTE — PROGRESS NOTES
Fely is a 67 year old who is being evaluated via a billable telephone visit.      What phone number would you like to be contacted at? [phone]  How would you like to obtain your AVS? Mail a copyDistant Location (provider location):  On-site    Assessment & Plan     Dysuria  Reviewed negative previous urine culture (urogenital organisms only) and recommended every check, including for vaginitis which could also possibly cause some pain with urination.  Discussed if she had yeast we could prescribe fluconazole, or if she has bacterial overgrowth she would be fine with the pill and understands she cannot drink alcohol  - UA Macroscopic with reflex to Microscopic and Culture - Lab Collect  - Wet prep - Clinic Collect    Return if symptoms worsen or fail to improve.        Thelma Vasquez MD  [facility]   Fely is a 67 year old, presenting for the following health issues:  Offive Visit (Pt thinks she has UTI. She has urinary pain off and on for 3 weeks. Was seen by  A few weeks ago and given antibiotic- Contacted pt to tell her not to take antibiotic as she did not have UTI. Current symptoms-burning sensation when urinating and sudden urges to urinate.)      8/11/2023    12:16 PM   Additional Questions   Roomed by nicholas zepeda   Accompanied by alone         8/11/2023    12:16 PM   Patient Reported Additional Medications   Patient reports taking the following new medications none     Reviewed HPI above provided by staff.  Fely says her symptoms of gotten worse.   - Does not use soap in gential area   - No vaginal discharge   -  Cloudy urine   - Pain internally (not externally) with urination   - Has had to wear a depends recently - urine coming before she knows I   - No new sexual partners      History of Present Illness       Reason for visit:  UTI    She eats 0-1 servings of fruits and vegetables daily.She consumes 4 sweetened beverage(s) daily.She exercises with  enough effort to increase her heart rate 30 to 60 minutes per day.  She exercises with enough effort to increase her heart rate 3 or less days per week.   She is taking medications regularly.         Objective           Vitals:  No vitals were obtained today due to virtual visit.    Physical Exam     PSYCH: Alert and oriented times 3; coherent speech, normal   rate and volume, able to articulate logical thoughts, able   to abstract reason, no tangential thoughts, no hallucinations   or delusions  Her affect is normal  RESP: No cough, no audible wheezing, able to talk in full sentences  Remainder of exam unable to be completed due to telephone visits            Phone call duration: 20 minutes    12:20 - 12:40

## 2023-08-14 ENCOUNTER — LAB (OUTPATIENT)
Dept: LAB | Facility: CLINIC | Age: 68
End: 2023-08-14
Payer: COMMERCIAL

## 2023-08-14 DIAGNOSIS — R30.0 DYSURIA: ICD-10-CM

## 2023-08-14 LAB
ALBUMIN UR-MCNC: 30 MG/DL
APPEARANCE UR: CLEAR
BACTERIA #/AREA URNS HPF: ABNORMAL /HPF
BILIRUB UR QL STRIP: NEGATIVE
CLUE CELLS: NORMAL
COLOR UR AUTO: YELLOW
GLUCOSE UR STRIP-MCNC: NEGATIVE MG/DL
HGB UR QL STRIP: ABNORMAL
KETONES UR STRIP-MCNC: NEGATIVE MG/DL
LEUKOCYTE ESTERASE UR QL STRIP: ABNORMAL
NITRATE UR QL: NEGATIVE
PH UR STRIP: 5.5 [PH] (ref 5–7)
RBC #/AREA URNS AUTO: ABNORMAL /HPF
SP GR UR STRIP: 1.01 (ref 1–1.03)
SQUAMOUS #/AREA URNS AUTO: ABNORMAL /LPF
TRICHOMONAS, WET PREP: NORMAL
UROBILINOGEN UR STRIP-ACNC: 0.2 E.U./DL
WBC #/AREA URNS AUTO: >100 /HPF
WBC CLUMPS #/AREA URNS HPF: PRESENT /HPF
WBC'S/HIGH POWER FIELD, WET PREP: NORMAL
YEAST, WET PREP: NORMAL

## 2023-08-14 PROCEDURE — 87088 URINE BACTERIA CULTURE: CPT

## 2023-08-14 PROCEDURE — 87210 SMEAR WET MOUNT SALINE/INK: CPT

## 2023-08-14 PROCEDURE — 87186 SC STD MICRODIL/AGAR DIL: CPT

## 2023-08-14 PROCEDURE — 87086 URINE CULTURE/COLONY COUNT: CPT

## 2023-08-14 PROCEDURE — 81001 URINALYSIS AUTO W/SCOPE: CPT

## 2023-08-15 DIAGNOSIS — N30.00 ACUTE CYSTITIS WITHOUT HEMATURIA: Primary | ICD-10-CM

## 2023-08-15 RX ORDER — CEPHALEXIN 500 MG/1
500 CAPSULE ORAL 2 TIMES DAILY
Qty: 10 CAPSULE | Refills: 0 | Status: SHIPPED | OUTPATIENT
Start: 2023-08-15 | End: 2023-08-20

## 2023-08-16 LAB — BACTERIA UR CULT: ABNORMAL

## 2023-08-17 DIAGNOSIS — N30.00 ACUTE CYSTITIS WITHOUT HEMATURIA: Primary | ICD-10-CM

## 2023-08-17 RX ORDER — GRANULES FOR ORAL 3 G/1
3 POWDER ORAL ONCE
Qty: 1 PACKET | Refills: 0 | Status: SHIPPED | OUTPATIENT
Start: 2023-08-17 | End: 2023-08-17

## 2023-08-17 NOTE — RESULT ENCOUNTER NOTE
Dr. Vasquez,     Tried to call patient to check about the dextrose allergy, but not successful. I feel like this reaction might not be a true allergy and it might be to the IV dextrose.  I was checking dailyMed. Nitrofu-- has  STARCH, CORN (most people with dextrose allergy are allergic to corn)    Try Fosfomycin-- comparable to nitrofu in efficacy. --- it has sucrose rather than corn starch   --- No oral fourth generation cephalosporins .     Sorry, I had a busy day did not get a chance to answer fast.   Thanks

## 2023-08-25 ENCOUNTER — PATIENT OUTREACH (OUTPATIENT)
Dept: FAMILY MEDICINE | Facility: CLINIC | Age: 68
End: 2023-08-25
Payer: COMMERCIAL

## 2023-08-25 NOTE — TELEPHONE ENCOUNTER
Patient Quality Outreach Health Maintenance - PAL RN    Summary:    PAL RN contacted pt regarding overdue health maintenance    Patient is due/failing the following:       Topic Date Due    Pneumococcal Vaccine (1 - PCV) Never done    COVID-19 Vaccine (3 - Moderna series) 03/08/2023    Flu Vaccine (1) 09/01/2023     Health Maintenance Due   Topic Date Due    Pneumococcal Vaccine: 65+ Years (1 - PCV) Never done    COVID-19 Vaccine (3 - Moderna series) 03/08/2023    INFLUENZA VACCINE (1) 09/01/2023     Type of outreach:    Chart review performed, no outreach needed. Will outreach to patient when influenza vaccine available    - Advised patient if any questions or concerns comes up to call the PAL RN.   - Patient given opportunity to ask questions and at this time there is nothing further needed.     Questions for provider review:    None     Randy Kraus, RN  Patient Advocate Liaison (PAL)  [facility]  [phone]    08/25/2023 at 4:16 PM

## 2023-09-02 ENCOUNTER — OFFICE VISIT (OUTPATIENT)
Dept: URGENT CARE | Facility: URGENT CARE | Age: 68
End: 2023-09-02
Payer: COMMERCIAL

## 2023-09-02 ENCOUNTER — ANCILLARY PROCEDURE (OUTPATIENT)
Dept: GENERAL RADIOLOGY | Facility: CLINIC | Age: 68
End: 2023-09-02
Attending: FAMILY MEDICINE
Payer: COMMERCIAL

## 2023-09-02 VITALS — DIASTOLIC BLOOD PRESSURE: 76 MMHG | OXYGEN SATURATION: 95 % | SYSTOLIC BLOOD PRESSURE: 119 MMHG | HEART RATE: 111 BPM

## 2023-09-02 DIAGNOSIS — S99.921A FOOT INJURY, RIGHT, INITIAL ENCOUNTER: Primary | ICD-10-CM

## 2023-09-02 DIAGNOSIS — S99.912A ANKLE INJURY, LEFT, INITIAL ENCOUNTER: ICD-10-CM

## 2023-09-02 DIAGNOSIS — S92.501A CLOSED FRACTURE OF PHALANX OF RIGHT FIFTH TOE, INITIAL ENCOUNTER: ICD-10-CM

## 2023-09-02 PROCEDURE — 73630 X-RAY EXAM OF FOOT: CPT | Mod: TC | Performed by: RADIOLOGY

## 2023-09-02 PROCEDURE — 73610 X-RAY EXAM OF ANKLE: CPT | Mod: TC | Performed by: RADIOLOGY

## 2023-09-02 PROCEDURE — 99214 OFFICE O/P EST MOD 30 MIN: CPT | Performed by: FAMILY MEDICINE

## 2023-09-02 NOTE — PROGRESS NOTES
Chief Complaint   Patient presents with    Urgent Care     Pt had a trip and bruised her toe and then  fell yesterday and  hurt both feet and bruised her right knee. Swelling on ankle       Mariela was seen today for urgent care.    Diagnoses and all orders for this visit:    Foot injury, right, initial encounter  -     XR Foot Right G/E 3 Views    Ankle injury, left, initial encounter  -     XR Ankle Left G/E 3 Views    Closed fracture of phalanx of right fifth toe, initial encounter  -     Ankle/Foot Bracing Supplies DME Post-op Shoe; Right        D/d  Ankle  sprain, contusion, fracture, and tendon injury    PLAN:  NSAID, ice suggested  activity modification  tj-taped for 2 to 4 weeks to heal.  Continue wearing post op shoe for next 4 weeks   See orders in [facility].      .SUBJECTIVE:  Mariela Duffy is a 67 year old female who complains of inversion injury to the left ankle 1 days ago.and also right foot injury  Immediate symptoms: immediate pain. Symptoms have been gradual since that time. Prior history of related problems: no prior problems with this area in the past. There is pain and swelling at the lateral aspect of that ankle. There is bruising noted involving the dorsum of the right foot  This happened when she tripped and fell but did not hit her head       OBJECTIVE:  She appears well, vital signs are normal. There is swelling and tenderness over the lateral malleolus. No tenderness over the medial aspect of the ankle. The fifth metatarsal is not tender. Right foot - there is extensive bruising noted over the dorsum of the right foot involving the 4th and 5th metatarsal bones The ankle joint is intact without excessive opening on stressing. X-ray: soft tissue swelling , there was fracture noted in the proximal phalanx of the right fifth toe no fracture noted over the left ankle  The rest of the foot, ankle and leg exam is normal.    Megan Schuler MD

## 2023-09-02 NOTE — PATIENT INSTRUCTIONS
Your broken toe may need to be tj-taped for 2 to 4 weeks to heal.  Rest and protect your toe. Do not walk on it until you can do so without too much pain. If the doctor has told you to use crutches, use them as instructed.Put ice or a cold pack on your toe for 10 to 20 minutes at a time. Try to do this every 1 to 2 hours for the next 3 days (when you are awake) or until the swelling goes down.    Megan Schuler MD

## 2023-09-05 ENCOUNTER — TELEPHONE (OUTPATIENT)
Dept: FAMILY MEDICINE | Facility: CLINIC | Age: 68
End: 2023-09-05
Payer: COMMERCIAL

## 2023-09-05 NOTE — TELEPHONE ENCOUNTER
- VM on PAL RN Line concerning setting up home care services to assist with cares    - PAL RN to call back 9/6/23.    Randy Kraus, RN  Patient Advocate Liaison (PAL)  [facility]  [phone]    09/05/2023 at 3:59 PM

## 2023-09-06 NOTE — TELEPHONE ENCOUNTER
- LMTCB # 1 with daughter Dominique (CTC on file) concerning below    Randy Kraus RN  Patient Advocate Liaison (PAL)  [facility]  [phone]    09/06/2023 at 9:54 AM

## 2023-09-07 NOTE — TELEPHONE ENCOUNTER
- Huddled with provider, a visit would have to be conducted per Dr. RANDOLPH.    - Called daughter back, advised of need for visit, OK to schedule this to discuss. Preference to telephone visit due to limited technology.    Sep 11, 2023  5:30 PM  (Arrive by 5:10 PM)  Provider Visit with Reba Tillman MD  [facility] ([facility] ) [phone]     - PAL RN requests if there is any other things needed at this time, advised that there is nothing else.     Randy Kraus RN  Patient Advocate Liaison (PAL)  [facility]  [phone]    09/07/2023 at 5:42 PM

## 2023-10-02 DIAGNOSIS — E89.0 POSTOPERATIVE HYPOTHYROIDISM: ICD-10-CM

## 2023-10-02 DIAGNOSIS — K52.9 CHRONIC DIARRHEA: ICD-10-CM

## 2023-10-02 DIAGNOSIS — E78.5 HYPERLIPIDEMIA LDL GOAL <100: ICD-10-CM

## 2023-10-03 RX ORDER — PERPHENAZINE 8 MG
1 TABLET ORAL DAILY
Qty: 90 CAPSULE | Refills: 1 | Status: SHIPPED | OUTPATIENT
Start: 2023-10-03 | End: 2024-08-26

## 2023-10-03 RX ORDER — LEVOTHYROXINE SODIUM 137 UG/1
TABLET ORAL
Qty: 90 TABLET | Refills: 0 | Status: SHIPPED | OUTPATIENT
Start: 2023-10-03 | End: 2024-01-19

## 2023-10-03 RX ORDER — ATORVASTATIN CALCIUM 20 MG/1
20 TABLET, FILM COATED ORAL DAILY
Qty: 90 TABLET | Refills: 0 | Status: SHIPPED | OUTPATIENT
Start: 2023-10-03 | End: 2024-02-24

## 2023-10-05 DIAGNOSIS — E89.0 POSTOPERATIVE HYPOTHYROIDISM: ICD-10-CM

## 2023-10-11 DIAGNOSIS — E89.0 POSTOPERATIVE HYPOTHYROIDISM: ICD-10-CM

## 2023-10-11 RX ORDER — LEVOTHYROXINE SODIUM 137 UG/1
TABLET ORAL
Qty: 90 TABLET | Refills: 0 | OUTPATIENT
Start: 2023-10-11

## 2023-10-12 RX ORDER — LEVOTHYROXINE SODIUM 137 UG/1
TABLET ORAL
Qty: 90 TABLET | Refills: 0 | OUTPATIENT
Start: 2023-10-12

## 2023-10-13 ENCOUNTER — PATIENT OUTREACH (OUTPATIENT)
Dept: GASTROENTEROLOGY | Facility: CLINIC | Age: 68
End: 2023-10-13
Payer: COMMERCIAL

## 2023-10-14 DIAGNOSIS — E78.5 HYPERLIPIDEMIA LDL GOAL <100: ICD-10-CM

## 2023-10-17 RX ORDER — ATORVASTATIN CALCIUM 20 MG/1
20 TABLET, FILM COATED ORAL DAILY
Qty: 90 TABLET | Refills: 0 | OUTPATIENT
Start: 2023-10-17

## 2024-01-19 ENCOUNTER — OFFICE VISIT (OUTPATIENT)
Dept: URGENT CARE | Facility: URGENT CARE | Age: 69
End: 2024-01-19
Payer: COMMERCIAL

## 2024-01-19 VITALS
TEMPERATURE: 97.1 F | DIASTOLIC BLOOD PRESSURE: 74 MMHG | OXYGEN SATURATION: 96 % | HEART RATE: 96 BPM | RESPIRATION RATE: 14 BRPM | SYSTOLIC BLOOD PRESSURE: 103 MMHG

## 2024-01-19 DIAGNOSIS — A08.4 VIRAL GASTROENTERITIS: ICD-10-CM

## 2024-01-19 DIAGNOSIS — E89.0 POSTOPERATIVE HYPOTHYROIDISM: ICD-10-CM

## 2024-01-19 DIAGNOSIS — H81.10 BENIGN PAROXYSMAL POSITIONAL VERTIGO, UNSPECIFIED LATERALITY: Primary | ICD-10-CM

## 2024-01-19 LAB
BASOPHILS # BLD AUTO: 0 10E3/UL (ref 0–0.2)
BASOPHILS NFR BLD AUTO: 0 %
EOSINOPHIL # BLD AUTO: 0.1 10E3/UL (ref 0–0.7)
EOSINOPHIL NFR BLD AUTO: 1 %
ERYTHROCYTE [DISTWIDTH] IN BLOOD BY AUTOMATED COUNT: 13.4 % (ref 10–15)
GLUCOSE BLD-MCNC: 98 MG/DL (ref 60–99)
HCT VFR BLD AUTO: 39.4 % (ref 35–47)
HGB BLD-MCNC: 13.6 G/DL (ref 11.7–15.7)
IMM GRANULOCYTES # BLD: 0 10E3/UL
IMM GRANULOCYTES NFR BLD: 0 %
LYMPHOCYTES # BLD AUTO: 2.1 10E3/UL (ref 0.8–5.3)
LYMPHOCYTES NFR BLD AUTO: 19 %
MCH RBC QN AUTO: 28.6 PG (ref 26.5–33)
MCHC RBC AUTO-ENTMCNC: 34.5 G/DL (ref 31.5–36.5)
MCV RBC AUTO: 83 FL (ref 78–100)
MONOCYTES # BLD AUTO: 0.6 10E3/UL (ref 0–1.3)
MONOCYTES NFR BLD AUTO: 5 %
NEUTROPHILS # BLD AUTO: 8.6 10E3/UL (ref 1.6–8.3)
NEUTROPHILS NFR BLD AUTO: 75 %
PLATELET # BLD AUTO: 450 10E3/UL (ref 150–450)
RBC # BLD AUTO: 4.75 10E6/UL (ref 3.8–5.2)
WBC # BLD AUTO: 11.5 10E3/UL (ref 4–11)

## 2024-01-19 PROCEDURE — 82947 ASSAY GLUCOSE BLOOD QUANT: CPT | Performed by: PHYSICIAN ASSISTANT

## 2024-01-19 PROCEDURE — 36415 COLL VENOUS BLD VENIPUNCTURE: CPT | Performed by: PHYSICIAN ASSISTANT

## 2024-01-19 PROCEDURE — 99215 OFFICE O/P EST HI 40 MIN: CPT | Mod: 25 | Performed by: PHYSICIAN ASSISTANT

## 2024-01-19 PROCEDURE — 93005 ELECTROCARDIOGRAM TRACING: CPT | Performed by: PHYSICIAN ASSISTANT

## 2024-01-19 PROCEDURE — 85025 COMPLETE CBC W/AUTO DIFF WBC: CPT | Performed by: PHYSICIAN ASSISTANT

## 2024-01-19 RX ORDER — FLUTICASONE PROPIONATE 50 MCG
2 SPRAY, SUSPENSION (ML) NASAL DAILY
Qty: 9.9 ML | Refills: 0 | Status: SHIPPED | OUTPATIENT
Start: 2024-01-19 | End: 2024-02-02

## 2024-01-19 RX ORDER — LEVOTHYROXINE SODIUM 137 UG/1
137 TABLET ORAL DAILY
Qty: 90 TABLET | Refills: 0 | Status: SHIPPED | OUTPATIENT
Start: 2024-01-19 | End: 2024-02-21

## 2024-01-19 RX ORDER — MECLIZINE HCL 12.5 MG 12.5 MG/1
12.5 TABLET ORAL 3 TIMES DAILY PRN
Qty: 15 TABLET | Refills: 0 | Status: SHIPPED | OUTPATIENT
Start: 2024-01-19 | End: 2024-01-24

## 2024-01-19 NOTE — PROGRESS NOTES
Assessment & Plan:      Problem List Items Addressed This Visit    None  Visit Diagnoses       Benign paroxysmal positional vertigo, unspecified laterality    -  Primary    Relevant Medications    fluticasone (FLONASE) 50 MCG/ACT nasal spray    meclizine (ANTIVERT) 12.5 MG tablet    Other Relevant Orders    EKG 12-lead, tracing only (Completed)    CBC with platelets and differential (Completed)    Glucose, whole blood (Completed)    Postoperative hypothyroidism        Relevant Medications    levothyroxine (SYNTHROID/LEVOTHROID) 137 MCG tablet    Viral gastroenteritis              Medical Decision Making  Patient presents for dizziness, reduced hearing in the right ear, and emesis.  Reduced hearing sensation in the right ear is likely secondary to the moderate cerumen impaction seen.  Discussed appropriate ear care.  Also recommend trial of nasal steroids to help with your effusion seen on physical exam.  Patient's dizziness workup is otherwise benign with EKG showing normal sinus rhythm.  CBC shows slight elevation in white blood cell count consistent with acute viral gastroenteritis.  Viral gastroenteritis seems to be showing good signs of improvement with patient tolerating food and fluids appropriately at this time.  Blood sugars appropriate.  Dizziness appears consistent with positional vertigo.  Recommend trial of meclizine.  Discussed treatment and symptomatic care.  Allergies and medication interactions reviewed.  Discussed signs of worsening symptoms and when to follow-up with PCP if no symptom improvement.    40 minutes spent in total for charting, chart review, patient examination, and discussion with patient on labs, counseling, and coordination of care as listed above.     Subjective:      Mariela Duffy is a 68 year old female here for evaluation of dizziness, reduced hearing in the right ear, and emesis.  Onset of symptoms was 2 to 3 days ago.  Patient initially started with emesis that has been  showing good signs of improvement.  Today patient noted dizziness with movements of the head that quickly improves as long as she sits still.  She states she is tolerating fluids appropriately.  She also notes reduced hearing in the right ear, but no ear pains.  Patient states she recently ran out of her thyroid medications and is requesting a refill.     The following portions of the patient's history were reviewed and updated as appropriate: allergies, current medications, and problem list.     Review of Systems  Pertinent items are noted in HPI.    Allergies  Allergies   Allergen Reactions    Dextrose Unknown    Levaquin Nausea and Vomiting       Family History   Problem Relation Age of Onset    Cancer Father         Colon, stomach -  at 75yoa    Hypertension Father     C.A.D. Father     Colon Cancer Father     Other Cancer Father     Cancer Mother         Throat, lymph, bone -  at 79yoa    Other Cancer Mother     C.A.D. Maternal Grandfather         Heart Attack -  in his late 60's    Alzheimer Disease Paternal Grandmother     C.A.D. Paternal Grandfather         Heart Attack -  at 63yoa    Thyroid Disease Other        Social History     Tobacco Use    Smoking status: Former     Years: 5     Types: Cigarettes     Start date: 5/10/1973     Quit date: 1977     Years since quittin.0    Smokeless tobacco: Never   Substance Use Topics    Alcohol use: Yes     Comment: Once a month maybe        Objective:      /74   Pulse 96   Temp 97.1  F (36.2  C) (Tympanic)   Resp 14   LMP  (LMP Unknown)   SpO2 96%   General appearance - alert, well appearing, and in no distress and non-toxic  Ears - right: Moderate cerumen impaction, otherwise TMs intact with mild serous fluid and bulging bilaterally, no erythema  Nose - normal and patent, no erythema, discharge or polyps  Mouth - mucous membranes moist, pharynx normal without lesions  Neck - supple, no significant adenopathy  Chest - clear to  auscultation, no wheezes, rales or rhonchi, symmetric air entry  Heart - normal rate, regular rhythm, normal S1, S2, no murmurs, rubs, clicks or gallops     Lab & Imaging Results    Results for orders placed or performed in visit on 01/19/24   Glucose, whole blood     Status: Normal   Result Value Ref Range    Glucose Whole Blood 98 60 - 99 mg/dL   CBC with platelets and differential     Status: Abnormal   Result Value Ref Range    WBC Count 11.5 (H) 4.0 - 11.0 10e3/uL    RBC Count 4.75 3.80 - 5.20 10e6/uL    Hemoglobin 13.6 11.7 - 15.7 g/dL    Hematocrit 39.4 35.0 - 47.0 %    MCV 83 78 - 100 fL    MCH 28.6 26.5 - 33.0 pg    MCHC 34.5 31.5 - 36.5 g/dL    RDW 13.4 10.0 - 15.0 %    Platelet Count 450 150 - 450 10e3/uL    % Neutrophils 75 %    % Lymphocytes 19 %    % Monocytes 5 %    % Eosinophils 1 %    % Basophils 0 %    % Immature Granulocytes 0 %    Absolute Neutrophils 8.6 (H) 1.6 - 8.3 10e3/uL    Absolute Lymphocytes 2.1 0.8 - 5.3 10e3/uL    Absolute Monocytes 0.6 0.0 - 1.3 10e3/uL    Absolute Eosinophils 0.1 0.0 - 0.7 10e3/uL    Absolute Basophils 0.0 0.0 - 0.2 10e3/uL    Absolute Immature Granulocytes 0.0 <=0.4 10e3/uL   CBC with platelets and differential     Status: Abnormal    Narrative    The following orders were created for panel order CBC with platelets and differential.  Procedure                               Abnormality         Status                     ---------                               -----------         ------                     CBC with platelets and d...[[id number]]  Abnormal            Final result                 Please view results for these tests on the individual orders.       I personally reviewed these results and discussed findings with the patient.    The use of Dragon/PowerMic dictation services was used to construct the content of this note; any grammatical errors are non-intentional. Please contact the author directly if you are in need of any clarification.

## 2024-02-07 ENCOUNTER — HOSPITAL ENCOUNTER (EMERGENCY)
Facility: CLINIC | Age: 69
Discharge: HOME | End: 2024-02-07
Attending: EMERGENCY MEDICINE | Admitting: EMERGENCY MEDICINE
Payer: COMMERCIAL

## 2024-02-07 VITALS
SYSTOLIC BLOOD PRESSURE: 125 MMHG | DIASTOLIC BLOOD PRESSURE: 79 MMHG | HEART RATE: 94 BPM | RESPIRATION RATE: 18 BRPM | OXYGEN SATURATION: 96 % | TEMPERATURE: 98.8 F

## 2024-02-07 DIAGNOSIS — N39.0 URINARY TRACT INFECTION IN ELDERLY PATIENT: ICD-10-CM

## 2024-02-07 LAB
ALBUMIN UR-MCNC: 30 MG/DL
APPEARANCE UR: ABNORMAL
BACTERIA #/AREA URNS HPF: ABNORMAL /HPF
BILIRUB UR QL STRIP: NEGATIVE
COLOR UR AUTO: YELLOW
GLUCOSE UR STRIP-MCNC: NEGATIVE MG/DL
HGB UR QL STRIP: ABNORMAL
KETONES UR STRIP-MCNC: ABNORMAL MG/DL
LEUKOCYTE ESTERASE UR QL STRIP: ABNORMAL
NITRATE UR QL: POSITIVE
PH UR STRIP: 6 [PH] (ref 5–7)
RBC URINE: 132 /HPF
SP GR UR STRIP: 1 (ref 1–1.03)
SQUAMOUS EPITHELIAL: 2 /HPF
UROBILINOGEN UR STRIP-MCNC: NORMAL MG/DL
WBC CLUMPS #/AREA URNS HPF: PRESENT /HPF
WBC URINE: >182 /HPF

## 2024-02-07 PROCEDURE — 87186 SC STD MICRODIL/AGAR DIL: CPT | Performed by: EMERGENCY MEDICINE

## 2024-02-07 PROCEDURE — 250N000013 HC RX MED GY IP 250 OP 250 PS 637: Performed by: EMERGENCY MEDICINE

## 2024-02-07 PROCEDURE — 87086 URINE CULTURE/COLONY COUNT: CPT | Performed by: EMERGENCY MEDICINE

## 2024-02-07 PROCEDURE — 81001 URINALYSIS AUTO W/SCOPE: CPT | Performed by: EMERGENCY MEDICINE

## 2024-02-07 PROCEDURE — 99283 EMERGENCY DEPT VISIT LOW MDM: CPT

## 2024-02-07 RX ORDER — SULFAMETHOXAZOLE/TRIMETHOPRIM 800-160 MG
1 TABLET ORAL 2 TIMES DAILY
Qty: 10 TABLET | Refills: 0 | Status: SHIPPED | OUTPATIENT
Start: 2024-02-07 | End: 2024-02-12

## 2024-02-07 RX ORDER — SULFAMETHOXAZOLE/TRIMETHOPRIM 800-160 MG
1 TABLET ORAL ONCE
Status: COMPLETED | OUTPATIENT
Start: 2024-02-07 | End: 2024-02-07

## 2024-02-07 RX ORDER — CEPHALEXIN 500 MG/1
500 CAPSULE ORAL ONCE
Status: DISCONTINUED | OUTPATIENT
Start: 2024-02-07 | End: 2024-02-07

## 2024-02-07 RX ORDER — CEPHALEXIN 500 MG/1
500 CAPSULE ORAL 2 TIMES DAILY
Qty: 14 CAPSULE | Refills: 0 | Status: SHIPPED | OUTPATIENT
Start: 2024-02-07 | End: 2024-02-07

## 2024-02-07 RX ADMIN — SULFAMETHOXAZOLE AND TRIMETHOPRIM 1 TABLET: 800; 160 TABLET ORAL at 13:11

## 2024-02-07 ASSESSMENT — ACTIVITIES OF DAILY LIVING (ADL): ADLS_ACUITY_SCORE: 35

## 2024-02-07 NOTE — ED TRIAGE NOTES
Pt presents to ED from her care facility due to concerns for burning on urination. Symptoms started possible two days ago. Family states that pt is oriented to self and place and at times oriented to time at baseline but feels she is more forgetful recently. Has had testing for alzheimer's recently.   . Seen at  but left without being seen.     Daughter Joy, is the POA and can be reached with updates at [phone]
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
## 2024-02-08 ENCOUNTER — TELEPHONE (OUTPATIENT)
Dept: FAMILY MEDICINE | Facility: CLINIC | Age: 69
End: 2024-02-08
Payer: COMMERCIAL

## 2024-02-08 LAB — BACTERIA UR CULT: ABNORMAL

## 2024-02-08 NOTE — TELEPHONE ENCOUNTER
Reason for Call:  Appointment Request    Patient requesting this type of appt:  Hospital/ED Follow-Up for UTI and confusion     Requested provider: Reba Raymundo    Reason patient unable to be scheduled: Not within requested timeframe    When does patient want to be seen/preferred time: 3-7 days    Comments: Patient is sooner is scheduled for March 4th with PCP but is hoping to be seen sooner per emergency rooms instructions and due to the increasing confusion    Could we send this information to you in [facility] or would you prefer to receive a phone call?:   Patient would prefer a phone call   Okay to leave a detailed message?: Yes at Other phone number:  Please call Kiley moya [phone]    Call taken on 2/8/2024 at 12:09 PM by Linda Taylor

## 2024-02-08 NOTE — TELEPHONE ENCOUNTER
Called patient, scheduled appointment for 02/14/24 with Dr. Morgan Tillman. Also called daughter Kiley @ [phone] left [facility] to call clinic. Ruth Behrens.

## 2024-02-09 ENCOUNTER — TELEPHONE (OUTPATIENT)
Dept: EMERGENCY MEDICINE | Facility: CLINIC | Age: 69
End: 2024-02-09
Payer: COMMERCIAL

## 2024-02-09 DIAGNOSIS — E89.0 POSTOPERATIVE HYPOTHYROIDISM: ICD-10-CM

## 2024-02-09 RX ORDER — LEVOTHYROXINE SODIUM 137 UG/1
137 TABLET ORAL DAILY
Qty: 90 TABLET | Refills: 0 | OUTPATIENT
Start: 2024-02-09

## 2024-02-09 NOTE — TELEPHONE ENCOUNTER
[facility]    Reason for call: Lab Result Notification     Lab Result (including Rx patient on, if applicable).  If culture, copy of lab report at bottom.  Lab Result: Final Urine Culture Report on 2/8/24  [facility] Emergency Dept discharge antibiotic prescribed: Sulfamethoxazole-Trimethoprim (Bactrim DS, Septra DS) 800-160 mg PO tablet,  1 tablet by mouth 2 times daily for 5 days.  #1. Bacteria, >100,000 CFU/ML Enterobacter cloacae complex is SUSCEPTIBLE to Antibiotic.    No change in treatment per [facility] ED lab result Urine Culture protocol.    Creatinine Level (mg/dl)   Creatinine   Date Value Ref Range Status   04/17/2023 0.74 0.51 - 0.95 mg/dL Final   07/06/2021 0.92 0.52 - 1.04 mg/dL Final    Creatinine clearance (ml/min), if applicable    Serum creatinine: 0.74 mg/dL 04/17/23 1201  Estimated creatinine clearance: 85.5 mL/min     Patient's current Symptoms:   Left voicemail message requesting a call back to [facility] ED Lab Result RN at [phone]. RN is available every day between 9 a.m. and 5:30 p.m.      Robin Parker RN

## 2024-02-14 ENCOUNTER — PATIENT OUTREACH (OUTPATIENT)
Dept: FAMILY MEDICINE | Facility: CLINIC | Age: 69
End: 2024-02-14

## 2024-02-14 DIAGNOSIS — E89.0 POSTOPERATIVE HYPOTHYROIDISM: ICD-10-CM

## 2024-02-14 NOTE — TELEPHONE ENCOUNTER
- Daughter Joy (CTC on file), advised that a home health RN scheduled a visit with patient for tomorrow 2/15/24, but she was not aware of any orders that had been placed, or that they were contacted to actually schedule this visit.     - Wondering if there was anything in the chart indicating that orders had been placed or outreach conducted. Upon chart review, nothing recently was ordered concerning home health, either from the primary care team or the hospital.     - Call back to Joy to advise, LMTCB # 1. Will also discuss in person at appointment today.    Feb 14, 2024  1:00 PM  (Arrive by 12:45 PM)  ED/Hospital Follow Up with April Lizbeth iTllman MD  [facility] ([facility] ) [phone]     - Closing this encounter for now.    Randy Kraus RN  Patient Advocate Liaison (PAL)  [facility]  [phone]    02/14/2024 at 9:06 AM

## 2024-02-14 NOTE — TELEPHONE ENCOUNTER
Patient Quality Outreach    Patient is due for the following:       Topic Date Due    Pneumococcal Vaccine (1 of 1 - PCV) Never done    Flu Vaccine (1) 09/01/2023    COVID-19 Vaccine (3 - 2023-24 season) 09/01/2023     Next Steps:   Patient has upcoming appointment, these items will be addressed at that time.    Type of outreach:    Phone, left message for patient/parent to call back.    Questions for provider review:    None         Randy Kraus RN  Patient Advocate Liaison (PAL)  [facility]  [phone]    02/14/2024 at 9:07 AM

## 2024-02-15 ENCOUNTER — TRANSFERRED RECORDS (OUTPATIENT)
Dept: HEALTH INFORMATION MANAGEMENT | Facility: CLINIC | Age: 69
End: 2024-02-15
Payer: COMMERCIAL

## 2024-02-16 RX ORDER — LEVOTHYROXINE SODIUM 137 UG/1
137 TABLET ORAL DAILY
Qty: 90 TABLET | Refills: 0 | OUTPATIENT
Start: 2024-02-16

## 2024-02-21 DIAGNOSIS — E89.0 POSTOPERATIVE HYPOTHYROIDISM: ICD-10-CM

## 2024-02-21 NOTE — TELEPHONE ENCOUNTER
- Call from Daughter (CHER Hamilton on file) LM on PAL RN Line  - Patient is out of her Levothyroxine due to the prescription being lost. Patient is requesting a refill sent in before appointment with Dr. RANDOLPH in early March.    - Routing high Priority to PCP as patient is out of script and was previously prescribed by Katelynn Moreira PA-C and then a  provider, wanting to make sure it goes to PCP.     Randy Kraus, RN  Patient Advocate Liaison (PAL)  [facility]  [phone]    02/21/2024 at 11:36 AM

## 2024-02-23 ENCOUNTER — OFFICE VISIT (OUTPATIENT)
Dept: URGENT CARE | Facility: URGENT CARE | Age: 69
End: 2024-02-23
Payer: COMMERCIAL

## 2024-02-23 VITALS
BODY MASS INDEX: 28.56 KG/M2 | TEMPERATURE: 98.1 F | DIASTOLIC BLOOD PRESSURE: 66 MMHG | SYSTOLIC BLOOD PRESSURE: 116 MMHG | RESPIRATION RATE: 18 BRPM | WEIGHT: 192 LBS | OXYGEN SATURATION: 96 % | HEART RATE: 98 BPM

## 2024-02-23 DIAGNOSIS — R30.0 DYSURIA: ICD-10-CM

## 2024-02-23 DIAGNOSIS — N39.0 RECURRENT UTI (URINARY TRACT INFECTION): Primary | ICD-10-CM

## 2024-02-23 LAB
ALBUMIN UR-MCNC: >=300 MG/DL
APPEARANCE UR: CLEAR
BACTERIA #/AREA URNS HPF: ABNORMAL /HPF
BILIRUB UR QL STRIP: ABNORMAL
COLOR UR AUTO: YELLOW
GLUCOSE UR STRIP-MCNC: NEGATIVE MG/DL
HGB UR QL STRIP: ABNORMAL
KETONES UR STRIP-MCNC: NEGATIVE MG/DL
LEUKOCYTE ESTERASE UR QL STRIP: ABNORMAL
NITRATE UR QL: POSITIVE
PH UR STRIP: 6.5 [PH] (ref 5–7)
RBC #/AREA URNS AUTO: ABNORMAL /HPF
SP GR UR STRIP: 1.02 (ref 1–1.03)
SQUAMOUS #/AREA URNS AUTO: ABNORMAL /LPF
UROBILINOGEN UR STRIP-ACNC: 0.2 E.U./DL
WBC #/AREA URNS AUTO: >100 /HPF

## 2024-02-23 PROCEDURE — 81001 URINALYSIS AUTO W/SCOPE: CPT

## 2024-02-23 PROCEDURE — 87086 URINE CULTURE/COLONY COUNT: CPT

## 2024-02-23 PROCEDURE — 99213 OFFICE O/P EST LOW 20 MIN: CPT

## 2024-02-23 PROCEDURE — 87186 SC STD MICRODIL/AGAR DIL: CPT

## 2024-02-23 PROCEDURE — 87088 URINE BACTERIA CULTURE: CPT

## 2024-02-23 RX ORDER — LEVOTHYROXINE SODIUM 137 UG/1
137 TABLET ORAL DAILY
Qty: 90 TABLET | Refills: 0 | Status: SHIPPED | OUTPATIENT
Start: 2024-02-23

## 2024-02-23 RX ORDER — CEFDINIR 300 MG/1
300 CAPSULE ORAL 2 TIMES DAILY
Qty: 14 CAPSULE | Refills: 0 | Status: SHIPPED | OUTPATIENT
Start: 2024-02-23 | End: 2024-03-01

## 2024-02-23 RX ORDER — SULFAMETHOXAZOLE/TRIMETHOPRIM 800-160 MG
1 TABLET ORAL 2 TIMES DAILY
Qty: 14 TABLET | Refills: 0 | Status: SHIPPED | OUTPATIENT
Start: 2024-02-23 | End: 2024-02-23

## 2024-02-23 ASSESSMENT — ENCOUNTER SYMPTOMS
FREQUENCY: 1
CONSTITUTIONAL NEGATIVE: 1
CARDIOVASCULAR NEGATIVE: 1
FLANK PAIN: 0
DYSURIA: 1
GASTROINTESTINAL NEGATIVE: 1
CONFUSION: 1
MUSCULOSKELETAL NEGATIVE: 1
RESPIRATORY NEGATIVE: 1

## 2024-02-23 NOTE — PATIENT INSTRUCTIONS
Based on your symptoms and urinalysis in clinic today, I suspect you have a urinary tract infection.     Take antibiotic as prescribed.  Take Bactrim twice daily by mouth for a total of 7 days. Make sure to finish the entire course of antibiotics, even if you start to feel better before you have finished.  Your urine culture is pending-we will let you know if we need to change the antibiotics, based on this result.    Antibiotics can sometimes disrupt your gut zachary and cause some GI upset - to help with this, take antibiotic with food and eat plenty of probiotic rich foods and/or drinks while taking the antibiotic.    Drink plenty of fluids.  Try not to hold your urine.  Urinate immediately after intercourse.    Follow-up with your primary care provider and discuss the need for further evaluation, referral to urology, and/or topical vaginal estrogen.    Tylenol as needed for fever or discomfort. May trial OTC Pyridium as needed. I recommend cranberry supplements as well. Increase fluids. Monitor for new or worsening symptoms.     Do not use Pyridium for more than 2 days.  If you are finding that you need it more than this, you should follow-up as you might not be on the correct antibiotic.    Please follow up if no improvement or worsening in 2-3 days. If fevers/chills, flank pain, follow up sooner.      Go to ER if you are experiencing shortness of breath, difficulty catching your breath, difficulty speaking in full sentences, chest pain, sweating, dizziness/light headedness, confusion/lethargy/altered mental status.

## 2024-02-23 NOTE — TELEPHONE ENCOUNTER
Upon chart review, LG signed off on the prescription. Call out to Daria Hamilton (CTC on file) to advise.    - Left detailed message that the prescription was sent to the pharmacy.    Randy Kraus RN  Patient Advocate Liaison (PAL)  [facility]  [phone]    02/23/2024 at 2:46 PM

## 2024-02-23 NOTE — TELEPHONE ENCOUNTER
- Call back from Daughter, states that the prescription has not been sent to the pharmacy and they are getting nervous going into the weekend that it will not get signed. PCP has not addressed as OOO.    - Routing to TRENA Moreira PA-C, please review and advise, OK to sign off on this prescription for the patient after she lost the last one.     Randy Kraus RN  Patient Advocate Liaison (PAL)  [facility]  [phone]    02/23/2024 at 1:42 PM
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
## 2024-02-24 ENCOUNTER — MYC REFILL (OUTPATIENT)
Dept: UROLOGY | Facility: CLINIC | Age: 69
End: 2024-02-24
Payer: COMMERCIAL

## 2024-02-24 ENCOUNTER — MYC REFILL (OUTPATIENT)
Dept: FAMILY MEDICINE | Facility: CLINIC | Age: 69
End: 2024-02-24
Payer: COMMERCIAL

## 2024-02-24 DIAGNOSIS — E78.5 HYPERLIPIDEMIA LDL GOAL <100: ICD-10-CM

## 2024-02-24 DIAGNOSIS — Z87.440 HISTORY OF UTI: ICD-10-CM

## 2024-02-24 DIAGNOSIS — F09 MILD COGNITIVE DISORDER: ICD-10-CM

## 2024-02-24 DIAGNOSIS — F41.1 GENERALIZED ANXIETY DISORDER: ICD-10-CM

## 2024-02-26 LAB — BACTERIA UR CULT: ABNORMAL

## 2024-02-26 RX ORDER — METHENAMINE HIPPURATE 1000 MG/1
1 TABLET ORAL 2 TIMES DAILY
Qty: 180 TABLET | Refills: 0 | Status: SHIPPED | OUTPATIENT
Start: 2024-02-26

## 2024-02-26 RX ORDER — OLANZAPINE 7.5 MG/1
7.5 TABLET, FILM COATED ORAL AT BEDTIME
Qty: 90 TABLET | Refills: 0 | Status: SHIPPED | OUTPATIENT
Start: 2024-02-26 | End: 2024-03-04

## 2024-02-26 RX ORDER — ATORVASTATIN CALCIUM 20 MG/1
20 TABLET, FILM COATED ORAL DAILY
Qty: 90 TABLET | Refills: 0 | Status: SHIPPED | OUTPATIENT
Start: 2024-02-26 | End: 2024-05-28

## 2024-03-01 ASSESSMENT — ANXIETY QUESTIONNAIRES
5. BEING SO RESTLESS THAT IT IS HARD TO SIT STILL: NOT AT ALL
GAD7 TOTAL SCORE: 8
IF YOU CHECKED OFF ANY PROBLEMS ON THIS QUESTIONNAIRE, HOW DIFFICULT HAVE THESE PROBLEMS MADE IT FOR YOU TO DO YOUR WORK, TAKE CARE OF THINGS AT HOME, OR GET ALONG WITH OTHER PEOPLE: VERY DIFFICULT
2. NOT BEING ABLE TO STOP OR CONTROL WORRYING: MORE THAN HALF THE DAYS
6. BECOMING EASILY ANNOYED OR IRRITABLE: NOT AT ALL
4. TROUBLE RELAXING: SEVERAL DAYS
1. FEELING NERVOUS, ANXIOUS, OR ON EDGE: NEARLY EVERY DAY
8. IF YOU CHECKED OFF ANY PROBLEMS, HOW DIFFICULT HAVE THESE MADE IT FOR YOU TO DO YOUR WORK, TAKE CARE OF THINGS AT HOME, OR GET ALONG WITH OTHER PEOPLE?: VERY DIFFICULT
3. WORRYING TOO MUCH ABOUT DIFFERENT THINGS: MORE THAN HALF THE DAYS
GAD7 TOTAL SCORE: 8
7. FEELING AFRAID AS IF SOMETHING AWFUL MIGHT HAPPEN: NOT AT ALL
7. FEELING AFRAID AS IF SOMETHING AWFUL MIGHT HAPPEN: NOT AT ALL

## 2024-03-02 DIAGNOSIS — F09 MILD COGNITIVE DISORDER: ICD-10-CM

## 2024-03-02 DIAGNOSIS — F41.1 GENERALIZED ANXIETY DISORDER: ICD-10-CM

## 2024-03-04 ENCOUNTER — OFFICE VISIT (OUTPATIENT)
Dept: FAMILY MEDICINE | Facility: CLINIC | Age: 69
End: 2024-03-04
Payer: COMMERCIAL

## 2024-03-04 VITALS
HEART RATE: 105 BPM | WEIGHT: 149.1 LBS | TEMPERATURE: 98.1 F | HEIGHT: 69 IN | RESPIRATION RATE: 15 BRPM | DIASTOLIC BLOOD PRESSURE: 66 MMHG | BODY MASS INDEX: 22.08 KG/M2 | SYSTOLIC BLOOD PRESSURE: 102 MMHG | OXYGEN SATURATION: 97 %

## 2024-03-04 DIAGNOSIS — R41.3 MEMORY PROBLEM: ICD-10-CM

## 2024-03-04 DIAGNOSIS — F41.1 GENERALIZED ANXIETY DISORDER: ICD-10-CM

## 2024-03-04 DIAGNOSIS — E78.5 HYPERLIPIDEMIA LDL GOAL <100: ICD-10-CM

## 2024-03-04 DIAGNOSIS — E89.0 POSTOPERATIVE HYPOTHYROIDISM: ICD-10-CM

## 2024-03-04 DIAGNOSIS — F01.518 VASCULAR DEMENTIA WITH BEHAVIORAL DISTURBANCE (H): ICD-10-CM

## 2024-03-04 DIAGNOSIS — F09 MILD COGNITIVE DISORDER: ICD-10-CM

## 2024-03-04 DIAGNOSIS — I72.8 SPLENIC ARTERY ANEURYSM (H): ICD-10-CM

## 2024-03-04 DIAGNOSIS — R79.9 ABNORMAL FINDING OF BLOOD CHEMISTRY, UNSPECIFIED: ICD-10-CM

## 2024-03-04 DIAGNOSIS — F33.8 SEASONAL AFFECTIVE DISORDER (H): ICD-10-CM

## 2024-03-04 DIAGNOSIS — N39.0 RECURRENT UTI: Primary | ICD-10-CM

## 2024-03-04 PROBLEM — F03.90 DEMENTIA WITHOUT BEHAVIORAL DISTURBANCE (H): Status: RESOLVED | Noted: 2021-04-14 | Resolved: 2024-03-04

## 2024-03-04 LAB
ALBUMIN UR-MCNC: NEGATIVE MG/DL
APPEARANCE UR: CLEAR
BACTERIA #/AREA URNS HPF: ABNORMAL /HPF
BILIRUB UR QL STRIP: NEGATIVE
COLOR UR AUTO: YELLOW
GLUCOSE UR STRIP-MCNC: NEGATIVE MG/DL
HGB UR QL STRIP: NEGATIVE
KETONES UR STRIP-MCNC: NEGATIVE MG/DL
LEUKOCYTE ESTERASE UR QL STRIP: ABNORMAL
NITRATE UR QL: NEGATIVE
PH UR STRIP: 6.5 [PH] (ref 5–7)
RBC #/AREA URNS AUTO: ABNORMAL /HPF
SP GR UR STRIP: 1.01 (ref 1–1.03)
SQUAMOUS #/AREA URNS AUTO: ABNORMAL /LPF
UROBILINOGEN UR STRIP-ACNC: 0.2 E.U./DL
WBC #/AREA URNS AUTO: ABNORMAL /HPF

## 2024-03-04 PROCEDURE — 81001 URINALYSIS AUTO W/SCOPE: CPT | Performed by: FAMILY MEDICINE

## 2024-03-04 PROCEDURE — 99214 OFFICE O/P EST MOD 30 MIN: CPT | Performed by: FAMILY MEDICINE

## 2024-03-04 RX ORDER — ATORVASTATIN CALCIUM 20 MG/1
20 TABLET, FILM COATED ORAL DAILY
Qty: 90 TABLET | Refills: 0 | Status: CANCELLED | OUTPATIENT
Start: 2024-03-04

## 2024-03-04 RX ORDER — OLANZAPINE 7.5 MG/1
7.5 TABLET, FILM COATED ORAL AT BEDTIME
Qty: 90 TABLET | Refills: 0 | Status: CANCELLED | OUTPATIENT
Start: 2024-03-04

## 2024-03-04 RX ORDER — DONEPEZIL HYDROCHLORIDE 5 MG/1
5 TABLET, FILM COATED ORAL AT BEDTIME
Qty: 90 TABLET | Refills: 1 | Status: SHIPPED | OUTPATIENT
Start: 2024-03-04

## 2024-03-04 RX ORDER — OLANZAPINE 7.5 MG/1
7.5 TABLET, FILM COATED ORAL AT BEDTIME
Qty: 90 TABLET | Refills: 0 | OUTPATIENT
Start: 2024-03-04

## 2024-03-04 RX ORDER — OLANZAPINE 7.5 MG/1
7.5 TABLET, FILM COATED ORAL AT BEDTIME
Qty: 90 TABLET | Refills: 0 | Status: SHIPPED | OUTPATIENT
Start: 2024-03-04 | End: 2024-06-03

## 2024-03-04 RX ORDER — CALCIUM CARBONATE 500 MG/1
1 TABLET, CHEWABLE ORAL DAILY
COMMUNITY

## 2024-03-04 RX ORDER — BUSPIRONE HYDROCHLORIDE 5 MG/1
5 TABLET ORAL DAILY
Qty: 90 TABLET | Refills: 1 | Status: SHIPPED | OUTPATIENT
Start: 2024-03-04 | End: 2024-06-06

## 2024-03-04 RX ORDER — LEVOTHYROXINE SODIUM 137 UG/1
137 TABLET ORAL DAILY
Qty: 90 TABLET | Refills: 0 | Status: CANCELLED | OUTPATIENT
Start: 2024-03-04

## 2024-03-04 ASSESSMENT — PAIN SCALES - GENERAL: PAINLEVEL: NO PAIN (0)

## 2024-03-04 NOTE — PROGRESS NOTES
Assessment & Plan     Recurrent UTI  Will recheck UA today, has urology follow up next month.  - UA Macroscopic with reflex to Microscopic and Culture - Lab Collect; Future  - UA Macroscopic with reflex to Microscopic and Culture - Lab Collect  - UA Microscopic with Reflex to Culture    Generalized anxiety disorder  Recommended taking this TID not PRN, monitor anxiety symptoms.  - busPIRone (BUSPAR) 5 MG tablet; Take 1 tablet (5 mg) by mouth daily    Postoperative hypothyroidism  Due for labs, recommendations pending results.  - TSH with free T4 reflex; Future  - TSH with free T4 reflex; Future    Mild cognitive disorder  Patient was not taking this for a while, will restart her on it.  They have Neurology follow up with [facility] later this month.  - donepezil (ARICEPT) 5 MG tablet; Take 1 tablet (5 mg) by mouth at bedtime    Memory problem  Seems memory is getting worse, though it could also be related to UTIs.  Will check labs for abnormalities.  - Vitamin B12; Future  - Folate; Future  - Vitamin B1 whole blood; Future  - Folate; Future  - Vitamin B12; Future  - Vitamin B1 whole blood; Future    Hyperlipidemia LDL goal <100  Due for labs, recommendations pending results.  - Lipid panel reflex to direct LDL Fasting; Future  - Comprehensive metabolic panel (BMP + Alb, Alk Phos, ALT, AST, Total. Bili, TP); Future  - CBC with platelets; Future  - Comprehensive metabolic panel; Future  - Lipid panel reflex to direct LDL Fasting; Future  - CBC with platelets; Future    Abnormal finding of blood chemistry, unspecified  - Hemoglobin A1c; Future  - Hemoglobin A1c; Future    Vascular dementia with behavioral disturbance (H)  Possibly worsened, Neuro follow up later this month.      Seasonal affective disorder (H24)  Stable on current medications.    Splenic artery aneurysm (H24)  Checked in 05/2023, stable.    30 minutes spent by me on the date of the encounter doing chart review, history and exam, documentation and further  activities per the note        See Patient Instructions    Subjective   Fely is a 68 year old, presenting for the following health issues:  Hospital F/U and Anxiety        3/4/2024     1:10 PM   Additional Questions   Roomed by Erica Vogel     HPI         ED/UC Followup:    Facility:  [facility]  Date of visit: 2024   Reason for visit: Urinary tract infection    Current Status:     No pain with urination, catheter was removed a while back, has trouble starting urination, getting more infections.  Has an appointment with urology in April.    Anxiety Follow-Up  How are you doing with your anxiety since your last visit? Worsened   Are you having other symptoms that might be associated with anxiety? Yes:  Shaking   Have you had a significant life event? No   Are you feeling depressed? No  Do you have any concerns with your use of alcohol or other drugs? No    Medication Followup of busPIRone (BUSPAR) 5MG  Taking Medication as prescribed: yes  Side Effects:  None  Medication Helping Symptoms:  NO     Anxiety is high, having more UTIs, not sure if it is the infections that are causing problems, does not remember talking about it.  She is alert to person, place, and time.  Memory problems seem stronger, more forgetful.  Will be following up with [facility] neurological clinic.  She is using the buspar more as needed, was out of the olanzaprine for a while.    Social History     Tobacco Use    Smoking status: Former     Years: 5     Types: Cigarettes     Start date: 5/10/1973     Quit date: 1977     Years since quittin.2    Smokeless tobacco: Never   Vaping Use    Vaping Use: Never used   Substance Use Topics    Alcohol use: Yes     Comment: Once a month maybe    Drug use: No         2023    12:11 PM 2024     1:20 PM 3/1/2024     5:42 PM   QUYEN-7 SCORE   Total Score 4 (minimal anxiety) 3 (minimal anxiety) 8 (mild anxiety)   Total Score 4 3 8         2023    12:10 PM  "2/13/2024     1:20 PM 3/4/2024     1:43 PM   PHQ   PHQ-9 Total Score 6 6 0   Q9: Thoughts of better off dead/self-harm past 2 weeks Not at all Not at all Not at all       Current Outpatient Medications   Medication Sig Dispense Refill    atorvastatin (LIPITOR) 20 MG tablet Take 1 tablet (20 mg) by mouth daily 90 tablet 0    busPIRone (BUSPAR) 5 MG tablet Take 1 tablet (5 mg) by mouth daily 90 tablet 1    CRANBERRY PO       dicyclomine (BENTYL) 20 MG tablet Take 20 mg by mouth      diphenoxylate-atropine (LOMOTIL) 2.5-0.025 MG tablet TAKE ONE TABLET BY MOUTH DAILY AS NEEDED FOR DIARRHEA 30 tablet 3    donepezil (ARICEPT) 5 MG tablet Take 1 tablet (5 mg) by mouth at bedtime 90 tablet 1    levothyroxine (SYNTHROID/LEVOTHROID) 137 MCG tablet Take 1 tablet (137 mcg) by mouth daily 90 tablet 0    loperamide (IMODIUM) 2 MG capsule Take 1 capsule (2 mg) by mouth 4 times daily as needed      methenamine hippurate (HIPREX) 1 g tablet Take 1 tablet (1 g) by mouth 2 times daily 180 tablet 0    multivitamin w/minerals (THERA-VIT-M) tablet Take 1 tablet by mouth daily      OLANZapine (ZYPREXA) 7.5 MG tablet Take 1 tablet (7.5 mg) by mouth at bedtime 90 tablet 0    UNABLE TO FIND MEDICATION NAME:      vitamin D3 (CHOLECALCIFEROL) 125 MCG (5000 UT) tablet Take 125 mcg by mouth      calcium carbonate (CALCIUM ANTACID) 500 MG chewable tablet Take 1 tablet by mouth daily      Peppermint Oil 50 MG CPDR TAKE ONE CAPSULE BY MOUTH DAILY 90 capsule 1             Objective    /66 (BP Location: Right arm, Patient Position: Sitting, Cuff Size: Adult Regular)   Pulse 105   Temp 98.1  F (36.7  C) (Oral)   Resp 15   Ht 1.746 m (5' 8.75\")   LMP  (LMP Unknown)   SpO2 97%   Breastfeeding No   BMI 28.56 kg/m    Body mass index is 28.56 kg/m .  Physical Exam   GENERAL: alert and no distress  PSYCH: affect normal/bright    Reviewed in chart        Signed Electronically by: Reba Tillman MD    "

## 2024-03-06 ENCOUNTER — TELEPHONE (OUTPATIENT)
Dept: FAMILY MEDICINE | Facility: CLINIC | Age: 69
End: 2024-03-06
Payer: COMMERCIAL

## 2024-03-06 NOTE — TELEPHONE ENCOUNTER
FYI - Status Update    Who is Calling: family member, Daughter Dominique    Update: Needs to get Health history and current medication list for assisted living     [facility] fax [facility] to [facility], Gabriele elena , fax number [phone]    Does caller want a call/response back: Yes     Could we send this information to you in [place]. or would you prefer to receive a phone call?:   Patient would prefer a phone call   Okay to leave a detailed message?: Yes at Other phone number:  Dominique phone number [phone]*

## 2024-03-06 NOTE — TELEPHONE ENCOUNTER
Faxed copy of medical hx and medications to [facility] at [phone].  Daughter notified.   Orly Kaplan, CMA

## 2024-03-18 ENCOUNTER — PATIENT OUTREACH (OUTPATIENT)
Dept: CARE COORDINATION | Facility: CLINIC | Age: 69
End: 2024-03-18
Payer: COMMERCIAL

## 2024-03-19 ENCOUNTER — MEDICAL CORRESPONDENCE (OUTPATIENT)
Dept: HEALTH INFORMATION MANAGEMENT | Facility: CLINIC | Age: 69
End: 2024-03-19
Payer: COMMERCIAL

## 2024-03-28 ENCOUNTER — TRANSFERRED RECORDS (OUTPATIENT)
Dept: HEALTH INFORMATION MANAGEMENT | Facility: CLINIC | Age: 69
End: 2024-03-28
Payer: COMMERCIAL

## 2024-04-01 ENCOUNTER — PATIENT OUTREACH (OUTPATIENT)
Dept: CARE COORDINATION | Facility: CLINIC | Age: 69
End: 2024-04-01
Payer: COMMERCIAL

## 2024-04-03 NOTE — PROGRESS NOTES
Please call to find out what dose of cranberry supplement she is taking and how often, how often she takes the Dicyclomine, what the SIG says (I assume QID PRN, but have nothing in the chart) and who prescribes that for her.    Also, how often is she using the Lomotil (Diphenoxylate-atropine).    Pharmacy is requesting updated orders.

## 2024-04-03 NOTE — PROGRESS NOTES
- Call out to patient to inquire, advised that she does not manage her medications but has a nurse agency ([facility]) that does her medications. Advised that PAL RN can call [phone] to inquire on questions below.    - Called out to this number, LM on , not registered as confidential, requesting a call back to this PAL RN line below    Randy Kraus, RN  Patient Advocate Liaison (PAL)  [facility]  [phone]    04/03/2024 at 2:35 PM

## 2024-04-04 NOTE — PROGRESS NOTES
- LMB # 2 concerning below with the Jaylon Kraus, RN  Patient Advocate Liaison (PAL)  [facility]  [phone]    04/04/2024 at 12:19 PM

## 2024-04-05 NOTE — PROGRESS NOTES
Message #3 left for [facility] to return call to this RN PAL today 4/5/24    If after 4/5/24 to call Randy RN PAL Direct number left for this RN PAL on message for return call after 4/5/24     Mariely Mackenzie RN PAL (Patient Advocate Liaison)  [facility]  [phone]

## 2024-04-08 NOTE — PROGRESS NOTES
- Called patient, LMTCB concerning below attempts to reach [facility] staff.    - Will wait for call back from patient.    Randy Kraus RN  Patient Advocate Liaison (PAL)  [facility]  [phone]    04/08/2024 at 9:51 AM

## 2024-04-09 NOTE — PROGRESS NOTES
Call back from [facility] 4/8/24 @ 16:49 regarding patient.       - Ameena GARCIA requesting call back to [phone]    - Attempted call back, LMTCB # 5    Randy Kraus RN  Patient Advocate Liaison (PAL)  [facility]  [phone]    04/09/2024 at 12:28 PM

## 2024-04-10 NOTE — PROGRESS NOTES
- Call from Nisreen GARCIA with [facility]. Went over below    Advised cranberry supplement -  mg taking once daily  Dicyclomine - patient is no longer taking this, states that she does not need it  Lomotil, advised that she has not taken in over a month, and only takes as needed.   Vitamin D, wanting clarification on how often patient needs to take. This is a patient reported med, not sure if Dr. RANDOLPH would prescribe this, or if labs are needed. Please advise.    Additionally, fax received requesting clarification on above. Requests that this be faxed back to [phone]    Randy Kraus RN  Patient Advocate Liaison (PAL)  [facility]  [phone]    04/10/2024 at 10:48 AM

## 2024-04-11 NOTE — PROGRESS NOTES
I would have her stop the 5000 IUs.  If she wants to take a supplement, 1000 to 2000 IUs daily would be better, but is not necessary currently

## 2024-04-12 NOTE — PROGRESS NOTES
- Called and spoke with Ameena GARCIA concerning below. Requesting an updated medication list sent to [phone] with the update in medications.     - Faxing 04/12/2024 @ 11:50 AM from [facility] [phone].    Randy Kraus RN  Patient Advocate Liaison (PAL)  [facility]  [phone]    04/12/2024 at 11:50 AM

## 2024-04-17 NOTE — TELEPHONE ENCOUNTER
PRIOR AUTHORIZATION DENIED    Medication: Pantoprazole-DENIED    Denial Date: 12/4/2018    Denial Rational: Requested quantity is larger/greater than allowed by insurance.  All PPIs are covered up to 1 per day for 120 days within 365 days.  If an appeal is desired please let the PA team know when a letter of medical necessity has been written explaining why the patient needs more than the 120 per 365.        Appeal Information:            Yes

## 2024-04-18 ENCOUNTER — PATIENT OUTREACH (OUTPATIENT)
Dept: FAMILY MEDICINE | Facility: CLINIC | Age: 69
End: 2024-04-18
Payer: COMMERCIAL

## 2024-04-24 NOTE — TELEPHONE ENCOUNTER
Continue statin   I saw patient last week and in office. She is doing well.  She asked about stopping home care, but I do not recommend this, as she previously tried to stop and had rapid decline.     Recommend home care continue to reach out to pt.     Will route to [facility] too.

## 2024-05-22 NOTE — PROGRESS NOTES
Clinic Care Coordination Contact    Clinic Care Coordination Contact  OUTREACH    Referral Information:  Referral Source: PCP    Primary Diagnosis: Cognitive Impairment    Chief Complaint   Patient presents with     Clinic Care Coordination - Follow-up     County contact, [place]         [place] Utilization:   Clinic Utilization  Difficulty keeping appointments:: No  Utilization    Last refreshed: 8/15/2018  6:03 AM:  No Show Count (past year) 1       Last refreshed: 8/15/2018  6:03 AM:  ED visits 3       Last refreshed: 8/15/2018  6:03 AM:  Hospital admissions 0          Current as of: 8/15/2018  6:03 AM           Clinical Concerns:  Current Medical Concerns:  Memory impairment     CCRN spoke with Tiffanie King from [facility] adult protection   Tiffanie has met with patient in her home   Concerns from family, home care due to cognition concerns     APS reports filed by CCRN and home care     Patient is unable to care for herself or finances     [facility] worker is asking Dr. Ojeda to help with obtaining a guardian and conservator for patient - as she is unable to manage caring for herself on her own.   Tiffanie will fax the form for Dr. Ojeda to review and fill out if willing     Tiffanie [facility] will reach out to children to see if they are interested in being either the conservator or guardian and if not will go to professional service     Current Behavioral Concerns: concerns   Education Provided to patient: NA  Pain  Pain (GOAL):: No  Health Maintenance Reviewed: Due/Overdue   Health Maintenance Due   Topic Date Due     HIV SCREEN (SYSTEM ASSIGNED)  10/12/1973     PAP Q3 YR  04/19/2016     ADVANCE DIRECTIVE PLANNING Q5 YRS  04/19/2018     COLONOSCOPY Q5 YR  06/14/2018     MAMMO Q1 YR  07/28/2018     Clinical Pathway: None    Medication Management:  Current Outpatient Prescriptions   Medication     aspirin 325 MG tablet     busPIRone (BUSPAR) 15 MG tablet     fluticasone (FLONASE) 50 MCG/ACT spray      Detail Level: Zone levothyroxine (SYNTHROID/LEVOTHROID) 175 MCG tablet     LEVOXYL 175 MCG tablet     LORazepam (ATIVAN) 0.5 MG tablet     Naproxen Sodium (ALEVE PO)     nortriptyline (PAMELOR) 50 MG capsule     pantoprazole (PROTONIX) 20 MG EC tablet     ranitidine (ZANTAC) 300 MG tablet     sertraline (ZOLOFT) 100 MG tablet     SUMAtriptan (IMITREX) 100 MG tablet     triamcinolone (KENALOG) 0.1 % ointment     No current facility-administered medications for this visit.        Functional Status:  Dependent ADLs:: Ambulation-no assistive device  Dependent IADLs:: Medication Management, Money Management, Cleaning  Bed or wheelchair confined:: No  Mobility Status: Independent    Living Situation:  Current living arrangement:: I live alone  Type of residence:: Private home - stairs    Diet/Exercise/Sleep:  Diet:: Regular  Inadequate nutrition (GOAL):: No  Food Insecurity: No  Tube Feeding: No  Inadequate activity/exercise (GOAL):: No  Significant changes in sleep pattern (GOAL): No    Transportation:  Transportation concerns (GOAL):: Yes  Transportation means:: Regular car, Family     Psychosocial:  U[place]din or spiritual beliefs that impact treatment:: No  Mental health DX:: Yes  Mental health DX how managed:: Medication, Outpatient Counseling (Mena Acosta )  Mental health management concern (GOAL):: Yes  Informal Support system:: Children, Family     Financial/Insurance:   Financial/Insurance concerns (GOAL):: Yes     Resources and Interventions:  Current Resources:   List of home care services:: Skilled Nursing, Physicial Therapy, Occupational Therapy (emergency fund to cover  as not covered by MA );   Community Resources: OP Mental Health (Mena Acosta - fired her last [employer] worker )  Supplies used at home:: None  Equipment Currently Used at Home: none    Advance Care Plan/Directive  Advanced Care Plans/Directives on file:: No    Referrals Placed: None     Goals: None    Patient/Caregiver understanding: NA - care  team contact     Outreach Frequency: monthly  Future Appointments              In 2 months Jerry Jordan RPH [facility] MTM, ETHAN          Plan:   [facility] APS will fax conservator/guardian form to CCRN to review and assist pcp in filling out   Forms faxed to [facility] fax [phone]    Mariely Mackenzie Care Coordinator RN  [facility] [place] [facility]  [phone]  August 15, 2018

## 2024-05-26 DIAGNOSIS — E78.5 HYPERLIPIDEMIA LDL GOAL <100: ICD-10-CM

## 2024-05-28 RX ORDER — ATORVASTATIN CALCIUM 20 MG/1
20 TABLET, FILM COATED ORAL DAILY
Qty: 90 TABLET | Refills: 0 | Status: SHIPPED | OUTPATIENT
Start: 2024-05-28

## 2024-05-31 DIAGNOSIS — F09 MILD COGNITIVE DISORDER: ICD-10-CM

## 2024-05-31 DIAGNOSIS — F41.1 GENERALIZED ANXIETY DISORDER: ICD-10-CM

## 2024-06-01 ENCOUNTER — HEALTH MAINTENANCE LETTER (OUTPATIENT)
Age: 69
End: 2024-06-01

## 2024-06-03 RX ORDER — OLANZAPINE 7.5 MG/1
7.5 TABLET, FILM COATED ORAL AT BEDTIME
Qty: 90 TABLET | Refills: 0 | Status: SHIPPED | OUTPATIENT
Start: 2024-06-03

## 2024-06-06 ENCOUNTER — TELEPHONE (OUTPATIENT)
Dept: FAMILY MEDICINE | Facility: CLINIC | Age: 69
End: 2024-06-06

## 2024-06-06 ENCOUNTER — OFFICE VISIT (OUTPATIENT)
Dept: FAMILY MEDICINE | Facility: CLINIC | Age: 69
End: 2024-06-06
Payer: COMMERCIAL

## 2024-06-06 VITALS
HEART RATE: 108 BPM | TEMPERATURE: 98.1 F | WEIGHT: 175 LBS | DIASTOLIC BLOOD PRESSURE: 76 MMHG | HEIGHT: 69 IN | RESPIRATION RATE: 17 BRPM | SYSTOLIC BLOOD PRESSURE: 116 MMHG | BODY MASS INDEX: 25.92 KG/M2 | OXYGEN SATURATION: 98 %

## 2024-06-06 DIAGNOSIS — H61.21 IMPACTED CERUMEN OF RIGHT EAR: ICD-10-CM

## 2024-06-06 DIAGNOSIS — F33.9 RECURRENT MAJOR DEPRESSIVE DISORDER, REMISSION STATUS UNSPECIFIED (H): ICD-10-CM

## 2024-06-06 DIAGNOSIS — F41.1 GENERALIZED ANXIETY DISORDER: Primary | ICD-10-CM

## 2024-06-06 DIAGNOSIS — F01.518 VASCULAR DEMENTIA WITH BEHAVIORAL DISTURBANCE (H): ICD-10-CM

## 2024-06-06 PROCEDURE — 96127 BRIEF EMOTIONAL/BEHAV ASSMT: CPT

## 2024-06-06 PROCEDURE — 69209 REMOVE IMPACTED EAR WAX UNI: CPT | Mod: RT

## 2024-06-06 PROCEDURE — 99214 OFFICE O/P EST MOD 30 MIN: CPT | Mod: 25

## 2024-06-06 RX ORDER — BUSPIRONE HYDROCHLORIDE 5 MG/1
5-10 TABLET ORAL DAILY
Qty: 90 TABLET | Refills: 1 | Status: SHIPPED | OUTPATIENT
Start: 2024-06-06

## 2024-06-06 RX ORDER — BUSPIRONE HYDROCHLORIDE 5 MG/1
5 TABLET ORAL PRN
Qty: 90 TABLET | Refills: 1 | Status: SHIPPED | OUTPATIENT
Start: 2024-06-06

## 2024-06-06 RX ORDER — BUSPIRONE HYDROCHLORIDE 5 MG/1
5 TABLET ORAL EVERY MORNING
Qty: 90 TABLET | Refills: 1 | Status: SHIPPED | OUTPATIENT
Start: 2024-06-06

## 2024-06-06 RX ORDER — RESPIRATORY SYNCYTIAL VIRUS VACCINE 120MCG/0.5
0.5 KIT INTRAMUSCULAR ONCE
Qty: 1 EACH | Refills: 0 | Status: CANCELLED | OUTPATIENT
Start: 2024-06-06 | End: 2024-06-06

## 2024-06-06 ASSESSMENT — PATIENT HEALTH QUESTIONNAIRE - PHQ9
10. IF YOU CHECKED OFF ANY PROBLEMS, HOW DIFFICULT HAVE THESE PROBLEMS MADE IT FOR YOU TO DO YOUR WORK, TAKE CARE OF THINGS AT HOME, OR GET ALONG WITH OTHER PEOPLE: NOT DIFFICULT AT ALL
SUM OF ALL RESPONSES TO PHQ QUESTIONS 1-9: 1
SUM OF ALL RESPONSES TO PHQ QUESTIONS 1-9: 1

## 2024-06-06 ASSESSMENT — ENCOUNTER SYMPTOMS: NERVOUS/ANXIOUS: 1

## 2024-06-06 NOTE — TELEPHONE ENCOUNTER
Ameena from [facility] calling.   Need medication order in chart written differently for medication management.      Disp Refills Start End YAEL    busPIRone (BUSPAR) 5 MG tablet 90 tablet 1 6/6/2024 -- No   Sig - Route: Take 1-2 tablets (5-10 mg) by mouth daily - Oral   Sent to pharmacy as: busPIRone HCl 5 MG Oral Tablet (BUSPAR)   Class: E-Prescribe   Notes to Pharmacy: May take additional 5 mg buspar following first 5 mg in the morning.   Order: [id number]   E-Prescribing Status: Receipt confirmed by pharmacy (6/6/2024  2:15 PM CDT)          They need two separate scripts:     Buspar 5mg tablet in morning daily   Buspar 5mg tablet PRN for anxiety following 5mg morning dose        Please adjust and send to [facility] ONLY pharmacy, and fax a signed copy of both orders to [phone]    Dai ARCHIBALD RN on 6/6/2024 at 4:40 PM

## 2024-06-06 NOTE — PROGRESS NOTES
"  Assessment & Plan     (F41.1) Generalized anxiety disorder  (primary encounter diagnosis)  Comment: stable. Provided note for patient to take additional buspar during day if necessary. Mental health referral placed for [facility]PS to help w/ management. Patient fully understands and is agreeable with plan of care, at this point patient will follow up as needed unless acute concerns arise in the meantime.  Plan: busPIRone (BUSPAR) 5 MG tablet, Adult Mental         Health  Referral, CANCELED: Adult         Mental Health  Referral    (F33.9) Recurrent major depressive disorder, remission status unspecified (H24)  Comment: as above.   Plan: Adult Mental Health  Referral,         CANCELED: Adult Mental Health          Referral    (F01.518) Vascular dementia with behavioral disturbance (H)  Comment: scheduled to see neuropsych in October w/ Ion.    (H61.21) Impacted cerumen of right ear  Comment: Following ear lavage TM pearly gray, cone of light noted, bony structures visible. No infection noted. Patient tolerated well. Hearing improved.   Plan: OK REMOVAL IMPACTED CERUMEN IRRIGATION/LVG         UNILAT      BMI  Estimated body mass index is 26.03 kg/m  as calculated from the following:    Height as of this encounter: 1.746 m (5' 8.75\").    Weight as of this encounter: 79.4 kg (175 lb).     Shmuel Geiger is a 68 year old, presenting for the following health issues:  Anxiety        6/6/2024     1:38 PM   Additional Questions   Roomed by Carol MARTINEZ CMA     Patient stated that her anxiety medication are dispensed by the nurse and if she needs take an extra one they need to be ok by the provider.    Patient is having a hard time hearing from the right ear.      History of Present Illness       Mental Health Follow-up:  Patient presents to follow-up on Depression & Anxiety.Patient's depression since last visit has been:  Better  The patient is having other symptoms associated with " "depression.  Patient's anxiety since last visit has been:  Medium  The patient is having other symptoms associated with anxiety.  Any significant life events: grief or loss  Patient is feeling anxious or having panic attacks.  Patient has no concerns about alcohol or drug use.    Reason for visit:  Follow up to medications and anxiety. Also i am now having difficulty hearing out of my right ear    She eats 0-1 servings of fruits and vegetables daily.She consumes 4 sweetened beverage(s) daily.She exercises with enough effort to increase her heart rate 9 or less minutes per day.  She exercises with enough effort to increase her heart rate 3 or less days per week.   She is taking medications regularly.     Anxiety  - better  -needs note to give to home to be able to give additional Buspar dose 5 mg during they day as needed.    Right Ear  -can't hear as well  -would like this to be checked.       Review of Systems  Constitutional, HEENT, and psych systems are negative, except as otherwise noted.      Objective    /76 (BP Location: Right arm, Patient Position: Sitting, Cuff Size: Adult Large)   Pulse 108   Temp 98.1  F (36.7  C) (Oral)   Resp 17   Ht 1.746 m (5' 8.75\")   Wt 79.4 kg (175 lb)   LMP  (LMP Unknown)   SpO2 98%   BMI 26.03 kg/m    Body mass index is 26.03 kg/m .  Physical Exam  Vitals and nursing note reviewed.   Constitutional:       General: She is not in acute distress.     Appearance: Normal appearance. She is not ill-appearing.   HENT:      Right Ear: Ear canal and external ear normal. There is impacted cerumen.      Left Ear: Tympanic membrane, ear canal and external ear normal. There is no impacted cerumen.   Cardiovascular:      Rate and Rhythm: Normal rate.   Pulmonary:      Effort: Pulmonary effort is normal.   Skin:     General: Skin is warm and dry.   Neurological:      Mental Status: She is alert.   Psychiatric:         Mood and Affect: Mood normal.         Behavior: Behavior " normal.         Thought Content: Thought content normal.         Judgment: Judgment normal.            Signed Electronically by: REGINE Dos Santos CNP

## 2024-06-06 NOTE — NURSING NOTE
Patient identified using two patient identifiers.  Ear exam showing wax occlusion completed by provider.  H202/H20 was placed in the right ear(s) via irrigation tool: elephant ear.    Carol Rodríguez CMA on 6/6/2024 at 2:35 PM

## 2024-06-06 NOTE — LETTER
AUTHORIZATION FOR ADMINISTRATION OF MEDICATION AT HOME      Student:  Mariela Duffy    YOB: 1955    The following medication has been prescribed for this patient: Buspar and request that it be administered by day care personnel or by the Home Facility nurse while the patient is at home. Patient may take an additional 5 mg Buspar during the day following morning dose if necessary.     Medication:    Current Outpatient Medications   Medication Sig Dispense Refill    atorvastatin (LIPITOR) 20 MG tablet TAKE 1 TABLET (20 MG) BY MOUTH DAILY 90 tablet 0    busPIRone (BUSPAR) 5 MG tablet Take 1-2 tablets (5-10 mg) by mouth daily 90 tablet 1    calcium carbonate (CALCIUM ANTACID) 500 MG chewable tablet Take 1 tablet by mouth daily      CRANBERRY PO Take 500 mg by mouth daily      diphenoxylate-atropine (LOMOTIL) 2.5-0.025 MG tablet TAKE ONE TABLET BY MOUTH DAILY AS NEEDED FOR DIARRHEA 30 tablet 3    donepezil (ARICEPT) 5 MG tablet Take 1 tablet (5 mg) by mouth at bedtime 90 tablet 1    levothyroxine (SYNTHROID/LEVOTHROID) 137 MCG tablet Take 1 tablet (137 mcg) by mouth daily 90 tablet 0    loperamide (IMODIUM) 2 MG capsule Take 1 capsule (2 mg) by mouth 4 times daily as needed      methenamine hippurate (HIPREX) 1 g tablet Take 1 tablet (1 g) by mouth 2 times daily 180 tablet 0    multivitamin w/minerals (THERA-VIT-M) tablet Take 1 tablet by mouth daily      OLANZapine (ZYPREXA) 7.5 MG tablet Take 1 tablet (7.5 mg) by mouth at bedtime 90 tablet 0    Peppermint Oil 50 MG CPDR TAKE ONE CAPSULE BY MOUTH DAILY 90 capsule 1     No current facility-administered medications for this visit.     All authorizations  at the end of the school year or at the end of   Extended School Year summer school programs        Electronically Signed By  Provider: MIKE RESENDIZ                                                                                             Date: 2024
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
## 2024-07-26 ENCOUNTER — MEDICAL CORRESPONDENCE (OUTPATIENT)
Dept: HEALTH INFORMATION MANAGEMENT | Facility: CLINIC | Age: 69
End: 2024-07-26
Payer: COMMERCIAL

## 2024-07-29 ENCOUNTER — TELEPHONE (OUTPATIENT)
Dept: FAMILY MEDICINE | Facility: CLINIC | Age: 69
End: 2024-07-29
Payer: COMMERCIAL

## 2024-07-29 NOTE — TELEPHONE ENCOUNTER
Please call and schedule a visit, patient has four recorded blood pressures that are high at her living facility:    138/83, 145/66, 151/92, 148/91    Schedule with extender

## 2024-07-29 NOTE — TELEPHONE ENCOUNTER
Called [facility], they will relay message to patient and daughter and call to schedule appointment. Ruth Behrens.

## 2024-07-29 NOTE — TELEPHONE ENCOUNTER
Forms/Letter Request    Type of form/letter: OTHER: [facility]         Do we have the form/letter: Yes:     Who is the form from? [facility] - Blood pressure review    Where did/will the form come from? form was faxed in    When is form/letter needed by:     How would you like the form/letter returned: Fax : [phone]    Patient Notified form requests are processed in 5-7 business days:    Could we send this information to you in [employer] or would you prefer to receive a phone call?:

## 2024-08-10 ENCOUNTER — HEALTH MAINTENANCE LETTER (OUTPATIENT)
Age: 69
End: 2024-08-10

## 2024-08-16 ENCOUNTER — MEDICAL CORRESPONDENCE (OUTPATIENT)
Dept: HEALTH INFORMATION MANAGEMENT | Facility: CLINIC | Age: 69
End: 2024-08-16
Payer: COMMERCIAL

## 2024-08-26 DIAGNOSIS — R19.7 DIARRHEA, UNSPECIFIED TYPE: ICD-10-CM

## 2024-08-26 DIAGNOSIS — Z87.19 HX OF IRRITABLE BOWEL SYNDROME: ICD-10-CM

## 2024-08-26 DIAGNOSIS — K52.9 CHRONIC DIARRHEA: ICD-10-CM

## 2024-08-26 NOTE — TELEPHONE ENCOUNTER
RADHA Lindquist with [facility] calls to request refill of Lomotil to have on hand. Current order is . Patient has not used this medication in the past year but would like to keep on hand as needed.    Also requesting refill of peppermint oil.    Orders pended for provider review.    Please print/sign and fax prescriptions to [phone]. Assisted Living is changing pharmacy but not sure of the exact date.

## 2024-08-27 RX ORDER — DIPHENOXYLATE HCL/ATROPINE 2.5-.025MG
1 TABLET ORAL DAILY PRN
Qty: 30 TABLET | Refills: 3 | Status: SHIPPED | OUTPATIENT
Start: 2024-08-27

## 2024-08-27 RX ORDER — PERPHENAZINE 8 MG
1 TABLET ORAL DAILY
Qty: 90 CAPSULE | Refills: 1 | Status: SHIPPED | OUTPATIENT
Start: 2024-08-27

## 2024-08-29 NOTE — TELEPHONE ENCOUNTER
Faxed and placed in faxed pile.      Lida TIDWELL, - [facility]  [facility]- [place], [facility]

## 2024-09-12 ENCOUNTER — OFFICE VISIT (OUTPATIENT)
Dept: URGENT CARE | Facility: URGENT CARE | Age: 69
End: 2024-09-12
Payer: COMMERCIAL

## 2024-09-12 VITALS
OXYGEN SATURATION: 94 % | TEMPERATURE: 97.2 F | DIASTOLIC BLOOD PRESSURE: 66 MMHG | BODY MASS INDEX: 26.03 KG/M2 | WEIGHT: 175 LBS | HEART RATE: 120 BPM | SYSTOLIC BLOOD PRESSURE: 110 MMHG

## 2024-09-12 DIAGNOSIS — H57.89 IRRITATION OF RIGHT EYE: Primary | ICD-10-CM

## 2024-09-12 DIAGNOSIS — K64.8 INTERNAL HEMORRHOID, BLEEDING: ICD-10-CM

## 2024-09-12 PROCEDURE — 99213 OFFICE O/P EST LOW 20 MIN: CPT | Performed by: PHYSICIAN ASSISTANT

## 2024-09-12 ASSESSMENT — ENCOUNTER SYMPTOMS
EYE DISCHARGE: 0
ANAL BLEEDING: 1
PHOTOPHOBIA: 0
VOMITING: 0
ABDOMINAL PAIN: 0
NAUSEA: 0
CONSTIPATION: 0
EYE ITCHING: 0
EYE PAIN: 0
EYE REDNESS: 0
RECTAL PAIN: 0
BLOOD IN STOOL: 0
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
## 2024-09-12 NOTE — PATIENT INSTRUCTIONS
Your eye appears normal today. Follow up if it causes further irritation.     You have an internal rectal lesion that appears to be a hemorrhoid.   Eat a high fiber diet. Try sitz baths. You can try hemorrhoid cream.     If you are not getting better in several weeks I recommend seeing colorectal for follow up.

## 2024-10-10 ENCOUNTER — OFFICE VISIT (OUTPATIENT)
Dept: FAMILY MEDICINE | Facility: CLINIC | Age: 69
End: 2024-10-10
Payer: COMMERCIAL

## 2024-10-10 VITALS
HEART RATE: 97 BPM | RESPIRATION RATE: 16 BRPM | DIASTOLIC BLOOD PRESSURE: 80 MMHG | WEIGHT: 194.3 LBS | HEIGHT: 69 IN | BODY MASS INDEX: 28.78 KG/M2 | SYSTOLIC BLOOD PRESSURE: 117 MMHG | OXYGEN SATURATION: 93 % | TEMPERATURE: 97.9 F

## 2024-10-10 DIAGNOSIS — Z00.00 ENCOUNTER FOR MEDICARE ANNUAL WELLNESS EXAM: Primary | ICD-10-CM

## 2024-10-10 DIAGNOSIS — Z12.31 VISIT FOR SCREENING MAMMOGRAM: ICD-10-CM

## 2024-10-10 DIAGNOSIS — R03.0 ELEVATED BP WITHOUT DIAGNOSIS OF HYPERTENSION: ICD-10-CM

## 2024-10-10 DIAGNOSIS — F41.1 GENERALIZED ANXIETY DISORDER: ICD-10-CM

## 2024-10-10 DIAGNOSIS — E89.0 POSTOPERATIVE HYPOTHYROIDISM: ICD-10-CM

## 2024-10-10 DIAGNOSIS — E78.5 HYPERLIPIDEMIA LDL GOAL <100: ICD-10-CM

## 2024-10-10 DIAGNOSIS — R41.3 MEMORY PROBLEM: ICD-10-CM

## 2024-10-10 DIAGNOSIS — Z13.1 SCREENING FOR DIABETES MELLITUS: ICD-10-CM

## 2024-10-10 DIAGNOSIS — F09 MILD COGNITIVE DISORDER: ICD-10-CM

## 2024-10-10 LAB
ALBUMIN SERPL BCG-MCNC: 4.3 G/DL (ref 3.5–5.2)
ALP SERPL-CCNC: 114 U/L (ref 40–150)
ALT SERPL W P-5'-P-CCNC: 16 U/L (ref 0–50)
ANION GAP SERPL CALCULATED.3IONS-SCNC: 12 MMOL/L (ref 7–15)
AST SERPL W P-5'-P-CCNC: 22 U/L (ref 0–45)
BILIRUB SERPL-MCNC: 0.7 MG/DL
BUN SERPL-MCNC: 18.7 MG/DL (ref 8–23)
CALCIUM SERPL-MCNC: 9.7 MG/DL (ref 8.8–10.4)
CHLORIDE SERPL-SCNC: 102 MMOL/L (ref 98–107)
CREAT SERPL-MCNC: 1.03 MG/DL (ref 0.51–0.95)
EGFRCR SERPLBLD CKD-EPI 2021: 59 ML/MIN/1.73M2
ERYTHROCYTE [DISTWIDTH] IN BLOOD BY AUTOMATED COUNT: 12.9 % (ref 10–15)
EST. AVERAGE GLUCOSE BLD GHB EST-MCNC: 100 MG/DL
FOLATE SERPL-MCNC: 38 NG/ML (ref 4.6–34.8)
GLUCOSE SERPL-MCNC: 92 MG/DL (ref 70–99)
HBA1C MFR BLD: 5.1 % (ref 0–5.6)
HCO3 SERPL-SCNC: 27 MMOL/L (ref 22–29)
HCT VFR BLD AUTO: 41.6 % (ref 35–47)
HGB BLD-MCNC: 14.1 G/DL (ref 11.7–15.7)
MCH RBC QN AUTO: 29 PG (ref 26.5–33)
MCHC RBC AUTO-ENTMCNC: 33.9 G/DL (ref 31.5–36.5)
MCV RBC AUTO: 85 FL (ref 78–100)
PLATELET # BLD AUTO: 298 10E3/UL (ref 150–450)
POTASSIUM SERPL-SCNC: 4.5 MMOL/L (ref 3.4–5.3)
PROT SERPL-MCNC: 7.1 G/DL (ref 6.4–8.3)
RBC # BLD AUTO: 4.87 10E6/UL (ref 3.8–5.2)
SODIUM SERPL-SCNC: 141 MMOL/L (ref 135–145)
TSH SERPL DL<=0.005 MIU/L-ACNC: 0.3 UIU/ML (ref 0.3–4.2)
VIT B12 SERPL-MCNC: 511 PG/ML (ref 232–1245)
WBC # BLD AUTO: 7.7 10E3/UL (ref 4–11)

## 2024-10-10 PROCEDURE — 85027 COMPLETE CBC AUTOMATED: CPT | Performed by: FAMILY MEDICINE

## 2024-10-10 PROCEDURE — 82746 ASSAY OF FOLIC ACID SERUM: CPT | Performed by: FAMILY MEDICINE

## 2024-10-10 PROCEDURE — 82607 VITAMIN B-12: CPT | Performed by: FAMILY MEDICINE

## 2024-10-10 PROCEDURE — 99000 SPECIMEN HANDLING OFFICE-LAB: CPT | Performed by: FAMILY MEDICINE

## 2024-10-10 PROCEDURE — G0439 PPPS, SUBSEQ VISIT: HCPCS | Performed by: FAMILY MEDICINE

## 2024-10-10 PROCEDURE — 80053 COMPREHEN METABOLIC PANEL: CPT | Performed by: FAMILY MEDICINE

## 2024-10-10 PROCEDURE — 99214 OFFICE O/P EST MOD 30 MIN: CPT | Mod: 25 | Performed by: FAMILY MEDICINE

## 2024-10-10 PROCEDURE — 83036 HEMOGLOBIN GLYCOSYLATED A1C: CPT | Performed by: FAMILY MEDICINE

## 2024-10-10 PROCEDURE — 84443 ASSAY THYROID STIM HORMONE: CPT | Performed by: FAMILY MEDICINE

## 2024-10-10 PROCEDURE — 36415 COLL VENOUS BLD VENIPUNCTURE: CPT | Performed by: FAMILY MEDICINE

## 2024-10-10 PROCEDURE — 84425 ASSAY OF VITAMIN B-1: CPT | Mod: 90 | Performed by: FAMILY MEDICINE

## 2024-10-10 RX ORDER — HYDRALAZINE HYDROCHLORIDE 10 MG/1
TABLET, FILM COATED ORAL
Qty: 30 TABLET | Refills: 1 | Status: SHIPPED | OUTPATIENT
Start: 2024-10-10

## 2024-10-10 RX ORDER — OLANZAPINE 7.5 MG/1
7.5 TABLET, FILM COATED ORAL AT BEDTIME
Qty: 90 TABLET | Refills: 3 | Status: SHIPPED | OUTPATIENT
Start: 2024-10-10

## 2024-10-10 RX ORDER — DONEPEZIL HYDROCHLORIDE 5 MG/1
5 TABLET, FILM COATED ORAL AT BEDTIME
Qty: 90 TABLET | Refills: 1 | Status: CANCELLED | OUTPATIENT
Start: 2024-10-10

## 2024-10-10 RX ORDER — ATORVASTATIN CALCIUM 20 MG/1
20 TABLET, FILM COATED ORAL DAILY
Qty: 90 TABLET | Refills: 0 | Status: CANCELLED | OUTPATIENT
Start: 2024-10-10

## 2024-10-10 RX ORDER — LEVOTHYROXINE SODIUM 137 UG/1
137 TABLET ORAL DAILY
Qty: 90 TABLET | Refills: 1 | Status: SHIPPED | OUTPATIENT
Start: 2024-10-10

## 2024-10-10 RX ORDER — LEVOTHYROXINE SODIUM 137 UG/1
137 TABLET ORAL DAILY
Qty: 90 TABLET | Refills: 0 | Status: CANCELLED | OUTPATIENT
Start: 2024-10-10

## 2024-10-10 RX ORDER — BUSPIRONE HYDROCHLORIDE 5 MG/1
5-10 TABLET ORAL DAILY
Qty: 90 TABLET | Refills: 1 | Status: CANCELLED | OUTPATIENT
Start: 2024-10-10

## 2024-10-10 RX ORDER — DONEPEZIL HYDROCHLORIDE 5 MG/1
5 TABLET, FILM COATED ORAL AT BEDTIME
Qty: 90 TABLET | Refills: 1 | Status: SHIPPED | OUTPATIENT
Start: 2024-10-10

## 2024-10-10 RX ORDER — OLANZAPINE 7.5 MG/1
7.5 TABLET, FILM COATED ORAL AT BEDTIME
Qty: 90 TABLET | Refills: 0 | Status: CANCELLED | OUTPATIENT
Start: 2024-10-10

## 2024-10-10 SDOH — HEALTH STABILITY: PHYSICAL HEALTH: ON AVERAGE, HOW MANY DAYS PER WEEK DO YOU ENGAGE IN MODERATE TO STRENUOUS EXERCISE (LIKE A BRISK WALK)?: 0 DAYS

## 2024-10-10 SDOH — HEALTH STABILITY: PHYSICAL HEALTH: ON AVERAGE, HOW MANY MINUTES DO YOU ENGAGE IN EXERCISE AT THIS LEVEL?: 0 MIN

## 2024-10-10 ASSESSMENT — SOCIAL DETERMINANTS OF HEALTH (SDOH): HOW OFTEN DO YOU GET TOGETHER WITH FRIENDS OR RELATIVES?: ONCE A WEEK

## 2024-10-10 ASSESSMENT — ACTIVITIES OF DAILY LIVING (ADL): CURRENT_FUNCTION: NEEDS ASSISTANCE

## 2024-10-10 NOTE — PATIENT INSTRUCTIONS
Schedule fasting lab appt, blood work only.  Continue all current medications.  Blood pressures in clinic were normal.  Will provider with Hydralazine as needed for blood pressures greater than 160/100.  Please sent copy of blood pressure readings to clinic.  Schedule Mammogram.  Recommend getting vaccines at pharmacy: Pneumococcal (pneumonia).  Get Flu and COVID shots at facility as planned.  Patient Education   Preventive Care Advice   This is general advice given by our system to help you stay healthy. However, your care team may have specific advice just for you. Please talk to your care team about your preventive care needs.  Nutrition  Eat 5 or more servings of fruits and vegetables each day.  Try wheat bread, brown rice and whole grain pasta (instead of white bread, rice, and pasta).  Get enough calcium and vitamin D. Check the label on foods and aim for 100% of the RDA (recommended daily allowance).  Lifestyle  Exercise at least 150 minutes each week  (30 minutes a day, 5 days a week).  Do muscle strengthening activities 2 days a week. These help control your weight and prevent disease.  No smoking.  Wear sunscreen to prevent skin cancer.  Have a dental exam and cleaning every 6 months.  Yearly exams  See your health care team every year to talk about:  Any changes in your health.  Any medicines your care team has prescribed.  Preventive care, family planning, and ways to prevent chronic diseases.  Shots (vaccines)   HPV shots (up to age 26), if you've never had them before.  Hepatitis B shots (up to age 59), if you've never had them before.  COVID-19 shot: Get this shot when it's due.  Flu shot: Get a flu shot every year.  Tetanus shot: Get a tetanus shot every 10 years.  Pneumococcal, hepatitis A, and RSV shots: Ask your care team if you need these based on your risk.  Shingles shot (for age 50 and up)  General health tests  Diabetes screening:  Starting at age 35, Get screened for diabetes at least every  3 years.  If you are younger than age 35, ask your care team if you should be screened for diabetes.  Cholesterol test: At age 39, start having a cholesterol test every 5 years, or more often if advised.  Bone density scan (DEXA): At age 50, ask your care team if you should have this scan for osteoporosis (brittle bones).  Hepatitis C: Get tested at least once in your life.  STIs (sexually transmitted infections)  Before age 24: Ask your care team if you should be screened for STIs.  After age 24: Get screened for STIs if you're at risk. You are at risk for STIs (including HIV) if:  You are sexually active with more than one person.  You don't use condoms every time.  You or a partner was diagnosed with a sexually transmitted infection.  If you are at risk for HIV, ask about PrEP medicine to prevent HIV.  Get tested for HIV at least once in your life, whether you are at risk for HIV or not.  Cancer screening tests  Cervical cancer screening: If you have a cervix, begin getting regular cervical cancer screening tests starting at age 21.  Breast cancer scan (mammogram): If you've ever had breasts, begin having regular mammograms starting at age 40. This is a scan to check for breast cancer.  Colon cancer screening: It is important to start screening for colon cancer at age 45.  Have a colonoscopy test every 10 years (or more often if you're at risk) Or, ask your provider about stool tests like a FIT test every year or Cologuard test every 3 years.  To learn more about your testing options, visit:   .  For help making a decision, visit:   https://bit.ly/tu10248.  Prostate cancer screening test: If you have a prostate, ask your care team if a prostate cancer screening test (PSA) at age 55 is right for you.  Lung cancer screening: If you are a current or former smoker ages 50 to 80, ask your care team if ongoing lung cancer screenings are right for you.  For informational purposes only. Not to replace the advice of your  health care provider. Copyright   2023 [facility]. All rights reserved. Clinically reviewed by the [facility] Transitions Program. [employer] 549114 - REV 01/24.  Learning About Activities of Daily Living  What are activities of daily living?     Activities of daily living (ADLs) are the basic self-care tasks you do every day. These include eating, bathing, dressing, and moving around.  As you age, and if you have health problems, you may find that it's harder to do some of these tasks. If so, your doctor can suggest ideas that may help.  To measure what kind of help you may need, your doctor will ask how well you are able to do ADLs. Let your doctor know if there are any tasks that you are having trouble doing. This is an important first step to getting help. And when you have the help you need, you can stay as independent as possible.  How will a doctor assess your ADLs?  Asking about ADLs is part of a routine health checkup your doctor will likely do as you age. Your health check might be done in a doctor's office, in your home, or at a hospital. The goal is to find out if you are having any problems that could make it hard to care for yourself or that make it unsafe for you to be on your own.  To measure your ADLs, your doctor will ask how hard it is for you to do routine tasks. Your doctor may also want to know if you have changed the way you do a task because of a health problem. Your doctor may watch how you:  Walk back and forth.  Keep your balance while you stand or walk.  Move from sitting to standing or from a bed to a chair.  Button or unbutton a shirt or sweater.  Remove and put on your shoes.  It's common to feel a little worried or anxious if you find you can't do all the things you used to be able to do. Talking with your doctor about ADLs is a way to make sure you're as safe as possible and able to care for yourself as well as you can. You may want to bring a caregiver, friend,  or family member to your checkup. They can help you talk to your doctor.  Follow-up care is a key part of your treatment and safety. Be sure to make and go to all appointments, and call your doctor if you are having problems. It's also a good idea to know your test results and keep a list of the medicines you take.  Current as of: October 24, 2023  Content Version: 14.2 2024 [facility].   Care instructions adapted under license by your healthcare professional. If you have questions about a medical condition or this instruction, always ask your healthcare professional. [employer] disclaims any warranty or liability for your use of this information.
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
## 2024-10-14 LAB — VIT B1 PYROPHOSHATE BLD-SCNC: 226 NMOL/L

## 2024-10-16 ENCOUNTER — TELEPHONE (OUTPATIENT)
Dept: FAMILY MEDICINE | Facility: CLINIC | Age: 69
End: 2024-10-16
Payer: COMMERCIAL

## 2024-10-16 DIAGNOSIS — R79.89 HIGH SERUM FOLIC ACID LEVEL: ICD-10-CM

## 2024-10-16 DIAGNOSIS — R41.3 MEMORY PROBLEM: Primary | ICD-10-CM

## 2024-10-16 DIAGNOSIS — R63.8 EXCESSIVE INTAKE OF THIAMINE: ICD-10-CM

## 2024-10-16 DIAGNOSIS — R79.89 ELEVATED SERUM CREATININE: ICD-10-CM

## 2024-10-16 NOTE — TELEPHONE ENCOUNTER
Routing to Dr. Morgan Tillman-  Lab orders pended. Please review and sign. Other orders already futured on 3/4/24. Thanks!     RN called and spoke with patient   Relayed message from Dr. Morgan Tillman (see previous note)   Scheduled lab only appointment 12/30/24 at 10:15am   Printed lab results with result note and mailed to home address per patient request     Patient was given an opportunity to ask questions, verbalized understanding of plan, and is agreeable.     Celia FLANAGAN RN  [facility]

## 2024-10-16 NOTE — LETTER
"2024      Mariela JOSEPH Clive  [address]   [facility] [address]        To Staff at [facility] in [place],      Please hold multivitamin w/minerals (THERA-VIT-M) tablet for Mariela \"Fely\" Clive ( 1955) until after labs are completed 24. Please call the clinic number above with any questions or concerns.      Sincerely,        Reba Tillman MD / Melissa Sandy RN  (Signed electronically)            "

## 2024-10-16 NOTE — TELEPHONE ENCOUNTER
----- Message from April MARIO Tillman sent at 10/15/2024  6:44 PM CDT -----  Please relay below:    Vitamin B1/Thiamine level high.  Folate/Folic acid level high. Hold multivitamin and recheck labs in 2-3 months.    Your metabolic panel, which looks at your electrolytes (sodium, potassium, chloride, and calcium), glucose, kidney function (BUN, creatinine, GFR), and liver function (AST, ALT, alkaline phosphatase, total protein, albumin, and total bilirubin) was within normal limits, aside from creatinine being high.  Recommend increased water intake.    Normal thyroid levels. Normal b12 levels.    Normal A1c, no diabetes or prediabetes.  Your CBC, which looks at your white blood cells, red blood cells, hemoglobin, and platelets, was within normal limits.

## 2024-10-16 NOTE — TELEPHONE ENCOUNTER
Incoming call from Francine PEREZ at [facility] in [place] where patient resides.   They need order faxed to them in order to hold multivitamin until after labs completed 12/30/2024. I pended letter for provider review - please sign and fax to [phone].    Melissa Sandy RN

## 2024-10-16 NOTE — LETTER
October 16, 2024      Fely Duffy  [address]   [facility] [address]        Dear ,      We are writing to inform you of your test results.    Vitamin B1/Thiamine level high.  Folate/Folic acid level high. Hold multivitamin and recheck labs in 2-3 months.     Your metabolic panel, which looks at your electrolytes (sodium, potassium, chloride, and calcium), glucose, kidney function (BUN, creatinine, GFR), and liver function (AST, ALT, alkaline phosphatase, total protein, albumin, and total bilirubin) was within normal limits, aside from creatinine being high.  Recommend increased water intake.     Normal thyroid levels. Normal b12 levels.     Normal A1c, no diabetes or prediabetes.  Your CBC, which looks at your white blood cells, red blood cells, hemoglobin, and platelets, was within normal limits.    Resulted Orders   TSH with free T4 reflex   Result Value Ref Range    TSH 0.30 0.30 - 4.20 uIU/mL   Comprehensive metabolic panel (BMP + Alb, Alk Phos, ALT, AST, Total. Bili, TP)   Result Value Ref Range    Sodium 141 135 - 145 mmol/L    Potassium 4.5 3.4 - 5.3 mmol/L    Carbon Dioxide (CO2) 27 22 - 29 mmol/L    Anion Gap 12 7 - 15 mmol/L    Urea Nitrogen 18.7 8.0 - 23.0 mg/dL    Creatinine 1.03 (H) 0.51 - 0.95 mg/dL    GFR Estimate 59 (L) >60 mL/min/1.73m2      Comment:      eGFR calculated using 2021 CKD-EPI equation.    Calcium 9.7 8.8 - 10.4 mg/dL      Comment:      Reference intervals for this test were updated on 7/16/2024 to reflect our healthy population more accurately. There may be differences in the flagging of prior results with similar values performed with this method. Those prior results can be interpreted in the context of the updated reference intervals.    Chloride 102 98 - 107 mmol/L    Glucose 92 70 - 99 mg/dL    Alkaline Phosphatase 114 40 - 150 U/L    AST 22 0 - 45 U/L    ALT 16 0 - 50 U/L    Protein Total 7.1 6.4 - 8.3 g/dL    Albumin 4.3 3.5 - 5.2 g/dL    Bilirubin Total  0.7 <=1.2 mg/dL   Hemoglobin A1c   Result Value Ref Range    Estimated Average Glucose 100 <117 mg/dL    Hemoglobin A1C 5.1 0.0 - 5.6 %      Comment:      Normal <5.7%   Prediabetes 5.7-6.4%    Diabetes 6.5% or higher     Note: Adopted from ADA consensus guidelines.   CBC with platelets   Result Value Ref Range    WBC Count 7.7 4.0 - 11.0 10e3/uL    RBC Count 4.87 3.80 - 5.20 10e6/uL    Hemoglobin 14.1 11.7 - 15.7 g/dL    Hematocrit 41.6 35.0 - 47.0 %    MCV 85 78 - 100 fL    MCH 29.0 26.5 - 33.0 pg    MCHC 33.9 31.5 - 36.5 g/dL    RDW 12.9 10.0 - 15.0 %    Platelet Count 298 150 - 450 10e3/uL   Folate   Result Value Ref Range    Folic Acid 38.0 (H) 4.6 - 34.8 ng/mL   Vitamin B12   Result Value Ref Range    Vitamin B12 511 232 - 1,245 pg/mL   Vitamin B1 whole blood   Result Value Ref Range    Vitamin B1 Whole Blood Level 226 (H) 70 - 180 nmol/L      Comment:      INTERPRETIVE INFORMATION: Vitamin B1, Whole Blood    This assay measures the concentration of thiamine   diphosphate (TDP), the primary active form of vitamin B1.   Approximately 90 percent of vitamin B1 present in whole   blood is TDP. Thiamine and thiamine monophosphate, which   comprise the remaining 10 percent, are not measured.    This test was developed and its performance characteristics   determined by [facility]. It has not been cleared or   approved by the US Food and Drug Administration. This test   was performed in a CLIA certified laboratory and is   intended for clinical purposes.  Performed By: [facility]  [address] [place] [id number]  : Yasmani Tello MD, PhD  CLIA Number: [id number]       If you have any questions or concerns, please call the clinic at the number listed above.       Sincerely,      Dr. Morgan Tillman / Celia FLANAGAN RN  [facility]

## 2024-10-24 ENCOUNTER — TELEPHONE (OUTPATIENT)
Dept: FAMILY MEDICINE | Facility: CLINIC | Age: 69
End: 2024-10-24
Payer: COMMERCIAL

## 2024-10-24 DIAGNOSIS — N39.0 RECURRENT UTI: Primary | ICD-10-CM

## 2024-10-24 NOTE — TELEPHONE ENCOUNTER
Lynn at [facility] calling. States that Cranberry PO 500mg capsules are on backorder at their pharmacy and is requesting provider order either the 500mg 1x day Tablets or the 250MG capsules 2x day. Both these options are in stock at the Pharmacy.    Writer noted Cranberry PO is listed as historical and pt reported. Lynn states pt has been taking this medication but she is unsure of who originally ordered it. She is requesting pts PCP to begin ordering it.    Routing to provider to review and order as appropriate. If ordering, please send to [facility] (Updated pharmacy preference in chart).    Please fax new orders to [facility]: [phone].      Lida Curry RN on 10/24/2024 at 8:56 AM

## 2024-10-26 ENCOUNTER — PATIENT OUTREACH (OUTPATIENT)
Dept: CARE COORDINATION | Facility: CLINIC | Age: 69
End: 2024-10-26
Payer: COMMERCIAL

## 2024-11-13 ENCOUNTER — TELEPHONE (OUTPATIENT)
Dept: FAMILY MEDICINE | Facility: CLINIC | Age: 69
End: 2024-11-13
Payer: COMMERCIAL

## 2024-11-13 NOTE — TELEPHONE ENCOUNTER
Printed and faxed.      Lida TIDWELL, - [facility]  [facility]     · Outpatient regimen consists of: metoprolol + coumadin   · Coumadin held due to abdominal wall hematoma  · Rates into 170's on 2/16 requiring Amio bolus x 2 and then initiated on continuous infusion  · Continue for now  · Remains on 4 pressors

## 2024-11-13 NOTE — TELEPHONE ENCOUNTER
Please assist with Forms:    Received a call from Edna, with patient's assisted living facility, [facility]: [phone]    They are requesting patient's last office visit notes to include a list of her diagnosis.    Please fax to: Fax [phone]    Thanks!  Perla HANNA RN, BSN  Clinic RN  [facility]

## 2024-11-14 ENCOUNTER — TELEPHONE (OUTPATIENT)
Dept: FAMILY MEDICINE | Facility: CLINIC | Age: 69
End: 2024-11-14
Payer: COMMERCIAL

## 2024-11-14 DIAGNOSIS — R03.0 ELEVATED BP WITHOUT DIAGNOSIS OF HYPERTENSION: ICD-10-CM

## 2024-11-14 NOTE — TELEPHONE ENCOUNTER
Received a call from Edna, with patient's assisted living facility, [facility]: [phone]     Need signed doctors order for hydralazine to be able to give to her.  Can print and sign med list as they need med list signed yearly anyway and then hydralazine will be on med list.  Also RX needs to be sent to [facility].  Was sent to [facility].  T'd up.        Please fax to: Fax [phone]     Abril Harris RN

## 2024-11-18 RX ORDER — HYDRALAZINE HYDROCHLORIDE 10 MG/1
TABLET, FILM COATED ORAL
Qty: 30 TABLET | Refills: 1 | Status: SHIPPED | OUTPATIENT
Start: 2024-11-18 | End: 2024-11-18

## 2024-11-18 RX ORDER — HYDRALAZINE HYDROCHLORIDE 10 MG/1
TABLET, FILM COATED ORAL
Qty: 30 TABLET | Refills: 1 | Status: SHIPPED | OUTPATIENT
Start: 2024-11-18

## 2024-11-25 ENCOUNTER — ANCILLARY PROCEDURE (OUTPATIENT)
Dept: MAMMOGRAPHY | Facility: CLINIC | Age: 69
End: 2024-11-25
Attending: FAMILY MEDICINE
Payer: COMMERCIAL

## 2024-11-25 ENCOUNTER — MEDICAL CORRESPONDENCE (OUTPATIENT)
Dept: HEALTH INFORMATION MANAGEMENT | Facility: CLINIC | Age: 69
End: 2024-11-25

## 2024-11-25 DIAGNOSIS — Z12.31 VISIT FOR SCREENING MAMMOGRAM: ICD-10-CM

## 2024-11-25 PROCEDURE — 77063 BREAST TOMOSYNTHESIS BI: CPT | Mod: TC | Performed by: RADIOLOGY

## 2024-11-25 PROCEDURE — 77067 SCR MAMMO BI INCL CAD: CPT | Mod: TC | Performed by: RADIOLOGY

## 2024-11-25 NOTE — TELEPHONE ENCOUNTER
[facility] calls, they have been dispensing depakote extended release, delayed release sent, think error, routed to [facility], please confirm, if ok update and send extended release  Thelma Strong RN, BSN  Message handled by CLINIC NURSE.     DC instructions

## 2025-02-21 ENCOUNTER — APPOINTMENT (OUTPATIENT)
Dept: CT IMAGING | Facility: CLINIC | Age: 70
DRG: 392 | End: 2025-02-21
Attending: EMERGENCY MEDICINE
Payer: COMMERCIAL

## 2025-02-21 ENCOUNTER — HOSPITAL ENCOUNTER (INPATIENT)
Facility: CLINIC | Age: 70
LOS: 3 days | Discharge: HOME | DRG: 392 | End: 2025-02-24
Attending: EMERGENCY MEDICINE | Admitting: INTERNAL MEDICINE
Payer: COMMERCIAL

## 2025-02-21 DIAGNOSIS — N39.0 URINARY TRACT INFECTION IN FEMALE: ICD-10-CM

## 2025-02-21 DIAGNOSIS — N13.30 HYDRONEPHROSIS, UNSPECIFIED HYDRONEPHROSIS TYPE: ICD-10-CM

## 2025-02-21 DIAGNOSIS — N13.4 HYDROURETER: ICD-10-CM

## 2025-02-21 DIAGNOSIS — K52.9 GASTROENTERITIS: ICD-10-CM

## 2025-02-21 DIAGNOSIS — R33.8 ACUTE URINARY RETENTION: ICD-10-CM

## 2025-02-21 DIAGNOSIS — R19.7 NAUSEA VOMITING AND DIARRHEA: ICD-10-CM

## 2025-02-21 DIAGNOSIS — R11.2 NAUSEA VOMITING AND DIARRHEA: ICD-10-CM

## 2025-02-21 LAB
ALBUMIN SERPL BCG-MCNC: 4.3 G/DL (ref 3.5–5.2)
ALBUMIN UR-MCNC: 20 MG/DL
ALP SERPL-CCNC: 125 U/L (ref 40–150)
ALT SERPL W P-5'-P-CCNC: 14 U/L (ref 0–50)
ANION GAP SERPL CALCULATED.3IONS-SCNC: 18 MMOL/L (ref 7–15)
APPEARANCE UR: ABNORMAL
AST SERPL W P-5'-P-CCNC: 22 U/L (ref 0–45)
ATRIAL RATE - MUSE: 113 BPM
BASOPHILS # BLD AUTO: 0 10E3/UL (ref 0–0.2)
BASOPHILS NFR BLD AUTO: 0 %
BILIRUB DIRECT SERPL-MCNC: 0.25 MG/DL (ref 0–0.3)
BILIRUB SERPL-MCNC: 0.9 MG/DL
BILIRUB UR QL STRIP: NEGATIVE
BUN SERPL-MCNC: 17.6 MG/DL (ref 8–23)
CALCIUM SERPL-MCNC: 9.2 MG/DL (ref 8.8–10.4)
CHLORIDE SERPL-SCNC: 99 MMOL/L (ref 98–107)
COLOR UR AUTO: YELLOW
CREAT SERPL-MCNC: 1.09 MG/DL (ref 0.51–0.95)
DIASTOLIC BLOOD PRESSURE - MUSE: NORMAL MMHG
EGFRCR SERPLBLD CKD-EPI 2021: 55 ML/MIN/1.73M2
EOSINOPHIL # BLD AUTO: 0 10E3/UL (ref 0–0.7)
EOSINOPHIL NFR BLD AUTO: 0 %
ERYTHROCYTE [DISTWIDTH] IN BLOOD BY AUTOMATED COUNT: 13.2 % (ref 10–15)
GLUCOSE SERPL-MCNC: 155 MG/DL (ref 70–99)
GLUCOSE UR STRIP-MCNC: NEGATIVE MG/DL
HCO3 SERPL-SCNC: 22 MMOL/L (ref 22–29)
HCT VFR BLD AUTO: 45.2 % (ref 35–47)
HGB BLD-MCNC: 15.4 G/DL (ref 11.7–15.7)
HGB UR QL STRIP: ABNORMAL
HOLD SPECIMEN: NORMAL
HOLD SPECIMEN: NORMAL
IMM GRANULOCYTES # BLD: 0.1 10E3/UL
IMM GRANULOCYTES NFR BLD: 1 %
INTERPRETATION ECG - MUSE: NORMAL
KETONES UR STRIP-MCNC: NEGATIVE MG/DL
LACTATE SERPL-SCNC: 2.3 MMOL/L (ref 0.7–2)
LACTATE SERPL-SCNC: 2.3 MMOL/L (ref 0.7–2)
LEUKOCYTE ESTERASE UR QL STRIP: ABNORMAL
LIPASE SERPL-CCNC: 74 U/L (ref 13–60)
LYMPHOCYTES # BLD AUTO: 0.4 10E3/UL (ref 0.8–5.3)
LYMPHOCYTES NFR BLD AUTO: 2 %
MAGNESIUM SERPL-MCNC: 1.9 MG/DL (ref 1.7–2.3)
MCH RBC QN AUTO: 28.5 PG (ref 26.5–33)
MCHC RBC AUTO-ENTMCNC: 34.1 G/DL (ref 31.5–36.5)
MCV RBC AUTO: 84 FL (ref 78–100)
MONOCYTES # BLD AUTO: 0.3 10E3/UL (ref 0–1.3)
MONOCYTES NFR BLD AUTO: 2 %
MUCOUS THREADS #/AREA URNS LPF: PRESENT /LPF
NEUTROPHILS # BLD AUTO: 16.9 10E3/UL (ref 1.6–8.3)
NEUTROPHILS NFR BLD AUTO: 96 %
NITRATE UR QL: POSITIVE
NRBC # BLD AUTO: 0 10E3/UL
NRBC BLD AUTO-RTO: 0 /100
P AXIS - MUSE: 64 DEGREES
PH UR STRIP: 5.5 [PH] (ref 5–7)
PLATELET # BLD AUTO: 360 10E3/UL (ref 150–450)
POTASSIUM SERPL-SCNC: 4.3 MMOL/L (ref 3.4–5.3)
PR INTERVAL - MUSE: 178 MS
PROT SERPL-MCNC: 7.4 G/DL (ref 6.4–8.3)
QRS DURATION - MUSE: 70 MS
QT - MUSE: 328 MS
QTC - MUSE: 449 MS
R AXIS - MUSE: 92 DEGREES
RBC # BLD AUTO: 5.4 10E6/UL (ref 3.8–5.2)
RBC URINE: 18 /HPF
SODIUM SERPL-SCNC: 139 MMOL/L (ref 135–145)
SP GR UR STRIP: 1.02 (ref 1–1.03)
SQUAMOUS EPITHELIAL: 1 /HPF
SYSTOLIC BLOOD PRESSURE - MUSE: NORMAL MMHG
T AXIS - MUSE: 38 DEGREES
UROBILINOGEN UR STRIP-MCNC: NORMAL MG/DL
VENTRICULAR RATE- MUSE: 113 BPM
WBC # BLD AUTO: 17.7 10E3/UL (ref 4–11)
WBC CLUMPS #/AREA URNS HPF: PRESENT /HPF
WBC URINE: >182 /HPF

## 2025-02-21 PROCEDURE — 85041 AUTOMATED RBC COUNT: CPT | Performed by: EMERGENCY MEDICINE

## 2025-02-21 PROCEDURE — 96374 THER/PROPH/DIAG INJ IV PUSH: CPT | Mod: 59

## 2025-02-21 PROCEDURE — 83605 ASSAY OF LACTIC ACID: CPT | Performed by: INTERNAL MEDICINE

## 2025-02-21 PROCEDURE — 0T9B70Z DRAINAGE OF BLADDER WITH DRAINAGE DEVICE, VIA NATURAL OR ARTIFICIAL OPENING: ICD-10-PCS | Performed by: PHYSICIAN ASSISTANT

## 2025-02-21 PROCEDURE — 74177 CT ABD & PELVIS W/CONTRAST: CPT

## 2025-02-21 PROCEDURE — 80053 COMPREHEN METABOLIC PANEL: CPT | Performed by: EMERGENCY MEDICINE

## 2025-02-21 PROCEDURE — 120N000001 HC R&B MED SURG/OB

## 2025-02-21 PROCEDURE — 83690 ASSAY OF LIPASE: CPT | Performed by: EMERGENCY MEDICINE

## 2025-02-21 PROCEDURE — 83735 ASSAY OF MAGNESIUM: CPT | Performed by: PHYSICIAN ASSISTANT

## 2025-02-21 PROCEDURE — 99223 1ST HOSP IP/OBS HIGH 75: CPT | Performed by: PHYSICIAN ASSISTANT

## 2025-02-21 PROCEDURE — 250N000013 HC RX MED GY IP 250 OP 250 PS 637: Performed by: PHYSICIAN ASSISTANT

## 2025-02-21 PROCEDURE — 36415 COLL VENOUS BLD VENIPUNCTURE: CPT | Performed by: EMERGENCY MEDICINE

## 2025-02-21 PROCEDURE — 258N000003 HC RX IP 258 OP 636: Performed by: EMERGENCY MEDICINE

## 2025-02-21 PROCEDURE — 250N000009 HC RX 250: Performed by: EMERGENCY MEDICINE

## 2025-02-21 PROCEDURE — 258N000003 HC RX IP 258 OP 636: Performed by: PHYSICIAN ASSISTANT

## 2025-02-21 PROCEDURE — 99221 1ST HOSP IP/OBS SF/LOW 40: CPT | Mod: FS | Performed by: STUDENT IN AN ORGANIZED HEALTH CARE EDUCATION/TRAINING PROGRAM

## 2025-02-21 PROCEDURE — 96361 HYDRATE IV INFUSION ADD-ON: CPT

## 2025-02-21 PROCEDURE — 84155 ASSAY OF PROTEIN SERUM: CPT | Performed by: EMERGENCY MEDICINE

## 2025-02-21 PROCEDURE — 250N000011 HC RX IP 250 OP 636: Performed by: EMERGENCY MEDICINE

## 2025-02-21 PROCEDURE — 87040 BLOOD CULTURE FOR BACTERIA: CPT | Performed by: EMERGENCY MEDICINE

## 2025-02-21 PROCEDURE — 87086 URINE CULTURE/COLONY COUNT: CPT | Performed by: EMERGENCY MEDICINE

## 2025-02-21 PROCEDURE — 82040 ASSAY OF SERUM ALBUMIN: CPT | Performed by: EMERGENCY MEDICINE

## 2025-02-21 PROCEDURE — 99285 EMERGENCY DEPT VISIT HI MDM: CPT | Mod: 25

## 2025-02-21 PROCEDURE — 81003 URINALYSIS AUTO W/O SCOPE: CPT | Performed by: EMERGENCY MEDICINE

## 2025-02-21 PROCEDURE — 250N000013 HC RX MED GY IP 250 OP 250 PS 637: Performed by: INTERNAL MEDICINE

## 2025-02-21 PROCEDURE — G0378 HOSPITAL OBSERVATION PER HR: HCPCS

## 2025-02-21 PROCEDURE — 258N000003 HC RX IP 258 OP 636: Performed by: INTERNAL MEDICINE

## 2025-02-21 PROCEDURE — 85018 HEMOGLOBIN: CPT | Performed by: EMERGENCY MEDICINE

## 2025-02-21 PROCEDURE — 96375 TX/PRO/DX INJ NEW DRUG ADDON: CPT

## 2025-02-21 PROCEDURE — 36415 COLL VENOUS BLD VENIPUNCTURE: CPT | Performed by: INTERNAL MEDICINE

## 2025-02-21 PROCEDURE — 80048 BASIC METABOLIC PNL TOTAL CA: CPT | Performed by: EMERGENCY MEDICINE

## 2025-02-21 PROCEDURE — 96365 THER/PROPH/DIAG IV INF INIT: CPT

## 2025-02-21 RX ORDER — ONDANSETRON 4 MG/1
4 TABLET, ORALLY DISINTEGRATING ORAL EVERY 6 HOURS PRN
Status: DISCONTINUED | OUTPATIENT
Start: 2025-02-21 | End: 2025-02-24 | Stop reason: HOSPADM

## 2025-02-21 RX ORDER — DONEPEZIL HYDROCHLORIDE 5 MG/1
5 TABLET, FILM COATED ORAL AT BEDTIME
Status: DISCONTINUED | OUTPATIENT
Start: 2025-02-21 | End: 2025-02-24 | Stop reason: HOSPADM

## 2025-02-21 RX ORDER — AMOXICILLIN 250 MG
2 CAPSULE ORAL 2 TIMES DAILY PRN
Status: DISCONTINUED | OUTPATIENT
Start: 2025-02-21 | End: 2025-02-24 | Stop reason: HOSPADM

## 2025-02-21 RX ORDER — ACETAMINOPHEN 325 MG/1
650 TABLET ORAL EVERY 4 HOURS PRN
Status: DISCONTINUED | OUTPATIENT
Start: 2025-02-21 | End: 2025-02-21

## 2025-02-21 RX ORDER — ACETAMINOPHEN 325 MG/1
650 TABLET ORAL EVERY 4 HOURS PRN
Status: DISCONTINUED | OUTPATIENT
Start: 2025-02-21 | End: 2025-02-24 | Stop reason: HOSPADM

## 2025-02-21 RX ORDER — OLANZAPINE 2.5 MG/1
7.5 TABLET, FILM COATED ORAL AT BEDTIME
Status: DISCONTINUED | OUTPATIENT
Start: 2025-02-21 | End: 2025-02-24 | Stop reason: HOSPADM

## 2025-02-21 RX ORDER — PROCHLORPERAZINE MALEATE 5 MG/1
5 TABLET ORAL EVERY 6 HOURS PRN
Status: DISCONTINUED | OUTPATIENT
Start: 2025-02-21 | End: 2025-02-24 | Stop reason: HOSPADM

## 2025-02-21 RX ORDER — ATORVASTATIN CALCIUM 20 MG/1
20 TABLET, FILM COATED ORAL DAILY
Status: DISCONTINUED | OUTPATIENT
Start: 2025-02-21 | End: 2025-02-24 | Stop reason: HOSPADM

## 2025-02-21 RX ORDER — CEFTRIAXONE 1 G/1
1 INJECTION, POWDER, FOR SOLUTION INTRAMUSCULAR; INTRAVENOUS EVERY 24 HOURS
Status: DISCONTINUED | OUTPATIENT
Start: 2025-02-22 | End: 2025-02-23

## 2025-02-21 RX ORDER — ONDANSETRON 4 MG/1
4 TABLET, ORALLY DISINTEGRATING ORAL EVERY 8 HOURS PRN
Qty: 10 TABLET | Refills: 0 | Status: SHIPPED | OUTPATIENT
Start: 2025-02-21 | End: 2025-02-24

## 2025-02-21 RX ORDER — LIDOCAINE 40 MG/G
CREAM TOPICAL
Status: DISCONTINUED | OUTPATIENT
Start: 2025-02-21 | End: 2025-02-21

## 2025-02-21 RX ORDER — IOPAMIDOL 755 MG/ML
500 INJECTION, SOLUTION INTRAVASCULAR ONCE
Status: COMPLETED | OUTPATIENT
Start: 2025-02-21 | End: 2025-02-21

## 2025-02-21 RX ORDER — CALCIUM CARBONATE 500 MG/1
1000 TABLET, CHEWABLE ORAL 4 TIMES DAILY PRN
Status: DISCONTINUED | OUTPATIENT
Start: 2025-02-21 | End: 2025-02-24 | Stop reason: HOSPADM

## 2025-02-21 RX ORDER — ONDANSETRON 2 MG/ML
4 INJECTION INTRAMUSCULAR; INTRAVENOUS EVERY 6 HOURS PRN
Status: DISCONTINUED | OUTPATIENT
Start: 2025-02-21 | End: 2025-02-24 | Stop reason: HOSPADM

## 2025-02-21 RX ORDER — AMOXICILLIN 250 MG
1 CAPSULE ORAL 2 TIMES DAILY PRN
Status: DISCONTINUED | OUTPATIENT
Start: 2025-02-21 | End: 2025-02-24 | Stop reason: HOSPADM

## 2025-02-21 RX ORDER — HYDRALAZINE HYDROCHLORIDE 10 MG/1
10 TABLET, FILM COATED ORAL 3 TIMES DAILY PRN
Status: DISCONTINUED | OUTPATIENT
Start: 2025-02-21 | End: 2025-02-24 | Stop reason: HOSPADM

## 2025-02-21 RX ORDER — ACETAMINOPHEN 650 MG/1
650 SUPPOSITORY RECTAL EVERY 4 HOURS PRN
Status: DISCONTINUED | OUTPATIENT
Start: 2025-02-21 | End: 2025-02-21

## 2025-02-21 RX ORDER — ONDANSETRON 2 MG/ML
4 INJECTION INTRAMUSCULAR; INTRAVENOUS ONCE
Status: COMPLETED | OUTPATIENT
Start: 2025-02-21 | End: 2025-02-21

## 2025-02-21 RX ORDER — BUSPIRONE HYDROCHLORIDE 5 MG/1
5-10 TABLET ORAL DAILY
Status: DISCONTINUED | OUTPATIENT
Start: 2025-02-21 | End: 2025-02-24 | Stop reason: HOSPADM

## 2025-02-21 RX ORDER — ACETAMINOPHEN 650 MG/1
650 SUPPOSITORY RECTAL EVERY 4 HOURS PRN
Status: DISCONTINUED | OUTPATIENT
Start: 2025-02-21 | End: 2025-02-24 | Stop reason: HOSPADM

## 2025-02-21 RX ORDER — CEFTRIAXONE 2 G/1
2 INJECTION, POWDER, FOR SOLUTION INTRAMUSCULAR; INTRAVENOUS ONCE
Status: COMPLETED | OUTPATIENT
Start: 2025-02-21 | End: 2025-02-21

## 2025-02-21 RX ORDER — LOPERAMIDE HYDROCHLORIDE 2 MG/1
2-4 TABLET ORAL 3 TIMES DAILY PRN
Qty: 20 TABLET | Refills: 0 | Status: SHIPPED | OUTPATIENT
Start: 2025-02-21 | End: 2025-02-24

## 2025-02-21 RX ADMIN — SODIUM CHLORIDE 500 ML: 9 INJECTION, SOLUTION INTRAVENOUS at 19:16

## 2025-02-21 RX ADMIN — LEVOTHYROXINE SODIUM 137 MCG: 0.11 TABLET ORAL at 16:02

## 2025-02-21 RX ADMIN — OLANZAPINE 7.5 MG: 2.5 TABLET, FILM COATED ORAL at 20:47

## 2025-02-21 RX ADMIN — IOPAMIDOL 100 ML: 755 INJECTION, SOLUTION INTRAVENOUS at 09:57

## 2025-02-21 RX ADMIN — SODIUM CHLORIDE 500 ML: 9 INJECTION, SOLUTION INTRAVENOUS at 22:51

## 2025-02-21 RX ADMIN — SODIUM CHLORIDE 65 ML: 9 INJECTION, SOLUTION INTRAVENOUS at 09:57

## 2025-02-21 RX ADMIN — SODIUM CHLORIDE 1000 ML: 9 INJECTION, SOLUTION INTRAVENOUS at 08:44

## 2025-02-21 RX ADMIN — BUSPIRONE HYDROCHLORIDE 5 MG: 5 TABLET ORAL at 16:01

## 2025-02-21 RX ADMIN — ONDANSETRON 4 MG: 2 INJECTION, SOLUTION INTRAMUSCULAR; INTRAVENOUS at 08:44

## 2025-02-21 RX ADMIN — CEFTRIAXONE SODIUM 2 G: 2 INJECTION, POWDER, FOR SOLUTION INTRAMUSCULAR; INTRAVENOUS at 12:52

## 2025-02-21 RX ADMIN — SODIUM CHLORIDE 1000 ML: 9 INJECTION, SOLUTION INTRAVENOUS at 16:05

## 2025-02-21 RX ADMIN — ACETAMINOPHEN 650 MG: 325 TABLET, FILM COATED ORAL at 18:30

## 2025-02-21 RX ADMIN — ATORVASTATIN CALCIUM 20 MG: 20 TABLET, FILM COATED ORAL at 16:02

## 2025-02-21 RX ADMIN — DONEPEZIL HYDROCHLORIDE 5 MG: 5 TABLET, FILM COATED ORAL at 20:48

## 2025-02-21 ASSESSMENT — ACTIVITIES OF DAILY LIVING (ADL)
ADLS_ACUITY_SCORE: 39
ADLS_ACUITY_SCORE: 54
ADLS_ACUITY_SCORE: 62
ADLS_ACUITY_SCORE: 62
ADLS_ACUITY_SCORE: 39
ADLS_ACUITY_SCORE: 62
ADLS_ACUITY_SCORE: 54
ADLS_ACUITY_SCORE: 39
ADLS_ACUITY_SCORE: 54
ADLS_ACUITY_SCORE: 39
ADLS_ACUITY_SCORE: 54

## 2025-02-21 ASSESSMENT — COLUMBIA-SUICIDE SEVERITY RATING SCALE - C-SSRS
6. HAVE YOU EVER DONE ANYTHING, STARTED TO DO ANYTHING, OR PREPARED TO DO ANYTHING TO END YOUR LIFE?: NO
2. HAVE YOU ACTUALLY HAD ANY THOUGHTS OF KILLING YOURSELF IN THE PAST MONTH?: NO
1. IN THE PAST MONTH, HAVE YOU WISHED YOU WERE DEAD OR WISHED YOU COULD GO TO SLEEP AND NOT WAKE UP?: NO
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
## 2025-02-21 NOTE — ED TRIAGE NOTES
Patient Brought in by ambulance from assisted living facility for vomiting, diarrhea, and abdominal pain since last evening. 4mg zofran given en route.

## 2025-02-21 NOTE — ED NOTES
Bed: ED39  Expected date: 2/21/25  Expected time: 10:25 AM  Means of arrival:   Comments:  LILIANA PT

## 2025-02-21 NOTE — LETTER
Thelma Pineda, [facility] SHA   Inpatient Care Coordination   Supervisor  [facility]  [phone]

## 2025-02-21 NOTE — PHARMACY-ADMISSION MEDICATION HISTORY
Pharmacist Admission Medication History    Admission medication history is complete. The information provided in this note is only as accurate as the sources available at the time of the update.    Information Source(s): Facility (TCU/NH/) medication list/MAR ([facility] ([phone]) via phone and fax    Changes made to PTA medication list:  Added: None  Deleted: cranberry (duplicate), Lomotil  Changed: None    Allergies reviewed with patient and updates made in EHR: yes    Medication History Completed By: Liana Cotton, [place] 2/21/2025 2:09 PM    PTA Med List   Medication Sig Last Dose/Taking    atorvastatin (LIPITOR) 20 MG tablet TAKE 1 TABLET (20 MG) BY MOUTH DAILY 2/20/2025 at  9:00 AM    busPIRone (BUSPAR) 5 MG tablet Take 1-2 tablets (5-10 mg) by mouth daily 2/20/2025 at  7:30 AM    calcium carbonate (CALCIUM ANTACID) 500 MG chewable tablet Take 1 tablet by mouth daily 2/20/2025 at  9:00 AM    Cranberry 500 MG TABS Take 1 tablet by mouth daily. 2/20/2025 at  9:00 AM    donepezil (ARICEPT) 5 MG tablet Take 1 tablet (5 mg) by mouth at bedtime. 2/20/2025 at  9:00 PM    hydrALAZINE (APRESOLINE) 10 MG tablet Take one tablet (10 mg) by mouth TID PRN for blood pressures greater than 160/100. Unknown    levothyroxine (SYNTHROID/LEVOTHROID) 137 MCG tablet Take 1 tablet (137 mcg) by mouth daily. 2/20/2025 at  7:00 AM    loperamide (IMODIUM A-D) 2 MG tablet Take 1-2 tablets (2-4 mg) by mouth 3 times daily as needed for diarrhea. Taking As Needed    loperamide (IMODIUM) 2 MG capsule Take 1 capsule (2 mg) by mouth 4 times daily as needed Unknown    methenamine hippurate (HIPREX) 1 g tablet Take 1 tablet (1 g) by mouth 2 times daily 2/20/2025 at  9:00 PM    multivitamin w/minerals (THERA-VIT-M) tablet Take 1 tablet by mouth daily 2/20/2025 at  9:00 AM    OLANZapine (ZYPREXA) 7.5 MG tablet Take 1 tablet (7.5 mg) by mouth at bedtime. 2/20/2025 at  9:00 PM    ondansetron (ZOFRAN  ODT) 4 MG ODT tab Take 1 tablet (4 mg) by mouth every 8 hours as needed for nausea or vomiting. Taking As Needed
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
## 2025-02-21 NOTE — ED NOTES
"[facility]  ED Nurse Handoff Report    ED Chief complaint: Abdominal Pain and Nausea, Vomiting, & Diarrhea  . ED Diagnosis:   Final diagnoses:   Gastroenteritis   Nausea vomiting and diarrhea   Urinary tract infection in female   Hydronephrosis, unspecified hydronephrosis type   Hydroureter   Acute urinary retention       Allergies:   Allergies   Allergen Reactions    Levofloxacin Nausea and Vomiting    Dextrose Unknown    Levaquin Nausea and Vomiting       Code Status: Full Code    Activity level - Baseline/Home:  independent.  Activity Level - Current:   assist of 1.   Lift room needed: No.   Bariatric: No   Needed: No   Isolation: No.   Infection: Not Applicable.     Respiratory status: Room air    Vital Signs (within 30 minutes):   Vitals:    02/21/25 0819 02/21/25 1050 02/21/25 1246 02/21/25 1254   BP: (!) 151/94  125/81    Pulse: (!) 137 107 99    Resp: 16      Temp: 97.8  F (36.6  C)      SpO2: 96% 96% 95% 96%   Weight: 91.5 kg (201 lb 11.5 oz)      Height: 1.727 m (5' 8\")          Cardiac Rhythm:  ,      Pain level:    Patient confused: No.   Patient Falls Risk: bed/chair alarm on, nonskid shoes/slippers when out of bed, and activity supervised.   Elimination Status: Urethral catheter (castañeda) in place; orders for patient to discharge with castañeda      Patient Report - Initial Complaint: NVD.   Focused Assessment: Mariela Duffy is a 69 year old female with history of dementia, irritable bowel syndrome, and GERD who presents via EMS from assisted living for abdominal pain, nausea, vomiting, and diarrhea. Symptoms started last night with vomiting and diarrhea. Patient is currently very nauseous. Her abdominal pain has become less intense. She has had some relief after taking an antidiarrheal last night. No fevers. No blood in stools or urinary symptoms. No history of abdominal surgery. She is not aware of any outbreaks in her assisted living facility.      Abnormal Results: "   Labs Ordered and Resulted from Time of ED Arrival to Time of ED Departure   BASIC METABOLIC PANEL - Abnormal       Result Value    Sodium 139      Potassium 4.3      Chloride 99      Carbon Dioxide (CO2) 22      Anion Gap 18 (*)     Urea Nitrogen 17.6      Creatinine 1.09 (*)     GFR Estimate 55 (*)     Calcium 9.2      Glucose 155 (*)    CBC WITH PLATELETS AND DIFFERENTIAL - Abnormal    WBC Count 17.7 (*)     RBC Count 5.40 (*)     Hemoglobin 15.4      Hematocrit 45.2      MCV 84      MCH 28.5      MCHC 34.1      RDW 13.2      Platelet Count 360      % Neutrophils 96      % Lymphocytes 2      % Monocytes 2      % Eosinophils 0      % Basophils 0      % Immature Granulocytes 1      NRBCs per 100 WBC 0      Absolute Neutrophils 16.9 (*)     Absolute Lymphocytes 0.4 (*)     Absolute Monocytes 0.3      Absolute Eosinophils 0.0      Absolute Basophils 0.0      Absolute Immature Granulocytes 0.1      Absolute NRBCs 0.0     LIPASE - Abnormal    Lipase 74 (*)    ROUTINE UA WITH MICROSCOPIC REFLEX TO CULTURE - Abnormal    Color Urine Yellow      Appearance Urine Slightly Cloudy (*)     Glucose Urine Negative      Bilirubin Urine Negative      Ketones Urine Negative      Specific Gravity Urine 1.019      Blood Urine Small (*)     pH Urine 5.5      Protein Albumin Urine 20 (*)     Urobilinogen Urine Normal      Nitrite Urine Positive (*)     Leukocyte Esterase Urine Large (*)     WBC Clumps Urine Present (*)     Mucus Urine Present (*)     RBC Urine 18 (*)     WBC Urine >182 (*)     Squamous Epithelials Urine 1     HEPATIC FUNCTION PANEL - Normal    Protein Total 7.4      Albumin 4.3      Bilirubin Total 0.9      Alkaline Phosphatase 125      AST 22      ALT 14      Bilirubin Direct 0.25     ISTAT GASES LACTATE VENOUS POCT   BLOOD CULTURE   BLOOD CULTURE   URINE CULTURE        CT Abdomen Pelvis w Contrast   Final Result   IMPRESSION:    1.  Severe bilateral hydronephrosis and hydroureter. No obstructive calculus is seen.  Findings may be due to urinary retention or outlet obstruction. Mild to moderate narrowing is noted along the distal ureters near the bilateral ureterovesicular    junctions which may suggest underlying stricture. Suggest correlation with renal function tests as well as urological consultation.   2.  Several segmental areas of mild wall thickening are seen within the ascending colon measuring up to 1.7 cm in length. These findings may reflect focal areas colitis or underdistention but an underlying colonic mass is difficult to exclude.    Furthermore, a polyploid like lesion is noted adjacent to one of the areas of segmental wall thickening in the ascending colon and measures up to 7 mm. Colonic mass versus polyp is in the differential diagnosis. Recommend further characterization with    colonoscopy.   3.  Small to moderate hiatal hernia. Mild wall thickening is seen along the gastric fundus and proximal body which may suggest gastritis. Suggest correlation with clinical exam.   4.  Stable calcified splenic artery aneurysms measuring up to 1.3 cm.      REFERENCE:   Management of Incidental Adrenal Masses: A White Paper of the ACR Incidental Findings Committee. J Am Sarita Radiol 2017;14:5129-5968.      <1 cm in short axis: No follow-up.             Treatments provided: See Epic  Family Comments: NA  OBS brochure/video discussed/provided to patient:  N/A  ED Medications:   Medications   cefTRIAXone (ROCEPHIN) 2 g vial to attach to  ml bag for ADULTS or NS 50 ml bag for PEDS (2 g Intravenous $New Bag 2/21/25 1252)   sodium chloride 0.9% BOLUS 1,000 mL (0 mLs Intravenous Stopped 2/21/25 1253)   ondansetron (ZOFRAN) injection 4 mg (4 mg Intravenous $Given 2/21/25 0855)   iopamidol (ISOVUE-370) solution 500 mL (100 mLs Intravenous $Given 2/21/25 0957)   CT scan flush (65 mLs Intravenous $Given 2/21/25 0957)       Drips infusing:  No  For the majority of the shift this patient was Green.   Interventions performed  were NA.    Sepsis treatment initiated: No    ED Nurse Name: Aleta Isidro RN  1:03 PM    RECEIVING UNIT ED HANDOFF REVIEW    Above ED Nurse Handoff Report was reviewed: Yes  Reviewed by: Nayeli Cruz RN on February 21, 2025 at 5:48 PM   I Emily called the ED to inform them the note was read: No

## 2025-02-22 ENCOUNTER — APPOINTMENT (OUTPATIENT)
Dept: ULTRASOUND IMAGING | Facility: CLINIC | Age: 70
DRG: 392 | End: 2025-02-22
Attending: PHYSICIAN ASSISTANT
Payer: COMMERCIAL

## 2025-02-22 ENCOUNTER — APPOINTMENT (OUTPATIENT)
Dept: PHYSICAL THERAPY | Facility: CLINIC | Age: 70
DRG: 392 | End: 2025-02-22
Attending: PHYSICIAN ASSISTANT
Payer: COMMERCIAL

## 2025-02-22 LAB
ANION GAP SERPL CALCULATED.3IONS-SCNC: 10 MMOL/L (ref 7–15)
BACTERIA UR CULT: NO GROWTH
BUN SERPL-MCNC: 10.2 MG/DL (ref 8–23)
C DIFF TOX B STL QL: NEGATIVE
CALCIUM SERPL-MCNC: 7.9 MG/DL (ref 8.8–10.4)
CHLORIDE SERPL-SCNC: 104 MMOL/L (ref 98–107)
CREAT SERPL-MCNC: 0.96 MG/DL (ref 0.51–0.95)
EGFRCR SERPLBLD CKD-EPI 2021: 64 ML/MIN/1.73M2
ERYTHROCYTE [DISTWIDTH] IN BLOOD BY AUTOMATED COUNT: 13.6 % (ref 10–15)
GLUCOSE SERPL-MCNC: 106 MG/DL (ref 70–99)
HCO3 SERPL-SCNC: 24 MMOL/L (ref 22–29)
HCT VFR BLD AUTO: 35.4 % (ref 35–47)
HGB BLD-MCNC: 11.8 G/DL (ref 11.7–15.7)
MAGNESIUM SERPL-MCNC: 1.8 MG/DL (ref 1.7–2.3)
MCH RBC QN AUTO: 28.6 PG (ref 26.5–33)
MCHC RBC AUTO-ENTMCNC: 33.3 G/DL (ref 31.5–36.5)
MCV RBC AUTO: 86 FL (ref 78–100)
PLATELET # BLD AUTO: 217 10E3/UL (ref 150–450)
POTASSIUM SERPL-SCNC: 3.9 MMOL/L (ref 3.4–5.3)
RBC # BLD AUTO: 4.13 10E6/UL (ref 3.8–5.2)
SODIUM SERPL-SCNC: 138 MMOL/L (ref 135–145)
WBC # BLD AUTO: 6.6 10E3/UL (ref 4–11)

## 2025-02-22 PROCEDURE — 87507 IADNA-DNA/RNA PROBE TQ 12-25: CPT | Performed by: INTERNAL MEDICINE

## 2025-02-22 PROCEDURE — 99232 SBSQ HOSP IP/OBS MODERATE 35: CPT | Performed by: INTERNAL MEDICINE

## 2025-02-22 PROCEDURE — 250N000013 HC RX MED GY IP 250 OP 250 PS 637: Performed by: PHYSICIAN ASSISTANT

## 2025-02-22 PROCEDURE — G0378 HOSPITAL OBSERVATION PER HR: HCPCS

## 2025-02-22 PROCEDURE — 250N000013 HC RX MED GY IP 250 OP 250 PS 637: Performed by: INTERNAL MEDICINE

## 2025-02-22 PROCEDURE — 80048 BASIC METABOLIC PNL TOTAL CA: CPT | Performed by: PHYSICIAN ASSISTANT

## 2025-02-22 PROCEDURE — 96376 TX/PRO/DX INJ SAME DRUG ADON: CPT

## 2025-02-22 PROCEDURE — 120N000001 HC R&B MED SURG/OB

## 2025-02-22 PROCEDURE — 250N000011 HC RX IP 250 OP 636: Performed by: PHYSICIAN ASSISTANT

## 2025-02-22 PROCEDURE — 83735 ASSAY OF MAGNESIUM: CPT | Performed by: INTERNAL MEDICINE

## 2025-02-22 PROCEDURE — 97161 PT EVAL LOW COMPLEX 20 MIN: CPT | Mod: GP | Performed by: PHYSICAL THERAPIST

## 2025-02-22 PROCEDURE — 87493 C DIFF AMPLIFIED PROBE: CPT | Performed by: INTERNAL MEDICINE

## 2025-02-22 PROCEDURE — 85018 HEMOGLOBIN: CPT | Performed by: PHYSICIAN ASSISTANT

## 2025-02-22 PROCEDURE — 82565 ASSAY OF CREATININE: CPT | Performed by: PHYSICIAN ASSISTANT

## 2025-02-22 PROCEDURE — 99231 SBSQ HOSP IP/OBS SF/LOW 25: CPT | Performed by: STUDENT IN AN ORGANIZED HEALTH CARE EDUCATION/TRAINING PROGRAM

## 2025-02-22 PROCEDURE — 36415 COLL VENOUS BLD VENIPUNCTURE: CPT | Performed by: PHYSICIAN ASSISTANT

## 2025-02-22 PROCEDURE — 76770 US EXAM ABDO BACK WALL COMP: CPT

## 2025-02-22 PROCEDURE — 85048 AUTOMATED LEUKOCYTE COUNT: CPT | Performed by: PHYSICIAN ASSISTANT

## 2025-02-22 RX ORDER — LACTOBACILLUS RHAMNOSUS GG 10B CELL
1 CAPSULE ORAL 2 TIMES DAILY
Status: DISCONTINUED | OUTPATIENT
Start: 2025-02-22 | End: 2025-02-24 | Stop reason: HOSPADM

## 2025-02-22 RX ORDER — CALCIUM CARBONATE 500(1250)
500 TABLET ORAL 2 TIMES DAILY WITH MEALS
Status: DISCONTINUED | OUTPATIENT
Start: 2025-02-22 | End: 2025-02-24 | Stop reason: HOSPADM

## 2025-02-22 RX ADMIN — LEVOTHYROXINE SODIUM 137 MCG: 0.11 TABLET ORAL at 09:36

## 2025-02-22 RX ADMIN — Medication 1 CAPSULE: at 09:43

## 2025-02-22 RX ADMIN — DONEPEZIL HYDROCHLORIDE 5 MG: 5 TABLET, FILM COATED ORAL at 21:56

## 2025-02-22 RX ADMIN — CALCIUM 500 MG: 500 TABLET ORAL at 13:28

## 2025-02-22 RX ADMIN — CEFTRIAXONE 1 G: 1 INJECTION, POWDER, FOR SOLUTION INTRAMUSCULAR; INTRAVENOUS at 09:41

## 2025-02-22 RX ADMIN — OLANZAPINE 7.5 MG: 2.5 TABLET, FILM COATED ORAL at 21:56

## 2025-02-22 RX ADMIN — Medication 1 CAPSULE: at 21:56

## 2025-02-22 RX ADMIN — ATORVASTATIN CALCIUM 20 MG: 20 TABLET, FILM COATED ORAL at 09:37

## 2025-02-22 RX ADMIN — BUSPIRONE HYDROCHLORIDE 5 MG: 5 TABLET ORAL at 09:36

## 2025-02-22 RX ADMIN — CALCIUM 500 MG: 500 TABLET ORAL at 17:56

## 2025-02-22 ASSESSMENT — ACTIVITIES OF DAILY LIVING (ADL)
ADLS_ACUITY_SCORE: 45
DEPENDENT_IADLS:: CLEANING;SHOPPING;MEDICATION MANAGEMENT;TRANSPORTATION
ADLS_ACUITY_SCORE: 38
ADLS_ACUITY_SCORE: 45
ADLS_ACUITY_SCORE: 38
ADLS_ACUITY_SCORE: 42
ADLS_ACUITY_SCORE: 38
ADLS_ACUITY_SCORE: 45
ADLS_ACUITY_SCORE: 38
ADLS_ACUITY_SCORE: 38
ADLS_ACUITY_SCORE: 45
ADLS_ACUITY_SCORE: 45
ADLS_ACUITY_SCORE: 42
ADLS_ACUITY_SCORE: 42
ADLS_ACUITY_SCORE: 45
ADLS_ACUITY_SCORE: 45
ADLS_ACUITY_SCORE: 38
ADLS_ACUITY_SCORE: 38

## 2025-02-22 NOTE — PLAN OF CARE
"Vss, no co pain/cp/sob. Alarms on for safety, castañeda in place until discharge per urology, ble edema noted, mepilex to sacrum, loose BM overnight and diarrhea PTA, obtained order for probiotic an enteric isolation until cdiff can be ruled out. K+/Mag WDL with rechecks ordered for am. Alarms on for safety, up with Ax1+gb+walker, IV rocephin, old SP site WDL. K+/Mag WDL with rechecks ordered for am. Continue poc and monitoring.       Goal Outcome Evaluation:      Plan of Care Reviewed With: patient    Overall Patient Progress: no changeOverall Patient Progress: no change    Outcome Evaluation: castañeda remains, no co pain, FLYNN continue      Problem: Adult Inpatient Plan of Care  Goal: Plan of Care Review  Description: The Plan of Care Review/Shift note should be completed every shift.  The Outcome Evaluation is a brief statement about your assessment that the patient is improving, declining, or no change.  This information will be displayed automatically on your shift  note.  Outcome: Not Progressing  Flowsheets (Taken 2/22/2025 1515)  Outcome Evaluation: castañeda remains, no co pain, FLYNN continue  Plan of Care Reviewed With: patient  Overall Patient Progress: no change  Goal: Patient-Specific Goal (Individualized)  Description: You can add care plan individualizations to a care plan. Examples of Individualization might be:  \"Parent requests to be called daily at 9am for status\", \"I have a hard time hearing out of my right ear\", or \"Do not touch me to wake me up as it startles  me\".  Outcome: Not Progressing  Goal: Absence of Hospital-Acquired Illness or Injury  Outcome: Not Progressing  Intervention: Identify and Manage Fall Risk  Recent Flowsheet Documentation  Taken 2/22/2025 1417 by Harleen Chino, RN  Safety Promotion/Fall Prevention: safety round/check completed  Taken 2/22/2025 1327 by Harleen Chino, RN  Safety Promotion/Fall Prevention: safety round/check completed  Taken 2/22/2025 1251 by Harleen Chino, " RN  Safety Promotion/Fall Prevention: safety round/check completed  Taken 2/22/2025 1205 by Harleen Chino RN  Safety Promotion/Fall Prevention: safety round/check completed  Taken 2/22/2025 1142 by Harleen Chino RN  Safety Promotion/Fall Prevention: safety round/check completed  Taken 2/22/2025 1049 by Harleen Chino RN  Safety Promotion/Fall Prevention: safety round/check completed  Taken 2/22/2025 0945 by Harleen Chino RN  Safety Promotion/Fall Prevention:   activity supervised   assistive device/personal items within reach   treat underlying cause   treat reversible contributory factors   supervised activity   safety round/check completed   room organization consistent   room near nurse's station   room door open   patient and family education   nonskid shoes/slippers when out of bed   lighting adjusted   increase visualization of patient   increased rounding and observation   clutter free environment maintained  Taken 2/22/2025 0847 by Harleen Chino RN  Safety Promotion/Fall Prevention: safety round/check completed  Taken 2/22/2025 0734 by Harleen Chino RN  Safety Promotion/Fall Prevention: safety round/check completed  Intervention: Prevent Skin Injury  Recent Flowsheet Documentation  Taken 2/22/2025 0945 by Harleen Chino RN  Body Position: position changed independently  Intervention: Prevent and Manage VTE (Venous Thromboembolism) Risk  Recent Flowsheet Documentation  Taken 2/22/2025 0945 by Harleen Chino RN  VTE Prevention/Management: SCDs off (sequential compression devices)  Goal: Optimal Comfort and Wellbeing  Outcome: Not Progressing  Goal: Readiness for Transition of Care  Outcome: Not Progressing     Problem: Fall Injury Risk  Goal: Absence of Fall and Fall-Related Injury  Outcome: Not Progressing  Intervention: Identify and Manage Contributors  Recent Flowsheet Documentation  Taken 2/22/2025 0945 by Harleen Chino RN  Medication Review/Management:   high-risk  medications identified   medications reviewed  Intervention: Promote Injury-Free Environment  Recent Flowsheet Documentation  Taken 2/22/2025 1417 by Harleen Chino RN  Safety Promotion/Fall Prevention: safety round/check completed  Taken 2/22/2025 1327 by Harleen Chino RN  Safety Promotion/Fall Prevention: safety round/check completed  Taken 2/22/2025 1251 by Harleen Chino RN  Safety Promotion/Fall Prevention: safety round/check completed  Taken 2/22/2025 1205 by Harleen Chino RN  Safety Promotion/Fall Prevention: safety round/check completed  Taken 2/22/2025 1142 by Harleen Chino RN  Safety Promotion/Fall Prevention: safety round/check completed  Taken 2/22/2025 1049 by Harleen Chino RN  Safety Promotion/Fall Prevention: safety round/check completed  Taken 2/22/2025 0945 by Harleen Chino RN  Safety Promotion/Fall Prevention:   activity supervised   assistive device/personal items within reach   treat underlying cause   treat reversible contributory factors   supervised activity   safety round/check completed   room organization consistent   room near nurse's station   room door open   patient and family education   nonskid shoes/slippers when out of bed   lighting adjusted   increase visualization of patient   increased rounding and observation   clutter free environment maintained  Taken 2/22/2025 0847 by Harleen Chino RN  Safety Promotion/Fall Prevention: safety round/check completed  Taken 2/22/2025 0734 by Harleen Chino RN  Safety Promotion/Fall Prevention: safety round/check completed     Problem: UTI (Urinary Tract Infection)  Goal: Improved Infection Symptoms  Outcome: Not Progressing

## 2025-02-22 NOTE — PROGRESS NOTES
Notified provider about indwelling castañeda catheter discussed removal or continued need.    Did provider choose to remove indwelling castañeda catheter? NO    Provider's castañeda indication for keeping indwelling castañeda catheter: Indication for continued use: Obstruction    Is there an order for indwelling castañeda catheter? YES    Per urology note: Will plan to leave Castañeda in place upon discharge and get urodynamics study to reassess bladder function

## 2025-02-22 NOTE — CONSULTS
Care Management Initial Consult    General Information  Assessment completed with: Patient,    Type of CM/SW Visit: Initial Assessment    Primary Care Provider verified and updated as needed:     Readmission within the last 30 days:        Reason for Consult: discharge planning  Advance Care Planning:            Communication Assessment  Patient's communication style: spoken language (English or Bilingual)    Hearing Difficulty or Deaf: no   Wear Glasses or Blind: yes    Cognitive  Cognitive/Neuro/Behavioral: WDL                      Living Environment:   People in home: alone     Current living Arrangements: assisted living      Able to return to prior arrangements: yes       Family/Social Support:  Care provided by: self, child(jesus), other (see comments)  Provides care for: no one     Support system: Children, Facility resident(s)/Staff          Description of Support System:           Current Resources:   Patient receiving home care services: No        Community Resources: None  Equipment currently used at home: none  Supplies currently used at home:      Employment/Financial:  Employment Status: retired        Financial Concerns:        Does the patient's insurance plan have a 3 day qualifying hospital stay waiver?  Yes     Which insurance plan 3 day waiver is available? [employer]    Will the waiver be used for post-acute placement? No    Lifestyle & Psychosocial Needs:  Social Drivers of Health     Food Insecurity: Low Risk  (2/21/2025)    Food Insecurity     Within the past 12 months, did you worry that your food would run out before you got money to buy more?: No     Within the past 12 months, did the food you bought just not last and you didn t have money to get more?: No   Depression: Not at risk (10/10/2024)    PHQ-2     PHQ-2 Score: 0   Housing Stability: Low Risk  (2/21/2025)    Housing Stability     Do you have housing? : Yes     Are you worried about losing your housing?: No   Tobacco Use: Medium Risk  (10/10/2024)    Patient History     Smoking Tobacco Use: Former     Smokeless Tobacco Use: Never     Passive Exposure: Not on file   Financial Resource Strain: Low Risk  (2/21/2025)    Financial Resource Strain     Within the past 12 months, have you or your family members you live with been unable to get utilities (heat, electricity) when it was really needed?: No   Alcohol Use: Not At Risk (4/17/2023)    AUDIT-C     Frequency of Alcohol Consumption: Monthly or less     Average Number of Drinks: 1 or 2     Frequency of Binge Drinking: Never   Transportation Needs: Low Risk  (2/21/2025)    Transportation Needs     Within the past 12 months, has lack of transportation kept you from medical appointments, getting your medicines, non-medical meetings or appointments, work, or from getting things that you need?: No   Physical Activity: Inactive (10/10/2024)    Exercise Vital Sign     Days of Exercise per Week: 0 days     Minutes of Exercise per Session: 0 min   Interpersonal Safety: Low Risk  (2/21/2025)    Interpersonal Safety     Do you feel physically and emotionally safe where you currently live?: Yes     Within the past 12 months, have you been hit, slapped, kicked or otherwise physically hurt by someone?: No     Within the past 12 months, have you been humiliated or emotionally abused in other ways by your partner or ex-partner?: No   Stress: No Stress Concern Present (10/10/2024)    [employer] [place] [employer] - Occupational Stress Questionnaire     Feeling of Stress : Not at all   Social Connections: Unknown (10/10/2024)    Social Connection and Isolation Panel [NHANES]     Frequency of Communication with Friends and Family: Not on file     Frequency of Social Gatherings with Friends and Family: Once a week     Attends [employer] Services: Not on file     Active Member of Clubs or Organizations: Not on file     Attends Club or Organization Meetings: Not on file     Marital Status: Not on file   Health  Literacy: Not on file       Functional Status:  Prior to admission patient needed assistance:   Dependent ADLs:: Independent, Ambulation-no assistive device  Dependent IADLs:: Cleaning, Shopping, Medication Management, Transportation       Mental Health Status:  Mental Health Status: No Current Concerns       Chemical Dependency Status:  Chemical Dependency Status: No Current Concerns             Values/Beliefs:  Spiritual, Cultural Beliefs, Baptism Practices, Values that affect care:                 Discussed  Partnership in Safe Discharge Planning  document with patient/family: No    Additional Information:  Consult received fro discharge planning. SW met with pt who reports she [facility]. She reports she receives housecleaning and med management. Pt states she is independent with all other ADL's and IADL's. Pt does not use any assistive devices. She does not receive any other services in the community and no previous TCU stays. Pt reports she has 9 children, all [place] two of them live in the [place].  Her dtr helps with transport. Pt does not anticipate any needs.     Next Steps: SW will continue to follow and assist with any needs. Waiting on PT consult.     Sapphire VASQUEZ, [facility]  Inpatient Care Coordination   [facility]   [phone]

## 2025-02-22 NOTE — PROVIDER NOTIFICATION
Spoke with dr dodge regarding diarrhea prior to admission and more overnight per night RN report. Asking MD for probiotic and enteric isolation to rule out community acquired infections, such as cdiff. Md states he will enter orders.

## 2025-02-22 NOTE — PLAN OF CARE
"AO x4. Can seem a bit confused at times. Denies pain. On K, Mg protocols, rechecks in AM. Triggered sepsis multiple times, provider notified, x2 bolus given. Mtz positional, preston output though slightly pink tinged because of very small clots, catheter wipes done. Up Assist x1 w/ gb. Daughter updated, but wants to discuss plan w/ provider in AM. Plan: NPO midnight for possible stent placement, renal US in AM, urology following, SW/PT consult.        Goal Outcome Evaluation:      Plan of Care Reviewed With: patient, child    Overall Patient Progress: no changeOverall Patient Progress: no change    Outcome Evaluation: Triggered sepsis, lactic 2.3, 2x bolus given      Problem: Adult Inpatient Plan of Care  Goal: Plan of Care Review  Description: The Plan of Care Review/Shift note should be completed every shift.  The Outcome Evaluation is a brief statement about your assessment that the patient is improving, declining, or no change.  This information will be displayed automatically on your shift  note.  Outcome: Progressing  Flowsheets (Taken 2/21/2025 2311)  Outcome Evaluation: Triggered sepsis, lactic 2.3, 2x bolus given  Plan of Care Reviewed With:   patient   child  Overall Patient Progress: no change  Goal: Patient-Specific Goal (Individualized)  Description: You can add care plan individualizations to a care plan. Examples of Individualization might be:  \"Parent requests to be called daily at 9am for status\", \"I have a hard time hearing out of my right ear\", or \"Do not touch me to wake me up as it startles  me\".  Outcome: Progressing  Goal: Absence of Hospital-Acquired Illness or Injury  Outcome: Progressing  Intervention: Identify and Manage Fall Risk  Recent Flowsheet Documentation  Taken 2/21/2025 1800 by Nayeli Cruz, RN  Safety Promotion/Fall Prevention:   supervised activity   safety round/check completed   room organization consistent   nonskid shoes/slippers when out of bed   lighting " adjusted  Intervention: Prevent Skin Injury  Recent Flowsheet Documentation  Taken 2/21/2025 1800 by Nayeli Cruz RN  Body Position: position changed independently  Intervention: Prevent Infection  Recent Flowsheet Documentation  Taken 2/21/2025 1800 by Nayeli Cruz RN  Infection Prevention:   rest/sleep promoted   single patient room provided  Goal: Optimal Comfort and Wellbeing  Outcome: Progressing  Goal: Readiness for Transition of Care  Outcome: Progressing  Intervention: Mutually Develop Transition Plan  Recent Flowsheet Documentation  Taken 2/21/2025 1934 by Nayeli Cruz RN  Equipment Currently Used at Home: (per pt report) none     Problem: Fall Injury Risk  Goal: Absence of Fall and Fall-Related Injury  Outcome: Progressing  Intervention: Identify and Manage Contributors  Recent Flowsheet Documentation  Taken 2/21/2025 1800 by Nayeli Cruz RN  Medication Review/Management: medications reviewed  Intervention: Promote Injury-Free Environment  Recent Flowsheet Documentation  Taken 2/21/2025 1800 by Nayeli Cruz RN  Safety Promotion/Fall Prevention:   supervised activity   safety round/check completed   room organization consistent   nonskid shoes/slippers when out of bed   lighting adjusted     Problem: Comorbidity Management  Goal: Maintenance of Asthma Control  Outcome: Progressing  Intervention: Maintain Asthma Symptom Control  Recent Flowsheet Documentation  Taken 2/21/2025 1800 by Nayeli Cruz RN  Medication Review/Management: medications reviewed  Goal: Maintenance of Behavioral Health Symptom Control  Outcome: Progressing  Intervention: Maintain Behavioral Health Symptom Control  Recent Flowsheet Documentation  Taken 2/21/2025 1800 by Nayeli Cruz RN  Medication Review/Management: medications reviewed  Goal: Maintenance of COPD Symptom Control  Outcome: Progressing  Intervention: Maintain COPD Symptom Control  Recent Flowsheet Documentation  Taken 2/21/2025 1800 by Nayeli Cruz  RN  Medication Review/Management: medications reviewed  Goal: Blood Glucose Levels Within Targeted Range  Outcome: Progressing  Intervention: Monitor and Manage Glycemia  Recent Flowsheet Documentation  Taken 2/21/2025 1800 by Nayeli Cruz RN  Medication Review/Management: medications reviewed  Goal: Maintenance of Heart Failure Symptom Control  Outcome: Progressing  Intervention: Maintain Heart Failure Management  Recent Flowsheet Documentation  Taken 2/21/2025 1800 by Nayeli Cruz RN  Medication Review/Management: medications reviewed  Goal: Blood Pressure in Desired Range  Outcome: Progressing  Intervention: Maintain Blood Pressure Management  Recent Flowsheet Documentation  Taken 2/21/2025 1800 by Nayeli Cruz RN  Medication Review/Management: medications reviewed  Goal: Maintenance of Osteoarthritis Symptom Control  Outcome: Progressing  Intervention: Maintain Osteoarthritis Symptom Control  Recent Flowsheet Documentation  Taken 2/21/2025 1800 by Nayeli Cruz RN  Assistive Device Utilized: gait belt  Activity Management: activity adjusted per tolerance  Medication Review/Management: medications reviewed  Goal: Bariatric Home Regimen Maintained  Outcome: Progressing  Intervention: Maintain and Manage Postbariatric Surgery Care  Recent Flowsheet Documentation  Taken 2/21/2025 1800 by Nayeli Cruz RN  Medication Review/Management: medications reviewed  Goal: Maintenance of Seizure Control  Outcome: Progressing  Intervention: Maintain Seizure Symptom Control  Recent Flowsheet Documentation  Taken 2/21/2025 1800 by Nayeli Cruz RN  Medication Review/Management: medications reviewed     Problem: UTI (Urinary Tract Infection)  Goal: Improved Infection Symptoms  Outcome: Progressing

## 2025-02-22 NOTE — PROGRESS NOTES
02/22/25 1511   Appointment Info   Signing Clinician's Name / Credentials (PT) Char Laureano DPT   Living Environment   People in Home alone   Current Living Arrangements assisted living   Home Accessibility no concerns   Transportation Anticipated family or friend will provide   Living Environment Comments Does not use AD for mobility, IND with most ADLs and IADLs except facility provides medications and dtr provides transportation   Self-Care   Usual Activity Tolerance good   Current Activity Tolerance good   Equipment Currently Used at Home none   Fall history within last six months no   General Information   Onset of Illness/Injury or Date of Surgery 02/21/25   Referring Physician Crys Sparks PA-C   Patient/Family Therapy Goals Statement (PT) none stated   Pertinent History of Current Problem (include personal factors and/or comorbidities that impact the POC) 69 year old female with a PMH significant for hx of Urinary retention, requiring suprapubic cath (at outside hospital in [place]) at one point and discontinued since 2023, hx of mult UTI with resistant organisms, hx of URI associated bacteremia and sepsis, dementia, splenic artery aneurysm, anxiety/MDD who presents to the ED for acute nausea, vomiting, diarrhea and abd pain.  Found to have urinary tract infection   Existing Precautions/Restrictions no known precautions/restrictions   General Observations supine, NAD   Cognition   Affect/Mental Status (Cognition) WFL   Orientation Status (Cognition) oriented x 4  (slightly off on date but within acceptable range (stated it was 2/26/25 but knew it was Saturday))   Follows Commands (Cognition) WFL   Pain Assessment   Patient Currently in Pain No   Integumentary/Edema   Integumentary/Edema Comments see RN assessment   Posture    Posture Forward head position;Protracted shoulders   Range of Motion (ROM)   Range of Motion ROM is WFL   Strength (Manual Muscle Testing)   Strength (Manual Muscle Testing)  strength is WFL   Bed Mobility   Bed Mobility no deficits identified   Transfers   Transfers no deficits identified   Gait/Stairs (Locomotion)   Cavalier Level (Gait) supervision   Assistive Device (Gait)   (no AD)   Distance in Feet (Gait) 250'   Pattern (Gait) step-through   Balance   Balance Comments Fair, no overt LOB noted   Clinical Impression   Criteria for Skilled Therapeutic Intervention Evaluation only   PT Diagnosis (PT) at/close to baseline for mobility   Clinical Presentation (PT Evaluation Complexity) stable   Clinical Presentation Rationale clinical judgement   Clinical Decision Making (Complexity) low complexity   Risk & Benefits of therapy have been explained evaluation/treatment results reviewed;care plan/treatment goals reviewed;risks/benefits reviewed;current/potential barriers reviewed;participants voiced agreement with care plan;participants included;patient   PT Total Evaluation Time   PT Eval, Low Complexity Minutes (68635) 16   PT Discharge Planning   PT Plan no acute PT goals identified   PT Discharge Recommendation (DC Rec) home   PT Rationale for DC Rec Pt appears at/close to baseline for mobility.  No acute PT goals identified.  Rec home to [facility] once medically stable.   PT Brief overview of current status Supervision while in hospital   PT Total Distance Amb During Session (feet) 250   Physical Therapy Time and Intention   Total Session Time (sum of timed and untimed services) 16

## 2025-02-22 NOTE — PLAN OF CARE
[id number]    VSS on RA x mildly tachy low 100s, afebrile this shift. A/O x4, forgetful, pleasant. LS dim. Renal US done, see results. Urology consult in, plan for possible stent placement, NPO since 0000. Mtz patent, urine pink w/ few small clots.     Lactic 2.3 x2 on 2/21, bolus x2 given/finished, MD notified, no repeat lactic needed per MD.     Goal Outcome Evaluation:      Plan of Care Reviewed With: patient    Overall Patient Progress: no changeOverall Patient Progress: no change    Outcome Evaluation: vitally stable x mild tachycardia      Problem: Adult Inpatient Plan of Care  Goal: Plan of Care Review  Description: The Plan of Care Review/Shift note should be completed every shift.  The Outcome Evaluation is a brief statement about your assessment that the patient is improving, declining, or no change.  This information will be displayed automatically on your shift  note.  Outcome: Not Progressing  Flowsheets (Taken 2/22/2025 0018)  Outcome Evaluation: vitally stable x mild tachycardia  Plan of Care Reviewed With: patient  Overall Patient Progress: no change  Goal: Absence of Hospital-Acquired Illness or Injury  Outcome: Not Progressing  Intervention: Prevent Skin Injury  Recent Flowsheet Documentation  Taken 2/21/2025 2341 by Nisreen Ibarra, RN  Body Position:   position changed independently   supine, head elevated  Intervention: Prevent Infection  Recent Flowsheet Documentation  Taken 2/21/2025 2341 by Nisreen Ibarra, RN  Infection Prevention: rest/sleep promoted  Goal: Optimal Comfort and Wellbeing  Outcome: Not Progressing  Goal: Readiness for Transition of Care  Outcome: Not Progressing     Problem: Fall Injury Risk  Goal: Absence of Fall and Fall-Related Injury  Outcome: Not Progressing     Problem: Comorbidity Management  Goal: Maintenance of Asthma Control  Outcome: Not Progressing  Goal: Maintenance of Behavioral Health Symptom Control  Outcome: Not Progressing  Goal: Maintenance of COPD  Symptom Control  Outcome: Not Progressing  Goal: Blood Glucose Levels Within Targeted Range  Outcome: Not Progressing  Goal: Maintenance of Heart Failure Symptom Control  Outcome: Not Progressing  Goal: Blood Pressure in Desired Range  Outcome: Not Progressing  Goal: Maintenance of Osteoarthritis Symptom Control  Outcome: Not Progressing  Intervention: Maintain Osteoarthritis Symptom Control  Recent Flowsheet Documentation  Taken 2/21/2025 2341 by Nisreen Ibarra RN  Activity Management: activity adjusted per tolerance  Goal: Bariatric Home Regimen Maintained  Outcome: Not Progressing  Goal: Maintenance of Seizure Control  Outcome: Not Progressing     Problem: UTI (Urinary Tract Infection)  Goal: Improved Infection Symptoms  Outcome: Not Progressing
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
## 2025-02-22 NOTE — PROGRESS NOTES
"[facility]    Medicine Progress Note - Hospitalist Service    Date of Admission:  2/21/2025    Assessment & Plan     Mariela Duffy is a 69 year old female with a PMH significant for hx of Urinary retention, requiring suprapubic cath (at outside hospital in [place]) at one point and discontinued since 2023, hx of mult UTI with resistant organisms, hx of URI associated bacteremia and sepsis, dementia, splenic artery aneurysm, anxiety/MDD who presents to the ED for acute nausea, vomiting, diarrhea and abd pain.  Found to have urinary tract infection.      Urinary tract infection.  -Continue IV ceftriaxone.      Nausea, vomiting, diarrhea.  -Antiemetics as needed.  -Check C. difficile toxin and enteric virus and bacterial panel.  -Start probiotic.    Probable chronic kidney disease stage II/IIIa.  -Creatinine near recent baseline.  -Avoid nephrotoxins as able.  -Recheck metabolic panel tomorrow.    Hypocalcemia.  -Start calcium carbonate 500 mg twice a day.    Hyperlipidemia.  -Continue atorvastatin 20 mg a day.    Hypothyroidism.  -Continue levothyroxine 137 mcg a day.    Anxiety.  Depression.  -Continue buspirone daily.  -Continue olanzapine 7.5 mg at bedtime.    Dementia.  -Continue donepezil 5 mg a day.            Diet: Regular Diet Adult    DVT Prophylaxis: Pneumatic Compression Devices  Mtz Catheter: Not present  Lines: None     Cardiac Monitoring: None  Code Status: Full Code      Clinically Significant Risk Factors Present on Admission           # Hypocalcemia: Lowest Ca = 7.9 mg/dL in last 2 days, will monitor and replace as appropriate         # Hypertension: Home medication list includes antihypertensive(s)     # Dementia: noted on problem list       # Obesity: Estimated body mass index is 30.91 kg/m  as calculated from the following:    Height as of this encounter: 1.727 m (5' 8\").    Weight as of this encounter: 92.2 kg (203 lb 4.8 oz).              Social Drivers of Health  "   Tobacco Use: Medium Risk (10/10/2024)    Patient History     Smoking Tobacco Use: Former     Smokeless Tobacco Use: Never   Physical Activity: Inactive (10/10/2024)    Exercise Vital Sign     Days of Exercise per Week: 0 days     Minutes of Exercise per Session: 0 min   Social Connections: Unknown (10/10/2024)    Social Connection and Isolation Panel [NHANES]     Frequency of Social Gatherings with Friends and Family: Once a week          Disposition Plan     Medically Ready for Discharge: Anticipated Tomorrow             Michael Holguin DO  Hospitalist Service  [facility]  Securely message with [employer] (more info)  Text page via [facility] Paging/Directory   ______________________________________________________________________    Interval History   Having diarrhea.  Hungry.  Denies chest pain, shortness of breath, fevers, chills, nausea.    Physical Exam   Vital Signs: Temp: 99.1  F (37.3  C) Temp src: Oral BP: 109/55 Pulse: 104   Resp: 16 SpO2: 96 % O2 Device: None (Room air)    Weight: 203 lbs 4.8 oz    Gen:  NAD, A&Ox2 to person and place.  Trouble with time.  Eyes:  PERRL, sclera anicteric.  OP:  MMM, no lesions.  Neck:  Supple.  CV:  Regular, no murmurs.  Lung:  CTA b/l, normal effort.  Ab:  +BS, soft.  Skin:  Warm, dry to touch.  No rash.  Ext:  No pitting edema LE b/l.      Medical Decision Making       40 MINUTES SPENT BY ME on the date of service doing chart review, history, exam, documentation & further activities per the note.      Data     I have personally reviewed the following data over the past 24 hrs:    6.6  \   11.8   / 217     138 104 10.2 /  106 (H)   3.9 24 0.96 (H) \     Procal: N/A CRP: N/A Lactic Acid: 2.3 (H)         Imaging results reviewed over the past 24 hrs:   Recent Results (from the past 24 hours)   US Renal Complete Non-Vascular    Narrative    EXAM: US RENAL COMPLETE NON-VASCULAR  LOCATION: [facility]  DATE: 2/22/2025    INDICATION:  bilateral hydronephrosis, improved?  COMPARISON: None.  TECHNIQUE: Routine Bilateral Renal and Bladder Ultrasound.    FINDINGS:    RIGHT KIDNEY: 10 x 5.1 x 4.8 cm. Cortex measures 1 cm thickness . The renal parenchyma is normal in echogenicity. Moderate hydronephrosis is noted.     LEFT KIDNEY: 10.7 x 5.1 x 4 cm. Cortex measures 1 cm thickness.  The renal parenchyma is normal in echogenicity. Mild hydronephrosis is noted.    BLADDER: The bladder is collapsed from Mtz catheter.      Impression    IMPRESSION:  1.  Moderate right and mild left hydronephrosis.

## 2025-02-23 LAB
ADV 40+41 DNA STL QL NAA+NON-PROBE: NEGATIVE
ANION GAP SERPL CALCULATED.3IONS-SCNC: 7 MMOL/L (ref 7–15)
ASTRO TYP 1-8 RNA STL QL NAA+NON-PROBE: NEGATIVE
BUN SERPL-MCNC: 7 MG/DL (ref 8–23)
C CAYETANENSIS DNA STL QL NAA+NON-PROBE: NEGATIVE
CALCIUM SERPL-MCNC: 8.1 MG/DL (ref 8.8–10.4)
CAMPYLOBACTER DNA SPEC NAA+PROBE: NEGATIVE
CHLORIDE SERPL-SCNC: 104 MMOL/L (ref 98–107)
CREAT SERPL-MCNC: 0.83 MG/DL (ref 0.51–0.95)
CRYPTOSP DNA STL QL NAA+NON-PROBE: NEGATIVE
E COLI O157 DNA STL QL NAA+NON-PROBE: ABNORMAL
E HISTOLYT DNA STL QL NAA+NON-PROBE: NEGATIVE
EAEC ASTA GENE ISLT QL NAA+PROBE: NEGATIVE
EC STX1+STX2 GENES STL QL NAA+NON-PROBE: NEGATIVE
EGFRCR SERPLBLD CKD-EPI 2021: 76 ML/MIN/1.73M2
EPEC EAE GENE STL QL NAA+NON-PROBE: NEGATIVE
ERYTHROCYTE [DISTWIDTH] IN BLOOD BY AUTOMATED COUNT: 13.4 % (ref 10–15)
ETEC LTA+ST1A+ST1B TOX ST NAA+NON-PROBE: NEGATIVE
G LAMBLIA DNA STL QL NAA+NON-PROBE: NEGATIVE
GLUCOSE SERPL-MCNC: 105 MG/DL (ref 70–99)
HCO3 SERPL-SCNC: 26 MMOL/L (ref 22–29)
HCT VFR BLD AUTO: 37 % (ref 35–47)
HGB BLD-MCNC: 12.3 G/DL (ref 11.7–15.7)
LACTATE SERPL-SCNC: 0.6 MMOL/L (ref 0.7–2)
MAGNESIUM SERPL-MCNC: 1.8 MG/DL (ref 1.7–2.3)
MCH RBC QN AUTO: 28.6 PG (ref 26.5–33)
MCHC RBC AUTO-ENTMCNC: 33.2 G/DL (ref 31.5–36.5)
MCV RBC AUTO: 86 FL (ref 78–100)
NOROVIRUS GI+II RNA STL QL NAA+NON-PROBE: POSITIVE
P SHIGELLOIDES DNA STL QL NAA+NON-PROBE: NEGATIVE
PHOSPHATE SERPL-MCNC: 2.9 MG/DL (ref 2.5–4.5)
PLATELET # BLD AUTO: 211 10E3/UL (ref 150–450)
POTASSIUM SERPL-SCNC: 3.7 MMOL/L (ref 3.4–5.3)
RBC # BLD AUTO: 4.3 10E6/UL (ref 3.8–5.2)
RVA RNA STL QL NAA+NON-PROBE: NEGATIVE
SALMONELLA SP RPOD STL QL NAA+PROBE: NEGATIVE
SAPO I+II+IV+V RNA STL QL NAA+NON-PROBE: NEGATIVE
SHIGELLA SP+EIEC IPAH ST NAA+NON-PROBE: NEGATIVE
SODIUM SERPL-SCNC: 137 MMOL/L (ref 135–145)
V CHOLERAE DNA SPEC QL NAA+PROBE: NEGATIVE
VIBRIO DNA SPEC NAA+PROBE: NEGATIVE
WBC # BLD AUTO: 5.6 10E3/UL (ref 4–11)
Y ENTEROCOL DNA STL QL NAA+PROBE: NEGATIVE

## 2025-02-23 PROCEDURE — 250N000013 HC RX MED GY IP 250 OP 250 PS 637: Performed by: PHYSICIAN ASSISTANT

## 2025-02-23 PROCEDURE — 83735 ASSAY OF MAGNESIUM: CPT | Performed by: INTERNAL MEDICINE

## 2025-02-23 PROCEDURE — 36415 COLL VENOUS BLD VENIPUNCTURE: CPT | Performed by: INTERNAL MEDICINE

## 2025-02-23 PROCEDURE — 96376 TX/PRO/DX INJ SAME DRUG ADON: CPT

## 2025-02-23 PROCEDURE — 250N000011 HC RX IP 250 OP 636: Performed by: PHYSICIAN ASSISTANT

## 2025-02-23 PROCEDURE — 120N000001 HC R&B MED SURG/OB

## 2025-02-23 PROCEDURE — 84100 ASSAY OF PHOSPHORUS: CPT | Performed by: INTERNAL MEDICINE

## 2025-02-23 PROCEDURE — 85014 HEMATOCRIT: CPT | Performed by: INTERNAL MEDICINE

## 2025-02-23 PROCEDURE — G0378 HOSPITAL OBSERVATION PER HR: HCPCS

## 2025-02-23 PROCEDURE — 82565 ASSAY OF CREATININE: CPT | Performed by: INTERNAL MEDICINE

## 2025-02-23 PROCEDURE — 250N000013 HC RX MED GY IP 250 OP 250 PS 637: Performed by: INTERNAL MEDICINE

## 2025-02-23 PROCEDURE — 83605 ASSAY OF LACTIC ACID: CPT | Performed by: INTERNAL MEDICINE

## 2025-02-23 PROCEDURE — 99232 SBSQ HOSP IP/OBS MODERATE 35: CPT | Performed by: INTERNAL MEDICINE

## 2025-02-23 PROCEDURE — 80048 BASIC METABOLIC PNL TOTAL CA: CPT | Performed by: INTERNAL MEDICINE

## 2025-02-23 RX ADMIN — Medication 1 CAPSULE: at 09:53

## 2025-02-23 RX ADMIN — Medication 1 CAPSULE: at 20:57

## 2025-02-23 RX ADMIN — DONEPEZIL HYDROCHLORIDE 5 MG: 5 TABLET, FILM COATED ORAL at 20:57

## 2025-02-23 RX ADMIN — BUSPIRONE HYDROCHLORIDE 5 MG: 5 TABLET ORAL at 09:53

## 2025-02-23 RX ADMIN — ATORVASTATIN CALCIUM 20 MG: 20 TABLET, FILM COATED ORAL at 09:53

## 2025-02-23 RX ADMIN — OLANZAPINE 7.5 MG: 2.5 TABLET, FILM COATED ORAL at 20:57

## 2025-02-23 RX ADMIN — CEFTRIAXONE 1 G: 1 INJECTION, POWDER, FOR SOLUTION INTRAMUSCULAR; INTRAVENOUS at 09:55

## 2025-02-23 RX ADMIN — CALCIUM 500 MG: 500 TABLET ORAL at 17:19

## 2025-02-23 RX ADMIN — CALCIUM 500 MG: 500 TABLET ORAL at 09:53

## 2025-02-23 RX ADMIN — LEVOTHYROXINE SODIUM 137 MCG: 0.11 TABLET ORAL at 09:53

## 2025-02-23 ASSESSMENT — ACTIVITIES OF DAILY LIVING (ADL)
ADLS_ACUITY_SCORE: 45

## 2025-02-23 NOTE — PROGRESS NOTES
[facility]    Medicine Progress Note - Hospitalist Service    Date of Admission:  2/21/2025    Assessment & Plan     Mariela Duffy is a 69 year old female with a PMH significant for hx of Urinary retention, requiring suprapubic cath (at outside hospital in [place]) at one point and discontinued since 2023, hx of mult UTI with resistant organisms, hx of URI associated bacteremia and sepsis, dementia, splenic artery aneurysm, anxiety/MDD who presents to the ED for acute nausea, vomiting, diarrhea and abd pain.  Found to have urinary tract infection.       Urinary tract infection.  -Urine culture with no growth.  -Completed 3 day course of IV ceftriaxone.       Norovirus infection.  Nausea, vomiting, diarrhea.  -Antiemetics as needed.  -Enteric bacteria and virus panel positive for norovirus.    -Continue Probiotic.     Probable chronic kidney disease stage II/IIIa.  -Creatinine near recent baseline.  -Avoid nephrotoxins as able.  -Recheck metabolic panel intermittently.    Urinary retention.  -Mtz catheter in place.  -Seen in consultation by urology.  -Plan to keep Mzt catheter in place at time of discharge with close follow-up in urology clinic.    Lactic acidosis.  -Likely due to dehydration from vomiting and diarrhea due to norovirus.  -Resolved with IV fluids.     Hypocalcemia.  -Continue calcium carbonate 500 mg twice a day.     Hyperlipidemia.  -Continue atorvastatin 20 mg a day.     Hypothyroidism.  -Continue levothyroxine 137 mcg a day.     Anxiety.  Depression.  -Continue buspirone daily.  -Continue olanzapine 7.5 mg at bedtime.     Dementia.  -Continue donepezil 5 mg a day.           Diet: Regular Diet Adult    DVT Prophylaxis: Pneumatic Compression Devices  Mtz Catheter: Not present  Lines: None     Cardiac Monitoring: None  Code Status: Full Code      Clinically Significant Risk Factors                       # Dementia: noted on problem list        # Obesity: Estimated body  "mass index is 30.91 kg/m  as calculated from the following:    Height as of this encounter: 1.727 m (5' 8\").    Weight as of this encounter: 92.2 kg (203 lb 4.8 oz)., PRESENT ON ADMISSION            Social Drivers of Health    Tobacco Use: Medium Risk (10/10/2024)    Patient History     Smoking Tobacco Use: Former     Smokeless Tobacco Use: Never   Physical Activity: Inactive (10/10/2024)    Exercise Vital Sign     Days of Exercise per Week: 0 days     Minutes of Exercise per Session: 0 min   Social Connections: Unknown (10/10/2024)    Social Connection and Isolation Panel [NHANES]     Frequency of Social Gatherings with Friends and Family: Once a week          Disposition Plan     Medically Ready for Discharge: Anticipated Tomorrow             Michael Holguin DO  Hospitalist Service  [facility]  Securely message with [employer] (more info)  Text page via [email or web address] Paging/Directory   ______________________________________________________________________    Interval History   Feeling better.  Nausea at times.  Stools loose.  Denies chest pain, shortness of breath, fevers, chills.    Physical Exam   Vital Signs: Temp: 98.9  F (37.2  C) Temp src: Oral BP: 121/73 Pulse: 97   Resp: 18 SpO2: 93 % O2 Device: None (Room air)    Weight: 203 lbs 4.8 oz    Gen:  NAD, A&O seemingly x3.  Eyes:  PERRL, sclera anicteric.  OP:  MMM, no lesions.  Neck:  Supple.  CV:  Regular, no murmurs.  Lung:  CTA b/l, normal effort.  Ab:  +BS, soft.  Skin:  Warm, dry to touch.  No rash.  Ext:  No pitting edema LE b/l.      Medical Decision Making       40 MINUTES SPENT BY ME on the date of service doing chart review, history, exam, documentation & further activities per the note.      Data     I have personally reviewed the following data over the past 24 hrs:    5.6  \   12.3   / 211     137 104 7.0 (L) /  105 (H)   3.7 26 0.83 \     Procal: N/A CRP: N/A Lactic Acid: 0.6 (L)         Imaging results reviewed over the past 24 " hrs:   No results found for this or any previous visit (from the past 24 hours).

## 2025-02-23 NOTE — PLAN OF CARE
"AO x4. Denies pain. Low grade fever during shift. On K, Mg protocols, rechecks in AM. Mtz positional. Stool sample positive for norovirus crosscover aware Up Assist x1 w/ gb. Plan: continue abx rocephin/follow cultures, discharge back to [facility] per PT, will discharge w/ Mtz   Problem: Adult Inpatient Plan of Care  Goal: Plan of Care Review  Description: The Plan of Care Review/Shift note should be completed every shift.  The Outcome Evaluation is a brief statement about your assessment that the patient is improving, declining, or no change.  This information will be displayed automatically on your shift  note.  Outcome: Progressing  Flowsheets (Taken 2/23/2025 0707)  Plan of Care Reviewed With: patient  Overall Patient Progress: no change  Goal: Patient-Specific Goal (Individualized)  Description: You can add care plan individualizations to a care plan. Examples of Individualization might be:  \"Parent requests to be called daily at 9am for status\", \"I have a hard time hearing out of my right ear\", or \"Do not touch me to wake me up as it startles  me\".  Outcome: Progressing  Goal: Absence of Hospital-Acquired Illness or Injury  Outcome: Progressing  Intervention: Identify and Manage Fall Risk  Recent Flowsheet Documentation  Taken 2/23/2025 0014 by Winifred Cooley RN  Safety Promotion/Fall Prevention:   supervised activity   safety round/check completed   room organization consistent   nonskid shoes/slippers when out of bed   lighting adjusted   activity supervised   assistive device/personal items within reach   clutter free environment maintained   room near nurse's station  Intervention: Prevent Skin Injury  Recent Flowsheet Documentation  Taken 2/23/2025 0014 by Winifred Cooley RN  Body Position: position changed independently  Intervention: Prevent and Manage VTE (Venous Thromboembolism) Risk  Recent Flowsheet Documentation  Taken 2/23/2025 0014 by Winifred Cooley RN  VTE Prevention/Management: SCDs off " (sequential compression devices)  Intervention: Prevent Infection  Recent Flowsheet Documentation  Taken 2/23/2025 0014 by Winifred Cooley, RN  Infection Prevention:   single patient room provided   rest/sleep promoted   environmental surveillance performed   equipment surfaces disinfected   hand hygiene promoted   personal protective equipment utilized  Goal: Optimal Comfort and Wellbeing  Outcome: Progressing  Goal: Readiness for Transition of Care  Outcome: Progressing   Goal Outcome Evaluation:      Plan of Care Reviewed With: patient    Overall Patient Progress: no changeOverall Patient Progress: no change

## 2025-02-23 NOTE — PLAN OF CARE
"Vss, no co pain/cp/sob. Alarms on for safety, castañeda in place until discharge per urology (possibly later today, will need urology follow up as outpatient), loose +BM, enteric isolation and probiotic continue. K+/Mag WDL with rechecks ordered for am, lactic 0.6. Alarms on for safety, up with SBA+gb, IV rocephin dc'd, old SP site WDL. Continue poc and monitoring until discharge.       Goal Outcome Evaluation:      Plan of Care Reviewed With: patient    Overall Patient Progress: improvingOverall Patient Progress: improving    Outcome Evaluation: possible d/c later today, castañeda output continues to be dark, +norovirus      Problem: Adult Inpatient Plan of Care  Goal: Plan of Care Review  Description: The Plan of Care Review/Shift note should be completed every shift.  The Outcome Evaluation is a brief statement about your assessment that the patient is improving, declining, or no change.  This information will be displayed automatically on your shift  note.  Outcome: Progressing  Flowsheets (Taken 2/23/2025 1414)  Outcome Evaluation: possible d/c later today, castañeda output continues to be dark, +norovirus  Plan of Care Reviewed With: patient  Overall Patient Progress: improving  Goal: Patient-Specific Goal (Individualized)  Description: You can add care plan individualizations to a care plan. Examples of Individualization might be:  \"Parent requests to be called daily at 9am for status\", \"I have a hard time hearing out of my right ear\", or \"Do not touch me to wake me up as it startles  me\".  Outcome: Progressing  Goal: Absence of Hospital-Acquired Illness or Injury  Outcome: Progressing  Intervention: Identify and Manage Fall Risk  Recent Flowsheet Documentation  Taken 2/23/2025 1411 by Harleen Chino, RN  Safety Promotion/Fall Prevention: safety round/check completed  Taken 2/23/2025 1311 by Harleen Chino, RN  Safety Promotion/Fall Prevention: safety round/check completed  Taken 2/23/2025 1234 by Harleen Chino, " RN  Safety Promotion/Fall Prevention: safety round/check completed  Taken 2/23/2025 1126 by Harleen Chino RN  Safety Promotion/Fall Prevention: safety round/check completed  Taken 2/23/2025 1012 by Harleen Chino RN  Safety Promotion/Fall Prevention: safety round/check completed  Taken 2/23/2025 0958 by Harleen Chino RN  Safety Promotion/Fall Prevention:   activity supervised   assistive device/personal items within reach   treat underlying cause   treat reversible contributory factors   supervised activity   safety round/check completed   room organization consistent   room near nurse's station   room door open   patient and family education   nonskid shoes/slippers when out of bed   lighting adjusted   increase visualization of patient   increased rounding and observation   clutter free environment maintained  Taken 2/23/2025 0924 by Harleen Chino RN  Safety Promotion/Fall Prevention: safety round/check completed  Taken 2/23/2025 0855 by Harleen Chino RN  Safety Promotion/Fall Prevention: safety round/check completed  Taken 2/23/2025 0735 by Harleen Chino RN  Safety Promotion/Fall Prevention: safety round/check completed  Intervention: Prevent Skin Injury  Recent Flowsheet Documentation  Taken 2/23/2025 0958 by Harleen Chino RN  Body Position: position changed independently  Intervention: Prevent and Manage VTE (Venous Thromboembolism) Risk  Recent Flowsheet Documentation  Taken 2/23/2025 0958 by Harleen Chino RN  VTE Prevention/Management: SCDs off (sequential compression devices)  Goal: Optimal Comfort and Wellbeing  Outcome: Progressing  Goal: Readiness for Transition of Care  Outcome: Progressing     Problem: Fall Injury Risk  Goal: Absence of Fall and Fall-Related Injury  Outcome: Progressing  Intervention: Identify and Manage Contributors  Recent Flowsheet Documentation  Taken 2/23/2025 0958 by Harleen Chino RN  Medication Review/Management:   high-risk medications  identified   medications reviewed  Intervention: Promote Injury-Free Environment  Recent Flowsheet Documentation  Taken 2/23/2025 1411 by Harleen Chino RN  Safety Promotion/Fall Prevention: safety round/check completed  Taken 2/23/2025 1311 by Harleen Chino RN  Safety Promotion/Fall Prevention: safety round/check completed  Taken 2/23/2025 1234 by Harleen Chino RN  Safety Promotion/Fall Prevention: safety round/check completed  Taken 2/23/2025 1126 by Harleen Chino RN  Safety Promotion/Fall Prevention: safety round/check completed  Taken 2/23/2025 1012 by Harleen Chino RN  Safety Promotion/Fall Prevention: safety round/check completed  Taken 2/23/2025 0958 by Harleen Chino RN  Safety Promotion/Fall Prevention:   activity supervised   assistive device/personal items within reach   treat underlying cause   treat reversible contributory factors   supervised activity   safety round/check completed   room organization consistent   room near nurse's station   room door open   patient and family education   nonskid shoes/slippers when out of bed   lighting adjusted   increase visualization of patient   increased rounding and observation   clutter free environment maintained  Taken 2/23/2025 0924 by Harleen Chino RN  Safety Promotion/Fall Prevention: safety round/check completed  Taken 2/23/2025 0855 by Harleen Chino RN  Safety Promotion/Fall Prevention: safety round/check completed  Taken 2/23/2025 0735 by Harleen Chino RN  Safety Promotion/Fall Prevention: safety round/check completed     Problem: UTI (Urinary Tract Infection)  Goal: Improved Infection Symptoms  Outcome: Progressing

## 2025-02-23 NOTE — PLAN OF CARE
"AO x4. Denies pain. No fever this shift. On K, Mg protocols, rechecks in AM. Mtz positional, preston output though slightly more pink compared to start of shift, crosscover aware, catheter wipes done. Stool sample in process. Up Assist x1 w/ gb. Plan: continue abx rocephin/follow cultures, discharge back to [facility] per PT, will discharge w/ Mtz          Goal Outcome Evaluation:      Plan of Care Reviewed With: patient    Overall Patient Progress: no changeOverall Patient Progress: no change    Outcome Evaluation: Mtz, output slightly more pink tinged, crosscover aware      Problem: Adult Inpatient Plan of Care  Goal: Plan of Care Review  Description: The Plan of Care Review/Shift note should be completed every shift.  The Outcome Evaluation is a brief statement about your assessment that the patient is improving, declining, or no change.  This information will be displayed automatically on your shift  note.  Outcome: Progressing  Flowsheets (Taken 2/22/2025 2306)  Outcome Evaluation: Mtz, output slightly more pink tinged, crosscover aware  Plan of Care Reviewed With: patient  Overall Patient Progress: no change  Goal: Patient-Specific Goal (Individualized)  Description: You can add care plan individualizations to a care plan. Examples of Individualization might be:  \"Parent requests to be called daily at 9am for status\", \"I have a hard time hearing out of my right ear\", or \"Do not touch me to wake me up as it startles  me\".  Outcome: Progressing  Goal: Absence of Hospital-Acquired Illness or Injury  Outcome: Progressing  Intervention: Identify and Manage Fall Risk  Recent Flowsheet Documentation  Taken 2/22/2025 1800 by Nayeli Cruz RN  Safety Promotion/Fall Prevention:   supervised activity   safety round/check completed   room organization consistent   nonskid shoes/slippers when out of bed   lighting adjusted  Intervention: Prevent Skin Injury  Recent Flowsheet Documentation  Taken 2/22/2025 1800 by Anthony, " Nayeli ZIMMERMAN RN  Body Position: position changed independently  Intervention: Prevent Infection  Recent Flowsheet Documentation  Taken 2/22/2025 1800 by Nayeli Cruz RN  Infection Prevention:   single patient room provided   rest/sleep promoted  Goal: Optimal Comfort and Wellbeing  Outcome: Progressing  Goal: Readiness for Transition of Care  Outcome: Progressing     Problem: Fall Injury Risk  Goal: Absence of Fall and Fall-Related Injury  Outcome: Progressing  Intervention: Identify and Manage Contributors  Recent Flowsheet Documentation  Taken 2/22/2025 1800 by Nayeli Cruz RN  Medication Review/Management: medications reviewed  Intervention: Promote Injury-Free Environment  Recent Flowsheet Documentation  Taken 2/22/2025 1800 by Nayeli Cruz RN  Safety Promotion/Fall Prevention:   supervised activity   safety round/check completed   room organization consistent   nonskid shoes/slippers when out of bed   lighting adjusted     Problem: Comorbidity Management  Goal: Maintenance of Asthma Control  Outcome: Progressing  Intervention: Maintain Asthma Symptom Control  Recent Flowsheet Documentation  Taken 2/22/2025 1800 by Nayeli Cruz RN  Medication Review/Management: medications reviewed  Goal: Maintenance of Behavioral Health Symptom Control  Outcome: Progressing  Intervention: Maintain Behavioral Health Symptom Control  Recent Flowsheet Documentation  Taken 2/22/2025 1800 by Nayeli Cruz RN  Medication Review/Management: medications reviewed  Goal: Maintenance of COPD Symptom Control  Outcome: Progressing  Intervention: Maintain COPD Symptom Control  Recent Flowsheet Documentation  Taken 2/22/2025 1800 by Nayeli Cruz RN  Medication Review/Management: medications reviewed  Goal: Blood Glucose Levels Within Targeted Range  Outcome: Progressing  Intervention: Monitor and Manage Glycemia  Recent Flowsheet Documentation  Taken 2/22/2025 1800 by Nayeli Cruz RN  Medication Review/Management: medications  reviewed  Goal: Maintenance of Heart Failure Symptom Control  Outcome: Progressing  Intervention: Maintain Heart Failure Management  Recent Flowsheet Documentation  Taken 2/22/2025 1800 by Nayeli Cruz RN  Medication Review/Management: medications reviewed  Goal: Blood Pressure in Desired Range  Outcome: Progressing  Intervention: Maintain Blood Pressure Management  Recent Flowsheet Documentation  Taken 2/22/2025 1800 by Nayeli Cruz RN  Medication Review/Management: medications reviewed  Goal: Maintenance of Osteoarthritis Symptom Control  Outcome: Progressing  Intervention: Maintain Osteoarthritis Symptom Control  Recent Flowsheet Documentation  Taken 2/22/2025 1800 by Nayeli Cruz RN  Assistive Device Utilized: gait belt  Activity Management: activity adjusted per tolerance  Medication Review/Management: medications reviewed  Goal: Bariatric Home Regimen Maintained  Outcome: Progressing  Intervention: Maintain and Manage Postbariatric Surgery Care  Recent Flowsheet Documentation  Taken 2/22/2025 1800 by Nayeli Cruz RN  Medication Review/Management: medications reviewed  Goal: Maintenance of Seizure Control  Outcome: Progressing  Intervention: Maintain Seizure Symptom Control  Recent Flowsheet Documentation  Taken 2/22/2025 1800 by Nayeli Cruz RN  Medication Review/Management: medications reviewed     Problem: UTI (Urinary Tract Infection)  Goal: Improved Infection Symptoms  Outcome: Progressing

## 2025-02-24 ENCOUNTER — TELEPHONE (OUTPATIENT)
Dept: FAMILY MEDICINE | Facility: CLINIC | Age: 70
End: 2025-02-24
Payer: COMMERCIAL

## 2025-02-24 VITALS
BODY MASS INDEX: 30.81 KG/M2 | OXYGEN SATURATION: 96 % | WEIGHT: 203.3 LBS | DIASTOLIC BLOOD PRESSURE: 77 MMHG | RESPIRATION RATE: 20 BRPM | SYSTOLIC BLOOD PRESSURE: 145 MMHG | HEART RATE: 89 BPM | HEIGHT: 68 IN | TEMPERATURE: 98.4 F

## 2025-02-24 LAB
ANION GAP SERPL CALCULATED.3IONS-SCNC: 10 MMOL/L (ref 7–15)
BUN SERPL-MCNC: 9.3 MG/DL (ref 8–23)
CALCIUM SERPL-MCNC: 8.5 MG/DL (ref 8.8–10.4)
CHLORIDE SERPL-SCNC: 102 MMOL/L (ref 98–107)
CREAT SERPL-MCNC: 0.84 MG/DL (ref 0.51–0.95)
EGFRCR SERPLBLD CKD-EPI 2021: 75 ML/MIN/1.73M2
GLUCOSE SERPL-MCNC: 100 MG/DL (ref 70–99)
HCO3 SERPL-SCNC: 25 MMOL/L (ref 22–29)
HOLD SPECIMEN: NORMAL
MAGNESIUM SERPL-MCNC: 1.9 MG/DL (ref 1.7–2.3)
POTASSIUM SERPL-SCNC: 3.7 MMOL/L (ref 3.4–5.3)
SODIUM SERPL-SCNC: 137 MMOL/L (ref 135–145)

## 2025-02-24 PROCEDURE — 250N000013 HC RX MED GY IP 250 OP 250 PS 637: Performed by: PHYSICIAN ASSISTANT

## 2025-02-24 PROCEDURE — 250N000013 HC RX MED GY IP 250 OP 250 PS 637: Performed by: INTERNAL MEDICINE

## 2025-02-24 PROCEDURE — 83735 ASSAY OF MAGNESIUM: CPT | Performed by: INTERNAL MEDICINE

## 2025-02-24 PROCEDURE — 99239 HOSP IP/OBS DSCHRG MGMT >30: CPT | Performed by: INTERNAL MEDICINE

## 2025-02-24 PROCEDURE — 80048 BASIC METABOLIC PNL TOTAL CA: CPT | Performed by: INTERNAL MEDICINE

## 2025-02-24 PROCEDURE — G0378 HOSPITAL OBSERVATION PER HR: HCPCS

## 2025-02-24 PROCEDURE — 36415 COLL VENOUS BLD VENIPUNCTURE: CPT | Performed by: INTERNAL MEDICINE

## 2025-02-24 RX ORDER — ACETAMINOPHEN 325 MG/1
650 TABLET ORAL EVERY 4 HOURS PRN
COMMUNITY
Start: 2025-02-24

## 2025-02-24 RX ADMIN — Medication 1 CAPSULE: at 08:45

## 2025-02-24 RX ADMIN — BUSPIRONE HYDROCHLORIDE 5 MG: 5 TABLET ORAL at 08:45

## 2025-02-24 RX ADMIN — ATORVASTATIN CALCIUM 20 MG: 20 TABLET, FILM COATED ORAL at 08:46

## 2025-02-24 RX ADMIN — LEVOTHYROXINE SODIUM 137 MCG: 0.11 TABLET ORAL at 08:45

## 2025-02-24 RX ADMIN — CALCIUM 500 MG: 500 TABLET ORAL at 08:46

## 2025-02-24 ASSESSMENT — ACTIVITIES OF DAILY LIVING (ADL)
ADLS_ACUITY_SCORE: 45

## 2025-02-24 NOTE — DISCHARGE SUMMARY
"[facility]  Hospitalist Discharge Summary      Date of Admission:  2/21/2025  Date of Discharge:  2/24/2025  Discharging Provider: Michael Holguin DO  Discharge Service: Hospitalist Service    Discharge Diagnoses   Norovirus infection.  Possible urinary tract infection.  Chronic kidney disease stage II/IIIa.  Urinary retention.  Lactic acidosis.  Hypocalcemia.  Hyperlipidemia.  Hypothyroidism.  Anxiety.  Depression.  Dementia.      Clinically Significant Risk Factors     # Obesity: Estimated body mass index is 30.91 kg/m  as calculated from the following:    Height as of this encounter: 1.727 m (5' 8\").    Weight as of this encounter: 92.2 kg (203 lb 4.8 oz).       Follow-ups Needed After Discharge   Follow-up Appointments       Follow-up and recommended labs and tests       Follow up with primary care provider, Reba Tillman, within 7 days for hospital follow- up.  The following labs/tests are recommended: CBC and BMP in 7 days.  Follow up with urology in 1 week.                Discharge Disposition   Discharged to home  Condition at discharge: Stable    Hospital Course   Mariela Duffy is a 69 year old female with a PMH significant for hx of Urinary retention, requiring suprapubic cath (at outside hospital in [place]) at one point and discontinued since 2023, hx of mult UTI with resistant organisms, hx of URI associated bacteremia and sepsis, dementia, splenic artery aneurysm, anxiety/MDD who presents to the ED for acute nausea, vomiting, diarrhea and abd pain. Found to have norovirus infection and possible urinary tract infection.  Also noted to have urinary retention with need for Mtz catheter placement.  Seen in consultation by urology.  Mtz catheter is going to remain in place at time of discharge.  She does need to follow-up with urology within 1 week for probable removal in office.  Did receive antibiotics with ceftriaxone for 3 days.  Urine culture returned with no " growth.  Urinalysis changes possibly due to UTI versus due to norovirus infection.  Symptoms much improved by time of discharge.  Follow-up with primary care provider within 1 week.  Recheck CBC and BMP within 1 week.    Consultations This Hospital Stay   UROLOGY IP CONSULT  PHYSICAL THERAPY ADULT IP CONSULT  CARE MANAGEMENT / SOCIAL WORK IP CONSULT    Code Status   Full Code    Time Spent on this Encounter   I spent 40 minutes with Mrs. Duffy and working on discharge on 2/24/2025.       Michael Holguin, [place] [address] [facility]  [address] [place] [address]  Phone: [phone]  Fax: [phone]  ______________________________________________________________________    Physical Exam   Vital Signs: Temp: 98.4  F (36.9  C) Temp src: Oral BP: (!) 145/77 Pulse: 89   Resp: 20 SpO2: 96 % O2 Device: None (Room air)    Weight: 203 lbs 4.8 oz  Gen:  NAD, A&O seemingly x3.  Eyes:  PERRL, sclera anicteric.  OP:  MMM, no lesions.  Neck:  Supple.  CV:  Regular, no murmurs.  Lung:  CTA b/l, normal effort.  Ab:  +BS, soft.  Skin:  Warm, dry to touch.  No rash.  Ext:  No pitting edema LE b/l.         Primary Care Physician   Reba Tillman    Discharge Orders      CBC with platelets     Basic metabolic panel     Reason for your hospital stay    Norovirus infection     Follow-up and recommended labs and tests     Follow up with primary care provider, Reba Tillman, within 7 days for hospital follow- up.  The following labs/tests are recommended: CBC and BMP in 7 days.  Follow up with urology in 1 week.     Activity    Your activity upon discharge: activity as tolerated     Diet    Follow this diet upon discharge: Regular           Discharge Medications   Current Discharge Medication List        START taking these medications    Details   acetaminophen (TYLENOL) 325 MG tablet Take 2 tablets (650 mg) by mouth every 4 hours as needed for mild pain, other or fever (and adjunct  with moderate or severe pain or per patient request).    Associated Diagnoses: Acute urinary retention      loperamide (IMODIUM A-D) 2 MG tablet Take 1-2 tablets (2-4 mg) by mouth 3 times daily as needed for diarrhea.  Qty: 20 tablet, Refills: 0      ondansetron (ZOFRAN ODT) 4 MG ODT tab Take 1 tablet (4 mg) by mouth every 8 hours as needed for nausea or vomiting.  Qty: 10 tablet, Refills: 0           CONTINUE these medications which have NOT CHANGED    Details   atorvastatin (LIPITOR) 20 MG tablet TAKE 1 TABLET (20 MG) BY MOUTH DAILY  Qty: 90 tablet, Refills: 0    Associated Diagnoses: Hyperlipidemia LDL goal <100      busPIRone (BUSPAR) 5 MG tablet Take 1-2 tablets (5-10 mg) by mouth daily  Qty: 90 tablet, Refills: 1    Comments: May take additional 5 mg buspar following first 5 mg in the morning.  Associated Diagnoses: Generalized anxiety disorder      calcium carbonate (CALCIUM ANTACID) 500 MG chewable tablet Take 1 tablet by mouth daily      Cranberry 500 MG TABS Take 1 tablet by mouth daily.  Qty: 90 tablet, Refills: 3    Associated Diagnoses: Recurrent UTI      donepezil (ARICEPT) 5 MG tablet Take 1 tablet (5 mg) by mouth at bedtime.  Qty: 90 tablet, Refills: 1    Associated Diagnoses: Mild cognitive disorder      hydrALAZINE (APRESOLINE) 10 MG tablet Take one tablet (10 mg) by mouth TID PRN for blood pressures greater than 160/100.  Qty: 30 tablet, Refills: 1    Associated Diagnoses: Elevated BP without diagnosis of hypertension      levothyroxine (SYNTHROID/LEVOTHROID) 137 MCG tablet Take 1 tablet (137 mcg) by mouth daily.  Qty: 90 tablet, Refills: 1    Associated Diagnoses: Postoperative hypothyroidism      loperamide (IMODIUM) 2 MG capsule Take 1 capsule (2 mg) by mouth 4 times daily as needed      methenamine hippurate (HIPREX) 1 g tablet Take 1 tablet (1 g) by mouth 2 times daily  Qty: 180 tablet, Refills: 0    Associated Diagnoses: History of UTI      multivitamin w/minerals (THERA-VIT-M) tablet Take 1  tablet by mouth daily      OLANZapine (ZYPREXA) 7.5 MG tablet Take 1 tablet (7.5 mg) by mouth at bedtime.  Qty: 90 tablet, Refills: 3    Associated Diagnoses: Mild cognitive disorder; Generalized anxiety disorder           Allergies   Allergies   Allergen Reactions    Levofloxacin Nausea and Vomiting    Dextrose Unknown

## 2025-02-24 NOTE — PLAN OF CARE
"A&O. VSS. Denies pain. LS diminished. Mtz in place. Assist x1/gait belt. Possible discharge today.    Goal Outcome Evaluation:      Plan of Care Reviewed With: patient    Overall Patient Progress: no changeOverall Patient Progress: no change    Outcome Evaluation: Denies pain. Mtz in place      Problem: Adult Inpatient Plan of Care  Goal: Plan of Care Review  Description: The Plan of Care Review/Shift note should be completed every shift.  The Outcome Evaluation is a brief statement about your assessment that the patient is improving, declining, or no change.  This information will be displayed automatically on your shift  note.  Outcome: Progressing  Flowsheets (Taken 2/24/2025 0429)  Outcome Evaluation: Denies pain. Mtz in place  Plan of Care Reviewed With: patient  Overall Patient Progress: no change  Goal: Patient-Specific Goal (Individualized)  Description: You can add care plan individualizations to a care plan. Examples of Individualization might be:  \"Parent requests to be called daily at 9am for status\", \"I have a hard time hearing out of my right ear\", or \"Do not touch me to wake me up as it startles  me\".  Outcome: Progressing  Goal: Absence of Hospital-Acquired Illness or Injury  Outcome: Progressing  Intervention: Identify and Manage Fall Risk  Recent Flowsheet Documentation  Taken 2/24/2025 0230 by Pina Coronado RN  Safety Promotion/Fall Prevention: safety round/check completed  Taken 2/23/2025 2355 by Pina Coronado RN  Safety Promotion/Fall Prevention:   activity supervised   clutter free environment maintained   nonskid shoes/slippers when out of bed   safety round/check completed  Intervention: Prevent Skin Injury  Recent Flowsheet Documentation  Taken 2/23/2025 2355 by Pina Coronado RN  Body Position: position changed independently  Intervention: Prevent and Manage VTE (Venous Thromboembolism) Risk  Recent Flowsheet Documentation  Taken 2/23/2025 2355 by Pina Coronado RN  VTE " Prevention/Management: SCDs off (sequential compression devices)  Intervention: Prevent Infection  Recent Flowsheet Documentation  Taken 2/23/2025 6146 by Pina Coronado, RN  Infection Prevention:   rest/sleep promoted   single patient room provided  Goal: Optimal Comfort and Wellbeing  Outcome: Progressing  Goal: Readiness for Transition of Care  Outcome: Progressing

## 2025-02-24 NOTE — PLAN OF CARE
"A&OX4.  Forgetful. VSS. Afebrile. Super pubic catheter intact and draining. Patient to discharge home with the catheter. Reg diet. Continue POC and monitoring.       Problem: Adult Inpatient Plan of Care  Goal: Plan of Care Review  Description: The Plan of Care Review/Shift note should be completed every shift.  The Outcome Evaluation is a brief statement about your assessment that the patient is improving, declining, or no change.  This information will be displayed automatically on your shift  note.  Outcome: Progressing  Flowsheets (Taken 2/23/2025 2241)  Outcome Evaluation: Continue to have super pubic catheter. patient will discharge with Mtz and be folowed u  Plan of Care Reviewed With: patient  Overall Patient Progress: improving  Goal: Patient-Specific Goal (Individualized)  Description: You can add care plan individualizations to a care plan. Examples of Individualization might be:  \"Parent requests to be called daily at 9am for status\", \"I have a hard time hearing out of my right ear\", or \"Do not touch me to wake me up as it startles  me\".  Outcome: Progressing  Goal: Absence of Hospital-Acquired Illness or Injury  Outcome: Progressing  Intervention: Identify and Manage Fall Risk  Recent Flowsheet Documentation  Taken 2/23/2025 1700 by Gisela Choudhary RN  Safety Promotion/Fall Prevention:   safety round/check completed   activity supervised  Intervention: Prevent Skin Injury  Recent Flowsheet Documentation  Taken 2/23/2025 1700 by Gisela Choudhary RN  Body Position: position changed independently  Intervention: Prevent Infection  Recent Flowsheet Documentation  Taken 2/23/2025 1700 by Gisela Choudhary RN  Infection Prevention:   single patient room provided   hand hygiene promoted  Goal: Optimal Comfort and Wellbeing  Outcome: Progressing  Goal: Readiness for Transition of Care  Outcome: Progressing     Problem: Fall Injury Risk  Goal: Absence of Fall and Fall-Related Injury  Outcome: " Progressing  Intervention: Promote Injury-Free Environment  Recent Flowsheet Documentation  Taken 2/23/2025 1700 by Gisela Choudhary, RN  Safety Promotion/Fall Prevention:   safety round/check completed   activity supervised     Problem: Comorbidity Management  Goal: Maintenance of Behavioral Health Symptom Control  Outcome: Progressing  Goal: Maintenance of COPD Symptom Control  Outcome: Progressing  Goal: Blood Glucose Levels Within Targeted Range  Outcome: Progressing  Goal: Maintenance of Heart Failure Symptom Control  Outcome: Progressing  Goal: Blood Pressure in Desired Range  Outcome: Progressing  Goal: Maintenance of Osteoarthritis Symptom Control  Outcome: Progressing  Intervention: Maintain Osteoarthritis Symptom Control  Recent Flowsheet Documentation  Taken 2/23/2025 1700 by Gisela Choudhary, RN  Assistive Device Utilized: gait belt  Activity Management:   ambulated to bathroom   ambulated outside room  Goal: Bariatric Home Regimen Maintained  Outcome: Progressing  Goal: Maintenance of Seizure Control  Outcome: Progressing     Problem: UTI (Urinary Tract Infection)  Goal: Improved Infection Symptoms  Outcome: Progressing   Goal Outcome Evaluation:      Plan of Care Reviewed With: patient    Overall Patient Progress: improvingOverall Patient Progress: improving    Outcome Evaluation: Continue to have super pubic catheter. patient will discharge with Mtz and be folowed u

## 2025-02-24 NOTE — PROGRESS NOTES
Care Management Discharge Note    Discharge Date: 02/24/2025       Discharge Disposition:  Return to her independent apartment at [facility].    Discharge Services:  meals, housekeeping and medication management.    Discharge DME:  none    Discharge Transportation: Kiley moya will transport this afternoon. Updated nursing to call daughter when they have patient ready to go.    Private pay costs discussed: Not applicable    Does the patient's insurance plan have a 3 day qualifying hospital stay waiver?  No    PAS Confirmation Code: Not applicable   Patient/family educated on Medicare website which has current facility and service quality ratings:  Not applicable    Education Provided on the Discharge Plan:  yes  Persons Notified of Discharge Plans: patient, daughter, nursing and facility  Patient/Family in Agreement with the Plan: yes    Handoff Referral Completed: No, handoff not indicated or clinically appropriate    Additional Information:  Provider has discharged patient back to her senior living apartment where she lives independently. Spoke wit daughterKiley she will  patient when she is ready to go. Informed the RN that when patient is ready to go to call griffin Cao and she would be here in 15 minutes. Updated Edna at [facility] that there is no new orders.    JOSE ALFREDO Garcia   Inpatient Care Coordination   Supervisor  [facility]  [phone]      JOSE ALFREDO Aviles

## 2025-02-24 NOTE — TELEPHONE ENCOUNTER
Incoming call, wondering if patient should seen by PCP or urology after ED.     I read through the note. Advise to follow-up with both.     I assist with making an appointment as best as I can. No opening with PCP, able to book for tomorrow with an available provider.     I provided her with urology scheduling number as well.     No further questions at this time.     Luis Felipe RN BSN  [facility]  [phone]

## 2025-02-25 ENCOUNTER — PATIENT OUTREACH (OUTPATIENT)
Dept: FAMILY MEDICINE | Facility: CLINIC | Age: 70
End: 2025-02-25

## 2025-02-25 NOTE — TELEPHONE ENCOUNTER
Received a call back from the patient's daughter, Dominique (Consent to Communicate on file)   - Assisted in scheduling the patient for a hospital follow-up appointment on 2/28/2025 with REGINE Dos Santos CNP at 11:30 AM (arrival time of 11:10 AM)   - Patient;'s daughter verbalized understanding and agrees with the plan     Appointments in Next Year      Feb 28, 2025 11:30 AM  (Arrive by 11:15 AM)  ED/Hospital Follow Up with REGINE Dos Santos CNP  [facility] ([facility] ) [phone]     Transitions of Care Outreach  Chief Complaint   Patient presents with    Hospital F/U     Hydroureter undefined   Gastroenteritis undefined   Acute urinary retention  undefined   Nausea vomiting and diarrhea  undefined   Hydronephrosis, unspecified hydronephrosis type  undefined   Urinary tract infection in female           Most Recent Admission Date: 2/21/2025   Most Recent Admission Diagnosis: Hydroureter - N13.4  Gastroenteritis - K52.9  Acute urinary retention - R33.8  Nausea vomiting and diarrhea - R11.2, R19.7  Hydronephrosis, unspecified hydronephrosis type - N13.30  Urinary tract infection in female - N39.0     Most Recent Discharge Date: 2/24/2025   Most Recent Discharge Diagnosis: Gastroenteritis - K52.9  Nausea vomiting and diarrhea - R11.2, R19.7  Urinary tract infection in female - N39.0  Hydronephrosis, unspecified hydronephrosis type - N13.30  Hydroureter - N13.4  Acute urinary retention - R33.8     Transitions of Care Assessment    Discharge Assessment  How are you doing now that you are home?: Patient's daughter states that the patient is confused and has care set up through [facility] at this time.  How are your symptoms? (Red Flag symptoms escalate to triage hotline per guidelines): Unchanged  Do you know how to contact your clinic care team if you have future questions or changes to your health status? : Yes  Does the patient have their discharge  instructions? : Yes  Does the patient have questions regarding their discharge instructions? : No  Were you started on any new medications or were there changes to any of your previous medications? : Yes  Does the patient have all of their medications?: Yes  Do you have questions regarding any of your medications? : No  Do you have all of your needed medical supplies or equipment (DME)?  (i.e. oxygen tank, CPAP, cane, etc.): Yes    Follow up Plan     Discharge Follow-Up  Discharge follow up appointment scheduled in alignment with recommended follow up timeframe or Transitions of Risk Category? (Low = within 30 days; Moderate= within 14 days; High= within 7 days): Yes  Discharge Follow Up Appointment Date: 02/28/25  Discharge Follow Up Appointment Scheduled with?: Primary Care Provider  Patient's follow up appointment not scheduled: Patient accepted scheduling support. Appt scheduled/requested per protocol.    Future Appointments   Date Time Provider Department Center   2/28/2025 11:30 AM Allen Bejarano APRN CNP [facility]   4/11/2025  3:30 PM Coming Reba Tillman MD [facility]       Outpatient Plan as outlined on AVS reviewed with patient.    For any urgent concerns, please contact our 24 hour nurse triage line: [phone] ([phone])       Mandie Cardoso RN

## 2025-02-25 NOTE — TELEPHONE ENCOUNTER
Message #1  left for patient's daughter Dominique to return call   Please assist with making hospital follow up appt in clinic within 7 days, for assessment and labs     Patient is unable to schedule hosp follow-up appt on her own, RN advised to reach out to daughter Dominique for scheduling     Transitions of Care Outreach  Chief Complaint   Patient presents with    Hospital F/U     Hydroureter undefined   Gastroenteritis undefined   Acute urinary retention  undefined   Nausea vomiting and diarrhea  undefined   Hydronephrosis, unspecified hydronephrosis type  undefined   Urinary tract infection in female         Most Recent Admission Date: 2/21/2025   Most Recent Admission Diagnosis: Hydroureter - N13.4  Gastroenteritis - K52.9  Acute urinary retention - R33.8  Nausea vomiting and diarrhea - R11.2, R19.7  Hydronephrosis, unspecified hydronephrosis type - N13.30  Urinary tract infection in female - N39.0     Most Recent Discharge Date: 2/24/2025   Most Recent Discharge Diagnosis: Gastroenteritis - K52.9  Nausea vomiting and diarrhea - R11.2, R19.7  Urinary tract infection in female - N39.0  Hydronephrosis, unspecified hydronephrosis type - N13.30  Hydroureter - N13.4  Acute urinary retention - R33.8     Transitions of Care Assessment    Discharge Assessment  How are you doing now that you are home?: feeling pretty good, denies nausea and vomiting since coming home, currently has catheter and is working well  How are your symptoms? (Red Flag symptoms escalate to triage hotline per guidelines): Improved  Do you know how to contact your clinic care team if you have future questions or changes to your health status? : No (main number given)  Does the patient have their discharge instructions? : Yes  Does the patient have questions regarding their discharge instructions? : No (zofran, tylenol, imodium)  Were you started on any new medications or were there changes to any of your previous medications? : Yes  Does the  patient have all of their medications?: Yes  Do you have questions regarding any of your medications? : No (meds are given at [facility])  Do you have all of your needed medical supplies or equipment (DME)?  (i.e. oxygen tank, CPAP, cane, etc.): Yes    Follow up Plan     Discharge Follow-Up  Discharge follow up appointment scheduled in alignment with recommended follow up timeframe or Transitions of Risk Category? (Low = within 30 days; Moderate= within 14 days; High= within 7 days): No (patient does not schedule her own appointment - message left for daughter Dominique to return call to assist with appt.)  Patient's follow up appointment not scheduled: Patient accepted scheduling support. Appt scheduled/requested per protocol.    Future Appointments   Date Time Provider Department Center   4/11/2025  3:30 PM Reba Raymundo MD [facility]     Outpatient Plan as outlined on AVS reviewed with patient.    For any urgent concerns, please contact our 24 hour nurse triage line: [phone] ([phone])       Mariely Mackenzie RN on 2/25/2025 at 8:54 AM

## 2025-02-25 NOTE — TELEPHONE ENCOUNTER
Hospital   2/21/2025 - 2/24/2025 (3 days)  [facility]    Diagnosis    Hydroureter    Gastroenteritis    Acute urinary retention    Nausea vomiting and diarrhea    Hydronephrosis, unspecified hydronephrosis type    Urinary tract infection in female    Medications   START taking:  acetaminophen (TYLENOL)  ondansetron (ZOFRAN ODT)  CHANGE how you take:  loperamide (IMODIUM A-D)  loperamide (IMODIUM)    Follow Up Instructions  Follow-up and recommended labs and tests  Follow up with primary care provider, Reba Tillman, within 7 days for hospital follow- up. The following labs/tests  are recommended: CBC and BMP in 7 days. Follow up with urology in 1 week.    Routing to NOLBERTO Mackenzie, Registered Nurse  [facility]

## 2025-02-26 LAB
BACTERIA BLD CULT: NO GROWTH
BACTERIA BLD CULT: NO GROWTH

## 2025-02-27 ENCOUNTER — LAB REQUISITION (OUTPATIENT)
Dept: LAB | Facility: CLINIC | Age: 70
End: 2025-02-27
Payer: COMMERCIAL

## 2025-02-27 DIAGNOSIS — E78.49 OTHER HYPERLIPIDEMIA: ICD-10-CM

## 2025-02-27 DIAGNOSIS — N30.00 ACUTE CYSTITIS WITHOUT HEMATURIA: ICD-10-CM

## 2025-03-03 ENCOUNTER — TELEPHONE (OUTPATIENT)
Dept: UROLOGY | Facility: CLINIC | Age: 70
End: 2025-03-03
Payer: COMMERCIAL

## 2025-03-03 LAB
ANION GAP SERPL CALCULATED.3IONS-SCNC: 13 MMOL/L (ref 7–15)
BASOPHILS # BLD AUTO: 0 10E3/UL (ref 0–0.2)
BASOPHILS NFR BLD AUTO: 1 %
BUN SERPL-MCNC: 5.6 MG/DL (ref 8–23)
CALCIUM SERPL-MCNC: 9.7 MG/DL (ref 8.8–10.4)
CHLORIDE SERPL-SCNC: 103 MMOL/L (ref 98–107)
CREAT SERPL-MCNC: 0.96 MG/DL (ref 0.51–0.95)
EGFRCR SERPLBLD CKD-EPI 2021: 64 ML/MIN/1.73M2
EOSINOPHIL # BLD AUTO: 0.3 10E3/UL (ref 0–0.7)
EOSINOPHIL NFR BLD AUTO: 4 %
ERYTHROCYTE [DISTWIDTH] IN BLOOD BY AUTOMATED COUNT: 13.2 % (ref 10–15)
GLUCOSE SERPL-MCNC: 110 MG/DL (ref 70–99)
HCO3 SERPL-SCNC: 28 MMOL/L (ref 22–29)
HCT VFR BLD AUTO: 42.6 % (ref 35–47)
HGB BLD-MCNC: 13.7 G/DL (ref 11.7–15.7)
IMM GRANULOCYTES # BLD: 0.1 10E3/UL
IMM GRANULOCYTES NFR BLD: 2 %
LYMPHOCYTES # BLD AUTO: 1.9 10E3/UL (ref 0.8–5.3)
LYMPHOCYTES NFR BLD AUTO: 23 %
MCH RBC QN AUTO: 28.6 PG (ref 26.5–33)
MCHC RBC AUTO-ENTMCNC: 32.2 G/DL (ref 31.5–36.5)
MCV RBC AUTO: 89 FL (ref 78–100)
MONOCYTES # BLD AUTO: 0.6 10E3/UL (ref 0–1.3)
MONOCYTES NFR BLD AUTO: 7 %
NEUTROPHILS # BLD AUTO: 5.1 10E3/UL (ref 1.6–8.3)
NEUTROPHILS NFR BLD AUTO: 63 %
NRBC # BLD AUTO: 0 10E3/UL
NRBC BLD AUTO-RTO: 0 /100
PLATELET # BLD AUTO: 399 10E3/UL (ref 150–450)
POTASSIUM SERPL-SCNC: 3.6 MMOL/L (ref 3.4–5.3)
RBC # BLD AUTO: 4.79 10E6/UL (ref 3.8–5.2)
SODIUM SERPL-SCNC: 144 MMOL/L (ref 135–145)
WBC # BLD AUTO: 8 10E3/UL (ref 4–11)

## 2025-03-03 PROCEDURE — P9604 ONE-WAY ALLOW PRORATED TRIP: HCPCS | Mod: ORL

## 2025-03-03 PROCEDURE — 36415 COLL VENOUS BLD VENIPUNCTURE: CPT | Mod: ORL

## 2025-03-03 PROCEDURE — 80048 BASIC METABOLIC PNL TOTAL CA: CPT | Mod: ORL

## 2025-03-03 PROCEDURE — 85025 COMPLETE CBC W/AUTO DIFF WBC: CPT | Mod: ORL

## 2025-03-18 ENCOUNTER — ALLIED HEALTH/NURSE VISIT (OUTPATIENT)
Dept: UROLOGY | Facility: CLINIC | Age: 70
End: 2025-03-18
Payer: COMMERCIAL

## 2025-03-18 DIAGNOSIS — Z79.2 PROPHYLACTIC ANTIBIOTIC: ICD-10-CM

## 2025-03-18 DIAGNOSIS — R33.9 URINARY RETENTION: ICD-10-CM

## 2025-03-18 DIAGNOSIS — Z87.440 HISTORY OF UTI: Primary | ICD-10-CM

## 2025-03-18 LAB
ALBUMIN UR-MCNC: NEGATIVE MG/DL
APPEARANCE UR: CLEAR
BILIRUB UR QL STRIP: NEGATIVE
COLOR UR AUTO: YELLOW
GLUCOSE UR STRIP-MCNC: NEGATIVE MG/DL
HGB UR QL STRIP: ABNORMAL
KETONES UR STRIP-MCNC: NEGATIVE MG/DL
LEUKOCYTE ESTERASE UR QL STRIP: ABNORMAL
NITRATE UR QL: NEGATIVE
PH UR STRIP: 5.5 [PH] (ref 5–7)
SP GR UR STRIP: <=1.005 (ref 1–1.03)
UROBILINOGEN UR STRIP-ACNC: 0.2 E.U./DL

## 2025-03-18 PROCEDURE — 81003 URINALYSIS AUTO W/O SCOPE: CPT | Mod: QW

## 2025-03-18 RX ORDER — CEPHALEXIN 500 MG/1
500 CAPSULE ORAL ONCE
Qty: 1 CAPSULE | Refills: 0 | Status: SHIPPED | OUTPATIENT
Start: 2025-03-18 | End: 2025-03-18

## 2025-03-18 NOTE — PROGRESS NOTES
Mariela Duffy comes into clinic today at the request of Dr. Keating Ordering Provider for [facility].    Pt's catheter was changed prior to collecting urine specimen    The patient is here for a Mtz catheter change.  Patient's catheter was disconnected from the leg bag and balloon deflated. The catheter was removed with no complaints offered by the patient and the procedure was tolerated well.      Using sterile field technique a sterile, well lubricated 16 Algerian Mtz straight catheter was gently inserted with ease x 1 attempt.  The balloon was filled with 8 ml sterile water.  No discomfort was voiced by the patient.  Clear, yellow urine return drained from the catheter, this was collected and sent for culture today. Sterile tubing and drainage bag were attached to the catheter and secured to the right thigh. Patient was instructed on proper hygiene and catheter care.  Pt to follow up with UDS.         This service provided today was under the supervising provider of the anne Barlow CNP, who was available if needed.    Nahed More, CMA

## 2025-03-19 LAB — BACTERIA UR CULT: NO GROWTH

## 2025-03-26 ENCOUNTER — TELEPHONE (OUTPATIENT)
Dept: UROLOGY | Facility: CLINIC | Age: 70
End: 2025-03-26
Payer: COMMERCIAL

## 2025-03-26 ENCOUNTER — TELEPHONE (OUTPATIENT)
Dept: FAMILY MEDICINE | Facility: CLINIC | Age: 70
End: 2025-03-26
Payer: COMMERCIAL

## 2025-03-26 NOTE — TELEPHONE ENCOUNTER
Received a call from pt,    She states she stood up and her catheter fell ot. She denies pain or any concerns. She would just like to know the next steps.     Per chart review,   Pt established with urology.     Pt states she meant to call urology, however did not have phone number.     Writer gave her the phone number to speak with urology [phone].     Pt verbalizes understanding and is agreeable with plan. Pt denies any further questions or concerns at this time.     Terri EM RN   Clinic RN  [facility]

## 2025-03-26 NOTE — PROGRESS NOTES
PREPROCEDURE DIAGNOSES:    1. Urinary retention previously managed with SP tube that was removed in 2023  2. Hx of severe bilateral hydroureteronephrosis  3. Hx of multiple UTIs with resistant organisms    POSTPROCEDURE DIAGNOSES:  -Small bladder capacity (187 mL mL) with increased/heightened filling sensations.  -Good bladder compliance though small volume.  -No DO/DOI.  -No appreciable detrusor contraction during voiding. Patient voided 10 mL by significant Valsalva effort.   -No true voiding phase observed.   -Incomplete bladder emptying (final  mL)  -Mild EMG activity during voiding likely Valsalva effort.     PROCEDURE:    -Complex filling cystometrogram with measurement of bladder and vaginal pressures.  -Electromyography of the pelvic floor during urodynamics.    INDICATIONS FOR PROCEDURE:  Ms. Mariela Duffy is a pleasant 69 year old female with urinary retention previously managed with an SP tube (removed in 2023) that was recently hospitalized for UTI, gastroenteritis, and acute urinary retention from 2/21/25-2/24/25. Urodynamic assessment is requested today by Dr. Jarrell Keating to better characterize Ms. Mariela Duffy's voiding dysfunction.      VOIDING DIARY: Not completed as patient has an indwelling Mtz catheter.    DESCRIPTION OF PROCEDURE:  Risks, benefits, and alternatives to urodynamics were discussed with the patient and she wished to proceed.  Urodynamics are planned to better assess the primary etiology for Ms. Duffy's urologic dysfunction.  The patient is not currently taking an anticholinergic medication. After informed consent was obtained, the patient was taken to the procedure room where the study was initiated. Findings below.     PRE-STUDY UROFLOWMETRY: Not completed as patient arrived with indwelling Mtz catheter.     First, a 7F double-lumen urodynamics catheter was inserted into the bladder under sterile technique via the urethra.  A 7F abdominal manometry catheter was  placed in the vagina.  EMG pads were placed on both sides of the anal verge. With coughing there was an appropriate rise in vesical and abdominal pressures with no change in detrusor pressure, confirming good study catheter placement.   The bladder was then filled with sterile normal saline at 30 mL/minute and serial pressures were recorded until the patient determine cystometric capacity. At completion of the study, all UDS catheters were removed. A new Mtz catheter was placed.     DURING THE FILLING PHASE:  First sensation: 26 mL.  First desire: 57 mL.  Strong desire: 81 mL.  Maximum capacity per patient: 105 mL.    Note: When patient reported capacity, staff suggested they try filling a little more. Patient was able to hold an additional 82 mL before reaching true maximum capacity.     Maximum capacity per volume infused: 187 mL    Uninhibited detrusor contractions: None.  Compliance: Good. PDet=~4 cmH2O at capacity. Compliance ratio of 47.  Continence: No leaking with study.   EMG: Quiet during filling.    DURING THE VOIDING PHASE:  Maximum detrusor contraction with void: No appreciable detrusor contraction with void. Patient appears to avoid entirely by Valsalva by which she produced an output of 10 mL.    FLUOROSCOPIC IMAGING OF THE BLADDER WAS NOT OBTAINED DURING URODYNAMICS GIVEN OUR LOCATION DOES NOT PROVIDE FLUOROSCOPY.    ASSESSMENT/PLAN:  Ms. Mariela Duffy is a pleasant 69 year old female with urinary retention managed via Mtz catheter who demonstrated the following findings today on urodynamic evaluation:    -Small bladder capacity (187 mL mL) with increased/heightened filling sensations.  -Good bladder compliance though small volume.  -No DO/DOI.  -No appreciable detrusor contraction during voiding. Patient voided 10 mL by significant Valsalva effort.   -No true voiding phase observed.   -Incomplete bladder emptying (final  mL)  -Mild EMG activity during voiding likely Valsalva effort.      The patient will follow up as scheduled with Dr. Keating on 4/11/25 to further discuss today's study results and make plans for how best to proceed.      - A single Keflex was provided for UTI prophylaxis following completion of today's study per department protocol.  The risk of UTI with UDS is low at ~2.5-3%.      Thank you for allowing me to participate in the care of Ms. Mariela Duffy and please don't hesitate to contact me with any questions or concerns.      Ariadna Mitchell PA-C  Department of Urology

## 2025-03-26 NOTE — TELEPHONE ENCOUNTER
[facility] Call Center    Phone Message    May a detailed message be left on voicemail: yes     Reason for Call: Other: patient called in stating that she just stood up and her catheter fell out. Please call her back to discuss.      Action Taken: Message routed to:  Other: karishma uro    Travel Screening: Not Applicable     Date of Service:

## 2025-03-28 ENCOUNTER — ALLIED HEALTH/NURSE VISIT (OUTPATIENT)
Dept: UROLOGY | Facility: CLINIC | Age: 70
End: 2025-03-28
Payer: COMMERCIAL

## 2025-03-28 DIAGNOSIS — R33.9 URINARY RETENTION: Primary | ICD-10-CM

## 2025-03-28 DIAGNOSIS — Z79.2 PROPHYLACTIC ANTIBIOTIC: ICD-10-CM

## 2025-03-28 PROCEDURE — 51797 INTRAABDOMINAL PRESSURE TEST: CPT

## 2025-03-28 PROCEDURE — 51728 CYSTOMETROGRAM W/VP: CPT

## 2025-03-28 PROCEDURE — 51784 ANAL/URINARY MUSCLE STUDY: CPT

## 2025-03-28 PROCEDURE — 51741 ELECTRO-UROFLOWMETRY FIRST: CPT

## 2025-03-28 RX ADMIN — Medication 500 MG: at 14:18

## 2025-03-28 NOTE — NURSING NOTE
Catheter insertion documentation on 3/28/2025:    Mariela Duffy presents to the clinic for catheter insertion.  Reason for insertion: replace after urodyamics  Order has been verified. yes  Catheter successfully inserted into the urethral meatus in the usual sterile fashion without immediate complication.  Type of catheter placed: 16 Macedonian indwelling catheter  Urine is yellow in color.  150 cc's of urine output returned.  Balloon was filled with 10 cc's of normal saline.  Securement device placed for the catheter.  The patient tolerated the procedure and was instructed to follow up as planned.  Clinic Administered Medication Documentation    Patient was given Cipro 500 mg. Prior to medication administration, verified patient's identity using patient's name and date of birth.    Nallely Ji LPN

## 2025-04-12 ENCOUNTER — HEALTH MAINTENANCE LETTER (OUTPATIENT)
Age: 70
End: 2025-04-12

## 2025-05-01 ENCOUNTER — VIRTUAL VISIT (OUTPATIENT)
Dept: UROLOGY | Facility: CLINIC | Age: 70
End: 2025-05-01
Payer: COMMERCIAL

## 2025-05-01 DIAGNOSIS — R33.9 URINARY RETENTION: Primary | ICD-10-CM

## 2025-05-01 NOTE — NURSING NOTE
Current patient location: [place]    Is the patient currently in the state of [place]? YES    Visit mode: VIDEO    If the visit is dropped, the patient can be reconnected by:Text to daughter's phone    Will anyone else be joining the visit? Yes, daughter  (If patient encounters technical issues they should call [phone]    Are changes needed to the allergy or medication list? E-check in was reviewed/completed for today's visit. VF did not review e-check in information again with Fely due to this.       Are refills needed on medications prescribed by this physician? Discuss with provider    Rooming Documentation:  Not applicable    Reason for visit: RECHECK    Mora TAYLOR

## 2025-05-01 NOTE — PROGRESS NOTES
CHIEF COMPLAINT   Mariela Duffy who is a 69 year old female returns today for follow-up of h/o chronic urinary retention previously managed with suprapubic tube from [id number], distended bladder and bilateral hydroureteronephrosis Feb 2025 and Mtz replaced    HPI   Mariela Duffy is a 69 year old female returns today for follow-up of h/o chronic urinary retention previously managed with suprapubic tube from 0012-8328, distended bladder and bilateral hydroureteronephrosis Feb 2025 and Mtz replaced    Catheter doing ok per patient, but per her daughter she keeps accidentally removing the catheter (as recently as yesterday)    PHYSICAL EXAM  Patient is a 69 year old  female   Vitals: not currently breastfeeding.  There is no height or weight on file to calculate BMI.  General Appearance Adult:   Alert, no acute distress, oriented  HENT: throat/mouth:normal, good dentition  Lungs: no respiratory distress, or pursed lip breathing  Heart: No obvious jugular venous distension present  Musculoskeltal: extremities normal, no peripheral edema  Skin: no suspicious lesions or rashes  Neuro: Alert, oriented, speech and mentation normal  Psych: affect and mood normal      ASSESSMENT and PLAN  69 year old female returns today for follow-up of h/o chronic urinary retention previously managed with suprapubic tube from 3285-3454, distended bladder and bilateral hydroureteronephrosis Feb 2025 and Mtz replaced    Discussed that patient has an atonic bladder without any expected recovery. She is scheduled for discussion with Dr. Urbina to see if she would be a candidate for sacral neuromodulation. I do not recommend placing a suprapubic tube prior to that discussion, as suprapubic tube placement is a minor operation with risks including bleeding, infection, damage to bowel, and this operation is made more challenging by scar tissue present from prior SP tube placement    She will have discussion with Dr. Urbina first prior to  making a decision          Jarrell Keating MD   [facility]  [facility] Phone: [phone]      Virtual Visit Details    Type of service:  Video Visit   Video Start Time: 11:12AM  Video End Time:11:21 AM    Originating Location (pt. Location): Home    Distant Location (provider location):  On-site  Platform used for Video Visit: [facility]

## 2025-05-01 NOTE — LETTER
5/1/2025       RE: Mariela Duffy  [address]  [place] [address]     Dear Colleague,    Thank you for referring your patient, Mariela Duffy, to the [facility] [place] at [facility]. Please see a copy of my visit note below.    CHIEF COMPLAINT   Mariela Duffy who is a 69 year old female returns today for follow-up of h/o chronic urinary retention previously managed with suprapubic tube from 7516-2064, distended bladder and bilateral hydroureteronephrosis Feb 2025 and Mtz replaced    HPI   Mariela Duffy is a 69 year old female returns today for follow-up of h/o chronic urinary retention previously managed with suprapubic tube from 6453-0345, distended bladder and bilateral hydroureteronephrosis Feb 2025 and Mtz replaced    Catheter doing ok per patient, but per her daughter she keeps accidentally removing the catheter (as recently as yesterday)    PHYSICAL EXAM  Patient is a 69 year old  female   Vitals: not currently breastfeeding.  There is no height or weight on file to calculate BMI.  General Appearance Adult:   Alert, no acute distress, oriented  HENT: throat/mouth:normal, good dentition  Lungs: no respiratory distress, or pursed lip breathing  Heart: No obvious jugular venous distension present  Musculoskeltal: extremities normal, no peripheral edema  Skin: no suspicious lesions or rashes  Neuro: Alert, oriented, speech and mentation normal  Psych: affect and mood normal      ASSESSMENT and PLAN  69 year old female returns today for follow-up of h/o chronic urinary retention previously managed with suprapubic tube from 6692-0389, distended bladder and bilateral hydroureteronephrosis Feb 2025 and Mtz replaced    Discussed that patient has an atonic bladder without any expected recovery. She is scheduled for discussion with Dr. Urbina to see if she would be a candidate for sacral neuromodulation. I do not recommend  placing a suprapubic tube prior to that discussion, as suprapubic tube placement is a minor operation with risks including bleeding, infection, damage to bowel, and this operation is made more challenging by scar tissue present from prior SP tube placement    She will have discussion with Dr. Ciara dubois prior to making a decision          Jarrell Keating MD   [facility]  [facility] Phone: [phone]      Virtual Visit Details    Type of service:  Video Visit   Video Start Time: 11:12AM  Video End Time:11:21 AM    Originating Location (pt. Location): Home    Distant Location (provider location):  On-site  Platform used for Video Visit: [facility]      Again, thank you for allowing me to participate in the care of your patient.      Sincerely,    Jarrell Keating MD

## 2025-05-14 ENCOUNTER — TRANSFERRED RECORDS (OUTPATIENT)
Dept: HEALTH INFORMATION MANAGEMENT | Facility: CLINIC | Age: 70
End: 2025-05-14
Payer: COMMERCIAL

## 2025-05-28 ENCOUNTER — VIRTUAL VISIT (OUTPATIENT)
Dept: UROLOGY | Facility: CLINIC | Age: 70
End: 2025-05-28
Payer: COMMERCIAL

## 2025-05-28 DIAGNOSIS — R31.21 ASYMPTOMATIC MICROSCOPIC HEMATURIA: ICD-10-CM

## 2025-05-28 DIAGNOSIS — Z87.440 HISTORY OF UTI: ICD-10-CM

## 2025-05-28 DIAGNOSIS — R33.9 URINARY RETENTION: Primary | ICD-10-CM

## 2025-05-28 PROCEDURE — 98006 SYNCH AUDIO-VIDEO EST MOD 30: CPT | Performed by: UROLOGY

## 2025-05-28 PROCEDURE — 1126F AMNT PAIN NOTED NONE PRSNT: CPT | Mod: 95 | Performed by: UROLOGY

## 2025-05-28 NOTE — NURSING NOTE
Current [place] location[place]    Is the patient currently in the state of [place]? YES    Visit mode: VIDEO    If the visit is dropped, the patient can be reconnected by:VIDEO VISIT: Text to cell phone:   Telephone Information:   Mobile [phone]   Mobile [phone]       Will anyone else be joining the visit? NO  (If patient encounters technical issues they should call [phone]    Are changes needed to the allergy or medication list? No    Are refills needed on medications prescribed by this physician? NO    Rooming Documentation:  Questionnaire(s) completed    Reason for visit: RECHECK    Roseanne TAYLOR

## 2025-05-28 NOTE — PROGRESS NOTES
Virtual Visit Details    Type of service:  Video Visit   Video Start Time: 9:46 AM   Video End Time: 10:01 AM  Originating Location (pt. Location): Home    Distant Location (provider location):  On-site  Platform used for Video Visit: Jerald    May 28, 2025    Fely was seen today for recheck.    Diagnoses and all orders for this visit:    Urinary retention    History of UTI    Asymptomatic microscopic hematuria    At this time we discussed how SNM works and the literature on SNM is mostly for OAB, less on urinary retention but it is FDA approved for this.  We discussed the literature defines about 50% improvement as success.   We discussed that this is a stage 1 two week trial and then a stage 2 vs removal.    We discussed risks of surgery to include risks of anesthesia, bleeding, infection, device not working, device malfunction and need to removal/replacement    We discussed the two sacral neuromodulators on the market are Axonics and Medtronic and some of the different nuances of each.       She wishes to be sent some information about these things and then return for further discussion    15 minutes were spent today on the day of the encounter in reviewing the EMR including Dr Keating's note, direct patient care including surgical counseling, coordination of care and documentation    Georgie Urbina MD MPH  (she/her/hers)   of Urology  [facility]    Subjective    She is here today to discuss if she is a candidate for SNM.  She is accompanied by her daughter  Dr Keating's note from 4/11/25 reviewed in Epic.  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 UDS showed no detrusor function She denies any changes in health since last visit    LMP  (LMP Unknown)   GENERAL: healthy, alert and no distress  EYES: Eyes grossly normal to inspection, conjunctivae and sclerae normal  HENT:  normal cephalic/atraumatic.  External ears, nose and mouth without ulcers or lesions.  RESP: no audible wheeze, cough, or visible cyanosis.  No visible retractions or increased work of breathing.  Able to speak fully in complete sentences.  NEURO: Cranial nerves grossly intact, mentation intact and speech normal  PSYCH: mentation appears normal, affect normal/bright, judgement and insight intact, normal speech and appearance well-groomed    CC  Patient Care Team:  Chandrika Raymundo MD as PCP - General (Family Medicine)  Chandrika Raymundo MD as Assigned PCP  Jarrell Keating MD as Assigned Surgical Provider  Georgie Urbina MD as MD (Urology)  CHANDRIKA RAYMUNDO

## 2025-05-28 NOTE — LETTER
5/28/2025       RE: Mariela Duffy  [address]  [place] [address]     Dear Colleague,    Thank you for referring your patient, Mariela Duffy, to the [facility] at [facility]. Please see a copy of my visit note below.    Virtual Visit Details    Type of service:  Video Visit   Video Start Time: 9:46 AM   Video End Time: 10:01 AM  Originating Location (pt. Location): Home    Distant Location (provider location):  On-site  Platform used for Video Visit: Jerald    May 28, 2025    Fely was seen today for recheck.    Diagnoses and all orders for this visit:    Urinary retention    History of UTI    Asymptomatic microscopic hematuria    At this time we discussed how SNM works and the literature on SNM is mostly for OAB, less on urinary retention but it is FDA approved for this.  We discussed the literature defines about 50% improvement as success.   We discussed that this is a stage 1 two week trial and then a stage 2 vs removal.    We discussed risks of surgery to include risks of anesthesia, bleeding, infection, device not working, device malfunction and need to removal/replacement    We discussed the two sacral neuromodulators on the market are Axonics and Medtronic and some of the different nuances of each.       She wishes to be sent some information about these things and then return for further discussion    15 minutes were spent today on the day of the encounter in reviewing the EMR including Dr Keating's note, direct patient care including surgical counseling, coordination of care and documentation    Georgie Urbina MD MPH  (she/her/hers)   of Urology  [facility]    Subjective    She is here today to discuss if she is a candidate for SNM.  She is accompanied by her daughter  Dr Keating's note from 4/11/25 reviewed in Epic.  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 UDS showed no detrusor function She denies any changes in health since last visit    LMP  (LMP Unknown)   GENERAL: healthy, alert and no distress  EYES: Eyes grossly normal to inspection, conjunctivae and sclerae normal  HENT: normal cephalic/atraumatic.  External ears, nose and mouth without ulcers or lesions.  RESP: no audible wheeze, cough, or visible cyanosis.  No visible retractions or increased work of breathing.  Able to speak fully in complete sentences.  NEURO: Cranial nerves grossly intact, mentation intact and speech normal  PSYCH: mentation appears normal, affect normal/bright, judgement and insight intact, normal speech and appearance well-groomed    CC  Patient Care Team:  Chandrika Raymundo MD as PCP - General (Family Medicine)  Chandrika Raymundo MD as Assigned PCP  Jarrell Keating MD as Assigned Surgical Provider  Georgie Urbina MD as MD (Urology)  CHANDRIKA RAYMUNDO          Again, thank you for allowing me to participate in the care of your patient.      Sincerely,    Georgie Urbina MD

## 2025-06-13 NOTE — PROGRESS NOTES
Assessment & Plan     Diarrhea, unspecified type  Generalized abdominal tenderness without rebound tenderness     - Enteric Bacteria and Virus Panel by [facility] Stool; Future  - Enteric Bacteria and Virus Panel by [facility] Stool  - Clostridium difficile Toxin B PCR; Future    Will order stool studies today.  Recommend increased electrolyte rehydration and BRAT diet.  Close Follow-up if no change or new or worsening sx prn.    Vesna Mcgregor MD  [facility] ClinicJESSICA Geiger is a 66 year old, presenting for the following health issues:  Consult (C diff)      HPI     Presents with daughter with concerns of GI infection with recent diarrhea this past week.  Very mild generalized pain- bloating.  Tolerating po well.  Home health RN recommended she be checked for infection and particularly C. Diff.  Has never had C. Diff in past.    No fever or vomiting.  Mild nausea.      Review of Systems   Constitutional, HEENT, cardiovascular, pulmonary, GI, , musculoskeletal, neuro, skin, endocrine and psych systems are negative, except as otherwise noted.      Objective    /60 (BP Location: Right arm, Patient Position: Chair, Cuff Size: Adult Regular)   Pulse 78   Temp 98.9  F (37.2  C) (Oral)   Resp 16   Wt 82.4 kg (181 lb 9.6 oz)   LMP 01/20/2004   SpO2 97%   Breastfeeding No   BMI 27.01 kg/m    Body mass index is 27.01 kg/m .  Physical Exam   GENERAL: healthy, alert and no distress  EYES: Eyes grossly normal to inspection, PERRL and conjunctivae and sclerae normal  ABDOMEN: soft, nontender  MS: no gross musculoskeletal defects noted, no edema  PSYCH: mentation appears normal, affect normal/bright                    .  ..  
[Coronary Artery Disease] : coronary artery disease

## 2025-06-23 ENCOUNTER — PATIENT OUTREACH (OUTPATIENT)
Dept: FAMILY MEDICINE | Facility: CLINIC | Age: 70
End: 2025-06-23
Payer: COMMERCIAL

## 2025-06-23 NOTE — TELEPHONE ENCOUNTER
Patient Quality Outreach    Patient is due for the following:   Breast Cancer Screening - Mammogram    Action(s) Taken:   If patient calls back, schedule a office visit for mammo    Type of outreach:    Phone, left message for patient/parent to call back.    Questions for provider review:    None         Erica Goncalves, [facility]  Chart routed to None.

## 2025-07-07 ENCOUNTER — TELEPHONE (OUTPATIENT)
Dept: UROLOGY | Facility: CLINIC | Age: 70
End: 2025-07-07
Payer: COMMERCIAL

## 2025-07-07 NOTE — TELEPHONE ENCOUNTER
HONGM asking patient to call back to schedule surgery with Dr. Urbina. Provided call back number of [phone]. Candis Owens on 7/7/2025 at 11:40 AM

## 2025-07-08 NOTE — TELEPHONE ENCOUNTER
[facility]       Ash Spain on 7/8/2025 at 1:36 PM

## 2025-07-10 NOTE — TELEPHONE ENCOUNTER
ANDREA for patient, asking they call back to schedule with Dr. Urbina. Provided call back number of [phone]. Candis Owens on 7/10/2025 at 4:18 PM

## 2025-07-14 PROBLEM — R33.9 URINARY RETENTION: Status: ACTIVE | Noted: 2025-06-25

## (undated) RX ORDER — LIDOCAINE HYDROCHLORIDE 10 MG/ML
INJECTION, SOLUTION INFILTRATION; PERINEURAL
Status: DISPENSED
Start: 2021-08-02

## (undated) RX ORDER — CEFTAZIDIME 1 G/1
INJECTION, POWDER, FOR SOLUTION INTRAMUSCULAR; INTRAVENOUS
Status: DISPENSED
Start: 2021-08-02

## (undated) RX ORDER — FENTANYL CITRATE 50 UG/ML
INJECTION, SOLUTION INTRAMUSCULAR; INTRAVENOUS
Status: DISPENSED
Start: 2021-08-02